# Patient Record
Sex: MALE | Race: WHITE | NOT HISPANIC OR LATINO | Employment: OTHER | URBAN - METROPOLITAN AREA
[De-identification: names, ages, dates, MRNs, and addresses within clinical notes are randomized per-mention and may not be internally consistent; named-entity substitution may affect disease eponyms.]

---

## 2017-02-06 DIAGNOSIS — I10 BENIGN ESSENTIAL HYPERTENSION: Primary | ICD-10-CM

## 2017-02-06 RX ORDER — LISINOPRIL 40 MG/1
40 TABLET ORAL DAILY
Qty: 90 TABLET | Refills: 0 | Status: SHIPPED | OUTPATIENT
Start: 2017-02-06 | End: 2017-05-10

## 2017-02-06 NOTE — TELEPHONE ENCOUNTER
Request for medication refill:    Date of last visit at clinic: 11/08/2016    Please complete refill if appropriate and CLOSE ENCOUNTER.    Closing the encounter signifies the refill is complete.    If refill has been denied, please complete the smart phrase .smirefuse and route it to the [facility] RN TRIAGE pool to inform the patient and the pharmacy.    Keyla Aguillon, CMA

## 2017-02-27 DIAGNOSIS — F33.1 MAJOR DEPRESSIVE DISORDER, RECURRENT EPISODE, MODERATE (H): ICD-10-CM

## 2017-02-27 RX ORDER — CITALOPRAM HYDROBROMIDE 40 MG/1
40 TABLET ORAL DAILY
Qty: 30 TABLET | Refills: 8 | Status: SHIPPED | OUTPATIENT
Start: 2017-02-27 | End: 2017-11-20

## 2017-02-27 NOTE — TELEPHONE ENCOUNTER
Request for medication refill:    Date of last visit at clinic: 11/8/16    Please complete refill if appropriate and CLOSE ENCOUNTER.    Closing the encounter signifies the refill is complete.    If refill has been denied, please complete the smart phrase .smirefuse and route it to the [facility] RN TRIAGE pool to inform the patient and the pharmacy.    Sandy Juares

## 2017-03-10 ENCOUNTER — OFFICE VISIT (OUTPATIENT)
Dept: FAMILY MEDICINE | Facility: CLINIC | Age: 57
End: 2017-03-10

## 2017-03-10 VITALS
OXYGEN SATURATION: 94 % | HEIGHT: 68 IN | WEIGHT: 310.7 LBS | DIASTOLIC BLOOD PRESSURE: 75 MMHG | SYSTOLIC BLOOD PRESSURE: 114 MMHG | BODY MASS INDEX: 47.09 KG/M2 | RESPIRATION RATE: 18 BRPM | TEMPERATURE: 97.9 F | HEART RATE: 81 BPM

## 2017-03-10 DIAGNOSIS — R20.0 NUMBNESS OF FEET: Primary | ICD-10-CM

## 2017-03-10 DIAGNOSIS — G56.21 LESION OF RIGHT ULNAR NERVE: ICD-10-CM

## 2017-03-10 LAB
FOLATE SERPL-MCNC: 59.5 NG/ML
HBA1C MFR BLD: 6.6 % (ref 4.1–5.7)
VIT B12 SERPL-MCNC: 618 PG/ML (ref 193–986)

## 2017-03-10 NOTE — PROGRESS NOTES
SUBJECTIVE:  The patient is here because of worsening problems with numbness and tingling.  First, I asked him how his neck was doing since the last time I talked with him that is what he was dealing with.  That got better with physical therapy so that has resolved.  He did fill out a PHQ-9 and had a score of 10 which all seemed related to his continued problems with sleep apnea.  He is just giving it a break right now since there have been a lot of efforts put into trying to resolve that problem.  However, this problem with the tingling has been a longstanding one.   Unfortunately, it looks like the background information was so long ago that it is not in the records on the computer.  The only mention I could find was something that says lesion of right ulnar nerve and nothing else to go with it.  Patient is not remembering a lot of the details except when it was looked into before, he had the impression that treatment was not all that good and recovery would be very long.  He decided since his symptoms are getting worse that he would look into it some more and see if there have been any advances made.  I told him I really did not know if advances have been made but since his symptoms are worse, it would probably be worth evaluating.  Now he has no symptoms in both arms, mostly in the hands both right and left.  He had had EMGs, nerve conduction tests previously and there had been found a problem in the right ulnar nerve previously.  Again, now it is more continuous and involving more of the hands bilaterally.  The other thing that is new and he says feels completely different is in his feet.  He gets a tingling sensation.  The hands are more of a numb feeling.  The feet, he says the more he sits, he will get it, in certain chairs it is more likely to happen, that comes and goes.  It is not painful at all.  It seems to be more towards the distal part of the feet.      OBJECTIVE:  On exam, the patient is in no acute  distress.  Vital signs are stable including a good blood pressure.  As mentioned his PHQ-9 score was 10, mostly for sleep-related issues.      IMPRESSION:  History of left ulnar nerve problem, symptoms getting worse in the hands bilaterally and now new symptoms of tingling in the feet.      PLAN:  We will go ahead with ordering an EMG for both upper extremities.  As far as the feet tingling, I ordered a hemoglobin A1c, B12 and folate.  I will get back with him through [employer] about those results.  After he has had his EMG, he will send me a MyChart note and we can make plans from there.      Visit length was 25 minutes, all spent counseling about symptoms and plan.

## 2017-03-10 NOTE — MR AVS SNAPSHOT
"              After Visit Summary   3/10/2017    Juan Francisco Booker    MRN: [id number]           Patient Information     Date Of Birth          1960        Visit Information        Provider Department      3/10/2017 1:40 PM Mar Lieberman MD [facility]        Today's Diagnoses     Numbness of feet    -  1       Follow-ups after your visit        Who to contact     Please call your clinic at [phone] to:    Ask questions about your health    Make or cancel appointments    Discuss your medicines    Learn about your test results    Speak to your doctor   If you have compliments or concerns about an experience at your clinic, or if you wish to file a complaint, please contact [facility] Patient Relations at [phone] or email us at [email or web address]         Additional Information About Your Visit        MyChart Information     frentst gives you secure access to your electronic health record. If you see a primary care provider, you can also send messages to your care team and make appointments. If you have questions, please call your primary care clinic.  If you do not have a primary care provider, please call [phone] and they will assist you.      [employer] is an electronic gateway that provides easy, online access to your medical records. With [employer], you can request a clinic appointment, read your test results, renew a prescription or communicate with your care team.     To access your existing account, please contact your [facility] or call [phone] for assistance.        Care EveryWhere ID     This is your Care EveryWhere ID. This could be used by other organizations to access your [facility] medical records  [id number]        Your Vitals Were     Pulse Temperature Respirations Height Pulse Oximetry BMI (Body Mass Index)    81 97.9  F (36.6  C) (Oral) 18 5' 8\" (172.7 cm) 94% 47.24 kg/m2       Blood " Pressure from Last 3 Encounters:   03/10/17 114/75   11/08/16 108/77   10/20/16 113/74    Weight from Last 3 Encounters:   03/10/17 (!) 310 lb 11.2 oz (140.9 kg)   11/08/16 (!) 315 lb (142.9 kg)   10/20/16 (!) 312 lb 6.3 oz (141.7 kg)              We Performed the Following     FOLATE     Hemoglobin A1c (LabDAQ)     Vitamin B12          Today's Medication Changes          These changes are accurate as of: 3/10/17  2:17 PM.  If you have any questions, ask your nurse or doctor.               These medicines have changed or have updated prescriptions.        Dose/Directions    hydrochlorothiazide 25 MG tablet   Commonly known as:  HYDRODIURIL   This may have changed:  additional instructions   Used for:  Benign essential hypertension        Dose:  25 mg   Take 1 tablet (25 mg) by mouth daily   Quantity:  90 tablet   Refills:  1                Primary Care Provider Office Phone # Fax #    Mar Lieberman [phone]       [facility] [address] [address]        Thank you!     Thank you for choosing [facility]  for your care. Our goal is always to provide you with excellent care. Hearing back from our patients is one way we can continue to improve our services. Please take a few minutes to complete the written survey that you may receive in the mail after your visit with us. Thank you!             Your Updated Medication List - Protect others around you: Learn how to safely use, store and throw away your medicines at www.disposemymeds.org.          This list is accurate as of: 3/10/17  2:17 PM.  Always use your most recent med list.                   Brand Name Dispense Instructions for use    aspirin 81 MG tablet      Take 1 tablet by mouth daily AM       atenolol 100 MG tablet    TENORMIN    90 tablet    Take 1 tablet (100 mg) by mouth daily AM       citalopram 40 MG tablet    celeXA    30 tablet    Take 1 tablet (40 mg) by mouth daily 1  tablet (40mg) daily.       DAILY MULTIVITAMIN PO      Take 1 tablet by mouth daily AM       ferrous sulfate 325 (65 FE) MG tablet    IRON    100 tablet    Take 1 tablet (325 mg) by mouth daily (with breakfast)       fluticasone 50 MCG/ACT spray    FLONASE    16 g    Spray 1-2 sprays into both nostrils daily       gabapentin 300 MG capsule    NEURONTIN    270 capsule    Take 3 capsules (900 mg) by mouth At Bedtime Take 900mg orally each night at 2 am       hydrochlorothiazide 25 MG tablet    HYDRODIURIL    90 tablet    Take 1 tablet (25 mg) by mouth daily       lisinopril 40 MG tablet    PRINIVIL/ZESTRIL    90 tablet    Take 1 tablet (40 mg) by mouth daily AM       omega-3 fatty acids 1200 MG capsule      Take 1 capsule by mouth daily AM       order for DME      Use your CPAP device as directed by your provider.       priLOSEC 20 MG CR capsule   Generic drug:  omeprazole      Take 40 mg by mouth At Bedtime HS       simvastatin 40 MG tablet    ZOCOR    30 tablet    Take 1 tablet (40 mg) by mouth At Bedtime       TYLENOL 500 MG tablet   Generic drug:  acetaminophen      Take 2 tablets by mouth every 6 hours as needed.

## 2017-03-12 DIAGNOSIS — E66.9 OBESITY, UNSPECIFIED OBESITY SEVERITY, UNSPECIFIED OBESITY TYPE: Primary | ICD-10-CM

## 2017-03-14 DIAGNOSIS — E66.9 OBESITY, UNSPECIFIED OBESITY SEVERITY, UNSPECIFIED OBESITY TYPE: ICD-10-CM

## 2017-03-14 LAB — GLUCOSE P FAST SERPL-MCNC: 120 MG/DL (ref 51–110)

## 2017-03-15 DIAGNOSIS — R73.03 PREDIABETES: Primary | ICD-10-CM

## 2017-03-16 ASSESSMENT — PATIENT HEALTH QUESTIONNAIRE - PHQ9: SUM OF ALL RESPONSES TO PHQ QUESTIONS 1-9: 10

## 2017-03-22 ENCOUNTER — TELEPHONE (OUTPATIENT)
Dept: FAMILY MEDICINE | Facility: CLINIC | Age: 57
End: 2017-03-22

## 2017-03-22 NOTE — TELEPHONE ENCOUNTER
[facility] phone call message- patient requesting a refill:    Full Medication Name: Atenalol and Hydrocholorothiazide    Dose: 25mg on hyrdro and 100mg atenalol both one time daily    Pharmacy confirmed as    [id number] [place] [facility] - [address] [address]  Phone: [phone] Fax: [phone]  : Yes    Additional Comments: Please refill the patient is out.     OK to leave a message on voice mail? Yes    Primary language: English      needed? No    Call taken on March 22, 2017 at 12:46 PM by Christal Lee

## 2017-03-24 DIAGNOSIS — I15.8 OTHER SECONDARY HYPERTENSION: ICD-10-CM

## 2017-03-24 DIAGNOSIS — I10 BENIGN ESSENTIAL HYPERTENSION: ICD-10-CM

## 2017-03-24 RX ORDER — HYDROCHLOROTHIAZIDE 25 MG/1
25 TABLET ORAL DAILY
Qty: 90 TABLET | Refills: 1 | Status: SHIPPED | OUTPATIENT
Start: 2017-03-24 | End: 2017-08-23

## 2017-03-24 RX ORDER — ATENOLOL 100 MG/1
100 TABLET ORAL DAILY
Qty: 90 TABLET | Refills: 1 | Status: SHIPPED | OUTPATIENT
Start: 2017-03-24 | End: 2017-08-23

## 2017-03-24 NOTE — TELEPHONE ENCOUNTER
Request for medication refill:    Date of last visit at clinic: 3/10/17    Please complete refill if appropriate and CLOSE ENCOUNTER.    Closing the encounter signifies the refill is complete.    If refill has been denied, please complete the smart phrase .smirefuse and route it to the [facility] RN TRIAGE pool to inform the patient and the pharmacy.    Sandy Juares

## 2017-04-10 ENCOUNTER — OFFICE VISIT (OUTPATIENT)
Dept: NEUROLOGY | Facility: CLINIC | Age: 57
End: 2017-04-10

## 2017-04-10 ENCOUNTER — MYC MEDICAL ADVICE (OUTPATIENT)
Dept: FAMILY MEDICINE | Facility: CLINIC | Age: 57
End: 2017-04-10

## 2017-04-10 DIAGNOSIS — G56.03 BILATERAL CARPAL TUNNEL SYNDROME: Primary | ICD-10-CM

## 2017-04-10 DIAGNOSIS — G56.00 CARPAL TUNNEL SYNDROME, UNSPECIFIED LATERALITY: Primary | ICD-10-CM

## 2017-04-10 NOTE — MR AVS SNAPSHOT
After Visit Summary   4/10/2017    Juan Francisco Booker    MRN: [id number]           Patient Information     Date Of Birth          1960        Visit Information        Provider Department      4/10/2017 2:30 PM David Fajardo MD [facility]        Today's Diagnoses     Bilateral carpal tunnel syndrome    -  1       Follow-ups after your visit        Follow-up notes from your care team     Return if symptoms worsen or fail to improve.      Your next 10 appointments already scheduled     Apr 21, 2017  2:30 PM CDT   (Arrive by 2:15 PM)   Office Visit with Micaela Rice RD   [facility] ([facility] [place])    [address]  [address] [facility] [address] [phone]           Bring a current list of meds and any records pertaining to this visit.  For Physicals, please bring immunization records and any forms needing to be filled out.  Please arrive 10 minutes early to complete paperwork.              Who to contact     Please call your clinic at [phone] to:    Ask questions about your health    Make or cancel appointments    Discuss your medicines    Learn about your test results    Speak to your doctor   If you have compliments or concerns about an experience at your clinic, or if you wish to file a complaint, please contact [facility] Patient Relations at [phone] or email us at [email or web address]         Additional Information About Your Visit        MyChart Information     [employer] gives you secure access to your electronic health record. If you see a primary care provider, you can also send messages to your care team and make appointments. If you have questions, please call your primary care clinic.  If you do not have a primary care provider, please call [phone] and they will assist you.      [employer] is an electronic gateway that provides easy, online  access to your medical records. With [email or web address], you can request a clinic appointment, read your test results, renew a prescription or communicate with your care team.     To access your existing account, please contact your [facility] or call [phone] for assistance.        Care EveryWhere ID     This is your Care EveryWhere ID. This could be used by other organizations to access your [facility] medical records  [id number]         Blood Pressure from Last 3 Encounters:   03/10/17 114/75   11/08/16 108/77   10/20/16 113/74    Weight from Last 3 Encounters:   03/10/17 (!) 310 lb 11.2 oz (140.9 kg)   11/08/16 (!) 315 lb (142.9 kg)   10/20/16 (!) 312 lb 6.3 oz (141.7 kg)              We Performed the Following     NCS Motor w or w/o F-Wave, 7 or 8 (54806)     Needle EMG - 1 Extremity  (5 or more muscles w/ 3or more nerves or 4 or more spinal levels) (44245)        Primary Care Provider Office Phone # Fax #    Mar Lieberman [phone]       [facility] [address] [address]        Thank you!     Thank you for choosing [facility]  for your care. Our goal is always to provide you with excellent care. Hearing back from our patients is one way we can continue to improve our services. Please take a few minutes to complete the written survey that you may receive in the mail after your visit with us. Thank you!             Your Updated Medication List - Protect others around you: Learn how to safely use, store and throw away your medicines at www.disposemymeds.org.          This list is accurate as of: 4/10/17 11:59 PM.  Always use your most recent med list.                   Brand Name Dispense Instructions for use    aspirin 81 MG tablet      Take 1 tablet by mouth daily AM       atenolol 100 MG tablet    TENORMIN    90 tablet    Take 1 tablet (100 mg) by mouth daily AM       citalopram  40 MG tablet    celeXA    30 tablet    Take 1 tablet (40 mg) by mouth daily 1 tablet (40mg) daily.       DAILY MULTIVITAMIN PO      Take 1 tablet by mouth daily AM       ferrous sulfate 325 (65 FE) MG tablet    IRON    100 tablet    Take 1 tablet (325 mg) by mouth daily (with breakfast)       fluticasone 50 MCG/ACT spray    FLONASE    16 g    Spray 1-2 sprays into both nostrils daily       gabapentin 300 MG capsule    NEURONTIN    270 capsule    Take 3 capsules (900 mg) by mouth At Bedtime Take 900mg orally each night at 2 am       hydrochlorothiazide 25 MG tablet    HYDRODIURIL    90 tablet    Take 1 tablet (25 mg) by mouth daily AM       lisinopril 40 MG tablet    PRINIVIL/ZESTRIL    90 tablet    Take 1 tablet (40 mg) by mouth daily AM       omega-3 fatty acids 1200 MG capsule      Take 1 capsule by mouth daily AM       order for DME      Use your CPAP device as directed by your provider.       priLOSEC 20 MG CR capsule   Generic drug:  omeprazole      Take 40 mg by mouth At Bedtime HS       simvastatin 40 MG tablet    ZOCOR    30 tablet    Take 1 tablet (40 mg) by mouth At Bedtime       TYLENOL 500 MG tablet   Generic drug:  acetaminophen      Take 2 tablets by mouth every 6 hours as needed.

## 2017-04-10 NOTE — LETTER
4/10/2017       RE: Farrukh Peacock  [address]  [place] [address]     Dear Colleague,    Thank you for referring your patient, Farrukh Peacock, to the [facility] at [facility]. Please see a copy of my visit note below.    RE: FARRUKH PEACOCK : 1960   MRN: [id number] REMINGTON: 04/10/2017     REFERRING:    Mar Lieberman MD, [facility] [place]   [address]     RIGHT AND LEFT UPPER EXTREMITY EMG EXAMINATION    RIGHT UPPER EXTREMITY      CONDUCTION   VELOCITIES STIM. LATENCY  MS AMPLITUDE   DISTANCE  CM   NCV NORMAL   Right Median Nerve Above Elbow 13.3  5 mV     Record  / APB       24 AE-W 49 M/sec >50    F-Wave :  Wrist 8.4  5 mV             Right Ulnar Nerve Above Elbow 8.5  4 mV     Record / ADM      10 AE-BE 54 M/sec >50    F-Wave: Below Elbow 6.7  4 mV          19 BE-W 54 M/sec >50    Wrist 3.2  5 mV                 Right Sensory Median   7.9  5 micro             Right Sensory Ulnar   2.8  20 micro             NEEDLE ELECTRODE EXAMINATION (EMG)   MUSCLE     POS. POT.     FIBS.     FASCIC. MOTOR UNIT ACTION POTENTIALS   Abductor Pollicis Brevis 0 0 0 Normal     1st Dorsal Interosseous   0   0   0   Normal     Pronator Teres   0   0   0   Normal     Extensor Digitorum Communis   0   0   0   Normal     Triceps   0   0   0   Normal          LEFT UPPER EXTREMITY      CONDUCTION   VELOCITIES STIM. LATENCY  MS AMPLITUDE   DISTANCE  CM   NCV NORMAL   Left Median Motor Nerve Above Elbow 11.3  3 mV     Record  / APB       26 AE-W 50 M/sec >50    F-Wave :  Wrist 6.1  3 mV             Left Ulnar Nerve Above Elbow 8.1  5 mV     Record / ADM      10 AE-BE 80 M/sec >50    F-Wave: Below Elbow 6.8  2 mV          18 BE-W 51 M/sec >50    Wrist 3.3  5 mV                 Left Sensory Median  5.7  7 micro               Left Sensory Ulnar  3.0  20 micro             NEEDLE ELECTRODE EXAMINATION (EMG)   MUSCLE     POS.  POT.     FIBS.     FASCIC. MOTOR UNIT ACTION POTENTIALS   Abductor Pollicis Brevis 0 0 0 Normal            This patient is seen for evaluation of bilateral hand and wrist pain and sensory disturbance.    FINDINGS:   Nerve conduction studies of the right arm show markedly prolonged peak latency of the median sensory response with low amplitude.  The median motor response shows a prolonged distal latency with normal amplitude.  The ulnar sensory and motor responses are normal.  Concentric needle EMG of the right arm and hand shows no abnormalities.     Nerve conduction studies of the left arm show prolonged peak latency with low amplitude.  The median motor response on the left shows prolonged distal latency with borderline low amplitude and normal conduction velocity.  The ulnar sensory and motor responses are normal.  EMG of left abductor pollicis brevis shows no abnormalities.       INTERPRETATION:  Abnormal study.  The findings show evidence of:  1.  Median neuropathies at the wrists, as seen in carpal tunnel syndrome.  There is a high degree of involvement with primarily demyelination bilaterally.  The right side is somewhat worse than the left.  2.  No evidence of ulnar neuropathy.   3.  No evidence of radiculopathy involving the right arm.       David Fajardo MD  Department of Neurology   [facility]    cc  Mar Lieberman MD  [place] [address] [address]   [address]      D: 04/10/2017 15:11   T: 04/10/2017 16:35   MT: CORNELIUS    Name:     FARRUKH PEACOCK   MRN:      [id number]        Account:      [id number]   :      1960           Service Date: 04/10/2017    Document: [id number]

## 2017-04-11 NOTE — PROGRESS NOTES
RE: FARRUKH PEACOCK : 1960   MRN: [id number] REMINGTON: 04/10/2017     REFERRING:    Mar Lieberman MD, [place]   [address]     RIGHT AND LEFT UPPER EXTREMITY EMG EXAMINATION    RIGHT UPPER EXTREMITY      CONDUCTION   VELOCITIES STIM. LATENCY  MS AMPLITUDE   DISTANCE  CM   NCV NORMAL   Right Median Nerve Above Elbow 13.3  5 mV     Record  / APB       24 AE-W 49 M/sec >50    F-Wave :  Wrist 8.4  5 mV             Right Ulnar Nerve Above Elbow 8.5  4 mV     Record / ADM      10 AE-BE 54 M/sec >50    F-Wave: Below Elbow 6.7  4 mV          19 BE-W 54 M/sec >50    Wrist 3.2  5 mV                 Right Sensory Median   7.9  5 micro             Right Sensory Ulnar   2.8  20 micro             NEEDLE ELECTRODE EXAMINATION (EMG)   MUSCLE     POS. POT.     FIBS.     FASCIC. MOTOR UNIT ACTION POTENTIALS   Abductor Pollicis Brevis 0 0 0 Normal     1st Dorsal Interosseous   0   0   0   Normal     Pronator Teres   0   0   0   Normal     Extensor Digitorum Communis   0   0   0   Normal     Triceps   0   0   0   Normal          LEFT UPPER EXTREMITY      CONDUCTION   VELOCITIES STIM. LATENCY  MS AMPLITUDE   DISTANCE  CM   NCV NORMAL   Left Median Motor Nerve Above Elbow 11.3  3 mV     Record  / APB       26 AE-W 50 M/sec >50    F-Wave :  Wrist 6.1  3 mV             Left Ulnar Nerve Above Elbow 8.1  5 mV     Record / ADM      10 AE-BE 80 M/sec >50    F-Wave: Below Elbow 6.8  2 mV          18 BE-W 51 M/sec >50    Wrist 3.3  5 mV                 Left Sensory Median  5.7  7 micro               Left Sensory Ulnar  3.0  20 micro             NEEDLE ELECTRODE EXAMINATION (EMG)   MUSCLE     POS. POT.     FIBS.     FASCIC. MOTOR UNIT ACTION POTENTIALS   Abductor Pollicis Brevis 0 0 0 Normal            This patient is seen for evaluation of bilateral hand and wrist pain and sensory disturbance.    FINDINGS:   Nerve conduction studies of the right arm show markedly prolonged peak latency of the median sensory response  with low amplitude.  The median motor response shows a prolonged distal latency with normal amplitude.  The ulnar sensory and motor responses are normal.  Concentric needle EMG of the right arm and hand shows no abnormalities.     Nerve conduction studies of the left arm show prolonged peak latency with low amplitude.  The median motor response on the left shows prolonged distal latency with borderline low amplitude and normal conduction velocity.  The ulnar sensory and motor responses are normal.  EMG of left abductor pollicis brevis shows no abnormalities.       INTERPRETATION:  Abnormal study.  The findings show evidence of:  1.  Median neuropathies at the wrists, as seen in carpal tunnel syndrome.  There is a high degree of involvement with primarily demyelination bilaterally.  The right side is somewhat worse than the left.  2.  No evidence of ulnar neuropathy.   3.  No evidence of radiculopathy involving the right arm.       David Fajardo MD  Department of Neurology   [facility]    cc  Mar Lieberman MD  [address]   [address]      D: 04/10/2017 15:11   T: 04/10/2017 16:35   MT: CORNELIUS    Name:     FARRUKH PEACOCK   MRN:      [id number]        Account:      [id number]   :      1960           Service Date: 04/10/2017    Document: [id number]

## 2017-04-21 ENCOUNTER — OFFICE VISIT (OUTPATIENT)
Dept: EDUCATION SERVICES | Facility: CLINIC | Age: 57
End: 2017-04-21

## 2017-04-21 VITALS — BODY MASS INDEX: 46.95 KG/M2 | HEIGHT: 68 IN | WEIGHT: 309.8 LBS

## 2017-04-21 DIAGNOSIS — R73.09 IMPAIRED GLUCOSE TOLERANCE TEST: Primary | ICD-10-CM

## 2017-04-21 NOTE — PROGRESS NOTES
"  Diabetes Self Management Training: Individual Review Visit    Juan Francisco Booker presents today for education related to Pre-diabetes.    He is accompanied by self    Patient Problem List and Family Medical History reviewed for relevant medical history, current medical status, and diabetes risk factors.    Current Diabetes Management per Patient:  Taking diabetes medications? no    Past Diabetes Education: No    Patient glucose self monitoring as follows: n/a.   FBS test: 120mg/dl     Vitals:  Ht 1.727 m (5' 8\")  Wt (!) 140.5 kg (309 lb 12.8 oz)  BMI 47.1 kg/m2  Estimated body mass index is 47.1 kg/(m^2) as calculated from the following:    Height as of this encounter: 1.727 m (5' 8\").    Weight as of this encounter: 140.5 kg (309 lb 12.8 oz).   Last 3 BP:   BP Readings from Last 3 Encounters:   03/10/17 114/75   11/08/16 108/77   10/20/16 113/74     History   Smoking Status     Never Smoker   Smokeless Tobacco     Never Used       Labs:  Lab Results   Component Value Date    A1C 6.6 03/10/2017     Lab Results   Component Value Date    .0 03/14/2017    .0 09/21/2016     Lab Results   Component Value Date    LDL 79 11/08/2016     HDL Cholesterol   Date Value Ref Range Status   11/08/2016 35 (L) >39 mg/dL Final   ]  GFR Estimate   Date Value Ref Range Status   09/21/2016 82.2 mL/min/1.7 m2 Final     GFR Estimate If Black   Date Value Ref Range Status   09/21/2016 99.4 mL/min/1.7 m2 Final     Lab Results   Component Value Date    CR 1.0 09/21/2016     No results found for: MICROALBUMIN    Nutrition Review:  Tomi is here today per Dr. Mathis for education/nutrition for prediabetes. [facility] is retired, [facility] [place] He tells me he has already made some diet and exercise changes in the past month and as of today he has lost 6#.     Diet Recall:   Breakfast:sleeps late  Lunch:Lean Cuisine or Smart ONes or Weight Watchers MW dinner, or 2-3 large bowls cheerios/skim milk, sometimes with " fruit  PM snack:Microwave meal, 8-9 Outshine low sugar frozen bars, apple or 2 clementines or carrots, or ritz carackers  Dinner:Microwave dinner, sometimes frozen squash, clementines, Outshine bars, water  Evening snack:2-3 large bowls cheerios/skim milk, sometimes clementines, Outshine bars    Eats out in restaurants: about 3-4 times per week: at cafe or restaurant at [employer],   Beverages:drinks sugar free tea, sometimes diet or regular soda  ETOH: none   Cooks meals at home:Javier does not cook for self  Grocery shops: 2per week at [employer]    Physical Activity:    Walks 4000 steps per day or uses treadmill for 15 minutes at 2.7 mph, plus push ups and sit ups and stretching    Gave Javier written and verbal information on general healthy eating, low saturated, low trans fat, low sodium diet. Discussed benefits of moderate weight loss of 5-10%, approximately 2-4# per month for diabetes prevention regarding blood glucose, blood pressure and lipids. Worked with Javier to set goals for improved diet and weight loss. Reviewed results of the Diabetes Prevention Program in detail and downloaded information for Javier about programs in his area if he is interested. Worked with Javier to develop simple meal plan/menus that are convenient for him to follow for a healthy diet and continued weight loss. Javier would like to lose weight to 280# for now. Reviewed exercise recommendations/goals of at least 30 minutes at least 5x/wk.   Education provided today on:  AADE Self-Care Behaviors:  Healthy Eating: weight reduction, heart healthy diet and portion control  Being Active: relationship to blood glucose and describe appropriate activity program    Pt verbalized understanding of concepts discussed and recommendations provided today.         ASSESSMENT: Javier is motivated to lose weight with diet and exercise and has already made lifestyle changes resulting in a 6# weight loss this past month. Verbalized recommendations/goals today. Will f/u in  one month.       PLAN:  See Patient Instructions for co-developed, patient-stated behavior change goals.  AVS printed and provided to patient today.    FOLLOW-UP:  Follow-up appointment scheduled one month.  Chart routed to referring provider.    Time Spent: 90 minutes  Encounter Type: Individual    Any diabetes medication dose changes were made via the CDE Protocol and Collaborative Practice Agreement with the patient's referring provider. A copy of this encounter was shared with the provider.

## 2017-04-21 NOTE — MR AVS SNAPSHOT
After Visit Summary   4/21/2017    Juan Francisco Booker    MRN: [id number]           Patient Information     Date Of Birth          1960        Visit Information        Provider Department      4/21/2017 2:30 PM Micaela Rice RD [facility] Diabetes        Care Instructions    1. You have lost 6 pounds on  Your own in the past month with diet and exercise changes!! That is excellent!  2. Today we discussed the Diabetes Prevention Program and results showing that a 7% weight loss, for you that would be approx a 22 pound weight loss. Your goal is 280#. A healthy rate of weight loss is 2-4 pounds per month.  3. You are doing a great job with your exercise. Gradually increase time to at least 30 minutes per session at least 5 days per week at minimum. Add 5 minutes per week to your current routine.  4. We developed some sample meal plans for you today. Keep track of what you are eating in a journal or spreadsheet.  5. Add Healthy Choice microwave meals to your current choices  6. Sample 2200 calorie menus and 100 calorie snack list provided  7. Try grocery shopping only once/week. Never go hungry. Go with a plan for what you want to buy.  8. Follow up one month, Wednesday May 24th at 12:30  Micaela Rice RD, LD, CDE  Diabetes Care  [address]  [address] [place] [address]  [address]  Phone: [phone]  Appointment line: [phone]  Email: [email or web address]            Follow-ups after your visit        Your next 10 appointments already scheduled     May 30, 2017  3:15 PM CDT   (Arrive by 3:00 PM)   NEW HAND with Aracelis Lowe MD   [facility] ([facility])    [address] [place] [address] [phone]              Who to contact     Please call your clinic at [phone] to:    Ask questions about your health    Make or cancel appointments    Discuss your medicines    Learn about  your test results    Speak to your doctor   If you have compliments or concerns about an experience at your clinic, or if you wish to file a complaint, please contact [facility] Patient Relations at [phone] or email us at [email or web address]         Additional Information About Your Visit        MyChart Information     [employer] gives you secure access to your electronic health record. If you see a primary care provider, you can also send messages to your care team and make appointments. If you have questions, please call your primary care clinic.  If you do not have a primary care provider, please call [phone] and they will assist you.      [employer] is an electronic gateway that provides easy, online access to your medical records. With [employer], you can request a clinic appointment, read your test results, renew a prescription or communicate with your care team.     To access your existing account, please contact your [facility] or call [phone] for assistance.        Care EveryWhere ID     This is your Care EveryWhere ID. This could be used by other organizations to access your [place] medical records  [id number]         Blood Pressure from Last 3 Encounters:   03/10/17 114/75   11/08/16 108/77   10/20/16 113/74    Weight from Last 3 Encounters:   03/10/17 (!) 140.9 kg (310 lb 11.2 oz)   11/08/16 (!) 142.9 kg (315 lb)   10/20/16 (!) 141.7 kg (312 lb 6.3 oz)              Today, you had the following     No orders found for display       Primary Care Provider Office Phone # Fax #    aMr Lieberman [phone]       [facility] [address] [address]        Thank you!     Thank you for choosing [facility]  for your care. Our goal is always to provide you with excellent care. Hearing back from our patients is one way we can continue to improve our services. Please take a few  minutes to complete the written survey that you may receive in the mail after your visit with us. Thank you!             Your Updated Medication List - Protect others around you: Learn how to safely use, store and throw away your medicines at www.disposemymeds.org.          This list is accurate as of: 4/21/17  3:58 PM.  Always use your most recent med list.                   Brand Name Dispense Instructions for use    aspirin 81 MG tablet      Take 1 tablet by mouth daily AM       atenolol 100 MG tablet    TENORMIN    90 tablet    Take 1 tablet (100 mg) by mouth daily AM       citalopram 40 MG tablet    celeXA    30 tablet    Take 1 tablet (40 mg) by mouth daily 1 tablet (40mg) daily.       DAILY MULTIVITAMIN PO      Take 1 tablet by mouth daily AM       ferrous sulfate 325 (65 FE) MG tablet    IRON    100 tablet    Take 1 tablet (325 mg) by mouth daily (with breakfast)       fluticasone 50 MCG/ACT spray    FLONASE    16 g    Spray 1-2 sprays into both nostrils daily       gabapentin 300 MG capsule    NEURONTIN    270 capsule    Take 3 capsules (900 mg) by mouth At Bedtime Take 900mg orally each night at 2 am       hydrochlorothiazide 25 MG tablet    HYDRODIURIL    90 tablet    Take 1 tablet (25 mg) by mouth daily AM       lisinopril 40 MG tablet    PRINIVIL/ZESTRIL    90 tablet    Take 1 tablet (40 mg) by mouth daily AM       omega-3 fatty acids 1200 MG capsule      Take 1 capsule by mouth daily AM       order for DME      Use your CPAP device as directed by your provider.       priLOSEC 20 MG CR capsule   Generic drug:  omeprazole      Take 40 mg by mouth At Bedtime HS       simvastatin 40 MG tablet    ZOCOR    30 tablet    Take 1 tablet (40 mg) by mouth At Bedtime       TYLENOL 500 MG tablet   Generic drug:  acetaminophen      Take 2 tablets by mouth every 6 hours as needed.

## 2017-04-21 NOTE — PATIENT INSTRUCTIONS
1. You have lost 6 pounds on  Your own in the past month with diet and exercise changes!! That is excellent!  2. Today we discussed the Diabetes Prevention Program and results showing that a 7% weight loss, for you that would be approx a 22 pound weight loss. Your goal is 280#. A healthy rate of weight loss is 2-4 pounds per month.  3. You are doing a great job with your exercise. Gradually increase time to at least 30 minutes per session at least 5 days per week at minimum. Add 5 minutes per week to your current routine.  4. We developed some sample meal plans for you today. Keep track of what you are eating in a journal or spreadsheet.  5. Add Healthy Choice microwave meals to your current choices  6. Sample 2200 calorie menus and 100 calorie snack list provided  7. Try grocery shopping only once/week. Never go hungry. Go with a plan for what you want to buy.  8. Follow up one month, Wednesday May 24th at 12:30  Micaela Rice RD, LD, CDE  Diabetes Care  [address]  [address]  [place]  [address]  Phone: [phone]  Appointment line: [phone]  Email: [email or web address]

## 2017-05-10 DIAGNOSIS — I10 BENIGN ESSENTIAL HYPERTENSION: ICD-10-CM

## 2017-05-10 NOTE — TELEPHONE ENCOUNTER
Request for medication refill:    Date of last visit at clinic: 03/10/2017    Please complete refill if appropriate and CLOSE ENCOUNTER.    Closing the encounter signifies the refill is complete.    If refill has been denied, please complete the smart phrase .smirefuse and route it to the [facility] RN TRIAGE pool to inform the patient and the pharmacy.    Keyla Aguillon, CMA

## 2017-05-12 RX ORDER — LISINOPRIL 40 MG/1
40 TABLET ORAL DAILY
Qty: 90 TABLET | Refills: 1 | Status: SHIPPED | OUTPATIENT
Start: 2017-05-12 | End: 2017-06-07

## 2017-05-16 ASSESSMENT — ENCOUNTER SYMPTOMS
JOINT SWELLING: 0
SINUS PAIN: 0
STIFFNESS: 0
MYALGIAS: 0
SINUS CONGESTION: 1
HEADACHES: 1
NUMBNESS: 0
DISTURBANCES IN COORDINATION: 0
SEIZURES: 0
MUSCLE CRAMPS: 0
LOSS OF CONSCIOUSNESS: 0
BACK PAIN: 1
SPEECH CHANGE: 0
PARALYSIS: 0
SMELL DISTURBANCE: 0
NECK MASS: 0
WEAKNESS: 0
TASTE DISTURBANCE: 0
HOARSE VOICE: 0
SORE THROAT: 1
TREMORS: 0
TINGLING: 1
NECK PAIN: 1
MEMORY LOSS: 0
TROUBLE SWALLOWING: 0
ARTHRALGIAS: 0
MUSCLE WEAKNESS: 0

## 2017-05-18 ENCOUNTER — PRE VISIT (OUTPATIENT)
Dept: ORTHOPEDICS | Facility: CLINIC | Age: 57
End: 2017-05-18

## 2017-05-18 NOTE — TELEPHONE ENCOUNTER
1.  Date/reason for appt: 5/30/17 - Carpal Tunnel Syndrome  2.  Referring provider: Dr. Lieberman  3.  Call to patient (Yes / No - short description): no, pt is referred  4.  Previous care at / records requested from:   - [facility] -- records in [facility] with Dr. Lieberman   - [facility] -- Records and EMG 4/10/17 is in [facility] with Dr. Fajardo

## 2017-05-24 ENCOUNTER — OFFICE VISIT (OUTPATIENT)
Dept: EDUCATION SERVICES | Facility: CLINIC | Age: 57
End: 2017-05-24

## 2017-05-24 VITALS — BODY MASS INDEX: 46.42 KG/M2 | WEIGHT: 306.3 LBS | HEIGHT: 68 IN

## 2017-05-24 DIAGNOSIS — R73.03 PREDIABETES: Primary | ICD-10-CM

## 2017-05-24 NOTE — PATIENT INSTRUCTIONS
1. You have lost another 3 pounds! Keep up the good work  2. Great job with exercise, gradually increase over time to 30 minutes at least 5 times per week  3. We devised a sample menu for you today with another small meal at night instead of your usual snacks  4. Continue to log your food daily  5. Follow up June 28th at 1:30 with Micaela Rice RD, HARJIT, CDE  Diabetes Care  [address]  [address]  [address]  Phone: [phone]  Appointment line: [phone]  Email: [email or web address]

## 2017-05-24 NOTE — PROGRESS NOTES
Medical Nutrition Therapy for Diabetes  Visit Type:Reassessment and intervention    Juan Francisco Booker presents today for MNT and education related to prediabetes.   He is accompanied by self.     ASSESSMENT:   Patient comments/concerns relating to nutrition: Juan Francisco brings in food and exercise logs today    NUTRITION HISTORY:    Breakfast: sleeping  Lunch: Microwave frozen dinners and veggies, 4 clementines or Outshine bars and 1/3 box wheat thins  Dinner: walleye/rice/corn or peter sandwich/clementines or salad/tuna crackers  Snacks: 8-9 Outshine bars, 2-3 bowls cereal with skim milk and blueberries, carrots, almonds  Beverages: water    Misses meals? breakfast  Eats out:  3-4 times/week        Food allergies/intolerances: none    Diet is high in: calories  Diet is low in: calcium, fiber and protein    EXERCISE: Tomi is walking 5 x/wk outside or on the treadmill, averaging 15-20 minutes. Goal is 30 minutes.    SOCIO/ECONOMIC:   Lives with: self    BLOOD GLUCOSE MONITORING:  Patient glucose self monitoring as follows: never     Patient's most recent   Lab Results   Component Value Date    A1C 6.6 03/10/2017    is meeting goal of <7.0    MEDICATIONS:  Current Outpatient Prescriptions   Medication     lisinopril (PRINIVIL/ZESTRIL) 40 MG tablet     atenolol (TENORMIN) 100 MG tablet     hydrochlorothiazide (HYDRODIURIL) 25 MG tablet     citalopram (CELEXA) 40 MG tablet     simvastatin (ZOCOR) 40 MG tablet     gabapentin (NEURONTIN) 300 MG capsule     fluticasone (FLONASE) 50 MCG/ACT nasal spray     ferrous sulfate (IRON) 325 (65 FE) MG tablet     ORDER FOR DME     aspirin 81 MG tablet     Omega-3 Fatty Acids (FISH OIL) 1200 MG capsule     Multiple Vitamin (DAILY MULTIVITAMIN PO)     omeprazole (PRILOSEC) 20 MG capsule     acetaminophen (TYLENOL) 500 MG tablet     No current facility-administered medications for this visit.        LABS:  Lab Results   Component Value Date    A1C 6.6 03/10/2017     Lab Results   Component  "Value Date    .0 03/14/2017    .0 09/21/2016     Lab Results   Component Value Date    LDL 79 11/08/2016     HDL Cholesterol   Date Value Ref Range Status   11/08/2016 35 (L) >39 mg/dL Final   ]  GFR Estimate   Date Value Ref Range Status   09/21/2016 82.2 mL/min/1.7 m2 Final     GFR Estimate If Black   Date Value Ref Range Status   09/21/2016 99.4 mL/min/1.7 m2 Final     Lab Results   Component Value Date    CR 1.0 09/21/2016     No results found for: MICROALBUMIN    ANTHROPOMETRICS:  Vitals: Ht 1.727 m (5' 8\")  Wt (!) 138.9 kg (306 lb 4.8 oz)  BMI 46.57 kg/m2  Body mass index is 46.57 kg/(m^2).      Wt Readings from Last 5 Encounters:   05/24/17 (!) 138.9 kg (306 lb 4.8 oz)   04/21/17 (!) 140.5 kg (309 lb 12.8 oz)   03/10/17 (!) 140.9 kg (310 lb 11.2 oz)   11/08/16 (!) 142.9 kg (315 lb)   10/20/16 (!) 141.7 kg (312 lb 6.3 oz)       Weight Change: decrease 3 pounds since initial visit, 10# since pre diabetes diagnosis    NUTRITION DIAGNOSIS: Excessive energy intake related to obesity as evidenced by prediabetes    NUTRITION INTERVENTION:  Gave Tomi lots of positive feedback for making above lifestyle changes resulting in a 3 pound weight loss and improved bg control. Devised menu with Tomi today to help him further improve diet quality and reduce calorie intake for continued weight loss and diabetes prevention.       PATIENT'S BEHAVIOR CHANGE GOALS:   See Patient Instructions for patient stated behavior change goals. AVS was printed and given to patient at today's appointment.    MONITOR / EVALUATE:  RD will monitor/evaluate:  Blood Glucose / A1c  Food and nutrition knowledge / skills  Food / Beverage / Nutrient intake   Pertinent Labs  Weight change    FOLLOW-UP:  Follow-up appointment scheduled on June 18, 2017.       Time spent in minutes: 60  Encounter: Individual  "

## 2017-05-24 NOTE — MR AVS SNAPSHOT
After Visit Summary   5/24/2017    Juan Francisco Booker    MRN: [id number]           Patient Information     Date Of Birth          1960        Visit Information        Provider Department      5/24/2017 12:30 PM Micaela Rice RD [facility] Diabetes        Care Instructions    1. You have lost another 3 pounds! Keep up the good work  2. Great job with exercise, gradually increase over time to 30 minutes at least 5 times per week  3. We devised a sample menu for you today with another small meal at night instead of your usual snacks  4. Continue to log your food daily  5. Follow up June 28th at 1:30 with Micaela Rice RD, LD, CDE  Diabetes Care  [address]  [address]  [place]  [address]  Phone: [phone]  Appointment line: [phone]  Email: [email or web address]                  Follow-ups after your visit        Your next 10 appointments already scheduled     May 30, 2017  3:15 PM CDT   (Arrive by 3:00 PM)   NEW HAND with Aracelis Lowe MD   [facility] ([facility])    [address]  4th Floor  [facility] [id number] [phone]            Jun 07, 2017  2:00 PM CDT   Return Visit with Mar Lieberman MD   [facility] ([facility])    [address],  [address]  [facility] [place] [address]   [phone]              Who to contact     Please call your clinic at [phone] to:    Ask questions about your health    Make or cancel appointments    Discuss your medicines    Learn about your test results    Speak to your doctor   If you have compliments or concerns about an experience at your clinic, or if you wish to file a complaint, please contact [facility] Patient Relations at [phone] or email us at [email or web address]         Additional Information About Your Visit        MyChart Information      [employer] gives you secure access to your electronic health record. If you see a primary care provider, you can also send messages to your care team and make appointments. If you have questions, please call your primary care clinic.  If you do not have a primary care provider, please call [phone] and they will assist you.      [employer] is an electronic gateway that provides easy, online access to your medical records. With [employer], you can request a clinic appointment, read your test results, renew a prescription or communicate with your care team.     To access your existing account, please contact your [facility] or call [phone] for assistance.        Care EveryWhere ID     This is your Care EveryWhere ID. This could be used by other organizations to access your [place] medical records  [id number]         Blood Pressure from Last 3 Encounters:   03/10/17 114/75   11/08/16 108/77   10/20/16 113/74    Weight from Last 3 Encounters:   04/21/17 (!) 140.5 kg (309 lb 12.8 oz)   03/10/17 (!) 140.9 kg (310 lb 11.2 oz)   11/08/16 (!) 142.9 kg (315 lb)              Today, you had the following     No orders found for display       Primary Care Provider Office Phone # Fax #    Mar Lieberman [phone]       [facility] [address] [address]        Thank you!     Thank you for choosing [facility]  for your care. Our goal is always to provide you with excellent care. Hearing back from our patients is one way we can continue to improve our services. Please take a few minutes to complete the written survey that you may receive in the mail after your visit with us. Thank you!             Your Updated Medication List - Protect others around you: Learn how to safely use, store and throw away your medicines at www.disposemymeds.org.          This list is accurate as of: 5/24/17  1:29 PM.  Always use your most recent med list.                    Brand Name Dispense Instructions for use    aspirin 81 MG tablet      Take 1 tablet by mouth daily AM       atenolol 100 MG tablet    TENORMIN    90 tablet    Take 1 tablet (100 mg) by mouth daily AM       citalopram 40 MG tablet    celeXA    30 tablet    Take 1 tablet (40 mg) by mouth daily 1 tablet (40mg) daily.       DAILY MULTIVITAMIN PO      Take 1 tablet by mouth daily AM       ferrous sulfate 325 (65 FE) MG tablet    IRON    100 tablet    Take 1 tablet (325 mg) by mouth daily (with breakfast)       fluticasone 50 MCG/ACT spray    FLONASE    16 g    Spray 1-2 sprays into both nostrils daily       gabapentin 300 MG capsule    NEURONTIN    270 capsule    Take 3 capsules (900 mg) by mouth At Bedtime Take 900mg orally each night at 2 am       hydrochlorothiazide 25 MG tablet    HYDRODIURIL    90 tablet    Take 1 tablet (25 mg) by mouth daily AM       lisinopril 40 MG tablet    PRINIVIL/ZESTRIL    90 tablet    Take 1 tablet (40 mg) by mouth daily AM       omega-3 fatty acids 1200 MG capsule      Take 1 capsule by mouth daily AM       order for DME      Use your CPAP device as directed by your provider.       priLOSEC 20 MG CR capsule   Generic drug:  omeprazole      Take 40 mg by mouth At Bedtime HS       simvastatin 40 MG tablet    ZOCOR    30 tablet    Take 1 tablet (40 mg) by mouth At Bedtime       TYLENOL 500 MG tablet   Generic drug:  acetaminophen      Take 2 tablets by mouth every 6 hours as needed.

## 2017-05-30 ENCOUNTER — OFFICE VISIT (OUTPATIENT)
Dept: ORTHOPEDICS | Facility: CLINIC | Age: 57
End: 2017-05-30

## 2017-05-30 VITALS — BODY MASS INDEX: 46.38 KG/M2 | WEIGHT: 306 LBS | HEIGHT: 68 IN

## 2017-05-30 DIAGNOSIS — G56.00 CARPAL TUNNEL SYNDROME, UNSPECIFIED LATERALITY: Primary | ICD-10-CM

## 2017-05-30 DIAGNOSIS — G56.03 BILATERAL CARPAL TUNNEL SYNDROME: Primary | ICD-10-CM

## 2017-05-30 NOTE — PROGRESS NOTES
[facility], Orthopaedic Hand Surgery    Juan Francisco Booker MRN# [id number]   Age: 57 year old YOB: 1960     Requesting physician: Antelmo Sharma   Primary care physician: Mar Lieberman             History of Present Illness:   Juan Francisco Booker is a 57 year old year old male who presents today for evaluation and management of bilateral carpal tunnel symptoms for > 8 years. Occasional complaints in ulnar distribution as well. Was seen for this many years ago.     Pain Assessment  Patient Currently in Pain: Yes  Pain Orientation: Right, Left  Pain Descriptors: Numbness  Alleviating Factors: Rest  Aggravating Factors:  (Driving)    Hand Dominance Evaluation  Hand Dominance: Right    The paresthesia localizes to the median nerve distribution R > L.    It has been present for approximately several years, recently worsening.    There was not inciting event or trauma.    The pain has been worsening over the more recent history.   Thus far, the patient has not tried inj or bracing.          Past Medical History:     Patient Active Problem List   Diagnosis     Low back pain     Obstructive sleep apnea     Insomnia     Excessive sleepiness     Chronic nasal congestion     Other and unspecified alcohol dependence, unspecified drinking behavior     Lesion of ulnar nerve     Hyperlipidemia     Essential hypertension     Obesity     Benign neoplasm of colon     Hyperlipidemia     Moderate major depression (H)     Gastric polyps     Cervical pain     Impaired glucose tolerance test     Prediabetes     Past Medical History:   Diagnosis Date     Anxiety      Benign neoplasm of colon 3/2/2015     Depression      Depressive disorder 1/15/2000     Difficult intubation      ETOH abuse 3/15/2009    in remission     Gastro-oesophageal reflux disease 1/15/2010     Hypoxemia      Morbid obesity (H)      TIMOTHY on CPAP 8/13/2012    Severe (uses 5-6 hours as able)     Other and unspecified hyperlipidemia 10/25/2012      "Personal history of colonic polyps 2/207/15    Multiple \"neg for FAP\"     Pre-diabetes      Prediabetes 2017     Skin tag      Unspecified essential hypertension 10/25/2012     Prior history of blood clot: none  Prior history of bleeding problems: none  Prior history of anesthetic complications: none  Currently on opioids:  none  History of Diabetes: prediabetic    Lab Results   Component Value Date    A1C 6.6 03/10/2017    A1C 6.3 2016    A1C 6.3 01/10/2014          Past Surgical History:     Past Surgical History:   Procedure Laterality Date     AS COLONOSCOPY THRU STOMA W BIOPSY/CAUTERY TUMOR/POLYP/LESION  2/27/15    colon polyps     COLONOSCOPY  2/27/2015    x 2     COLONOSCOPY WITH CO2 INSUFFLATION N/A 10/20/2016    Procedure: COLONOSCOPY WITH CO2 INSUFFLATION;  Surgeon: Juan Francisco Beltran MD;  Location:  OR     ENT SURGERY  2011    [facility]     ESOPHAGOSCOPY, GASTROSCOPY, DUODENOSCOPY (EGD), COMBINED N/A 10/20/2016    Procedure: COMBINED ESOPHAGOSCOPY, GASTROSCOPY, DUODENOSCOPY (EGD);  Surgeon: Juan Francisco Beltran MD;  Location:  OR            Social History:     Social History     Social History     Marital status: Single     Spouse name: N/A     Number of children: N/A     Years of education: N/A     Occupational History     Not on file.     Social History Main Topics     Smoking status: Never Smoker     Smokeless tobacco: Never Used     Alcohol use 0.0 oz/week     0 Standard drinks or equivalent per week      Comment: In remission since      Drug use: No     Sexual activity: No     Other Topics Concern     Not on file     Social History Narrative    Single.  Lives alone.  Retired.  Worked at [employer].    No children.    1 brother -  of liver cancer, etoh    Mother  of cancer ? Type    Father  - complications of MS     The patient lives in [place] with no others. Retired banker         Family History:       Family History   Problem Relation Age of Onset     Other " Cancer Mother      CANCER Mother      Other Cancer Brother      CANCER Brother      CEREBROVASCULAR DISEASE Maternal Grandfather      Alcohol/Drug Brother      CANCER Brother      pancreatic; liver     Asthma No family hx of      Anesthesia Reaction No family hx of      Colon Polyps No family hx of      Crohn Disease No family hx of      Ulcerative Colitis No family hx of      Colon Cancer No family hx of           Medications:     Current Outpatient Prescriptions   Medication Sig     lisinopril (PRINIVIL/ZESTRIL) 40 MG tablet Take 1 tablet (40 mg) by mouth daily AM     atenolol (TENORMIN) 100 MG tablet Take 1 tablet (100 mg) by mouth daily AM     hydrochlorothiazide (HYDRODIURIL) 25 MG tablet Take 1 tablet (25 mg) by mouth daily AM     citalopram (CELEXA) 40 MG tablet Take 1 tablet (40 mg) by mouth daily 1 tablet (40mg) daily.     simvastatin (ZOCOR) 40 MG tablet Take 1 tablet (40 mg) by mouth At Bedtime     gabapentin (NEURONTIN) 300 MG capsule Take 3 capsules (900 mg) by mouth At Bedtime Take 900mg orally each night at 2 am     fluticasone (FLONASE) 50 MCG/ACT nasal spray Spray 1-2 sprays into both nostrils daily     ferrous sulfate (IRON) 325 (65 FE) MG tablet Take 1 tablet (325 mg) by mouth daily (with breakfast) (Patient not taking: Reported on 3/10/2017)     ORDER FOR DME Use your CPAP device as directed by your provider.     aspirin 81 MG tablet Take 1 tablet by mouth daily AM     Omega-3 Fatty Acids (FISH OIL) 1200 MG capsule Take 1 capsule by mouth daily AM     Multiple Vitamin (DAILY MULTIVITAMIN PO) Take 1 tablet by mouth daily AM     omeprazole (PRILOSEC) 20 MG capsule Take 40 mg by mouth At Bedtime HS     acetaminophen (TYLENOL) 500 MG tablet Take 2 tablets by mouth every 6 hours as needed.     No current facility-administered medications for this visit.           Review of Systems:   A comprehensive 10 point review of systems (constitutional, ENT, cardiac, peripheral vascular, lymphatic, respiratory,  "GI, , Musculoskeletal, skin, Neurological) was performed and documented in the intake form and at the end of this note.  Answers for HPI/ROS submitted by the patient on 5/16/2017   General Symptoms: No  Skin Symptoms: No  HENT Symptoms: Yes  EYE SYMPTOMS: No  HEART SYMPTOMS: No  LUNG SYMPTOMS: No  INTESTINAL SYMPTOMS: No  URINARY SYMPTOMS: No  REPRODUCTIVE SYMPTOMS: No  SKELETAL SYMPTOMS: Yes  BLOOD SYMPTOMS: No  NERVOUS SYSTEM SYMPTOMS: Yes  MENTAL HEALTH SYMPTOMS: No  Ear pain: No  Ear discharge: No  Hearing loss: No  Tinnitus: No  Nosebleeds: No  Congestion: Yes  Sinus pain: No  Trouble swallowing: No   Voice hoarseness: No  Mouth sores: No  Sore throat: Yes  Tooth pain: No  Gum tenderness: No  Bleeding gums: No  Change in taste: No  Change in sense of smell: No  Dry mouth: Yes  Hearing aid used: No  Neck lump: No  Back pain: Yes  Muscle aches: No  Neck pain: Yes  Swollen joints: No  Joint pain: No  Bone pain: Yes  Muscle cramps: No  Muscle weakness: No  Joint stiffness: No  Bone fracture: No  Trouble with coordination: No  Fainting or black-out spells: No  Memory loss: No  Headache: Yes  Seizures: No  Speech problems: No  Tingling: Yes  Tremor: No  Weakness: No  Difficulty walking: No  Paralysis: No  Numbness: No         Physical Exam:     EXAMINATION pertinent findings:   VITAL SIGNS: Height 1.727 m (5' 8\"), weight (!) 138.8 kg (306 lb).  Body mass index is 46.53 kg/(m^2).  GEN: AOx3, appears stated age, cooperative, no distress  HEENT: normocephalic, atraumatic, multiple skin tags  RESP: non labored breathing   SKIN: No rashes or open lesions on hands  CV: Non-tachycardic   NEURO: CN2-12 grossly intact   VASCULAR: palpable radial pulse   MUSCULOSKELETAL:     BUE exam:   Tenderness to palpation: no  Warmth and erythema: no  Prior incision: no  Sensation: intact in Med/uln/rad distributions  2pt sense intact at 5mm in all digits    Motor: Fires EPL/FPL/AIN/IO  Deformity: no    Tinnel/star/phalen  R -/+/-  L " -/+/-         Data:   Imaging:     XR of bilateral wrist reviewed and the pertinent findings are as follows:     CLINICAL HISTORY: Carpal tunnel syndrome.    FINDINGS: AP, oblique, and lateral views of the bilateral wrists were  obtained. Bones are well aligned. The joint spaces are  well-maintained. No displaced fractures. Well-corticated bone fragment  along the dorsal aspect of the right wrist, possibly related to remote  trauma.    IMPRESSION: No acute bone abnormality in either wrist.      Measurements   force  R hand  level 1 force: 20.4 kg (45 lb)  R hand  level 3 force: 36.3 kg (80 lb)  R hand  level 5 force: 23.6 kg (52 lb)  L hand   level 1 force: 22.7 kg (50 lb)  L hand  level 3 force: 34 kg (75 lb)  L hand  level 5 force: 25.9 kg (57 lb)    Pinch force  R hand key force: 5.443 kg (12 lb)  L hand key force: 5.897 kg (13 lb)    QuickDASH Assessment  QuickDA Main 5/16/2017   1.Open a tight or new jar. Unable to answer   2. Do heavy household chores (e.g., wash walls, floors) No difficulty   3. Carry a shopping bag or briefcase. No difficulty   4. Wash your back. No difficulty   5. Use a knife to cut food. No difficulty   6. Recreational activities in which you take some force or impact through your arm, shoulder or hand (e.g., golf, hammering, tennis, etc.). No difficulty   7. During the past week, to what extent has your arm, shoulder or hand problem interfered with your normal social activities with family, friends, neighbours or groups? Moderately   8. During the past week, were you limited in your work or other regular daily activities as a result of your arm, shoulder or hand problem? Slightly limited   9. Arm, shoulder or hand pain. Mild   10.Tingling (pins and needles) in your arm,shoulder or hand. Severe   11. During the past week, how much difficulty have you had sleeping because of the pain in your arm, shoulder or hand? ([place] number) No difficulty   Quickdash  Ability Score 13.63               Assessment and Plan:   Assessment:  1. Bilateral clinical and EMG+ carpal tunnel syndrome     Plan:  1. Plan for CTR on side of pt preference followed by other side when recovered    I discussed non-operative and operative treatment options with the patient.  Specifically, I discussed open carpal tunnel release.  I have described the procedure, post-operative protocol, and expected outcomes.  I also discussed the risks of surgery which include, but are not limited to, bleeding, infection, nerve or vessel damage, wound healing problems, persistent pain, persistent numbness, and the possibility for further surgery.  Anesthetic risks are rare but include an adverse reaction to local anesthesia.  After a full discussion of risks, benefits, and alternatives to surgery, the patient has elected to proceed.  We will look for a mutually convenient date to schedule.    The patient plan of care was formulated along with Dr. Mynor Araujo MD  Orthopedic Resident  05/30/2017    I have independently seen and evaluated the patient and agree with the findings and plan of care as documented by the resident and edited by me.

## 2017-05-30 NOTE — MR AVS SNAPSHOT
After Visit Summary   5/30/2017    Juan Francisco Booker    MRN: [id number]           Patient Information     Date Of Birth          1960        Visit Information        Provider Department      5/30/2017 3:15 PM Aracelis Lowe MD [facility]        Today's Diagnoses     Bilateral carpal tunnel syndrome    -  1       Follow-ups after your visit        Your next 10 appointments already scheduled     Jun 27, 2017 11:00 AM CDT   (Arrive by 10:45 AM)   Post-Op with RYAN U ORTHO HAND POP   [facility] ([facility] [place])    [address] [place] [address]  [address]th [facility] [id number] [phone]            Jun 28, 2017  1:30 PM CDT   (Arrive by 1:15 PM)   Office Visit with Micaela Rice RD   [facility] ([facility])    [address] [place] [address]  3rd [facility] [id number] [phone]           Bring a current list of meds and any records pertaining to this visit.  For Physicals, please bring immunization records and any forms needing to be filled out.  Please arrive 10 minutes early to complete paperwork.              Who to contact     Please call your clinic at [phone] to:    Ask questions about your health    Make or cancel appointments    Discuss your medicines    Learn about your test results    Speak to your doctor   If you have compliments or concerns about an experience at your clinic, or if you wish to file a complaint, please contact [facility] Patient Relations at [phone] or email us at [email or web address]         Additional Information About Your Visit        MyChart Information     [employer] gives you secure access to your electronic health record. If you see a primary care provider, you can also send messages to your care team and make appointments. If you have questions, please call your primary care clinic.  If you do not have a primary care provider,  "please call [phone] and they will assist you.      Pharmaxis is an electronic gateway that provides easy, online access to your medical records. With Pharmaxis, you can request a clinic appointment, read your test results, renew a prescription or communicate with your care team.     To access your existing account, please contact your [facility] or call [phone] for assistance.        Care EveryWhere ID     This is your Care EveryWhere ID. This could be used by other organizations to access your [place] medical records  [id number]        Your Vitals Were     Height BMI (Body Mass Index)                1.727 m (5' 8\") 46.53 kg/m2           Blood Pressure from Last 3 Encounters:   06/14/17 132/76   06/07/17 101/63   03/10/17 114/75    Weight from Last 3 Encounters:   06/07/17 (!) 137.4 kg (303 lb)   05/30/17 (!) 138.8 kg (306 lb)   05/24/17 (!) 138.9 kg (306 lb 4.8 oz)              We Performed the Following     Huong-Operative Worksheet        Primary Care Provider Office Phone # Fax #    Mar Lieberman [phone]       [facility] [address] [id number]        Equal Access to Services     DAX BURGOS : Hadii aad ku hadasho Soomaali, waaxda luqadaha, qaybta kaalmada adeegyada, christiano amaral haychery waters . So [facility] [phone].    ATENCIÓN: Si habla español, tiene a beal disposición servicios gratuitos de asistencia lingüística. Margie al [phone].    We comply with applicable federal civil rights laws and [place] laws. We do not discriminate on the basis of race, color, national origin, age, disability sex, sexual orientation or gender identity.            Thank you!     Thank you for choosing [facility]  for your care. Our goal is always to provide you with excellent care. Hearing back from our patients is one way we can continue to improve our services. Please take a few minutes to " complete the written survey that you may receive in the mail after your visit with us. Thank you!             Your Updated Medication List - Protect others around you: Learn how to safely use, store and throw away your medicines at www.disposemymeds.org.          This list is accurate as of: 5/30/17 11:59 PM.  Always use your most recent med list.                   Brand Name Dispense Instructions for use Diagnosis    aspirin 81 MG tablet      Take 1 tablet by mouth daily AM        atenolol 100 MG tablet    TENORMIN    90 tablet    Take 1 tablet (100 mg) by mouth daily AM    Other secondary hypertension       citalopram 40 MG tablet    celeXA    30 tablet    Take 1 tablet (40 mg) by mouth daily 1 tablet (40mg) daily.    Major depressive disorder, recurrent episode, moderate (H)       DAILY MULTIVITAMIN PO      Take 1 tablet by mouth daily AM        fluticasone 50 MCG/ACT spray    FLONASE    16 g    Spray 1-2 sprays into both nostrils daily        gabapentin 300 MG capsule    NEURONTIN    270 capsule    Take 3 capsules (900 mg) by mouth At Bedtime Take 900mg orally each night at 2 am    Restless legs syndrome (RLS)       hydrochlorothiazide 25 MG tablet    HYDRODIURIL    90 tablet    Take 1 tablet (25 mg) by mouth daily AM    Benign essential hypertension       omega-3 fatty acids 1200 MG capsule      Take 1 capsule by mouth daily AM        order for DME      Use your CPAP device as directed by your provider.        priLOSEC 20 MG CR capsule   Generic drug:  omeprazole      Take 40 mg by mouth At Bedtime HS        simvastatin 40 MG tablet    ZOCOR    30 tablet    Take 1 tablet (40 mg) by mouth At Bedtime    Hyperlipidemia LDL goal <130       TYLENOL 500 MG tablet   Generic drug:  acetaminophen      Take 2 tablets by mouth every 6 hours as needed.

## 2017-05-30 NOTE — NURSING NOTE
Teaching Flowsheet   Relevant Diagnosis: Carpal tunnel syndrome  Teaching Topic: Pre-op for right carpal tunnel release - surgery coordinator will call to schedule     Person(s) involved in teaching:   Patient     Motivation Level:  Asks Questions: Yes  Eager to Learn: Yes  Cooperative: Yes  Receptive (willing/able to accept information): Yes  Any cultural factors/Worship beliefs that may influence understanding or compliance? No     Patient demonstrates understanding of the following:  Reason for the appointment, diagnosis and treatment plan: Yes  Knowledge of proper use of medications and conditions for which they are ordered (with special attention to potential side effects or drug interactions): Yes - local only  Which situations necessitate calling provider and whom to contact: Yes    H&P by Dr Lowe on day of surgery  Patient lives alone - will ask friend to assist with transportation  Given wrist brace     Proper use and care of post-op dressing (medical equip, care aids, etc.): Yes  Nutritional needs and diet plan: N/A  - no food or fluid restrictions  Pain management techniques: Yes  Wound Care: Yes  How and/when to access community resources: Yes     Instructional Materials Used/Given: Pre-op packet, surgical soap     Time spent with patient: 12.

## 2017-05-30 NOTE — NURSING NOTE
"Reason For Visit:   Chief Complaint   Patient presents with     Musculoskeletal Problem     Bilateral carpal tunnel (EMG 4/10/17), previously evaluation Dr Mar Lieberman (PCP)     Primary/Ref MD: Mar Lieberman    Age: 57 year old    ?  No      Ht 1.727 m (5' 8\")  Wt (!) 138.8 kg (306 lb)  BMI 46.53 kg/m2      Pain Assessment  Patient Currently in Pain: Yes  Pain Orientation: Right, Left  Pain Descriptors: Numbness  Alleviating Factors: Rest  Aggravating Factors:  (Driving)    Hand Dominance Evaluation  Hand Dominance: Right  Pinch force  R hand key force: 5.443 kg (12 lb)  L hand key force: 5.897 kg (13 lb)   force  R hand  level 1 force: 20.4 kg (45 lb)  R hand  level 3 force: 36.3 kg (80 lb)  R hand  level 5 force: 23.6 kg (52 lb)  L hand   level 1 force: 22.7 kg (50 lb)  L hand  level 3 force: 34 kg (75 lb)  L hand  level 5 force: 25.9 kg (57 lb)    QuickDASH Assessment  QuickDASH Main 5/16/2017   1.Open a tight or new jar. Unable to answer   2. Do heavy household chores (e.g., wash walls, floors) No difficulty   3. Carry a shopping bag or briefcase. No difficulty   4. Wash your back. No difficulty   5. Use a knife to cut food. No difficulty   6. Recreational activities in which you take some force or impact through your arm, shoulder or hand (e.g., golf, hammering, tennis, etc.). No difficulty   7. During the past week, to what extent has your arm, shoulder or hand problem interfered with your normal social activities with family, friends, neighbours or groups? Moderately   8. During the past week, were you limited in your work or other regular daily activities as a result of your arm, shoulder or hand problem? Slightly limited   9. Arm, shoulder or hand pain. Mild   10.Tingling (pins and needles) in your arm,shoulder or hand. Severe   11. During the past week, how much difficulty have you had sleeping because of the pain in your arm, shoulder or hand? ([place] " number) No difficulty   Quickdash Ability Score 13.63          Current Outpatient Prescriptions   Medication Sig Dispense Refill     lisinopril (PRINIVIL/ZESTRIL) 40 MG tablet Take 1 tablet (40 mg) by mouth daily AM 90 tablet 1     atenolol (TENORMIN) 100 MG tablet Take 1 tablet (100 mg) by mouth daily AM 90 tablet 1     hydrochlorothiazide (HYDRODIURIL) 25 MG tablet Take 1 tablet (25 mg) by mouth daily AM 90 tablet 1     citalopram (CELEXA) 40 MG tablet Take 1 tablet (40 mg) by mouth daily 1 tablet (40mg) daily. 30 tablet 8     simvastatin (ZOCOR) 40 MG tablet Take 1 tablet (40 mg) by mouth At Bedtime 30 tablet 11     gabapentin (NEURONTIN) 300 MG capsule Take 3 capsules (900 mg) by mouth At Bedtime Take 900mg orally each night at 2 am 270 capsule 3     fluticasone (FLONASE) 50 MCG/ACT nasal spray Spray 1-2 sprays into both nostrils daily 16 g 3     ferrous sulfate (IRON) 325 (65 FE) MG tablet Take 1 tablet (325 mg) by mouth daily (with breakfast) (Patient not taking: Reported on 3/10/2017) 100 tablet 2     ORDER FOR DME Use your CPAP device as directed by your provider.       aspirin 81 MG tablet Take 1 tablet by mouth daily AM       Omega-3 Fatty Acids (FISH OIL) 1200 MG capsule Take 1 capsule by mouth daily AM       Multiple Vitamin (DAILY MULTIVITAMIN PO) Take 1 tablet by mouth daily AM       omeprazole (PRILOSEC) 20 MG capsule Take 40 mg by mouth At Bedtime HS       acetaminophen (TYLENOL) 500 MG tablet Take 2 tablets by mouth every 6 hours as needed.         Allergies   Allergen Reactions     Grass

## 2017-06-07 ENCOUNTER — OFFICE VISIT (OUTPATIENT)
Dept: FAMILY MEDICINE | Facility: CLINIC | Age: 57
End: 2017-06-07

## 2017-06-07 VITALS
HEART RATE: 79 BPM | OXYGEN SATURATION: 96 % | WEIGHT: 303 LBS | RESPIRATION RATE: 20 BRPM | SYSTOLIC BLOOD PRESSURE: 101 MMHG | DIASTOLIC BLOOD PRESSURE: 63 MMHG | TEMPERATURE: 98.2 F | BODY MASS INDEX: 46.07 KG/M2

## 2017-06-07 DIAGNOSIS — I10 BENIGN ESSENTIAL HYPERTENSION: ICD-10-CM

## 2017-06-07 DIAGNOSIS — R10.13 DYSPEPSIA: ICD-10-CM

## 2017-06-07 DIAGNOSIS — G62.9 PERIPHERAL POLYNEUROPATHY: Primary | ICD-10-CM

## 2017-06-07 RX ORDER — LISINOPRIL 40 MG/1
40 TABLET ORAL DAILY
Qty: 90 TABLET | Refills: 1 | Status: SHIPPED | OUTPATIENT
Start: 2017-06-07 | End: 2018-05-17

## 2017-06-07 RX ORDER — GABAPENTIN 300 MG/1
CAPSULE ORAL
Qty: 150 CAPSULE | Refills: 3 | Status: SHIPPED | OUTPATIENT
Start: 2017-06-07 | End: 2017-12-18

## 2017-06-07 NOTE — PROGRESS NOTES
SUBJECTIVE:  The patient is here for followup.  This is to talk further about his abnormal sensations in his feet.  He ends up having carpal tunnel syndrome of his hands and is going to have surgery on his right hand in about a week.  They did diagnose him as having prediabetes and he has lost 12 pounds since November.  I congratulated him on that.  He did see the nutritionist to get some counseling about that.  He is getting regular exercise, walking about 30 minutes a day.  He does have a treadmill that he can use when the weather is bad.  Mainly we are talking about his feet.  We talked about this at his last visit that was approximately 3 months ago.  He does not describe it as a burning but a tingling.  It is on the soles primarily but sometimes it will go on the dorsum of the feet and then occasionally up to the ankles.  He does get this daily but is not constant.  It may be gone for a few hours at a time.  He notices it more with sitting and lying down.  He also mentioned that he has had some dyspepsia issues and omeprazole worked for a while and now it does not work so well.  I tried to give him a different PPI but his insurance would not give him anything else.  Now he has changed insurance so he wanted to see if we could give a different PPI this time.      OBJECTIVE:  On exam, the patient is in no acute distress.  Vital signs are stable.  His blood pressure is a little bit on the low side now that he has lost some weight.  We talked about watching that and he may be able to cut down on his blood pressure meds if this continues to be low.  His feet had a normal color and normal warmth and the appearance was normal.  I did monofilament testing of both feet and the sensation there was a normal.  He is currently on gabapentin 900 mg at 2:00 in the morning.  The patient has different hours.  [facility] by [facility] to try to help with restless leg syndrome.  He has not noticed that it has been  helpful for that.        IMPRESSION:  Tingling in the feet seems to be a peripheral neuropathy.  I forgot to mention that we had checked previously B12, folate and hemoglobin A1c which were normal except for his prediabetic level of hemoglobin A1c.  GI symptoms not responding very well to omeprazole.      PLAN:  We talked about the option of just doing a Neurology referral versus trying something for peripheral neuropathy and he was okay with just trying something for peripheral neuropathy.  So he is going to try a higher dose of gabapentin or more than just 1 dose a day.  We will start out at 300 mg in the morning, 300 at midday and then continue on 900 mg at 2:00 in the morning.  He will wait to start this until after his surgery is done for the carpal tunnel.  Certainly if we cannot get his symptoms any better we can refer to Neurology.  As far as the different PPI, we will try to Nexium and see if that is covered.  If not, hopefully we can find out what his new insurance does cover.      Visit length was 25 minutes, more than 50% spent in counseling about symptoms and plan.

## 2017-06-07 NOTE — MR AVS SNAPSHOT
After Visit Summary   6/7/2017    Juan Francisco Booker    MRN: [id number]           Patient Information     Date Of Birth          1960        Visit Information        Provider Department      6/7/2017 2:00 PM Mar Lieberman MD [facility]        Today's Diagnoses     Peripheral polyneuropathy (H)    -  1    Benign essential hypertension           Follow-ups after your visit        Your next 10 appointments already scheduled     Jun 14, 2017   Procedure with Aracelis Lowe MD   [facility] ([facility] [place])    [address] [place] [address]  5th [facility] [id number] [phone]           Located in the Clinics and Surgery Center at [address] [place] [address].   parking is very convenient and highly recommended.  is a $6 flat rate fee.  Both  and self parkers should enter the main arrival plaza from [facility]; parking attendants will direct you based on your parking preference.            Jun 27, 2017 11:00 AM CDT   (Arrive by 10:45 AM)   Post-Op with  U ORTHO HAND POP   [facility] ([facility] [place])    [address] [place] [address]  4th Floor  [facility] [id number] [phone]            Jun 28, 2017  1:30 PM CDT   (Arrive by 1:15 PM)   Office Visit with Micaela Rice RD   [facility] ([facility] [place])    [address] [place] [address]  3rd Floor  [facility] [id number] [phone]           Bring a current list of meds and any records pertaining to this visit.  For Physicals, please bring immunization records and any forms needing to be filled out.  Please arrive 10 minutes early to complete paperwork.              Who to contact     Please call your clinic at [phone] to:    Ask questions about your health    Make or cancel appointments    Discuss your medicines    Learn about your test results    Speak to your doctor   If  you have compliments or concerns about an experience at your clinic, or if you wish to file a complaint, please contact [facility] Patient Relations at [phone] or email us at [email or web address]         Additional Information About Your Visit        MyChart Information     [employer] gives you secure access to your electronic health record. If you see a primary care provider, you can also send messages to your care team and make appointments. If you have questions, please call your primary care clinic.  If you do not have a primary care provider, please call [phone] and they will assist you.      [employer] [employer] is an electronic gateway that provides easy, online access to your medical records. With [employer], you can request a clinic appointment, read your test results, renew a prescription or communicate with your care team.     To access your existing account, please contact your [facility] or call [phone] for assistance.        Care EveryWhere ID     This is your Care EveryWhere ID. This could be used by other organizations to access your [facility] medical records  [id number]        Your Vitals Were     Pulse Temperature Respirations Pulse Oximetry BMI (Body Mass Index)       79 98.2  F (36.8  C) (Oral) 20 96% 46.07 kg/m2        Blood Pressure from Last 3 Encounters:   06/07/17 101/63   03/10/17 114/75   11/08/16 108/77    Weight from Last 3 Encounters:   06/07/17 (!) 303 lb (137.4 kg)   05/30/17 (!) 306 lb (138.8 kg)   05/24/17 (!) 306 lb 4.8 oz (138.9 kg)              Today, you had the following     No orders found for display         Today's Medication Changes          These changes are accurate as of: 6/7/17  2:42 PM.  If you have any questions, ask your nurse or doctor.               These medicines have changed or have updated prescriptions.        Dose/Directions    * gabapentin 300 MG capsule   Commonly known as:  NEURONTIN   This  may have changed:  Another medication with the same name was added. Make sure you understand how and when to take each.   Used for:  Restless legs syndrome (RLS)   Changed by:  Wellington Varma MD        Dose:  900 mg   Take 3 capsules (900 mg) by mouth At Bedtime Take 900mg orally each night at 2 am   Quantity:  270 capsule   Refills:  3       * gabapentin 300 MG capsule   Commonly known as:  NEURONTIN   This may have changed:  You were already taking a medication with the same name, and this prescription was added. Make sure you understand how and when to take each.   Used for:  Peripheral polyneuropathy (H)   Changed by:  Mar Lieberman MD        1 in morning, 1 at midday, 3 at night   Quantity:  150 capsule   Refills:  3       * Notice:  This list has 2 medication(s) that are the same as other medications prescribed for you. Read the directions carefully, and ask your doctor or other care provider to review them with you.         Where to get your medicines      These medications were sent to [place] [address] [place] [address] [address]     Phone:  [phone]     gabapentin 300 MG capsule    lisinopril 40 MG tablet                Primary Care Provider Office Phone # Fax #    Mar Lieberman [phone]       [facility] [address] [address]        Thank you!     Thank you for choosing [facility]  for your care. Our goal is always to provide you with excellent care. Hearing back from our patients is one way we can continue to improve our services. Please take a few minutes to complete the written survey that you may receive in the mail after your visit with us. Thank you!             Your Updated Medication List - Protect others around you: Learn how to safely use, store and throw away your medicines at www.disposemymeds.org.          This list is accurate as of: 6/7/17  2:42 PM.   Always use your most recent med list.                   Brand Name Dispense Instructions for use    aspirin 81 MG tablet      Take 1 tablet by mouth daily AM       atenolol 100 MG tablet    TENORMIN    90 tablet    Take 1 tablet (100 mg) by mouth daily AM       citalopram 40 MG tablet    celeXA    30 tablet    Take 1 tablet (40 mg) by mouth daily 1 tablet (40mg) daily.       DAILY MULTIVITAMIN PO      Take 1 tablet by mouth daily AM       fluticasone 50 MCG/ACT spray    FLONASE    16 g    Spray 1-2 sprays into both nostrils daily       * gabapentin 300 MG capsule    NEURONTIN    270 capsule    Take 3 capsules (900 mg) by mouth At Bedtime Take 900mg orally each night at 2 am       * gabapentin 300 MG capsule    NEURONTIN    150 capsule    1 in morning, 1 at midday, 3 at night       hydrochlorothiazide 25 MG tablet    HYDRODIURIL    90 tablet    Take 1 tablet (25 mg) by mouth daily AM       lisinopril 40 MG tablet    PRINIVIL/ZESTRIL    90 tablet    Take 1 tablet (40 mg) by mouth daily AM       omega-3 fatty acids 1200 MG capsule      Take 1 capsule by mouth daily AM       order for DME      Use your CPAP device as directed by your provider.       priLOSEC 20 MG CR capsule   Generic drug:  omeprazole      Take 40 mg by mouth At Bedtime HS       simvastatin 40 MG tablet    ZOCOR    30 tablet    Take 1 tablet (40 mg) by mouth At Bedtime       TYLENOL 500 MG tablet   Generic drug:  acetaminophen      Take 2 tablets by mouth every 6 hours as needed.       * Notice:  This list has 2 medication(s) that are the same as other medications prescribed for you. Read the directions carefully, and ask your doctor or other care provider to review them with you.

## 2017-06-14 ENCOUNTER — HOSPITAL ENCOUNTER (OUTPATIENT)
Facility: AMBULATORY SURGERY CENTER | Age: 57
End: 2017-06-14
Attending: ORTHOPAEDIC SURGERY

## 2017-06-14 ENCOUNTER — SURGERY (OUTPATIENT)
Age: 57
End: 2017-06-14

## 2017-06-14 VITALS
SYSTOLIC BLOOD PRESSURE: 132 MMHG | DIASTOLIC BLOOD PRESSURE: 76 MMHG | TEMPERATURE: 97.1 F | OXYGEN SATURATION: 93 % | RESPIRATION RATE: 16 BRPM

## 2017-06-14 DIAGNOSIS — G56.01 CARPAL TUNNEL SYNDROME OF RIGHT WRIST: Primary | ICD-10-CM

## 2017-06-14 RX ORDER — HYDROCODONE BITARTRATE AND ACETAMINOPHEN 5; 325 MG/1; MG/1
1-2 TABLET ORAL EVERY 4 HOURS PRN
Qty: 15 TABLET | Refills: 0 | Status: SHIPPED | OUTPATIENT
Start: 2017-06-14 | End: 2017-06-27

## 2017-06-14 NOTE — BRIEF OP NOTE
Orthopedic Brief Operative Note    June 14, 2017    Pre-operative diagnosis: Right Carpal Tunnel Syndrome   Post-operative diagnosis: Same   Procedure: Right Open Carpal Tunnel Release   Surgeon: Aracelis Lowe   Assistant(s): Lenny Tellez MS4   Anesthesia: Local anesthesia   Estimated blood loss: Minimal   Total IV fluids: (See anesthesia record)  None   Drains: None   Specimens: None   Implants: None   Findings: See operative report   Complications: None   Condition: Stable   Weight bearing status: Weight bearing as tolerated   Activity: Activity as tolerated  Patient can move independently   Plan: Soft splint x 4 days, then removal brace until follow-up appt   Follow-up: 12 days in clinic    Aracelis Lowe MD  [phone]

## 2017-06-14 NOTE — DISCHARGE INSTRUCTIONS
[facility]  Home Care Following Your Procedure  Call a doctor if you have signs of infection (fever, growing tenderness at the surgery site, a large amount of drainage or bleeding, severe pain, foul-smelling drainage, redness, swelling).  Your doctor is:  Dr. Aracelis Lowe, Orthopaedics: [phone]                                    Or dial [phone] and ask for the resident on call for:  Orthopaedics  For emergency care, call the:  [facility]: [phone] (TTY for hearing impaired: [phone])

## 2017-06-14 NOTE — IP AVS SNAPSHOT
MRN:[id number]                      After Visit Summary   6/14/2017    Juan Francisco Booker    MRN: [id number]           Thank you!     Thank you for choosing [facility] for your care. Our goal is always to provide you with excellent care. Hearing back from our patients is one way we can continue to improve our services. Please take a few minutes to complete the written survey that you may receive in the mail after you visit with us. Thank you!        Patient Information     Date Of Birth          1960        About your hospital stay     You were [facility] on:  June 14, 2017 [facility]    You were discharged on:  June 14, 2017       Who to Call     For medical emergencies, please call 911.  For non-urgent questions about your medical care, please call your primary care provider or clinic, [phone]  For questions related to your surgery, please call your surgery clinic        Attending Provider     Provider Aracelis Barajas MD Orthopedics       Primary Care Provider Office Phone # Fax #    Mar Lieberman [phone]      After Care Instructions      Diet as Tolerated       Return to diet before surgery, unless instructed otherwise.            Discharge Instructions       Review outpatient procedure discharge instructions with patient as directed by Provider            Dressing Change       Keep dressing clean, dry, and intact for five days.  Then ok to remove dressing, get incision wet in shower, and transition to removable wrist brace.  No soaking incision until sutures removed.            Ice to affected area       Ice pack to surgical site every 15 minutes per hour for 24 hours            Notify Provider       For signs and symptoms of infection: Fever greater than 101, redness, swelling, heat at site, drainage, pus.            Return to clinic       Return to clinic in 2 weeks            Weight  bearing - Partial       No lifting more than a coffee cup.  Finger ROM encouraged beginning today.                  Your next 10 appointments already scheduled     Jun 27, 2017 11:00 AM CDT   (Arrive by 10:45 AM)   Post-Op with RYAN U ORTHO HAND POP   [facility] ([facility])    [address]  4th Floor  [facility] [address] [phone]            Jun 28, 2017  1:30 PM CDT   (Arrive by 1:15 PM)   Office Visit with Micaela Rice RD   [facility] ([facility])    [address]  3rd Floor  [facility] [address] [phone]           Bring a current list of meds and any records pertaining to this visit.  For Physicals, please bring immunization records and any forms needing to be filled out.  Please arrive 10 minutes early to complete paperwork.              Further instructions from your care team       [facility]  Home Care Following Your Procedure  Call a doctor if you have signs of infection (fever, growing tenderness at the surgery site, a large amount of drainage or bleeding, severe pain, foul-smelling drainage, redness, swelling).  Your doctor is:  Dr. Aracelis Lowe, Orthopaedics: [phone]                                    Or dial [phone] and ask for the resident on call for:  Orthopaedics  For emergency care, call the:  [facility]: [phone] (TTY for hearing impaired: [phone])                Pending Results     No orders found from 6/12/2017 to 6/15/2017.            Admission Information     Date & Time Provider Department Dept. Phone    6/14/2017 Aracelis Lowe MD [facility] [phone]      Your Vitals Were     Blood Pressure Temperature Respirations Pulse Oximetry          119/76 98.3  F (36.8  C) (Oral) 14 95%        MyChart Information     [employer] gives you secure access to your electronic health record. If you see a  primary care provider, you can also send messages to your care team and make appointments. If you have questions, please call your primary care clinic.  If you do not have a primary care provider, please call [phone] and they will assist you.      [employer] is an electronic gateway that provides easy, online access to your medical records. With [employer], you can request a clinic appointment, read your test results, renew a prescription or communicate with your care team.     To access your existing account, please contact your [facility] or call [phone] for assistance.        Care EveryWhere ID     This is your Care EveryWhere ID. This could be used by other organizations to access your [facility] medical records  [id number]           Review of your medicines      START taking        Dose / Directions    HYDROcodone-acetaminophen 5-325 MG per tablet   Commonly known as:  NORCO   Used for:  Carpal tunnel syndrome of right wrist        Dose:  1-2 tablet   Take 1-2 tablets by mouth every 4 hours as needed for other (Moderate to Severe Pain)   Quantity:  15 tablet   Refills:  0         CONTINUE these medicines which have NOT CHANGED        Dose / Directions    aspirin 81 MG tablet        Dose:  1 tablet   Take 1 tablet by mouth daily AM   Refills:  0       atenolol 100 MG tablet   Commonly known as:  TENORMIN   Used for:  Other secondary hypertension        Dose:  100 mg   Take 1 tablet (100 mg) by mouth daily AM   Quantity:  90 tablet   Refills:  1       citalopram 40 MG tablet   Commonly known as:  celeXA   Used for:  Major depressive disorder, recurrent episode, moderate (H)        Dose:  40 mg   Take 1 tablet (40 mg) by mouth daily 1 tablet (40mg) daily.   Quantity:  30 tablet   Refills:  8       DAILY MULTIVITAMIN PO        Dose:  1 tablet   Take 1 tablet by mouth daily AM   Refills:  0       esomeprazole 20 MG CR capsule   Commonly known as:  nexIUM   Used for:  Dyspepsia         Dose:  20 mg   Take 1 capsule (20 mg) by mouth every morning (before breakfast) Take 30-60 minutes before eating.   Quantity:  30 capsule   Refills:  1       fluticasone 50 MCG/ACT spray   Commonly known as:  FLONASE        Dose:  1-2 spray   Spray 1-2 sprays into both nostrils daily   Quantity:  16 g   Refills:  3       * gabapentin 300 MG capsule   Commonly known as:  NEURONTIN   Used for:  Restless legs syndrome (RLS)        Dose:  900 mg   Take 3 capsules (900 mg) by mouth At Bedtime Take 900mg orally each night at 2 am   Quantity:  270 capsule   Refills:  3       * gabapentin 300 MG capsule   Commonly known as:  NEURONTIN   Used for:  Peripheral polyneuropathy (H)        1 in morning, 1 at midday, 3 at night   Quantity:  150 capsule   Refills:  3       hydrochlorothiazide 25 MG tablet   Commonly known as:  HYDRODIURIL   Used for:  Benign essential hypertension        Dose:  25 mg   Take 1 tablet (25 mg) by mouth daily AM   Quantity:  90 tablet   Refills:  1       lisinopril 40 MG tablet   Commonly known as:  PRINIVIL/ZESTRIL   Used for:  Benign essential hypertension        Dose:  40 mg   Take 1 tablet (40 mg) by mouth daily AM   Quantity:  90 tablet   Refills:  1       omega-3 fatty acids 1200 MG capsule        Dose:  1 capsule   Take 1 capsule by mouth daily AM   Refills:  0       order for DME        Use your CPAP device as directed by your provider.   Refills:  0       priLOSEC 20 MG CR capsule   Generic drug:  omeprazole        Dose:  40 mg   Take 40 mg by mouth At Bedtime HS   Refills:  0       simvastatin 40 MG tablet   Commonly known as:  ZOCOR   Used for:  Hyperlipidemia LDL goal <130        Dose:  40 mg   Take 1 tablet (40 mg) by mouth At Bedtime   Quantity:  30 tablet   Refills:  11       TYLENOL 500 MG tablet   Generic drug:  acetaminophen        Dose:  2 tablet   Take 2 tablets by mouth every 6 hours as needed.   Refills:  0       * Notice:  This list has 2 medication(s) that are the same as  other medications prescribed for you. Read the directions carefully, and ask your doctor or other care provider to review them with you.         Where to get your medicines      Some of these will need a paper prescription and others can be bought over the counter. Ask your nurse if you have questions.     Bring a paper prescription for each of these medications     HYDROcodone-acetaminophen 5-325 MG per tablet                Protect others around you: Learn how to safely use, store and throw away your medicines at www.disposemymeds.org.             Medication List: This is a list of all your medications and when to take them. Check marks below indicate your daily home schedule. Keep this list as a reference.      Medications           Morning Afternoon Evening Bedtime As Needed    aspirin 81 MG tablet   Take 1 tablet by mouth daily AM                                atenolol 100 MG tablet   Commonly known as:  TENORMIN   Take 1 tablet (100 mg) by mouth daily AM                                citalopram 40 MG tablet   Commonly known as:  celeXA   Take 1 tablet (40 mg) by mouth daily 1 tablet (40mg) daily.                                DAILY MULTIVITAMIN PO   Take 1 tablet by mouth daily AM                                esomeprazole 20 MG CR capsule   Commonly known as:  nexIUM   Take 1 capsule (20 mg) by mouth every morning (before breakfast) Take 30-60 minutes before eating.                                fluticasone 50 MCG/ACT spray   Commonly known as:  FLONASE   Spray 1-2 sprays into both nostrils daily                                * gabapentin 300 MG capsule   Commonly known as:  NEURONTIN   Take 3 capsules (900 mg) by mouth At Bedtime Take 900mg orally each night at 2 am                                * gabapentin 300 MG capsule   Commonly known as:  NEURONTIN   1 in morning, 1 at midday, 3 at night                                hydrochlorothiazide 25 MG tablet   Commonly known as:  HYDRODIURIL   Take 1  tablet (25 mg) by mouth daily AM                                HYDROcodone-acetaminophen 5-325 MG per tablet   Commonly known as:  NORCO   Take 1-2 tablets by mouth every 4 hours as needed for other (Moderate to Severe Pain)                                lisinopril 40 MG tablet   Commonly known as:  PRINIVIL/ZESTRIL   Take 1 tablet (40 mg) by mouth daily AM                                omega-3 fatty acids 1200 MG capsule   Take 1 capsule by mouth daily AM                                order for DME   Use your CPAP device as directed by your provider.                                priLOSEC 20 MG CR capsule   Take 40 mg by mouth At Bedtime HS   Generic drug:  omeprazole                                simvastatin 40 MG tablet   Commonly known as:  ZOCOR   Take 1 tablet (40 mg) by mouth At Bedtime                                TYLENOL 500 MG tablet   Take 2 tablets by mouth every 6 hours as needed.   Generic drug:  acetaminophen                                * Notice:  This list has 2 medication(s) that are the same as other medications prescribed for you. Read the directions carefully, and ask your doctor or other care provider to review them with you.

## 2017-06-14 NOTE — IP AVS SNAPSHOT
[facility]    [address] [place] [address]    Phone:  [phone]    Fax:  [phone]                                       After Visit Summary   6/14/2017    Juan Francisco Booker    MRN: [id number]           After Visit Summary Signature Page     I have received my discharge instructions, and my questions have been answered. I have discussed any challenges I see with this plan with the nurse or doctor.    ..........................................................................................................................................  Patient/Patient Representative Signature      ..........................................................................................................................................  Patient Representative Print Name and Relationship to Patient    ..................................................               ................................................  Date                                            Time    ..........................................................................................................................................  Reviewed by Signature/Title    ...................................................              ..............................................  Date                                                            Time

## 2017-06-22 ASSESSMENT — ENCOUNTER SYMPTOMS
SORE THROAT: 0
NECK MASS: 0
SMELL DISTURBANCE: 0
TASTE DISTURBANCE: 0
SINUS CONGESTION: 1
HOARSE VOICE: 0
TROUBLE SWALLOWING: 0
SINUS PAIN: 0

## 2017-06-27 ENCOUNTER — OFFICE VISIT (OUTPATIENT)
Dept: ORTHOPEDICS | Facility: CLINIC | Age: 57
End: 2017-06-27

## 2017-06-27 DIAGNOSIS — G56.00 CARPAL TUNNEL SYNDROME, UNSPECIFIED LATERALITY: Primary | ICD-10-CM

## 2017-06-27 NOTE — MR AVS SNAPSHOT
After Visit Summary   6/27/2017    Juan Francisco Booker    MRN: [id number]           Patient Information     Date Of Birth          1960        Visit Information        Provider Department      6/27/2017 11:00 AM [facility]        Today's Diagnoses     Carpal tunnel syndrome, unspecified laterality    -  1       Follow-ups after your visit        Follow-up notes from your care team     Return if symptoms worsen or fail to improve.      Your next 10 appointments already scheduled     Jun 28, 2017  1:30 PM CDT   (Arrive by 1:15 PM)   Office Visit with Micaela Rice RD   [facility] ([facility])    [address]  [address] [facility] [address] [phone]           Bring a current list of meds and any records pertaining to this visit.  For Physicals, please bring immunization records and any forms needing to be filled out.  Please arrive 10 minutes early to complete paperwork.              Who to contact     Please call your clinic at [phone] to:    Ask questions about your health    Make or cancel appointments    Discuss your medicines    Learn about your test results    Speak to your doctor   If you have compliments or concerns about an experience at your clinic, or if you wish to file a complaint, please contact [facility] Patient Relations at [phone] or email us at [email or web address]         Additional Information About Your Visit        MyChart Information     [employer] gives you secure access to your electronic health record. If you see a primary care provider, you can also send messages to your care team and make appointments. If you have questions, please call your primary care clinic.  If you do not have a primary care provider, please call [phone] and they will assist you.      [employer] is an electronic gateway that provides easy, online access to your medical  records. With [employer], you can request a clinic appointment, read your test results, renew a prescription or communicate with your care team.     To access your existing account, please contact your [facility] or call [phone] for assistance.        Care EveryWhere ID     This is your Care EveryWhere ID. This could be used by other organizations to access your [place] medical records  [id number]         Blood Pressure from Last 3 Encounters:   06/14/17 132/76   06/07/17 101/63   03/10/17 114/75    Weight from Last 3 Encounters:   06/07/17 (!) 137.4 kg (303 lb)   05/30/17 (!) 138.8 kg (306 lb)   05/24/17 (!) 138.9 kg (306 lb 4.8 oz)              Today, you had the following     No orders found for display       Primary Care Provider Office Phone # Fax #    Mar Lieberman [phone]       [facility] [address] [address]        Equal Access to Services     [facility]: Hadii aad ku hadasho Soomaali, waaxda luqadaha, qaybta kaalmada adeegyada, christiano waters . So [facility] [phone].    ATENCIÓN: Si habla español, tiene a beal disposición servicios gratuitos de asistencia lingüística. Margie al [phone].    We comply with applicable federal civil rights laws and [place] laws. We do not discriminate on the basis of race, color, national origin, age, disability sex, sexual orientation or gender identity.            Thank you!     Thank you for choosing [facility]  for your care. Our goal is always to provide you with excellent care. Hearing back from our patients is one way we can continue to improve our services. Please take a few minutes to complete the written survey that you may receive in the mail after your visit with us. Thank you!             Your Updated Medication List - Protect others around you: Learn how to safely use, store and throw away your medicines at  www.disposemymeds.org.          This list is accurate as of: 6/27/17 11:24 AM.  Always use your most recent med list.                   Brand Name Dispense Instructions for use Diagnosis    aspirin 81 MG tablet      Take 1 tablet by mouth daily AM        atenolol 100 MG tablet    TENORMIN    90 tablet    Take 1 tablet (100 mg) by mouth daily AM    Other secondary hypertension       citalopram 40 MG tablet    celeXA    30 tablet    Take 1 tablet (40 mg) by mouth daily 1 tablet (40mg) daily.    Major depressive disorder, recurrent episode, moderate (H)       DAILY MULTIVITAMIN PO      Take 1 tablet by mouth daily AM        esomeprazole 20 MG CR capsule    nexIUM    30 capsule    Take 1 capsule (20 mg) by mouth every morning (before breakfast) Take 30-60 minutes before eating.    Dyspepsia       fluticasone 50 MCG/ACT spray    FLONASE    16 g    Spray 1-2 sprays into both nostrils daily        * gabapentin 300 MG capsule    NEURONTIN    270 capsule    Take 3 capsules (900 mg) by mouth At Bedtime Take 900mg orally each night at 2 am    Restless legs syndrome (RLS)       * gabapentin 300 MG capsule    NEURONTIN    150 capsule    1 in morning, 1 at midday, 3 at night    Peripheral polyneuropathy (H)       hydrochlorothiazide 25 MG tablet    HYDRODIURIL    90 tablet    Take 1 tablet (25 mg) by mouth daily AM    Benign essential hypertension       lisinopril 40 MG tablet    PRINIVIL/ZESTRIL    90 tablet    Take 1 tablet (40 mg) by mouth daily AM    Benign essential hypertension       omega-3 fatty acids 1200 MG capsule      Take 1 capsule by mouth daily AM        order for DME      Use your CPAP device as directed by your provider.        priLOSEC 20 MG CR capsule   Generic drug:  omeprazole      Take 40 mg by mouth At Bedtime HS        simvastatin 40 MG tablet    ZOCOR    30 tablet    Take 1 tablet (40 mg) by mouth At Bedtime    Hyperlipidemia LDL goal <130       TYLENOL 500 MG tablet   Generic drug:  acetaminophen       Take 2 tablets by mouth every 6 hours as needed.        * Notice:  This list has 2 medication(s) that are the same as other medications prescribed for you. Read the directions carefully, and ask your doctor or other care provider to review them with you.

## 2017-06-27 NOTE — PROGRESS NOTES
Mr Booker came into clinic for a two week post op check.   He is status post right open carpal tunnel release.      Assessment:  The incision is clean and intact with no erythema or drainage noted.  Sutures were removed without difficulty and steri-strips were applied to the site.  He reports numbness and tingling in the fingers has decreased.  He has good digital range of motion and states he has minimal pain.  He reports he has been wearing the wrist brace full-time.      Plan:  Mr Booker was told he can wean out of the brace.  He was encouraged to watch for signs of infection at the incisional site and call for any questions or concerns.  Otherwise he will follow-up on an as needed basis.

## 2017-06-28 ENCOUNTER — OFFICE VISIT (OUTPATIENT)
Dept: EDUCATION SERVICES | Facility: CLINIC | Age: 57
End: 2017-06-28

## 2017-06-28 VITALS — WEIGHT: 303.9 LBS | HEIGHT: 68 IN | BODY MASS INDEX: 46.06 KG/M2

## 2017-06-28 DIAGNOSIS — R73.03 PREDIABETES: Primary | ICD-10-CM

## 2017-06-28 DIAGNOSIS — R73.09 IMPAIRED GLUCOSE TOLERANCE TEST: ICD-10-CM

## 2017-06-28 NOTE — PROGRESS NOTES
Medical Nutrition Therapy  Visit Type:Reassessment and intervention    Juan Francisco Booker presents today for MNT and education related to prediabetes.   He is accompanied by self.     ASSESSMENT:   Patient comments/concerns relating to nutrition: Juan Francisco returns for f/u visit for prediabetes. He brings his daily logs for review. He tells me he is now up to 28 minutes when he walks and can also walk 4000 steps. He started at 2000 steps.     NUTRITION HISTORY:    Breakfast: sleeps  Lunch: Microwave Healthy Choice/Lean Cuisine/Weight Watchers, 2-4 clementines, sometimes frozen veg, water, 3 Outshine bars  Dinner: same as lunch, plus vegetables everyday  Snacks: 2 bowls cheerios/skim milk and turkey/cheese sandwich, 6-8 Outshine bars  Beverages: water    Misses meals? breakfast  Eats out:  2-3 times/wk when he goes to Jazz clubs to listen to music. Typically appetizers/bar food     Previous diet education:  Yes     Food allergies/intolerances: none    MEDICATIONS:  Current Outpatient Prescriptions   Medication     lisinopril (PRINIVIL/ZESTRIL) 40 MG tablet     gabapentin (NEURONTIN) 300 MG capsule     esomeprazole (NEXIUM) 20 MG CR capsule     atenolol (TENORMIN) 100 MG tablet     hydrochlorothiazide (HYDRODIURIL) 25 MG tablet     citalopram (CELEXA) 40 MG tablet     simvastatin (ZOCOR) 40 MG tablet     gabapentin (NEURONTIN) 300 MG capsule     fluticasone (FLONASE) 50 MCG/ACT nasal spray     ORDER FOR DME     aspirin 81 MG tablet     Omega-3 Fatty Acids (FISH OIL) 1200 MG capsule     Multiple Vitamin (DAILY MULTIVITAMIN PO)     omeprazole (PRILOSEC) 20 MG capsule     acetaminophen (TYLENOL) 500 MG tablet     No current facility-administered medications for this visit.        LABS:  Last Basic Metabolic Panel:  Lab Results   Component Value Date    .3 09/21/2016      Lab Results   Component Value Date    POTASSIUM 3.5 10/20/2016     Lab Results   Component Value Date    CHLORIDE 95.7 09/21/2016     Lab Results  "  Component Value Date    ZHEN 9.3 09/21/2016     Lab Results   Component Value Date    CO2 29.6 09/21/2016     Lab Results   Component Value Date    BUN 18.7 09/21/2016     Lab Results   Component Value Date    CR 1.0 09/21/2016     Lab Results   Component Value Date    .0 03/14/2017    .0 09/21/2016       ANTHROPOMETRICS:  Vitals: Ht 1.727 m (5' 8\")  Wt (!) 137.8 kg (303 lb 14.4 oz)  BMI 46.21 kg/m2  Body mass index is 46.21 kg/(m^2).      Wt Readings from Last 5 Encounters:   06/28/17 (!) 137.8 kg (303 lb 14.4 oz)   06/07/17 (!) 137.4 kg (303 lb)   05/30/17 (!) 138.8 kg (306 lb)   05/24/17 (!) 138.9 kg (306 lb 4.8 oz)   04/21/17 (!) 140.5 kg (309 lb 12.8 oz)       Weight Change: minus 3#, total weight loss since April 2017 of 12#    NUTRITION INTERVENTION:  Gave Juan Francisco lots of positive feedback for making above lifestyle changes resulting in a 3 pound weight loss and improved bg control since our last visit, with a total weight loss of 12# since 4/17. Reviewed importance of continued weight loss for diabetes prevention and worked with Juan Francisco to set continuing goals towards ongoing weight loss with diet and exercise. Encouraged Juan Francisco to make a follow up appointment with his PCP to monitor progress regarding his prediabetes.    PATIENT'S BEHAVIOR CHANGE GOALS:   See Patient Instructions for patient stated behavior change goals. AVS was printed and given to patient at today's appointment.    MONITOR / EVALUATE:  RD will monitor/evaluate:  Blood Glucose / A1c  Food and nutrition knowledge / skills  Food / Beverage / Nutrient intake   Pertinent Labs  Progress toward meeting stated nutrition-related goals  Weight change    FOLLOW-UP:  Follow-up appointment scheduled on August 7th at 2:30.       Time spent in minutes: 45  Encounter: Individual    "

## 2017-06-28 NOTE — PATIENT INSTRUCTIONS
1. You have done a great job again, you have lost another 3 pounds for a total weight loss since April of 12 pounds!  2. Your goals today:   Stop eating cereal in the evening, especially late at night. A turkey sandwich and veggies is good.   Limit Outshine Bars to 6 or 1/2 box   Increase walking to 35 minutes at least 5 times/week  3. Continue to log your food and weight and exercise  4. Follow up August 7th at 2:30  Micaela Rice RD, HARJIT, CDE  Diabetes Care  [address]  [address]  [place] [address]  Phone: [phone]  Appointment line: [phone]  Email: [email or web address]

## 2017-06-28 NOTE — MR AVS SNAPSHOT
After Visit Summary   6/28/2017    Juan Farncisco Booker    MRN: [id number]           Patient Information     Date Of Birth          1960        Visit Information        Provider Department      6/28/2017 1:30 PM Micaela Rice RD  Health Diabetes        Care Instructions    1. You have done a great job again, you have lost another 3 pounds for a total weight loss since April of 12 pounds!  2. Your goals today:   Stop eating cereal in the evening, especially late at night. A turkey sandwich and veggies is good.   Limit Outshine Bars to 6 or 1/2 box   Increase walking to 35 minutes at least 5 times/week  3. Continue to log your food and weight and exercise  4. Follow up August 7th at 2:30  Micaela Rice RD, LD, CDE  Diabetes Care  [address]  [address]  [place] [address]  Phone: [phone]  Appointment line: [phone]  Email: [email or web address]                Follow-ups after your visit        Who to contact     Please call your clinic at [phone] to:    Ask questions about your health    Make or cancel appointments    Discuss your medicines    Learn about your test results    Speak to your doctor   If you have compliments or concerns about an experience at your clinic, or if you wish to file a complaint, please contact [facility] Patient Relations at [phone] or email us at [email or web address]         Additional Information About Your Visit        MyChart Information     [employer] gives you secure access to your electronic health record. If you see a primary care provider, you can also send messages to your care team and make appointments. If you have questions, please call your primary care clinic.  If you do not have a primary care provider, please call [phone] and they will assist you.      [employer] is an electronic gateway that provides easy, online access to your medical records. With  Johnny, you can request a clinic appointment, read your test results, renew a prescription or communicate with your care team.     To access your existing account, please contact your [facility] or call [phone] for assistance.        Care EveryWhere ID     This is your Care EveryWhere ID. This could be used by other organizations to access your [place] medical records  [id number]         Blood Pressure from Last 3 Encounters:   06/14/17 132/76   06/07/17 101/63   03/10/17 114/75    Weight from Last 3 Encounters:   06/07/17 (!) 137.4 kg (303 lb)   05/30/17 (!) 138.8 kg (306 lb)   05/24/17 (!) 138.9 kg (306 lb 4.8 oz)              Today, you had the following     No orders found for display       Primary Care Provider Office Phone # Fax #    Mar Lieberman [phone]       [facility] [address] [address]        Equal Access to Services     [facility] : Hadii aad ku hadasho Soomaali, waaxda luqadaha, qaybta kaalmada adeegyada, christiano amaral haychery waters . So [facility] [phone].    ATENCIÓN: Si habla español, tiene a beal disposición servicios gratuitos de asistencia lingüística. Llame al [phone].    We comply with applicable federal civil rights laws and [place] laws. We do not discriminate on the basis of race, color, national origin, age, disability sex, sexual orientation or gender identity.            Thank you!     Thank you for choosing [facility]  for your care. Our goal is always to provide you with excellent care. Hearing back from our patients is one way we can continue to improve our services. Please take a few minutes to complete the written survey that you may receive in the mail after your visit with us. Thank you!             Your Updated Medication List - Protect others around you: Learn how to safely use, store and throw away your medicines at www.disposemymeds.org.           This list is accurate as of: 6/28/17  2:14 PM.  Always use your most recent med list.                   Brand Name Dispense Instructions for use Diagnosis    aspirin 81 MG tablet      Take 1 tablet by mouth daily AM        atenolol 100 MG tablet    TENORMIN    90 tablet    Take 1 tablet (100 mg) by mouth daily AM    Other secondary hypertension       citalopram 40 MG tablet    celeXA    30 tablet    Take 1 tablet (40 mg) by mouth daily 1 tablet (40mg) daily.    Major depressive disorder, recurrent episode, moderate (H)       DAILY MULTIVITAMIN PO      Take 1 tablet by mouth daily AM        esomeprazole 20 MG CR capsule    nexIUM    30 capsule    Take 1 capsule (20 mg) by mouth every morning (before breakfast) Take 30-60 minutes before eating.    Dyspepsia       fluticasone 50 MCG/ACT spray    FLONASE    16 g    Spray 1-2 sprays into both nostrils daily        * gabapentin 300 MG capsule    NEURONTIN    270 capsule    Take 3 capsules (900 mg) by mouth At Bedtime Take 900mg orally each night at 2 am    Restless legs syndrome (RLS)       * gabapentin 300 MG capsule    NEURONTIN    150 capsule    1 in morning, 1 at midday, 3 at night    Peripheral polyneuropathy (H)       hydrochlorothiazide 25 MG tablet    HYDRODIURIL    90 tablet    Take 1 tablet (25 mg) by mouth daily AM    Benign essential hypertension       lisinopril 40 MG tablet    PRINIVIL/ZESTRIL    90 tablet    Take 1 tablet (40 mg) by mouth daily AM    Benign essential hypertension       omega-3 fatty acids 1200 MG capsule      Take 1 capsule by mouth daily AM        order for DME      Use your CPAP device as directed by your provider.        priLOSEC 20 MG CR capsule   Generic drug:  omeprazole      Take 40 mg by mouth At Bedtime HS        simvastatin 40 MG tablet    ZOCOR    30 tablet    Take 1 tablet (40 mg) by mouth At Bedtime    Hyperlipidemia LDL goal <130       TYLENOL 500 MG tablet   Generic drug:  acetaminophen      Take 2 tablets by mouth every 6  hours as needed.        * Notice:  This list has 2 medication(s) that are the same as other medications prescribed for you. Read the directions carefully, and ask your doctor or other care provider to review them with you.

## 2017-07-14 NOTE — TELEPHONE ENCOUNTER
Pending Prescriptions:                       Disp   Refills    fluticasone (FLONASE) 50 MCG/ACT spray    16 g   3            Sig: Spray 1-2 sprays into both nostrils daily    Last Written Prescription Date:  7/11/2016  Last Fill Quantity: 16.0 g,   # refills: 3  Last Office Visit with [facility], [facility] or [facility] prescribing provider: 7/11/2016  Future Office visit:   No follow up scheduled at this time.      ALESSANDRA Brewer

## 2017-07-16 RX ORDER — FLUTICASONE PROPIONATE 50 MCG
1-2 SPRAY, SUSPENSION (ML) NASAL DAILY
Qty: 16 G | Refills: 3 | Status: SHIPPED | OUTPATIENT
Start: 2017-07-16 | End: 2017-10-26

## 2017-08-02 DIAGNOSIS — J31.0 CHRONIC RHINITIS, UNSPECIFIED TYPE: Primary | ICD-10-CM

## 2017-08-07 ENCOUNTER — TELEPHONE (OUTPATIENT)
Dept: SLEEP MEDICINE | Facility: CLINIC | Age: 57
End: 2017-08-07

## 2017-08-07 NOTE — TELEPHONE ENCOUNTER
Spoke with patient to schedule with Dr. Kristen Grimaldo ENT. Patient took the number once he can he will contact them for scheduling. Patient aware offices in [facility] on [address].

## 2017-08-22 ENCOUNTER — MYC MEDICAL ADVICE (OUTPATIENT)
Dept: ORTHOPEDICS | Facility: CLINIC | Age: 57
End: 2017-08-22

## 2017-08-23 DIAGNOSIS — I15.8 OTHER SECONDARY HYPERTENSION: ICD-10-CM

## 2017-08-23 DIAGNOSIS — I10 BENIGN ESSENTIAL HYPERTENSION: ICD-10-CM

## 2017-08-23 RX ORDER — ATENOLOL 100 MG/1
100 TABLET ORAL DAILY
Qty: 90 TABLET | Refills: 0 | Status: SHIPPED | OUTPATIENT
Start: 2017-08-23 | End: 2017-10-26

## 2017-08-23 RX ORDER — HYDROCHLOROTHIAZIDE 25 MG/1
25 TABLET ORAL DAILY
Qty: 90 TABLET | Refills: 0 | Status: SHIPPED | OUTPATIENT
Start: 2017-08-23 | End: 2017-11-20

## 2017-08-23 NOTE — TELEPHONE ENCOUNTER
Patient contacted concerning scheduling surgery.  He stated his right wrist is still sore.  Requests an appointment with Dr Lowe before scheduling the left carpal tunnel release.  Appointment scheduled 09/12/2017.

## 2017-08-24 ENCOUNTER — TELEPHONE (OUTPATIENT)
Dept: FAMILY MEDICINE | Facility: CLINIC | Age: 57
End: 2017-08-24

## 2017-08-24 NOTE — TELEPHONE ENCOUNTER
"[facility] phone call message- medication clarification/question:    Full Medication Name: atenolol (TENORMIN) 100 MG tablet   Dose: Take 1 tablet (100 mg) by mouth daily AM    Question: Per Daniel, Pharmacist they are \"out of atenolol and will not have it in stock until the end of September\". \"Can provider write Rx for alternative\"?     Pharmacy confirmed as   [facility] [id number] [facility] [facility] [address] [place] [address]  Phone: [phone] Fax: [phone]    [facility] - USE FOR MAILING ONLY - [address]  [place] [address]  Phone: [phone] Fax: [phone]    [facility] - [place], [place] - [address] [address]  Phone: [phone] Fax: [phone]  : Yes    OK to leave a message on voice mail? Yes    Call taken on August 24, 2017 at 10:04 AM by Anita Rodriguez    "

## 2017-08-30 DIAGNOSIS — I10 ESSENTIAL HYPERTENSION: Primary | ICD-10-CM

## 2017-08-30 RX ORDER — METOPROLOL SUCCINATE 100 MG/1
100 TABLET, EXTENDED RELEASE ORAL DAILY
Qty: 30 TABLET | Refills: 1 | Status: SHIPPED | OUTPATIENT
Start: 2017-08-30 | End: 2017-09-21

## 2017-08-30 NOTE — TELEPHONE ENCOUNTER
Message sent to PCP again to advise for alternative as pharmacy is out of atenolol    Gardenia Wallace RN

## 2017-08-30 NOTE — TELEPHONE ENCOUNTER
Pharmacy calling back to check on status of medication change.  Please advise if change will be made and send to pharmacy if appropriate.  This was first requested on 08/24/17

## 2017-09-07 ENCOUNTER — OFFICE VISIT (OUTPATIENT)
Dept: OTOLARYNGOLOGY | Facility: CLINIC | Age: 57
End: 2017-09-07

## 2017-09-07 VITALS — WEIGHT: 300 LBS | HEIGHT: 69 IN | BODY MASS INDEX: 44.43 KG/M2

## 2017-09-07 DIAGNOSIS — R09.81 NASAL CONGESTION: Primary | ICD-10-CM

## 2017-09-07 RX ORDER — SODIUM FLUORIDE 1.1 G/100G
CREAM ORAL
Refills: 2 | COMMUNITY
Start: 2016-11-21 | End: 2021-11-23

## 2017-09-07 ASSESSMENT — PAIN SCALES - GENERAL: PAINLEVEL: NO PAIN (0)

## 2017-09-07 NOTE — NURSING NOTE
Chief Complaint   Patient presents with     Consult     chronic rhinitis     Margaret Callejas Medical Assistant

## 2017-09-07 NOTE — MR AVS SNAPSHOT
After Visit Summary   9/7/2017    Juan Francisco Booker    MRN: [id number]           Patient Information     Date Of Birth          1960        Visit Information        Provider Department      9/7/2017 11:30 AM Kristen Grimaldo MD [facility] Ear Nose [facility] Th[facility]        Today's Diagnoses     Nasal congestion    -  1       Follow-ups after your visit        Your next 10 appointments already scheduled     Sep 14, 2017  1:00 PM CDT   LAB VISIT with SY [facility] LAB   [facility] ([facility])    [address] [place] [address],  [address]  [facility] [address]   [phone]           If you are coming in for fasting labs, you will need to fast for 10-12 hours prior to your appt. Fasting labs include lipids, cholesterol, glucose, complete metabolic panel, basic metabolic panel, and triglycerides. Do not drink coffee or any other fluids. Water with medications are okay. Do not chew gum as well. If you have any further questions, please contact your health care team.              Sep 21, 2017 11:00 AM CDT   Return Visit with Mar Lieberman MD   [facility] ([facility])    [address],  [address]  [facility] [address]   [phone]            Oct 26, 2017 11:30 AM CDT   Return Sleep Patient with Wellington Varma MD   [facility] [place] Sleep Study ([facility])    [address] [place] [id number] [phone]            Dec 29, 2017   Procedure with Aracelis Lowe MD   [facility] ([facility])    [address] [place] [address]  [address] [employer] [id number] [phone]           Located in the Clinics and Surgery Center at [address] [place] [address].   parking is very convenient and highly recommended.  is a $6 flat rate fee.  Both  and self parkers should enter the main arrival plaza from [facility]; parking  "attendants will direct you based on your parking preference.              Who to contact     Please call your clinic at [phone] to:    Ask questions about your health    Make or cancel appointments    Discuss your medicines    Learn about your test results    Speak to your doctor   If you have compliments or concerns about an experience at your clinic, or if you wish to file a complaint, please contact [facility] Patient Relations at [phone] or email us at [email or web address]         Additional Information About Your Visit        [employer] Information     [employer] gives you secure access to your electronic health record. If you see a primary care provider, you can also send messages to your care team and make appointments. If you have questions, please call your primary care clinic.  If you do not have a primary care provider, please call [phone] and they will assist you.      [employer] is an electronic gateway that provides easy, online access to your medical records. With [employer], you can request a clinic appointment, read your test results, renew a prescription or communicate with your care team.     To access your existing account, please contact your [facility] Clinic or call [phone] for assistance.        Care EveryWhere ID     This is your Care EveryWhere ID. This could be used by other organizations to access your [place] medical records  [id number]        Your Vitals Were     Height BMI (Body Mass Index)                1.753 m (5' 9\") 44.3 kg/m2           Blood Pressure from Last 3 Encounters:   06/14/17 132/76   06/07/17 101/63   03/10/17 114/75    Weight from Last 3 Encounters:   09/12/17 136.1 kg (300 lb)   09/07/17 136.1 kg (300 lb)   06/28/17 (!) 137.8 kg (303 lb 14.4 oz)              Today, you had the following     No orders found for display       Primary Care Provider Office Phone # Fax #    Mar Lieberman MD " [phone]       [address]  [facility] [address]        Equal Access to Services     DAX BURGOS : Hadii yumi vaila tammy Rodriguez, tetoda nikhil, lucia kakwame badillo, christiano Torres [facility] [phone].    ATENCIÓN: Si habla español, tiene a beal disposición servicios gratuitos de asistencia lingüística. Margie al [phone].    We comply with applicable federal civil rights laws and [place] laws. We do not discriminate on the basis of race, color, national origin, age, disability sex, sexual orientation or gender identity.            Thank you!     Thank you for choosing [facility] EAR NOSE AND THROAT  for your care. Our goal is always to provide you with excellent care. Hearing back from our patients is one way we can continue to improve our services. Please take a few minutes to complete the written survey that you may receive in the mail after your visit with us. Thank you!             Your Updated Medication List - Protect others around you: Learn how to safely use, store and throw away your medicines at www.disposemymeds.org.          This list is accurate as of: 9/7/17 11:59 PM.  Always use your most recent med list.                   Brand Name Dispense Instructions for use Diagnosis    aspirin 81 MG tablet      Take 1 tablet by mouth daily AM        atenolol 100 MG tablet    TENORMIN    90 tablet    Take 1 tablet (100 mg) by mouth daily AM    Other secondary hypertension       citalopram 40 MG tablet    celeXA    30 tablet    Take 1 tablet (40 mg) by mouth daily 1 tablet (40mg) daily.    Major depressive disorder, recurrent episode, moderate (H)       MULTIVITAMINS Chew      Take 1 tablet by mouth        DAILY MULTIVITAMIN PO      Take 1 tablet by mouth daily AM        DENTA 5000 PLUS 1.1 % Crea   Generic drug:  Sodium Fluoride      APPLY A PEA SIZED AMOUNT TO TOOTHBRUSH, BRUSH ONTO ROOT AREAS THOROUGHLY, THEN SPIT OUT AT BEDTIME.         esomeprazole 20 MG CR capsule    nexIUM    30 capsule    Take 1 capsule (20 mg) by mouth every morning (before breakfast) Take 30-60 minutes before eating.    Dyspepsia       fluticasone 50 MCG/ACT spray    FLONASE    16 g    Spray 1-2 sprays into both nostrils daily        * gabapentin 300 MG capsule    NEURONTIN    270 capsule    Take 3 capsules (900 mg) by mouth At Bedtime Take 900mg orally each night at 2 am    Restless legs syndrome (RLS)       * gabapentin 300 MG capsule    NEURONTIN    150 capsule    1 in morning, 1 at midday, 3 at night    Peripheral polyneuropathy (H)       hydrochlorothiazide 25 MG tablet    HYDRODIURIL    90 tablet    Take 1 tablet (25 mg) by mouth daily AM    Benign essential hypertension       lisinopril 40 MG tablet    PRINIVIL/ZESTRIL    90 tablet    Take 1 tablet (40 mg) by mouth daily AM    Benign essential hypertension       metoprolol 100 MG 24 hr tablet    TOPROL-XL    30 tablet    Take 1 tablet (100 mg) by mouth daily    Essential hypertension       omega-3 fatty acids 1200 MG capsule      Take 1 capsule by mouth daily AM        order for DME      Use your CPAP device as directed by your provider.        priLOSEC 20 MG CR capsule   Generic drug:  omeprazole      Take 40 mg by mouth At Bedtime HS        simvastatin 40 MG tablet    ZOCOR    30 tablet    Take 1 tablet (40 mg) by mouth At Bedtime    Hyperlipidemia LDL goal <130       TYLENOL 500 MG tablet   Generic drug:  acetaminophen      Take 2 tablets by mouth every 6 hours as needed.        * Notice:  This list has 2 medication(s) that are the same as other medications prescribed for you. Read the directions carefully, and ask your doctor or other care provider to review them with you.

## 2017-09-07 NOTE — PROGRESS NOTES
Otolaryngology Adult Consultation    Patient: Juan Francisco Booker  : 1960      HPI:  Juan Francisco Booker is a 57 year old male seen today in the Otolaryngology Clinic for nasal congestion and rhinitis.  The patient has been seen previously in our ENT Clinic by both Dr. Kecia Tee as well as myself in .  He was being evaluated for this same issue that he has today which is chronic nasal congestion and rhinitis.  He has a history of previous nasal polyp surgery with Dr. Lars Wallis in  or .  Congestion is present at night.  During the day, he has no significant issues with nasal breathing.  When he is on CPAP, he feels his nose gets more congested in the middle of the night.  In the morning and during the day, it gets better.  He currently is doing Neti pot rinses before he goes to bed as well as Flonase, initially once a day, but he has been on twice a day for the last month and half.  When he rinses his nose, he does get quite a bit of mucus out.  He has not felt much improvement in his nasal breathing by being on twice a day Flonase.         Medications:  Current Outpatient Rx   Medication Sig Dispense Refill     Multiple Vitamins-Minerals (MULTIVITAMINS) CHEW Take 1 tablet by mouth       DENTA 5000 PLUS 1.1 % CREA APPLY A PEA SIZED AMOUNT TO TOOTHBRUSH, BRUSH ONTO ROOT AREAS THOROUGHLY, THEN SPIT OUT AT BEDTIME.  2     metoprolol (TOPROL-XL) 100 MG 24 hr tablet Take 1 tablet (100 mg) by mouth daily 30 tablet 1     hydrochlorothiazide (HYDRODIURIL) 25 MG tablet Take 1 tablet (25 mg) by mouth daily AM 90 tablet 0     atenolol (TENORMIN) 100 MG tablet Take 1 tablet (100 mg) by mouth daily AM 90 tablet 0     fluticasone (FLONASE) 50 MCG/ACT spray Spray 1-2 sprays into both nostrils daily 16 g 3     lisinopril (PRINIVIL/ZESTRIL) 40 MG tablet Take 1 tablet (40 mg) by mouth daily AM 90 tablet 1     gabapentin (NEURONTIN) 300 MG capsule 1 in morning, 1 at midday, 3 at night 150 capsule 3     esomeprazole (NEXIUM)  "20 MG CR capsule Take 1 capsule (20 mg) by mouth every morning (before breakfast) Take 30-60 minutes before eating. 30 capsule 1     citalopram (CELEXA) 40 MG tablet Take 1 tablet (40 mg) by mouth daily 1 tablet (40mg) daily. 30 tablet 8     simvastatin (ZOCOR) 40 MG tablet Take 1 tablet (40 mg) by mouth At Bedtime 30 tablet 11     gabapentin (NEURONTIN) 300 MG capsule Take 3 capsules (900 mg) by mouth At Bedtime Take 900mg orally each night at 2 am 270 capsule 3     ORDER FOR DME Use your CPAP device as directed by your provider.       aspirin 81 MG tablet Take 1 tablet by mouth daily AM       Omega-3 Fatty Acids (FISH OIL) 1200 MG capsule Take 1 capsule by mouth daily AM       Multiple Vitamin (DAILY MULTIVITAMIN PO) Take 1 tablet by mouth daily AM       omeprazole (PRILOSEC) 20 MG capsule Take 40 mg by mouth At Bedtime HS       acetaminophen (TYLENOL) 500 MG tablet Take 2 tablets by mouth every 6 hours as needed.         Allergies: Grass     PMH:  Past Medical History:   Diagnosis Date     Anxiety      Benign neoplasm of colon 3/2/2015     Depression      Depressive disorder 1/15/2000     Difficult intubation      ETOH abuse 3/15/2009    in remission     Gastro-oesophageal reflux disease 1/15/2010     Hypoxemia      Morbid obesity (H)      Nasal polyps 1/1/2015     TIMOTHY on CPAP 8/13/2012    Severe (uses 5-6 hours as able)     Other and unspecified hyperlipidemia 10/25/2012     Personal history of colonic polyps 2/207/15    Multiple \"neg for FAP\"     Pre-diabetes      Prediabetes 5/24/2017     Skin tag      Unspecified essential hypertension 10/25/2012       PSH:  Past Surgical History:   Procedure Laterality Date     AS COLONOSCOPY THRU STOMA W BIOPSY/CAUTERY TUMOR/POLYP/LESION  2/27/15    colon polyps     COLONOSCOPY  2/27/2015    x 2     COLONOSCOPY WITH CO2 INSUFFLATION N/A 10/20/2016    Procedure: COLONOSCOPY WITH CO2 INSUFFLATION;  Surgeon: Juan Francisco Beltran MD;  Location: [facility] OR     ENT SURGERY  1/5/2011 "    [facility]     ESOPHAGOSCOPY, GASTROSCOPY, DUODENOSCOPY (EGD), COMBINED N/A 10/20/2016    Procedure: COMBINED ESOPHAGOSCOPY, GASTROSCOPY, DUODENOSCOPY (EGD);  Surgeon: Juan Francisco Beltran MD;  Location: [facility] OR     RELEASE CARPAL TUNNEL Right 6/14/2017    Procedure: RELEASE CARPAL TUNNEL;  Right Open Carpal Tunnel Release;  Surgeon: Aracelis Lowe MD;  Location: [facility] OR       FH:  Family History   Problem Relation Age of Onset     Other Cancer Mother      CANCER Mother      Other Cancer Brother      CANCER Brother      CEREBROVASCULAR DISEASE Maternal Grandfather      ,, ,, ,, ,, ,, ,, ,, ,     Alcohol/Drug Brother      CANCER Brother      pancreatic; liver     Asthma No family hx of      Anesthesia Reaction No family hx of      Colon Polyps No family hx of      Crohn Disease No family hx of      Ulcerative Colitis No family hx of      Colon Cancer No family hx of         SH:  Social History   Substance Use Topics     Smoking status: Never Smoker     Smokeless tobacco: Never Used     Alcohol use 0.0 oz/week     0 Standard drinks or equivalent per week      Comment: In remission since 2009       Review of Systems  [facility] ROS 8/29/2017   Constitutional Problems with sleep   Neurology Headache   Ears, Nose, Throat Nasal congestion or drainage, Sore throat, Trouble swallowing   Gastrointestinal/Genitourinary Heartburn/indigestion   Musculoskeletal Back pain, Neck pain       Physical Exam:    GEN:  The patient is alert, oriented and in no acute distress.  HEAD:  Head, face scalp is grossly normal.  EARS:  Ears demonstrate normal canals, auricles and tympanic membranes.  NOSE:  External nose is straight, skin is normal.                Intranasal exam (anterior rhinoscopy) reveals normal moist mucosa, turbinate                  tissue without edema, erythema or crusting.  Septum mainly in the midline.    Assessment/Plan:  The patient presents with situational nasal congestion and rhinitis.  From an anatomic  structural standpoint, he has a very open nose and therefore additional surgery I do not think will be helpful for him.  In terms of the rhinitis, he is already on appropriate medical therapy including sinus rinses and nasal steroid sprays.  At this time, there is not anything additional that we will be able to add.  Since he is not having significant improvement twice a day Flonase, my recommendation would be to decrease this to once a day.     I spent a total of 35 minutes face-to-face with Juan Francisco Booker during today's office visit.  Over 50% of this time was spent counseling the patient on and/or coordinating care as documented in my assessment and plan.

## 2017-09-07 NOTE — LETTER
2017       RE: Juan Francisco Booker  [address]  [place] [address]     Dear Colleague,    Thank you for referring your patient, Juan Francisco Booker, to the [facility] at [facility]. Please see a copy of my visit note below.    Otolaryngology Adult Consultation    Patient: Juan Francisco Booker  : 1960      HPI:  Juan Francisco Booker is a 57 year old male seen today in the Otolaryngology Clinic for nasal congestion and rhinitis.  The patient has been seen previously in our ENT Clinic by both Dr. Kecia Tee as well as myself in .  He was being evaluated for this same issue that he has today which is chronic nasal congestion and rhinitis.  He has a history of previous nasal polyp surgery with Dr. Lars Wallis in  or .  Congestion is present at night.  During the day, he has no significant issues with nasal breathing.  When he is on CPAP, he feels his nose gets more congested in the middle of the night.  In the morning and during the day, it gets better.  He currently is doing Neti pot rinses before he goes to bed as well as Flonase, initially once a day, but he has been on twice a day for the last month and half.  When he rinses his nose, he does get quite a bit of mucus out.  He has not felt much improvement in his nasal breathing by being on twice a day Flonase.         Medications:  Current Outpatient Rx   Medication Sig Dispense Refill     Multiple Vitamins-Minerals (MULTIVITAMINS) CHEW Take 1 tablet by mouth       DENTA 5000 PLUS 1.1 % CREA APPLY A PEA SIZED AMOUNT TO TOOTHBRUSH, BRUSH ONTO ROOT AREAS THOROUGHLY, THEN SPIT OUT AT BEDTIME.  2     metoprolol (TOPROL-XL) 100 MG 24 hr tablet Take 1 tablet (100 mg) by mouth daily 30 tablet 1     hydrochlorothiazide (HYDRODIURIL) 25 MG tablet Take 1 tablet (25 mg) by mouth daily AM 90 tablet 0     atenolol (TENORMIN) 100 MG tablet Take 1 tablet (100 mg) by mouth daily AM 90 tablet 0     fluticasone  "(FLONASE) 50 MCG/ACT spray Spray 1-2 sprays into both nostrils daily 16 g 3     lisinopril (PRINIVIL/ZESTRIL) 40 MG tablet Take 1 tablet (40 mg) by mouth daily AM 90 tablet 1     gabapentin (NEURONTIN) 300 MG capsule 1 in morning, 1 at midday, 3 at night 150 capsule 3     esomeprazole (NEXIUM) 20 MG CR capsule Take 1 capsule (20 mg) by mouth every morning (before breakfast) Take 30-60 minutes before eating. 30 capsule 1     citalopram (CELEXA) 40 MG tablet Take 1 tablet (40 mg) by mouth daily 1 tablet (40mg) daily. 30 tablet 8     simvastatin (ZOCOR) 40 MG tablet Take 1 tablet (40 mg) by mouth At Bedtime 30 tablet 11     gabapentin (NEURONTIN) 300 MG capsule Take 3 capsules (900 mg) by mouth At Bedtime Take 900mg orally each night at 2 am 270 capsule 3     ORDER FOR DME Use your CPAP device as directed by your provider.       aspirin 81 MG tablet Take 1 tablet by mouth daily AM       Omega-3 Fatty Acids (FISH OIL) 1200 MG capsule Take 1 capsule by mouth daily AM       Multiple Vitamin (DAILY MULTIVITAMIN PO) Take 1 tablet by mouth daily AM       omeprazole (PRILOSEC) 20 MG capsule Take 40 mg by mouth At Bedtime HS       acetaminophen (TYLENOL) 500 MG tablet Take 2 tablets by mouth every 6 hours as needed.         Allergies: Grass     PMH:  Past Medical History:   Diagnosis Date     Anxiety      Benign neoplasm of colon 3/2/2015     Depression      Depressive disorder 1/15/2000     Difficult intubation      ETOH abuse 3/15/2009    in remission     Gastro-oesophageal reflux disease 1/15/2010     Hypoxemia      Morbid obesity (H)      Nasal polyps 1/1/2015     TIMOTHY on CPAP 8/13/2012    Severe (uses 5-6 hours as able)     Other and unspecified hyperlipidemia 10/25/2012     Personal history of colonic polyps 2/207/15    Multiple \"neg for FAP\"     Pre-diabetes      Prediabetes 5/24/2017     Skin tag      Unspecified essential hypertension 10/25/2012       PSH:  Past Surgical History:   Procedure Laterality Date     AS " COLONOSCOPY THRU STOMA W BIOPSY/CAUTERY TUMOR/POLYP/LESION  2/27/15    colon polyps     COLONOSCOPY  2/27/2015    x 2     COLONOSCOPY WITH CO2 INSUFFLATION N/A 10/20/2016    Procedure: COLONOSCOPY WITH CO2 INSUFFLATION;  Surgeon: Juan Francisco Beltran MD;  Location: [facility] OR     ENT SURGERY  1/5/2011    [facility]     ESOPHAGOSCOPY, GASTROSCOPY, DUODENOSCOPY (EGD), COMBINED N/A 10/20/2016    Procedure: COMBINED ESOPHAGOSCOPY, GASTROSCOPY, DUODENOSCOPY (EGD);  Surgeon: Juan Francisco Beltran MD;  Location: [facility] OR     RELEASE CARPAL TUNNEL Right 6/14/2017    Procedure: RELEASE CARPAL TUNNEL;  Right Open Carpal Tunnel Release;  Surgeon: Aracelis Lowe MD;  [facility] OR       FH:  Family History   Problem Relation Age of Onset     Other Cancer Mother      CANCER Mother      Other Cancer Brother      CANCER Brother      CEREBROVASCULAR DISEASE Maternal Grandfather      ,, ,, ,, ,, ,, ,, ,, ,     Alcohol/Drug Brother      CANCER Brother      pancreatic; liver     Asthma No family hx of      Anesthesia Reaction No family hx of      Colon Polyps No family hx of      Crohn Disease No family hx of      Ulcerative Colitis No family hx of      Colon Cancer No family hx of         SH:  Social History   Substance Use Topics     Smoking status: Never Smoker     Smokeless tobacco: Never Used     Alcohol use 0.0 oz/week     0 Standard drinks or equivalent per week      Comment: In remission since 2009       Review of Systems  [facility] ROS 8/29/2017   Constitutional Problems with sleep   Neurology Headache   Ears, Nose, Throat Nasal congestion or drainage, Sore throat, Trouble swallowing   Gastrointestinal/Genitourinary Heartburn/indigestion   Musculoskeletal Back pain, Neck pain       Physical Exam:    GEN:  The patient is alert, oriented and in no acute distress.  HEAD:  Head, face scalp is grossly normal.  EARS:  Ears demonstrate normal canals, auricles and tympanic membranes.  NOSE:  External nose is straight, skin is  normal.                Intranasal exam (anterior rhinoscopy) reveals normal moist mucosa, turbinate                  tissue without edema, erythema or crusting.  Septum mainly in the midline.    Assessment/Plan:  The patient presents with situational nasal congestion and rhinitis.  From an anatomic structural standpoint, he has a very open nose and therefore additional surgery I do not think will be helpful for him.  In terms of the rhinitis, he is already on appropriate medical therapy including sinus rinses and nasal steroid sprays.  At this time, there is not anything additional that we will be able to add.  Since he is not having significant improvement twice a day Flonase, my recommendation would be to decrease this to once a day.     I spent a total of 35 minutes face-to-face with Juan Francisco Booker during today's office visit.  Over 50% of this time was spent counseling the patient on and/or coordinating care as documented in my assessment and plan.        Again, thank you for allowing me to participate in the care of your patient.      Sincerely,    Kristen Grimaldo MD

## 2017-09-08 ASSESSMENT — ENCOUNTER SYMPTOMS
TROUBLE SWALLOWING: 0
SORE THROAT: 1
DOUBLE VISION: 0
EYE PAIN: 0
SINUS CONGESTION: 1
HOARSE VOICE: 0
SINUS PAIN: 0
EYE WATERING: 1
TASTE DISTURBANCE: 0
NECK MASS: 0
EYE REDNESS: 0
SMELL DISTURBANCE: 0
EYE IRRITATION: 1

## 2017-09-11 DIAGNOSIS — R73.03 PREDIABETES: Primary | ICD-10-CM

## 2017-09-12 ENCOUNTER — OFFICE VISIT (OUTPATIENT)
Dept: ORTHOPEDICS | Facility: CLINIC | Age: 57
End: 2017-09-12

## 2017-09-12 VITALS — BODY MASS INDEX: 44.43 KG/M2 | HEIGHT: 69 IN | WEIGHT: 300 LBS

## 2017-09-12 DIAGNOSIS — G56.03 BILATERAL CARPAL TUNNEL SYNDROME: Primary | ICD-10-CM

## 2017-09-12 NOTE — PROGRESS NOTES
"Chief Complaint   Patient presents with     Surgical Followup     DOS 6/14/17 S/P Right Open Carpal Tunnel Release     RECHECK     States he is still having pain on his right side and wants to talk about surgery on his left       Summary of Clinic Encounter:     Mr. Booker 12 weeks is status post the above.  In the interim, is improving overall. He notes no pain or numbness in the fingers anymore. There is no nighttime pain. He does have intermittent tenderness and pain over the previous surgical incision site. He's having identical symptoms on the left side however less severe. Numbness and tingling in all fingers, but more so in the thumb and index. Worse with driving.  No nighttime pain  No fevers or chills.    Objective:  Ht 1.753 m (5' 9\")  Wt 136.1 kg (300 lb)  BMI 44.3 kg/m2   QUICKDASH: 18.18   strength level 3: 60lbs right and 65lbs left.  General - well developed and groomed. No acute distress  CV- regular rate  Pulm- No audible stridor or wheazing, nonlabored  Skin - intact, healed incisions.   MSK - examination of the right hand shows well-healed incision. His sensation is intact and radial, median, ulnar nerves. He is mildly tender over the incision consistent with pillar pain. He has mild hyperemia about his scar. Figures are warm and well perfused.  Left hand: He has no obvious thenar atrophy. 5 out of 5 APB strength. Positive carpal compression and positive Phalens. Negative Tinel's at the wrist. 2 point discrimination is 5mm in all digits.      EMG: Left median nerve sensory latency 5.7, nerve conduction velocity 50 m/s. No fasciculations or fibrillations.    Assessment:  57 year old, right-hand dominant, now 12 weeks s/p right carpal tunnel release with persistent pillar pain. 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 He wants to proceed with left carpal tunnel release.  I discussed non-operative and operative treatment options with " the patient.  Specifically, I discussed left open carpal tunnel release.  I have described the procedure, post-operative protocol, and expected outcomes.  I also discussed the risks of surgery which include, but are not limited to, bleeding, infection, nerve or vessel damage, wound healing problems, persistent pain, persistent numbness, and the possibility for further surgery.  Anesthetic risks are rare but include an adverse reaction to local anesthesia.  After a full discussion of risks, benefits, and alternatives to surgery, the patient has elected to proceed.  We will look for a mutually convenient date to schedule.      Plan:  -  Will schedule left carpal tunnel release December 2017 as he wants to give his right side more time to recover.  -  Continue scar massage    Seen and discussed with Dr. Lowe.     Guillermo Painting  9/12/2017  3:13 PM    I have independently seen and evaluated the patient and agree with the findings and plan of care as documented by the resident and edited by me.      Answers for HPI/ROS submitted by the patient on 9/8/2017   General Symptoms: No  Skin Symptoms: No  HENT Symptoms: Yes  EYE SYMPTOMS: Yes  HEART SYMPTOMS: No  LUNG SYMPTOMS: No  INTESTINAL SYMPTOMS: No  URINARY SYMPTOMS: No  REPRODUCTIVE SYMPTOMS: No  SKELETAL SYMPTOMS: No  BLOOD SYMPTOMS: No  NERVOUS SYSTEM SYMPTOMS: No  MENTAL HEALTH SYMPTOMS: No  Ear pain: No  Ear discharge: No  Hearing loss: No  Tinnitus: No  Nosebleeds: No  Congestion: Yes  Sinus pain: No  Trouble swallowing: No   Voice hoarseness: No  Mouth sores: No  Sore throat: Yes  Tooth pain: No  Gum tenderness: No  Bleeding gums: No  Change in taste: No  Change in sense of smell: No  Dry mouth: Yes  Hearing aid used: No  Neck lump: No  Eye pain: No  Vision loss: No  Dry eyes: No  Watery eyes: Yes  Eye bulging: No  Double vision: No  Flashing of lights: No  Spots: No  Floaters: No  Redness: No  Crossed eyes: No  Tunnel Vision: No  Yellowing of eyes:  No  Eye irritation: Yes

## 2017-09-12 NOTE — NURSING NOTE
Teaching Flowsheet   Relevant Diagnosis: Left carpal tunnel syndrome  Teaching Topic: Left open carpal tunnel syndrome     Person(s) involved in teaching:   Patient     Motivation Level:  Asks Questions: Yes  Eager to Learn: Yes  Cooperative: Yes  Receptive (willing/able to accept information): Yes  Any cultural factors/Sikhism beliefs that may influence understanding or compliance? No       Patient demonstrates understanding of the following:  Reason for the appointment, diagnosis and treatment plan: Yes  Knowledge of proper use of medications and conditions for which they are ordered (with special attention to potential side effects or drug interactions): Yes  Which situations necessitate calling provider and whom to contact: Yes       Teaching Concerns Addressed:   Patient has had the same procedure done on the right side. He is familiar with the per-operative instructions. He has a history of sleep apnea, HTN, and elevated cholesterol. The procedure will be done under a local only.   Recommended to hold the Aspirin and Omega-3 for 10-14 days prior to surgery.  Patient states he has most of the pain medication left over from the last procedure.      Proper use and care of : NA  Nutritional needs and diet plan: Yes  Pain management techniques: Yes  Wound Care: Yes  How and/when to access community resources: Yes     Instructional Materials Used/Given: pre-operative surgery packet and anti-bacterial soap.      Time spent with patient: 10 minutes.

## 2017-09-12 NOTE — NURSING NOTE
"Reason For Visit:   Chief Complaint   Patient presents with     Surgical Followup     DOS 6/14/17 S/P Right Open Carpal Tunnel Release     RECHECK     States he is still having pain on his right side and wants to talk about surgery on his left       Primary MD: Mar Lieberman  Ref. MD: Dr. Sharma    Age: 57 year old    ?  No      Ht 1.753 m (5' 9\")  Wt 136.1 kg (300 lb)  BMI 44.3 kg/m2      Pain Assessment  Patient Currently in Pain: Yes  0-10 Pain Scale: 1  Primary Pain Location: Wrist  Pain Orientation: Right  Pain Descriptors: Discomfort, Sore  Alleviating Factors: Rest  Aggravating Factors: Movement, Stretching    Hand Dominance Evaluation  Hand Dominance: Right          QuickDASH Assessment  QuickDASH Main 9/8/2017   1.Open a tight or new jar. No difficulty   2. Do heavy household chores (e.g., wash walls, floors) No difficulty   3. Carry a shopping bag or briefcase. No difficulty   4. Wash your back. No difficulty   5. Use a knife to cut food. No difficulty   6. Recreational activities in which you take some force or impact through your arm, shoulder or hand (e.g., golf, hammering, tennis, etc.). Moderate difficulty   7. During the past week, to what extent has your arm, shoulder or hand problem interfered with your normal social activities with family, friends, neighbours or groups? Slightly   8. During the past week, were you limited in your work or other regular daily activities as a result of your arm, shoulder or hand problem? Not limited at all   9. Arm, shoulder or hand pain. Moderate   10.Tingling (pins and needles) in your arm,shoulder or hand. Severe   11. During the past week, how much difficulty have you had sleeping because of the pain in your arm, shoulder or hand? ([place] number) No difficulty   Quickdash Ability Score 18.18          Current Outpatient Prescriptions   Medication Sig Dispense Refill     Multiple Vitamins-Minerals (MULTIVITAMINS) CHEW Take 1 tablet by mouth   "     DENTA 5000 PLUS 1.1 % CREA APPLY A PEA SIZED AMOUNT TO TOOTHBRUSH, BRUSH ONTO ROOT AREAS THOROUGHLY, THEN SPIT OUT AT BEDTIME.  2     metoprolol (TOPROL-XL) 100 MG 24 hr tablet Take 1 tablet (100 mg) by mouth daily 30 tablet 1     hydrochlorothiazide (HYDRODIURIL) 25 MG tablet Take 1 tablet (25 mg) by mouth daily AM 90 tablet 0     atenolol (TENORMIN) 100 MG tablet Take 1 tablet (100 mg) by mouth daily AM 90 tablet 0     fluticasone (FLONASE) 50 MCG/ACT spray Spray 1-2 sprays into both nostrils daily 16 g 3     lisinopril (PRINIVIL/ZESTRIL) 40 MG tablet Take 1 tablet (40 mg) by mouth daily AM 90 tablet 1     gabapentin (NEURONTIN) 300 MG capsule 1 in morning, 1 at midday, 3 at night 150 capsule 3     esomeprazole (NEXIUM) 20 MG CR capsule Take 1 capsule (20 mg) by mouth every morning (before breakfast) Take 30-60 minutes before eating. 30 capsule 1     citalopram (CELEXA) 40 MG tablet Take 1 tablet (40 mg) by mouth daily 1 tablet (40mg) daily. 30 tablet 8     simvastatin (ZOCOR) 40 MG tablet Take 1 tablet (40 mg) by mouth At Bedtime 30 tablet 11     gabapentin (NEURONTIN) 300 MG capsule Take 3 capsules (900 mg) by mouth At Bedtime Take 900mg orally each night at 2 am 270 capsule 3     ORDER FOR DME Use your CPAP device as directed by your provider.       aspirin 81 MG tablet Take 1 tablet by mouth daily AM       Omega-3 Fatty Acids (FISH OIL) 1200 MG capsule Take 1 capsule by mouth daily AM       Multiple Vitamin (DAILY MULTIVITAMIN PO) Take 1 tablet by mouth daily AM       omeprazole (PRILOSEC) 20 MG capsule Take 40 mg by mouth At Bedtime HS       acetaminophen (TYLENOL) 500 MG tablet Take 2 tablets by mouth every 6 hours as needed.         Allergies   Allergen Reactions     Grass        Whitney Tejada LPN

## 2017-09-12 NOTE — NURSING NOTE
"Reason For Visit:   Chief Complaint   Patient presents with     Surgical Followup     DOS 6/14/17 S/P Right Open Carpal Tunnel Release     RECHECK     States he is still having pain on his right side and wants to talk about surgery on his left       Primary MD: Mar Lieberman  Ref. MD: Dr. Sharma    Age: 57 year old    ?  No      Ht 1.753 m (5' 9\")  Wt 136.1 kg (300 lb)  BMI 44.3 kg/m2      Pain Assessment  Patient Currently in Pain: Yes  0-10 Pain Scale: 1  Primary Pain Location: Wrist  Pain Orientation: Right  Pain Descriptors: Discomfort, Sore  Alleviating Factors: Rest  Aggravating Factors: Movement, Stretching    Hand Dominance Evaluation  Hand Dominance: Right      force  R hand  level 3 force: 27.2 kg (60 lb)  L hand  level 3 force: 29.5 kg (65 lb)    QuickDASH Assessment  QuickDASH Main 9/8/2017   1.Open a tight or new jar. No difficulty   2. Do heavy household chores (e.g., wash walls, floors) No difficulty   3. Carry a shopping bag or briefcase. No difficulty   4. Wash your back. No difficulty   5. Use a knife to cut food. No difficulty   6. Recreational activities in which you take some force or impact through your arm, shoulder or hand (e.g., golf, hammering, tennis, etc.). Moderate difficulty   7. During the past week, to what extent has your arm, shoulder or hand problem interfered with your normal social activities with family, friends, neighbours or groups? Slightly   8. During the past week, were you limited in your work or other regular daily activities as a result of your arm, shoulder or hand problem? Not limited at all   9. Arm, shoulder or hand pain. Moderate   10.Tingling (pins and needles) in your arm,shoulder or hand. Severe   11. During the past week, how much difficulty have you had sleeping because of the pain in your arm, shoulder or hand? ([place] number) No difficulty   Quickdash Ability Score 18.18          Current Outpatient Prescriptions   Medication " Sig Dispense Refill     Multiple Vitamins-Minerals (MULTIVITAMINS) CHEW Take 1 tablet by mouth       DENTA 5000 PLUS 1.1 % CREA APPLY A PEA SIZED AMOUNT TO TOOTHBRUSH, BRUSH ONTO ROOT AREAS THOROUGHLY, THEN SPIT OUT AT BEDTIME.  2     metoprolol (TOPROL-XL) 100 MG 24 hr tablet Take 1 tablet (100 mg) by mouth daily 30 tablet 1     hydrochlorothiazide (HYDRODIURIL) 25 MG tablet Take 1 tablet (25 mg) by mouth daily AM 90 tablet 0     atenolol (TENORMIN) 100 MG tablet Take 1 tablet (100 mg) by mouth daily AM 90 tablet 0     fluticasone (FLONASE) 50 MCG/ACT spray Spray 1-2 sprays into both nostrils daily 16 g 3     lisinopril (PRINIVIL/ZESTRIL) 40 MG tablet Take 1 tablet (40 mg) by mouth daily AM 90 tablet 1     gabapentin (NEURONTIN) 300 MG capsule 1 in morning, 1 at midday, 3 at night 150 capsule 3     esomeprazole (NEXIUM) 20 MG CR capsule Take 1 capsule (20 mg) by mouth every morning (before breakfast) Take 30-60 minutes before eating. 30 capsule 1     citalopram (CELEXA) 40 MG tablet Take 1 tablet (40 mg) by mouth daily 1 tablet (40mg) daily. 30 tablet 8     simvastatin (ZOCOR) 40 MG tablet Take 1 tablet (40 mg) by mouth At Bedtime 30 tablet 11     gabapentin (NEURONTIN) 300 MG capsule Take 3 capsules (900 mg) by mouth At Bedtime Take 900mg orally each night at 2 am 270 capsule 3     ORDER FOR DME Use your CPAP device as directed by your provider.       aspirin 81 MG tablet Take 1 tablet by mouth daily AM       Omega-3 Fatty Acids (FISH OIL) 1200 MG capsule Take 1 capsule by mouth daily AM       Multiple Vitamin (DAILY MULTIVITAMIN PO) Take 1 tablet by mouth daily AM       omeprazole (PRILOSEC) 20 MG capsule Take 40 mg by mouth At Bedtime HS       acetaminophen (TYLENOL) 500 MG tablet Take 2 tablets by mouth every 6 hours as needed.         Allergies   Allergen Reactions     Grass        Whitney Tejada LPN

## 2017-09-12 NOTE — MR AVS SNAPSHOT
After Visit Summary   9/12/2017    Juan Francisco Booker    MRN: [id number]           Patient Information     Date Of Birth          1960        Visit Information        Provider Department      9/12/2017 2:30 PM Aracelis Lowe MD [facility]        Today's Diagnoses     Bilateral carpal tunnel syndrome    -  1       Follow-ups after your visit        Your next 10 appointments already scheduled     Oct 26, 2017 11:30 AM CDT   Return Sleep Patient with Wellington Varma MD   [facility] [place], Sleep Study ([facility])    [address] [place] [id number] [phone]            Dec 29, 2017   Procedure with Aracelis Lowe MD   [facility] ([facility])    [address] [place] [address]  [address] [facility] [address] [phone]           Located in the Clinics and Surgery Center at [address] [place] [address].   parking is very convenient and highly recommended.  is a $6 flat rate fee.  Both  and self parkers should enter the main arrival plaza from [facility]; parking attendants will direct you based on your parking preference.            Jan 09, 2018 10:30 AM CST   (Arrive by 10:15 AM)   Post-Op with  [facility]   [facility] ([facility])    [address] [place] [address]  [address] [facility] [address] [phone]              Who to contact     Please call your clinic at [phone] to:    Ask questions about your health    Make or cancel appointments    Discuss your medicines    Learn about your test results    Speak to your doctor   If you have compliments or concerns about an experience at your clinic, or if you wish to file a complaint, please contact [facility] Patient Relations at [phone] or email us at [email or web address]         Additional Information  "About Your Visit        [employer] Information     HOSTING gives you secure access to your electronic health record. If you see a primary care provider, you can also send messages to your care team and make appointments. If you have questions, please call your primary care clinic.  If you do not have a primary care provider, please call [phone] and they will assist you.      HOSTING is an electronic gateway that provides easy, online access to your medical records. With HOSTING, you can request a clinic appointment, read your test results, renew a prescription or communicate with your care team.     To access your existing account, please contact your [facility] or call [phone] for assistance.        Care EveryWhere ID     This is your Care EveryWhere ID. This could be used by other organizations to access your [place] medical records  [id number]        Your Vitals Were     Height BMI (Body Mass Index)                1.753 m (5' 9\") 44.3 kg/m2           Blood Pressure from Last 3 Encounters:   09/21/17 112/73   06/14/17 132/76   06/07/17 101/63    Weight from Last 3 Encounters:   09/21/17 135.5 kg (298 lb 12.8 oz)   09/12/17 136.1 kg (300 lb)   09/07/17 136.1 kg (300 lb)              We Performed the Following     Huong-Operative Worksheet (Hand)        Primary Care Provider Office Phone # Fax #    Mar Lieberman [phone]       [address] [address]        Equal Access to Services     DAX BURGOS : Hadii aad ku hadasho Soomaali, waaxda luqadaha, qaybta kaalmada adeegyada, christiano waters . So [facility] [phone].    ATENCIÓN: Si habla español, tiene a beal disposición servicios gratuitos de asistencia lingüística. Llame al [phone].    We comply with applicable federal civil rights laws and [place] laws. We do not discriminate on the basis of race, color, national origin, age, disability, sex, " sexual orientation, or gender identity.            Thank you!     Thank you for choosing [facility]  for your care. Our goal is always to provide you with excellent care. Hearing back from our patients is one way we can continue to improve our services. Please take a few minutes to complete the written survey that you may receive in the mail after your visit with us. Thank you!             Your Updated Medication List - Protect others around you: Learn how to safely use, store and throw away your medicines at www.disposemymeds.org.          This list is accurate as of: 9/12/17 11:59 PM.  Always use your most recent med list.                   Brand Name Dispense Instructions for use Diagnosis    aspirin 81 MG tablet      Take 1 tablet by mouth daily AM        atenolol 100 MG tablet    TENORMIN    90 tablet    Take 1 tablet (100 mg) by mouth daily AM    Other secondary hypertension       citalopram 40 MG tablet    celeXA    30 tablet    Take 1 tablet (40 mg) by mouth daily 1 tablet (40mg) daily.    Major depressive disorder, recurrent episode, moderate (H)       MULTIVITAMINS Chew      Take 1 tablet by mouth        DAILY MULTIVITAMIN PO      Take 1 tablet by mouth daily AM        DENTA 5000 PLUS 1.1 % Crea   Generic drug:  Sodium Fluoride      APPLY A PEA SIZED AMOUNT TO TOOTHBRUSH, BRUSH ONTO ROOT AREAS THOROUGHLY, THEN SPIT OUT AT BEDTIME.        esomeprazole 20 MG CR capsule    nexIUM    30 capsule    Take 1 capsule (20 mg) by mouth every morning (before breakfast) Take 30-60 minutes before eating.    Dyspepsia       fluticasone 50 MCG/ACT spray    FLONASE    16 g    Spray 1-2 sprays into both nostrils daily        * gabapentin 300 MG capsule    NEURONTIN    270 capsule    Take 3 capsules (900 mg) by mouth At Bedtime Take 900mg orally each night at 2 am    Restless legs syndrome (RLS)       * gabapentin 300 MG capsule    NEURONTIN    150 capsule    1 in morning, 1 at midday, 3 at night     Peripheral polyneuropathy       hydrochlorothiazide 25 MG tablet    HYDRODIURIL    90 tablet    Take 1 tablet (25 mg) by mouth daily AM    Benign essential hypertension       lisinopril 40 MG tablet    PRINIVIL/ZESTRIL    90 tablet    Take 1 tablet (40 mg) by mouth daily AM    Benign essential hypertension       metoprolol 100 MG 24 hr tablet    TOPROL-XL    30 tablet    Take 1 tablet (100 mg) by mouth daily    Essential hypertension       omega-3 fatty acids 1200 MG capsule      Take 1 capsule by mouth daily AM        order for DME      Use your CPAP device as directed by your provider.        priLOSEC 20 MG CR capsule   Generic drug:  omeprazole      Take 40 mg by mouth At Bedtime HS        simvastatin 40 MG tablet    ZOCOR    30 tablet    Take 1 tablet (40 mg) by mouth At Bedtime    Hyperlipidemia LDL goal <130       TYLENOL 500 MG tablet   Generic drug:  acetaminophen      Take 2 tablets by mouth every 6 hours as needed.        * Notice:  This list has 2 medication(s) that are the same as other medications prescribed for you. Read the directions carefully, and ask your doctor or other care provider to review them with you.

## 2017-09-14 DIAGNOSIS — R73.03 PREDIABETES: ICD-10-CM

## 2017-09-14 LAB
CHOLEST SERPL-MCNC: 163.7 MG/DL (ref 0–200)
CHOLEST/HDLC SERPL: 5.7 {RATIO} (ref 0–5)
GLUCOSE P FAST SERPL-MCNC: 125 MG/DL (ref 51–110)
HBA1C MFR BLD: 6 % (ref 4.1–5.7)
HDLC SERPL-MCNC: 28.8 MG/DL
LDLC SERPL CALC-MCNC: 75 MG/DL (ref 0–129)
TRIGL SERPL-MCNC: 301.1 MG/DL (ref 0–150)
VLDL CHOLESTEROL: 60.2 MG/DL (ref 7–32)

## 2017-09-21 ENCOUNTER — OFFICE VISIT (OUTPATIENT)
Dept: FAMILY MEDICINE | Facility: CLINIC | Age: 57
End: 2017-09-21

## 2017-09-21 VITALS
BODY MASS INDEX: 44.13 KG/M2 | HEART RATE: 76 BPM | OXYGEN SATURATION: 96 % | TEMPERATURE: 97.8 F | DIASTOLIC BLOOD PRESSURE: 73 MMHG | SYSTOLIC BLOOD PRESSURE: 112 MMHG | RESPIRATION RATE: 18 BRPM | WEIGHT: 298.8 LBS

## 2017-09-21 DIAGNOSIS — Z00.00 HEALTHCARE MAINTENANCE: Primary | ICD-10-CM

## 2017-09-21 ASSESSMENT — ENCOUNTER SYMPTOMS
TREMORS: 0
NAUSEA: 0
FLANK PAIN: 0
ADENOPATHY: 0
CHILLS: 0
VOMITING: 0
NECK PAIN: 1
COUGH: 0
NERVOUS/ANXIOUS: 1
LIGHT-HEADEDNESS: 0
EYE ITCHING: 0
NECK STIFFNESS: 0
BACK PAIN: 1
DIARRHEA: 0
ABDOMINAL PAIN: 0
DIZZINESS: 0
WEAKNESS: 0
DYSURIA: 0
SLEEP DISTURBANCE: 1
BRUISES/BLEEDS EASILY: 0
SINUS PAIN: 0
CHEST TIGHTNESS: 0
SHORTNESS OF BREATH: 0
FEVER: 1
SORE THROAT: 0

## 2017-09-21 ASSESSMENT — PATIENT HEALTH QUESTIONNAIRE - PHQ9: SUM OF ALL RESPONSES TO PHQ QUESTIONS 1-9: 8

## 2017-09-21 NOTE — PROGRESS NOTES
HPI:       Juan Francisco Booker is a 57 year old who c/o right heel and sometimes on L for past 2-3 months. R foot pain is on outside of heel and medial side of foot, 4-5/10, intermittent, better with rest & ice, worse with walking. Has not tried any medications for pain. It was so bad sometimes that he had to lower his activity level twice from the pain.     Gabapentin helps with the burning pain for past 6 months (f/u by Dr. Lieberman) but not for this new foot pain.     Also here to f/u on pre-diabetes labs. Has done a good job of cutting out desserts, eating healthier (more salads, vegetables).         Problem, Medication and Allergy Lists were reviewed and are current.  Patient is an established patient of this clinic.         Review of Systems:   Review of Systems   Constitutional: Positive for fever. Negative for chills.   HENT: Negative for dental problem, ear pain, hearing loss, sinus pain, sneezing and sore throat.    Eyes: Negative for itching and visual disturbance.   Respiratory: Negative for cough, chest tightness and shortness of breath.    Cardiovascular: Negative for chest pain and leg swelling.   Gastrointestinal: Negative for abdominal pain, diarrhea, nausea and vomiting.   Endocrine: Negative for cold intolerance, heat intolerance and polyuria.   Genitourinary: Negative for decreased urine volume, discharge, dysuria, flank pain and testicular pain.   Musculoskeletal: Positive for back pain and neck pain. Negative for gait problem and neck stiffness.   Skin: Negative for rash.   Neurological: Negative for dizziness, tremors, weakness and light-headedness.   Hematological: Negative for adenopathy. Does not bruise/bleed easily.   Psychiatric/Behavioral: Positive for sleep disturbance. The patient is nervous/anxious.              Physical Exam:   Patient Vitals for the past 24 hrs:   BP Temp Temp src Pulse Resp SpO2 Weight   09/21/17 1105 112/73 97.8  F (36.6  C) Oral 76 18 96 % 298 lb 12.8 oz (135.5  kg)     Body mass index is 44.13 kg/(m^2).  Vitals were reviewed and were normal     Physical Exam   Constitutional: He appears well-developed and well-nourished.   HENT:   Head: Normocephalic and atraumatic.   Cardiovascular: Normal rate and regular rhythm.    Pulmonary/Chest: Effort normal and breath sounds normal.   Abdominal: Soft. Bowel sounds are normal. He exhibits distension. He exhibits no mass. There is no tenderness. There is no rebound and no guarding.   Musculoskeletal: Normal range of motion.        Right ankle: Normal. No tenderness.        Feet:    Bilat pedal and posterior tibial pulses intact   Skin: Skin is warm and dry.         Assessment and Plan     1. Healthcare maintenance  - ADMIN VACCINE, INITIAL  - Flu vaccine, quad, preserve-free, 0.5 ml    2. Foot pain  The foot pain is more likely plantar fasciitis than another etiology, though the physical exam is atypical for it, so Juan Francisco was advised to try some exercises and stretches for plantar fasciitis. He was told to come back if there is no change or if it gets worse. He was also advised that he may have to lighten his walking regimen while the pain gets worked up. Juan Francisco agreed to this plan.  There are no discontinued medications.  Options for treatment and follow-up care were reviewed with the patient. Juan Francisco Booker  engaged in the decision making process and verbalized understanding of the options discussed and agreed with the final plan.    This note was scribed by Erin Lew, MS3, on behalf of Dr. Lieberman.     Erin Lew  3rd year Medical Student        Mar Lieberman MD

## 2017-09-21 NOTE — NURSING NOTE
Juan Francisco Booker      1.  Has the patient received the information for the influenza vaccine? YES    2.  Does the patient have any of the following contraindications?     Allergy to eggs? No     Allergic reaction to previous influenza vaccines? No     Any other problems to previous influenza vaccines? No     Paralyzed by Guillain-Endeavor syndrome? No     Currently pregnant? NO     Current moderate or severe illness? No    Vaccination given by Lauren Florence CMA  .  Recorded by Lauren Florence CMA

## 2017-09-23 NOTE — PROGRESS NOTES
The medical student acted as scribe and the encounter documented above was completely performed by myself and the documentation reflects the work I have performed today. Also reviewed labs drawn previsit - lipid profile, HbA1c, fasting glucose. Hba1c shows marked improvement. PHQ9 score 8, most related to sleeping issues (has sleep apnea not well controlled). Congratulated on progress with diet and activity. Visit length 25 min, >50% spent counseling re sxs and plan. Mar Lieberman MD

## 2017-10-26 ENCOUNTER — TELEPHONE (OUTPATIENT)
Dept: SLEEP MEDICINE | Facility: CLINIC | Age: 57
End: 2017-10-26

## 2017-10-26 ENCOUNTER — OFFICE VISIT (OUTPATIENT)
Dept: SLEEP MEDICINE | Facility: CLINIC | Age: 57
End: 2017-10-26
Attending: INTERNAL MEDICINE

## 2017-10-26 ENCOUNTER — DOCUMENTATION ONLY (OUTPATIENT)
Dept: SLEEP MEDICINE | Facility: CLINIC | Age: 57
End: 2017-10-26
Attending: INTERNAL MEDICINE

## 2017-10-26 VITALS
WEIGHT: 296 LBS | HEART RATE: 87 BPM | BODY MASS INDEX: 43.84 KG/M2 | HEIGHT: 69 IN | OXYGEN SATURATION: 94 % | DIASTOLIC BLOOD PRESSURE: 76 MMHG | SYSTOLIC BLOOD PRESSURE: 118 MMHG | RESPIRATION RATE: 18 BRPM

## 2017-10-26 DIAGNOSIS — G47.33 OBSTRUCTIVE SLEEP APNEA: Primary | ICD-10-CM

## 2017-10-26 PROCEDURE — 99211 OFF/OP EST MAY X REQ PHY/QHP: CPT | Mod: ZF

## 2017-10-26 RX ORDER — FLUTICASONE PROPIONATE 50 MCG
1-2 SPRAY, SUSPENSION (ML) NASAL DAILY
Qty: 16 G | Refills: 3 | Status: SHIPPED | OUTPATIENT
Start: 2017-10-26 | End: 2017-10-30

## 2017-10-26 RX ORDER — METOPROLOL SUCCINATE 100 MG/1
100 TABLET, EXTENDED RELEASE ORAL DAILY
Refills: 1 | COMMUNITY
Start: 2017-10-03 | End: 2017-12-26

## 2017-10-26 NOTE — PROGRESS NOTES
FARRUKH CAME TO SLEEP WANTING TO TRY NEW FULL FACE MASK /HEADGEAR. PT TRIED ON AIRFIT F20 LARGE AND SIMPLUS LARGE.  PT CHOSE THE SIMPLUS LARGE FULL FACE. FARRUKH ALSO RECEIVED A BLACK FILTER FOR SYSTEM ONE.  FARRUKH WAS GOING TO [facility] TO  A NEW WATER CHAMBER, TUBING, AND KNOB FOR SYSTEM ONE MACHINE, [place] WAS CURRENTLY OUT OF STOCK.

## 2017-10-26 NOTE — PATIENT INSTRUCTIONS

## 2017-10-26 NOTE — TELEPHONE ENCOUNTER
[facility] Pharmacist Aracelis calling for clarification for medication fluticasone (FLONASE) 50 MCG/ACT spray. Please have Dr. Varma or a nurse call to advise at [phone].    Central Scheduling  Rory NEVAREZ

## 2017-10-26 NOTE — MR AVS SNAPSHOT
After Visit Summary   10/26/2017    Juan Francisco Booker    MRN: [id number]           Patient Information     Date Of Birth          1960        Visit Information        Provider Department      10/26/2017 11:30 AM Wellington Varma MD [facility] [place], Sleep Study        Today's Diagnoses     Obstructive sleep apnea    -  1      Care Instructions      Your BMI is Body mass index is 43.71 kg/(m^2).  Weight management is a personal decision.  If you are interested in exploring weight loss strategies, the following discussion covers the approaches that may be successful. Body mass index (BMI) is one way to tell whether you are at a healthy weight, overweight, or obese. It measures your weight in relation to your height.  A BMI of 18.5 to 24.9 is in the healthy range. A person with a BMI of 25 to 29.9 is considered overweight, and someone with a BMI of 30 or greater is considered obese. More than two-thirds of American adults are considered overweight or obese.  Being overweight or obese increases the risk for further weight gain. Excess weight may lead to heart disease and diabetes.  Creating and following plans for healthy eating and physical activity may help you improve your health.  Weight control is part of healthy lifestyle and includes exercise, emotional health, and healthy eating habits. Careful eating habits lifelong are the mainstay of weight control. Though there are significant health benefits from weight loss, long-term weight loss with diet alone may be very difficult to achieve- studies show long-term success with dietary management in less than 10% of people. Attaining a healthy weight may be especially difficult to achieve in those with severe obesity. In some cases, medications, devices and surgical management might be considered.  What can you do?  If you are overweight or obese and are interested in methods for weight loss, you should discuss this with your provider.     Consider reducing  daily calorie intake by 500 calories.     Keep a food journal.     Avoiding skipping meals, consider cutting portions instead.    Diet combined with exercise helps maintain muscle while optimizing fat loss. Strength training is particularly important for building and maintaining muscle mass. Exercise helps reduce stress, increase energy, and improves fitness. Increasing exercise without diet control, however, may not burn enough calories to loose weight.       Start walking three days a week 10-20 minutes at a time    Work towards walking thirty minutes five days a week     Eventually, increase the speed of your walking for 1-2 minutes at time    In addition, we recommend that you review healthy lifestyles and methods for weight loss available through the National Institutes of Health patient information sites:  http://win.niddk.nih.gov/publications/index.htm    And look into health and wellness programs that may be available through your health insurance provider, employer, local community center, or bernie club.    Weight management plan: Patient was referred to their PCP to discuss a diet and exercise plan.              Follow-ups after your visit        Follow-up notes from your care team     Return in about 1 year (around 10/26/2018).      Your next 10 appointments already scheduled     Oct 26, 2017  2:30 PM CDT   PAP SETUP REPLACEMENT with DME SCHEDULE   [facility] [place], Sleep Study ([facility])    [address] [place] [id number] [phone]            Dec 29, 2017   Procedure with Aracelis Lowe MD   [facility] ([facility])    [address] [place] [address]  5th [facility] [id number] [phone]           Located in the Clinics and Surgery Center at [address] [place] [address].   parking is very convenient and highly recommended.  is a $6 flat rate fee.  Both  and  "self parkers should enter the main arrival plaza from [facility]; parking attendants will direct you based on your parking preference.            Jan 09, 2018 10:30 AM CST   (Arrive by 10:15 AM)   Post-Op with [facility] ([facility])    [address]  4th Floor  [facility] [id number] [phone]              Who to contact     If you have questions or need follow up information about today's clinic visit or your schedule please contact [facility] directly at [phone].  Normal or non-critical lab and imaging results will be communicated to you by [employer], letter or phone within 4 business days after the clinic has received the results. If you do not hear from us within 7 days, please contact the clinic through [employer] or phone. If you have a critical or abnormal lab result, we will notify you by phone as soon as possible.  Submit refill requests through [employer] or call your pharmacy and they will forward the refill request to us. Please allow 3 business days for your refill to be completed.          Additional Information About Your Visit        [facility] Information     [employer] gives you secure access to your electronic health record. If you see a primary care provider, you can also send messages to your care team and make appointments. If you have questions, please call your primary care clinic.  If you do not have a primary care provider, please call [phone] and they will assist you.        Care EveryWhere ID     This is your Care EveryWhere ID. This could be used by other organizations to access your [place] medical records  [id number]        Your Vitals Were     Pulse Respirations Height Pulse Oximetry BMI (Body Mass Index)       87 18 1.753 m (5' 9\") 94% 43.71 kg/m2        Blood Pressure from Last 3 Encounters:   10/26/17 118/76   09/21/17 112/73   06/14/17 132/76    Weight from Last 3 Encounters: "   10/26/17 134.3 kg (296 lb)   09/21/17 135.5 kg (298 lb 12.8 oz)   09/12/17 136.1 kg (300 lb)              We Performed the Following     Comprehensive DME          Today's Medication Changes          These changes are accurate as of: 10/26/17 12:23 PM.  If you have any questions, ask your nurse or doctor.               These medicines have changed or have updated prescriptions.        Dose/Directions    fluticasone 50 MCG/ACT spray   Commonly known as:  FLONASE   This may have changed:  additional instructions   Changed by:  Wellington Varma MD        Dose:  1-2 spray   Spray 1-2 sprays into both nostrils daily Spray 1-2 sprays twice daily   Quantity:  16 g   Refills:  3            Where to get your medicines      These medications were sent to [address] [address]     Phone:  [phone]     fluticasone 50 MCG/ACT spray                Primary Care Provider Office Phone # Fax #    Mar Ab Lieberman [phone]       [address] [address]        Equal Access to Services     [facility]: Hadii yumi avila hadfabrizioo Sorafi, waaxda luqadaha, qaybta kaalmada adeegyada, christiano waters . So [facility] [phone].    ATENCIÓN: Si habla español, tiene a beal disposición servicios gratuitos de asistencia lingüística. Llame al [phone].    We comply with applicable federal civil rights laws and [place] laws. We do not discriminate on the basis of race, color, national origin, age, disability, sex, sexual orientation, or gender identity.            Thank you!     Thank you for choosing [facility]EP [facility]  for your care. Our goal is always to provide you with excellent care. Hearing back from our patients is one way we can continue to improve our services. Please take a few minutes to complete the written survey that you may receive in the mail after your visit with us. Thank  you!             Your Updated Medication List - Protect others around you: Learn how to safely use, store and throw away your medicines at www.disposemymeds.org.          This list is accurate as of: 10/26/17 12:23 PM.  Always use your most recent med list.                   Brand Name Dispense Instructions for use Diagnosis    aspirin 81 MG tablet      Take 1 tablet by mouth daily AM        citalopram 40 MG tablet    celeXA    30 tablet    Take 1 tablet (40 mg) by mouth daily 1 tablet (40mg) daily.    Major depressive disorder, recurrent episode, moderate (H)       MULTIVITAMINS Chew      Take 1 tablet by mouth        DAILY MULTIVITAMIN PO      Take 1 tablet by mouth daily AM        DENTA 5000 PLUS 1.1 % Crea   Generic drug:  Sodium Fluoride      APPLY A PEA SIZED AMOUNT TO TOOTHBRUSH, BRUSH ONTO ROOT AREAS THOROUGHLY, THEN SPIT OUT AT BEDTIME.        esomeprazole 20 MG CR capsule    nexIUM    30 capsule    Take 1 capsule (20 mg) by mouth every morning (before breakfast) Take 30-60 minutes before eating.    Dyspepsia       fluticasone 50 MCG/ACT spray    FLONASE    16 g    Spray 1-2 sprays into both nostrils daily Spray 1-2 sprays twice daily        * gabapentin 300 MG capsule    NEURONTIN    270 capsule    Take 3 capsules (900 mg) by mouth At Bedtime Take 900mg orally each night at 2 am    Restless legs syndrome (RLS)       * gabapentin 300 MG capsule    NEURONTIN    150 capsule    1 in morning, 1 at midday, 3 at night    Peripheral polyneuropathy       hydrochlorothiazide 25 MG tablet    HYDRODIURIL    90 tablet    Take 1 tablet (25 mg) by mouth daily AM    Benign essential hypertension       lisinopril 40 MG tablet    PRINIVIL/ZESTRIL    90 tablet    Take 1 tablet (40 mg) by mouth daily AM    Benign essential hypertension       metoprolol 100 MG 24 hr tablet    TOPROL-XL     Take 100 mg by mouth daily        omega-3 fatty acids 1200 MG capsule      Take 1 capsule by mouth daily AM        order for DME      Use  your CPAP device as directed by your provider.        priLOSEC 20 MG CR capsule   Generic drug:  omeprazole      Take 40 mg by mouth At Bedtime HS        simvastatin 40 MG tablet    ZOCOR    30 tablet    Take 1 tablet (40 mg) by mouth At Bedtime    Hyperlipidemia LDL goal <130       TYLENOL 500 MG tablet   Generic drug:  acetaminophen      Take 2 tablets by mouth every 6 hours as needed.        * Notice:  This list has 2 medication(s) that are the same as other medications prescribed for you. Read the directions carefully, and ask your doctor or other care provider to review them with you.

## 2017-10-26 NOTE — PROGRESS NOTES
Farrukh Peacock is a 57-year-old gentleman with severe obstructive sleep apnea who has had long-term problems with effective therapy management due to intolerance to have an oral device in the past and difficulty using an interface, which has multiple underpinnings including chronic nasal congestion and a tendency for poor sleep hygiene with irregular sleep time and delayed sleep phase.  He denies additional problems such as pain at night or restless legs syndrome and does not have excessive rumination at night.  He acknowledges that he is frustrated with unsuccessful trial of CPAP.  He does feel that the nasal spray twice a day may have been better therapy for his chronic nasal congestion, so we have increased the dose and he indicates that at times he feels nasal symptoms are limiting his use of the device due to the need for oral breathing so we have offered him full facemask as an alternative.  We have also reinforced regular sleep time with bedtime of approximately 2:00 a.m. and awakening at 12:00 p.m. which meets his natural sleep cycle.  He will return here on a on-yearly basis.      Total time spent with the patient 25 minutes, greater than 50% counseling.         AURORA ORTIZ MD             D: 10/26/2017 12:11   T: 10/26/2017 12:40   MT: CHRISTINA      Name:     FARRUKH PEACOKC   MRN:      [id number]        Account:      [id number]   :      1960           Visit Date:   10/26/2017      Document: [id number]       cc: Mar Brock MD

## 2017-10-30 DIAGNOSIS — R09.81 NASAL CONGESTION: Primary | ICD-10-CM

## 2017-10-30 NOTE — TELEPHONE ENCOUNTER
Received fax from pharmacy requesting prior authorization to be completed for patient's Flonase.     Patient is using his RX with in 15 days, I spoke with Dr. Varma who advised going to once a day. He did not wish for me to complete authorization at this time.     I called the pharmacy back to discuss this, I spoke with Robbi who stated the way the RX is now patient would use in 15 days. 1-2 sprays each nostril twice daily would be 8 sprays a day and out of a 120 spray bottle after 15 days bottle would be empty.     Robbi did ask for a new RX to be sent clarifying once a day dosing.     RX sent to provider for approval.     ALESSANDRA Nick  Sleep Clinic-Specialist,    Registered Medical Assistant   [facility]- \[facility] [place] [facility]\""

## 2017-10-31 RX ORDER — FLUTICASONE PROPIONATE 50 MCG
1-2 SPRAY, SUSPENSION (ML) NASAL DAILY
Qty: 16 G | Refills: 3 | Status: SHIPPED | OUTPATIENT
Start: 2017-10-31 | End: 2018-04-19

## 2017-11-13 ENCOUNTER — DOCUMENTATION ONLY (OUTPATIENT)
Dept: SLEEP MEDICINE | Facility: CLINIC | Age: 57
End: 2017-11-13

## 2017-11-13 NOTE — PROGRESS NOTES
FARRUKH CAME AND WANTED TO TRY HIS SECOND CHOICE MASK AIRFIT F20 LARGE BEFORE MAKING CHOICE FOR MASK FITTING. HE STATES CURRENT MASK SIMPLUS LARGE HAS A LEAK IN EYES WHEN HE IS LAYING DOWN. WE WENT OVER HOW TO ADJUST MASK/HEADGEAR AND STATED HE MAY NEED TO ELEVATE HEAD WHLE LAYING DOWN.   HE IS GOING TO RETURN TO [facility] ON 11/17/17 AND RETURN DEMO AIRFIT F20 LARGE AND INFORM ME ON HOW HE WANTS TO PROCEED IN RE: TO MASK EXCHANGE.

## 2017-11-15 DIAGNOSIS — E78.5 HYPERLIPIDEMIA LDL GOAL <130: ICD-10-CM

## 2017-11-15 RX ORDER — SIMVASTATIN 40 MG
40 TABLET ORAL AT BEDTIME
Qty: 30 TABLET | Refills: 9 | Status: SHIPPED | OUTPATIENT
Start: 2017-11-15 | End: 2018-09-18

## 2017-11-15 NOTE — TELEPHONE ENCOUNTER
Request for medication refill:    Date of last visit at clinic: 09/21/17    Please complete refill if appropriate and CLOSE ENCOUNTER.    Closing the encounter signifies the refill is complete.    If refill has been denied, please complete the smart phrase .smirefuse and route it to the [facility] RN TRIAGE pool to inform the patient and the pharmacy.    Lala Anderson, CMA

## 2017-11-20 DIAGNOSIS — F33.1 MAJOR DEPRESSIVE DISORDER, RECURRENT EPISODE, MODERATE (H): ICD-10-CM

## 2017-11-20 DIAGNOSIS — I10 BENIGN ESSENTIAL HYPERTENSION: ICD-10-CM

## 2017-11-20 RX ORDER — HYDROCHLOROTHIAZIDE 25 MG/1
25 TABLET ORAL DAILY
Qty: 90 TABLET | Refills: 0 | Status: SHIPPED | OUTPATIENT
Start: 2017-11-20 | End: 2018-04-09

## 2017-11-20 RX ORDER — CITALOPRAM HYDROBROMIDE 40 MG/1
40 TABLET ORAL DAILY
Qty: 30 TABLET | Refills: 8 | Status: SHIPPED | OUTPATIENT
Start: 2017-11-20 | End: 2018-07-11

## 2017-11-20 NOTE — TELEPHONE ENCOUNTER
Date of last visit at clinic: 9/21/17    Please complete refill and CLOSE ENCOUNTER.  Closing the encounter signifies the refill is complete.

## 2017-11-20 NOTE — TELEPHONE ENCOUNTER
Request for medication refill:    Date of last visit at clinic: 09/21/2017    Please complete refill if appropriate and CLOSE ENCOUNTER.    Closing the encounter signifies the refill is complete.    If refill has been denied, please complete the smart phrase .smirefuse and route it to the [facility] RN TRIAGE pool to inform the patient and the pharmacy.    Flaquito Jacob, CMA

## 2017-12-18 DIAGNOSIS — G62.9 PERIPHERAL POLYNEUROPATHY: ICD-10-CM

## 2017-12-18 RX ORDER — GABAPENTIN 300 MG/1
CAPSULE ORAL
Qty: 150 CAPSULE | Refills: 3 | Status: SHIPPED | OUTPATIENT
Start: 2017-12-18 | End: 2018-09-24

## 2017-12-18 NOTE — TELEPHONE ENCOUNTER
Request for medication refill:    Date of last visit at clinic: 6/7/17    Please complete refill if appropriate and CLOSE ENCOUNTER.    Closing the encounter signifies the refill is complete.    If refill has been denied, please complete the smart phrase .smirefuse and route it to the [facility] RN TRIAGE pool to inform the patient and the pharmacy.    Sangeetha Fan, RN

## 2017-12-28 ENCOUNTER — OFFICE VISIT (OUTPATIENT)
Dept: FAMILY MEDICINE | Facility: CLINIC | Age: 57
End: 2017-12-28
Payer: COMMERCIAL

## 2017-12-28 VITALS
OXYGEN SATURATION: 93 % | SYSTOLIC BLOOD PRESSURE: 119 MMHG | HEART RATE: 72 BPM | RESPIRATION RATE: 18 BRPM | BODY MASS INDEX: 44.85 KG/M2 | TEMPERATURE: 97.8 F | DIASTOLIC BLOOD PRESSURE: 77 MMHG | WEIGHT: 295 LBS

## 2017-12-28 DIAGNOSIS — Z01.818 PREOP GENERAL PHYSICAL EXAM: Primary | ICD-10-CM

## 2017-12-28 LAB
BUN SERPL-MCNC: 15.6 MG/DL (ref 7–21)
CALCIUM SERPL-MCNC: 9.5 MG/DL (ref 8.5–10.1)
CHLORIDE SERPLBLD-SCNC: 99.1 MMOL/L (ref 98–110)
CO2 SERPL-SCNC: 30 MMOL/L (ref 20–32)
CREAT SERPL-MCNC: 0.9 MG/DL (ref 0.7–1.3)
GFR SERPL CREATININE-BSD FRML MDRD: >90 ML/MIN/1.7 M2
GLUCOSE SERPL-MCNC: 152.4 MG'DL (ref 70–99)
HBA1C MFR BLD: 5.9 % (ref 4.1–5.7)
POTASSIUM SERPL-SCNC: 4.1 MMOL/DL (ref 3.3–4.5)
SODIUM SERPL-SCNC: 136.3 MMOL/L (ref 132.6–141.4)

## 2017-12-28 NOTE — PROGRESS NOTES
[facility]  [address],  [address]  [facility] [address]  Phone: [phone]  Fax: [phone]    12/28/2017    Adult PRE-OP Evaluation:    Juan Francisco Booker, 1960 presents for pre-operative evaluation and assessment as requested by Dr. Lowe, prior to undergoing surgery/procedure for treatment of  Carpal Tunnel (Left wrist) .    Proposed procedure: Carpal Tunnel Surgery     Date of Surgery/ Procedure: December 29, 2017    Hospital/Surgical Facility:    [facility]      [address]      Fax: [phone]      Phone: [phone]       Primary Physician: Mar Lieberman  Type of Anesthesia Anticipated: Local  History of anesthesia complications: NONE  History of  abnormal bleeding: NONE   History of blood transfusions: NO  Patient has a Health Care Directive or Living Will:  NO    Preoperative Questions   1. NO - Do you have a history of heart attack, stroke, stent, bypass or surgery on an artery in the head, neck, heart or legs?  2. NO - Do you ever have any pain or discomfort in your chest?  3. NO - Have you ever had a severe pain across the front of your chest lasting for half an hour or more?  4. NO - Do you have a history of Congestive Heart Failure?  5. NO - Are you troubled by shortness of breath when: walking on the level/ up a slight hill/ at night?  6. NO - Does your chest ever sound wheezy or whistling?  7. NO - Do you currently have a cold, bronchitis or other respiratory infection?  8. NO - Have you had a cold, bronchitis or other respiratory infection within the last 2 weeks?  9. NO - Do you usually have a cough?  10. NO - Do you sometimes get pains in the calves of your legs when you walk?  11. NO - Do you or anyone in your family have previous history of blood clots?  12. NO - Do you or does anyone in your family have a serious bleeding problem such as prolonged bleeding following surgeries or cuts?  13. NO - Have you ever had problems with  anemia or been told to take iron pills?  14. NO - Have you had any abnormal blood loss such as black, tarry or bloody stools, or abnormal vaginal bleeding?  15. NO - Have you ever had a blood transfusion?  16. NO - Have you or any of your relatives ever had problems with anesthesia?  17. YES - Do you have sleep apnea, excessive snoring or daytime drowsiness.   18. NO - Do you have any prosthetic heart valves?  19. NO - Do you have prosthetic joints?  20. NO - Is there any chance that you may be pregnant?  Patient Active Problem List   Diagnosis     Low back pain     Obstructive sleep apnea     Insomnia     Excessive sleepiness     Chronic nasal congestion     Other and unspecified alcohol dependence, unspecified drinking behavior     Lesion of ulnar nerve     Hyperlipidemia     Essential hypertension     Obesity     Benign neoplasm of colon     Hyperlipidemia     Moderate major depression (H)     Gastric polyps     Cervical pain     Impaired glucose tolerance test     Prediabetes         Current Outpatient Prescriptions on File Prior to Visit:  ATENOLOL PO Take 100 mg by mouth daily   gabapentin (NEURONTIN) 300 MG capsule 1 in morning, 1 at midday, 3 at night   hydrochlorothiazide (HYDRODIURIL) 25 MG tablet Take 1 tablet (25 mg) by mouth daily AM   citalopram (CELEXA) 40 MG tablet Take 1 tablet (40 mg) by mouth daily 1 tablet (40mg) daily.   simvastatin (ZOCOR) 40 MG tablet Take 1 tablet (40 mg) by mouth At Bedtime   fluticasone (FLONASE) 50 MCG/ACT spray Spray 1-2 sprays into both nostrils daily   DENTA 5000 PLUS 1.1 % CREA APPLY A PEA SIZED AMOUNT TO TOOTHBRUSH, BRUSH ONTO ROOT AREAS THOROUGHLY, THEN SPIT OUT AT BEDTIME.   lisinopril (PRINIVIL/ZESTRIL) 40 MG tablet Take 1 tablet (40 mg) by mouth daily AM   esomeprazole (NEXIUM) 20 MG CR capsule Take 1 capsule (20 mg) by mouth every morning (before breakfast) Take 30-60 minutes before eating.   gabapentin (NEURONTIN) 300 MG capsule Take 3 capsules (900 mg) by mouth  At Bedtime Take 900mg orally each night at 2 am   aspirin 81 MG tablet Take 1 tablet by mouth daily AM   Omega-3 Fatty Acids (FISH OIL) 1200 MG capsule Take 1 capsule by mouth daily AM   Multiple Vitamin (DAILY MULTIVITAMIN PO) Take 1 tablet by mouth daily AM   omeprazole (PRILOSEC) 20 MG capsule Take 40 mg by mouth At Bedtime HS   acetaminophen (TYLENOL) 500 MG tablet Take 2 tablets by mouth every 6 hours as needed.   Multiple Vitamins-Minerals (MULTIVITAMINS) CHEW Take 1 tablet by mouth   ORDER FOR DME Use your CPAP device as directed by your provider.     No current facility-administered medications on file prior to visit.     OTC products: Aspirin and NSAIDS    Allergies   Allergen Reactions     Grass      Latex Allergy: NO    Social History     Social History     Marital status: Single     Spouse name: N/A     Number of children: N/A     Years of education: N/A     Social History Main Topics     Smoking status: Never Smoker     Smokeless tobacco: Never Used     Alcohol use 0.0 oz/week     0 Standard drinks or equivalent per week      Comment: In remission since      Drug use: No     Sexual activity: No     Other Topics Concern     None     Social History Narrative    Single.  Lives alone.  Retired.  Worked at [employer].    No children.    1 brother -  of liver cancer, etoh    Mother  of cancer ? Type    Father  - complications of MS       REVIEW OF SYSTEMS:   Constitutional, HEENT, cardiovascular, pulmonary, GI, , musculoskeletal, neuro, skin, endocrine and psych systems are negative, except as otherwise noted.      EXAM:   Patient Vitals for the past 24 hrs:   BP Temp Temp src Pulse Resp SpO2 Weight   17 1324 119/77 97.8  F (36.6  C) Oral 72 18 93 % 295 lb (133.8 kg)     Body mass index is 44.85 kg/(m^2).  GENERAL: healthy, alert and no distress  EYES: Eyes grossly normal to inspection, extraocular movements - intact, and PERRL  HENT: ear canals- normal; TMs- normal; Nose- normal; Mouth-  no ulcers, no lesions  NECK: no tenderness, no adenopathy, no asymmetry, no masses, no stiffness; thyroid- normal to palpation  RESP: lungs clear to auscultation - no rales, no rhonchi, no wheezes  CV: regular rates and rhythm, normal S1 S2, no S3 or S4 and no murmur, no click or rub -  ABDOMEN: soft, no tenderness, no  hepatosplenomegaly, no masses, normal bowel sounds  MS: extremities- no gross deformities noted, no edema  SKIN: Diffused facial skin tags.   NEURO: strength and tone- normal, sensory exam- grossly normal, mentation- intact, speech- normal, reflexes- symmetric  BACK: no CVA tenderness, no paralumbar tenderness  PSYCH: Alert and oriented times 3; speech- coherent , normal rate and volume; able to articulate logical thoughts  LYMPHATICS: ant. cervical- normal, post. cervical- normal    DIAGNOSTICS:      No labs or EKG required for low risk surgery (cataract, skin procedure, breast biopsy, etc)    RISK ASSESSMENT:     Cardiovascular Risk:  -Patient is able to participate in strenuous activities without chest pain.  -The patient does not have chest pain at rest.  -Patient does not have a history of congestive heart failure.    -The patient does not have a history of stroke and does not have a history of valvular disease.    Pulmonary Risk:  - Patient has sleep apnea     Perioperative Complications:  -The patient does not have a history of bleeding or clotting problems in the past.    -The patient has not had complications from surgeries.    -The patient does not have a family history of any anesthesia or surgical complications.      IMPRESSION:   Reason for surgery/procedure: Carpal Tunnel syndrome     The proposed surgical procedure is considered LOW risk.    For above listed surgery and anesthesia:   Patient is at low risk for surgery/procedure and perioperative/procedure complications.    RECOMMENDATIONS:     Labs: BMP and Hemoglobin A1c      Preop Plan:  --Approval given to proceed with proposed  procedure, without further diagnostic evaluation  --Patient is taking an  Ace inhibitor or Angiotensin Receptor Blocker (ARB) and will continue this medication due to the higher risk of uncontrolled perioperative hypertension (e.g. neurosurgical procedure)    Juan Francisco was seen today for pre-op exam.    Diagnoses and all orders for this visit:    Preop general physical exam:  Proceed with surgical procedure.     Selene Rodgers MD    Please contact our office if there are any further questions or information required about this patient.

## 2017-12-28 NOTE — MR AVS SNAPSHOT
After Visit Summary   12/28/2017    Juan Francisco Booker    MRN: [id number]           Patient Information     Date Of Birth          1960        Visit Information        Provider Department      12/28/2017 1:20 PM Selene Rodgers MD [facility]        Today's Diagnoses     Preop general physical exam    -  1      Care Instructions      Before Your Surgery      Call your surgeon if there is any change in your health. This includes signs of a cold or flu (such as a sore throat, runny nose, cough, rash or fever).    Do not smoke, drink alcohol or take over the counter medicine (unless your surgeon or primary care doctor tells you to) for the 24 hours before and after surgery.    If you take prescribed drugs: Follow your doctor s orders about which medicines to take and which to stop until after surgery.    Eating and drinking prior to surgery: follow the instructions from your surgeon    Take a shower or bath the night before surgery. Use the soap your surgeon gave you to gently clean your skin. If you do not have soap from your surgeon, use your regular soap. Do not shave or scrub the surgery site.  Wear clean pajamas and have clean sheets on your bed.   Presurgery Checklist  You are scheduled to have surgery. The healthcare staff will try to make your stay comfortable. Use the guidelines below to remind yourself what to do before surgery. Be sure to follow any specific pre-op instructions from your surgeon or nurse.   Preparing for Surgery  Ask your surgeon if you ll need a blood transfusion during surgery and if so, how to prepare for it. In some cases, you can donate blood before surgery. If needed, this blood can be given back (transfused) to you during or after surgery.  If you are having abdominal surgery, ask what you need to do to clear your bowel.  Tell your surgeon if you have allergies to any medications or foods.  Arrange for an adult family member or friend to drive  you home after surgery. If possible, have someone ready to help you at home as you recover.  Call the surgeon if you get a cold, fever, sore throat, diarrhea, or other health problem just before surgery. Your surgeon can decide whether or not to postpone the surgery.  Medications  Tell your surgeon about all medications you take, including prescription and over-the-counter products such as herbal remedies and vitamins. Ask if you should continue taking them.  If you take ibuprofen, naproxen, or  blood thinners  such as aspirin, clopidogrel (Plavix), or warfarin (Coumadin), ask your surgeon whether you should stop taking them and how long before surgery you should stop.  You may be told to take antibiotics just before surgery to prevent infection. If so, follow instructions carefully on how to take them.  If you are told to take medications called anticoagulants to prevent blood clots after surgery, be sure to follow the instructions on how to take them.  Stop Smoking  If you smoke, healing may take longer. So at least 2 week(s) before surgery, stop smoking.  Bathing or Showering Before Surgery  If instructed, wash with antibacterial soap. Afterward, do not use lotions or powders.  If you are having surgery on the head, you may be asked to shampoo with antibacterial soap. Follow instructions for doing so.  Do Not Remove Hair from the Surgery Site  Do not shave hair from the incision site, unless you are given specific instructions to do so. Usually, if hair needs to be removed, it will be done at the hospital right before surgery.  Don t Eat or Drink  Your doctor will tell you when to stop eating and drinking. If you do not follow your doctor's instructions, your procedure may be postponed or rescheduled for another day.  If your surgeon tells you to continue any medications, take them with small sips of water.  You can brush your teeth and rinse your mouth, but don t swallow any water.  Day of Surgery  Do not wear  makeup. Do not use perfume, deodorant, or hairspray. Remove nail polish and artificial nails.  Leave jewelry (including rings), watches, and other valuables at home.  Be sure to bring health insurance cards or forms and a photo ID.  Bring a list of your medications (include the name, dose, how often you take them, and the time last dose was taken).  Arrive on time at the hospital or surgery facility.          Follow-ups after your visit        Your next 10 appointments already scheduled     Dec 29, 2017   Procedure with Aracelis Lowe MD   [facility] ([facility])    [address] [place] [address]  5th [facility] [id number] [phone]           Located in the Clinics and Surgery Center at [address] [place] [address].   parking is very convenient and highly recommended.  is a $6 flat rate fee.  Both  and self parkers should enter the main arrival plaza from [facility]; parking attendants will direct you based on your parking preference.            Jan 11, 2018  2:00 PM CST   (Arrive by 1:45 PM)   Post-Op with  U ORTHO HAND POP   [facility] ([facility])    [address] [place] [address]  4th [facility] [id number] [phone]            Feb 06, 2018  3:00 PM CST   (Arrive by 2:45 PM)   RETURN HAND with Aracelis Lowe MD   [facility] ([facility])    [address] [place] [address]  4th [facility] [id number] [phone]              Who to contact     Please call your clinic at [phone] to:    Ask questions about your health    Make or cancel appointments    Discuss your medicines    Learn about your test results    Speak to your doctor   If you have compliments or concerns about an experience at your clinic, or if you wish to file a complaint, please contact [facility] Patient Relations at [phone] or  email us at [email or web address]         Additional Information About Your Visit        [employer] Information     [employer] gives you secure access to your electronic health record. If you see a primary care provider, you can also send messages to your care team and make appointments. If you have questions, please call your primary care clinic.  If you do not have a primary care provider, please call [phone] and they will assist you.      [employer] is an electronic gateway that provides easy, online access to your medical records. With [employer], you can request a clinic appointment, read your test results, renew a prescription or communicate with your care team.     To access your existing account, please contact your [facility] or call [phone] for assistance.        Care EveryWhere ID     This is your Care EveryWhere ID. This could be used by other organizations to access your [facility] medical records  [id number]        Your Vitals Were     Pulse Temperature Respirations Pulse Oximetry BMI (Body Mass Index)       72 97.8  F (36.6  C) (Oral) 18 93% 44.85 kg/m2        Blood Pressure from Last 3 Encounters:   12/28/17 119/77   10/26/17 118/76   09/21/17 112/73    Weight from Last 3 Encounters:   12/28/17 295 lb (133.8 kg)   10/26/17 296 lb (134.3 kg)   09/21/17 298 lb 12.8 oz (135.5 kg)              We Performed the Following     Basic Metabolic Panel ([facility])     Hemoglobin A1c ([facility])        Primary Care Provider Office Phone # Fax #    Marwilfredo Lieberman [phone]       [address] [address]        Equal Access to Services     [facility] : Hadii aad ku hadasho Soomaali, waaxda luqadaha, qaybta kaalmada adeegyada, christiano waters . So [facility] [phone].    ATENCIÓN: Si habla español, tiene a beal disposición servicios gratuitos de asistencia lingüística. Llame al [phone].    We  comply with applicable federal civil rights laws and [place] laws. We do not discriminate on the basis of race, color, national origin, age, disability, sex, sexual orientation, or gender identity.            Thank you!     Thank you for choosing [facility]  for your care. Our goal is always to provide you with excellent care. Hearing back from our patients is one way we can continue to improve our services. Please take a few minutes to complete the written survey that you may receive in the mail after your visit with us. Thank you!             Your Updated Medication List - Protect others around you: Learn how to safely use, store and throw away your medicines at www.disposemymeds.org.          This list is accurate as of: 12/28/17  2:23 PM.  Always use your most recent med list.                   Brand Name Dispense Instructions for use Diagnosis    aspirin 81 MG tablet      Take 1 tablet by mouth daily AM        ATENOLOL PO      Take 100 mg by mouth daily        citalopram 40 MG tablet    celeXA    30 tablet    Take 1 tablet (40 mg) by mouth daily 1 tablet (40mg) daily.    Major depressive disorder, recurrent episode, moderate (H)       MULTIVITAMINS Chew      Take 1 tablet by mouth        DAILY MULTIVITAMIN PO      Take 1 tablet by mouth daily AM        DENTA 5000 PLUS 1.1 % Crea   Generic drug:  Sodium Fluoride      APPLY A PEA SIZED AMOUNT TO TOOTHBRUSH, BRUSH ONTO ROOT AREAS THOROUGHLY, THEN SPIT OUT AT BEDTIME.        esomeprazole 20 MG CR capsule    nexIUM    30 capsule    Take 1 capsule (20 mg) by mouth every morning (before breakfast) Take 30-60 minutes before eating.    Dyspepsia       fluticasone 50 MCG/ACT spray    FLONASE    16 g    Spray 1-2 sprays into both nostrils daily    Nasal congestion       * gabapentin 300 MG capsule    NEURONTIN    270 capsule    Take 3 capsules (900 mg) by mouth At Bedtime Take 900mg orally each night at 2 am    Restless legs syndrome (RLS)       *  gabapentin 300 MG capsule    NEURONTIN    150 capsule    1 in morning, 1 at midday, 3 at night    Peripheral polyneuropathy       hydrochlorothiazide 25 MG tablet    HYDRODIURIL    90 tablet    Take 1 tablet (25 mg) by mouth daily AM    Benign essential hypertension       lisinopril 40 MG tablet    PRINIVIL/ZESTRIL    90 tablet    Take 1 tablet (40 mg) by mouth daily AM    Benign essential hypertension       omega-3 fatty acids 1200 MG capsule      Take 1 capsule by mouth daily AM        order for DME      Use your CPAP device as directed by your provider.        priLOSEC 20 MG CR capsule   Generic drug:  omeprazole      Take 40 mg by mouth At Bedtime HS        simvastatin 40 MG tablet    ZOCOR    30 tablet    Take 1 tablet (40 mg) by mouth At Bedtime    Hyperlipidemia LDL goal <130       TYLENOL 500 MG tablet   Generic drug:  acetaminophen      Take 2 tablets by mouth every 6 hours as needed.        * Notice:  This list has 2 medication(s) that are the same as other medications prescribed for you. Read the directions carefully, and ask your doctor or other care provider to review them with you.

## 2017-12-28 NOTE — PROGRESS NOTES
"  [facility]   [address],  [address]  [facility] [address]  [phone]  Dept: [phone]    PRE-OP EVALUATION:  Today's date: 2017    Juan Francisco Booker (: 1960) presents for pre-operative evaluation assessment as requested by Dr. Aracelis Lowe.  He requires evaluation and anesthesia risk assessment prior to undergoing surgery/procedure for treatment of *** .  Proposed procedure: carpel tunnel in the left hand    Date of Surgery/ Procedure: 2017  Time of Surgery/ Procedure: 8:55am  Hospital/Surgical Facility: [facility]  {SURGERY FAX NUMBER:[id number]::\"Fax number for surgical facility: ***\"}  Primary Physician: Mar Lieberman  Type of Anesthesia Anticipated: Local    Patient has a Health Care Directive or Living Will:  {YES/NO:107546::\"NO\"}    {PREOP QUESTIONNAIRE OPTIONS 2 (by MA):016443}    HPI:                                                      Brief HPI related to upcoming procedure: ***      {Ch. Problems:478279}    MEDICAL HISTORY:                                                      Patient Active Problem List    Diagnosis Date Noted     Prediabetes 2017     Priority: Medium     Impaired glucose tolerance test 2017     Priority: Medium     Cervical pain 2016     Priority: Medium     Gastric polyps 2016     Priority: Medium     Seen on endoscopy . One was bleeding.       Moderate major depression (H) 2016     Priority: Medium     Hyperlipidemia 2015     Priority: Medium     Benign neoplasm of colon 2015     Priority: Medium     Multiple seen on colonoscopy 2/27/15. Repeat colonoscopy 10/16 saw adenomatous polp. For repeat colonoscopy in 3 years.       Other and unspecified alcohol dependence, unspecified drinking behavior 10/25/2012     Priority: Medium     Lesion of ulnar nerve 10/25/2012     Priority: Medium     Hyperlipidemia 10/25/2012     Priority: Medium     Problem list name updated by automated process. " "Provider to review       Essential hypertension 10/25/2012     Priority: Medium     Problem list name updated by automated process. Provider to review       Obesity 10/25/2012     Priority: Medium     Problem list name updated by automated process. Provider to review       Obstructive sleep apnea 08/13/2012     Priority: Medium     Problem list name updated by automated process. Provider to review       Insomnia 08/13/2012     Priority: Medium     Problem list name updated by automated process. Provider to review       Excessive sleepiness 08/13/2012     Priority: Medium     Chronic nasal congestion 08/13/2012     Priority: Medium     Low back pain 01/06/2012     Priority: Medium      Past Medical History:   Diagnosis Date     Anxiety      Benign neoplasm of colon 3/2/2015     Depression      Depressive disorder 1/15/2000     Difficult intubation      ETOH abuse 3/15/2009    in remission     Gastro-oesophageal reflux disease 1/15/2010     Hypoxemia      Morbid obesity (H)      Nasal polyps 1/1/2015     TIMOTHY on CPAP 8/13/2012    Severe (uses 5-6 hours as able)     Other and unspecified hyperlipidemia 10/25/2012     Personal history of colonic polyps 2/207/15    Multiple \"neg for FAP\"     Pre-diabetes      Prediabetes 5/24/2017     Restless leg      Skin tag      Unspecified essential hypertension 10/25/2012     Past Surgical History:   Procedure Laterality Date     AS COLONOSCOPY THRU STOMA W BIOPSY/CAUTERY TUMOR/POLYP/LESION  2/27/15    colon polyps     COLONOSCOPY  2/27/2015    x 2     COLONOSCOPY WITH CO2 INSUFFLATION N/A 10/20/2016    Procedure: COLONOSCOPY WITH CO2 INSUFFLATION;  Surgeon: Juan Francisco Beltran MD;  Location:  OR     ENT SURGERY  1/5/2011    [facility]     ESOPHAGOSCOPY, GASTROSCOPY, DUODENOSCOPY (EGD), COMBINED N/A 10/20/2016    Procedure: COMBINED ESOPHAGOSCOPY, GASTROSCOPY, DUODENOSCOPY (EGD);  Surgeon: Juan Francisco Beltran MD;  Location:  OR     RELEASE CARPAL TUNNEL Right 6/14/2017    " Procedure: RELEASE CARPAL TUNNEL;  Right Open Carpal Tunnel Release;  Surgeon: Aracelis Lowe MD;  Location: [facility] OR     Current Outpatient Prescriptions   Medication Sig Dispense Refill     ATENOLOL PO Take 100 mg by mouth daily       gabapentin (NEURONTIN) 300 MG capsule 1 in morning, 1 at midday, 3 at night 150 capsule 3     hydrochlorothiazide (HYDRODIURIL) 25 MG tablet Take 1 tablet (25 mg) by mouth daily AM 90 tablet 0     citalopram (CELEXA) 40 MG tablet Take 1 tablet (40 mg) by mouth daily 1 tablet (40mg) daily. 30 tablet 8     simvastatin (ZOCOR) 40 MG tablet Take 1 tablet (40 mg) by mouth At Bedtime 30 tablet 9     fluticasone (FLONASE) 50 MCG/ACT spray Spray 1-2 sprays into both nostrils daily 16 g 3     DENTA 5000 PLUS 1.1 % CREA APPLY A PEA SIZED AMOUNT TO TOOTHBRUSH, BRUSH ONTO ROOT AREAS THOROUGHLY, THEN SPIT OUT AT BEDTIME.  2     lisinopril (PRINIVIL/ZESTRIL) 40 MG tablet Take 1 tablet (40 mg) by mouth daily AM 90 tablet 1     esomeprazole (NEXIUM) 20 MG CR capsule Take 1 capsule (20 mg) by mouth every morning (before breakfast) Take 30-60 minutes before eating. 30 capsule 1     gabapentin (NEURONTIN) 300 MG capsule Take 3 capsules (900 mg) by mouth At Bedtime Take 900mg orally each night at 2 am 270 capsule 3     aspirin 81 MG tablet Take 1 tablet by mouth daily AM       Omega-3 Fatty Acids (FISH OIL) 1200 MG capsule Take 1 capsule by mouth daily AM       Multiple Vitamin (DAILY MULTIVITAMIN PO) Take 1 tablet by mouth daily AM       omeprazole (PRILOSEC) 20 MG capsule Take 40 mg by mouth At Bedtime HS       acetaminophen (TYLENOL) 500 MG tablet Take 2 tablets by mouth every 6 hours as needed.       Multiple Vitamins-Minerals (MULTIVITAMINS) CHEW Take 1 tablet by mouth       ORDER FOR DME Use your CPAP device as directed by your provider.       OTC products: {OTC ANALGESICS:131282}    Allergies   Allergen Reactions     Grass       Latex Allergy: NO    Social History   Substance Use Topics      "Smoking status: Never Smoker     Smokeless tobacco: Never Used     Alcohol use 0.0 oz/week     0 Standard drinks or equivalent per week      Comment: In remission since 2009     History   Drug Use No       REVIEW OF SYSTEMS:                                                    {ROS Preop Choices:609782}    EXAM:                                                    /77  Pulse 72  Temp 97.8  F (36.6  C) (Oral)  Resp 18  Wt 295 lb (133.8 kg)  BMI 44.85 kg/m2  {EXAM Preop Choices:628203}    DIAGNOSTICS:                                                    {DIAGNOSTIC FOR PREOP:267945}    Recent Labs   Lab Test  09/14/17   1318  03/10/17   1427  11/08/16   1206  10/20/16   0630  09/21/16   1433   07/05/16   1438   HGB   --    --   13.4   --   12.8*   < >  10.7*   NA   --    --    --    --   135.3   --   132.4   POTASSIUM   --    --    --   3.5  3.8   --   4.0   CR   --    --    --    --   1.0   --   1.1   A1C  6.0*  6.6*   --    --    --    < >   --     < > = values in this interval not displayed.        IMPRESSION:                                                    {PREOP REASONS:926952::\"Reason for surgery/procedure: ***\",\"Diagnosis/reason for consult: ***\"}    The proposed surgical procedure is considered {HIGH=major cardiovascular or procedures requiring prolonged anesthesia >4 hours or large fluid shifts;    INTERMEDIATE=abdominal, most orthopedic and intrathoracic surgery; LOW= endoscopy, cataract and breast surgery:802670} risk.    REVISED CARDIAC RISK INDEX  The patient has the following serious cardiovascular risks for perioperative complications such as (MI, PE, VFib and 3  AV Block):  {PREOP REVISED CARDIAC INDEX (RCI):105683:p:\"No serious cardiac risks\"}  INTERPRETATION: {REVISED CARDIAC RISK INTERPRETATION:135791}    The patient has the following additional risks for perioperative complications:  {Additional perioperative risks:780362:p:\"No identified additional risks\"}    No diagnosis " "found.    RECOMMENDATIONS:                                                      {IMPORTANT - Conditions - complete carefully!!:459673}    {IMPORTANT - Medications:295729::\"--Patient is to take all scheduled medications on the day of surgery EXCEPT for modifications listed below.\"}    {IMPORTANT - Approval:989009:p:\"APPROVAL GIVEN to proceed with proposed procedure, without further diagnostic evaluation\"}       Signed Electronically by: Selene Rodgers MD    Copy of this evaluation report is provided to requesting physician.    [facility] Preop Guidelines  "

## 2017-12-28 NOTE — PATIENT INSTRUCTIONS
Before Your Surgery      Call your surgeon if there is any change in your health. This includes signs of a cold or flu (such as a sore throat, runny nose, cough, rash or fever).    Do not smoke, drink alcohol or take over the counter medicine (unless your surgeon or primary care doctor tells you to) for the 24 hours before and after surgery.    If you take prescribed drugs: Follow your doctor s orders about which medicines to take and which to stop until after surgery.    Eating and drinking prior to surgery: follow the instructions from your surgeon    Take a shower or bath the night before surgery. Use the soap your surgeon gave you to gently clean your skin. If you do not have soap from your surgeon, use your regular soap. Do not shave or scrub the surgery site.  Wear clean pajamas and have clean sheets on your bed.   Presurgery Checklist  You are scheduled to have surgery. The healthcare staff will try to make your stay comfortable. Use the guidelines below to remind yourself what to do before surgery. Be sure to follow any specific pre-op instructions from your surgeon or nurse.   Preparing for Surgery  Ask your surgeon if you ll need a blood transfusion during surgery and if so, how to prepare for it. In some cases, you can donate blood before surgery. If needed, this blood can be given back (transfused) to you during or after surgery.  If you are having abdominal surgery, ask what you need to do to clear your bowel.  Tell your surgeon if you have allergies to any medications or foods.  Arrange for an adult family member or friend to drive you home after surgery. If possible, have someone ready to help you at home as you recover.  Call the surgeon if you get a cold, fever, sore throat, diarrhea, or other health problem just before surgery. Your surgeon can decide whether or not to postpone the surgery.  Medications  Tell your surgeon about all medications you take, including prescription and over-the-counter  products such as herbal remedies and vitamins. Ask if you should continue taking them.  If you take ibuprofen, naproxen, or  blood thinners  such as aspirin, clopidogrel (Plavix), or warfarin (Coumadin), ask your surgeon whether you should stop taking them and how long before surgery you should stop.  You may be told to take antibiotics just before surgery to prevent infection. If so, follow instructions carefully on how to take them.  If you are told to take medications called anticoagulants to prevent blood clots after surgery, be sure to follow the instructions on how to take them.  Stop Smoking  If you smoke, healing may take longer. So at least 2 week(s) before surgery, stop smoking.  Bathing or Showering Before Surgery  If instructed, wash with antibacterial soap. Afterward, do not use lotions or powders.  If you are having surgery on the head, you may be asked to shampoo with antibacterial soap. Follow instructions for doing so.  Do Not Remove Hair from the Surgery Site  Do not shave hair from the incision site, unless you are given specific instructions to do so. Usually, if hair needs to be removed, it will be done at the hospital right before surgery.  Don t Eat or Drink  Your doctor will tell you when to stop eating and drinking. If you do not follow your doctor's instructions, your procedure may be postponed or rescheduled for another day.  If your surgeon tells you to continue any medications, take them with small sips of water.  You can brush your teeth and rinse your mouth, but don t swallow any water.  Day of Surgery  Do not wear makeup. Do not use perfume, deodorant, or hairspray. Remove nail polish and artificial nails.  Leave jewelry (including rings), watches, and other valuables at home.  Be sure to bring health insurance cards or forms and a photo ID.  Bring a list of your medications (include the name, dose, how often you take them, and the time last dose was taken).  Arrive on time at the  hospital or surgery facility.

## 2017-12-28 NOTE — PROGRESS NOTES
Preceptor Attestation:   Patient seen and discussed with the resident. Assessment and plan reviewed with resident and agreed upon.   Supervising Physician:  Milady Jones MD  [facility]

## 2017-12-29 ENCOUNTER — HOSPITAL ENCOUNTER (OUTPATIENT)
Facility: AMBULATORY SURGERY CENTER | Age: 57
End: 2017-12-29
Attending: ORTHOPAEDIC SURGERY
Payer: COMMERCIAL

## 2017-12-29 VITALS
TEMPERATURE: 97.7 F | RESPIRATION RATE: 12 BRPM | DIASTOLIC BLOOD PRESSURE: 62 MMHG | HEART RATE: 69 BPM | SYSTOLIC BLOOD PRESSURE: 110 MMHG | WEIGHT: 290 LBS | OXYGEN SATURATION: 94 % | BODY MASS INDEX: 43.95 KG/M2 | HEIGHT: 68 IN

## 2017-12-29 DIAGNOSIS — G56.02 CARPAL TUNNEL SYNDROME OF LEFT WRIST: Primary | ICD-10-CM

## 2017-12-29 RX ORDER — HYDROCODONE BITARTRATE AND ACETAMINOPHEN 5; 325 MG/1; MG/1
1-2 TABLET ORAL EVERY 4 HOURS PRN
Qty: 8 TABLET | Refills: 0 | Status: SHIPPED | OUTPATIENT
Start: 2017-12-29 | End: 2018-01-12

## 2017-12-29 NOTE — OP NOTE
PREOPERATIVE DIAGNOSIS:  Left carpal tunnel syndrome.      POSTOPERATIVE DIAGNOSIS:  Left carpal tunnel syndrome.      PROCEDURE:  Left open carpal tunnel release.      ATTENDING SURGEON:  Aracelis Lowe MD      ASSISTANT:  None.      ANESTHESIA:  Local anesthesia only consisting of 6 mL of 0.25% Marcaine plain and 1% lidocaine plain in a 1:1 ratio.      TOURNIQUET TIME:  14 minutes.      ESTIMATED BLOOD LOSS:  1 mL.      SPECIMENS:  None.      DRAINS:  None.      IMPLANTS:  None.      COMPLICATIONS:  None apparent.      BRIEF HISTORY:  Juan Francisco Booker is a 57-year-old right-hand dominant male who presented with a history of numbness and tingling in a median nerve distribution.  He has undergone a trial of splinting and anti-inflammatories, but has had persistence of symptoms.  He underwent previous RIGHT open carpal tunnel release on 6/14/17 with excellent results and desires surgery on the LEFT as well.  I had a discussion with the patient regarding open carpal tunnel release.  I described the procedure, post-operative protocol and expected outcomes.  We discussed the risks, benefits and alternatives to surgery.  Risks discussed include bleeding, infection, nerve or vessel damage, wound healing problems, persistent pain, persistent numbness, and the possibility for further surgery.  Anesthetic risks are rare but include an adverse systemic reaction to local anesthesia.  After a full discussion of risks, benefits, and alternatives to surgery, the patient elected to proceed and informed consent was obtained.    INTRAOPERATIVE FINDINGS: There was no hook of hamate fractures or retained lumbricals.  The median nerve was hyperemic.  The ligament was thickened most notably at the distal aspect.     DESCRIPTION OF PROCEDURE:  The patient was identified in the preoperative holding area.  The consent was reviewed and signed and the operative site was identified and marked.  The patient was brought to the operating room and  transferred to the operating table in a supine position.  A tourniquet was applied to the operative forearm and the arm was placed onto an arm table.  A timeout was performed, verifying the correct patient, procedure, site and side.  No preoperative prophylactic antibiotics were given.  The skin and subcutaneous tissue were injected with local anesthesia in line with the planned surgical incision.  The left arm was then prepped and draped in a standard sterile fashion.  A second timeout was performed, verifying the correct operative site.  An Esmarch was used to exsanguinate the limb and the tourniquet was inflated to 250 mmHg. A longitudinal incision was made in line with the radial aspect of the ring finger from the distal wrist flexion crease to Thao's line.  This was taken down sharply to the level of the palmar fascia.  The palmar fascia was incised in line with the skin incision.  Hemostasis was achieved.  The transverse fibers of the transverse carpal ligament were identified and released from their ulnar attachment in both a proximal and distal direction.  Distally, the release was completed until the palmar fat was present and there was greater than 1 cm diastasis of the flaps.  Proximally, the tenotomy scissors were used to bluntly dissect above and below the transverse carpal ligament and it was released under direct visualization into the distal forearm.  Palpation and visualization confirmed complete release of the ligament.  The carpal tunnel was then inspected with findings as listed above.  The tourniquet was deflated.  Hemostasis was achieved.  The wound was thoroughly irrigated with normal saline.  The skin was closed with interrupted 4-0 nylon.  A soft sterile dressing was applied followed by a well-padded volar resting splint.  The patient tolerated the procedure well and there were no immediate complications.  He was brought to the recovery room in stable condition.  All sponge and needle  counts were correct at the end of the case.      POSTOPERATIVE PLAN:  The patient will be discharged to home today with oral pain medications.  The patient will elevate and ice.  The splint should remain clean, dry, and intact for 5 days.  After that time, the splint may be removed and the incision may get wet in the shower. The removable wrist brace should be worn.  Finger range of motion is encouraged.  The patient will return in ~12 days for a wound check and suture removal.         TAVO KHAN MD

## 2017-12-29 NOTE — IP AVS SNAPSHOT
MRN:[id number]                      After Visit Summary   12/29/2017    Juan Francisco Booker    MRN: [id number]           Thank you!     Thank you for choosing [facility] for your care. Our goal is always to provide you with excellent care. Hearing back from our patients is one way we can continue to improve our services. Please take a few minutes to complete the written survey that you may receive in the mail after you visit with us. Thank you!        Patient Information     Date Of Birth          1960        About your hospital stay     You were admitted on:  December 29, 2017 [facility]    You were discharged on:  December 29, 2017       Who to Call     For medical emergencies, please call 911.  For non-urgent questions about your medical care, please call your primary care provider or clinic, [phone]  For questions related to your surgery, please call your surgery clinic        Attending Provider     Provider Aracelis Barajas MD Orthopedics       Primary Care Provider Office Phone # Fax #    Mar Lieberman [phone]      After Care Instructions      Diet as Tolerated       Return to diet before surgery, unless instructed otherwise.            Discharge Instructions       Review outpatient procedure discharge instructions with patient as directed by Provider            Dressing Change       Keep dressing clean, dry, and intact for five days.  Then ok to remove dressing, get incision wet in shower, and transition to removable wrist brace.  No soaking incision until sutures removed.            Ice to affected area       Ice pack to surgical site every 15 minutes per hour for 24 hours            Notify Provider       For signs and symptoms of infection: Fever greater than 101, redness, swelling, heat at site, drainage, pus.            Return to clinic       Return to clinic in 2 weeks             Weight bearing - Partial       No lifting more than a coffee cup.  Finger ROM encouraged beginning today.                  Your next 10 appointments already scheduled     Jan 11, 2018  2:00 PM CST   (Arrive by 1:45 PM)   Post-Op with RYAN U ORTHO HAND POP   [facility] ([facility])    [address] [place] [id number] [phone]            Feb 06, 2018  3:00 PM CST   (Arrive by 2:45 PM)   RETURN HAND with Aracelis Lowe MD   [facility] ([facility])    [address] [place] [id number] [phone]              Further instructions from your care team       [facility]  Home Care Following Your Procedure  Call a doctor if you have signs of infection (fever, growing tenderness at the surgery site, a large amount of drainage or bleeding, severe pain, foul-smelling drainage, redness, swelling).  Tylenol/Acetaminophen Consumption  To help encourage the safe use of acetaminophen, the makers of TYLENOL  have lowered the maximum daily dose for single-ingredient Extra Strength TYLENOL  (acetaminophen) products sold in the [place] from 8 pills per day (4,000 mg) to 6 pills per day (3,000 mg). The dosing interval has also changed from 2 pills every 4-6 hours to 2 pills every 6 hours.    If you feel your pain relief is insufficient, you may take Tylenol/Acetaminophen in addition to your narcotic pain medication.     Be careful not to exceed 3,000 mg of Tylenol/Acetaminophen in a 24 hour period from all sources.    If you are taking extra strength Tylenol/acetaminophen (500 mg), the maximum dose is 6 tablets in 24 hours.    If you are taking regular strength acetaminophen (325 mg), the maximum dose is 9 tablets in 24 hours.  Your doctor is:  Dr. Aracelis Lowe, Orthopaedics: [phone]                                    Or dial [phone] and ask for the  "resident on call for:  Orthopaedics  For emergency care, call the:  [facility]: [phone] (TTY for hearing impaired: [phone])                Pending Results     No orders found from 12/27/2017 to 12/30/2017.            Admission Information     Date & Time Provider Department Dept. Phone    12/29/2017 Aracelis Lowe MD [facility] [phone]      Your Vitals Were     Blood Pressure Pulse Temperature Respirations Height Weight    110/62 69 97.7  F (36.5  C) (Oral) 12 1.727 m (5' 8\") 131.5 kg (290 lb)    Pulse Oximetry BMI (Body Mass Index)                94% 44.09 kg/m2          [employer] Information     [employer] gives you secure access to your electronic health record. If you see a primary care provider, you can also send messages to your care team and make appointments. If you have questions, please call your primary care clinic.  If you do not have a primary care provider, please call [phone] and they will assist you.      [employer] is an electronic gateway that provides easy, online access to your medical records. With [employer], you can request a clinic appointment, read your test results, renew a prescription or communicate with your care team.     To access your existing account, please contact your [facility] or call [phone] for assistance.        Care EveryWhere ID     This is your Care EveryWhere ID. This could be used by other organizations to access your [facility] medical records  [id number]        Equal Access to Services     DAX BURGOS : Hadii yumi avila hadasho Sofrediali, waaxda luqadaha, qaybta kaalmada adeegyada, waxay idiin hayaan adeeg kharash la'aan . So [facility] [phone].    ATENCIÓN: Si habla español, tiene a beal disposición servicios gratuitos de asistencia lingüística. Llame al [phone].    We comply with applicable federal civil rights laws and [place] laws. We do not discriminate on the basis of race, color, " national origin, age, disability, sex, sexual orientation, or gender identity.               Review of your medicines      START taking        Dose / Directions    HYDROcodone-acetaminophen 5-325 MG per tablet   Commonly known as:  NORCO   Used for:  Carpal tunnel syndrome of left wrist        Dose:  1-2 tablet   Take 1-2 tablets by mouth every 4 hours as needed for other (Moderate to Severe Pain)   Quantity:  8 tablet   Refills:  0         CONTINUE these medicines which have NOT CHANGED        Dose / Directions    aspirin 81 MG tablet        Dose:  1 tablet   Take 1 tablet by mouth daily AM   Refills:  0       ATENOLOL PO        Dose:  100 mg   Take 100 mg by mouth daily   Refills:  0       citalopram 40 MG tablet   Commonly known as:  celeXA   Used for:  Major depressive disorder, recurrent episode, moderate (H)        Dose:  40 mg   Take 1 tablet (40 mg) by mouth daily 1 tablet (40mg) daily.   Quantity:  30 tablet   Refills:  8       MULTIVITAMINS Chew        Dose:  1 tablet   Take 1 tablet by mouth   Refills:  0       DAILY MULTIVITAMIN PO        Dose:  1 tablet   Take 1 tablet by mouth daily AM   Refills:  0       DENTA 5000 PLUS 1.1 % Crea   Generic drug:  Sodium Fluoride        APPLY A PEA SIZED AMOUNT TO TOOTHBRUSH, BRUSH ONTO ROOT AREAS THOROUGHLY, THEN SPIT OUT AT BEDTIME.   Refills:  2       esomeprazole 20 MG CR capsule   Commonly known as:  nexIUM   Used for:  Dyspepsia        Dose:  20 mg   Take 1 capsule (20 mg) by mouth every morning (before breakfast) Take 30-60 minutes before eating.   Quantity:  30 capsule   Refills:  1       fluticasone 50 MCG/ACT spray   Commonly known as:  FLONASE   Used for:  Nasal congestion        Dose:  1-2 spray   Spray 1-2 sprays into both nostrils daily   Quantity:  16 g   Refills:  3       * gabapentin 300 MG capsule   Commonly known as:  NEURONTIN   Used for:  Restless legs syndrome (RLS)        Dose:  900 mg   Take 3 capsules (900 mg) by mouth At Bedtime Take 900mg  orally each night at 2 am   Quantity:  270 capsule   Refills:  3       * gabapentin 300 MG capsule   Commonly known as:  NEURONTIN   Used for:  Peripheral polyneuropathy        1 in morning, 1 at midday, 3 at night   Quantity:  150 capsule   Refills:  3       hydrochlorothiazide 25 MG tablet   Commonly known as:  HYDRODIURIL   Used for:  Benign essential hypertension        Dose:  25 mg   Take 1 tablet (25 mg) by mouth daily AM   Quantity:  90 tablet   Refills:  0       lisinopril 40 MG tablet   Commonly known as:  PRINIVIL/ZESTRIL   Used for:  Benign essential hypertension        Dose:  40 mg   Take 1 tablet (40 mg) by mouth daily AM   Quantity:  90 tablet   Refills:  1       omega-3 fatty acids 1200 MG capsule        Dose:  1 capsule   Take 1 capsule by mouth daily AM   Refills:  0       order for DME        Use your CPAP device as directed by your provider.   Refills:  0       priLOSEC 20 MG CR capsule   Generic drug:  omeprazole        Dose:  40 mg   Take 40 mg by mouth At Bedtime HS   Refills:  0       simvastatin 40 MG tablet   Commonly known as:  ZOCOR   Used for:  Hyperlipidemia LDL goal <130        Dose:  40 mg   Take 1 tablet (40 mg) by mouth At Bedtime   Quantity:  30 tablet   Refills:  9       TYLENOL 500 MG tablet   Generic drug:  acetaminophen        Dose:  2 tablet   Take 2 tablets by mouth every 6 hours as needed.   Refills:  0       * Notice:  This list has 2 medication(s) that are the same as other medications prescribed for you. Read the directions carefully, and ask your doctor or other care provider to review them with you.         Where to get your medicines      Some of these will need a paper prescription and others can be bought over the counter. Ask your nurse if you have questions.     Bring a paper prescription for each of these medications     HYDROcodone-acetaminophen 5-325 MG per tablet                Protect others around you: Learn how to safely use, store and throw away your  medicines at www.disposemymeds.org.             Medication List: This is a list of all your medications and when to take them. Check marks below indicate your daily home schedule. Keep this list as a reference.      Medications           Morning Afternoon Evening Bedtime As Needed    aspirin 81 MG tablet   Take 1 tablet by mouth daily AM                                ATENOLOL PO   Take 100 mg by mouth daily                                citalopram 40 MG tablet   Commonly known as:  celeXA   Take 1 tablet (40 mg) by mouth daily 1 tablet (40mg) daily.                                MULTIVITAMINS Chew   Take 1 tablet by mouth                                DAILY MULTIVITAMIN PO   Take 1 tablet by mouth daily AM                                DENTA 5000 PLUS 1.1 % Crea   APPLY A PEA SIZED AMOUNT TO TOOTHBRUSH, BRUSH ONTO ROOT AREAS THOROUGHLY, THEN SPIT OUT AT BEDTIME.   Generic drug:  Sodium Fluoride                                esomeprazole 20 MG CR capsule   Commonly known as:  nexIUM   Take 1 capsule (20 mg) by mouth every morning (before breakfast) Take 30-60 minutes before eating.                                fluticasone 50 MCG/ACT spray   Commonly known as:  FLONASE   Spray 1-2 sprays into both nostrils daily                                * gabapentin 300 MG capsule   Commonly known as:  NEURONTIN   Take 3 capsules (900 mg) by mouth At Bedtime Take 900mg orally each night at 2 am                                * gabapentin 300 MG capsule   Commonly known as:  NEURONTIN   1 in morning, 1 at midday, 3 at night                                hydrochlorothiazide 25 MG tablet   Commonly known as:  HYDRODIURIL   Take 1 tablet (25 mg) by mouth daily AM                                HYDROcodone-acetaminophen 5-325 MG per tablet   Commonly known as:  NORCO   Take 1-2 tablets by mouth every 4 hours as needed for other (Moderate to Severe Pain)                                lisinopril 40 MG tablet   Commonly known  as:  PRINIVIL/ZESTRIL   Take 1 tablet (40 mg) by mouth daily AM                                omega-3 fatty acids 1200 MG capsule   Take 1 capsule by mouth daily AM                                order for DME   Use your CPAP device as directed by your provider.                                priLOSEC 20 MG CR capsule   Take 40 mg by mouth At Bedtime HS   Generic drug:  omeprazole                                simvastatin 40 MG tablet   Commonly known as:  ZOCOR   Take 1 tablet (40 mg) by mouth At Bedtime                                TYLENOL 500 MG tablet   Take 2 tablets by mouth every 6 hours as needed.   Generic drug:  acetaminophen                                * Notice:  This list has 2 medication(s) that are the same as other medications prescribed for you. Read the directions carefully, and ask your doctor or other care provider to review them with you.

## 2017-12-29 NOTE — IP AVS SNAPSHOT
[facility]    [address] [place] [address]    Phone:  [phone]    Fax:  [phone]                                       After Visit Summary   12/29/2017    Juan Francisco Bookre    MRN: [id number]           After Visit Summary Signature Page     I have received my discharge instructions, and my questions have been answered. I have discussed any challenges I see with this plan with the nurse or doctor.    ..........................................................................................................................................  Patient/Patient Representative Signature      ..........................................................................................................................................  Patient Representative Print Name and Relationship to Patient    ..................................................               ................................................  Date                                            Time    ..........................................................................................................................................  Reviewed by Signature/Title    ...................................................              ..............................................  Date                                                            Time

## 2017-12-29 NOTE — BRIEF OP NOTE
Orthopedic Brief Operative Note    December 29, 2017    Pre-operative diagnosis: Left Carpal Tunnel Syndrome   Post-operative diagnosis: Same   Procedure: Left open carpal tunnel release   Surgeon: Aracelis Lowe   Assistant(s): None   Anesthesia: Local anesthesia   Estimated blood loss: 1cc   Total IV fluids: None   Drains: None   Specimens: None   Implants: None   Findings: See operative note   Complications: None   Condition: Stable   Weight bearing status: Partial weight bearing (30 - 50%)   Activity: Activity as tolerated  Patient may move about with assist as indicated or with supervision   Plan: Elevate  Ice  Splint until Monday, then removable brace   Follow-up: 2 weeks.    Aracelis Lowe MD  [phone]

## 2017-12-29 NOTE — DISCHARGE INSTRUCTIONS
[facility]  Home Care Following Your Procedure  Call a doctor if you have signs of infection (fever, growing tenderness at the surgery site, a large amount of drainage or bleeding, severe pain, foul-smelling drainage, redness, swelling).  Tylenol/Acetaminophen Consumption  To help encourage the safe use of acetaminophen, the makers of TYLENOL  have lowered the maximum daily dose for single-ingredient Extra Strength TYLENOL  (acetaminophen) products sold in the [place] from 8 pills per day (4,000 mg) to 6 pills per day (3,000 mg). The dosing interval has also changed from 2 pills every 4-6 hours to 2 pills every 6 hours.    If you feel your pain relief is insufficient, you may take Tylenol/Acetaminophen in addition to your narcotic pain medication.     Be careful not to exceed 3,000 mg of Tylenol/Acetaminophen in a 24 hour period from all sources.    If you are taking extra strength Tylenol/acetaminophen (500 mg), the maximum dose is 6 tablets in 24 hours.    If you are taking regular strength acetaminophen (325 mg), the maximum dose is 9 tablets in 24 hours.  Your doctor is:  Dr. Aracelis Lowe, Orthopaedics: [phone]                                    Or dial [phone] and ask for the resident on call for:  Orthopaedics  For emergency care, call the:  [facility]: [phone] (TTY for hearing impaired: [phone])

## 2018-01-05 ASSESSMENT — ENCOUNTER SYMPTOMS
DIFFICULTY URINATING: 1
TINGLING: 0
NUMBNESS: 0
FLANK PAIN: 0
HEMATURIA: 0
DISTURBANCES IN COORDINATION: 0
SEIZURES: 0
HEADACHES: 1
SPEECH CHANGE: 0
DYSURIA: 0
LOSS OF CONSCIOUSNESS: 0
TREMORS: 0
PARALYSIS: 0
DIZZINESS: 0
WEAKNESS: 0
MEMORY LOSS: 0

## 2018-01-12 ENCOUNTER — OFFICE VISIT (OUTPATIENT)
Dept: ORTHOPEDICS | Facility: CLINIC | Age: 58
End: 2018-01-12
Payer: COMMERCIAL

## 2018-01-12 DIAGNOSIS — G56.03 BILATERAL CARPAL TUNNEL SYNDROME: Primary | ICD-10-CM

## 2018-01-12 NOTE — MR AVS SNAPSHOT
After Visit Summary   1/12/2018    Juan Francisco Booker    MRN: [id number]           Patient Information     Date Of Birth          1960        Visit Information        Provider Department      1/12/2018 2:00 PM [facility] ORTHO HAND [facility]        Today's Diagnoses     Bilateral carpal tunnel syndrome    -  1       Follow-ups after your visit        Follow-up notes from your care team     Return in about 5 weeks (around 2/13/2018).      Your next 10 appointments already scheduled     Feb 13, 2018  1:45 PM CST   (Arrive by 1:30 PM)   RETURN HAND with Aracelis Lowe MD   [facility] ([facility])    [address] [facility] [address] [phone]              Who to contact     Please call your clinic at [phone] to:    Ask questions about your health    Make or cancel appointments    Discuss your medicines    Learn about your test results    Speak to your doctor   If you have compliments or concerns about an experience at your clinic, or if you wish to file a complaint, please contact [facility] Patient Relations at [phone] or email us at [email or web address]         Additional Information About Your Visit        MyChart Information     [employer] gives you secure access to your electronic health record. If you see a primary care provider, you can also send messages to your care team and make appointments. If you have questions, please call your primary care clinic.  If you do not have a primary care provider, please call [phone] and they will assist you.      [employer] is an electronic gateway that provides easy, online access to your medical records. With [employer], you can request a clinic appointment, read your test results, renew a prescription or communicate with your care team.     To access your existing account, please contact your [facility] or call [phone] for assistance.        Care EveryWhere ID     This is your Care EveryWhere ID. This could be used by other organizations to access your [facility] medical records  [id number]         Blood Pressure from Last 3 Encounters:   12/29/17 110/62   12/28/17 119/77   10/26/17 118/76    Weight from Last 3 Encounters:   12/29/17 131.5 kg (290 lb)   12/28/17 133.8 kg (295 lb)   10/26/17 134.3 kg (296 lb)              Today, you had the following     No orders found for display       Primary Care Provider Office Phone # Fax #    Mar Lieberman [phone]       [address] [address]        Equal Access to Services     [facility] : Hadii yumi hannao Sorafi, waaxda luqadaha, qaybta kaalmada adeegyada, christiano waters . So [facility] [place] [phone].    ATENCIÓN: Si habla español, tiene a beal disposición servicios gratuitos de asistencia lingüística. Margie al [phone].    We comply with applicable federal civil rights laws and [place] laws. We do not discriminate on the basis of race, color, national origin, age, disability, sex, sexual orientation, or gender identity.            Thank you!     Thank you for choosing [facility]  for your care. Our goal is always to provide you with excellent care. Hearing back from our patients is one way we can continue to improve our services. Please take a few minutes to complete the written survey that you may receive in the mail after your visit with us. Thank you!             Your Updated Medication List - Protect others around you: Learn how to safely use, store and throw away your medicines at www.disposemymeds.org.          This list is accurate as of: 1/12/18  2:32 PM.  Always use your most recent med list.                   Brand Name Dispense Instructions for use Diagnosis    aspirin 81 MG tablet      Take 1 tablet by mouth daily AM        ATENOLOL PO       Take 100 mg by mouth daily        citalopram 40 MG tablet    celeXA    30 tablet    Take 1 tablet (40 mg) by mouth daily 1 tablet (40mg) daily.    Major depressive disorder, recurrent episode, moderate (H)       MULTIVITAMINS Chew      Take 1 tablet by mouth        DAILY MULTIVITAMIN PO      Take 1 tablet by mouth daily AM        DENTA 5000 PLUS 1.1 % Crea   Generic drug:  Sodium Fluoride      APPLY A PEA SIZED AMOUNT TO TOOTHBRUSH, BRUSH ONTO ROOT AREAS THOROUGHLY, THEN SPIT OUT AT BEDTIME.        esomeprazole 20 MG CR capsule    nexIUM    30 capsule    Take 1 capsule (20 mg) by mouth every morning (before breakfast) Take 30-60 minutes before eating.    Dyspepsia       fluticasone 50 MCG/ACT spray    FLONASE    16 g    Spray 1-2 sprays into both nostrils daily    Nasal congestion       * gabapentin 300 MG capsule    NEURONTIN    270 capsule    Take 3 capsules (900 mg) by mouth At Bedtime Take 900mg orally each night at 2 am    Restless legs syndrome (RLS)       * gabapentin 300 MG capsule    NEURONTIN    150 capsule    1 in morning, 1 at midday, 3 at night    Peripheral polyneuropathy       hydrochlorothiazide 25 MG tablet    HYDRODIURIL    90 tablet    Take 1 tablet (25 mg) by mouth daily AM    Benign essential hypertension       lisinopril 40 MG tablet    PRINIVIL/ZESTRIL    90 tablet    Take 1 tablet (40 mg) by mouth daily AM    Benign essential hypertension       omega-3 fatty acids 1200 MG capsule      Take 1 capsule by mouth daily AM        order for DME      Use your CPAP device as directed by your provider.        priLOSEC 20 MG CR capsule   Generic drug:  omeprazole      Take 40 mg by mouth At Bedtime HS        simvastatin 40 MG tablet    ZOCOR    30 tablet    Take 1 tablet (40 mg) by mouth At Bedtime    Hyperlipidemia LDL goal <130       TYLENOL 500 MG tablet   Generic drug:  acetaminophen      Take 2 tablets by mouth every 6 hours as needed.        * Notice:  This list has 2 medication(s) that are  the same as other medications prescribed for you. Read the directions carefully, and ask your doctor or other care provider to review them with you.

## 2018-01-12 NOTE — PROGRESS NOTES
Mr Booker came in today for a wound check.  He is status post left open carpal tunnel release 12/29/2017.    Assessment:  The incision is clean, dry, and intact with no signs of infection.  Sutures were removed without difficulty and steri-strips were applied to the site.  He denies pain.  He reports sensation is unchanged from pre-op.  He has good finger range of motion    On the right side he reports full sensation has returned    Plan:  Mr Booker states he has been wearing his wrist brace full-time.  He was told he can start to wean out of the brace now.  He will follow-up with Dr Lowe 02/13/18 and was encouraged to call for any concerns that arise before then.

## 2018-02-06 ASSESSMENT — ENCOUNTER SYMPTOMS
FLANK PAIN: 0
DYSURIA: 0
HEMATURIA: 0
DIFFICULTY URINATING: 1

## 2018-02-13 ENCOUNTER — OFFICE VISIT (OUTPATIENT)
Dept: ORTHOPEDICS | Facility: CLINIC | Age: 58
End: 2018-02-13
Payer: COMMERCIAL

## 2018-02-13 VITALS — WEIGHT: 290 LBS | BODY MASS INDEX: 43.95 KG/M2 | HEIGHT: 68 IN

## 2018-02-13 DIAGNOSIS — G56.02 CARPAL TUNNEL SYNDROME OF LEFT WRIST: Primary | ICD-10-CM

## 2018-02-13 NOTE — NURSING NOTE
"Reason For Visit:   Chief Complaint   Patient presents with     Surgical Followup     DOS 12/29/17 S/P Left carpal tunnel release.        Primary MD: Mar Lieberman  Ref. MD: Dr. Sharma    Age: 58 year old    ?  No      Ht 1.727 m (5' 8\")  Wt 131.5 kg (290 lb)  BMI 44.09 kg/m2      Pain Assessment  Patient Currently in Pain: Yes  0-10 Pain Scale: 1  Primary Pain Location: Wrist  Pain Orientation: Left  Pain Descriptors: Aching    Hand Dominance Evaluation  Hand Dominance: Right          QuickDASH Assessment  QuickDASH Main 2/6/2018   1.Open a tight or new jar. Unable to answer   2. Do heavy household chores (e.g., wash walls, floors) Mild difficulty   3. Carry a shopping bag or briefcase. No difficulty   4. Wash your back. No difficulty   5. Use a knife to cut food. Unable to answer   6. Recreational activities in which you take some force or impact through your arm, shoulder or hand (e.g., golf, hammering, tennis, etc.). Unable to answer   7. During the past week, to what extent has your arm, shoulder or hand problem interfered with your normal social activities with family, friends, neighbours or groups? Not at all   8. During the past week, were you limited in your work or other regular daily activities as a result of your arm, shoulder or hand problem? Not limited at all   9. Arm, shoulder or hand pain. Mild   10.Tingling (pins and needles) in your arm,shoulder or hand. None   11. During the past week, how much difficulty have you had sleeping because of the pain in your arm, shoulder or hand? ([place] number) No difficulty   Quickdash Ability Score -2.27          Current Outpatient Prescriptions   Medication Sig Dispense Refill     ATENOLOL PO Take 100 mg by mouth daily       gabapentin (NEURONTIN) 300 MG capsule 1 in morning, 1 at midday, 3 at night 150 capsule 3     hydrochlorothiazide (HYDRODIURIL) 25 MG tablet Take 1 tablet (25 mg) by mouth daily AM 90 tablet 0     citalopram (CELEXA) 40 " MG tablet Take 1 tablet (40 mg) by mouth daily 1 tablet (40mg) daily. 30 tablet 8     simvastatin (ZOCOR) 40 MG tablet Take 1 tablet (40 mg) by mouth At Bedtime 30 tablet 9     fluticasone (FLONASE) 50 MCG/ACT spray Spray 1-2 sprays into both nostrils daily 16 g 3     Multiple Vitamins-Minerals (MULTIVITAMINS) CHEW Take 1 tablet by mouth       DENTA 5000 PLUS 1.1 % CREA APPLY A PEA SIZED AMOUNT TO TOOTHBRUSH, BRUSH ONTO ROOT AREAS THOROUGHLY, THEN SPIT OUT AT BEDTIME.  2     lisinopril (PRINIVIL/ZESTRIL) 40 MG tablet Take 1 tablet (40 mg) by mouth daily AM 90 tablet 1     esomeprazole (NEXIUM) 20 MG CR capsule Take 1 capsule (20 mg) by mouth every morning (before breakfast) Take 30-60 minutes before eating. 30 capsule 1     gabapentin (NEURONTIN) 300 MG capsule Take 3 capsules (900 mg) by mouth At Bedtime Take 900mg orally each night at 2 am 270 capsule 3     ORDER FOR DME Use your CPAP device as directed by your provider.       aspirin 81 MG tablet Take 1 tablet by mouth daily AM       Omega-3 Fatty Acids (FISH OIL) 1200 MG capsule Take 1 capsule by mouth daily AM       Multiple Vitamin (DAILY MULTIVITAMIN PO) Take 1 tablet by mouth daily AM       omeprazole (PRILOSEC) 20 MG capsule Take 40 mg by mouth At Bedtime HS       acetaminophen (TYLENOL) 500 MG tablet Take 2 tablets by mouth every 6 hours as needed.         Allergies   Allergen Reactions     Grass        Whitney Tejada LPN

## 2018-02-13 NOTE — MR AVS SNAPSHOT
After Visit Summary   2/13/2018    Juan Francisco Booker    MRN: [id number]           Patient Information     Date Of Birth          1960        Visit Information        Provider Department      2/13/2018 1:45 PM Aracelis Lowe MD [facility]        Today's Diagnoses     Carpal tunnel syndrome of left wrist    -  1       Follow-ups after your visit        Follow-up notes from your care team     Return if symptoms worsen or fail to improve.      Your next 10 appointments already scheduled     Mar 29, 2018 11:00 AM CDT   Return Visit with Mar Lieberman MD   [facility] ([facility])    [address],  [address]  [place] [address]   [phone]              Who to contact     Please call your clinic at [phone] to:    Ask questions about your health    Make or cancel appointments    Discuss your medicines    Learn about your test results    Speak to your doctor            Additional Information About Your Visit        MyChart Information     [employer] gives you secure access to your electronic health record. If you see a primary care provider, you can also send messages to your care team and make appointments. If you have questions, please call your primary care clinic.  If you do not have a primary care provider, please call [phone] and they will assist you.      [employer] is an electronic gateway that provides easy, online access to your medical records. With [employer], you can request a clinic appointment, read your test results, renew a prescription or communicate with your care team.     To access your existing account, please contact your [facility] or call [phone] for assistance.        Care EveryWhere ID     This is your Care EveryWhere ID. This could be used by other organizations to access your [place] medical records  [id number]        Your Vitals Were     Height BMI (Body Mass Index)     "            1.727 m (5' 8\") 44.09 kg/m2           Blood Pressure from Last 3 Encounters:   12/29/17 110/62   12/28/17 119/77   10/26/17 118/76    Weight from Last 3 Encounters:   02/13/18 131.5 kg (290 lb)   12/29/17 131.5 kg (290 lb)   12/28/17 133.8 kg (295 lb)              Today, you had the following     No orders found for display       Primary Care Provider Office Phone # Fax #    Mar Ab Lieberman [phone]       [address] [address]        Equal Access to Services     UZAIR BURGOS : Garett Rodriguez, rdaha tejeda, lucia badillo, christiano waters . So [facility] [phone].    ATENCIÓN: Si habla español, tiene a beal disposición servicios gratuitos de asistencia lingüística. Llame al [phone].    We comply with applicable federal civil rights laws and [place] laws. We do not discriminate on the basis of race, color, national origin, age, disability, sex, sexual orientation, or gender identity.            Thank you!     Thank you for choosing [facility]  for your care. Our goal is always to provide you with excellent care. Hearing back from our patients is one way we can continue to improve our services. Please take a few minutes to complete the written survey that you may receive in the mail after your visit with us. Thank you!             Your Updated Medication List - Protect others around you: Learn how to safely use, store and throw away your medicines at www.disposemymeds.org.          This list is accurate as of 2/13/18 11:59 PM.  Always use your most recent med list.                   Brand Name Dispense Instructions for use Diagnosis    aspirin 81 MG tablet      Take 1 tablet by mouth daily AM        citalopram 40 MG tablet    celeXA    30 tablet    Take 1 tablet (40 mg) by mouth daily 1 tablet (40mg) daily.    Major depressive disorder, recurrent episode, moderate (H)       " MULTIVITAMINS Chew      Take 1 tablet by mouth        DAILY MULTIVITAMIN PO      Take 1 tablet by mouth daily AM        DENTA 5000 PLUS 1.1 % Crea   Generic drug:  Sodium Fluoride      APPLY A PEA SIZED AMOUNT TO TOOTHBRUSH, BRUSH ONTO ROOT AREAS THOROUGHLY, THEN SPIT OUT AT BEDTIME.        esomeprazole 20 MG CR capsule    nexIUM    30 capsule    Take 1 capsule (20 mg) by mouth every morning (before breakfast) Take 30-60 minutes before eating.    Dyspepsia       fluticasone 50 MCG/ACT spray    FLONASE    16 g    Spray 1-2 sprays into both nostrils daily    Nasal congestion       * gabapentin 300 MG capsule    NEURONTIN    270 capsule    Take 3 capsules (900 mg) by mouth At Bedtime Take 900mg orally each night at 2 am    Restless legs syndrome (RLS)       * gabapentin 300 MG capsule    NEURONTIN    150 capsule    1 in morning, 1 at midday, 3 at night    Peripheral polyneuropathy       hydrochlorothiazide 25 MG tablet    HYDRODIURIL    90 tablet    Take 1 tablet (25 mg) by mouth daily AM    Benign essential hypertension       lisinopril 40 MG tablet    PRINIVIL/ZESTRIL    90 tablet    Take 1 tablet (40 mg) by mouth daily AM    Benign essential hypertension       omega-3 fatty acids 1200 MG capsule      Take 1 capsule by mouth daily AM        order for DME      Use your CPAP device as directed by your provider.        priLOSEC 20 MG CR capsule   Generic drug:  omeprazole      Take 40 mg by mouth At Bedtime HS        simvastatin 40 MG tablet    ZOCOR    30 tablet    Take 1 tablet (40 mg) by mouth At Bedtime    Hyperlipidemia LDL goal <130       TYLENOL 500 MG tablet   Generic drug:  acetaminophen      Take 2 tablets by mouth every 6 hours as needed.        * Notice:  This list has 2 medication(s) that are the same as other medications prescribed for you. Read the directions carefully, and ask your doctor or other care provider to review them with you.

## 2018-02-22 DIAGNOSIS — I10 BENIGN ESSENTIAL HYPERTENSION: Primary | ICD-10-CM

## 2018-02-22 RX ORDER — ATENOLOL 25 MG/1
100 TABLET ORAL DAILY
Qty: 120 TABLET | Refills: 2 | Status: SHIPPED | OUTPATIENT
Start: 2018-02-22 | End: 2018-04-09

## 2018-02-22 NOTE — TELEPHONE ENCOUNTER
Request for medication refill:    Date of last visit at clinic: 12/28/2017    Please check dosage strength.    Please complete refill if appropriate and CLOSE ENCOUNTER.    Closing the encounter signifies the refill is complete.    If refill has been denied, please complete the smart phrase .smirefuse and route it to the [facility] RN TRIAGE pool to inform the patient and the pharmacy.    Flaquito Jacob, CMA

## 2018-03-29 ENCOUNTER — OFFICE VISIT (OUTPATIENT)
Dept: FAMILY MEDICINE | Facility: CLINIC | Age: 58
End: 2018-03-29
Payer: COMMERCIAL

## 2018-03-29 VITALS
WEIGHT: 290.2 LBS | TEMPERATURE: 97.9 F | HEART RATE: 83 BPM | BODY MASS INDEX: 44.12 KG/M2 | SYSTOLIC BLOOD PRESSURE: 130 MMHG | DIASTOLIC BLOOD PRESSURE: 80 MMHG | OXYGEN SATURATION: 94 % | RESPIRATION RATE: 18 BRPM

## 2018-03-29 DIAGNOSIS — R35.0 URINARY FREQUENCY: Primary | ICD-10-CM

## 2018-03-29 LAB
BILIRUBIN UR: NEGATIVE
BLOOD UR: NEGATIVE
GLUCOSE URINE: NEGATIVE
KETONES UR QL: NEGATIVE
LEUKOCYTE ESTERASE UR: NEGATIVE
NITRITE UR QL STRIP: NEGATIVE
PH UR STRIP: 5.5 [PH] (ref 5–7)
PROTEIN UR: NEGATIVE
SP GR UR STRIP: 1.02
UROBILINOGEN UR STRIP-ACNC: NORMAL

## 2018-03-29 RX ORDER — TAMSULOSIN HYDROCHLORIDE 0.4 MG/1
0.4 CAPSULE ORAL DAILY
Qty: 30 CAPSULE | Refills: 1 | Status: SHIPPED | OUTPATIENT
Start: 2018-03-29 | End: 2018-05-21

## 2018-03-29 NOTE — PROGRESS NOTES
HPI:       Juan Francisco Booker is a 58 year old who presents for the following  Patient presents with:  Urinary Problem: x's 2 months of feeling like he is not emptying his bladder. No pain. Normal frequency per pt    Genitourinary - Male  Onset: 1-2 months of urinary retention    Description:   Dysuria (painful urination): no  Hematuria (blood in urine):no  Frequency: Yes Details: at night and during the day.  How many times are you getting up to urinate at night? : 2-3  Hesitancy (delay in starting urine):Yes takes time to initiate  Retention (unable to empty): Yes has to go twice in 10-15 minutes to feel that he fully relieves himself.  Decrease in urinary flow: no  Incontinence: no    Progression of Symptoms:  same and constant    Accompanying Signs & Symptoms:  Fever: no  Back/Flank pain:no  Urethral discharge:no  Testicle lumps/masses/pain: no  Nausea and/or vomiting: no  Abdominal pain:no    History:   History of frequent UTI's: no  History of kidney stones: no  History of hernias: no  Personal or Family history of Prostate problems:no    What makes it worse?:   nothing    What makes it better?:  nothing    Therapies Tried and outcome:          Problem, Medication and Allergy Lists were reviewed and are current.  Patient is an established patient of this clinic.         Review of Systems:   Review of Systems   A 10 point ROS was performed as above and is negative unless otherwise listed.        Physical Exam:   Patient Vitals for the past 24 hrs:   BP Temp Temp src Pulse Resp SpO2 Weight   03/29/18 1106 130/80 97.9  F (36.6  C) Oral 83 18 94 % 290 lb 3.2 oz (131.6 kg)     Body mass index is 44.12 kg/(m^2).  Vitals were reviewed and were normal     Physical Exam   Constitutional: He is oriented to person, place, and time. He appears well-developed and well-nourished.   HENT:   Head: Normocephalic and atraumatic.   Eyes: EOM are normal. Pupils are equal, round, and reactive to light.   Cardiovascular: Normal  rate, regular rhythm and normal heart sounds.    Pulmonary/Chest: Effort normal and breath sounds normal. No respiratory distress.   Abdominal: He exhibits no distension.   Neurological: He is oriented to person, place, and time.   Skin:   Many wart like/polyp like growths across face and neck.    Psychiatric: He has a normal mood and affect. His behavior is normal.       Results:     Pending UA  Assessment and Plan     1. Urinary frequency  Likely BPH with chronicity.   - Urinalysis, Micro If (UA) ([facility])  - tamsulosin (FLOMAX) 0.4 MG capsule; Take 1 capsule (0.4 mg) by mouth daily 30 min after a meal  Dispense: 30 capsule; Refill: 1    Options for treatment and follow-up care were reviewed with the patient. Juan Francisco Booker  engaged in the decision making process and verbalized understanding of the options discussed and agreed with the final plan.    Mar Lieberman MD    Preceptor Attestation:  I was present with the medical student who participated in the service and in the documentation of this note. I have verified the history and personally performed the physical exam and medical decision making. I have verified the content of the note, which accurately reflects my assessment of the patient and the plan of care. Visit length 15 min, all spent counseling re sxs and plan.   Supervising Physician:  Mar Lieberman MD

## 2018-03-29 NOTE — MR AVS SNAPSHOT
After Visit Summary   3/29/2018    Juan Francisco Booker    MRN: [id number]           Patient Information     Date Of Birth          1960        Visit Information        Provider Department      3/29/2018 11:00 AM Mar Lieberman MD [facility]        Today's Diagnoses     Urinary frequency    -  1       Follow-ups after your visit        Who to contact     Please call your clinic at [phone] to:    Ask questions about your health    Make or cancel appointments    Discuss your medicines    Learn about your test results    Speak to your doctor            Additional Information About Your Visit        MyChart Information     [facility] gives you secure access to your electronic health record. If you see a primary care provider, you can also send messages to your care team and make appointments. If you have questions, please call your primary care clinic.  If you do not have a primary care provider, please call [phone] and they will assist you.      [facility] is an electronic gateway that provides easy, online access to your medical records. With [facility], you can request a clinic appointment, read your test results, renew a prescription or communicate with your care team.     To access your existing account, please contact your [facility] or call [phone] for assistance.        Care EveryWhere ID     This is your Care EveryWhere ID. This could be used by other organizations to access your [place] medical records  [id number]        Your Vitals Were     Pulse Temperature Respirations Pulse Oximetry BMI (Body Mass Index)       83 97.9  F (36.6  C) (Oral) 18 94% 44.12 kg/m2        Blood Pressure from Last 3 Encounters:   03/29/18 130/80   12/29/17 110/62   12/28/17 119/77    Weight from Last 3 Encounters:   03/29/18 290 lb 3.2 oz (131.6 kg)   02/13/18 290 lb (131.5 kg)   12/29/17 290 lb (131.5 kg)              We Performed the Following      Urinalysis, Micro If (UA) ([facility])          Today's Medication Changes          These changes are accurate as of 3/29/18 12:00 PM.  If you have any questions, ask your nurse or doctor.               Start taking these medicines.        Dose/Directions    tamsulosin 0.4 MG capsule   Commonly known as:  FLOMAX   Used for:  Urinary frequency   Started by:  Mar Lieberman MD        Dose:  0.4 mg   Take 1 capsule (0.4 mg) by mouth daily 30 min after a meal   Quantity:  30 capsule   Refills:  1            Where to get your medicines      These medications were sent to [place] [address] [place] [facility] [address]  [address] [facility] [address]     Phone:  [phone]     tamsulosin 0.4 MG capsule                Primary Care Provider Office Phone # Fax #    Mar Lieberman -[phone]       [address] [address]        Equal Access to Services     [facility]: Hadii aad ku hadasho Soomaali, waaxda luqadaha, qaybta kaalmada adeegyada, waxay idiin haychery waters . So [facility] [phone].    ATENCIÓN: Si habla español, tiene a beal disposición servicios gratuitos de asistencia lingüística. [facility] [phone].    We comply with applicable federal civil rights laws and [place] laws. We do not discriminate on the basis of race, color, national origin, age, disability, sex, sexual orientation, or gender identity.            Thank you!     Thank you for choosing \[facility]\"" [facility]  for your care. Our goal is always to provide you with excellent care. Hearing back from our patients is one way we can continue to improve our services. Please take a few minutes to complete the written survey that you may receive in the mail after your visit with us. Thank you!             Your Updated Medication List - Protect others around you: Learn how to safely use, store and throw away your medicines at www.disposemymeds.org.           This list is accurate as of 3/29/18 12:00 PM.  Always use your most recent med list.                   Brand Name Dispense Instructions for use Diagnosis    aspirin 81 MG tablet      Take 1 tablet by mouth daily AM        atenolol 25 MG tablet    TENORMIN    120 tablet    Take 4 tablets (100 mg) by mouth daily    Benign essential hypertension       citalopram 40 MG tablet    celeXA    30 tablet    Take 1 tablet (40 mg) by mouth daily 1 tablet (40mg) daily.    Major depressive disorder, recurrent episode, moderate (H)       MULTIVITAMINS Chew      Take 1 tablet by mouth        DAILY MULTIVITAMIN PO      Take 1 tablet by mouth daily AM        DENTA 5000 PLUS 1.1 % Crea   Generic drug:  Sodium Fluoride      APPLY A PEA SIZED AMOUNT TO TOOTHBRUSH, BRUSH ONTO ROOT AREAS THOROUGHLY, THEN SPIT OUT AT BEDTIME.        esomeprazole 20 MG CR capsule    nexIUM    30 capsule    Take 1 capsule (20 mg) by mouth every morning (before breakfast) Take 30-60 minutes before eating.    Dyspepsia       fluticasone 50 MCG/ACT spray    FLONASE    16 g    Spray 1-2 sprays into both nostrils daily    Nasal congestion       * gabapentin 300 MG capsule    NEURONTIN    270 capsule    Take 3 capsules (900 mg) by mouth At Bedtime Take 900mg orally each night at 2 am    Restless legs syndrome (RLS)       * gabapentin 300 MG capsule    NEURONTIN    150 capsule    1 in morning, 1 at midday, 3 at night    Peripheral polyneuropathy       hydrochlorothiazide 25 MG tablet    HYDRODIURIL    90 tablet    Take 1 tablet (25 mg) by mouth daily AM    Benign essential hypertension       lisinopril 40 MG tablet    PRINIVIL/ZESTRIL    90 tablet    Take 1 tablet (40 mg) by mouth daily AM    Benign essential hypertension       omega-3 fatty acids 1200 MG capsule      Take 1 capsule by mouth daily AM        order for DME      Use your CPAP device as directed by your provider.        priLOSEC 20 MG CR capsule   Generic drug:  omeprazole      Take 40 mg by mouth At  Bedtime HS        simvastatin 40 MG tablet    ZOCOR    30 tablet    Take 1 tablet (40 mg) by mouth At Bedtime    Hyperlipidemia LDL goal <130       tamsulosin 0.4 MG capsule    FLOMAX    30 capsule    Take 1 capsule (0.4 mg) by mouth daily 30 min after a meal    Urinary frequency       TYLENOL 500 MG tablet   Generic drug:  acetaminophen      Take 2 tablets by mouth every 6 hours as needed.        * Notice:  This list has 2 medication(s) that are the same as other medications prescribed for you. Read the directions carefully, and ask your doctor or other care provider to review them with you.

## 2018-04-09 DIAGNOSIS — I10 BENIGN ESSENTIAL HYPERTENSION: ICD-10-CM

## 2018-04-09 RX ORDER — HYDROCHLOROTHIAZIDE 25 MG/1
25 TABLET ORAL DAILY
Qty: 90 TABLET | Refills: 1 | Status: SHIPPED | OUTPATIENT
Start: 2018-04-09 | End: 2018-09-10

## 2018-04-09 RX ORDER — ATENOLOL 25 MG/1
100 TABLET ORAL DAILY
Qty: 120 TABLET | Refills: 5 | Status: SHIPPED | OUTPATIENT
Start: 2018-04-09 | End: 2018-11-09

## 2018-04-09 NOTE — TELEPHONE ENCOUNTER
Request for medication refill: Atenolol 100mg tab    Date of last visit at clinic: 03/26/2018    Please complete refill if appropriate and CLOSE ENCOUNTER.    Closing the encounter signifies the refill is complete.    If refill has been denied, please complete the smart phrase .smirefuse and route it to the [facility] RN TRIAGE pool to inform the patient and the pharmacy.    Elaina Lafleur, CMA

## 2018-04-09 NOTE — TELEPHONE ENCOUNTER
Request for medication refill: hydrochlorothiazide 25 mg tablet    Date of last visit at clinic: 03/29/2018    Please complete refill if appropriate and CLOSE ENCOUNTER.    Closing the encounter signifies the refill is complete.    If refill has been denied, please complete the smart phrase .smirefuse and route it to the [facility] RN TRIAGE pool to inform the patient and the pharmacy.    Elaina Lafleur, CMA

## 2018-04-18 ASSESSMENT — PATIENT HEALTH QUESTIONNAIRE - PHQ9: SUM OF ALL RESPONSES TO PHQ QUESTIONS 1-9: 8

## 2018-04-19 DIAGNOSIS — R09.81 NASAL CONGESTION: ICD-10-CM

## 2018-04-19 RX ORDER — FLUTICASONE PROPIONATE 50 MCG
1-2 SPRAY, SUSPENSION (ML) NASAL DAILY
Qty: 16 G | Refills: 3 | Status: SHIPPED | OUTPATIENT
Start: 2018-04-19 | End: 2018-08-20

## 2018-04-19 NOTE — TELEPHONE ENCOUNTER
Pending Prescriptions:                       Disp   Refills    fluticasone (FLONASE) 50 MCG/ACT spray    16 g   3            Sig: Spray 1-2 sprays into both nostrils daily    Last Written Prescription Date:  10/26/2017  Last Fill Quantity: 16g,   # refills: 3  Last Office Visit with [facility], [facility] or [facility] prescribing provider: 10/31/2017  Future Office visit:   No follow up scheduled at this time.    ALESSANDRA Brewer

## 2018-04-27 DIAGNOSIS — G25.81 RESTLESS LEGS SYNDROME (RLS): ICD-10-CM

## 2018-04-27 RX ORDER — GABAPENTIN 300 MG/1
900 CAPSULE ORAL AT BEDTIME
Qty: 270 CAPSULE | Refills: 3 | Status: SHIPPED | OUTPATIENT
Start: 2018-04-27 | End: 2018-09-24

## 2018-04-27 NOTE — TELEPHONE ENCOUNTER
Request for medication refill:    Date of last visit at clinic: 03/29/2018    Please complete refill if appropriate and CLOSE ENCOUNTER.    Closing the encounter signifies the refill is complete.    If refill has been denied, please complete the smart phrase .smirefuse and route it to the [facility] RN TRIAGE pool to inform the patient and the pharmacy.    Stacy Soriano MA

## 2018-05-17 DIAGNOSIS — I10 BENIGN ESSENTIAL HYPERTENSION: ICD-10-CM

## 2018-05-17 RX ORDER — LISINOPRIL 40 MG/1
40 TABLET ORAL DAILY
Qty: 90 TABLET | Refills: 0 | Status: SHIPPED | OUTPATIENT
Start: 2018-05-17 | End: 2018-06-18

## 2018-05-17 NOTE — TELEPHONE ENCOUNTER
Request for medication refill:    Date of last visit at clinic: 3/29/2018    Please complete refill if appropriate and CLOSE ENCOUNTER.    Closing the encounter signifies the refill is complete.    If refill has been denied, please complete the smart phrase .smirefuse and route it to the [facility] RN TRIAGE pool to inform the patient and the pharmacy.    Rivka Gutierrez, CMA

## 2018-05-18 ENCOUNTER — OFFICE VISIT (OUTPATIENT)
Dept: FAMILY MEDICINE | Facility: CLINIC | Age: 58
End: 2018-05-18
Payer: COMMERCIAL

## 2018-05-18 VITALS
BODY MASS INDEX: 42.85 KG/M2 | TEMPERATURE: 98 F | OXYGEN SATURATION: 94 % | WEIGHT: 281.8 LBS | SYSTOLIC BLOOD PRESSURE: 98 MMHG | HEART RATE: 77 BPM | DIASTOLIC BLOOD PRESSURE: 67 MMHG

## 2018-05-18 DIAGNOSIS — G89.29 CHRONIC BILATERAL LOW BACK PAIN WITHOUT SCIATICA: Primary | ICD-10-CM

## 2018-05-18 DIAGNOSIS — M79.672 HEEL PAIN, BILATERAL: ICD-10-CM

## 2018-05-18 DIAGNOSIS — M54.50 CHRONIC BILATERAL LOW BACK PAIN WITHOUT SCIATICA: Primary | ICD-10-CM

## 2018-05-18 DIAGNOSIS — M79.671 HEEL PAIN, BILATERAL: ICD-10-CM

## 2018-05-18 NOTE — MR AVS SNAPSHOT
"              After Visit Summary   5/18/2018    Juan Francisco Booker    MRN: [id number]           Patient Information     Date Of Birth          1960        Visit Information        Provider Department      5/18/2018 1:20 PM Mar Lieberman MD [facility]        Today's Diagnoses     Chronic bilateral low back pain without sciatica    -  1       Follow-ups after your visit        Additional Services     PHYSICAL THERAPY REFERRAL - [facility]  At this location [facility] in [facility]    For \"back school\", recurrent low back pain                  Who to contact     Please call your clinic at [phone] to:    Ask questions about your health    Make or cancel appointments    Discuss your medicines    Learn about your test results    Speak to your doctor            Additional Information About Your Visit        MyChart Information     [employer] gives you secure access to your electronic health record. If you see a primary care provider, you can also send messages to your care team and make appointments. If you have questions, please call your primary care clinic.  If you do not have a primary care provider, please call [phone] and they will assist you.      [employer] is an electronic gateway that provides easy, online access to your medical records. With [employer], you can request a clinic appointment, read your test results, renew a prescription or communicate with your care team.     To access your existing account, please contact your [facility] or call [phone] for assistance.        Care EveryWhere ID     This is your Care EveryWhere ID. This could be used by other organizations to access your [facility] medical records  [id number]        Your Vitals Were     Pulse Temperature Pulse Oximetry BMI (Body Mass Index)          77 98  F (36.7  C) (Oral) 94% 42.85 kg/m2         Blood Pressure from Last 3 Encounters: "   05/18/18 98/67   03/29/18 130/80   12/29/17 110/62    Weight from Last 3 Encounters:   05/18/18 281 lb 12.8 oz (127.8 kg)   03/29/18 290 lb 3.2 oz (131.6 kg)   02/13/18 290 lb (131.5 kg)              We Performed the Following     PHYSICAL THERAPY REFERRAL - EXTERNAL        Primary Care Provider Office Phone # Fax #    Mar Lieberman [phone]       [address] [address]        Equal Access to Services     [facility]: Hadii yumi avila hadasho Soomaali, waaxda luqadaha, qaybta kaalmada leticiayaisabel, christiano waters . So [facility] [phone].    ATENCIÓN: Si habla español, tiene a beal disposición servicios gratuitos de asistencia lingüística. [facility] [phone].    We comply with applicable federal civil rights laws and [place] laws. We do not discriminate on the basis of race, color, national origin, age, disability, sex, sexual orientation, or gender identity.            Thank you!     Thank you for choosing hospitals [facility]  for your care. Our goal is always to provide you with excellent care. Hearing back from our patients is one way we can continue to improve our services. Please take a few minutes to complete the written survey that you may receive in the mail after your visit with us. Thank you!             Your Updated Medication List - Protect others around you: Learn how to safely use, store and throw away your medicines at www.disposemymeds.org.          This list is accurate as of 5/18/18  1:41 PM.  Always use your most recent med list.                   Brand Name Dispense Instructions for use Diagnosis    aspirin 81 MG tablet      Take 1 tablet by mouth daily AM        atenolol 25 MG tablet    TENORMIN    120 tablet    Take 4 tablets (100 mg) by mouth daily    Benign essential hypertension       citalopram 40 MG tablet    celeXA    30 tablet    Take 1 tablet (40 mg) by mouth daily 1 tablet (40mg) daily.    Major  depressive disorder, recurrent episode, moderate (H)       MULTIVITAMINS Chew      Take 1 tablet by mouth        DAILY MULTIVITAMIN PO      Take 1 tablet by mouth daily AM        DENTA 5000 PLUS 1.1 % Crea   Generic drug:  Sodium Fluoride      APPLY A PEA SIZED AMOUNT TO TOOTHBRUSH, BRUSH ONTO ROOT AREAS THOROUGHLY, THEN SPIT OUT AT BEDTIME.        esomeprazole 20 MG CR capsule    nexIUM    30 capsule    Take 1 capsule (20 mg) by mouth every morning (before breakfast) Take 30-60 minutes before eating.    Dyspepsia       fluticasone 50 MCG/ACT spray    FLONASE    16 g    Spray 1-2 sprays into both nostrils daily    Nasal congestion       * gabapentin 300 MG capsule    NEURONTIN    150 capsule    1 in morning, 1 at midday, 3 at night    Peripheral polyneuropathy       * gabapentin 300 MG capsule    NEURONTIN    270 capsule    Take 3 capsules (900 mg) by mouth At Bedtime Take 900mg orally each night at 2 am    Restless legs syndrome (RLS)       hydrochlorothiazide 25 MG tablet    HYDRODIURIL    90 tablet    Take 1 tablet (25 mg) by mouth daily AM    Benign essential hypertension       lisinopril 40 MG tablet    PRINIVIL/ZESTRIL    90 tablet    Take 1 tablet (40 mg) by mouth daily AM    Benign essential hypertension       omega-3 fatty acids 1200 MG capsule      Take 1 capsule by mouth daily AM        order for DME      Use your CPAP device as directed by your provider.        priLOSEC 20 MG CR capsule   Generic drug:  omeprazole      Take 40 mg by mouth At Bedtime HS        simvastatin 40 MG tablet    ZOCOR    30 tablet    Take 1 tablet (40 mg) by mouth At Bedtime    Hyperlipidemia LDL goal <130       tamsulosin 0.4 MG capsule    FLOMAX    30 capsule    Take 1 capsule (0.4 mg) by mouth daily 30 min after a meal    Urinary frequency       TYLENOL 500 MG tablet   Generic drug:  acetaminophen      Take 2 tablets by mouth every 6 hours as needed.        * Notice:  This list has 2 medication(s) that are the same as other  medications prescribed for you. Read the directions carefully, and ask your doctor or other care provider to review them with you.

## 2018-05-18 NOTE — PROGRESS NOTES
SUBJECTIVE:  The patient is here with several issues.  He had recently been in [place] and had done a lot of walking to the point where he had lost 9 pounds, and I congratulated him on that.  However, he ended up having some problems with his back, he said, freezing up on him while he was walking and also having heel pain.  He has had both of these issues in the past, but was much worse during his trip when he was so physically active.  For the heel pain, I had given him some exercises for plantar fasciitis, which he said really did not make any difference.  He did buy some new shoes, which were the exact same design as his previous shoes, but it ended up his older shoes were more comfortable than the new ones.  With the back pain, he ended up not taking any medications for it.  He did get some massages, which were helpful.  There was no radiation of the pain.  He was walking up to 10 miles some days, which is much more than he usually walks.  He has had some intermittent back pain in the past.  He was interested in something to try to help his back become stronger so that he would not have this sort of problem with future trips.  He also mentioned that he is still having his urinary symptoms.  I had prescribed Flomax for him at the last visit; however, he is having trouble with the instructions that say 30 minutes after a meal, but he will forget to take it 30 minutes after the meal, and then he does not know whether it is too late and he should not take it, so he is only taking it about half the time.  He has not noticed any change with the Flomax, but is assuming it is because he is not taking it very often.  Then he was wondering about trying to get off his antidepressant.  He says the only positive symptoms on PHQ-9 at this point are just sleep related.  A couple years ago he had tried to get off the Celexa and had some difficulty with it, but would like to try it again.  He is currently on 40 mg of Celexa.       OBJECTIVE:  On exam, the patient is in no acute distress.  Blood pressure is definitely on the low side, which I think may be related to his 9 pound weight loss.  We talked about if that continues that we could probably start tapering him off some of his blood pressure medicines.  I did examine his feet.  His feet are actually in good shape as far as not any calluses or anything.  The heel pain location is not typical for plantar fasciitis.  It is bilaterally on sort of the medial aspect of the heels.  As far as his back pain, it is in the lower lumbar area bilaterally, not over the spinous processes.      IMPRESSION:  Several issues here.     1.  One is what sounds like musculoskeletal low back pain.  The plan for that is to go ahead with the Physical Therapy referral.  He has been to the [facility] on [facility], so I did a referral there.  I printed it out, and he will make the appointment, and the idea is for like a back school.     2.  Heel pain.  I think it is still most likely plantar fasciitis.  Since the exercises did not work, I thought probably it is best at this point to send him on to Podiatry, so I did a referral for that.   3.  BPH symptoms.  Not helped yet with Flomax, but only taking it half the amount of time.  The plan was to send a message to pharmacology and get their input on the importance of the strictness of the time after a meal; then I will get back to him about that.     4.  Interest in decreasing Celexa medication.  He will go ahead and cut his meds in half, so he would be on 20 mg daily.  He said he would do that for at least 6-8 weeks before thinking about cutting back any further.  He did fill out a PHQ-9 and had a score of 6, and all the points were sleep related.       Visit length was 25 minutes, more than 50% spent in counseling about symptoms and plan.

## 2018-05-19 ASSESSMENT — PATIENT HEALTH QUESTIONNAIRE - PHQ9: SUM OF ALL RESPONSES TO PHQ QUESTIONS 1-9: 6

## 2018-05-21 ENCOUNTER — THERAPY VISIT (OUTPATIENT)
Dept: PHYSICAL THERAPY | Facility: CLINIC | Age: 58
End: 2018-05-21
Payer: COMMERCIAL

## 2018-05-21 DIAGNOSIS — R35.0 URINARY FREQUENCY: ICD-10-CM

## 2018-05-21 DIAGNOSIS — M54.50 LUMBAGO: Primary | ICD-10-CM

## 2018-05-21 PROCEDURE — G8978 MOBILITY CURRENT STATUS: HCPCS | Mod: GP | Performed by: PHYSICAL THERAPIST

## 2018-05-21 PROCEDURE — G8979 MOBILITY GOAL STATUS: HCPCS | Mod: GP | Performed by: PHYSICAL THERAPIST

## 2018-05-21 PROCEDURE — 97161 PT EVAL LOW COMPLEX 20 MIN: CPT | Mod: GP | Performed by: PHYSICAL THERAPIST

## 2018-05-21 PROCEDURE — 97110 THERAPEUTIC EXERCISES: CPT | Mod: GP | Performed by: PHYSICAL THERAPIST

## 2018-05-21 RX ORDER — TAMSULOSIN HYDROCHLORIDE 0.4 MG/1
0.4 CAPSULE ORAL DAILY
Qty: 30 CAPSULE | Refills: 1 | Status: SHIPPED | OUTPATIENT
Start: 2018-05-21 | End: 2018-07-17

## 2018-05-21 NOTE — MR AVS SNAPSHOT
After Visit Summary   5/21/2018    Juan Francisco Booker    MRN: [id number]           Patient Information     Date Of Birth          1960        Visit Information        Provider Department      5/21/2018 12:40 PM Gio Rain PT [facility]        Today's Diagnoses     Lumbago    -  1       Follow-ups after your visit        Your next 10 appointments already scheduled     May 29, 2018 11:00 AM CDT   ZENOBIA Spine with Gio Rain PT   [facility] ([facility]  )    [address] [facility] [id number] [phone]              Who to contact     If you have questions or need follow up information about today's clinic visit or your schedule please contact [facility] directly at [phone].  Normal or non-critical lab and imaging results will be communicated to you by MyChart, letter or phone within 4 business days after the clinic has received the results. If you do not hear from us within 7 days, please contact the clinic through [employer] or phone. If you have a critical or abnormal lab result, we will notify you by phone as soon as possible.  Submit refill requests through [employer] or call your pharmacy and they will forward the refill request to us. Please allow 3 business days for your refill to be completed.          Additional Information About Your Visit        MyChart Information     [employer] gives you secure access to your electronic health record. If you see a primary care provider, you can also send messages to your care team and make appointments. If you have questions, please call your primary care clinic.  If you do not have a primary care provider, please call [phone] and they will assist you.        Care EveryWhere ID     This is your Care EveryWhere ID. This could be used by other organizations to access your [place]  medical records  [id number]         Blood Pressure from Last 3 Encounters:   05/18/18 98/67   03/29/18 130/80   12/29/17 110/62    Weight from Last 3 Encounters:   05/18/18 127.8 kg (281 lb 12.8 oz)   03/29/18 131.6 kg (290 lb 3.2 oz)   02/13/18 131.5 kg (290 lb)              We Performed the Following     HC PT EVAL, LOW COMPLEXITY     ZENOBIA INITIAL EVAL REPORT     THERAPEUTIC EXERCISES        Primary Care Provider Office Phone # Fax #    Mar Ab Lieberman [phone]       [address] [address]        Equal Access to Services     [facility] : Hadii yumi [place], walulu tejeda, qaybta kaalmada justino, christiano johnson. So [facility] [phone].    ATENCIÓN: Si habla español, tiene a beal disposición servicios gratuitos de asistencia lingüística. [facility] [phone].    We comply with applicable federal civil rights laws and [place] laws. We do not discriminate on the basis of race, color, national origin, age, disability, sex, sexual orientation, or gender identity.            Thank you!     Thank you for choosing [facility]  for your care. Our goal is always to provide you with excellent care. Hearing back from our patients is one way we can continue to improve our services. Please take a few minutes to complete the written survey that you may receive in the mail after your visit with us. Thank you!             Your Updated Medication List - Protect others around you: Learn how to safely use, store and throw away your medicines at www.disposemymeds.org.          This list is accurate as of 5/21/18  1:17 PM.  Always use your most recent med list.                   Brand Name Dispense Instructions for use Diagnosis    aspirin 81 MG tablet      Take 1 tablet by mouth daily AM        atenolol 25 MG tablet    TENORMIN    120 tablet    Take 4 tablets (100 mg) by mouth daily     Benign essential hypertension       citalopram 40 MG tablet    celeXA    30 tablet    Take 1 tablet (40 mg) by mouth daily 1 tablet (40mg) daily.    Major depressive disorder, recurrent episode, moderate (H)       MULTIVITAMINS Chew      Take 1 tablet by mouth        DAILY MULTIVITAMIN PO      Take 1 tablet by mouth daily AM        DENTA 5000 PLUS 1.1 % Crea   Generic drug:  Sodium Fluoride      APPLY A PEA SIZED AMOUNT TO TOOTHBRUSH, BRUSH ONTO ROOT AREAS THOROUGHLY, THEN SPIT OUT AT BEDTIME.        esomeprazole 20 MG CR capsule    nexIUM    30 capsule    Take 1 capsule (20 mg) by mouth every morning (before breakfast) Take 30-60 minutes before eating.    Dyspepsia       fluticasone 50 MCG/ACT spray    FLONASE    16 g    Spray 1-2 sprays into both nostrils daily    Nasal congestion       * gabapentin 300 MG capsule    NEURONTIN    150 capsule    1 in morning, 1 at midday, 3 at night    Peripheral polyneuropathy       * gabapentin 300 MG capsule    NEURONTIN    270 capsule    Take 3 capsules (900 mg) by mouth At Bedtime Take 900mg orally each night at 2 am    Restless legs syndrome (RLS)       hydrochlorothiazide 25 MG tablet    HYDRODIURIL    90 tablet    Take 1 tablet (25 mg) by mouth daily AM    Benign essential hypertension       lisinopril 40 MG tablet    PRINIVIL/ZESTRIL    90 tablet    Take 1 tablet (40 mg) by mouth daily AM    Benign essential hypertension       omega-3 fatty acids 1200 MG capsule      Take 1 capsule by mouth daily AM        order for DME      Use your CPAP device as directed by your provider.        priLOSEC 20 MG CR capsule   Generic drug:  omeprazole      Take 40 mg by mouth At Bedtime HS        simvastatin 40 MG tablet    ZOCOR    30 tablet    Take 1 tablet (40 mg) by mouth At Bedtime    Hyperlipidemia LDL goal <130       tamsulosin 0.4 MG capsule    FLOMAX    30 capsule    Take 1 capsule (0.4 mg) by mouth daily 30 min after a meal    Urinary frequency       TYLENOL 500 MG tablet    Generic drug:  acetaminophen      Take 2 tablets by mouth every 6 hours as needed.        * Notice:  This list has 2 medication(s) that are the same as other medications prescribed for you. Read the directions carefully, and ask your doctor or other care provider to review them with you.

## 2018-05-21 NOTE — PROGRESS NOTES
[facility] Initial Evaluation  Subjective:  Physical Therapy Initial Evaluation   5/21/18  Mar Lieberman MD Precautions/Restrictions/MD instructions: Recurrent LBP   Therapist Impression:   Pt is a 57 y/o male, with recent exacerbation of a chronic history of LBP. Pt presents with pain, reduced ROM, weakness, postural deficits, and reduced motor control consistent with mechanical LBP. These impairments limit their ability to ambulate, stand, perform ADL's, and perform household duties. Skilled PT services necessary in order to reduce impairments and improve independent function.    Subjective:   Chief Complaint: Pt reports bilateral LBP that increases with walking. Notes that it is chronic in nature and cannot recall any reason for the onset. Notes walking longer distances (>4,000 steps) increases his pain. Notes that after sitting for a long period of time, he will be very stiff.  DOI/onset: chronic DOS: n/a  Location: bilateral PSIS Quality: dull and stiff  Frequency: after walking Radiates: does note some tingling into both feet unrelated to back pain (notes that this has been going on for 6 months)  Pain scale: Rest 0/10 Activity 4/10   Time of day: activity related Sleeping: no issues d/t the back   Exacerbated by: longer distance walks Relieved by: rest Progression: worse since he got back from [place]  Previous Treatment: rest Effect of prior treatment: none   PMH and/or surgical history: overweight, HBP, changes in bowel/bladder function, migraines/headaches, sleep    Imaging: none    Occupation: retired Job duties: household duties   Current HEP/exercise regimen: walks daily Patient's goals: reduce pain/stiffness  Medications: sleep, antidepressants, HBP  General health as reported by patient: fair  Return to MD: PRN   HPI                Objective:  Lumbar Spine/SIJ Evaluation:    Gait: loss of lumbar lordosis, compensated trendelenburg    SL balance: B ipsilateral trunk  lean    Lumbar AROM:   Motion ROM Pain   Flexion To knees, does not reverse  LBP   Extension Mod loss L sided soreness   Side Bend R Min loss R sided   Side Bend L Min loss L sided   Side Gliding L Min loss L sided   Side Gliding R Min loss R sided     Repeated Motion: NT    Lumbar Myotomes/DTR/Dermatomes: WNL and symmetrical    Neural Tension/Mobility:  Neg    Hip PROM: Hypomobile all directions, comes out of saggital plane with flexion into obligatory ER    Muscle Activation    L R   TrA Poor poor   Glute Max NT NT       System    Physical Exam    General     ROS    Assessment/Plan:    Patient is a 58 year old male with lumbar complaints.    Patient has the following significant findings with corresponding treatment plan.                Diagnosis 1:  LBP  Pain -  hot/cold therapy, manual therapy, splint/taping/bracing/orthotics, education, directional preference exercise and home program  Decreased ROM/flexibility - manual therapy and therapeutic exercise  Decreased joint mobility - manual therapy and therapeutic exercise  Decreased strength - therapeutic exercise and therapeutic activities  Impaired balance - neuro re-education and therapeutic activities  Decreased proprioception - neuro re-education and therapeutic activities  Impaired gait - gait training  Impaired muscle performance - neuro re-education  Decreased function - therapeutic activities  Impaired posture - neuro re-education    Therapy Evaluation Codes:   1) History comprised of:   Personal factors that impact the plan of care:      Past/current experiences and Time since onset of symptoms.    Comorbidity factors that impact the plan of care are:      Bowel/bladder changes, High blood pressure, Migraines/headaches, Overweight and Sleep disorder/apnea.     Medications impacting care: Anti-depressant, High blood pressure and Sleep.  2) Examination of Body Systems comprised of:   Body structures and functions that impact the plan of care:      Hip and  Lumbar spine.   Activity limitations that impact the plan of care are:      Bathing, Bending, Cooking, Sitting, Walking and Sleeping.  3) Clinical presentation characteristics are:   Stable/Uncomplicated.  4) Decision-Making    Low complexity using standardized patient assessment instrument and/or measureable assessment of functional outcome.  Cumulative Therapy Evaluation is: Low complexity.    Previous and current functional limitations:  (See Goal Flow Sheet for this information)    Short term and Long term goals: (See Goal Flow Sheet for this information)     Communication ability:  Patient appears to be able to clearly communicate and understand verbal and written communication and follow directions correctly.  Treatment Explanation - The following has been discussed with the patient:   RX ordered/plan of care  Anticipated outcomes  Possible risks and side effects  This patient would benefit from PT intervention to resume normal activities.   Rehab potential is good.    Frequency:  1 X week, once daily  Duration:  for 3 weeks tapering to 2 X a month over 6 weeks  Discharge Plan:  Achieve all LTG.  Independent in home treatment program.  Reach maximal therapeutic benefit.    Please refer to the daily flowsheet for treatment today, total treatment time and time spent performing 1:1 timed codes.

## 2018-05-21 NOTE — TELEPHONE ENCOUNTER
Request for medication refill:    Date of last visit at clinic: 5/18/18    Please complete refill if appropriate and CLOSE ENCOUNTER.    Closing the encounter signifies the refill is complete.    If refill has been denied, please complete the smart phrase .smirefuse and route it to the [facility] RN TRIAGE pool to inform the patient and the pharmacy.    ALESSANDRA Clarke 1:40 PM May 21, 2018

## 2018-05-27 ENCOUNTER — MYC MEDICAL ADVICE (OUTPATIENT)
Dept: FAMILY MEDICINE | Facility: CLINIC | Age: 58
End: 2018-05-27

## 2018-05-29 ENCOUNTER — THERAPY VISIT (OUTPATIENT)
Dept: PHYSICAL THERAPY | Facility: CLINIC | Age: 58
End: 2018-05-29
Payer: COMMERCIAL

## 2018-05-29 DIAGNOSIS — M54.50 LUMBAGO: ICD-10-CM

## 2018-05-29 PROCEDURE — 97110 THERAPEUTIC EXERCISES: CPT | Mod: GP | Performed by: PHYSICAL THERAPIST

## 2018-06-08 ENCOUNTER — THERAPY VISIT (OUTPATIENT)
Dept: PHYSICAL THERAPY | Facility: CLINIC | Age: 58
End: 2018-06-08
Payer: COMMERCIAL

## 2018-06-08 DIAGNOSIS — M54.50 LUMBAGO: ICD-10-CM

## 2018-06-08 PROCEDURE — 97112 NEUROMUSCULAR REEDUCATION: CPT | Mod: GP | Performed by: PHYSICAL THERAPIST

## 2018-06-08 PROCEDURE — 97110 THERAPEUTIC EXERCISES: CPT | Mod: GP | Performed by: PHYSICAL THERAPIST

## 2018-06-18 DIAGNOSIS — I10 BENIGN ESSENTIAL HYPERTENSION: ICD-10-CM

## 2018-06-18 RX ORDER — LISINOPRIL 40 MG/1
40 TABLET ORAL DAILY
Qty: 90 TABLET | Refills: 1 | Status: SHIPPED | OUTPATIENT
Start: 2018-06-18 | End: 2018-08-17

## 2018-06-18 NOTE — TELEPHONE ENCOUNTER

## 2018-06-19 ENCOUNTER — THERAPY VISIT (OUTPATIENT)
Dept: PHYSICAL THERAPY | Facility: CLINIC | Age: 58
End: 2018-06-19
Payer: COMMERCIAL

## 2018-06-19 DIAGNOSIS — M54.50 LUMBAGO: ICD-10-CM

## 2018-06-19 PROCEDURE — 97112 NEUROMUSCULAR REEDUCATION: CPT | Mod: GP | Performed by: PHYSICAL THERAPIST

## 2018-06-19 PROCEDURE — 97110 THERAPEUTIC EXERCISES: CPT | Mod: GP | Performed by: PHYSICAL THERAPIST

## 2018-06-26 ASSESSMENT — ENCOUNTER SYMPTOMS
TASTE DISTURBANCE: 0
SINUS PAIN: 0
BACK PAIN: 1
MUSCLE CRAMPS: 0
DIFFICULTY URINATING: 1
NECK PAIN: 0
MEMORY LOSS: 0
FLANK PAIN: 0
TINGLING: 0
WEAKNESS: 0
ARTHRALGIAS: 0
SORE THROAT: 1
SEIZURES: 0
MUSCLE WEAKNESS: 0
MYALGIAS: 0
HOARSE VOICE: 0
SINUS CONGESTION: 1
PARALYSIS: 0
SPEECH CHANGE: 0
HEMATURIA: 0
HEADACHES: 1
TROUBLE SWALLOWING: 0
SMELL DISTURBANCE: 0
NECK MASS: 0
LOSS OF CONSCIOUSNESS: 0
DYSURIA: 0
DISTURBANCES IN COORDINATION: 0
NUMBNESS: 0
JOINT SWELLING: 0
TREMORS: 0
DIZZINESS: 0
STIFFNESS: 0

## 2018-06-28 NOTE — TELEPHONE ENCOUNTER
FUTURE VISIT INFORMATION      FUTURE VISIT INFORMATION:    Date: 7/10/18    Time: 1420    Location: [facility]  REFERRAL INFORMATION:    Referring provider:  PCP    Referring providers clinic:  [facility]    Reason for visit/diagnosis  HEEL PAIN      RECORDS STATUS      RECORDS IN CHART FROM [facility]

## 2018-07-10 ENCOUNTER — RADIANT APPOINTMENT (OUTPATIENT)
Dept: GENERAL RADIOLOGY | Facility: CLINIC | Age: 58
End: 2018-07-10
Attending: PODIATRIST
Payer: COMMERCIAL

## 2018-07-10 ENCOUNTER — OFFICE VISIT (OUTPATIENT)
Dept: ORTHOPEDICS | Facility: CLINIC | Age: 58
End: 2018-07-10
Payer: COMMERCIAL

## 2018-07-10 ENCOUNTER — PRE VISIT (OUTPATIENT)
Dept: ORTHOPEDICS | Facility: CLINIC | Age: 58
End: 2018-07-10

## 2018-07-10 DIAGNOSIS — T14.8XXA CONTUSION OF BONE: ICD-10-CM

## 2018-07-10 DIAGNOSIS — M79.671 PAIN IN BOTH FEET: ICD-10-CM

## 2018-07-10 DIAGNOSIS — M79.672 PAIN IN BOTH FEET: ICD-10-CM

## 2018-07-10 DIAGNOSIS — M79.672 PAIN IN BOTH FEET: Primary | ICD-10-CM

## 2018-07-10 DIAGNOSIS — M79.671 PAIN IN BOTH FEET: Primary | ICD-10-CM

## 2018-07-10 RX ORDER — NAPROXEN 500 MG/1
500 TABLET ORAL 2 TIMES DAILY WITH MEALS
Qty: 28 TABLET | Refills: 0 | Status: SHIPPED | OUTPATIENT
Start: 2018-07-10 | End: 2018-09-18

## 2018-07-10 NOTE — MR AVS SNAPSHOT
After Visit Summary   7/10/2018    Juan Francisco Booker    MRN: [id number]           Patient Information     Date Of Birth          1960        Visit Information        Provider Department      7/10/2018 2:20 PM Juan José Manzo, [facility]        Today's Diagnoses     Pain in both feet    -  1    Contusion of bone           Follow-ups after your visit        Your next 10 appointments already scheduled     Jul 11, 2018  3:20 PM CDT   [facility] with Gio Rain PT   [facility] ([facility]  )    [address] [facility] [id number] [phone]              Who to contact     Please call your clinic at [phone] to:    Ask questions about your health    Make or cancel appointments    Discuss your medicines    Learn about your test results    Speak to your doctor            Additional Information About Your Visit        MyChart Information     [employer] gives you secure access to your electronic health record. If you see a primary care provider, you can also send messages to your care team and make appointments. If you have questions, please call your primary care clinic.  If you do not have a primary care provider, please call [phone] and they will assist you.      [employer] is an electronic gateway that provides easy, online access to your medical records. With [employer], you can request a clinic appointment, read your test results, renew a prescription or communicate with your care team.     To access your existing account, please contact your [facility] or call [phone] for assistance.        Care EveryWhere ID     This is your Care EveryWhere ID. This could be used by other organizations to access your [facility] medical records  [id number]         Blood Pressure from Last 3 Encounters:   05/18/18 98/67   03/29/18 130/80   12/29/17 110/62    Weight from Last 3 Encounters:    05/18/18 127.8 kg (281 lb 12.8 oz)   03/29/18 131.6 kg (290 lb 3.2 oz)   02/13/18 131.5 kg (290 lb)                 Today's Medication Changes          These changes are accurate as of 7/10/18  3:16 PM.  If you have any questions, ask your nurse or doctor.               Start taking these medicines.        Dose/Directions    naproxen 500 MG tablet   Commonly known as:  NAPROSYN   Used for:  Pain in both feet   Started by:  Juan José Manzo DPM        Dose:  500 mg   Take 1 tablet (500 mg) by mouth 2 times daily (with meals)   Quantity:  28 tablet   Refills:  0            Where to get your medicines      These medications were sent to [address] [address]     Phone:  [phone]     naproxen 500 MG tablet                Primary Care Provider Office Phone # Fax #    Mar Lieberman [phone]       [address] [address]        Equal Access to Services     [facility]: Hadii aad ku hadasho Soomaali, waaxda luqadaha, qaybta kaalmada adebonnie, christiano waters . So [facility] [phone].    ATENCIÓN: Si habla español, tiene a beal disposición servicios gratuitos de asistencia lingüística. [facility] [phone].    We comply with applicable federal civil rights laws and [place] laws. We do not discriminate on the basis of race, color, national origin, age, disability, sex, sexual orientation, or gender identity.            Thank you!     Thank you for choosing [facility]  for your care. Our goal is always to provide you with excellent care. Hearing back from our patients is one way we can continue to improve our services. Please take a few minutes to complete the written survey that you may receive in the mail after your visit with us. Thank you!             Your Updated Medication List - Protect others around you: Learn how to safely use, store and  throw away your medicines at www.disposemymeds.org.          This list is accurate as of 7/10/18  3:16 PM.  Always use your most recent med list.                   Brand Name Dispense Instructions for use Diagnosis    aspirin 81 MG tablet      Take 1 tablet by mouth daily AM        atenolol 25 MG tablet    TENORMIN    120 tablet    Take 4 tablets (100 mg) by mouth daily    Benign essential hypertension       citalopram 40 MG tablet    celeXA    30 tablet    Take 1 tablet (40 mg) by mouth daily 1 tablet (40mg) daily.    Major depressive disorder, recurrent episode, moderate (H)       MULTIVITAMINS Chew      Take 1 tablet by mouth        DAILY MULTIVITAMIN PO      Take 1 tablet by mouth daily AM        DENTA 5000 PLUS 1.1 % Crea   Generic drug:  Sodium Fluoride      APPLY A PEA SIZED AMOUNT TO TOOTHBRUSH, BRUSH ONTO ROOT AREAS THOROUGHLY, THEN SPIT OUT AT BEDTIME.        fluticasone 50 MCG/ACT spray    FLONASE    16 g    Spray 1-2 sprays into both nostrils daily    Nasal congestion       * gabapentin 300 MG capsule    NEURONTIN    150 capsule    1 in morning, 1 at midday, 3 at night    Peripheral polyneuropathy       * gabapentin 300 MG capsule    NEURONTIN    270 capsule    Take 3 capsules (900 mg) by mouth At Bedtime Take 900mg orally each night at 2 am    Restless legs syndrome (RLS)       hydrochlorothiazide 25 MG tablet    HYDRODIURIL    90 tablet    Take 1 tablet (25 mg) by mouth daily AM    Benign essential hypertension       lisinopril 40 MG tablet    PRINIVIL/ZESTRIL    90 tablet    Take 1 tablet (40 mg) by mouth daily AM    Benign essential hypertension       naproxen 500 MG tablet    NAPROSYN    28 tablet    Take 1 tablet (500 mg) by mouth 2 times daily (with meals)    Pain in both feet       omega-3 fatty acids 1200 MG capsule      Take 1 capsule by mouth daily AM        order for DME      Use your CPAP device as directed by your provider.        priLOSEC 20 MG CR capsule   Generic drug:  omeprazole       Take 40 mg by mouth At Bedtime HS        simvastatin 40 MG tablet    ZOCOR    30 tablet    Take 1 tablet (40 mg) by mouth At Bedtime    Hyperlipidemia LDL goal <130       tamsulosin 0.4 MG capsule    FLOMAX    30 capsule    Take 1 capsule (0.4 mg) by mouth daily 30 min after a meal    Urinary frequency       TYLENOL 500 MG tablet   Generic drug:  acetaminophen      Take 2 tablets by mouth every 6 hours as needed.        * Notice:  This list has 2 medication(s) that are the same as other medications prescribed for you. Read the directions carefully, and ask your doctor or other care provider to review them with you.

## 2018-07-10 NOTE — LETTER
"7/10/2018       RE: Juan Francisco Booker  [address]  [place] [address]     Dear Colleague,    Thank you for referring your patient, Juan Francisco Booker, to the [facility]. Please see a copy of my visit note below.    Date of Service: 7/10/2018    Chief Complaint:   Chief Complaint   Patient presents with     Consult     Soreness, bilateral heels. Pt stated that his left heel is getting better.         HPI: Juan Francisco is a 58 year old male who presents today for further evaluation of BL heel pain.  Nature: dull and aching    Location: both heels however left is mostly resolved. Right still painful    Duration: 2 months.     Onset: happened after walking a lot in [place].     Course: getting somewhat better. Left has almost fully resolved.     Aggravating/alleviating factors: No post-static dyskinesia. Long walks aggravate. Rest alleviates.     Previous Treatments: Stretching, which did help the left.       PMH:   Past Medical History:   Diagnosis Date     Anxiety      Benign neoplasm of colon 3/2/2015     Depression      Depressive disorder 1/15/2000     Difficult intubation      ETOH abuse 3/15/2009    in remission     Gastro-oesophageal reflux disease 1/15/2010     Hyperlipidemia 1/1/2000     Hypoxemia      Morbid obesity (H)      Nasal polyps 1/1/2015     TIMOTHY on CPAP 8/13/2012    Severe (uses 5-6 hours as able)     Other and unspecified hyperlipidemia 10/25/2012     Personal history of colonic polyps 2/207/15    Multiple \"neg for FAP\"     Pre-diabetes      Prediabetes 5/24/2017     Restless leg      Skin tag      Surgical complication 1/6/2015    difficulty inserting a tube down my throat     Unspecified essential hypertension 10/25/2012       PSxH:   Past Surgical History:   Procedure Laterality Date     AS COLONOSCOPY THRU STOMA W BIOPSY/CAUTERY TUMOR/POLYP/LESION  2/27/15    colon polyps     COLONOSCOPY  2/27/2015    x 2     COLONOSCOPY WITH CO2 " INSUFFLATION N/A 10/20/2016    Procedure: COLONOSCOPY WITH CO2 INSUFFLATION;  Surgeon: Juan Francisco Beltran MD;  [facility]     ENT SURGERY  2011    [facility]     ESOPHAGOSCOPY, GASTROSCOPY, DUODENOSCOPY (EGD), COMBINED N/A 10/20/2016    Procedure: COMBINED ESOPHAGOSCOPY, GASTROSCOPY, DUODENOSCOPY (EGD);  Surgeon: Juan Francisco Beltran MD;  Location: [facility] OR     RELEASE CARPAL TUNNEL Right 2017    Procedure: RELEASE CARPAL TUNNEL;  Right Open Carpal Tunnel Release;  Surgeon: Aracelis Lowe MD;  [facility]     RELEASE CARPAL TUNNEL Left 2017    Procedure: RELEASE CARPAL TUNNEL;  Left Open Carpal Tunnel Release;  Surgeon: Aracelis Lowe MD;  [facility]       Allergies: Grass    SH:   Social History     Social History     Marital status: Single     Spouse name: N/A     Number of children: N/A     Years of education: N/A     Occupational History     Not on file.     Social History Main Topics     Smoking status: Never Smoker     Smokeless tobacco: Never Used     Alcohol use 0.0 oz/week     0 Standard drinks or equivalent per week      Comment: In remission since      Drug use: No     Sexual activity: No     Other Topics Concern     Not on file     Social History Narrative    Single.  Lives alone.  Retired.  Worked at [employer].    No children.    1 brother -  of liver cancer, etoh    Mother  of cancer ? Type    Father  - complications of MS       FH:   Family History   Problem Relation Age of Onset     Other Cancer Mother      Cancer Mother      Other Cancer Brother      Cancer Brother      Cerebrovascular Disease Maternal Grandfather      ,, ,, ,, ,, ,, ,, ,, ,     Alcohol/Drug Brother      Cancer Brother      pancreatic; liver     Asthma No family hx of      Anesthesia Reaction No family hx of      Colon Polyps No family hx of      Crohn Disease No family hx of      Ulcerative Colitis No family hx of      Colon Cancer No family hx of        Objective:      PT  and DP pulses are 2/4 bilaterally. CRT is 3 seconds. POsitive pedal hair.   Gross sensation is intact bilaterally.   Equinus is mild bilaterally. No pain with active or passive ROM of the ankle, MTJ, 1st ray, or halluces bilaterally. Pain is noted with directed palpation of the medial wall of BL calcanei and and left PT tendon around the medial malleolus. No edema or ecchymosis.   Nails normal bilaterally. No open lesions are noted.     bilateral ankle xrays indicated in 6 weightbearing views.    No fractures. Normal alignment. no soft tissue abnormality. Retrocalc spurring BL.       Assessment: Calcaneal contusion from increased walking. Has not had any rest days.     Plan:  - Pt seen and evaluated.  - Xrays taken and discussed with him.  - Recommend 7 days of Naprosyn BID. RICE protocol tomorrow. Can do 1/4 walking distance starting on Thursday.  - If no resolution, reappoint. Otherwise, see PRN.              Again, thank you for allowing me to participate in the care of your patient.      Sincerely,    Juan José Manzo DPM

## 2018-07-10 NOTE — NURSING NOTE
Reason For Visit:   Chief Complaint   Patient presents with     Consult     Soreness, bilateral heels. Pt stated that his left heel is getting better.      Referring:   SLIKC WELLS      Pain Assessment  Patient Currently in Pain: Yes  0-10 Pain Scale: 1  Primary Pain Location:  (heel)  Pain Orientation: Right           Current Outpatient Prescriptions   Medication Sig Dispense Refill     acetaminophen (TYLENOL) 500 MG tablet Take 2 tablets by mouth every 6 hours as needed.       aspirin 81 MG tablet Take 1 tablet by mouth daily AM       atenolol (TENORMIN) 25 MG tablet Take 4 tablets (100 mg) by mouth daily 120 tablet 5     citalopram (CELEXA) 40 MG tablet Take 1 tablet (40 mg) by mouth daily 1 tablet (40mg) daily. 30 tablet 8     DENTA 5000 PLUS 1.1 % CREA APPLY A PEA SIZED AMOUNT TO TOOTHBRUSH, BRUSH ONTO ROOT AREAS THOROUGHLY, THEN SPIT OUT AT BEDTIME.  2     fluticasone (FLONASE) 50 MCG/ACT spray Spray 1-2 sprays into both nostrils daily 16 g 3     gabapentin (NEURONTIN) 300 MG capsule Take 3 capsules (900 mg) by mouth At Bedtime Take 900mg orally each night at 2 am 270 capsule 3     gabapentin (NEURONTIN) 300 MG capsule 1 in morning, 1 at midday, 3 at night 150 capsule 3     hydrochlorothiazide (HYDRODIURIL) 25 MG tablet Take 1 tablet (25 mg) by mouth daily AM 90 tablet 1     lisinopril (PRINIVIL/ZESTRIL) 40 MG tablet Take 1 tablet (40 mg) by mouth daily AM 90 tablet 1     Multiple Vitamin (DAILY MULTIVITAMIN PO) Take 1 tablet by mouth daily AM       Multiple Vitamins-Minerals (MULTIVITAMINS) CHEW Take 1 tablet by mouth       Omega-3 Fatty Acids (FISH OIL) 1200 MG capsule Take 1 capsule by mouth daily AM       omeprazole (PRILOSEC) 20 MG capsule Take 40 mg by mouth At Bedtime HS       ORDER FOR DME Use your CPAP device as directed by your provider.       simvastatin (ZOCOR) 40 MG tablet Take 1 tablet (40 mg) by mouth At Bedtime 30 tablet 9     tamsulosin (FLOMAX) 0.4 MG capsule Take 1 capsule (0.4  mg) by mouth daily 30 min after a meal 30 capsule 1          Allergies   Allergen Reactions     Grass

## 2018-07-10 NOTE — PROGRESS NOTES
Date of Service: 7/10/2018    Chief Complaint:   Chief Complaint   Patient presents with     Consult     Soreness, bilateral heels. Pt stated that his left heel is getting better.         HPI: Juan Francisco is a 58 year old male who presents today for further evaluation of BL heel pain.  Nature: dull and aching    Location: both heels however left is mostly resolved. Right still painful    Duration: 2 months.     Onset: happened after walking a lot in [place].     Course: getting somewhat better. Left has almost fully resolved.     Aggravating/alleviating factors: No post-static dyskinesia. Long walks aggravate. Rest alleviates.     Previous Treatments: Stretching, which did help the left.         Review of Systems: Answers for HPI/ROS submitted by the patient on 6/26/2018   General Symptoms: No  Skin Symptoms: No  HENT Symptoms: Yes  EYE SYMPTOMS: No  HEART SYMPTOMS: No  LUNG SYMPTOMS: No  INTESTINAL SYMPTOMS: No  URINARY SYMPTOMS: Yes  REPRODUCTIVE SYMPTOMS: No  SKELETAL SYMPTOMS: Yes  BLOOD SYMPTOMS: No  NERVOUS SYSTEM SYMPTOMS: Yes  MENTAL HEALTH SYMPTOMS: No  Ear pain: No  Ear discharge: No  Hearing loss: No  Tinnitus: No  Nosebleeds: No  Congestion: Yes  Sinus pain: No  Trouble swallowing: No   Voice hoarseness: No  Mouth sores: No  Sore throat: Yes  Tooth pain: Yes  Gum tenderness: No  Bleeding gums: No  Change in taste: No  Change in sense of smell: No  Dry mouth: Yes  Hearing aid used: No  Neck lump: No  Trouble holding urine or incontinence: No  Pain or burning: No  Trouble starting or stopping: No  Increased frequency of urination: No  Blood in urine: No  Decreased frequency of urination: No  Frequent nighttime urination: No  Flank pain: No  Difficulty emptying bladder: Yes  Back pain: Yes  Muscle aches: No  Neck pain: No  Swollen joints: No  Joint pain: No  Bone pain: No  Muscle cramps: No  Muscle weakness: No  Joint stiffness: No  Bone fracture: No  Trouble with coordination: No  Dizziness or trouble with  "balance: No  Fainting or black-out spells: No  Memory loss: No  Headache: Yes  Seizures: No  Speech problems: No  Tingling: No  Tremor: No  Weakness: No  Difficulty walking: No  Paralysis: No  Numbness: No      PMH:   Past Medical History:   Diagnosis Date     Anxiety      Benign neoplasm of colon 3/2/2015     Depression      Depressive disorder 1/15/2000     Difficult intubation      ETOH abuse 3/15/2009    in remission     Gastro-oesophageal reflux disease 1/15/2010     Hyperlipidemia 1/1/2000     Hypoxemia      Morbid obesity (H)      Nasal polyps 1/1/2015     TIMOTHY on CPAP 8/13/2012    Severe (uses 5-6 hours as able)     Other and unspecified hyperlipidemia 10/25/2012     Personal history of colonic polyps 2/207/15    Multiple \"neg for FAP\"     Pre-diabetes      Prediabetes 5/24/2017     Restless leg      Skin tag      Surgical complication 1/6/2015    difficulty inserting a tube down my throat     Unspecified essential hypertension 10/25/2012       PSxH:   Past Surgical History:   Procedure Laterality Date     AS COLONOSCOPY THRU STOMA W BIOPSY/CAUTERY TUMOR/POLYP/LESION  2/27/15    colon polyps     COLONOSCOPY  2/27/2015    x 2     COLONOSCOPY WITH CO2 INSUFFLATION N/A 10/20/2016    Procedure: COLONOSCOPY WITH CO2 INSUFFLATION;  Surgeon: Juan Francisco Beltran MD;  [facility]     ENT SURGERY  1/5/2011    [facility]     ESOPHAGOSCOPY, GASTROSCOPY, DUODENOSCOPY (EGD), COMBINED N/A 10/20/2016    Procedure: COMBINED ESOPHAGOSCOPY, GASTROSCOPY, DUODENOSCOPY (EGD);  Surgeon: Juan Francisco Beltran MD;  Location: [facility] OR     RELEASE CARPAL TUNNEL Right 6/14/2017    Procedure: RELEASE CARPAL TUNNEL;  Right Open Carpal Tunnel Release;  Surgeon: Aracelis Lowe MD;  [facility]     RELEASE CARPAL TUNNEL Left 12/29/2017    Procedure: RELEASE CARPAL TUNNEL;  Left Open Carpal Tunnel Release;  Surgeon: Aracelis Lowe MD;  [facility]       Allergies: Grass    SH:   Social History     Social History     " Marital status: Single     Spouse name: N/A     Number of children: N/A     Years of education: N/A     Occupational History     Not on file.     Social History Main Topics     Smoking status: Never Smoker     Smokeless tobacco: Never Used     Alcohol use 0.0 oz/week     0 Standard drinks or equivalent per week      Comment: In remission since      Drug use: No     Sexual activity: No     Other Topics Concern     Not on file     Social History Narrative    Single.  Lives alone.  Retired.  Worked at [employer].    No children.    1 brother -  of liver cancer, etoh    Mother  of cancer ? Type    Father  - complications of MS       FH:   Family History   Problem Relation Age of Onset     Other Cancer Mother      Cancer Mother      Other Cancer Brother      Cancer Brother      Cerebrovascular Disease Maternal Grandfather      ,, ,, ,, ,, ,, ,, ,, ,     Alcohol/Drug Brother      Cancer Brother      pancreatic; liver     Asthma No family hx of      Anesthesia Reaction No family hx of      Colon Polyps No family hx of      Crohn Disease No family hx of      Ulcerative Colitis No family hx of      Colon Cancer No family hx of        Objective:      PT and DP pulses are 2/4 bilaterally. CRT is 3 seconds. POsitive pedal hair.   Gross sensation is intact bilaterally.   Equinus is mild bilaterally. No pain with active or passive ROM of the ankle, MTJ, 1st ray, or halluces bilaterally. Pain is noted with directed palpation of the medial wall of BL calcanei and and left PT tendon around the medial malleolus. No edema or ecchymosis.   Nails normal bilaterally. No open lesions are noted.     bilateral ankle xrays indicated in 6 weightbearing views.    No fractures. Normal alignment. no soft tissue abnormality. Retrocalc spurring BL.       Assessment: Calcaneal contusion from increased walking. Has not had any rest days.     Plan:  - Pt seen and evaluated.  - Xrays taken and discussed with him.  - Recommend 7 days of  Sukhi CARMONA. RICE protocol tomorrow. Can do 1/4 walking distance starting on Thursday.  - If no resolution, reappoint. Otherwise, see PRN.

## 2018-07-11 ENCOUNTER — THERAPY VISIT (OUTPATIENT)
Dept: PHYSICAL THERAPY | Facility: CLINIC | Age: 58
End: 2018-07-11
Payer: COMMERCIAL

## 2018-07-11 DIAGNOSIS — M54.50 LUMBAGO: ICD-10-CM

## 2018-07-11 DIAGNOSIS — F32.1 MODERATE MAJOR DEPRESSION (H): Primary | ICD-10-CM

## 2018-07-11 PROCEDURE — 97110 THERAPEUTIC EXERCISES: CPT | Mod: GP | Performed by: PHYSICAL THERAPIST

## 2018-07-11 PROCEDURE — 97112 NEUROMUSCULAR REEDUCATION: CPT | Mod: GP | Performed by: PHYSICAL THERAPIST

## 2018-07-11 RX ORDER — CITALOPRAM HYDROBROMIDE 10 MG/1
10 TABLET ORAL DAILY
Qty: 30 TABLET | Refills: 3 | Status: SHIPPED | OUTPATIENT
Start: 2018-07-11 | End: 2018-09-18

## 2018-07-11 NOTE — MR AVS SNAPSHOT
After Visit Summary   7/11/2018    Juan Francisco Booker    MRN: [id number]           Patient Information     Date Of Birth          1960        Visit Information        Provider Department      7/11/2018 3:20 PM Gio Rain PT [facility] Physical [facility]        Today's Diagnoses     Lumbago           Follow-ups after your visit        Your next 10 appointments already scheduled     Jul 25, 2018  2:00 PM CDT   ZENOBIA Spine with Sravanthi Mcdonough ATC   [facility] Physical Therapy ([facility]  )    [address] [facility] [address]   [phone]            Aug 08, 2018  2:00 PM CDT   ZENOBIA Spine with Sravanthi Mcdonough ATC   [facility] Physical Therapy ([facility]  )    [facility] [address]   [phone]              Who to contact     If you have questions or need follow up information about today's clinic visit or your schedule please contact [facility] directly at [phone].  Normal or non-critical lab and imaging results will be communicated to you by Glacier Bayhart, letter or phone within 4 business days after the clinic has received the results. If you do not hear from us within 7 days, please contact the clinic through E[place]ont or phone. If you have a critical or abnormal lab result, we will notify you by phone as soon as possible.  Submit refill requests through Sidestage or call your pharmacy and they will forward the refill request to us. Please allow 3 business days for your refill to be completed.          Additional Information About Your Visit        Glacier Bayhart Information     Sidestage gives you secure access to your electronic health record. If you see a primary care provider, you can also send messages to your care team and make appointments. If you have questions, please call your primary care clinic.  If you  do not have a primary care provider, please call [phone] and they will assist you.        Care EveryWhere ID     This is your Care EveryWhere ID. This could be used by other organizations to access your [place] medical records  [id number]         Blood Pressure from Last 3 Encounters:   05/18/18 98/67   03/29/18 130/80   12/29/17 110/62    Weight from Last 3 Encounters:   05/18/18 127.8 kg (281 lb 12.8 oz)   03/29/18 131.6 kg (290 lb 3.2 oz)   02/13/18 131.5 kg (290 lb)              We Performed the Following     ZENOBIA PROGRESS NOTES REPORT     NEUROMUSCULAR RE-EDUCATION     THERAPEUTIC EXERCISES          Today's Medication Changes          These changes are accurate as of 7/11/18  3:52 PM.  If you have any questions, ask your nurse or doctor.               These medicines have changed or have updated prescriptions.        Dose/Directions    citalopram 10 MG tablet   Commonly known as:  celeXA   This may have changed:    - medication strength  - how much to take  - additional instructions   Used for:  Moderate major depression (H)   Changed by:  Mar Lieberman MD        Dose:  10 mg   Take 1 tablet (10 mg) by mouth daily   Quantity:  30 tablet   Refills:  3            Where to get your medicines      These medications were sent to [place] [address] [place] [address] [address]     Phone:  [phone]     citalopram 10 MG tablet                Primary Care Provider Office Phone # Fax #    Mar Lieberman [phone]       [address] [address]        Equal Access to Services     [facility]: Hadii yumi munoz Sorafi, waaxda luqadaha, qaybta kaalchristiano nuñez. So [facility] [phone].    ATENCIÓN: Si habla español, tiene a beal disposición servicios gratuitos de asistencia lingüística. Llame al [phone].    We comply with applicable federal civil rights  laws and [place] laws. We do not discriminate on the basis of race, color, national origin, age, disability, sex, sexual orientation, or gender identity.            Thank you!     Thank you for choosing [place] [facility] PHYSICAL THERAPY  for your care. Our goal is always to provide you with excellent care. Hearing back from our patients is one way we can continue to improve our services. Please take a few minutes to complete the written survey that you may receive in the mail after your visit with us. Thank you!             Your Updated Medication List - Protect others around you: Learn how to safely use, store and throw away your medicines at www.disposemymeds.org.          This list is accurate as of 7/11/18  3:52 PM.  Always use your most recent med list.                   Brand Name Dispense Instructions for use Diagnosis    aspirin 81 MG tablet      Take 1 tablet by mouth daily AM        atenolol 25 MG tablet    TENORMIN    120 tablet    Take 4 tablets (100 mg) by mouth daily    Benign essential hypertension       citalopram 10 MG tablet    celeXA    30 tablet    Take 1 tablet (10 mg) by mouth daily    Moderate major depression (H)       MULTIVITAMINS Chew      Take 1 tablet by mouth        DAILY MULTIVITAMIN PO      Take 1 tablet by mouth daily AM        DENTA 5000 PLUS 1.1 % Crea   Generic drug:  Sodium Fluoride      APPLY A PEA SIZED AMOUNT TO TOOTHBRUSH, BRUSH ONTO ROOT AREAS THOROUGHLY, THEN SPIT OUT AT BEDTIME.        fluticasone 50 MCG/ACT spray    FLONASE    16 g    Spray 1-2 sprays into both nostrils daily    Nasal congestion       * gabapentin 300 MG capsule    NEURONTIN    150 capsule    1 in morning, 1 at midday, 3 at night    Peripheral polyneuropathy       * gabapentin 300 MG capsule    NEURONTIN    270 capsule    Take 3 capsules (900 mg) by mouth At Bedtime Take 900mg orally each night at 2 am    Restless legs syndrome (RLS)       hydrochlorothiazide 25 MG tablet     HYDRODIURIL    90 tablet    Take 1 tablet (25 mg) by mouth daily AM    Benign essential hypertension       lisinopril 40 MG tablet    PRINIVIL/ZESTRIL    90 tablet    Take 1 tablet (40 mg) by mouth daily AM    Benign essential hypertension       naproxen 500 MG tablet    NAPROSYN    28 tablet    Take 1 tablet (500 mg) by mouth 2 times daily (with meals)    Pain in both feet       omega-3 fatty acids 1200 MG capsule      Take 1 capsule by mouth daily AM        order for DME      Use your CPAP device as directed by your provider.        priLOSEC 20 MG CR capsule   Generic drug:  omeprazole      Take 40 mg by mouth At Bedtime HS        simvastatin 40 MG tablet    ZOCOR    30 tablet    Take 1 tablet (40 mg) by mouth At Bedtime    Hyperlipidemia LDL goal <130       tamsulosin 0.4 MG capsule    FLOMAX    30 capsule    Take 1 capsule (0.4 mg) by mouth daily 30 min after a meal    Urinary frequency       TYLENOL 500 MG tablet   Generic drug:  acetaminophen      Take 2 tablets by mouth every 6 hours as needed.        * Notice:  This list has 2 medication(s) that are the same as other medications prescribed for you. Read the directions carefully, and ask your doctor or other care provider to review them with you.

## 2018-07-11 NOTE — PROGRESS NOTES
Subjective:  HPI                    Objective:  System    Physical Exam    General     ROS    Assessment/Plan:    PROGRESS  REPORT    Progress reporting period is from 5/21/18 to 7/11/18.       SUBJECTIVE  Subjective changes noted by patient:   Subjective: Pt reports that he continues to have some discomfort with bending forward w/o an abdominal brace is somewhat sore. Feels like he is walking >6,000 steps per day without much of an issue but has not tried longer at this time.     Current Pain level: 0/10.     Initial Pain level: 2/10.   Changes in function:  Yes (See Goal flowsheet attached for changes in current functional level)  Adverse reaction to treatment or activity: None    OBJECTIVE  Changes noted in objective findings:  Yes,  Objective: Lumbar AROM: flex to distal tibia (HS stretch), ext min loss, R SB min loss with some R sided pain, L SB WNL. Continues to require cuing for avoiding pelvic rotation with stability exercises.      ASSESSMENT/PLAN  Updated problem list and treatment plan: Diagnosis 1:  LBP  Pain -  hot/cold therapy, manual therapy, education, directional preference exercise and home program  Decreased ROM/flexibility - manual therapy and therapeutic exercise  Decreased joint mobility - manual therapy and therapeutic exercise  Decreased strength - therapeutic exercise and therapeutic activities  Decreased proprioception - neuro re-education and therapeutic activities  Impaired muscle performance - neuro re-education  Decreased function - therapeutic activities  STG/LTGs have been met or progress has been made towards goals:  Yes (See Goal flow sheet completed today.)  Assessment of Progress: The patient's condition is improving.  The patient's condition has potential to improve.  Self Management Plans:  Patient has been instructed in a home treatment program.  I have re-evaluated this patient and find that the nature, scope, duration and intensity of the therapy is appropriate for the  medical condition of the patient.  Juan Francisco continues to require the following intervention to meet STG and LTG's:  PT    Recommendations:  This patient would benefit from continued therapy.     Frequency:  2 X a month, once daily  Duration:  for 2 visits        Please refer to the daily flowsheet for treatment today, total treatment time and time spent performing 1:1 timed codes.

## 2018-07-17 DIAGNOSIS — R35.0 URINARY FREQUENCY: ICD-10-CM

## 2018-07-17 RX ORDER — TAMSULOSIN HYDROCHLORIDE 0.4 MG/1
0.4 CAPSULE ORAL DAILY
Qty: 30 CAPSULE | Refills: 5 | Status: SHIPPED | OUTPATIENT
Start: 2018-07-17 | End: 2018-08-29

## 2018-07-17 NOTE — TELEPHONE ENCOUNTER

## 2018-07-18 NOTE — NURSING NOTE
"Chief Complaint   Patient presents with     CPAP Follow Up     Discuss not being able to use cpap due to pluggedn nose       Initial /76  Pulse 87  Resp 18  Ht 1.753 m (5' 9\")  Wt 134.3 kg (296 lb)  SpO2 94%  BMI 43.71 kg/m2 Estimated body mass index is 43.71 kg/(m^2) as calculated from the following:    Height as of this encounter: 1.753 m (5' 9\").    Weight as of this encounter: 134.3 kg (296 lb).  Medication Reconciliation: complete     ALESSANDRA Brewer        "
[facility]  
I, Jaspal Betancourt, performed the initial face to face bedside interview with this patient regarding history of present illness, review of symptoms and relevant past medical, social and family history.  I completed an independent physical examination.  I was the initial provider who evaluated this patient. I have signed out the follow up of any pending tests (i.e. labs, radiological studies) to the ACP.  I have communicated the patient’s plan of care and disposition with the ACP.  The history, relevant review of systems, past medical and surgical history, medical decision making, and physical examination was documented by the scribe in my presence and I attest to the accuracy of the documentation.

## 2018-07-25 ENCOUNTER — THERAPY VISIT (OUTPATIENT)
Dept: PHYSICAL THERAPY | Facility: CLINIC | Age: 58
End: 2018-07-25
Payer: COMMERCIAL

## 2018-07-25 DIAGNOSIS — M54.50 LUMBAGO: ICD-10-CM

## 2018-07-25 PROCEDURE — 97110 THERAPEUTIC EXERCISES: CPT | Mod: GP

## 2018-07-25 PROCEDURE — 97112 NEUROMUSCULAR REEDUCATION: CPT | Mod: GP

## 2018-08-08 ENCOUNTER — THERAPY VISIT (OUTPATIENT)
Dept: PHYSICAL THERAPY | Facility: CLINIC | Age: 58
End: 2018-08-08
Payer: COMMERCIAL

## 2018-08-08 DIAGNOSIS — M54.50 LUMBAGO: ICD-10-CM

## 2018-08-08 PROCEDURE — 97112 NEUROMUSCULAR REEDUCATION: CPT | Mod: GP

## 2018-08-08 PROCEDURE — 97110 THERAPEUTIC EXERCISES: CPT | Mod: GP

## 2018-08-08 NOTE — MR AVS SNAPSHOT
After Visit Summary   8/8/2018    Juan Francisco Booker    MRN: [id number]           Patient Information     Date Of Birth          1960        Visit Information        Provider Department      8/8/2018 2:00 PM Sravanthi Mcdonough ATC [facility]        Today's Diagnoses     Lumbago           Follow-ups after your visit        Your next 10 appointments already scheduled     Aug 29, 2018  2:00 PM CDT   ZENOBIA Spine with Sravanthi Mcdonough ATC   [facility] Physical Therapy ([facility]  )    [address] [facility] [address] [phone]              Who to contact     If you have questions or need follow up information about today's clinic visit or your schedule please contact [facility] A[facility] PHYSICAL [facility] directly at [phone].  Normal or non-critical lab and imaging results will be communicated to you by Exoprisehart, letter or phone within 4 business days after the clinic has received the results. If you do not hear from us within 7 days, please contact the clinic through Exoprisehart or phone. If you have a critical or abnormal lab result, we will notify you by phone as soon as possible.  Submit refill requests through Tantaline or call your pharmacy and they will forward the refill request to us. Please allow 3 business days for your refill to be completed.          Additional Information About Your Visit        MyChart Information     Tantaline gives you secure access to your electronic health record. If you see a primary care provider, you can also send messages to your care team and make appointments. If you have questions, please call your primary care clinic.  If you do not have a primary care provider, please call [phone] and they will assist you.        Care EveryWhere ID     This is your Care EveryWhere ID. This could be used by other organizations to access your [place]  medical records  [id number]         Blood Pressure from Last 3 Encounters:   05/18/18 98/67   03/29/18 130/80   12/29/17 110/62    Weight from Last 3 Encounters:   05/18/18 127.8 kg (281 lb 12.8 oz)   03/29/18 131.6 kg (290 lb 3.2 oz)   02/13/18 131.5 kg (290 lb)              We Performed the Following     Neuromuscular Re-Education     Therapeutic Exercises        Primary Care Provider Office Phone # Fax #    Mar Lieberman [phone]       [address] [address]        Equal Access to Services     UZAIR BURGOS : Hadii yumi Rodriguez, walulu tejeda, qayari morillomada justino, christiano waters . So [facility] [phone].    ATENCIÓN: Si habla español, tiene a beal disposición servicios gratuitos de asistencia lingüística. Llame al [phone].    We comply with applicable federal civil rights laws and [place] laws. We do not discriminate on the basis of race, color, national origin, age, disability, sex, sexual orientation, or gender identity.            Thank you!     [facility] you for choosing [facility] PHYSICAL THERAPY  for your care. Our goal is always to provide you with excellent care. Hearing back from our patients is one way we can continue to improve our services. Please take a few minutes to complete the written survey that you may receive in the mail after your visit with us. Thank you!             Your Updated Medication List - Protect others around you: Learn how to safely use, store and throw away your medicines at www.disposemymeds.org.          This list is accurate as of 8/8/18  3:04 PM.  Always use your most recent med list.                   Brand Name Dispense Instructions for use Diagnosis    aspirin 81 MG tablet      Take 1 tablet by mouth daily AM        atenolol 25 MG tablet    TENORMIN    120 tablet    Take 4 tablets (100 mg) by mouth daily    Benign essential hypertension        citalopram 10 MG tablet    celeXA    30 tablet    Take 1 tablet (10 mg) by mouth daily    Moderate major depression (H)       MULTIVITAMINS Chew      Take 1 tablet by mouth        DAILY MULTIVITAMIN PO      Take 1 tablet by mouth daily AM        DENTA 5000 PLUS 1.1 % Crea   Generic drug:  Sodium Fluoride      APPLY A PEA SIZED AMOUNT TO TOOTHBRUSH, BRUSH ONTO ROOT AREAS THOROUGHLY, THEN SPIT OUT AT BEDTIME.        fluticasone 50 MCG/ACT spray    FLONASE    16 g    Spray 1-2 sprays into both nostrils daily    Nasal congestion       * gabapentin 300 MG capsule    NEURONTIN    150 capsule    1 in morning, 1 at midday, 3 at night    Peripheral polyneuropathy       * gabapentin 300 MG capsule    NEURONTIN    270 capsule    Take 3 capsules (900 mg) by mouth At Bedtime Take 900mg orally each night at 2 am    Restless legs syndrome (RLS)       hydrochlorothiazide 25 MG tablet    HYDRODIURIL    90 tablet    Take 1 tablet (25 mg) by mouth daily AM    Benign essential hypertension       lisinopril 40 MG tablet    PRINIVIL/ZESTRIL    90 tablet    Take 1 tablet (40 mg) by mouth daily AM    Benign essential hypertension       naproxen 500 MG tablet    NAPROSYN    28 tablet    Take 1 tablet (500 mg) by mouth 2 times daily (with meals)    Pain in both feet       omega-3 fatty acids 1200 MG capsule      Take 1 capsule by mouth daily AM        order for DME      Use your CPAP device as directed by your provider.        priLOSEC 20 MG CR capsule   Generic drug:  omeprazole      Take 40 mg by mouth At Bedtime HS        simvastatin 40 MG tablet    ZOCOR    30 tablet    Take 1 tablet (40 mg) by mouth At Bedtime    Hyperlipidemia LDL goal <130       tamsulosin 0.4 MG capsule    FLOMAX    30 capsule    Take 1 capsule (0.4 mg) by mouth daily 30 min after a meal    Urinary frequency       TYLENOL 500 MG tablet   Generic drug:  acetaminophen      Take 2 tablets by mouth every 6 hours as needed.        * Notice:  This list has 2  medication(s) that are the same as other medications prescribed for you. Read the directions carefully, and ask your doctor or other care provider to review them with you.

## 2018-08-08 NOTE — PROGRESS NOTES
Subjective:  HPI                    Objective:  System    Physical Exam    General     ROS    Assessment/Plan:    PROGRESS  REPORT    Progress reporting period is to 8/8/2018. Patient has completed 7 visits and would like to extend for 2 additional visits.    SUBJECTIVE  Subjective: Patient has been working to increase his walking and having less pain overall with this activity. Patient would like to reach about 8, 000 steps on a walk.  Patient does continue to have min-moderate pain with reacing into washer (top load)- bending forwards activity.   Current Pain level: 0/10   Initial Pain level: 2/10   Changes in function: Yes, see goal flow sheet for change in function   Adverse reactions: None;   ,     The subjective and objective information are from the last SOAP note on this patient.    OBJECTIVE  Objective: better stability with exercise, improved core engagement.  improved independance with hep.  Working on Lower extremity strength for improved ADL's and goal. Poor posture with bending at this time.      ASSESSMENT/PLAN  Updated problem list and treatment plan: Diagnosis 1:  Low BAck Pain  Pain -  self management, education and home program  Decreased ROM/flexibility - therapeutic exercise, therapeutic activity and home program  Decreased strength - therapeutic exercise, therapeutic activities and home program  STG/LTGs have been met or progress has been made towards goals:  Yes (See Goal flow sheet completed today.)  Assessment of Progress: The patient's condition is improving.  Progressing towards goals- walking 8,000 steps per outing and decreasing pain with daily activity as in unloading washer and doing dishes.  Self Management Plans:  Patient has been instructed in a home treatment program.  Patient is becoming more independent in a home treatment program.  I have re-evaluated this patient and find that the nature, scope, duration and intensity of the therapy is appropriate for the medical condition of  the patientTaisha Chicas continues to require the following intervention to meet STG and LTG's: PT  Would like to continue with Strength progression and ergonomic staging for improved ALD's and less pain on a daily basis.     Recommendations:  This patient would benefit from continued therapy.     Frequency:  1 X week, once daily  Duration:  for 2 visits, each visit about 2-3 weeks apart.     Additional authorization will be needed for Patient to continue with therapy.       Please refer to the daily flowsheet for treatment today, total treatment time and time spent performing 1:1 timed codes.

## 2018-08-17 DIAGNOSIS — I10 BENIGN ESSENTIAL HYPERTENSION: ICD-10-CM

## 2018-08-17 NOTE — TELEPHONE ENCOUNTER

## 2018-08-20 DIAGNOSIS — R09.81 NASAL CONGESTION: ICD-10-CM

## 2018-08-20 RX ORDER — LISINOPRIL 40 MG/1
40 TABLET ORAL DAILY
Qty: 90 TABLET | Refills: 1 | Status: SHIPPED | OUTPATIENT
Start: 2018-08-20 | End: 2019-07-08

## 2018-08-20 RX ORDER — FLUTICASONE PROPIONATE 50 MCG
1-2 SPRAY, SUSPENSION (ML) NASAL DAILY
Qty: 16 G | Refills: 3 | Status: SHIPPED | OUTPATIENT
Start: 2018-08-20 | End: 2019-01-29

## 2018-08-20 NOTE — TELEPHONE ENCOUNTER
Chief Complaint   Patient presents with     Refill Request     Flonase      Refill request received via fax by pharmacy        [facility] [address] [place] [facility] [place] [place] - [address] [place] [address]   tel:[phone]    Juan Francisco requests prescriptions be mailed via certified mail to    [facility] [address] [place] [facility] [place] [place] - [address]  [facility] - USE FOR MAILING ONLY - [facility] [address] [place] [facility] [place] [place] - [address] [place] [address] [facility] - [place], [place] - [address] [facility] [place] [place]      Pending Prescriptions:                       Disp   Refills    fluticasone (FLONASE) 50 MCG/ACT spray    16 g   3            Sig: Spray 1-2 sprays into both nostrils daily         Last Written Prescription Date:  04/19/18  Last Fill Quantity: 16.0 GM,   # refills: 3  Last Office Visit with prescribing provider: 10/26/17  Future Office visit: None at this time.      Routing refill request to provider for review/approval because:  Drug not on the FMG, P or  Health refill protocol or controlled substance      Olivia Joseph [facility]

## 2018-08-21 ENCOUNTER — OFFICE VISIT (OUTPATIENT)
Dept: ORTHOPEDICS | Facility: CLINIC | Age: 58
End: 2018-08-21
Payer: COMMERCIAL

## 2018-08-21 DIAGNOSIS — M76.71 PERONEAL TENDONITIS, RIGHT: ICD-10-CM

## 2018-08-21 DIAGNOSIS — M76.829 PTTD (POSTERIOR TIBIAL TENDON DYSFUNCTION): Primary | ICD-10-CM

## 2018-08-21 NOTE — MR AVS SNAPSHOT
After Visit Summary   8/21/2018    Juan Francisco Booker    MRN: [id number]           Patient Information     Date Of Birth          1960        Visit Information        Provider Department      8/21/2018 5:20 PM Juan José Manzo DPM [facility]        Today's Diagnoses     PTTD (posterior tibial tendon dysfunction)    -  1    Peroneal tendonitis, right           Follow-ups after your visit        Additional Services     PHYSICAL THERAPY REFERRAL (Internal)       Physical Therapy Referral                  Your next 10 appointments already scheduled     Aug 29, 2018 11:00 AM CDT   Return Visit with Mar Lieberman MD   [facility] ([facility])    [address] [place] [address],  [address]  [facility] [address]   [phone]            Aug 29, 2018  2:00 PM CDT   ZENOBIA Spine with Simba Lieberman, PT   [facility] ([facility]  )    [address] [place] [address]   [phone]            Aug 30, 2018  2:00 PM CDT   (Arrive by 1:45 PM)   ZENOBIA Extremity with Jo Menjivar, PT   [facility] ([facility]  )    [address] [place] [address]   [phone]            Sep 07, 2018  4:00 PM CDT   ZENOBIA Extremity with Jo Menjivar PT   [facility] ([facility]  )    [address] [place] [address]   [phone]            Sep 18, 2018  5:20 PM CDT   (Arrive by 5:05 PM)   RETURN FOOT/ANKLE with Juan José Manzo DPM   [facility] ([facility]    [address] [facility]  4th Floor  [facility] [address] [phone]              Who to contact     Please call your clinic at [phone] to:    Ask questions about your health    Make or cancel appointments    Discuss your medicines    Learn about your test  results    Speak to your doctor            Additional Information About Your Visit        ProteoSensehart Information     [employer] gives you secure access to your electronic health record. If you see a primary care provider, you can also send messages to your care team and make appointments. If you have questions, please call your primary care clinic.  If you do not have a primary care provider, please call [phone] and they will assist you.      [employer] is an electronic gateway that provides easy, online access to your medical records. With [employer], you can request a clinic appointment, read your test results, renew a prescription or communicate with your care team.     To access your existing account, please contact your [facility] or call [phone] for assistance.        Care EveryWhere ID     This is your Care EveryWhere ID. This could be used by other organizations to access your [facility] medical records  [id number]         Blood Pressure from Last 3 Encounters:   05/18/18 98/67   03/29/18 130/80   12/29/17 110/62    Weight from Last 3 Encounters:   05/18/18 127.8 kg (281 lb 12.8 oz)   03/29/18 131.6 kg (290 lb 3.2 oz)   02/13/18 131.5 kg (290 lb)              We Performed the Following     PHYSICAL THERAPY REFERRAL (Internal)        Primary Care Provider Office Phone # Fax #    Mar Lieberman [phone]       [address] [address]        Equal Access to Services     [facility] : Hadii yumi hannao Sorafi, waaxda luqadaha, qaybta kaalmada justino, christiano waters . So [facility] [phone].    ATENCIÓN: Si habla español, tiene a beal disposición servicios gratuitos de asistencia lingüística. Llame al [phone].    We comply with applicable federal civil rights laws and [place] laws. We do not discriminate on the basis of race, color, national origin, age, disability, sex, sexual orientation, or  gender identity.            Thank you!     Thank you for choosing [facility]  for your care. Our goal is always to provide you with excellent care. Hearing back from our patients is one way we can continue to improve our services. Please take a few minutes to complete the written survey that you may receive in the mail after your visit with us. Thank you!             Your Updated Medication List - Protect others around you: Learn how to safely use, store and throw away your medicines at www.disposemymeds.org.          This list is accurate as of 8/21/18 11:59 PM.  Always use your most recent med list.                   Brand Name Dispense Instructions for use Diagnosis    aspirin 81 MG tablet      Take 1 tablet by mouth daily AM        atenolol 25 MG tablet    TENORMIN    120 tablet    Take 4 tablets (100 mg) by mouth daily    Benign essential hypertension       citalopram 10 MG tablet    celeXA    30 tablet    Take 1 tablet (10 mg) by mouth daily    Moderate major depression (H)       MULTIVITAMINS Chew      Take 1 tablet by mouth        DAILY MULTIVITAMIN PO      Take 1 tablet by mouth daily AM        DENTA 5000 PLUS 1.1 % Crea   Generic drug:  Sodium Fluoride      APPLY A PEA SIZED AMOUNT TO TOOTHBRUSH, BRUSH ONTO ROOT AREAS THOROUGHLY, THEN SPIT OUT AT BEDTIME.        fluticasone 50 MCG/ACT spray    FLONASE    16 g    Spray 1-2 sprays into both nostrils daily        * gabapentin 300 MG capsule    NEURONTIN    150 capsule    1 in morning, 1 at midday, 3 at night    Peripheral polyneuropathy       * gabapentin 300 MG capsule    NEURONTIN    270 capsule    Take 3 capsules (900 mg) by mouth At Bedtime Take 900mg orally each night at 2 am    Restless legs syndrome (RLS)       hydrochlorothiazide 25 MG tablet    HYDRODIURIL    90 tablet    Take 1 tablet (25 mg) by mouth daily AM    Benign essential hypertension       lisinopril 40 MG tablet    PRINIVIL/ZESTRIL    90 tablet    Take 1 tablet (40 mg) by  mouth daily AM    Benign essential hypertension       naproxen 500 MG tablet    NAPROSYN    28 tablet    Take 1 tablet (500 mg) by mouth 2 times daily (with meals)    Pain in both feet       omega-3 fatty acids 1200 MG capsule      Take 1 capsule by mouth daily AM        order for DME      Use your CPAP device as directed by your provider.        priLOSEC 20 MG CR capsule   Generic drug:  omeprazole      Take 40 mg by mouth At Bedtime HS        simvastatin 40 MG tablet    ZOCOR    30 tablet    Take 1 tablet (40 mg) by mouth At Bedtime    Hyperlipidemia LDL goal <130       tamsulosin 0.4 MG capsule    FLOMAX    30 capsule    Take 1 capsule (0.4 mg) by mouth daily 30 min after a meal    Urinary frequency       TYLENOL 500 MG tablet   Generic drug:  acetaminophen      Take 2 tablets by mouth every 6 hours as needed.        * Notice:  This list has 2 medication(s) that are the same as other medications prescribed for you. Read the directions carefully, and ask your doctor or other care provider to review them with you.

## 2018-08-21 NOTE — NURSING NOTE
Reason For Visit:   Chief Complaint   Patient presents with     RECHECK     Follow up. Pain, right heel. Pt stated that his pain is the same as last visit.        Pain Assessment  Patient Currently in Pain: Denies  Primary Pain Location:  (heel)  Pain Orientation: Right  Aggravating Factors: Walking, Standing       Current Outpatient Prescriptions   Medication Sig Dispense Refill     acetaminophen (TYLENOL) 500 MG tablet Take 2 tablets by mouth every 6 hours as needed.       aspirin 81 MG tablet Take 1 tablet by mouth daily AM       atenolol (TENORMIN) 25 MG tablet Take 4 tablets (100 mg) by mouth daily 120 tablet 5     citalopram (CELEXA) 10 MG tablet Take 1 tablet (10 mg) by mouth daily 30 tablet 3     DENTA 5000 PLUS 1.1 % CREA APPLY A PEA SIZED AMOUNT TO TOOTHBRUSH, BRUSH ONTO ROOT AREAS THOROUGHLY, THEN SPIT OUT AT BEDTIME.  2     fluticasone (FLONASE) 50 MCG/ACT spray Spray 1-2 sprays into both nostrils daily 16 g 3     gabapentin (NEURONTIN) 300 MG capsule Take 3 capsules (900 mg) by mouth At Bedtime Take 900mg orally each night at 2 am 270 capsule 3     gabapentin (NEURONTIN) 300 MG capsule 1 in morning, 1 at midday, 3 at night 150 capsule 3     hydrochlorothiazide (HYDRODIURIL) 25 MG tablet Take 1 tablet (25 mg) by mouth daily AM 90 tablet 1     lisinopril (PRINIVIL/ZESTRIL) 40 MG tablet Take 1 tablet (40 mg) by mouth daily AM 90 tablet 1     Multiple Vitamin (DAILY MULTIVITAMIN PO) Take 1 tablet by mouth daily AM       Multiple Vitamins-Minerals (MULTIVITAMINS) CHEW Take 1 tablet by mouth       naproxen (NAPROSYN) 500 MG tablet Take 1 tablet (500 mg) by mouth 2 times daily (with meals) 28 tablet 0     Omega-3 Fatty Acids (FISH OIL) 1200 MG capsule Take 1 capsule by mouth daily AM       omeprazole (PRILOSEC) 20 MG capsule Take 40 mg by mouth At Bedtime HS       ORDER FOR DME Use your CPAP device as directed by your provider.       simvastatin (ZOCOR) 40 MG tablet Take 1 tablet (40 mg) by mouth At Bedtime 30  tablet 9     tamsulosin (FLOMAX) 0.4 MG capsule Take 1 capsule (0.4 mg) by mouth daily 30 min after a meal 30 capsule 5          Allergies   Allergen Reactions     Grass

## 2018-08-21 NOTE — LETTER
8/21/2018       RE: Juan Francisco Booker  [address]  [place] [address]     Dear Colleague,    Thank you for referring your patient, Juan Francisco Booker, [facility] Please see a copy of my visit note below.    Chief Complaint:   Chief Complaint   Patient presents with     RECHECK     Follow up. Pain, right heel. Pt stated that his pain is the same as last visit.           Allergies   Allergen Reactions     Grass          Subjective: Juan Francisco is a 58 year old male who presents to the clinic today for a follow up of right ankle pain. He relates that he is having pain on both sides of the ankle. It does not hurt with the first step in the morning, but as he walks around during the day.     Objective  Data Unavailable Data Unavailable Data Unavailable Data Unavailable Data Unavailable 0 lbs 0 oz  Pain noted along the courses of the PT and peroneal tendons as they course around their respective malleoli. No edema. He is able to heel raise without pain. No tendon voids noted. No pain in the Achilles tendons.     Assessment: PT and peroneal tendonitis on the right ankle.     Plan:   - Pt seen and evaluated  - Recommendations: Start PT for the tendonitis. Start use of a Tri-lock ankle brace. He agrees to these and the brace was dispensed.  - Pt to return to clinic in 1 month.       Again, thank you for allowing me to participate in the care of your patient.      Sincerely,    Juan José Manzo DPM

## 2018-08-23 NOTE — PROGRESS NOTES
Chief Complaint:   Chief Complaint   Patient presents with     RECHECK     Follow up. Pain, right heel. Pt stated that his pain is the same as last visit.           Allergies   Allergen Reactions     Grass          Subjective: Juan Francisco is a 58 year old male who presents to the clinic today for a follow up of right ankle pain. He relates that he is having pain on both sides of the ankle. It does not hurt with the first step in the morning, but as he walks around during the day.     Objective  Data Unavailable Data Unavailable Data Unavailable Data Unavailable Data Unavailable 0 lbs 0 oz  Pain noted along the courses of the PT and peroneal tendons as they course around their respective malleoli. No edema. He is able to heel raise without pain. No tendon voids noted. No pain in the Achilles tendons.     Assessment: PT and peroneal tendonitis on the right ankle.     Plan:   - Pt seen and evaluated  - Recommendations: Start PT for the tendonitis. Start use of a Tri-lock ankle brace. He agrees to these and the brace was dispensed.  - Pt to return to clinic in 1 month.

## 2018-08-29 ENCOUNTER — OFFICE VISIT (OUTPATIENT)
Dept: FAMILY MEDICINE | Facility: CLINIC | Age: 58
End: 2018-08-29
Payer: COMMERCIAL

## 2018-08-29 VITALS
SYSTOLIC BLOOD PRESSURE: 119 MMHG | DIASTOLIC BLOOD PRESSURE: 76 MMHG | HEART RATE: 67 BPM | OXYGEN SATURATION: 96 % | BODY MASS INDEX: 43.97 KG/M2 | WEIGHT: 289.2 LBS | TEMPERATURE: 98.6 F

## 2018-08-29 DIAGNOSIS — R35.0 URINARY FREQUENCY: ICD-10-CM

## 2018-08-29 RX ORDER — TAMSULOSIN HYDROCHLORIDE 0.4 MG/1
0.8 CAPSULE ORAL DAILY
Qty: 60 CAPSULE | Refills: 0 | Status: SHIPPED | OUTPATIENT
Start: 2018-08-29 | End: 2018-11-01

## 2018-08-29 NOTE — PROGRESS NOTES
SUBJECTIVE:  The patient is here for followup.  He has been on 0.4 mg of Flomax for more than 4 weeks and feels like it is not helping at all with his BPH type symptoms.  Recently, he was not taking it very regularly because originally the instructions were 30 minutes after a meal.  Then we clarified with pharmacy that it basically needs to be on an empty stomach, so he says he has been able to take it more regularly and again has not noticed any difference.  We also discussed some of his other issues.  His blood pressure is doing well.  He has been dealing with foot pain with Podiatry and they have given him a brace, which does not seem too helpful at the moment.  He also went to Physical Therapy for back issues, and he is finding that very helpful.      OBJECTIVE:  On exam, the patient is in no acute distress.  Vital signs are stable.  No further exam done.      IMPRESSION:  BPH symptoms, not improved with 0.4 mg of Flomax.      PLAN:  We will go ahead and increase to 0.8 mg daily.  If that does not help symptoms after 4 weeks, he can go ahead and give me a message in [employer] and we will go ahead with a Urology referral.  We also touched base of his citalopram taper.  He is on 10 mg now and things are going well.      Visit length 15 minutes, all spent counseling about symptoms and plan.      Mar Brock MD

## 2018-08-29 NOTE — MR AVS SNAPSHOT
After Visit Summary   8/29/2018    Juan Francisco Booker    MRN: [id number]           Patient Information     Date Of Birth          1960        Visit Information        Provider Department      8/29/2018 11:00 AM Mar Lieberman MD [facility]        Today's Diagnoses     Urinary frequency           Follow-ups after your visit        Your next 10 appointments already scheduled     Aug 30, 2018  2:00 PM CDT   (Arrive by 1:45 PM)   ZENOBIA Extremity with Jo Menjivar PT   [facility] ([facility]  )    [address] [place] [address]   [phone]            Sep 07, 2018  4:00 PM CDT   ZENOBIA Extremity with Jo Menjivar PT   [facility] ([facility]  )    [address] [place] [address]   [phone]            Sep 18, 2018  5:20 PM CDT   (Arrive by 5:05 PM)   [facility]    [address] [place] [address] [phone]              Who to contact     Please call your clinic at [phone] to:    Ask questions about your health    Make or cancel appointments    Discuss your medicines    Learn about your test results    Speak to your doctor            Additional Information About Your Visit        MyChart Information     [employer] gives you secure access to your electronic health record. If you see a primary care provider, you can also send messages to your care team and make appointments. If you have questions, please call your primary care clinic.  If you do not have a primary care provider, please call [phone] and they will assist you.      [employer] is an electronic gateway that provides easy, online access to your medical records. With [employer], you can request a clinic appointment, read your test results,  renew a prescription or communicate with your care team.     To access your existing account, please contact your [facility] or call [phone] for assistance.        Care EveryWhere ID     This is your Care EveryWhere ID. This could be used by other organizations to access your [facility] medical records  [id number]        Your Vitals Were     Pulse Temperature Pulse Oximetry BMI (Body Mass Index)          67 98.6  F (37  C) (Oral) 96% 43.97 kg/m2         Blood Pressure from Last 3 Encounters:   08/29/18 119/76   05/18/18 98/67   03/29/18 130/80    Weight from Last 3 Encounters:   08/29/18 289 lb 3.2 oz (131.2 kg)   05/18/18 281 lb 12.8 oz (127.8 kg)   03/29/18 290 lb 3.2 oz (131.6 kg)              Today, you had the following     No orders found for display         Today's Medication Changes          These changes are accurate as of 8/29/18 11:28 AM.  If you have any questions, ask your nurse or doctor.               These medicines have changed or have updated prescriptions.        Dose/Directions    tamsulosin 0.4 MG capsule   Commonly known as:  FLOMAX   This may have changed:  how much to take   Used for:  Urinary frequency   Changed by:  Mar Lieberman MD        Dose:  0.8 mg   Take 2 capsules (0.8 mg) by mouth daily 30 min after a meal   Quantity:  60 capsule   Refills:  0            Where to get your medicines      These medications were sent to [address] [address]     Phone:  [phone]     tamsulosin 0.4 MG capsule                Primary Care Provider Office Phone # Fax #    Mar Lieberman [phone]       [address] [address]        Equal Access to Services     DAX BURGOS : Garett Rodriguez, waaxda luqadaha, qaybta kaalmada justino, christiano johnson. So [facility] [phone].    ATENCIÓN: Si  apt alex, tiene a beal disposición servicios gratuitos de asistencia lingüística. Margie lara [phone].    We comply with applicable federal civil rights laws and [place] laws. We do not discriminate on the basis of race, color, national origin, age, disability, sex, sexual orientation, or gender identity.            Thank you!     Thank you for choosing [facility]  for your care. Our goal is always to provide you with excellent care. Hearing back from our patients is one way we can continue to improve our services. Please take a few minutes to complete the written survey that you may receive in the mail after your visit with us. Thank you!             Your Updated Medication List - Protect others around you: Learn how to safely use, store and throw away your medicines at www.disposemymeds.org.          This list is accurate as of 8/29/18 11:28 AM.  Always use your most recent med list.                   Brand Name Dispense Instructions for use Diagnosis    aspirin 81 MG tablet      Take 1 tablet by mouth daily AM        atenolol 25 MG tablet    TENORMIN    120 tablet    Take 4 tablets (100 mg) by mouth daily    Benign essential hypertension       citalopram 10 MG tablet    celeXA    30 tablet    Take 1 tablet (10 mg) by mouth daily    Moderate major depression (H)       MULTIVITAMINS Chew      Take 1 tablet by mouth        DAILY MULTIVITAMIN PO      Take 1 tablet by mouth daily AM        DENTA 5000 PLUS 1.1 % Crea   Generic drug:  Sodium Fluoride      APPLY A PEA SIZED AMOUNT TO TOOTHBRUSH, BRUSH ONTO ROOT AREAS THOROUGHLY, THEN SPIT OUT AT BEDTIME.        fluticasone 50 MCG/ACT spray    FLONASE    16 g    Spray 1-2 sprays into both nostrils daily        * gabapentin 300 MG capsule    NEURONTIN    150 capsule    1 in morning, 1 at midday, 3 at night    Peripheral polyneuropathy       * gabapentin 300 MG capsule    NEURONTIN    270 capsule    Take 3 capsules (900 mg) by mouth At Bedtime  Take 900mg orally each night at 2 am    Restless legs syndrome (RLS)       hydrochlorothiazide 25 MG tablet    HYDRODIURIL    90 tablet    Take 1 tablet (25 mg) by mouth daily AM    Benign essential hypertension       lisinopril 40 MG tablet    PRINIVIL/ZESTRIL    90 tablet    Take 1 tablet (40 mg) by mouth daily AM    Benign essential hypertension       naproxen 500 MG tablet    NAPROSYN    28 tablet    Take 1 tablet (500 mg) by mouth 2 times daily (with meals)    Pain in both feet       omega-3 fatty acids 1200 MG capsule      Take 1 capsule by mouth daily AM        order for DME      Use your CPAP device as directed by your provider.        priLOSEC 20 MG CR capsule   Generic drug:  omeprazole      Take 40 mg by mouth At Bedtime HS        simvastatin 40 MG tablet    ZOCOR    30 tablet    Take 1 tablet (40 mg) by mouth At Bedtime    Hyperlipidemia LDL goal <130       tamsulosin 0.4 MG capsule    FLOMAX    60 capsule    Take 2 capsules (0.8 mg) by mouth daily 30 min after a meal    Urinary frequency       TYLENOL 500 MG tablet   Generic drug:  acetaminophen      Take 2 tablets by mouth every 6 hours as needed.        * Notice:  This list has 2 medication(s) that are the same as other medications prescribed for you. Read the directions carefully, and ask your doctor or other care provider to review them with you.

## 2018-08-30 ENCOUNTER — THERAPY VISIT (OUTPATIENT)
Dept: PHYSICAL THERAPY | Facility: CLINIC | Age: 58
End: 2018-08-30
Payer: COMMERCIAL

## 2018-08-30 DIAGNOSIS — M76.821 POSTERIOR TIBIAL TENDONITIS, RIGHT: ICD-10-CM

## 2018-08-30 DIAGNOSIS — M76.71 PERONEAL TENDONITIS, RIGHT: Primary | ICD-10-CM

## 2018-08-30 DIAGNOSIS — M25.571 PAIN IN JOINT INVOLVING ANKLE AND FOOT, RIGHT: ICD-10-CM

## 2018-08-30 PROCEDURE — 97530 THERAPEUTIC ACTIVITIES: CPT | Mod: GP | Performed by: PHYSICAL THERAPIST

## 2018-08-30 PROCEDURE — G8978 MOBILITY CURRENT STATUS: HCPCS | Mod: GP | Performed by: PHYSICAL THERAPIST

## 2018-08-30 PROCEDURE — 97161 PT EVAL LOW COMPLEX 20 MIN: CPT | Mod: GP | Performed by: PHYSICAL THERAPIST

## 2018-08-30 PROCEDURE — G8979 MOBILITY GOAL STATUS: HCPCS | Mod: GP | Performed by: PHYSICAL THERAPIST

## 2018-08-30 PROCEDURE — 97110 THERAPEUTIC EXERCISES: CPT | Mod: GP | Performed by: PHYSICAL THERAPIST

## 2018-08-30 NOTE — PROGRESS NOTES
[facility] Initial Evaluation  Subjective:  [facility]                  Physical Therapy Initial Evaluation  August 30, 2018     Precautions/Restrictions/MD instructions: PT eval and treat. Posterior tibial and peroneal tendonitis, ROM, PROM, AAROM, AROM and strengthening    Subjective:   Date of Onset:  May, 2018. MD order on 8/21/18  C/C:lateral heel pain inferior to lateral malleolus, occasionally medial pain posterior to medial malleolus  Quality of pain is aching. Pains are described as intermittent in nature. Pain is worse: after a walk. Pain is rated 2-4/10.   History of symptoms: Pains began suddenly as the result of increased walking when in [place]. Since onset, symptoms are improving and same. Previous episodes: none  Worsened by: walking, going down stairs walking 1 foot to a step    Alleviated by: Rest.    General health as reported by patient: fair  Pertinent medical/surgical history: carpal tunnel release in B UE. chemical dependency, depression, migraines/headaches, overweight, sleep apnea. He denies night pain, unexplained weight loss, significant current illness or recent hospital admission. He denies any regional implanted devices. He denies history of surgery in the area.  Imaging: x-ray. Current occupational status: none. Patient's goals are: decrease pain, improve tolerance to walking, improve tolerance to stairs with reciprocal pattern. Return to MD:  1 month    Objective:  ANKLE:   ROM L ROM R MMT L MMT R   Dorsiflexion 0 5 from neutral 5/5 5/5   Active 2 0     Plantarflexion 50 53 4/5 4/5   Inversion 30 30 5/5 5/5   Eversion 15 15 5/5 5/5   G Toe Ext 45 30     G Toe Flex 45 30     Diagonals        Strength L Strength R Pain  Tenderness   Dorsal Medial 5/5 5/5     Dorsal Lateral 5/5 4+/5 R    Caudal Medial 5/5 5/5     Caudal Lateral 5/5 5/5       Joint Mobility:   Left Right   Talocrural hypo Hypo**   Subtalar WNL hypo   Calcaneonavicular hypo hypo   Calcaneocuboid WNL hypo    Cuneonavicular WNL hypo   Ray mobility WNL hypo     Edema:  No apparent edema present  Palpation: TTP at posterior tibial muscle belly and fibularis longus tendon.  Gait: reduced step length on L LE with hard landing, early heel off on R with reduced pronation.  Single leg stance: unable to maintain B without min UE support    Assessment/Plan:    The patient is a 58 year old male with chief complaint of left sided heel pain.    The patient has the following significant findings with corresponding treatment plan.  Diagnosis 1:  Posterior tibial and peroneal tendonitis    Pain -  hot/cold therapy, manual therapy, splint/taping/bracing/orthotics, self management, education, directional preference exercise and home program  Decreased ROM/flexibility - manual therapy and therapeutic exercise  Decreased joint mobility - manual therapy and therapeutic exercise  Decreased strength - therapeutic exercise and therapeutic activities  Impaired balance - neuro re-education and therapeutic activities  Decreased proprioception - neuro re-education and therapeutic activities  Impaired gait - gait training  Impaired muscle performance - neuro re-education  Decreased function - therapeutic activities    Therapy Evaluation Codes:   1) History comprised of:   Personal factors that impact the plan of care:      Age and Time since onset of symptoms.    Comorbidity factors that impact the plan of care are:      Chemical Dependency, Depression, High blood pressure, Migraines/headaches, Overweight and Sleep disorder/apnea.     Medications impacting care: Anti-depressant, High blood pressure, Pain and Sleep.  2) Examination of Body Systems comprised of:   Body structures and functions that impact the plan of care:      Ankle and Lumbar spine.   Activity limitations that impact the plan of care are:      Driving, Jumping, Sitting, Squatting/kneeling, Stairs, Standing and Walking.   Clinical presentation characteristics  are:    Stable/Uncomplicated.  3) Presentation comprised of:   Presentation scored as Low complexity with uncomplicated characteristics..  4) Decision-Making    Low complexity using standardized patient assessment instrument and/or measureable assessment of functional outcome.  Cumulative Therapy Evaluation is: Low complexity.    Previous and current functional limitations:  (See Goal Flow Sheet for this information)    Short term and Long term goals: (See Goal Flow Sheet for this information)     Communication ability:  Patient appears to be able to clearly communicate and understand verbal and written communication and follow directions correctly.  Treatment Explanation - The following has been discussed with the patient: RX ordered/plan of care, anticipated outcomes, and possible risks and side effects.  This patient would benefit from PT intervention to resume normal activities.   Rehab potential is good.    Frequency:  1 X week, once daily  Duration:  for 8 weeks  Discharge Plan: Achieve all LTGs, be independent in home treatment program, and reach maximal therapeutic benefit.    Please refer to the daily flowsheet for treatment today, total treatment time and time spent performing 1:1 timed codes.       Objective:  System    Physical Exam    General     ROS

## 2018-08-30 NOTE — MR AVS SNAPSHOT
After Visit Summary   8/30/2018    Juan Francisco Booker    MRN: [id number]           Patient Information     Date Of Birth          1960        Visit Information        Provider Department      8/30/2018 2:00 PM Jo Menjivar PT [facility] Physical Therapy        Today's Diagnoses     Peroneal tendonitis, right    -  1    Posterior tibial tendonitis, right        Pain in joint involving ankle and foot, right           Follow-ups after your visit        Your next 10 appointments already scheduled     Sep 07, 2018  4:00 PM CDT   ZENOBIA Extremity with Jo Menjivar PT   [facility] ([facility] [place]  )    [address] [place] [id number]   [phone]            Sep 13, 2018  1:50 PM CDT   ZENOBIA Extremity with Sravanthi Mcdonough ATC   [facility] ([facility]  )    [address] [place] [id number] [phone]            Sep 18, 2018  5:20 PM CDT   (Arrive by 5:05 PM)   RETURN FOOT/ANKLE with Juan José Manzo DPM   [facility] ([facility])    [address] [place] [address] [phone]            Sep 19, 2018  2:00 PM CDT   ZENOBIA Extremity with Sravanthi Mcdonough ATC   [facility] ([facility]  )    [address] [place] [id number] [phone]            Sep 26, 2018  2:00 PM CDT   ZENOBIA Extremity with Paulo Gabriel PT   [facility] ([facility]  )    [address] [place] [address]   [phone]              Who to contact     If you have questions or need follow up information about today's clinic visit or your schedule please contact [facility] PHYSICAL THERAP[facility] directly at [phone].  Normal or  non-critical lab and imaging results will be communicated to you by MyChart, letter or phone within 4 business days after the clinic has received the results. If you do not hear from us within 7 days, please contact the clinic through Umthunzit or phone. If you have a critical or abnormal lab result, we will notify you by phone as soon as possible.  Submit refill requests through Cloud[employer] or call your pharmacy and they will forward the refill request to us. Please allow 3 business days for your refill to be completed.          Additional Information About Your Visit        CloudX Information     CloudX gives you secure access to your electronic health record. If you see a primary care provider, you can also send messages to your care team and make appointments. If you have questions, please call your primary care clinic.  If you do not have a primary care provider, please call [phone] and they will assist you.        Care EveryWhere ID     This is your Care EveryWhere ID. This could be used by other organizations to access your [facility] medical records  [id number]         Blood Pressure from Last 3 Encounters:   08/29/18 119/76   05/18/18 98/67   03/29/18 130/80    Weight from Last 3 Encounters:   08/29/18 131.2 kg (289 lb 3.2 oz)   05/18/18 127.8 kg (281 lb 12.8 oz)   03/29/18 131.6 kg (290 lb 3.2 oz)              We Performed the Following     HC PT EVAL, LOW COMPLEXITY     ZENOBIA INITIAL EVAL REPORT     THERAPEUTIC ACTIVITIES     THERAPEUTIC EXERCISES        Primary Care Provider Office Phone # Fax #    Mar Ab Lieberman [phone]       [address] [address]        Equal Access to Services     [facility] : Hadii yumi Rodriguez, radha tejeda, christiano mahoney. So [facility] [phone].    ATENCIÓN: Si habla español, tiene a beal disposición servicios gratuitos de asistencia lingüística. Llame al  [phone].    We comply with applicable federal civil rights laws and [place] laws. We do not discriminate on the basis of race, color, national origin, age, disability, sex, sexual orientation, or gender identity.            Thank you!     Thank you for choosing [facility] PHYSICAL THERAP[facility]  for your care. Our goal is always to provide you with excellent care. Hearing back from our patients is one way we can continue to improve our services. Please take a few minutes to complete the written survey that you may receive in the mail after your visit with us. Thank you!             Your Updated Medication List - Protect others around you: Learn how to safely use, store and throw away your medicines at www.disposemymeds.org.          This list is accurate as of 8/30/18 10:59 PM.  Always use your most recent med list.                   Brand Name Dispense Instructions for use Diagnosis    aspirin 81 MG tablet      Take 1 tablet by mouth daily AM        atenolol 25 MG tablet    TENORMIN    120 tablet    Take 4 tablets (100 mg) by mouth daily    Benign essential hypertension       citalopram 10 MG tablet    celeXA    30 tablet    Take 1 tablet (10 mg) by mouth daily    Moderate major depression (H)       MULTIVITAMINS Chew      Take 1 tablet by mouth        DAILY MULTIVITAMIN PO      Take 1 tablet by mouth daily AM        DENTA 5000 PLUS 1.1 % Crea   Generic drug:  Sodium Fluoride      APPLY A PEA SIZED AMOUNT TO TOOTHBRUSH, BRUSH ONTO ROOT AREAS THOROUGHLY, THEN SPIT OUT AT BEDTIME.        fluticasone 50 MCG/ACT spray    FLONASE    16 g    Spray 1-2 sprays into both nostrils daily        * gabapentin 300 MG capsule    NEURONTIN    150 capsule    1 in morning, 1 at midday, 3 at night    Peripheral polyneuropathy       * gabapentin 300 MG capsule    NEURONTIN    270 capsule    Take 3 capsules (900 mg) by mouth At Bedtime Take 900mg orally each night at 2 am    Restless legs syndrome  (RLS)       hydrochlorothiazide 25 MG tablet    HYDRODIURIL    90 tablet    Take 1 tablet (25 mg) by mouth daily AM    Benign essential hypertension       lisinopril 40 MG tablet    PRINIVIL/ZESTRIL    90 tablet    Take 1 tablet (40 mg) by mouth daily AM    Benign essential hypertension       naproxen 500 MG tablet    NAPROSYN    28 tablet    Take 1 tablet (500 mg) by mouth 2 times daily (with meals)    Pain in both feet       omega-3 fatty acids 1200 MG capsule      Take 1 capsule by mouth daily AM        order for DME      Use your CPAP device as directed by your provider.        priLOSEC 20 MG CR capsule   Generic drug:  omeprazole      Take 40 mg by mouth At Bedtime HS        simvastatin 40 MG tablet    ZOCOR    30 tablet    Take 1 tablet (40 mg) by mouth At Bedtime    Hyperlipidemia LDL goal <130       tamsulosin 0.4 MG capsule    FLOMAX    60 capsule    Take 2 capsules (0.8 mg) by mouth daily 30 min after a meal    Urinary frequency       TYLENOL 500 MG tablet   Generic drug:  acetaminophen      Take 2 tablets by mouth every 6 hours as needed.        * Notice:  This list has 2 medication(s) that are the same as other medications prescribed for you. Read the directions carefully, and ask your doctor or other care provider to review them with you.

## 2018-08-30 NOTE — LETTER
[facility] PHYSICAL THERAPY  [address] [address]  [phone]    2018    Re: Juan Francisco Booker   :   1960  MRN:  [id number]   REFERRING PHYSICIAN:   Juan José Manzo    [facility] PHYSICAL [facility]  Date of Initial Evaluation:  2018  Visits:  Rxs Used: 1  Reason for Referral:     Peroneal tendonitis, right  Posterior tibial tendonitis, right  Pain in joint involving ankle and foot, right    EVALUATION SUMMARY  Physical Therapy Initial Evaluation  2018  Precautions/Restrictions/MD instructions: PT eval and treat. Posterior tibial and peroneal tendonitis, ROM, PROM, AAROM, AROM and strengthening    Subjective:   Date of Onset:  May, 2018. MD order on 18  C/C:lateral heel pain inferior to lateral malleolus, occasionally medial pain posterior to medial malleolus  Quality of pain is aching. Pains are described as intermittent in nature. Pain is worse: after a walk. Pain is rated 2-4/10.   History of symptoms: Pains began suddenly as the result of increased walking when in [place]. Since onset, symptoms are improving and same. Previous episodes: none  Worsened by: walking, going down stairs walking 1 foot to a step    Alleviated by: Rest.    General health as reported by patient: fair  Pertinent medical/surgical history: carpal tunnel release in B UE. chemical dependency, depression, migraines/headaches, overweight, sleep apnea. He denies night pain, unexplained weight loss, significant current illness or recent hospital admission. He denies any regional implanted devices. He denies history of surgery in the area.  Imaging: x-ray. Current occupational status: none. Patient's goals are: decrease pain, improve tolerance to walking, improve tolerance to stairs with reciprocal pattern. Return to MD:  1 month            Re: Juan Francisco Booker   :   1960    Objective:  ANKLE:   ROM L ROM R MMT L  MMT R   Dorsiflexion 0 5 from neutral 5/5 5/5   Active 2 0     Plantarflexion 50 53 4/5 4/5   Inversion 30 30 5/5 5/5   Eversion 15 15 5/5 5/5   G Toe Ext 45 30     G Toe Flex 45 30     Diagonals        Strength L Strength R Pain  Tenderness   Dorsal Medial 5/5 5/5     Dorsal Lateral 5/5 4+/5 R    Caudal Medial 5/5 5/5     Caudal Lateral 5/5 5/5       Joint Mobility:   Left Right   Talocrural hypo Hypo**   Subtalar WNL hypo   Calcaneonavicular hypo hypo   Calcaneocuboid WNL hypo   Cuneonavicular WNL hypo   Ray mobility WNL hypo     Edema:  No apparent edema present  Palpation: TTP at posterior tibial muscle belly and fibularis longus tendon.  Gait: reduced step length on L LE with hard landing, early heel off on R with reduced pronation.  Single leg stance: unable to maintain B without min UE support    Assessment/Plan:    The patient is a 58 year old male with chief complaint of left sided heel pain.    The patient has the following significant findings with corresponding treatment plan.  Diagnosis 1:  Posterior tibial and peroneal tendonitis    Pain -  hot/cold therapy, manual therapy, splint/taping/bracing/orthotics, self management, education, directional preference exercise and home program  Decreased ROM/flexibility - manual therapy and therapeutic exercise  Decreased joint mobility - manual therapy and therapeutic exercise  Decreased strength - therapeutic exercise and therapeutic activities  Impaired balance - neuro re-education and therapeutic activities  Decreased proprioception - neuro re-education and therapeutic activities  Impaired gait - gait training  Impaired muscle performance - neuro re-education  Decreased function - therapeutic activities    Therapy Evaluation Codes:   1) History comprised of:   Personal factors that impact the plan of care:      Age and Time since onset of symptoms.    Comorbidity factors that impact the plan of care are:      Chemical Dependency, Depression, High blood  pressure, Migraines/headaches, Overweight and Sleep disorder/apnea.     Medications impacting care: Anti-depressant, High blood pressure, Pain and Sleep.  Re: Juan Francisco Booker   :   1960    2) Examination of Body Systems comprised of:   Body structures and functions that impact the plan of care:      Ankle and Lumbar spine.   Activity limitations that impact the plan of care are:      Driving, Jumping, Sitting, Squatting/kneeling, Stairs, Standing and Walking.   Clinical presentation characteristics are:    Stable/Uncomplicated.  3) Presentation comprised of:   Presentation scored as Low complexity with uncomplicated characteristics..  4) Decision-Making    Low complexity using standardized patient assessment instrument and/or measureable assessment of functional outcome.  Cumulative Therapy Evaluation is: Low complexity.    Previous and current functional limitations:  (See Goal Flow Sheet for this information)    Short term and Long term goals: (See Goal Flow Sheet for this information)     Communication ability:  Patient appears to be able to clearly communicate and understand verbal and written communication and follow directions correctly.  Treatment Explanation - The following has been discussed with the patient: RX ordered/plan of care, anticipated outcomes, and possible risks and side effects.  This patient would benefit from PT intervention to resume normal activities.   Rehab potential is good.    Frequency:  1 X week, once daily  Duration:  for 8 weeks  Discharge Plan: Achieve all LTGs, be independent in home treatment program, and reach maximal therapeutic benefit.    Please refer to the daily flowsheet for treatment today, total treatment time and time spent performing 1:1 timed codes.       Thank you for your referral.    INQUIRIES  Therapist: Jo Menjivar, PT, DPT   [facility] PHYSICAL THERAPY  [address] [place] [phone]  Phone:  [phone]  Fax: [phone]

## 2018-09-07 ENCOUNTER — THERAPY VISIT (OUTPATIENT)
Dept: PHYSICAL THERAPY | Facility: CLINIC | Age: 58
End: 2018-09-07
Payer: COMMERCIAL

## 2018-09-07 DIAGNOSIS — M25.571 PAIN IN JOINT INVOLVING ANKLE AND FOOT, RIGHT: ICD-10-CM

## 2018-09-07 DIAGNOSIS — M76.71 PERONEAL TENDONITIS, RIGHT: ICD-10-CM

## 2018-09-07 DIAGNOSIS — M76.821 POSTERIOR TIBIAL TENDONITIS, RIGHT: ICD-10-CM

## 2018-09-07 PROCEDURE — 97140 MANUAL THERAPY 1/> REGIONS: CPT | Mod: GP | Performed by: PHYSICAL THERAPIST

## 2018-09-07 PROCEDURE — 97110 THERAPEUTIC EXERCISES: CPT | Mod: GP | Performed by: PHYSICAL THERAPIST

## 2018-09-09 NOTE — OP NOTE
PREOPERATIVE DIAGNOSIS:  Right carpal tunnel syndrome.      POSTOPERATIVE DIAGNOSIS:  Right carpal tunnel syndrome.      PROCEDURE:  Right open carpal tunnel release.      ATTENDING SURGEON:  Aracelis Lowe MD      ASSISTANT:  None.      ANESTHESIA:  Local anesthesia only consisting of 7 mL of 0.25% Marcaine plain and 1% lidocaine plain in a 1:1 ratio.      TOURNIQUET TIME:  17 minutes.      ESTIMATED BLOOD LOSS:  1 mL.      SPECIMENS:  None.      DRAINS:  None.      IMPLANTS:  None.      COMPLICATIONS:  None apparent.      BRIEF HISTORY:  Juan Francisco Booker is a 57-year-old right-hand dominant male who presented with a history of numbness and tingling in a median nerve distribution.  He has undergone a trial of splinting and anti-inflammatories, but has had persistence of symptoms.  I had a discussion with the patient regarding open carpal tunnel release.  I described the procedure, post-operative protocol and expected outcomes.  We discussed the risks, benefits and alternatives to surgery.  Risks discussed include bleeding, infection, nerve or vessel damage, wound healing problems, persistent pain, persistent numbness, and the possibility for further surgery.  Anesthetic risks are rare but include an adverse systemic reaction to local anesthesia.  After a full discussion of risks, benefits, and alternatives to surgery, the patient elected to proceed and informed consent was obtained.    INTRAOPERATIVE FINDINGS: There was no hook of hamate fractures or retained lumbricals.  The median nerve was hyperemic.  The ligament was thickened.  There was a superficial crossing nerve branch, superficial to the palmar fascia running from proximal radial to distal ulnar and this was protected throughout the case.     DESCRIPTION OF PROCEDURE:  The patient was identified in the preoperative holding area.  The consent was reviewed and signed and the operative site was identified and marked.  The patient was brought to the operating room  and transferred to the operating table in a supine position.  A tourniquet was applied to the operative forearm and the arm was placed on to an arm table.  A timeout was performed, verifying the correct patient, procedure, site and side.  No preoperative prophylactic antibiotics were given.  The skin and subcutaneous tissue were injected with local anesthesia in line with the planned surgical incision.  The right arm was then prepped and draped in a standard sterile fashion.  A second timeout was performed, verifying the correct operative site.  An Esmarch was used to exsanguinate the limb and the tourniquet was inflated to 250 mmHg. A longitudinal incision was made in line with the radial aspect of the ring finger from the distal wrist flexion crease to Thao's line.  This was taken down sharply to the level of the palmar fascia.  The palmar fascia was incised in line with the skin incision.  Hemostasis was achieved.  The transverse fibers of the transverse carpal ligament were identified and released from their ulnar attachment in both a proximal and distal direction.  Distally, the release was completed until the palmar fat was present and there was greater than 1 cm diastasis of the flaps.  Proximally, the tenotomy scissors were used to bluntly dissect above and below the transverse carpal ligament and it was released under direct visualization into the distal forearm.  Palpation and visualization confirmed complete release of the ligament.  The carpal tunnel was then inspected with findings as listed above.  The tourniquet was deflated.  Hemostasis was achieved.  The wound was thoroughly irrigated with normal saline.  The skin was closed with interrupted 4-0 nylon.  A soft sterile dressing was applied followed by a well-padded volar resting splint.  The patient tolerated the procedure well and there were no immediate complications.  He was brought to the recovery room in stable condition.  All sponge and  needle counts were correct at the end of the case.      POSTOPERATIVE PLAN:  The patient will be discharged to home today with oral pain medications.  The patient will elevate and ice.  The splint should remain clean, dry, and intact for 5 days.  After that time, the splint may be removed and the incision may get wet in the shower. The removable wrist brace should be worn.  Finger range of motion is encouraged.  I will plan to see the patient on 6/27/17 for a wound check and suture removal.         TAVO KHAN MD   No

## 2018-09-10 DIAGNOSIS — I10 BENIGN ESSENTIAL HYPERTENSION: ICD-10-CM

## 2018-09-10 RX ORDER — HYDROCHLOROTHIAZIDE 25 MG/1
25 TABLET ORAL DAILY
Qty: 90 TABLET | Refills: 1 | Status: SHIPPED | OUTPATIENT
Start: 2018-09-10 | End: 2019-03-26

## 2018-09-10 NOTE — TELEPHONE ENCOUNTER

## 2018-09-12 ENCOUNTER — THERAPY VISIT (OUTPATIENT)
Dept: PHYSICAL THERAPY | Facility: CLINIC | Age: 58
End: 2018-09-12
Payer: COMMERCIAL

## 2018-09-12 DIAGNOSIS — M76.71 PERONEAL TENDONITIS, RIGHT: ICD-10-CM

## 2018-09-12 DIAGNOSIS — M76.821 POSTERIOR TIBIAL TENDONITIS, RIGHT: ICD-10-CM

## 2018-09-12 DIAGNOSIS — M25.571 PAIN IN JOINT INVOLVING ANKLE AND FOOT, RIGHT: ICD-10-CM

## 2018-09-12 PROCEDURE — 97140 MANUAL THERAPY 1/> REGIONS: CPT | Mod: GP

## 2018-09-12 PROCEDURE — 97110 THERAPEUTIC EXERCISES: CPT | Mod: GP

## 2018-09-12 PROCEDURE — 97112 NEUROMUSCULAR REEDUCATION: CPT | Mod: GP

## 2018-09-18 ENCOUNTER — OFFICE VISIT (OUTPATIENT)
Dept: ORTHOPEDICS | Facility: CLINIC | Age: 58
End: 2018-09-18
Payer: COMMERCIAL

## 2018-09-18 DIAGNOSIS — E78.5 HYPERLIPIDEMIA LDL GOAL <130: ICD-10-CM

## 2018-09-18 DIAGNOSIS — M76.821 POSTERIOR TIBIAL TENDONITIS, RIGHT: Primary | ICD-10-CM

## 2018-09-18 RX ORDER — SIMVASTATIN 40 MG
40 TABLET ORAL AT BEDTIME
Qty: 30 TABLET | Refills: 0 | Status: SHIPPED | OUTPATIENT
Start: 2018-09-18 | End: 2018-10-17

## 2018-09-18 NOTE — LETTER
9/18/2018       RE: Juan Francisco Booker  [address]  [place] [address]     Dear Colleague,    Thank you for referring your patient, Juan Francisco Booker, [facility] the [facility]. Please see a copy of my visit note below.    Chief Complaint:   Chief Complaint   Patient presents with     RECHECK     Pain, right heel. Pt has been going to physical therapy and says that his pain have improved some,          Allergies   Allergen Reactions     Grass          Subjective: Juan Francisco is a 58 year old male who presents to the clinic today for a follow up of right PTTD and peroneal tendonitis. Relates that PT is helping. Decreased pain when he walks. Uses the brace on longer walks.     Objective  Data Unavailable Data Unavailable Data Unavailable Data Unavailable Data Unavailable 0 lbs 0 oz  Some pain noted along the course of the left peroneal tendon as it courses around the lateral malleolus. No pain along the PT tendon or [place] don the left.     Assessment: Resolving left ankle tendonitis.   Plan:   - Pt seen and evaluated  - Cont PT until discharged. Can cont to wear ankle brace until not needed.   - Pt to return to clinic PRN  - Rx for Voltaren gel for breakthrough pain.      Again, thank you for allowing me to participate in the care of your patient.      Sincerely,    Juan José Manzo DPM

## 2018-09-18 NOTE — NURSING NOTE
Reason For Visit:   Chief Complaint   Patient presents with     RECHECK     Pain, right heel. Pt has been going to physical therapy and says that his pain have improved some,       Pain Assessment  Patient Currently in Pain: Denies             Current Outpatient Prescriptions   Medication Sig Dispense Refill     acetaminophen (TYLENOL) 500 MG tablet Take 2 tablets by mouth every 6 hours as needed.       aspirin 81 MG tablet Take 1 tablet by mouth daily AM       atenolol (TENORMIN) 25 MG tablet Take 4 tablets (100 mg) by mouth daily 120 tablet 5     DENTA 5000 PLUS 1.1 % CREA APPLY A PEA SIZED AMOUNT TO TOOTHBRUSH, BRUSH ONTO ROOT AREAS THOROUGHLY, THEN SPIT OUT AT BEDTIME.  2     fluticasone (FLONASE) 50 MCG/ACT spray Spray 1-2 sprays into both nostrils daily 16 g 3     gabapentin (NEURONTIN) 300 MG capsule Take 3 capsules (900 mg) by mouth At Bedtime Take 900mg orally each night at 2 am 270 capsule 3     gabapentin (NEURONTIN) 300 MG capsule 1 in morning, 1 at midday, 3 at night 150 capsule 3     hydrochlorothiazide (HYDRODIURIL) 25 MG tablet Take 1 tablet (25 mg) by mouth daily AM 90 tablet 1     lisinopril (PRINIVIL/ZESTRIL) 40 MG tablet Take 1 tablet (40 mg) by mouth daily AM 90 tablet 1     Multiple Vitamin (DAILY MULTIVITAMIN PO) Take 1 tablet by mouth daily AM       Multiple Vitamins-Minerals (MULTIVITAMINS) CHEW Take 1 tablet by mouth       Omega-3 Fatty Acids (FISH OIL) 1200 MG capsule Take 1 capsule by mouth daily AM       omeprazole (PRILOSEC) 20 MG capsule Take 40 mg by mouth At Bedtime HS       ORDER FOR DME Use your CPAP device as directed by your provider.       simvastatin (ZOCOR) 40 MG tablet Take 1 tablet (40 mg) by mouth At Bedtime No further refills until has lipid blood test. 30 tablet 0     tamsulosin (FLOMAX) 0.4 MG capsule Take 2 capsules (0.8 mg) by mouth daily 30 min after a meal 60 capsule 0     [DISCONTINUED] simvastatin (ZOCOR) 40 MG tablet Take 1 tablet (40 mg) by mouth At Bedtime 30  tablet 9          Allergies   Allergen Reactions     Grass

## 2018-09-18 NOTE — MR AVS SNAPSHOT
After Visit Summary   9/18/2018    Juan Francisco Booker    MRN: [id number]           Patient Information     Date Of Birth          1960        Visit Information        Provider Department      9/18/2018 5:20 PM Juan José Manzo, [facility]        Today's Diagnoses     Posterior tibial tendonitis, right    -  1       Follow-ups after your visit        Your next 10 appointments already scheduled     Sep 20, 2018  1:50 PM CDT   ZENOBIA Extremity with Sravanthi Mcdonough ATC   [facility] Physical Therapy ([facility]  )    [address] [place] [address]   [phone]            Sep 26, 2018  2:00 PM CDT   ZENOBIA Extremity with Paulo Gabriel PT   [facility] Physical Therapy ([facility]  )    [address] [place] [id number] [phone]              Who to contact     Please call your clinic at [phone] to:    Ask questions about your health    Make or cancel appointments    Discuss your medicines    Learn about your test results    Speak to your doctor            Additional Information About Your Visit        Vanderdroidhart Information     [employer] gives you secure access to your electronic health record. If you see a primary care provider, you can also send messages to your care team and make appointments. If you have questions, please call your primary care clinic.  If you do not have a primary care provider, please call [phone] and they will assist you.      [employer] is an electronic gateway that provides easy, online access to your medical records. With [employer], you can request a clinic appointment, read your test results, renew a prescription or communicate with your care team.     To access your existing account, please contact your [facility] or call [phone] for assistance.        Care EveryWhere ID     This is your Care EveryWhere ID. This  could be used by other organizations to access your [place] medical records  [id number]         Blood Pressure from Last 3 Encounters:   08/29/18 119/76   05/18/18 98/67   03/29/18 130/80    Weight from Last 3 Encounters:   08/29/18 131.2 kg (289 lb 3.2 oz)   05/18/18 127.8 kg (281 lb 12.8 oz)   03/29/18 131.6 kg (290 lb 3.2 oz)              Today, you had the following     No orders found for display         Today's Medication Changes          These changes are accurate as of 9/18/18 11:59 PM.  If you have any questions, ask your nurse or doctor.               Start taking these medicines.        Dose/Directions    diclofenac 1 % Gel topical gel   Commonly known as:  VOLTAREN   Used for:  Posterior tibial tendonitis, right   Started by:  Juan José Manzo DPM        Apply 2 grams to ankle four times daily using enclosed dosing card.   Quantity:  100 g   Refills:  3         These medicines have changed or have updated prescriptions.        Dose/Directions    simvastatin 40 MG tablet   Commonly known as:  ZOCOR   This may have changed:  additional instructions   Used for:  Hyperlipidemia LDL goal <130   Changed by:  Mar Lieberman MD        Dose:  40 mg   Take 1 tablet (40 mg) by mouth At Bedtime No further refills until has lipid blood test.   Quantity:  30 tablet   Refills:  0            Where to get your medicines      These medications were sent to [place] [address] [place] [address]  [address] [facility] [address]     Phone:  [phone]     diclofenac 1 % Gel topical gel    simvastatin 40 MG tablet                Primary Care Provider Office Phone # Fax #    Mar Lieberman [phone]       [address] [address]        Equal Access to Services     DAX BURGOS : Garett Rodriguez, radha tejeda, christiano mahoney. So [facility] [phone].    ATENCIÓN:  Si habla isai, tiene a beal disposición servicios gratuitos de asistencia lingüística. Margie lara [phone].    We comply with applicable federal civil rights laws and [place] laws. We do not discriminate on the basis of race, color, national origin, age, disability, sex, sexual orientation, or gender identity.            Thank you!     Thank you for choosing [facility]  for your care. Our goal is always to provide you with excellent care. Hearing back from our patients is one way we can continue to improve our services. Please take a few minutes to complete the written survey that you may receive in the mail after your visit with us. Thank you!             Your Updated Medication List - Protect others around you: Learn how to safely use, store and throw away your medicines at www.disposemymeds.org.          This list is accurate as of 9/18/18 11:59 PM.  Always use your most recent med list.                   Brand Name Dispense Instructions for use Diagnosis    aspirin 81 MG tablet      Take 1 tablet by mouth daily AM        atenolol 25 MG tablet    TENORMIN    120 tablet    Take 4 tablets (100 mg) by mouth daily    Benign essential hypertension       MULTIVITAMINS Chew      Take 1 tablet by mouth        DAILY MULTIVITAMIN PO      Take 1 tablet by mouth daily AM        DENTA 5000 PLUS 1.1 % Crea   Generic drug:  Sodium Fluoride      APPLY A PEA SIZED AMOUNT TO TOOTHBRUSH, BRUSH ONTO ROOT AREAS THOROUGHLY, THEN SPIT OUT AT BEDTIME.        diclofenac 1 % Gel topical gel    VOLTAREN    100 g    Apply 2 grams to ankle four times daily using enclosed dosing card.    Posterior tibial tendonitis, right       fluticasone 50 MCG/ACT spray    FLONASE    16 g    Spray 1-2 sprays into both nostrils daily        * gabapentin 300 MG capsule    NEURONTIN    150 capsule    1 in morning, 1 at midday, 3 at night    Peripheral polyneuropathy       * gabapentin 300 MG capsule    NEURONTIN    270 capsule    Take 3  capsules (900 mg) by mouth At Bedtime Take 900mg orally each night at 2 am    Restless legs syndrome (RLS)       hydrochlorothiazide 25 MG tablet    HYDRODIURIL    90 tablet    Take 1 tablet (25 mg) by mouth daily AM    Benign essential hypertension       lisinopril 40 MG tablet    PRINIVIL/ZESTRIL    90 tablet    Take 1 tablet (40 mg) by mouth daily AM    Benign essential hypertension       omega-3 fatty acids 1200 MG capsule      Take 1 capsule by mouth daily AM        order for DME      Use your CPAP device as directed by your provider.        priLOSEC 20 MG CR capsule   Generic drug:  omeprazole      Take 40 mg by mouth At Bedtime HS        simvastatin 40 MG tablet    ZOCOR    30 tablet    Take 1 tablet (40 mg) by mouth At Bedtime No further refills until has lipid blood test.    Hyperlipidemia LDL goal <130       tamsulosin 0.4 MG capsule    FLOMAX    60 capsule    Take 2 capsules (0.8 mg) by mouth daily 30 min after a meal    Urinary frequency       TYLENOL 500 MG tablet   Generic drug:  acetaminophen      Take 2 tablets by mouth every 6 hours as needed.        * Notice:  This list has 2 medication(s) that are the same as other medications prescribed for you. Read the directions carefully, and ask your doctor or other care provider to review them with you.

## 2018-09-19 NOTE — PROGRESS NOTES
Chief Complaint:   Chief Complaint   Patient presents with     RECHECK     Pain, right heel. Pt has been going to physical therapy and says that his pain have improved some,          Allergies   Allergen Reactions     Grass          Subjective: Juan Francisco is a 58 year old male who presents to the clinic today for a follow up of right PTTD and peroneal tendonitis. Relates that PT is helping. Decreased pain when he walks. Uses the brace on longer walks.     Objective  Data Unavailable Data Unavailable Data Unavailable Data Unavailable Data Unavailable 0 lbs 0 oz  Some pain noted along the course of the left peroneal tendon as it courses around the lateral malleolus. No pain along the PT tendon or [place] don the left.     Assessment: Resolving left ankle tendonitis.     Plan:   - Pt seen and evaluated  - Cont PT until discharged. Can cont to wear ankle brace until not needed.   - Pt to return to clinic PRN  - Rx for Voltaren gel for breakthrough pain.

## 2018-09-20 ENCOUNTER — THERAPY VISIT (OUTPATIENT)
Dept: PHYSICAL THERAPY | Facility: CLINIC | Age: 58
End: 2018-09-20
Payer: COMMERCIAL

## 2018-09-20 DIAGNOSIS — M76.821 POSTERIOR TIBIAL TENDONITIS, RIGHT: ICD-10-CM

## 2018-09-20 DIAGNOSIS — M25.571 PAIN IN JOINT INVOLVING ANKLE AND FOOT, RIGHT: ICD-10-CM

## 2018-09-20 DIAGNOSIS — M76.71 PERONEAL TENDONITIS, RIGHT: ICD-10-CM

## 2018-09-20 PROCEDURE — 97140 MANUAL THERAPY 1/> REGIONS: CPT | Mod: GP

## 2018-09-20 PROCEDURE — 97530 THERAPEUTIC ACTIVITIES: CPT | Mod: GP

## 2018-09-20 NOTE — MR AVS SNAPSHOT
After Visit Summary   9/20/2018    Juan Francisco Booker    MRN: [id number]           Patient Information     Date Of Birth          1960        Visit Information        Provider Department      9/20/2018 1:50 PM Sravanthi Mcdonough ATC [facility]        Today's Diagnoses     Peroneal tendonitis, right        Posterior tibial tendonitis, right        Pain in joint involving ankle and foot, right           Follow-ups after your visit        Your next 10 appointments already scheduled     Sep 26, 2018  2:00 PM CDT   ZENOBIA Extremity with Paulo Gabriel, PT   [facility] ([facility]  )    [address] [facility] [address] [phone]              Who to contact     If you have questions or need follow up information about today's clinic visit or your schedule please contact [facility] PHYSICAL [facility] directly at [phone].  Normal or non-critical lab and imaging results will be communicated to you by nGamehart, letter or phone within 4 business days after the clinic has received the results. If you do not hear from us within 7 days, please contact the clinic through [employer] or phone. If you have a critical or abnormal lab result, we will notify you by phone as soon as possible.  Submit refill requests through [employer] or call your pharmacy and they will forward the refill request to us. Please allow 3 business days for your refill to be completed.          Additional Information About Your Visit        MyChart Information     [employer] gives you secure access to your electronic health record. If you see a primary care provider, you can also send messages to your care team and make appointments. If you have questions, please call your primary care clinic.  If you do not have a primary care provider, please call [phone] and they will assist you.         Care EveryWhere ID     This is your Care EveryWhere ID. This could be used by other organizations to access your [place] medical records  [id number]         Blood Pressure from Last 3 Encounters:   08/29/18 119/76   05/18/18 98/67   03/29/18 130/80    Weight from Last 3 Encounters:   08/29/18 131.2 kg (289 lb 3.2 oz)   05/18/18 127.8 kg (281 lb 12.8 oz)   03/29/18 131.6 kg (290 lb 3.2 oz)              We Performed the Following     Manual Ther Tech, 1+Regions, EA 15 min     Therapeutic Activities        Primary Care Provider Office Phone # Fax #    Mar Ab Lieberman [phone]       [address] [address]        Equal Access to Services     [facility] : Hadii aad ku hadasho Soomaali, waaxda luqadaha, qaybta kaalmada adejanethyaisabel, christiano waters . So [facility] [phone].    ATENCIÓN: Si habla español, tiene a beal disposición servicios gratuitos de asistencia lingüística. Margie al [phone].    We comply with applicable federal civil rights laws and [place] laws. We do not discriminate on the basis of race, color, national origin, age, disability, sex, sexual orientation, or gender identity.            Thank you!     Thank you for choosing [facility]ER[facility]  for your care. Our goal is always to provide you with excellent care. Hearing back from our patients is one way we can continue to improve our services. Please take a few minutes to complete the written survey that you may receive in the mail after your visit with us. Thank you!             Your Updated Medication List - Protect others around you: Learn how to safely use, store and throw away your medicines at www.disposemymeds.org.          This list is accurate as of 9/20/18  3:50 PM.  Always use your most recent med list.                   Brand Name Dispense Instructions for use Diagnosis    aspirin 81 MG tablet      Take 1 tablet by  mouth daily AM        atenolol 25 MG tablet    TENORMIN    120 tablet    Take 4 tablets (100 mg) by mouth daily    Benign essential hypertension       MULTIVITAMINS Chew      Take 1 tablet by mouth        DAILY MULTIVITAMIN PO      Take 1 tablet by mouth daily AM        DENTA 5000 PLUS 1.1 % Crea   Generic drug:  Sodium Fluoride      APPLY A PEA SIZED AMOUNT TO TOOTHBRUSH, BRUSH ONTO ROOT AREAS THOROUGHLY, THEN SPIT OUT AT BEDTIME.        diclofenac 1 % Gel topical gel    VOLTAREN    100 g    Apply 2 grams to ankle four times daily using enclosed dosing card.    Posterior tibial tendonitis, right       fluticasone 50 MCG/ACT spray    FLONASE    16 g    Spray 1-2 sprays into both nostrils daily        * gabapentin 300 MG capsule    NEURONTIN    150 capsule    1 in morning, 1 at midday, 3 at night    Peripheral polyneuropathy       * gabapentin 300 MG capsule    NEURONTIN    270 capsule    Take 3 capsules (900 mg) by mouth At Bedtime Take 900mg orally each night at 2 am    Restless legs syndrome (RLS)       hydrochlorothiazide 25 MG tablet    HYDRODIURIL    90 tablet    Take 1 tablet (25 mg) by mouth daily AM    Benign essential hypertension       lisinopril 40 MG tablet    PRINIVIL/ZESTRIL    90 tablet    Take 1 tablet (40 mg) by mouth daily AM    Benign essential hypertension       omega-3 fatty acids 1200 MG capsule      Take 1 capsule by mouth daily AM        order for DME      Use your CPAP device as directed by your provider.        priLOSEC 20 MG CR capsule   Generic drug:  omeprazole      Take 40 mg by mouth At Bedtime HS        simvastatin 40 MG tablet    ZOCOR    30 tablet    Take 1 tablet (40 mg) by mouth At Bedtime No further refills until has lipid blood test.    Hyperlipidemia LDL goal <130       tamsulosin 0.4 MG capsule    FLOMAX    60 capsule    Take 2 capsules (0.8 mg) by mouth daily 30 min after a meal    Urinary frequency       TYLENOL 500 MG tablet   Generic drug:  acetaminophen      Take 2  tablets by mouth every 6 hours as needed.        * Notice:  This list has 2 medication(s) that are the same as other medications prescribed for you. Read the directions carefully, and ask your doctor or other care provider to review them with you.

## 2018-09-24 DIAGNOSIS — G62.9 PERIPHERAL POLYNEUROPATHY: ICD-10-CM

## 2018-09-24 RX ORDER — GABAPENTIN 300 MG/1
CAPSULE ORAL
Qty: 450 CAPSULE | Refills: 3 | Status: SHIPPED | OUTPATIENT
Start: 2018-09-24 | End: 2019-11-29

## 2018-09-24 RX ORDER — GABAPENTIN 300 MG/1
CAPSULE ORAL
Qty: 150 CAPSULE | Refills: 11 | Status: SHIPPED | OUTPATIENT
Start: 2018-09-24 | End: 2018-09-24

## 2018-09-26 ENCOUNTER — THERAPY VISIT (OUTPATIENT)
Dept: PHYSICAL THERAPY | Facility: CLINIC | Age: 58
End: 2018-09-26
Payer: COMMERCIAL

## 2018-09-26 DIAGNOSIS — M76.821 POSTERIOR TIBIAL TENDONITIS, RIGHT: ICD-10-CM

## 2018-09-26 DIAGNOSIS — M76.71 PERONEAL TENDONITIS, RIGHT: ICD-10-CM

## 2018-09-26 DIAGNOSIS — M25.571 PAIN IN JOINT INVOLVING ANKLE AND FOOT, RIGHT: ICD-10-CM

## 2018-09-26 PROCEDURE — 97530 THERAPEUTIC ACTIVITIES: CPT | Mod: GP | Performed by: PHYSICAL THERAPIST

## 2018-09-26 PROCEDURE — 97110 THERAPEUTIC EXERCISES: CPT | Mod: GP | Performed by: PHYSICAL THERAPIST

## 2018-09-26 NOTE — MR AVS SNAPSHOT
After Visit Summary   9/26/2018    Juan Francisco Booker    MRN: [id number]           Patient Information     Date Of Birth          1960        Visit Information        Provider Department      9/26/2018 2:00 PM Paulo Gabriel, PT [facility]        Today's Diagnoses     Peroneal tendonitis, right        Posterior tibial tendonitis, right        Pain in joint involving ankle and foot, right           Follow-ups after your visit        Who to contact     If you have questions or need follow up information about today's clinic visit or your schedule please contact [facility] ATHLET[facility] PHYSICAL [facility] directly at [phone].  Normal or non-critical lab and imaging results will be communicated to you by [employer] Hi-Tech[employer], letter or phone within 4 business days after the clinic has received the results. If you do not hear from us within 7 days, please contact the clinic through [employer] or phone. If you have a critical or abnormal lab result, we will notify you by phone as soon as possible.  Submit refill requests through [employer] or call your pharmacy and they will forward the refill request to us. Please allow 3 business days for your refill to be completed.          Additional Information About Your Visit        MyChart Information     [employer] gives you secure access to your electronic health record. If you see a primary care provider, you can also send messages to your care team and make appointments. If you have questions, please call your primary care clinic.  If you do not have a primary care provider, please call [phone] and they will assist you.        Care EveryWhere ID     This is your Care EveryWhere ID. This could be used by other organizations to access your [place] medical records  [id number]         Blood Pressure from Last 3 Encounters:   08/29/18 119/76   05/18/18 98/67   03/29/18 130/80    Weight from Last 3  Encounters:   08/29/18 131.2 kg (289 lb 3.2 oz)   05/18/18 127.8 kg (281 lb 12.8 oz)   03/29/18 131.6 kg (290 lb 3.2 oz)              We Performed the Following     Therapeutic Activities     Therapeutic Exercises        Primary Care Provider Office Phone # Fax #    Mar Ab Lieberman [phone]       [address] [address]        Equal Access to Services     DAX BURGOS : Hadii aad ku hadasho Soomaali, waaxda luqadaha, qaybta kaalmada adeegyada, waxay idiin hayaan adeeg romina waters . So [facility] [phone].    ATENCIÓN: Si habla español, tiene a beal disposición servicios gratuitos de asistencia lingüística. [facility] [phone].    We comply with applicable federal civil rights laws and [place] laws. We do not discriminate on the basis of race, color, national origin, age, disability, sex, sexual orientation, or gender identity.            Thank you!     Thank you for choosing [place] [facility] PHYSICAL THERAPY  for your care. Our goal is always to provide you with excellent care. Hearing back from our patients is one way we can continue to improve our services. Please take a few minutes to complete the written survey that you may receive in the mail after your visit with us. Thank you!             Your Updated Medication List - Protect others around you: Learn how to safely use, store and throw away your medicines at www.disposemymeds.org.          This list is accurate as of 9/26/18  2:33 PM.  Always use your most recent med list.                   Brand Name Dispense Instructions for use Diagnosis    aspirin 81 MG tablet      Take 1 tablet by mouth daily AM        atenolol 25 MG tablet    TENORMIN    120 tablet    Take 4 tablets (100 mg) by mouth daily    Benign essential hypertension       MULTIVITAMINS Chew      Take 1 tablet by mouth        DAILY MULTIVITAMIN PO      Take 1 tablet by mouth daily AM        DENTA 5000 PLUS 1.1 %  Crea   Generic drug:  Sodium Fluoride      APPLY A PEA SIZED AMOUNT TO TOOTHBRUSH, BRUSH ONTO ROOT AREAS THOROUGHLY, THEN SPIT OUT AT BEDTIME.        diclofenac 1 % Gel topical gel    VOLTAREN    100 g    Apply 2 grams to ankle four times daily using enclosed dosing card.    Posterior tibial tendonitis, right       fluticasone 50 MCG/ACT spray    FLONASE    16 g    Spray 1-2 sprays into both nostrils daily        gabapentin 300 MG capsule    NEURONTIN    450 capsule    1 in morning, 1 at midday, 3 at night    Peripheral polyneuropathy       hydrochlorothiazide 25 MG tablet    HYDRODIURIL    90 tablet    Take 1 tablet (25 mg) by mouth daily AM    Benign essential hypertension       lisinopril 40 MG tablet    PRINIVIL/ZESTRIL    90 tablet    Take 1 tablet (40 mg) by mouth daily AM    Benign essential hypertension       omega-3 fatty acids 1200 MG capsule      Take 1 capsule by mouth daily AM        order for DME      Use your CPAP device as directed by your provider.        priLOSEC 20 MG CR capsule   Generic drug:  omeprazole      Take 40 mg by mouth At Bedtime HS        simvastatin 40 MG tablet    ZOCOR    30 tablet    Take 1 tablet (40 mg) by mouth At Bedtime No further refills until has lipid blood test.    Hyperlipidemia LDL goal <130       tamsulosin 0.4 MG capsule    FLOMAX    60 capsule    Take 2 capsules (0.8 mg) by mouth daily 30 min after a meal    Urinary frequency       TYLENOL 500 MG tablet   Generic drug:  acetaminophen      Take 2 tablets by mouth every 6 hours as needed.

## 2018-09-27 DIAGNOSIS — N40.1 BENIGN PROSTATIC HYPERPLASIA WITH URINARY FREQUENCY: Primary | ICD-10-CM

## 2018-09-27 DIAGNOSIS — R35.0 BENIGN PROSTATIC HYPERPLASIA WITH URINARY FREQUENCY: Primary | ICD-10-CM

## 2018-10-01 ENCOUNTER — PRE VISIT (OUTPATIENT)
Dept: UROLOGY | Facility: CLINIC | Age: 58
End: 2018-10-01

## 2018-10-01 NOTE — TELEPHONE ENCOUNTER
MEDICAL RECORDS REQUEST   [facility]  Urology Clinic  [address] [place] [address]  PHONE: [phone]  Fax: [phone]        FUTURE VISIT INFORMATION                                                   Juan Francisco Booker : 1960 scheduled for future visit at [facility]    APPOINTMENT INFORMATION:    Date: 10/30/2018    Provider:  Jackie Matta    Reason for Visit/Diagnosis: BPH    REFERRAL INFORMATION:    Referring provider:  Mar Lieberman    Specialty: MD    Referring providers clinic:  [facility]    Clinic contact number:[phone];      RECORDS REQUESTED FOR VISIT                                                     NOTES  STATUS/DETAILS   OFFICE NOTE from referring provider  yes   OFFICE NOTE from other specialist  no   DISCHARGE SUMMARY from hospital  no   DISCHARGE REPORT from the ER  no   OPERATIVE REPORT  no   MEDICATION LIST  yes   LABS     URINALYSIS (UA)  yes       PRE-VISIT CHECKLIST      Record collection complete Yes   Appointment appropriately scheduled           (right time/right provider) Yes   MyChart activation Yes   Questionnaire complete If no, please explain in process     Completed by: Teresa Chacon

## 2018-10-17 DIAGNOSIS — E78.5 HYPERLIPIDEMIA LDL GOAL <130: ICD-10-CM

## 2018-10-17 RX ORDER — SIMVASTATIN 40 MG
40 TABLET ORAL AT BEDTIME
Qty: 30 TABLET | Refills: 0 | Status: SHIPPED | OUTPATIENT
Start: 2018-10-17 | End: 2018-11-13

## 2018-10-17 NOTE — TELEPHONE ENCOUNTER

## 2018-10-23 ASSESSMENT — ENCOUNTER SYMPTOMS
HEMATURIA: 0
ALTERED TEMPERATURE REGULATION: 1
POLYPHAGIA: 0
WEIGHT GAIN: 1
FEVER: 0
NIGHT SWEATS: 0
DECREASED APPETITE: 0
POLYDIPSIA: 0
HALLUCINATIONS: 0
DIFFICULTY URINATING: 1
CHILLS: 1
DYSURIA: 0
INCREASED ENERGY: 1
WEIGHT LOSS: 0
FATIGUE: 1
FLANK PAIN: 0

## 2018-10-24 ENCOUNTER — OFFICE VISIT (OUTPATIENT)
Dept: FAMILY MEDICINE | Facility: CLINIC | Age: 58
End: 2018-10-24
Payer: COMMERCIAL

## 2018-10-24 VITALS
DIASTOLIC BLOOD PRESSURE: 71 MMHG | SYSTOLIC BLOOD PRESSURE: 110 MMHG | TEMPERATURE: 98.9 F | RESPIRATION RATE: 16 BRPM | BODY MASS INDEX: 44.52 KG/M2 | OXYGEN SATURATION: 93 % | HEART RATE: 75 BPM | WEIGHT: 292.8 LBS

## 2018-10-24 DIAGNOSIS — R53.83 FATIGUE, UNSPECIFIED TYPE: Primary | ICD-10-CM

## 2018-10-24 DIAGNOSIS — F32.A DEPRESSION, UNSPECIFIED DEPRESSION TYPE: ICD-10-CM

## 2018-10-24 DIAGNOSIS — R73.03 PREDIABETES: ICD-10-CM

## 2018-10-24 DIAGNOSIS — Z13.9 SCREENING FOR CONDITION: ICD-10-CM

## 2018-10-24 DIAGNOSIS — Z13.220 SCREENING FOR HYPERLIPIDEMIA: ICD-10-CM

## 2018-10-24 LAB
% GRANULOCYTES: 71 %G (ref 40–75)
BUN SERPL-MCNC: 18.2 MG/DL (ref 7–21)
CALCIUM SERPL-MCNC: 9.3 MG/DL (ref 8.5–10.1)
CHLORIDE SERPLBLD-SCNC: 100.9 MMOL/L (ref 98–110)
CHOLEST SERPL-MCNC: 190.4 MG/DL (ref 0–200)
CHOLEST/HDLC SERPL: 5.1 {RATIO} (ref 0–5)
CK SERPL-CCNC: 52 U/L (ref 30–300)
CO2 SERPL-SCNC: 27.7 MMOL/L (ref 20–32)
CREAT SERPL-MCNC: 0.9 MG/DL (ref 0.7–1.3)
GFR SERPL CREATININE-BSD FRML MDRD: >90 ML/MIN/1.7 M2
GLUCOSE P FAST SERPL-MCNC: 85 MG/DL (ref 51–110)
GLUCOSE SERPL-MCNC: 106.8 MG'DL (ref 70–99)
GRANULOCYTES #: 5.3 K/UL (ref 1.6–8.3)
HBA1C MFR BLD: 6.1 % (ref 4.1–5.7)
HCT VFR BLD AUTO: 46.3 % (ref 40–53)
HDLC SERPL-MCNC: 37.6 MG/DL
HEMOGLOBIN: 14.1 G/DL (ref 13.3–17.7)
LDLC SERPL CALC-MCNC: 90 MG/DL (ref 0–129)
LYMPHOCYTES # BLD AUTO: 1.8 K/UL (ref 0.8–5.3)
LYMPHOCYTES NFR BLD AUTO: 23.7 %L (ref 20–48)
MCH RBC QN AUTO: 27.9 PG (ref 26.5–35)
MCHC RBC AUTO-ENTMCNC: 30.5 G/DL (ref 32–36)
MCV RBC AUTO: 91.7 FL (ref 78–100)
MID #: 0.4 K/UL (ref 0–2.2)
MID %: 5.3 %M (ref 0–20)
PLATELET # BLD AUTO: 324 K/UL (ref 150–450)
POTASSIUM SERPL-SCNC: 4.1 MMOL/DL (ref 3.3–4.5)
RBC # BLD AUTO: 5.05 M/UL (ref 4.4–5.9)
SODIUM SERPL-SCNC: 137.4 MMOL/L (ref 132.6–141.4)
T4 FREE SERPL-MCNC: 1.01 NG/DL (ref 0.76–1.46)
TRIGL SERPL-MCNC: 315.6 MG/DL (ref 0–150)
TSH SERPL DL<=0.005 MIU/L-ACNC: <0.01 MU/L (ref 0.4–4)
VLDL CHOLESTEROL: 63.1 MG/DL (ref 7–32)
WBC # BLD AUTO: 7.4 K/UL (ref 4–11)

## 2018-10-24 RX ORDER — INFLUENZA A VIRUS A/VICTORIA/2454/2019 IVR-207 (H1N1) ANTIGEN (PROPIOLACTONE INACTIVATED), INFLUENZA A VIRUS A/HONG KONG/2671/2019 IVR-208 (H3N2) ANTIGEN (PROPIOLACTONE INACTIVATED), INFLUENZA B VIRUS B/VICTORIA/705/2018 BVR-11 ANTIGEN (PROPIOLACTONE INACTIVATED), INFLUENZA B VIRUS B/PHUKET/3073/2013 BVR-1B ANTIGEN (PROPIOLACTONE INACTIVATED) 15; 15; 15; 15 UG/.5ML; UG/.5ML; UG/.5ML; UG/.5ML
INJECTION, SUSPENSION INTRAMUSCULAR
Refills: 0 | COMMUNITY
Start: 2018-08-21 | End: 2018-12-19

## 2018-10-24 RX ORDER — CITALOPRAM HYDROBROMIDE 10 MG/1
10 TABLET ORAL DAILY
Qty: 60 TABLET | Refills: 3 | Status: SHIPPED | OUTPATIENT
Start: 2018-10-24 | End: 2019-06-04

## 2018-10-24 RX ORDER — CITALOPRAM HYDROBROMIDE 10 MG/1
10 TABLET ORAL DAILY
Refills: 3 | COMMUNITY
Start: 2018-08-09 | End: 2018-10-24

## 2018-10-24 ASSESSMENT — ENCOUNTER SYMPTOMS
CONSTIPATION: 0
ABDOMINAL PAIN: 0
FREQUENCY: 0
SORE THROAT: 0
ACTIVITY CHANGE: 1
DECREASED CONCENTRATION: 1
COLOR CHANGE: 0
LIGHT-HEADEDNESS: 0
MYALGIAS: 1
ARTHRALGIAS: 0
DIZZINESS: 0
DYSURIA: 0
HEADACHES: 1
CHILLS: 1
UNEXPECTED WEIGHT CHANGE: 0
FATIGUE: 1
DYSPHORIC MOOD: 1
DIARRHEA: 0
APPETITE CHANGE: 1
VOMITING: 0
SHORTNESS OF BREATH: 0
COUGH: 0
PALPITATIONS: 0
DIAPHORESIS: 0
NAUSEA: 0
FEVER: 0

## 2018-10-24 ASSESSMENT — PATIENT HEALTH QUESTIONNAIRE - PHQ9: SUM OF ALL RESPONSES TO PHQ QUESTIONS 1-9: 12

## 2018-10-24 NOTE — PROGRESS NOTES
Preceptor Attestation:   Patient seen, evaluated and discussed with the resident. I have verified the content of the note, which accurately reflects my assessment of the patient and the plan of care.   Supervising Physician:  Alex Tapia MD

## 2018-10-24 NOTE — MR AVS SNAPSHOT
After Visit Summary   10/24/2018    Juan Francisco Booker    MRN: [id number]           Patient Information     Date Of Birth          1960        Visit Information        Provider Department      10/24/2018 1:00 PM Peggy Pop MD [facility]        Today's Diagnoses     Screening for hyperlipidemia    -  1    Prediabetes        Screening for condition        Fatigue, unspecified type        Depression, unspecified depression type          Care Instructions    Here is the plan from today's visit    Please follow up in 2 weeks for results and re-check.    1. Screening for hyperlipidemia  - Lipid Cascade ([facility])    2. Prediabetes  - Glucose Fasting ([facility])  - Hemoglobin A1c ([facility])    3. Screening for condition  - CBC with Diff Plt ([facility])  - TSH with free T4 reflex  - CK total  - Basic Metabolic Panel ([facility])    4. Fatigue, unspecified type  - CBC with Diff Plt ([facility])  - TSH with free T4 reflex    5. Depression, unspecified depression type  - citalopram (CELEXA) 10 MG tablet; Take 1 tablet (10 mg) by mouth daily  Dispense: 60 tablet; Refill: 3    Please call or return to clinic if your symptoms don't go away.    Follow up plan  Please make a clinic appointment for follow up with your primary physician Mar Lieberman MD in 2 weeks.    Thank you for coming to [facility] today.  Lab Testing:  **If you had lab testing today and your results are reassuring or normal they will be mailed to you or sent through [employer] within 7 days.   **If the lab tests need quick action we will call you with the results.  The phone number we will call with results is # [phone] (home) . If this is not the best number please call our clinic and change the number.  Medication Refills:  If you need any refills please call your pharmacy and they will contact us.   If you need to  your refill at a new pharmacy, please contact the new pharmacy directly. The new pharmacy  will help you get your medications transferred faster.   Scheduling:  If you have any concerns about today's visit or wish to schedule another appointment please call our office during normal business hours [phone] (8-5:00 M-F)  If a referral was made to a [facility] and you don't get a call from central scheduling please call [phone].  If a Mammogram was ordered for you at [facility] call [phone] to schedule or change your appointment.  If you had an XRay/CT/Ultrasound/MRI ordered the number is [phone] to schedule or change your radiology appointment.   Medical Concerns:  If you have urgent medical concerns please call [phone] at any time of the day.    Peggy Pop MD            Follow-ups after your visit        Your next 10 appointments already scheduled     Oct 30, 2018  4:30 PM CDT   (Arrive by 4:15 PM)   NEW BENIGN PROSTATIC HYPERTROPHY with LUZ MARIA Padgett   [facility] ([facility])    [address] [place] [id number] [phone]              Who to contact     Please call your clinic at [phone] to:    Ask questions about your health    Make or cancel appointments    Discuss your medicines    Learn about your test results    Speak to your doctor            Additional Information About Your Visit        Capt[employer]art Information     B[employer]o gives you secure access to your electronic health record. If you see a primary care provider, you can also send messages to your care team and make appointments. If you have questions, please call your primary care clinic.  If you do not have a primary care provider, please call [phone] and they will assist you.      Bebo is an electronic gateway that provides easy, online access to your medical records. With [employer], you can request a clinic appointment, read your test results, renew a prescription  or communicate with your care team.     To access your existing account, please contact your [facility] or call [phone] for assistance.        Care EveryWhere ID     This is your Care EveryWhere ID. This could be used by other organizations to access your [facility] medical records  [id number]        Your Vitals Were     Pulse Temperature Respirations Pulse Oximetry BMI (Body Mass Index)       75 98.9  F (37.2  C) (Oral) 16 93% 44.52 kg/m2        Blood Pressure from Last 3 Encounters:   10/24/18 110/71   08/29/18 119/76   05/18/18 98/67    Weight from Last 3 Encounters:   10/24/18 292 lb 12.8 oz (132.8 kg)   08/29/18 289 lb 3.2 oz (131.2 kg)   05/18/18 281 lb 12.8 oz (127.8 kg)              We Performed the Following     Basic Metabolic Panel ([facility])     CBC with Diff Plt ([facility])     CK total     Glucose Fasting ([facility])     Hemoglobin A1c ([facility])     Lipid [place] ([facility])     TSH with free T4 reflex          Where to get your medicines      These medications were sent to [place] [address] [place] [facility] [address] [facility]  [address] [facility] [address]     Phone:  [phone]     citalopram 10 MG tablet          Primary Care Provider Office Phone # Fax #    Mar Ab Lieberman [phone]       [address] [address]        Equal Access to Services     DAX BURGOS : Hadii yumi ku hadasho Soomaali, waaxda luqadaha, qaybta kaalmada justino, wax cristin waters . So [facility] [phone].    ATENCIÓN: Si habla español, tiene a beal disposición servicios gratuitos de asistencia lingüística. Llame al [phone].    We comply with applicable federal civil rights laws and [place] laws. We do not discriminate on the basis of race, color, national origin, age, disability, sex, sexual orientation, or gender identity.            Thank you!     Thank you for choosing [facility]   for your care. Our goal is always to provide you with excellent care. Hearing back from our patients is one way we can continue to improve our services. Please take a few minutes to complete the written survey that you may receive in the mail after your visit with us. Thank you!             Your Updated Medication List - Protect others around you: Learn how to safely use, store and throw away your medicines at www.disposemymeds.org.          This list is accurate as of 10/24/18  1:41 PM.  Always use your most recent med list.                   Brand Name Dispense Instructions for use Diagnosis    AFLURIA QUADRIVALENT 0.5 ML injection   Generic drug:  influenza quadrivalent (PF) vacc      TO BE ADMINISTERED BY PHARMACIST FOR IMMUNIZATION        aspirin 81 MG tablet      Take 1 tablet by mouth daily AM        atenolol 25 MG tablet    TENORMIN    120 tablet    Take 4 tablets (100 mg) by mouth daily    Benign essential hypertension       citalopram 10 MG tablet    celeXA    60 tablet    Take 1 tablet (10 mg) by mouth daily    Depression, unspecified depression type       MULTIVITAMINS Chew      Take 1 tablet by mouth        DAILY MULTIVITAMIN PO      Take 1 tablet by mouth daily AM        DENTA 5000 PLUS 1.1 % Crea   Generic drug:  Sodium Fluoride      APPLY A PEA SIZED AMOUNT TO TOOTHBRUSH, BRUSH ONTO ROOT AREAS THOROUGHLY, THEN SPIT OUT AT BEDTIME.        diclofenac 1 % Gel topical gel    VOLTAREN    100 g    Apply 2 grams to ankle four times daily using enclosed dosing card.    Posterior tibial tendonitis, right       fluticasone 50 MCG/ACT spray    FLONASE    16 g    Spray 1-2 sprays into both nostrils daily        gabapentin 300 MG capsule    NEURONTIN    450 capsule    1 in morning, 1 at midday, 3 at night    Peripheral polyneuropathy       hydrochlorothiazide 25 MG tablet    HYDRODIURIL    90 tablet    Take 1 tablet (25 mg) by mouth daily AM    Benign essential hypertension       lisinopril 40 MG tablet     PRINIVIL/ZESTRIL    90 tablet    Take 1 tablet (40 mg) by mouth daily AM    Benign essential hypertension       omega-3 fatty acids 1200 MG capsule      Take 1 capsule by mouth daily AM        order for DME      Use your CPAP device as directed by your provider.        priLOSEC 20 MG CR capsule   Generic drug:  omeprazole      Take 40 mg by mouth At Bedtime HS        simvastatin 40 MG tablet    ZOCOR    30 tablet    Take 1 tablet (40 mg) by mouth At Bedtime No further refills until has lipid blood test.    Hyperlipidemia LDL goal <130       tamsulosin 0.4 MG capsule    FLOMAX    60 capsule    Take 2 capsules (0.8 mg) by mouth daily 30 min after a meal    Urinary frequency       TYLENOL 500 MG tablet   Generic drug:  acetaminophen      Take 2 tablets by mouth every 6 hours as needed.

## 2018-10-24 NOTE — PROGRESS NOTES
"       HPI       Juan Francisco Booker is a 58 year old  who presents for   Chief Complaint   Patient presents with     Fatigue     tired, chills, bodyaches, x 3 weeks; no vomiting, no diarrhea     Labs Only     lipids     Fatigue    Started 5 weeks ago - reported feeling \"sick for about 1 week\" then getting better, then recent 3 weeks of ongoing fatigue, chills, diffuse myalgias.    Describes 1 week period of \"feeling sick\" as fatigue, intermittent chills, with NO cough, sore throat, congestion, rhinorrhea, fever, n/v/d.     Denies fever, diaphoresis. Chills on and off - afternoon and evenings. No night sweats. No weight loss.    Reports similar feeling few years ago when he was iron deficient 2/2 GI bleeding.    No recent blood in stools/dark stools or blood per rectum noted. Denies hematuria.    Currently denies light-headedness, though some chronic tension headaches with no recent changes in pattern.    Sleep pattern: no changes. Sleeps about 6 hours per night. Uses a CPAP - attempts nightly, but some nights falls off or wakes up without it (may have forgotten). When CPAP does stay on throughout the night, he does wake up feeling refreshed. Notes no changes in this pattern of recent and does not think fatigue is entirely due to CPAP issues.    Mood described as worse than usual with occasional tearfulness. (see PHQ-9 from today). Has noted to be worse since stopping his citalopram in August after running out. Stopped citalopram due to side effects - felt mostly on 40 mg dose, and not noticed on 10 mg while still having good symptom control.    Finds it hard to get the energy to complete daily tasks.    Multiple naps throughout the day.    Multiple skin tags over face, neck, upper chest - no recent changes. Previously biopsied and benign.    Hx multiple colonic tubular adenomas. Colonoscopy q3y.    PHQ-9 score:    PHQ-9 SCORE 10/24/2018   Total Score -   Total Score 12     Problem, Medication and Allergy Lists were " reviewed and updated if needed..    Patient is an established patient of this clinic..         Review of Systems:   Review of Systems   Constitutional: Positive for activity change, appetite change, chills and fatigue. Negative for diaphoresis, fever and unexpected weight change.   HENT: Negative for sore throat.    Eyes: Negative for visual disturbance.   Respiratory: Negative for cough and shortness of breath.    Cardiovascular: Negative for chest pain, palpitations and leg swelling.   Gastrointestinal: Negative for abdominal pain, constipation, diarrhea, nausea and vomiting.   Genitourinary: Negative for dysuria and frequency.   Musculoskeletal: Positive for myalgias. Negative for arthralgias.   Skin: Negative for color change and rash.   Neurological: Positive for headaches (chronic). Negative for dizziness and light-headedness.   Psychiatric/Behavioral: Positive for decreased concentration and dysphoric mood. Negative for self-injury.            Physical Exam:     Vitals:    10/24/18 1306   BP: 110/71   Pulse: 75   Resp: 16   Temp: 98.9  F (37.2  C)   TempSrc: Oral   SpO2: 93%   Weight: 292 lb 12.8 oz (132.8 kg)     Body mass index is 44.52 kg/(m^2).  Vitals were reviewed and were normal     Physical Exam   Constitutional: He is oriented to person, place, and time. He appears well-developed and well-nourished. No distress.   HENT:   Head: Normocephalic and atraumatic.   Mouth/Throat: No oropharyngeal exudate.   Eyes: Conjunctivae and EOM are normal.   Neck: Normal range of motion.   Cardiovascular: Normal rate, regular rhythm and normal heart sounds.    Pulmonary/Chest: Effort normal and breath sounds normal. No respiratory distress. He has no wheezes. He has no rales. He exhibits no tenderness.   Abdominal: Soft. Bowel sounds are normal. There is no tenderness.   Musculoskeletal: Normal range of motion.   Neurological: He is alert and oriented to person, place, and time.   Skin: Skin is warm. He is not  diaphoretic.   Multiple diffuse facial, neck, and upper chest skin tags. Colours vary from pale to dark. (Stable according to patient)   Psychiatric: He has a normal mood and affect.   Vitals reviewed.      Results:   Results are ordered and pending    Assessment and Plan      Juan Francisco was seen today for fatigue and HM labs.    Diagnoses and all orders for this visit:    Fatigue with myalgias  Differentials include anemia 2/2 GI bleed/malignancy; statin-induced myopathy; hypothyroidism; overt diabetes from pre-diabetes; depression; CPAP mask fitting issues.  -     CBC with Diff Plt ([facility])  -     TSH with free T4 reflex  -     Glucose Fasting ([facility])  -     Hemoglobin A1c ([facility])  -     TSH with free T4 reflex  -     CK total  -     Basic Metabolic Panel ([facility])    History of multiple adenomatous colonic polyps - receives colonoscopy q3y, due on 10/20/2019. Hx of LGI bleed but has not noticed any bleeds of recent. No light-headedness. Re-screen Hb.    Depression - stopped citalopram in August after running out.  Has noticed worsening mood, concentration, appetite, increasing tearfulness. Previously had issues with side effects at 40 mg, found 10 mg tolerable and effective for symptoms. Interested in re-starting citalopram today. Not interested in therapy for managing depression.    CPAP adjustment - says he has previously gotten in contact with his CPAP provider and technician and they appeared to be uninterested in seeing him again for re-fitting or adjustments.    Numerous skin tags - biopsied prior and benign - no new changes in appearance. Less likely source of malignancy if present.    Depression, unspecified depression type  -     citalopram (CELEXA) 10 MG tablet; Take 1 tablet (10 mg) by mouth daily    Screening for hyperlipidemia  -     Lipid Cascade ([facility])    RTC in 2 weeks to discuss testing results as well as check-in on depression/improvements after re-starting citalopram. Will also try to  find out CPAP fitting information in the meantime, but encouraged Juan Francisco to check in with his CPAP provider again and request adjustment/re-fitting.    Options for treatment and follow-up care were reviewed with the patient. Juan Francisco Booker  engaged in the decision making process and verbalized understanding of the options discussed and agreed with the final plan.    Peggy Pop MD  [facility] Medicine PGY-1  Pager [phone]

## 2018-10-24 NOTE — PATIENT INSTRUCTIONS
Here is the plan from today's visit    Please follow up in 2 weeks for results and re-check.    1. Screening for hyperlipidemia  - Lipid Cascade ([facility])    2. Prediabetes  - Glucose Fasting ([facility])  - Hemoglobin A1c ([facility])    3. Screening for condition  - CBC with Diff Plt ([facility])  - TSH with free T4 reflex  - CK total  - Basic Metabolic Panel ([facility])    4. Fatigue, unspecified type  - CBC with Diff Plt ([facility])  - TSH with free T4 reflex    5. Depression, unspecified depression type  - citalopram (CELEXA) 10 MG tablet; Take 1 tablet (10 mg) by mouth daily  Dispense: 60 tablet; Refill: 3    Please call or return to clinic if your symptoms don't go away.    Follow up plan  Please make a clinic appointment for follow up with your primary physician Mar Lieberman MD in 2 weeks.    Thank you for coming to [facility] today.  Lab Testing:  **If you had lab testing today and your results are reassuring or normal they will be mailed to you or sent through [employer] within 7 days.   **If the lab tests need quick action we will call you with the results.  The phone number we will call with results is # [phone] (home) . If this is not the best number please call our clinic and change the number.  Medication Refills:  If you need any refills please call your pharmacy and they will contact us.   If you need to  your refill at a new pharmacy, please contact the new pharmacy directly. The new pharmacy will help you get your medications transferred faster.   Scheduling:  If you have any concerns about today's visit or wish to schedule another appointment please call our office during normal business hours [phone] (8-5:00 M-F)  If a referral was made to a [facility] and you don't get a call from central scheduling please call [phone].  If a Mammogram was ordered for you at [facility] call [phone] to schedule or change your appointment.  If you had  an XRay/CT/Ultrasound/MRI ordered the number is [phone] to schedule or change your radiology appointment.   Medical Concerns:  If you have urgent medical concerns please call [phone] at any time of the day.    Peggy Pop MD

## 2018-10-25 NOTE — PROGRESS NOTES
Dear Juan Francisco  Here are your labs as you can see that on testing of your thyroid, TSH is low - however, the free T4 is normal. I have ordered add-on studies of thyroid levels for the lab to run. My suggested plan is to see what these results say, and repeat your TSH in 1 month to see if this is a truly decreased value or a transient drop or laboratory error.    Please message me if you have any concerns  Peggy Pop MD

## 2018-10-26 LAB — T3FREE SERPL-MCNC: 3.6 PG/ML (ref 2.3–4.2)

## 2018-10-30 ENCOUNTER — OFFICE VISIT (OUTPATIENT)
Dept: UROLOGY | Facility: CLINIC | Age: 58
End: 2018-10-30
Payer: COMMERCIAL

## 2018-10-30 VITALS
HEIGHT: 68 IN | SYSTOLIC BLOOD PRESSURE: 126 MMHG | WEIGHT: 290.3 LBS | DIASTOLIC BLOOD PRESSURE: 80 MMHG | BODY MASS INDEX: 44 KG/M2 | HEART RATE: 62 BPM

## 2018-10-30 DIAGNOSIS — N40.1 BENIGN PROSTATIC HYPERPLASIA WITH LOWER URINARY TRACT SYMPTOMS, SYMPTOM DETAILS UNSPECIFIED: Primary | ICD-10-CM

## 2018-10-30 LAB
ALBUMIN UR-MCNC: NEGATIVE MG/DL
APPEARANCE UR: CLEAR
BILIRUB UR QL STRIP: NEGATIVE
COLOR UR AUTO: YELLOW
GLUCOSE UR STRIP-MCNC: NEGATIVE MG/DL
HGB UR QL STRIP: NEGATIVE
KETONES UR STRIP-MCNC: NEGATIVE MG/DL
LEUKOCYTE ESTERASE UR QL STRIP: NEGATIVE
MUCOUS THREADS #/AREA URNS LPF: PRESENT /LPF
NITRATE UR QL: NEGATIVE
PH UR STRIP: 7 PH (ref 5–7)
RBC #/AREA URNS AUTO: 1 /HPF (ref 0–2)
SOURCE: ABNORMAL
SP GR UR STRIP: 1.01 (ref 1–1.03)
SQUAMOUS #/AREA URNS AUTO: <1 /HPF (ref 0–1)
UROBILINOGEN UR STRIP-MCNC: 0 MG/DL (ref 0–2)
WBC #/AREA URNS AUTO: 1 /HPF (ref 0–5)

## 2018-10-30 ASSESSMENT — PAIN SCALES - GENERAL: PAINLEVEL: NO PAIN (0)

## 2018-10-30 NOTE — PROGRESS NOTES
"It was my pleasure to meet Mr. Juan Francisco Booker, a 58 year old year old male seen in consultation today at the request of Anibal Pop MD for chief complaint: RECHECK (BPH)  - Having trouble emptying the bladder    HPI: Mr. Juan Francisco Booker has PMH significant for HTN, HLD, gerd, obesity, prediabetes, depression, TIMOTHY, BPH (on flomax - started 8/29/18).   Has a 3 month history of voiding until he feels his bladder is empty.  But he then leaves the toilet then needs to go right back to void again.   Sometimes he admits he is inpatient and leaves the toilet too soon, but he has been trying to take his time to empty fully.   Daytime frequency - 10-12 times per day  Nocturia 2 times  Sometimes has urgency.  No incontinence.  Wears no protection  Must sit to void.  Has dribbling stream.   No hesitancy.  Does have intermittency.    Fluid intake: A little water with pills.  Milk with cereal.  Out[employer] Frozen fruit bars (2 boxes per day).    Takes caffeine pills - has sleep apnea and these help him stay awake - has severe sleep apnea. Started pills to replace soda (which upset his stomach)   HGB A1C 6.1%.   No dysuria, hematuria, UTIs, stones.     Bowels: Sometimes has hesitancy.  Several times per day - soft.     REVIEW OF DIAGNOSTICS:  10/24/18 - Creatinine 0.9mg/dL  3/29/18 - UA negative    No PSA or renal imaging.     Past Medical History:   Diagnosis Date     Anxiety      Benign neoplasm of colon 3/2/2015     Depression      Depressive disorder 1/15/2000     Difficult intubation      ETOH abuse 3/15/2009    in remission     Gastro-oesophageal reflux disease 1/15/2010     Hyperlipidemia 1/1/2000     Hypoxemia      Morbid obesity (H)      Nasal polyps 1/1/2015     TIMOTHY on CPAP 8/13/2012    Severe (uses 5-6 hours as able)     Other and unspecified hyperlipidemia 10/25/2012     Personal history of colonic polyps 2/207/15    Multiple \"neg for FAP\"     Pre-diabetes      Prediabetes 5/24/2017     Restless leg      Skin tag      " Surgical complication 2015    difficulty inserting a tube down my throat     Unspecified essential hypertension 10/25/2012       Past Surgical History:   Procedure Laterality Date     AS COLONOSCOPY THRU STOMA W BIOPSY/CAUTERY TUMOR/POLYP/LESION  2/27/15    colon polyps     COLONOSCOPY  2/27/2015    x 2     COLONOSCOPY WITH CO2 INSUFFLATION N/A 10/20/2016    Procedure: COLONOSCOPY WITH CO2 INSUFFLATION;  Surgeon: Juan Francisco Beltran MD;  Location: [facility] OR     ENT SURGERY  2011    [facility]     ESOPHAGOSCOPY, GASTROSCOPY, DUODENOSCOPY (EGD), COMBINED N/A 10/20/2016    Procedure: COMBINED ESOPHAGOSCOPY, GASTROSCOPY, DUODENOSCOPY (EGD);  Surgeon: Juan Francisco Beltran MD;  Location: [facility] OR     RELEASE CARPAL TUNNEL Right 2017    Procedure: RELEASE CARPAL TUNNEL;  Right Open Carpal Tunnel Release;  Surgeon: Aracelis Lowe MD;  Location: [facility]     RELEASE CARPAL TUNNEL Left 2017    Procedure: RELEASE CARPAL TUNNEL;  Left Open Carpal Tunnel Release;  Surgeon: Aracelis Lowe MD;  Location: [facility] OR       FAMILY HISTORY: Denies family history of urologic cancer.   Brother - liver ca  Mother -  female malignancy    SOCIAL HISTORY: Lives alone.  Retired -  for a [employer]. No children.   No occupational exposures.    reports that he has never smoked. He has never used smokeless tobacco.    Current Outpatient Prescriptions   Medication Sig Dispense Refill     acetaminophen (TYLENOL) 500 MG tablet Take 2 tablets by mouth every 6 hours as needed.       AFLURIA QUADRIVALENT 0.5 ML injection TO BE ADMINISTERED BY PHARMACIST FOR IMMUNIZATION  0     aspirin 81 MG tablet Take 1 tablet by mouth daily AM       atenolol (TENORMIN) 25 MG tablet Take 4 tablets (100 mg) by mouth daily 120 tablet 5     citalopram (CELEXA) 10 MG tablet Take 1 tablet (10 mg) by mouth daily 60 tablet 3     DENTA 5000 PLUS 1.1 % CREA APPLY A PEA SIZED AMOUNT TO TOOTHBRUSH, BRUSH ONTO ROOT AREAS THOROUGHLY, THEN  SPIT OUT AT BEDTIME.  2     diclofenac (VOLTAREN) 1 % GEL topical gel Apply 2 grams to ankle four times daily using enclosed dosing card. 100 g 3     fluticasone (FLONASE) 50 MCG/ACT spray Spray 1-2 sprays into both nostrils daily 16 g 3     gabapentin (NEURONTIN) 300 MG capsule 1 in morning, 1 at midday, 3 at night 450 capsule 3     hydrochlorothiazide (HYDRODIURIL) 25 MG tablet Take 1 tablet (25 mg) by mouth daily AM 90 tablet 1     lisinopril (PRINIVIL/ZESTRIL) 40 MG tablet Take 1 tablet (40 mg) by mouth daily AM 90 tablet 1     Multiple Vitamin (DAILY MULTIVITAMIN PO) Take 1 tablet by mouth daily AM       Multiple Vitamins-Minerals (MULTIVITAMINS) CHEW Take 1 tablet by mouth       Omega-3 Fatty Acids (FISH OIL) 1200 MG capsule Take 1 capsule by mouth daily AM       omeprazole (PRILOSEC) 20 MG capsule Take 40 mg by mouth At Bedtime HS       ORDER FOR DME Use your CPAP device as directed by your provider.       simvastatin (ZOCOR) 40 MG tablet Take 1 tablet (40 mg) by mouth At Bedtime No further refills until has lipid blood test. 30 tablet 0     tamsulosin (FLOMAX) 0.4 MG capsule Take 2 capsules (0.8 mg) by mouth daily 30 min after a meal 60 capsule 0       ALLERGIES: Grass      REVIEW OF SYSTEMS:  Answers for HPI/ROS submitted by the patient on 10/23/2018   General Symptoms: Yes  Skin Symptoms: No  HENT Symptoms: No  EYE SYMPTOMS: No  HEART SYMPTOMS: No  LUNG SYMPTOMS: No  INTESTINAL SYMPTOMS: No  URINARY SYMPTOMS: Yes  REPRODUCTIVE SYMPTOMS: No  SKELETAL SYMPTOMS: No  BLOOD SYMPTOMS: No  NERVOUS SYSTEM SYMPTOMS: No  MENTAL HEALTH SYMPTOMS: No  Fever: No  Loss of appetite: No  Weight loss: No  Weight gain: Yes  Fatigue: Yes  Night sweats: No  Chills: Yes  Increased stress: No  Excessive hunger: No  Excessive thirst: No  Feeling hot or cold when others believe the temperature is normal: Yes  Loss of height: No  Post-operative complications: No  Surgical site pain: No  Hallucinations: No  Change in or Loss of  "Energy: Yes  Hyperactivity: No  Confusion: No  Trouble holding urine or incontinence: No  Pain or burning: No  Trouble starting or stopping: Yes  Increased frequency of urination: Yes  Blood in urine: No  Decreased frequency of urination: No  Frequent nighttime urination: No  Flank pain: No  Difficulty emptying bladder: Yes      GENERAL PHYSICAL EXAM:   Vitals: /80  Pulse 62  Ht 1.727 m (5' 8\")  Wt 131.7 kg (290 lb 4.8 oz)  BMI 44.14 kg/m2  Body mass index is 44.14 kg/(m^2).    GENERAL: Well groomed, well developed, well nourished male in NAD.  NECK: Neck supple. No adenopathy.   GI: Soft, NT, ND, no palpable masses.    No bony spinal tenderness or CVAT bilaterally.  MS: Full ROM in extremities, gait normal, normal muscle tone  SKIN: Warm to touch, dry.  Numerous skin tags on skin  HEMATOLOGIC/LYMPHATIC/IMMUNOLOGIC: normal ant/post cervical, supraclavicular and inguinal nodes. No LE edema.  NEURO: Alert and oriented x 3.  PSYCH: Normal mood and affect, pleasant and agreeable during interview and exam.     :      Inguinal: no hernias or  palpable lymph nodes      Circumcised penis, no penile plaques or lesions. Orthotopic location of the urethral meatus.       Scrotum normal.      Testicles of normal firmness and consistency, no masses.      Epididymes bilaterally without masses or tenderness.       CM:      normal rectal tone, small soft amount of stool in the rectal vault.      Unclear - sized prostate, without tenderness, nodules or asymmetry (although only the apex was palpable).    PVR: Residual urine by ultrasound was 0ml.     LABS: The last test results for Ms. Juan Francisco Booker were reviewed.   PSA - No results found for: PSA  BMP -   Recent Labs   Lab Test  10/24/18   1354  12/28/17   1400   10/20/16   0630  09/21/16   1433   NA  137.4  136.3   --    --   135.3   POTASSIUM  4.1  4.1   --   3.5  3.8   CHLORIDE  100.9  99.1   --    --   95.7*   CO2  27.7  30.0   --    --   29.6   BUN  18.2  15.6   --  "   --   18.7   CR  0.9  0.9   --    --   1.0   GLC  85.0  106.8*  152.4*   < >   --   157.0*   ZHEN  9.3  9.5   --    --   9.3    < > = values in this interval not displayed.       CBC -   Recent Labs   Lab Test  10/24/18   1354  11/08/16   1206  09/21/16   1433   HGB  14.1  13.4  12.8*       ASSESSMENT:   1) BPH with luts - Frequency/ urgency / hesitancy  2) Prostate cancer screening    PLAN:   UA micro reflex  Get a PSA lab text next time you get blood draws (can do today if you wish)  Keep a 24 hour bladder diary (include fruit bars on your diary as well as other sources of liquid)  Take your time at the toilet, double void, lean forward and press on your bladder, stand up and sit back down. Your goal is to empty your bladder completely. PVR was zero today  Continue Flomax (tamsulosin) for now  Return in 4-6 weeks with your bladder diary.  Will at that time decide about anticholinergics vs UDS. Could also obtain a uroflow to help diagnose.     Deena Matta PA-C  Department of Urologic Surgery

## 2018-10-30 NOTE — NURSING NOTE
"Chief Complaint   Patient presents with     RECHECK     BPH       Blood pressure 126/80, pulse 62, height 1.727 m (5' 8\"), weight 131.7 kg (290 lb 4.8 oz). Body mass index is 44.14 kg/(m^2).    Patient Active Problem List   Diagnosis     Obstructive sleep apnea     Insomnia     Excessive sleepiness     Chronic nasal congestion     Other and unspecified alcohol dependence, unspecified drinking behavior     Lesion of ulnar nerve     Hyperlipidemia     Essential hypertension     Obesity     Benign neoplasm of colon     Hyperlipidemia     Moderate major depression (H)     Gastric polyps     Impaired glucose tolerance test     Prediabetes     Lumbago     Peroneal tendonitis, right     Posterior tibial tendonitis, right     Pain in joint involving ankle and foot, right       Allergies   Allergen Reactions     Grass        Current Outpatient Prescriptions   Medication Sig Dispense Refill     acetaminophen (TYLENOL) 500 MG tablet Take 2 tablets by mouth every 6 hours as needed.       AFLURIA QUADRIVALENT 0.5 ML injection TO BE ADMINISTERED BY PHARMACIST FOR IMMUNIZATION  0     aspirin 81 MG tablet Take 1 tablet by mouth daily AM       atenolol (TENORMIN) 25 MG tablet Take 4 tablets (100 mg) by mouth daily 120 tablet 5     citalopram (CELEXA) 10 MG tablet Take 1 tablet (10 mg) by mouth daily 60 tablet 3     DENTA 5000 PLUS 1.1 % CREA APPLY A PEA SIZED AMOUNT TO TOOTHBRUSH, BRUSH ONTO ROOT AREAS THOROUGHLY, THEN SPIT OUT AT BEDTIME.  2     diclofenac (VOLTAREN) 1 % GEL topical gel Apply 2 grams to ankle four times daily using enclosed dosing card. 100 g 3     fluticasone (FLONASE) 50 MCG/ACT spray Spray 1-2 sprays into both nostrils daily 16 g 3     gabapentin (NEURONTIN) 300 MG capsule 1 in morning, 1 at midday, 3 at night 450 capsule 3     hydrochlorothiazide (HYDRODIURIL) 25 MG tablet Take 1 tablet (25 mg) by mouth daily AM 90 tablet 1     lisinopril (PRINIVIL/ZESTRIL) 40 MG tablet Take 1 tablet (40 mg) by mouth daily AM " 90 tablet 1     Multiple Vitamin (DAILY MULTIVITAMIN PO) Take 1 tablet by mouth daily AM       Multiple Vitamins-Minerals (MULTIVITAMINS) CHEW Take 1 tablet by mouth       Omega-3 Fatty Acids (FISH OIL) 1200 MG capsule Take 1 capsule by mouth daily AM       omeprazole (PRILOSEC) 20 MG capsule Take 40 mg by mouth At Bedtime HS       ORDER FOR DME Use your CPAP device as directed by your provider.       simvastatin (ZOCOR) 40 MG tablet Take 1 tablet (40 mg) by mouth At Bedtime No further refills until has lipid blood test. 30 tablet 0     tamsulosin (FLOMAX) 0.4 MG capsule Take 2 capsules (0.8 mg) by mouth daily 30 min after a meal 60 capsule 0       Social History   Substance Use Topics     Smoking status: Never Smoker     Smokeless tobacco: Never Used     Alcohol use 0.0 oz/week     0 Standard drinks or equivalent per week      Comment: In remission since 2009       ALESSANDRA Carrasco  10/30/2018  4:27 PM

## 2018-10-30 NOTE — MR AVS SNAPSHOT
"              After Visit Summary   10/30/2018    Juan Francisco Booker    MRN: [id number]           Patient Information     Date Of Birth          1960        Visit Information        Provider Department      10/30/2018 4:30 PM Jackie Matta PA [facility]        Today's Diagnoses     Benign prostatic hyperplasia with lower urinary tract symptoms, symptom details unspecified    -  1      Care Instructions    Get a PSA lab text next time you get blood draws (can do today if you wish)  Keep a 24 hour bladder diary (include fruit bars on your diary as well as other sources of liquid)  Take your time at the toilet, double void, lean forward and press on your bladder, stand up and sit back down. Your goal is to empty your bladder completely.   Continue Flomax (tamsulosin) for now  Return in 4-6 weeks with your bladder diary    [facility]    BLADDER DIARY    INSTRUCTIONS:    1.  Fluid Intake    Record the amounts of all fluids you drink.    Record the types of fluids you drink (such as coffee, pop, and water). Try to change the way you would \"normally\" drink.   3. Accidents/Leaking    Record when an accident occurs.    Did you feel a strong sense of urgency with the accident?    Record what you were doing when the accident occurred.   2. Urination    Record every time you urinate.    Measure / record the amount.   Maintain this diary for two full days  and bring it with you to your next appointment in Urology    Thank you! ~ Deena Matta               Time of day? Drinks    What   How  Kind?   Much? Urine    How much? Leaks  ([place] one)    Sm. Med.  Lg. Did you feel a strong urge?  ([place] one) What were you doing at the time?   Day one:    ? ? ?  Yes  No        ? ? ?  Yes  No        ? ? ?  Yes  No        ? ? ?  Yes  No        ? ? ?  Yes  No        ? ? ?  Yes  No        ? ? ?  Yes  No        ? ? ?  Yes  No        ? ? ?  Yes  No        ? ? ?  Yes  No       "  ? ? ?  Yes  No        ? ? ?  Yes  No        ? ? ?  Yes  No        ? ? ? Yes No        ? ? ? Yes No        ? ? ? Yes No        ? ? ?  Yes  No        ? ? ?  Yes  No        ? ? ?  Yes  No        ? ? ?  Yes  No            Time of day? Drinks    What   How  Kind?   Much Urine    How much Leaks  ([place] one)    Sm. Med.  Lg. Did you feel a strong urge?  ([place] one) What were you doing at the time?       ? ? ?  Yes  No        ? ? ?  Yes  No        ? ? ?  Yes  No    Day two:    ? ? ?  Yes  No        ? ? ?  Yes  No        ? ? ?  Yes  No        ? ? ?  Yes  No        ? ? ?  Yes  No        ? ? ?  Yes  No        ? ? ?  Yes  No        ? ? ?  Yes  No        ? ? ?  Yes  No        ? ? ?  Yes  No        ? ? ?  Yes  No        ? ? ?  Yes  No        ? ? ?  Yes  No        ? ? ?  Yes  No        ? ? ?  Yes  No        ? ? ?  Yes  No        ? ? ? Yes No        ? ? ? Yes No        ? ? ? Yes No        ? ? ?  Yes  No        ? ? ?  Yes  No                  Follow-ups after your visit        Your next 10 appointments already scheduled     Nov 13, 2018  1:20 PM CST   Return Visit with Mar Lieberman MD   [facility] ([facility])    [address],  [address]  [place] [address]   [phone]              Future tests that were ordered for you today     Open Future Orders        Priority Expected Expires Ordered    PSA tumor marker Routine  10/30/2019 10/30/2018            Who to contact     Please call your clinic at [phone] to:    Ask questions about your health    Make or cancel appointments    Discuss your medicines    Learn about your test results    Speak to your doctor            Additional Information About Your Visit        [employer] Information     [email or web address] gives you secure access to your electronic health record. If you see a primary care provider, you can also send messages to your care team and make appointments. If you have questions, please call your primary care clinic.  If you do  "not have a primary care provider, please call [phone] and they will assist you.      [employer] is an electronic gateway that provides easy, online access to your medical records. With [employer], you can request a clinic appointment, read your test results, renew a prescription or communicate with your care team.     To access your existing account, please contact your [facility] or call [phone] for assistance.        Care EveryWhere ID     This is your Care EveryWhere ID. This could be used by other organizations to access your [place] medical records  [id number]        Your Vitals Were     Pulse Height BMI (Body Mass Index)             62 1.727 m (5' 8\") 44.14 kg/m2          Blood Pressure from Last 3 Encounters:   10/30/18 126/80   10/24/18 110/71   08/29/18 119/76    Weight from Last 3 Encounters:   10/30/18 131.7 kg (290 lb 4.8 oz)   10/24/18 132.8 kg (292 lb 12.8 oz)   08/29/18 131.2 kg (289 lb 3.2 oz)              We Performed the Following     POST-VOID RESIDUAL BLADDER SCAN        Primary Care Provider Office Phone # Fax #    Mar Lieberman [phone]       [address] [address]        Equal Access to Services     [facility]: Hadii aad ku hadasho Soomaali, waaxda luqadaha, qaybta kaalmada adeegyada, christiano waters . So [facility] [phone].    ATENCIÓN: Si habla español, tiene a beal disposición servicios gratuitos de asistencia lingüística. Llame al [phone].    We comply with applicable federal civil rights laws and [place] laws. We do not discriminate on the basis of race, color, national origin, age, disability, sex, sexual orientation, or gender identity.            Thank you!     Thank you for choosing [facility]OLOG[facility] AND [facility]  for your care. Our goal is always to provide you with excellent care. Hearing back from our patients is one way we can " continue to improve our services. Please take a few minutes to complete the written survey that you may receive in the mail after your visit with us. Thank you!             Your Updated Medication List - Protect others around you: Learn how to safely use, store and throw away your medicines at www.disposemymeds.org.          This list is accurate as of 10/30/18  5:15 PM.  Always use your most recent med list.                   Brand Name Dispense Instructions for use Diagnosis    AFLURIA QUADRIVALENT 0.5 ML injection   Generic drug:  influenza quadrivalent (PF) vacc      TO BE ADMINISTERED BY PHARMACIST FOR IMMUNIZATION        aspirin 81 MG tablet      Take 1 tablet by mouth daily AM        atenolol 25 MG tablet    TENORMIN    120 tablet    Take 4 tablets (100 mg) by mouth daily    Benign essential hypertension       citalopram 10 MG tablet    celeXA    60 tablet    Take 1 tablet (10 mg) by mouth daily    Depression, unspecified depression type       MULTIVITAMINS Chew      Take 1 tablet by mouth        DAILY MULTIVITAMIN PO      Take 1 tablet by mouth daily AM        DENTA 5000 PLUS 1.1 % Crea   Generic drug:  Sodium Fluoride      APPLY A PEA SIZED AMOUNT TO TOOTHBRUSH, BRUSH ONTO ROOT AREAS THOROUGHLY, THEN SPIT OUT AT BEDTIME.        diclofenac 1 % Gel topical gel    VOLTAREN    100 g    Apply 2 grams to ankle four times daily using enclosed dosing card.    Posterior tibial tendonitis, right       fluticasone 50 MCG/ACT spray    FLONASE    16 g    Spray 1-2 sprays into both nostrils daily        gabapentin 300 MG capsule    NEURONTIN    450 capsule    1 in morning, 1 at midday, 3 at night    Peripheral polyneuropathy       hydrochlorothiazide 25 MG tablet    HYDRODIURIL    90 tablet    Take 1 tablet (25 mg) by mouth daily AM    Benign essential hypertension       lisinopril 40 MG tablet    PRINIVIL/ZESTRIL    90 tablet    Take 1 tablet (40 mg) by mouth daily AM    Benign essential hypertension       omega-3 fatty  acids 1200 MG capsule      Take 1 capsule by mouth daily AM        order for DME      Use your CPAP device as directed by your provider.        priLOSEC 20 MG CR capsule   Generic drug:  omeprazole      Take 40 mg by mouth At Bedtime HS        simvastatin 40 MG tablet    ZOCOR    30 tablet    Take 1 tablet (40 mg) by mouth At Bedtime No further refills until has lipid blood test.    Hyperlipidemia LDL goal <130       tamsulosin 0.4 MG capsule    FLOMAX    60 capsule    Take 2 capsules (0.8 mg) by mouth daily 30 min after a meal    Urinary frequency       TYLENOL 500 MG tablet   Generic drug:  acetaminophen      Take 2 tablets by mouth every 6 hours as needed.

## 2018-10-30 NOTE — LETTER
10/30/2018       RE: Juan Francisco Booker  [address]  [place] [address]     Dear Colleague,    Thank you for referring your patient, Juan Francisco Booker, to the [facility] UROLOGY AND INST FOR PROSTATE AND UROLOGIC CANCERS at [facility]. Please see a copy of my visit note below.    It was my pleasure to meet Mr. Juan Francisco Booker, a 58 year old year old male seen in consultation today at the request of Anibal Pop MD for chief complaint: RECHECK (BPH)  - Having trouble emptying the bladder    HPI: Mr. Juan Francisco Booker has PMH significant for HTN, HLD, gerd, obesity, prediabetes, depression, TIMOTHY, BPH (on flomax - started 8/29/18).   Has a 3 month history of voiding until he feels his bladder is empty.  But he then leaves the toilet then needs to go right back to void again.   Sometimes he admits he is inpatient and leaves the toilet too soon, but he has been trying to take his time to empty fully.   Daytime frequency - 10-12 times per day  Nocturia 2 times  Sometimes has urgency.  No incontinence.  Wears no protection  Must sit to void.  Has dribbling stream.   No hesitancy.  Does have intermittency.    Fluid intake: A little water with pills.  Milk with cereal.  [employer]zen fruit bars (2 boxes per day).    Takes caffeine pills - has sleep apnea and these help him stay awake - has severe sleep apnea. Started pills to replace soda (which upset his stomach)   HGB A1C 6.1%.   No dysuria, hematuria, UTIs, stones.     Bowels: Sometimes has hesitancy.  Several times per day - soft.     REVIEW OF DIAGNOSTICS:  10/24/18 - Creatinine 0.9mg/dL  3/29/18 - UA negative    No PSA or renal imaging.     Past Medical History:   Diagnosis Date     Anxiety      Benign neoplasm of colon 3/2/2015     Depression      Depressive disorder 1/15/2000     Difficult intubation      ETOH abuse 3/15/2009    in remission     Gastro-oesophageal reflux disease 1/15/2010     Hyperlipidemia 1/1/2000     Hypoxemia   "    Morbid obesity (H)      Nasal polyps 2015     TIMOTHY on CPAP 2012    Severe (uses 5-6 hours as able)     Other and unspecified hyperlipidemia 10/25/2012     Personal history of colonic polyps 2/207/15    Multiple \"neg for FAP\"     Pre-diabetes      Prediabetes 2017     Restless leg      Skin tag      Surgical complication 2015    difficulty inserting a tube down my throat     Unspecified essential hypertension 10/25/2012       Past Surgical History:   Procedure Laterality Date     AS COLONOSCOPY THRU STOMA W BIOPSY/CAUTERY TUMOR/POLYP/LESION  2/27/15    colon polyps     COLONOSCOPY  2/27/2015    x 2     COLONOSCOPY WITH CO2 INSUFFLATION N/A 10/20/2016    Procedure: COLONOSCOPY WITH CO2 INSUFFLATION;  Surgeon: Juan Francisco Beltran MD;  Location: [facility] OR     ENT SURGERY  2011    [facility]     ESOPHAGOSCOPY, GASTROSCOPY, DUODENOSCOPY (EGD), COMBINED N/A 10/20/2016    Procedure: COMBINED ESOPHAGOSCOPY, GASTROSCOPY, DUODENOSCOPY (EGD);  Surgeon: Juan Francisco Beltran MD;  Location: [facility] OR     RELEASE CARPAL TUNNEL Right 2017    Procedure: RELEASE CARPAL TUNNEL;  Right Open Carpal Tunnel Release;  Surgeon: Aracelis Lowe MD;  Location: [facility] OR     RELEASE CARPAL TUNNEL Left 2017    Procedure: RELEASE CARPAL TUNNEL;  Left Open Carpal Tunnel Release;  Surgeon: Aracelis Lowe MD;  Location: [facility] OR       FAMILY HISTORY: Denies family history of urologic cancer.   Brother - liver ca  Mother -  female malignancy    SOCIAL HISTORY: Lives alone.  Retired -  for a [employer]. No children.   No occupational exposures.    reports that he has never smoked. He has never used smokeless tobacco.    Current Outpatient Prescriptions   Medication Sig Dispense Refill     acetaminophen (TYLENOL) 500 MG tablet Take 2 tablets by mouth every 6 hours as needed.       AFLURIA QUADRIVALENT 0.5 ML injection TO BE ADMINISTERED BY PHARMACIST FOR IMMUNIZATION  0     aspirin 81 MG tablet " "Take 1 tablet by mouth daily AM       atenolol (TENORMIN) 25 MG tablet Take 4 tablets (100 mg) by mouth daily 120 tablet 5     citalopram (CELEXA) 10 MG tablet Take 1 tablet (10 mg) by mouth daily 60 tablet 3     DENTA 5000 PLUS 1.1 % CREA APPLY A PEA SIZED AMOUNT TO TOOTHBRUSH, BRUSH ONTO ROOT AREAS THOROUGHLY, THEN SPIT OUT AT BEDTIME.  2     diclofenac (VOLTAREN) 1 % GEL topical gel Apply 2 grams to ankle four times daily using enclosed dosing card. 100 g 3     fluticasone (FLONASE) 50 MCG/ACT spray Spray 1-2 sprays into both nostrils daily 16 g 3     gabapentin (NEURONTIN) 300 MG capsule 1 in morning, 1 at midday, 3 at night 450 capsule 3     hydrochlorothiazide (HYDRODIURIL) 25 MG tablet Take 1 tablet (25 mg) by mouth daily AM 90 tablet 1     lisinopril (PRINIVIL/ZESTRIL) 40 MG tablet Take 1 tablet (40 mg) by mouth daily AM 90 tablet 1     Multiple Vitamin (DAILY MULTIVITAMIN PO) Take 1 tablet by mouth daily AM       Multiple Vitamins-Minerals (MULTIVITAMINS) CHEW Take 1 tablet by mouth       Omega-3 Fatty Acids (FISH OIL) 1200 MG capsule Take 1 capsule by mouth daily AM       omeprazole (PRILOSEC) 20 MG capsule Take 40 mg by mouth At Bedtime HS       ORDER FOR DME Use your CPAP device as directed by your provider.       simvastatin (ZOCOR) 40 MG tablet Take 1 tablet (40 mg) by mouth At Bedtime No further refills until has lipid blood test. 30 tablet 0     tamsulosin (FLOMAX) 0.4 MG capsule Take 2 capsules (0.8 mg) by mouth daily 30 min after a meal 60 capsule 0       ALLERGIES: Grass      GENERAL PHYSICAL EXAM:   Vitals: /80  Pulse 62  Ht 1.727 m (5' 8\")  Wt 131.7 kg (290 lb 4.8 oz)  BMI 44.14 kg/m2  Body mass index is 44.14 kg/(m^2).    GENERAL: Well groomed, well developed, well nourished male in NAD.  NECK: Neck supple. No adenopathy.   GI: Soft, NT, ND, no palpable masses.    No bony spinal tenderness or CVAT bilaterally.  MS: Full ROM in extremities, gait normal, normal muscle tone  SKIN: Warm " to touch, dry.  Numerous skin tags on skin  HEMATOLOGIC/LYMPHATIC/IMMUNOLOGIC: normal ant/post cervical, supraclavicular and inguinal nodes. No LE edema.  NEURO: Alert and oriented x 3.  PSYCH: Normal mood and affect, pleasant and agreeable during interview and exam.     :      Inguinal: no hernias or  palpable lymph nodes      Circumcised penis, no penile plaques or lesions. Orthotopic location of the urethral meatus.       Scrotum normal.      Testicles of normal firmness and consistency, no masses.      Epididymes bilaterally without masses or tenderness.       CM:      normal rectal tone, small soft amount of stool in the rectal vault.      Unclear - sized prostate, without tenderness, nodules or asymmetry (although only the apex was palpable).    PVR: Residual urine by ultrasound was 0ml.     LABS: The last test results for Ms. Juan Francisco Booker were reviewed.   PSA - No results found for: PSA  BMP -   Recent Labs   Lab Test  10/24/18   1354  12/28/17   1400   10/20/16   0630  09/21/16   1433   NA  137.4  136.3   --    --   135.3   POTASSIUM  4.1  4.1   --   3.5  3.8   CHLORIDE  100.9  99.1   --    --   95.7*   CO2  27.7  30.0   --    --   29.6   BUN  18.2  15.6   --    --   18.7   CR  0.9  0.9   --    --   1.0   GLC  85.0  106.8*  152.4*   < >   --   157.0*   ZHEN  9.3  9.5   --    --   9.3    < > = values in this interval not displayed.       CBC -   Recent Labs   Lab Test  10/24/18   1354  11/08/16   1206  09/21/16   1433   HGB  14.1  13.4  12.8*       ASSESSMENT:   1) BPH with luts - Frequency/ urgency / hesitancy  2) Prostate cancer screening    PLAN:   UA micro reflex  Get a PSA lab text next time you get blood draws (can do today if you wish)  Keep a 24 hour bladder diary (include fruit bars on your diary as well as other sources of liquid)  Take your time at the toilet, double void, lean forward and press on your bladder, stand up and sit back down. Your goal is to empty your bladder completely. PVR was  zero today  Continue Flomax (tamsulosin) for now  Return in 4-6 weeks with your bladder diary.  Will at that time decide about anticholinergics vs UDS. Could also obtain a uroflow to help diagnose.     Deena Matta PA-C  Department of Urologic Surgery

## 2018-10-30 NOTE — PATIENT INSTRUCTIONS
"Get a PSA lab text next time you get blood draws (can do today if you wish)  Keep a 24 hour bladder diary (include fruit bars on your diary as well as other sources of liquid)  Take your time at the toilet, double void, lean forward and press on your bladder, stand up and sit back down. Your goal is to empty your bladder completely.   Continue Flomax (tamsulosin) for now  Return in 4-6 weeks with your bladder diary    [facility]    BLADDER DIARY    INSTRUCTIONS:    1.  Fluid Intake    Record the amounts of all fluids you drink.    Record the types of fluids you drink (such as coffee, pop, and water). Try to change the way you would \"normally\" drink.   3. Accidents/Leaking    Record when an accident occurs.    Did you feel a strong sense of urgency with the accident?    Record what you were doing when the accident occurred.   2. Urination    Record every time you urinate.    Measure / record the amount.   Maintain this diary for two full days  and bring it with you to your next appointment in Urology    Thank you! ~ Deena Matta               Time of day? Drinks    What   How  Kind?   Much? Urine    How much? Leaks  ([place] one)    Sm. Med.  Lg. Did you feel a strong urge?  ([place] one) What were you doing at the time?   Day one:    ? ? ?  Yes  No        ? ? ?  Yes  No        ? ? ?  Yes  No        ? ? ?  Yes  No        ? ? ?  Yes  No        ? ? ?  Yes  No        ? ? ?  Yes  No        ? ? ?  Yes  No        ? ? ?  Yes  No        ? ? ?  Yes  No        ? ? ?  Yes  No        ? ? ?  Yes  No        ? ? ?  Yes  No        ? ? ? Yes No        ? ? ? Yes No        ? ? ? Yes No        ? ? ?  Yes  No        ? ? ?  Yes  No        ? ? ?  Yes  No        ? ? ?  Yes  No            Time of day? Drinks    What   How  Kind?   Much Urine    How much Leaks  ([place] one)    Sm. Med.  Lg. Did you feel a strong urge?  ([place] one) What were you doing at the time?       ? ? ?  Yes  No        ? ? ?  Yes  No        ? ? ?  Yes  No  "   Day two:    ? ? ?  Yes  No        ? ? ?  Yes  No        ? ? ?  Yes  No        ? ? ?  Yes  No        ? ? ?  Yes  No        ? ? ?  Yes  No        ? ? ?  Yes  No        ? ? ?  Yes  No        ? ? ?  Yes  No        ? ? ?  Yes  No        ? ? ?  Yes  No        ? ? ?  Yes  No        ? ? ?  Yes  No        ? ? ?  Yes  No        ? ? ?  Yes  No        ? ? ?  Yes  No        ? ? ? Yes No        ? ? ? Yes No        ? ? ? Yes No        ? ? ?  Yes  No        ? ? ?  Yes  No

## 2018-11-01 DIAGNOSIS — R35.0 URINARY FREQUENCY: ICD-10-CM

## 2018-11-01 RX ORDER — TAMSULOSIN HYDROCHLORIDE 0.4 MG/1
0.8 CAPSULE ORAL DAILY
Qty: 60 CAPSULE | Refills: 0 | Status: SHIPPED | OUTPATIENT
Start: 2018-11-01 | End: 2018-11-06

## 2018-11-01 NOTE — TELEPHONE ENCOUNTER

## 2018-11-05 ENCOUNTER — TELEPHONE (OUTPATIENT)
Dept: UROLOGY | Facility: CLINIC | Age: 58
End: 2018-11-05

## 2018-11-05 NOTE — TELEPHONE ENCOUNTER
[facility] Call Center    Phone Message    May a detailed message be left on voicemail: yes    Reason for Call: Other: Per call from PT is to FU with Dr Matta at the end of November for BPH but Dr Matta doesn't have anything until January.  PT is requesting for sooner APPT, please assist the PT.     Action Taken: Message routed to:  [facility] ([facility] Urology

## 2018-11-06 DIAGNOSIS — R35.0 URINARY FREQUENCY: ICD-10-CM

## 2018-11-06 RX ORDER — TAMSULOSIN HYDROCHLORIDE 0.4 MG/1
0.8 CAPSULE ORAL DAILY
Qty: 60 CAPSULE | Refills: 5 | Status: SHIPPED | OUTPATIENT
Start: 2018-11-06 | End: 2019-06-04

## 2018-11-07 NOTE — TELEPHONE ENCOUNTER
Made appointment and left a detailed VM with the date and time. Asked for a call back to confirm.

## 2018-11-09 DIAGNOSIS — I10 BENIGN ESSENTIAL HYPERTENSION: ICD-10-CM

## 2018-11-09 RX ORDER — ATENOLOL 25 MG/1
100 TABLET ORAL DAILY
Qty: 120 TABLET | Refills: 5 | Status: SHIPPED | OUTPATIENT
Start: 2018-11-09 | End: 2019-05-14

## 2018-11-09 NOTE — TELEPHONE ENCOUNTER

## 2018-11-13 ENCOUNTER — OFFICE VISIT (OUTPATIENT)
Dept: FAMILY MEDICINE | Facility: CLINIC | Age: 58
End: 2018-11-13
Payer: COMMERCIAL

## 2018-11-13 VITALS
DIASTOLIC BLOOD PRESSURE: 83 MMHG | SYSTOLIC BLOOD PRESSURE: 125 MMHG | HEART RATE: 86 BPM | TEMPERATURE: 97.9 F | BODY MASS INDEX: 44.4 KG/M2 | OXYGEN SATURATION: 94 % | WEIGHT: 292 LBS

## 2018-11-13 DIAGNOSIS — E05.90 SUBCLINICAL HYPERTHYROIDISM: Primary | ICD-10-CM

## 2018-11-13 DIAGNOSIS — E78.5 HYPERLIPIDEMIA LDL GOAL <130: ICD-10-CM

## 2018-11-13 DIAGNOSIS — N40.1 BENIGN PROSTATIC HYPERPLASIA WITH LOWER URINARY TRACT SYMPTOMS, SYMPTOM DETAILS UNSPECIFIED: ICD-10-CM

## 2018-11-13 DIAGNOSIS — Z13.9 SCREENING FOR CONDITION: ICD-10-CM

## 2018-11-13 RX ORDER — SIMVASTATIN 40 MG
40 TABLET ORAL AT BEDTIME
Qty: 90 TABLET | Refills: 3 | Status: SHIPPED | OUTPATIENT
Start: 2018-11-13 | End: 2019-10-29

## 2018-11-13 NOTE — PROGRESS NOTES
SUBJECTIVE:  The patient is here for followup of fatigue.  He says this has been going on for 3 months, altogether.  Feels like it is slightly improving.  He did see a colleague recently who did a thyroid test which showed subclinical hyperthyroid.  She ended up ordering a T3 also, which was normal.  Recommended rechecking that in the month.  It has not been quite a month yet.  He did also see urologists very recently and they kept him on same medication, but he had a bunch of suggestions for him and they are going to see him back in followup.  I think his postvoid residual was 0, so I think they are not quite sure of the diagnosis.  They did want to have a PSA drawn next time he has lab work.  I mentioned to him about doing a 1 time HIV test and he was fine with that.      OBJECTIVE:  On exam, the patient is in no acute distress.  Vital signs are stable, including his blood pressure.  I did review other labs that have been done which were all fine including a normal hemoglobin.  He said it felt like maybe he felt when he was anemic before his colonoscopy.      ASSESSMENT:  Fatigue, uncertain etiology but realizes that his sleep apnea might be part of the cause.        PLAN:  He will come back for a blood drawing a lab visit only sometime after 11/24.  Will get the HIV, PSA, TSH and T4 at that time.      Visit length was 25 minutes, all spent counseling about symptoms and plan.

## 2018-11-13 NOTE — MR AVS SNAPSHOT
After Visit Summary   11/13/2018    Juan Francisco Booker    MRN: [id number]           Patient Information     Date Of Birth          1960        Visit Information        Provider Department      11/13/2018 1:20 PM Mar Lieberman MD [facility]        Today's Diagnoses     Subclinical hyperthyroidism    -  1    Benign prostatic hyperplasia with lower urinary tract symptoms, symptom details unspecified        Screening for condition           Follow-ups after your visit        Your next 10 appointments already scheduled     Dec 05, 2018 12:30 PM CST   (Arrive by 12:15 PM)   Return Visit with LUZ MARIA Padgett   [facility] and [facility] for Prostate and Urologic Cancers ([facility])    [address]  [address] [facility] [address] [phone]              Who to contact     Please call your clinic at [phone] to:    Ask questions about your health    Make or cancel appointments    Discuss your medicines    Learn about your test results    Speak to your doctor            Additional Information About Your Visit        [employer] Information     [employer]ary gives you secure access to your electronic health record. If you see a primary care provider, you can also send messages to your care team and make appointments. If you have questions, please call your primary care clinic.  If you do not have a primary care provider, please call [phone] and they will assist you.      Qnary is an electronic gateway that provides easy, online access to your medical records. With [employer]ary, you can request a clinic appointment, read your test results, renew a prescription or communicate with your care team.     To access your existing account, please contact your [facility] or call [phone] for assistance.        Care EveryWhere ID     This is your Care EveryWhere ID. This could be used by other organizations to  access your [facility] medical records  [id number]        Your Vitals Were     Pulse Temperature Pulse Oximetry BMI (Body Mass Index)          86 97.9  F (36.6  C) (Oral) 94% 44.4 kg/m2         Blood Pressure from Last 3 Encounters:   11/13/18 125/83   10/30/18 126/80   10/24/18 110/71    Weight from Last 3 Encounters:   11/13/18 292 lb (132.5 kg)   10/30/18 290 lb 4.8 oz (131.7 kg)   10/24/18 292 lb 12.8 oz (132.8 kg)              We Performed the Following     HIV Antigen Antibody Combo     Prostate spec antigen screen     T4 free     TSH          Today's Medication Changes          These changes are accurate as of 11/13/18  1:41 PM.  If you have any questions, ask your nurse or doctor.               These medicines have changed or have updated prescriptions.        Dose/Directions    simvastatin 40 MG tablet   Commonly known as:  ZOCOR   This may have changed:  additional instructions   Used for:  Hyperlipidemia LDL goal <130   Changed by:  Mar Lieberman MD        Dose:  40 mg   Take 1 tablet (40 mg) by mouth At Bedtime   Quantity:  90 tablet   Refills:  3            Where to get your medicines      These medications were sent to [address]  [address] [facility] [address]     Phone:  [phone]     simvastatin 40 MG tablet                Primary Care Provider Office Phone # Fax #    Mar Lieberman [phone]       [address] [address]        Equal Access to Services     UZAIR BURGOS : Hadii yumi ku hadasho Soomaali, waaxda luqadaha, qaybta kaalmada adeegyada, waxay cristin waters . So [facility] [phone].    ATENCIÓN: Si habla español, tiene a beal disposición servicios gratuitos de asistencia lingüística. Llame al [phone].    We comply with applicable federal civil rights laws and [place] laws. We do not discriminate on the basis of race, color, national origin, age,  disability, sex, sexual orientation, or gender identity.            Thank you!     Thank you for choosing [facility]  for your care. Our goal is always to provide you with excellent care. Hearing back from our patients is one way we can continue to improve our services. Please take a few minutes to complete the written survey that you may receive in the mail after your visit with us. Thank you!             Your Updated Medication List - Protect others around you: Learn how to safely use, store and throw away your medicines at www.disposemymeds.org.          This list is accurate as of 11/13/18  1:41 PM.  Always use your most recent med list.                   Brand Name Dispense Instructions for use Diagnosis    AFLURIA QUADRIVALENT 0.5 ML injection   Generic drug:  influenza quadrivalent (PF) vacc      TO BE ADMINISTERED BY PHARMACIST FOR IMMUNIZATION        aspirin 81 MG tablet      Take 1 tablet by mouth daily AM        atenolol 25 MG tablet    TENORMIN    120 tablet    Take 4 tablets (100 mg) by mouth daily    Benign essential hypertension       citalopram 10 MG tablet    celeXA    60 tablet    Take 1 tablet (10 mg) by mouth daily    Depression, unspecified depression type       MULTIVITAMINS Chew      Take 1 tablet by mouth        DAILY MULTIVITAMIN PO      Take 1 tablet by mouth daily AM        DENTA 5000 PLUS 1.1 % Crea   Generic drug:  Sodium Fluoride      APPLY A PEA SIZED AMOUNT TO TOOTHBRUSH, BRUSH ONTO ROOT AREAS THOROUGHLY, THEN SPIT OUT AT BEDTIME.        diclofenac 1 % Gel topical gel    VOLTAREN    100 g    Apply 2 grams to ankle four times daily using enclosed dosing card.    Posterior tibial tendonitis, right       fluticasone 50 MCG/ACT spray    FLONASE    16 g    Spray 1-2 sprays into both nostrils daily        gabapentin 300 MG capsule    NEURONTIN    450 capsule    1 in morning, 1 at midday, 3 at night    Peripheral polyneuropathy       hydrochlorothiazide 25 MG tablet     HYDRODIURIL    90 tablet    Take 1 tablet (25 mg) by mouth daily AM    Benign essential hypertension       lisinopril 40 MG tablet    PRINIVIL/ZESTRIL    90 tablet    Take 1 tablet (40 mg) by mouth daily AM    Benign essential hypertension       omega-3 fatty acids 1200 MG capsule      Take 1 capsule by mouth daily AM        order for DME      Use your CPAP device as directed by your provider.        priLOSEC 20 MG CR capsule   Generic drug:  omeprazole      Take 40 mg by mouth At Bedtime HS        simvastatin 40 MG tablet    ZOCOR    90 tablet    Take 1 tablet (40 mg) by mouth At Bedtime    Hyperlipidemia LDL goal <130       tamsulosin 0.4 MG capsule    FLOMAX    60 capsule    Take 2 capsules (0.8 mg) by mouth daily 30 min after a meal    Urinary frequency       TYLENOL 500 MG tablet   Generic drug:  acetaminophen      Take 2 tablets by mouth every 6 hours as needed.

## 2018-11-27 DIAGNOSIS — N40.1 BENIGN PROSTATIC HYPERPLASIA WITH LOWER URINARY TRACT SYMPTOMS, SYMPTOM DETAILS UNSPECIFIED: ICD-10-CM

## 2018-11-27 DIAGNOSIS — E05.90 SUBCLINICAL HYPERTHYROIDISM: ICD-10-CM

## 2018-11-27 DIAGNOSIS — Z13.9 SCREENING FOR CONDITION: ICD-10-CM

## 2018-11-27 LAB
PSA SERPL-ACNC: 1.26 UG/L (ref 0–4)
T4 FREE SERPL-MCNC: 1.06 NG/DL (ref 0.76–1.46)
TSH SERPL DL<=0.005 MIU/L-ACNC: <0.01 MU/L (ref 0.4–4)

## 2018-11-28 DIAGNOSIS — E05.90 SUBCLINICAL HYPERTHYROIDISM: Primary | ICD-10-CM

## 2018-11-28 LAB — HIV 1+2 AB+HIV1 P24 AG SERPL QL IA: NONREACTIVE

## 2018-12-03 ENCOUNTER — HOSPITAL ENCOUNTER (OUTPATIENT)
Dept: NUCLEAR MEDICINE | Facility: CLINIC | Age: 58
Setting detail: NUCLEAR MEDICINE
Discharge: HOME | End: 2018-12-04
Attending: FAMILY MEDICINE | Admitting: FAMILY MEDICINE
Payer: COMMERCIAL

## 2018-12-03 DIAGNOSIS — E05.90 SUBCLINICAL HYPERTHYROIDISM: ICD-10-CM

## 2018-12-03 PROCEDURE — 34300033 ZZH RX 343: Performed by: FAMILY MEDICINE

## 2018-12-03 PROCEDURE — A9516 IODINE I-123 SOD IODIDE MIC: HCPCS | Performed by: FAMILY MEDICINE

## 2018-12-03 RX ADMIN — Medication 214 UCI.: at 13:54

## 2018-12-04 ENCOUNTER — HOSPITAL ENCOUNTER (OUTPATIENT)
Dept: NUCLEAR MEDICINE | Facility: CLINIC | Age: 58
Setting detail: NUCLEAR MEDICINE
End: 2018-12-04
Attending: FAMILY MEDICINE
Payer: COMMERCIAL

## 2018-12-04 DIAGNOSIS — E03.8 SUBCLINICAL HYPOTHYROIDISM: Primary | ICD-10-CM

## 2018-12-04 PROCEDURE — 78014 THYROID IMAGING W/BLOOD FLOW: CPT

## 2018-12-05 ENCOUNTER — OFFICE VISIT (OUTPATIENT)
Dept: UROLOGY | Facility: CLINIC | Age: 58
End: 2018-12-05
Payer: COMMERCIAL

## 2018-12-05 VITALS
HEART RATE: 73 BPM | BODY MASS INDEX: 43.92 KG/M2 | DIASTOLIC BLOOD PRESSURE: 71 MMHG | HEIGHT: 68 IN | SYSTOLIC BLOOD PRESSURE: 109 MMHG | WEIGHT: 289.8 LBS

## 2018-12-05 DIAGNOSIS — R35.0 URINARY FREQUENCY: Primary | ICD-10-CM

## 2018-12-05 ASSESSMENT — PAIN SCALES - GENERAL: PAINLEVEL: NO PAIN (0)

## 2018-12-05 NOTE — MR AVS SNAPSHOT
"              After Visit Summary   12/5/2018    Juan Francisco Booker    MRN: [id number]           Patient Information     Date Of Birth          1960        Visit Information        Provider Department      12/5/2018 12:30 PM Jackie Matta PA [facility] Urology and [facility]        Care Instructions    - Increase water  - try 50-60 ounces per day.  This is a very normal amount of water for people to drink.    - See if this makes a difference.  If you are better, then return in 1 year.  If you are not better, then come back right away (or you can call in for further advice).  Honestly, the next step would be to try an overactive bladder medication.    - While you are doing this \"water\" experiment, continue the FLomax (tamsulosin).  But if your symptoms get better with water, then you can try stopping the Flomax too.   - Call or return with questions.     Good luck and happy holidays!    Deena matta PA-C            Follow-ups after your visit        Future tests that were ordered for you today     Open Future Orders        Priority Expected Expires Ordered    US Thyroid Routine  12/4/2019 12/4/2018            Who to contact     Please call your clinic at [phone] to:    Ask questions about your health    Make or cancel appointments    Discuss your medicines    Learn about your test results    Speak to your doctor            Additional Information About Your Visit        [employer] Information     [employer] gives you secure access to your electronic health record. If you see a primary care provider, you can also send messages to your care team and make appointments. If you have questions, please call your primary care clinic.  If you do not have a primary care provider, please call [phone] and they will assist you.      [employer] is an electronic gateway that provides easy, online access to your medical records. With [employer], you can request a clinic appointment, read your test " "results, renew a prescription or communicate with your care team.     To access your existing account, please contact your [facility] or call [phone] for assistance.        Care EveryWhere ID     This is your Care EveryWhere ID. This could be used by other organizations to access your [facility] medical records  [id number]        Your Vitals Were     Pulse Height BMI (Body Mass Index)             73 1.727 m (5' 8\") 44.06 kg/m2          Blood Pressure from Last 3 Encounters:   12/05/18 109/71   11/13/18 125/83   10/30/18 126/80    Weight from Last 3 Encounters:   12/05/18 131.5 kg (289 lb 12.8 oz)   11/13/18 132.5 kg (292 lb)   10/30/18 131.7 kg (290 lb 4.8 oz)              Today, you had the following     No orders found for display       Primary Care Provider Office Phone # Fax #    Mar Lieberman [phone]       [address] [address]        Equal Access to Services     [facility]: Hadii aad ku hadasho Sorafi, waaxda luqadaha, qaybta kaalmada adebonnie, christiano waters . So [facility] [phone].    ATENCIÓN: Si habla español, tiene a beal disposición servicios gratuitos de asistencia lingüística. [facility] [phone].    We comply with applicable federal civil rights laws and [place] laws. We do not discriminate on the basis of race, color, national origin, age, disability, sex, sexual orientation, or gender identity.            Thank you!     Thank you for choosing [facility] UROLOGY AND [facility] FOR PROSTATE AND UROLOGIC CANCERS  for your care. Our goal is always to provide you with excellent care. Hearing back from our patients is one way we can continue to improve our services. Please take a few minutes to complete the written survey that you may receive in the mail after your visit with us. Thank you!             Your Updated Medication List - Protect others around you: Learn how to safely use, " store and throw away your medicines at www.disposemymeds.org.          This list is accurate as of 12/5/18  1:32 PM.  Always use your most recent med list.                   Brand Name Dispense Instructions for use Diagnosis    AFLURIA QUADRIVALENT 0.5 ML injection   Generic drug:  influenza quadrivalent (PF) vacc      TO BE ADMINISTERED BY PHARMACIST FOR IMMUNIZATION        aspirin 81 MG tablet    ASA     Take 1 tablet by mouth daily AM        atenolol 25 MG tablet    TENORMIN    120 tablet    Take 4 tablets (100 mg) by mouth daily    Benign essential hypertension       citalopram 10 MG tablet    celeXA    60 tablet    Take 1 tablet (10 mg) by mouth daily    Depression, unspecified depression type       MULTIVITAMINS Chew      Take 1 tablet by mouth        DAILY MULTIVITAMIN PO      Take 1 tablet by mouth daily AM        DENTA 5000 PLUS 1.1 % Crea   Generic drug:  Sodium Fluoride      APPLY A PEA SIZED AMOUNT TO TOOTHBRUSH, BRUSH ONTO ROOT AREAS THOROUGHLY, THEN SPIT OUT AT BEDTIME.        diclofenac 1 % topical gel    VOLTAREN    100 g    Apply 2 grams to ankle four times daily using enclosed dosing card.    Posterior tibial tendonitis, right       fluticasone 50 MCG/ACT nasal spray    FLONASE    16 g    Spray 1-2 sprays into both nostrils daily        gabapentin 300 MG capsule    NEURONTIN    450 capsule    1 in morning, 1 at midday, 3 at night    Peripheral polyneuropathy       hydrochlorothiazide 25 MG tablet    HYDRODIURIL    90 tablet    Take 1 tablet (25 mg) by mouth daily AM    Benign essential hypertension       lisinopril 40 MG tablet    PRINIVIL/ZESTRIL    90 tablet    Take 1 tablet (40 mg) by mouth daily AM    Benign essential hypertension       omega-3 fatty acids 1200 MG capsule      Take 1 capsule by mouth daily AM        order for DME      Use your CPAP device as directed by your provider.        priLOSEC 20 MG DR capsule   Generic drug:  omeprazole      Take 40 mg by mouth At Bedtime HS         simvastatin 40 MG tablet    ZOCOR    90 tablet    Take 1 tablet (40 mg) by mouth At Bedtime    Hyperlipidemia LDL goal <130       tamsulosin 0.4 MG capsule    FLOMAX    60 capsule    Take 2 capsules (0.8 mg) by mouth daily 30 min after a meal    Urinary frequency       TYLENOL 500 MG tablet   Generic drug:  acetaminophen      Take 2 tablets by mouth every 6 hours as needed.

## 2018-12-05 NOTE — PATIENT INSTRUCTIONS
"- Increase water  - try 50-60 ounces per day.  This is a very normal amount of water for people to drink.    - See if this makes a difference.  If you are better, then return in 1 year.  If you are not better, then come back right away (or you can call in for further advice).  Honestly, the next step would be to try an overactive bladder medication.    - While you are doing this \"water\" experiment, continue the FLomax (tamsulosin).  But if your symptoms get better with water, then you can try stopping the Flomax too.   - Call or return with questions.     Good luck and happy holidays!    Deena lamb PA-C    "

## 2018-12-05 NOTE — PROGRESS NOTES
HPI: Mr. Juan Francisco Booker is a 58 year old year old male presenting today for evaluation of chief complaint(s): Follow Up For (BPH)    HPI: Mr. Juan Francisco Booker has PMH significant for HTN, HLD, gerd, obesity, prediabetes (HGB A1C 6.1%), depression, TIMOTHY, BPH (on flomax - started 8/29/18). He was seen on 10/30/18 in Urology clinic with a 3 month history of voiding until he feels his bladder is empty, but then when he leaves the toilet he'll need to go right back to void again.  He feels persistent sensation to void.  He feels he isn't emptying. No dysuria, hematuria, UTIs, stones. At that time his PVR was zero. His UA was normal. His CM was normal. He was eating 2 boxes of Outshine frozen fruit bars per day and taking caffeine pills to help him stay awake.  He was advised to keep a voiding diary, continue flomax.      Today he presents in routine followup with unchanged symptoms. His PSA is 1.26ng/dL.   He brought his bladder diary which shows:  - INTAKE: 8 ounces of water per day with 19 Outshine fruit bars  - OUTPUT: He voided 7 times in 24 hours.  Voided volumes were 100cc (10am), 100cc (noon), 100cc (4pm), 100cc (8pm), 50cc (midnight), 50cc (2am), 25cc (4am).      REVIEW OF DIAGNOSTICS:  12/5/18 - PSA 1.26ng/dL.   10/30/18 - UA negative  10/24/18 - Creatinine 0.9mg/dL  3/29/18 - UA negative    Current Outpatient Prescriptions   Medication Sig Dispense Refill     acetaminophen (TYLENOL) 500 MG tablet Take 2 tablets by mouth every 6 hours as needed.       aspirin 81 MG tablet Take 1 tablet by mouth daily AM       atenolol (TENORMIN) 25 MG tablet Take 4 tablets (100 mg) by mouth daily 120 tablet 5     citalopram (CELEXA) 10 MG tablet Take 1 tablet (10 mg) by mouth daily 60 tablet 3     DENTA 5000 PLUS 1.1 % CREA APPLY A PEA SIZED AMOUNT TO TOOTHBRUSH, BRUSH ONTO ROOT AREAS THOROUGHLY, THEN SPIT OUT AT BEDTIME.  2     fluticasone (FLONASE) 50 MCG/ACT spray Spray 1-2 sprays into both nostrils daily 16 g 3     gabapentin  "(NEURONTIN) 300 MG capsule 1 in morning, 1 at midday, 3 at night 450 capsule 3     hydrochlorothiazide (HYDRODIURIL) 25 MG tablet Take 1 tablet (25 mg) by mouth daily AM 90 tablet 1     lisinopril (PRINIVIL/ZESTRIL) 40 MG tablet Take 1 tablet (40 mg) by mouth daily AM 90 tablet 1     Multiple Vitamin (DAILY MULTIVITAMIN PO) Take 1 tablet by mouth daily AM       Omega-3 Fatty Acids (FISH OIL) 1200 MG capsule Take 1 capsule by mouth daily AM       omeprazole (PRILOSEC) 20 MG capsule Take 40 mg by mouth At Bedtime HS       ORDER FOR DME Use your CPAP device as directed by your provider.       simvastatin (ZOCOR) 40 MG tablet Take 1 tablet (40 mg) by mouth At Bedtime 90 tablet 3     tamsulosin (FLOMAX) 0.4 MG capsule Take 2 capsules (0.8 mg) by mouth daily 30 min after a meal 60 capsule 5     AFLURIA QUADRIVALENT 0.5 ML injection TO BE ADMINISTERED BY PHARMACIST FOR IMMUNIZATION  0     diclofenac (VOLTAREN) 1 % GEL topical gel Apply 2 grams to ankle four times daily using enclosed dosing card. (Patient not taking: Reported on 12/5/2018) 100 g 3     Multiple Vitamins-Minerals (MULTIVITAMINS) CHEW Take 1 tablet by mouth         ALLERGIES: Grass     GENERAL PHYSICAL EXAM:   Vitals: /71  Pulse 73  Ht 1.727 m (5' 8\")  Wt 131.5 kg (289 lb 12.8 oz)  BMI 44.06 kg/m2  Body mass index is 44.06 kg/(m^2).    GENERAL: Well groomed, well developed, well nourished male in NAD.  NEURO: Alert and oriented x 3.  PSYCH: Normal mood and affect, pleasant and agreeable during interview and exam.    LABS: The last test results for Mr. Juan Francisco Booker were reviewed:  PSA -   Lab Results   Component Value Date    PSA 1.26 11/27/2018     BMP -   Recent Labs   Lab Test  10/24/18   1354  12/28/17   1400   10/20/16   0630  09/21/16   1433   NA  137.4  136.3   --    --   135.3   POTASSIUM  4.1  4.1   --   3.5  3.8   CHLORIDE  100.9  99.1   --    --   95.7*   CO2  27.7  30.0   --    --   29.6   BUN  18.2  15.6   --    --   18.7   CR  0.9  0.9 " "  --    --   1.0   GLC  85.0  106.8*  152.4*   < >   --   157.0*   ZHEN  9.3  9.5   --    --   9.3    < > = values in this interval not displayed.       CBC -   Recent Labs   Lab Test  10/24/18   1354  11/08/16   1206  09/21/16   1433   HGB  14.1  13.4  12.8*       1) Urinary frequency - likely sensory due to poor PO water intake leading to hyperconcentrated urine in the setting of fruit bar and caffeine intake.     PLAN:   - We previously discussed reducing caffeine, but Mr. Booker feels this is necessary to promote wakefulness.  We also previously discussed reducing fruit bars.    - Increase water  - try 50-60 ounces per day.  This is a very normal amount of water for people to drink.    - See if this makes a difference.  If you are better, then return in 1 year.  If you are not better, then come back right away (or you can call in for further advice).  Honestly, the next step would be to try an overactive bladder medication like Sanctura 20mg bid (which helps with motor and sensory urgency).  Could also consider UDS.   - While you are doing this \"water\" experiment, continue the FLomax (tamsulosin).  But if your symptoms get better with increasing water, then you can try stopping the Flomax too.   - Call or return with questions.     Deena Matta PA-C  Department of Urologic Surgery    "

## 2018-12-07 ENCOUNTER — RADIANT APPOINTMENT (OUTPATIENT)
Dept: ULTRASOUND IMAGING | Facility: CLINIC | Age: 58
End: 2018-12-07
Attending: FAMILY MEDICINE
Payer: COMMERCIAL

## 2018-12-07 DIAGNOSIS — E03.8 SUBCLINICAL HYPOTHYROIDISM: ICD-10-CM

## 2018-12-10 DIAGNOSIS — E05.90 SUBCLINICAL HYPERTHYROIDISM: Primary | ICD-10-CM

## 2018-12-11 ENCOUNTER — TELEPHONE (OUTPATIENT)
Dept: ENDOCRINOLOGY | Facility: CLINIC | Age: 58
End: 2018-12-11

## 2018-12-11 NOTE — TELEPHONE ENCOUNTER
To schedulers : please schedule with open TUSHAR within the next 1-3 weeks.    Anabella Clemens MD  Endocrine triage     Health Call Center    Phone Message    May a detailed message be left on voicemail: Unknown     Reason for Call: Other: Internal referral has been placed by Mar Lieberman MD for  subclinical hyperthyroidism.      Action Taken: Message routed to:  [facility] Endo

## 2018-12-18 ASSESSMENT — ENCOUNTER SYMPTOMS
DIFFICULTY URINATING: 1
HOARSE VOICE: 0
SINUS CONGESTION: 1
DYSURIA: 0
TASTE DISTURBANCE: 0
SORE THROAT: 1
HEMATURIA: 0
TROUBLE SWALLOWING: 1
SINUS PAIN: 1
FLANK PAIN: 0
SMELL DISTURBANCE: 0
NECK MASS: 0

## 2018-12-19 ENCOUNTER — OFFICE VISIT (OUTPATIENT)
Dept: ENDOCRINOLOGY | Facility: CLINIC | Age: 58
End: 2018-12-19
Attending: FAMILY MEDICINE
Payer: COMMERCIAL

## 2018-12-19 VITALS
HEART RATE: 70 BPM | DIASTOLIC BLOOD PRESSURE: 82 MMHG | HEIGHT: 68 IN | BODY MASS INDEX: 45.07 KG/M2 | SYSTOLIC BLOOD PRESSURE: 125 MMHG | WEIGHT: 297.4 LBS

## 2018-12-19 DIAGNOSIS — E04.1 THYROID NODULE: ICD-10-CM

## 2018-12-19 DIAGNOSIS — E04.1 THYROID NODULE: Primary | ICD-10-CM

## 2018-12-19 DIAGNOSIS — E05.90 SUBCLINICAL HYPERTHYROIDISM: ICD-10-CM

## 2018-12-19 DIAGNOSIS — N40.1 BENIGN PROSTATIC HYPERPLASIA WITH LOWER URINARY TRACT SYMPTOMS, SYMPTOM DETAILS UNSPECIFIED: ICD-10-CM

## 2018-12-19 DIAGNOSIS — E66.812 CLASS 2 OBESITY WITHOUT SERIOUS COMORBIDITY IN ADULT, UNSPECIFIED BMI, UNSPECIFIED OBESITY TYPE: ICD-10-CM

## 2018-12-19 LAB
PSA SERPL-MCNC: 1.58 UG/L (ref 0–4)
T3 SERPL-MCNC: 126 NG/DL (ref 60–181)
T4 FREE SERPL-MCNC: 0.96 NG/DL (ref 0.76–1.46)
TSH SERPL DL<=0.005 MIU/L-ACNC: 0.01 MU/L (ref 0.4–4)

## 2018-12-19 ASSESSMENT — MIFFLIN-ST. JEOR: SCORE: 2143.37

## 2018-12-19 ASSESSMENT — PAIN SCALES - GENERAL: PAINLEVEL: NO PAIN (0)

## 2018-12-19 NOTE — PROGRESS NOTES
Endocrine Clinic Consult:     Reason for consult: Hyperthyroidism  Date: 12/19/2018  Referring Physician: Mar Lieberman    HPI:   Juan Francisco Booker is a 58 year old male who is seen for initial consultation.    Fatigue: Presented in October with following history:   Felt sick with chills / ? sore throat in September   This was followed by fatigue angely Exercise intolerance. Could not do treadmills.   Sleep pattern: no changes.     He saw PCP a month after symptom onset. TSH was suppressed.   Further labs done in 4 weeks -- persistent subclinical hyperthyroidism     PETERSON uptake:         Uptake on right: 2.4%, Uptake on Left: 8.0%.  Overall thyroid uptake is within normal limits at 10.4%.  Thyroid US recommended.     Thyroid ultrasound 12/7/18            Left Lobe:   Nodule 1: Ill-defined hypoechoic abnormality in the superior aspect of left thyroid lobe.   Nodule measurement: 1.4 x 1.1 x 1.1 cm   Echogenicity: Hypoechoic   Consistency: solid   Calcifications: None   Hypervascular: yes   Interval growth (>20%): N/A    He presents today for follow-up and treatment    Symptoms Details   Temp intolerance no   Palpitations no   SOB no   Appetite  increased   Weight changes  increased   Change in BM  no changes   Diet  increased carbs in diet due to holidays   Exercise  30-minute aerobic exercise.  Baseline 45 minutes.  exercise tolerance -- slowly improved   Energy Levels  good   Sleep  no new changes, uses CPAP   Mental status change  no changes   Skin or hair changes  none, numerous skin tags   Extremity  trace edema   Hypogonadism  does not have kids, never tested       Compressive symptoms   occasional difficulty swallowing pills.  No voice change, neck pain   FH  no family history of thyroid disease  No fh history of autoimmune disease  Obesity runs in the family      Radiation  no radiation exposure       Other ROS:   Mild URI symptoms, occ tinnitus.   No eye symptoms  No headache, change in vision, loss of  "peripheral vision  Complete ROS that were obtained were negative.     Past Medical History:   Diagnosis Date     Anxiety      Benign neoplasm of colon 3/2/2015     Depression      Depressive disorder 1/15/2000     Difficult intubation      ETOH abuse 3/15/2009    in remission     Gastro-oesophageal reflux disease 1/15/2010     Hyperlipidemia 1/1/2000     Hypoxemia      Morbid obesity (H)      Nasal polyps 1/1/2015     TIMOTHY on CPAP 8/13/2012    Severe (uses 5-6 hours as able)     Other and unspecified hyperlipidemia 10/25/2012     Personal history of colonic polyps 2/207/15    Multiple \"neg for FAP\"     Pre-diabetes      Prediabetes 5/24/2017     Restless leg      Skin tag      Surgical complication 1/6/2015    difficulty inserting a tube down my throat     Unspecified essential hypertension 10/25/2012     Past Surgical History:   Procedure Laterality Date     AS COLONOSCOPY THRU STOMA W BIOPSY/CAUTERY TUMOR/POLYP/LESION  2/27/15    colon polyps     COLONOSCOPY  2/27/2015    x 2     COLONOSCOPY WITH CO2 INSUFFLATION N/A 10/20/2016    Procedure: COLONOSCOPY WITH CO2 INSUFFLATION;  Surgeon: Juan Francisco Beltran MD;  Location: [facility] OR     ENT SURGERY  1/5/2011    [facility]     ESOPHAGOSCOPY, GASTROSCOPY, DUODENOSCOPY (EGD), COMBINED N/A 10/20/2016    Procedure: COMBINED ESOPHAGOSCOPY, GASTROSCOPY, DUODENOSCOPY (EGD);  Surgeon: Juan Francisco Beltran MD;  Location: [facility] OR     RELEASE CARPAL TUNNEL Right 6/14/2017    Procedure: RELEASE CARPAL TUNNEL;  Right Open Carpal Tunnel Release;  Surgeon: Aracelis Lowe MD;  Location: [facility] OR     RELEASE CARPAL TUNNEL Left 12/29/2017    Procedure: RELEASE CARPAL TUNNEL;  Left Open Carpal Tunnel Release;  Surgeon: Aracelis Lowe MD;  Location: [facility] OR     Family History   Problem Relation Age of Onset     Other Cancer Mother      Cancer Mother      Other Cancer Brother      Cancer Brother      Cerebrovascular Disease Maternal Grandfather         ,, ,, ,, ,, ,, ,, ,, ,     " Alcohol/Drug Brother      Cancer Brother         pancreatic; liver     Asthma No family hx of      Anesthesia Reaction No family hx of      Colon Polyps No family hx of      Crohn's Disease No family hx of      Ulcerative Colitis No family hx of      Colon Cancer No family hx of      Social History     Socioeconomic History     Marital status: Single     Spouse name: Not on file     Number of children: Not on file     Years of education: Not on file     Highest education level: Not on file   Social Needs     Financial resource strain: Not on file     Food insecurity - worry: Not on file     Food insecurity - inability: Not on file     Transportation needs - medical: Not on file     Transportation needs - non-medical: Not on file   Occupational History     Not on file   Tobacco Use     Smoking status: Never Smoker     Smokeless tobacco: Never Used   Substance and Sexual Activity     Alcohol use: Yes     Alcohol/week: 0.0 oz     Comment: In remission since      Drug use: No     Sexual activity: No   Other Topics Concern     Not on file   Social History Narrative    Single.  Lives alone.  Retired.  Worked at [employer].    No children.    1 brother -  of liver cancer, etoh    Mother  of cancer ? Type    Father  - complications of MS        Allergies   Allergen Reactions     Grass      Current Outpatient Medications   Medication     acetaminophen (TYLENOL) 500 MG tablet     AFLURIA QUADRIVALENT 0.5 ML injection     aspirin 81 MG tablet     atenolol (TENORMIN) 25 MG tablet     citalopram (CELEXA) 10 MG tablet     DENTA 5000 PLUS 1.1 % CREA     diclofenac (VOLTAREN) 1 % GEL topical gel     fluticasone (FLONASE) 50 MCG/ACT spray     gabapentin (NEURONTIN) 300 MG capsule     hydrochlorothiazide (HYDRODIURIL) 25 MG tablet     lisinopril (PRINIVIL/ZESTRIL) 40 MG tablet     Multiple Vitamin (DAILY MULTIVITAMIN PO)     Multiple Vitamins-Minerals (MULTIVITAMINS) CHEW     Omega-3 Fatty Acids (FISH OIL) 1200 MG  "capsule     omeprazole (PRILOSEC) 20 MG capsule     ORDER FOR DME     simvastatin (ZOCOR) 40 MG tablet     tamsulosin (FLOMAX) 0.4 MG capsule     No current facility-administered medications for this visit.            Exam:  /82   Pulse 70   Ht 1.727 m (5' 7.99\")   Wt 134.9 kg (297 lb 6.4 oz)   BMI 45.23 kg/m     Constitutional: Obese male  Head: Normocephalic. No masses, lesions, tenderness or abnormalities  Neck: Neck supple. No adenopathy. Thyroid no goiter   ENT: No throat congestion, no neck nodes or sinus tenderness  Cardiovascular: regular rhythm, no tachycardia  Respiratory: Lungs clear  Gastrointestinal: Abdomen soft, non-tender. BS normal. No striae  Musculoskeletal: gait normal and normal muscle tone  Neurologic: Normal speech, reflexes normal.   Psychiatric: mentation appears normal   Skin multiple skin tags  Trace pedal edema    TSH   Date Value Ref Range Status   11/27/2018 <0.01 (L) 0.40 - 4.00 mU/L Final   10/24/2018 <0.01 (L) 0.40 - 4.00 mU/L Final   07/05/2016 0.97 0.40 - 4.00 mU/L Final   10/08/2010 0.92 0.4 - 5.0 mU/L Final       T4 Free   Date Value Ref Range Status   11/27/2018 1.06 0.76 - 1.46 ng/dL Final   10/24/2018 1.01 0.76 - 1.46 ng/dL Final   ]    CBC RESULTS:   Recent Labs   Lab Test 10/24/18  1354   HGB 14.1   HCT 46.3   MCV 91.7   MCH 27.9   MCHC 30.5*     Recent Labs   Lab Test 10/24/18  1354 12/28/17  1400  03/27/15  1346 01/10/14  0921   .4 136.3   < > 135 138   POTASSIUM 4.1 4.1   < > 4.6 4.5   CHLORIDE 100.9 99.1   < > 102 101   CO2 27.7 30.0   < > 27 30   ANIONGAP  --   --   --  7 7   GLC 85.0  106.8* 152.4*   < > 90 109*   BUN 18.2 15.6   < > 21 15   CR 0.9 0.9   < > 0.97 0.97   ZHEN 9.3 9.5   < > 9.5 9.3    < > = values in this interval not displayed.     ENDO THYROID LABS-UMP Latest Ref Rng & Units 11/27/2018 10/24/2018   TSH 0.40 - 4.00 mU/L <0.01 (L) <0.01 (L)   T4 FREE 0.76 - 1.46 ng/dL 1.06 1.01   FREE T3 2.3 - 4.2 pg/mL  3.6     ENDO THYROID LABS-UMP " Latest Ref Rng & Units 7/5/2016   TSH 0.40 - 4.00 mU/L 0.97   T4 FREE 0.76 - 1.46 ng/dL    FREE T3 2.3 - 4.2 pg/mL      I reviewed imaging results and results as interpreted above.   I reviewed labs with patient  Discussed about plan of care extensively.     Assessment :     58 year old male with complex medical issues:     Excessive fatigue    Subclinical hyperthyroidism diagnosed in October 2018.  Presented with worsening fatigue without other symptoms    Ill-defined 1.4 cm left-sided thyroid nodule with possible central calcification that requires a biopsy once thyroid function test is normal, suspicious.     TIMOTHY and Obesity with BMI more than 40 leading to severe health consequences    Prediabetes      Subclinical hyperthyroidism:  Obtain labs today including TSI and antibodies.  If the labs are improving, likely mechanism is subacute thyroiditis.  Unlikely to be toxic adenoma.     Thyroid Nodule  We discussed about thyroid nodules and management in great detail. Majority of thyroid nodules are benign. Majority of thyroid cancers are slow growing and have good prognosis but a small number tend to be aggressive.   Thyroid FNA for further evaluation of larger nodules is next best step.   Outcomes at FNA including nondiagnostic, benign, malignant and indeterminate diagnosis were discussed. The nondiagnostic and indeterminate diagnosis may necessitate repeat FNA   When TSH normalizes, plan to FNA left sided nodule.     Obesity with severe health consequences  Diet and exercise discussed.   Wt management clinic referral made    Prediabetes:   Diabetes prevention with diet exercise, metformin discussed.   He will try wt management.     Yazmin Apodaca MD  17[address]  Endocrinology Service    >50 min spent today.

## 2018-12-19 NOTE — PATIENT INSTRUCTIONS
Labs today.   Follow up in 3 months with thyroid labs.   Wt management referral made today.   Reduce bread pizza pasta rice potatoes indiet, supplement with lean proteins and veggies.   Exercise, regular aerobic exercise as tolerated.

## 2018-12-19 NOTE — LETTER
12/19/2018     RE: Juan Francisco Booker  [address]  [place] [address]     Dear Colleague,    Thank you for referring your patient, Juan Francisco Booker, to the [facility] ENDOCRINOLOGY at [facility]. Please see a copy of my visit note below.    Endocrine Clinic Consult:     Reason for consult: Hyperthyroidism  Date: 12/19/2018  Referring Physician: Mar Lieberman    HPI:   Juan Francisco Booker is a 58 year old male who is seen for initial consultation.    Fatigue: Presented in October with following history:   Felt sick with chills / ? sore throat in September   This was followed by fatigue anegly Exercise intolerance. Could not do treadmills.   Sleep pattern: no changes.     He saw PCP a month after symptom onset. TSH was suppressed.   Further labs done in 4 weeks -- persistent subclinical hyperthyroidism     PETERSON uptake:         Uptake on right: 2.4%, Uptake on Left: 8.0%.  Overall thyroid uptake is within normal limits at 10.4%.  Thyroid US recommended.     Thyroid ultrasound 12/7/18            Left Lobe:   Nodule 1: Ill-defined hypoechoic abnormality in the superior aspect of left thyroid lobe.   Nodule measurement: 1.4 x 1.1 x 1.1 cm   Echogenicity: Hypoechoic   Consistency: solid   Calcifications: None   Hypervascular: yes   Interval growth (>20%): N/A    He presents today for follow-up and treatment    Symptoms Details   Temp intolerance no   Palpitations no   SOB no   Appetite  increased   Weight changes  increased   Change in BM  no changes   Diet  increased carbs in diet due to holidays   Exercise  30-minute aerobic exercise.  Baseline 45 minutes.  exercise tolerance -- slowly improved   Energy Levels  good   Sleep  no new changes, uses CPAP   Mental status change  no changes   Skin or hair changes  none, numerous skin tags   Extremity  trace edema   Hypogonadism  does not have kids, never tested       Compressive symptoms   occasional difficulty swallowing pills.  No  "voice change, neck pain   FH  no family history of thyroid disease  No fh history of autoimmune disease  Obesity runs in the family      Radiation  no radiation exposure       Other ROS:   Mild URI symptoms, occ tinnitus.   No eye symptoms  No headache, change in vision, loss of peripheral vision  Complete ROS that were obtained were negative.     Past Medical History:   Diagnosis Date     Anxiety      Benign neoplasm of colon 3/2/2015     Depression      Depressive disorder 1/15/2000     Difficult intubation      ETOH abuse 3/15/2009    in remission     Gastro-oesophageal reflux disease 1/15/2010     Hyperlipidemia 1/1/2000     Hypoxemia      Morbid obesity (H)      Nasal polyps 1/1/2015     TIMOTHY on CPAP 8/13/2012    Severe (uses 5-6 hours as able)     Other and unspecified hyperlipidemia 10/25/2012     Personal history of colonic polyps 2/207/15    Multiple \"neg for FAP\"     Pre-diabetes      Prediabetes 5/24/2017     Restless leg      Skin tag      Surgical complication 1/6/2015    difficulty inserting a tube down my throat     Unspecified essential hypertension 10/25/2012     Past Surgical History:   Procedure Laterality Date     AS COLONOSCOPY THRU STOMA W BIOPSY/CAUTERY TUMOR/POLYP/LESION  2/27/15    colon polyps     COLONOSCOPY  2/27/2015    x 2     COLONOSCOPY WITH CO2 INSUFFLATION N/A 10/20/2016    Procedure: COLONOSCOPY WITH CO2 INSUFFLATION;  Surgeon: Juan Francisco Beltran MD;  Location:  OR     ENT SURGERY  1/5/2011    [facility]     ESOPHAGOSCOPY, GASTROSCOPY, DUODENOSCOPY (EGD), COMBINED N/A 10/20/2016    Procedure: COMBINED ESOPHAGOSCOPY, GASTROSCOPY, DUODENOSCOPY (EGD);  Surgeon: Juan Francisco Beltran MD;  Location:  OR     RELEASE CARPAL TUNNEL Right 6/14/2017    Procedure: RELEASE CARPAL TUNNEL;  Right Open Carpal Tunnel Release;  Surgeon: Aracelis Lowe MD;  Location:  OR     RELEASE CARPAL TUNNEL Left 12/29/2017    Procedure: RELEASE CARPAL TUNNEL;  Left Open Carpal Tunnel Release;  " Surgeon: Aracelis Lowe MD;  Location: [facility] OR     Family History   Problem Relation Age of Onset     Other Cancer Mother      Cancer Mother      Other Cancer Brother      Cancer Brother      Cerebrovascular Disease Maternal Grandfather         ,, ,, ,, ,, ,, ,, ,, ,     Alcohol/Drug Brother      Cancer Brother         pancreatic; liver     Asthma No family hx of      Anesthesia Reaction No family hx of      Colon Polyps No family hx of      Crohn's Disease No family hx of      Ulcerative Colitis No family hx of      Colon Cancer No family hx of      Social History     Socioeconomic History     Marital status: Single     Spouse name: Not on file     Number of children: Not on file     Years of education: Not on file     Highest education level: Not on file   Social Needs     Financial resource strain: Not on file     Food insecurity - worry: Not on file     Food insecurity - inability: Not on file     Transportation needs - medical: Not on file     Transportation needs - non-medical: Not on file   Occupational History     Not on file   Tobacco Use     Smoking status: Never Smoker     Smokeless tobacco: Never Used   Substance and Sexual Activity     Alcohol use: Yes     Alcohol/week: 0.0 oz     Comment: In remission since      Drug use: No     Sexual activity: No   Other Topics Concern     Not on file   Social History Narrative    Single.  Lives alone.  Retired.  Worked at [employer].    No children.    1 brother -  of liver cancer, etoh    Mother  of cancer ? Type    Father  - complications of MS        Allergies   Allergen Reactions     Grass      Current Outpatient Medications   Medication     acetaminophen (TYLENOL) 500 MG tablet     AFLURIA QUADRIVALENT 0.5 ML injection     aspirin 81 MG tablet     atenolol (TENORMIN) 25 MG tablet     citalopram (CELEXA) 10 MG tablet     DENTA 5000 PLUS 1.1 % CREA     diclofenac (VOLTAREN) 1 % GEL topical gel     fluticasone (FLONASE) 50 MCG/ACT spray      "gabapentin (NEURONTIN) 300 MG capsule     hydrochlorothiazide (HYDRODIURIL) 25 MG tablet     lisinopril (PRINIVIL/ZESTRIL) 40 MG tablet     Multiple Vitamin (DAILY MULTIVITAMIN PO)     Multiple Vitamins-Minerals (MULTIVITAMINS) CHEW     Omega-3 Fatty Acids (FISH OIL) 1200 MG capsule     omeprazole (PRILOSEC) 20 MG capsule     ORDER FOR DME     simvastatin (ZOCOR) 40 MG tablet     tamsulosin (FLOMAX) 0.4 MG capsule     No current facility-administered medications for this visit.            Exam:  /82   Pulse 70   Ht 1.727 m (5' 7.99\")   Wt 134.9 kg (297 lb 6.4 oz)   BMI 45.23 kg/m      Constitutional: Obese male  Head: Normocephalic. No masses, lesions, tenderness or abnormalities  Neck: Neck supple. No adenopathy. Thyroid no goiter   ENT: No throat congestion, no neck nodes or sinus tenderness  Cardiovascular: regular rhythm, no tachycardia  Respiratory: Lungs clear  Gastrointestinal: Abdomen soft, non-tender. BS normal. No striae  Musculoskeletal: gait normal and normal muscle tone  Neurologic: Normal speech, reflexes normal.   Psychiatric: mentation appears normal   Skin multiple skin tags  Trace pedal edema    TSH   Date Value Ref Range Status   11/27/2018 <0.01 (L) 0.40 - 4.00 mU/L Final   10/24/2018 <0.01 (L) 0.40 - 4.00 mU/L Final   07/05/2016 0.97 0.40 - 4.00 mU/L Final   10/08/2010 0.92 0.4 - 5.0 mU/L Final       T4 Free   Date Value Ref Range Status   11/27/2018 1.06 0.76 - 1.46 ng/dL Final   10/24/2018 1.01 0.76 - 1.46 ng/dL Final   ]    CBC RESULTS:   Recent Labs   Lab Test 10/24/18  1354   HGB 14.1   HCT 46.3   MCV 91.7   MCH 27.9   MCHC 30.5*     Recent Labs   Lab Test 10/24/18  1354 12/28/17  1400  03/27/15  1346 01/10/14  0921   .4 136.3   < > 135 138   POTASSIUM 4.1 4.1   < > 4.6 4.5   CHLORIDE 100.9 99.1   < > 102 101   CO2 27.7 30.0   < > 27 30   ANIONGAP  --   --   --  7 7   GLC 85.0  106.8* 152.4*   < > 90 109*   BUN 18.2 15.6   < > 21 15   CR 0.9 0.9   < > 0.97 0.97   ZHEN 9.3 " 9.5   < > 9.5 9.3    < > = values in this interval not displayed.     ENDO THYROID LABS-UMP Latest Ref Rng & Units 11/27/2018 10/24/2018   TSH 0.40 - 4.00 mU/L <0.01 (L) <0.01 (L)   T4 FREE 0.76 - 1.46 ng/dL 1.06 1.01   FREE T3 2.3 - 4.2 pg/mL  3.6     ENDO THYROID LABS-UMP Latest Ref Rng & Units 7/5/2016   TSH 0.40 - 4.00 mU/L 0.97   T4 FREE 0.76 - 1.46 ng/dL    FREE T3 2.3 - 4.2 pg/mL      I reviewed imaging results and results as interpreted above.   I reviewed labs with patient  Discussed about plan of care extensively.     Assessment :     58 year old male with complex medical issues:     Excessive fatigue    Subclinical hyperthyroidism diagnosed in October 2018.  Presented with worsening fatigue without other symptoms    Ill-defined 1.4 cm left-sided thyroid nodule with possible central calcification that requires a biopsy once thyroid function test is normal, suspicious.     TIMOTHY and Obesity with BMI more than 40 leading to severe health consequences    Prediabetes      Subclinical hyperthyroidism:  Obtain labs today including TSI and antibodies.  If the labs are improving, likely mechanical subacute thyroiditis.  Unlikely to be thyroid nodule    Thyroid Nodule  We discussed about thyroid nodules and management in great detail. Majority of thyroid nodules are benign. Majority of thyroid cancers are slow growing and have good prognosis but a small number tend to be aggressive.   Thyroid FNA for further evaluation of larger nodules is next best step.   Outcomes at FNA including nondiagnostic, benign, malignant and indeterminate diagnosis were discussed. The nondiagnostic and indeterminate diagnosis may necessitate repeat FNA   When TSH normalizes, plan to FNA left sided nodule.     Obesity with severe health consequences  Diet and exercise discussed.   Wt management clinic referral made    Prediabetes:   Diabetes prevention with diet exercise, metformin discussed.   He will try wt management.     Yazmin  MD Paulie  [id number]  Endocrinology Service    >50 min spent today. Again, thank you for allowing me to participate in the care of your patient.

## 2018-12-20 LAB
THYROGLOB AB SERPL IA-ACNC: <20 IU/ML (ref 0–40)
THYROPEROXIDASE AB SERPL-ACNC: 13 IU/ML

## 2018-12-21 ENCOUNTER — MYC MEDICAL ADVICE (OUTPATIENT)
Dept: ENDOCRINOLOGY | Facility: CLINIC | Age: 58
End: 2018-12-21

## 2018-12-24 LAB — TSI SER-ACNC: 1.1 TSI INDEX

## 2018-12-28 ENCOUNTER — OFFICE VISIT (OUTPATIENT)
Dept: FAMILY MEDICINE | Facility: CLINIC | Age: 58
End: 2018-12-28
Payer: COMMERCIAL

## 2018-12-28 VITALS
TEMPERATURE: 97.7 F | HEART RATE: 76 BPM | SYSTOLIC BLOOD PRESSURE: 114 MMHG | WEIGHT: 295.8 LBS | OXYGEN SATURATION: 98 % | RESPIRATION RATE: 16 BRPM | BODY MASS INDEX: 44.99 KG/M2 | DIASTOLIC BLOOD PRESSURE: 81 MMHG

## 2018-12-28 DIAGNOSIS — H92.01 OTALGIA, RIGHT: Primary | ICD-10-CM

## 2018-12-28 NOTE — PROGRESS NOTES
SUBJECTIVE:  The patient is here for some right ear discomfort.  It has been going on, actually, for a few months.  It mainly bothers him when he uses a CPAP machine.  He feels like there is pressure in the right ear, which is enough that it causes trouble sleeping.  There has not been any change in his CPAP machine, and it attaches at his mouth and nose, not to the ear, so it is a little bit puzzling.  There is really minimal discomfort with the left ear.  It has been enough of a problem that he has actually stopped using the CPAP machine some nights.  He did get seen at [facility] about this.  They thought they saw some abnormality, maybe some fluid.  They recommended a nasal rinse.  Instead, it has gotten slightly better.  He does use Flonase regularly and nasal rinse.  The other thing which is unrelated we ended up talking about was his visit with Endocrine.  The plan is that he will have some followup lab work in about 3 months and potentially have a biopsy of one of the thyroid nodules when his TSH normalizes.  I had some questions about the visit, which the patient also had questions about, so he suggested and I agreed that I will send a message to Endocrine about these questions and get back to the patient with them.        OBJECTIVE:  On exam, the patient is in no acute distress.  Vital signs are stable, including a good blood pressure.  Both TMs look normal to me.  He has no pain with pulling on the pinnae.  No TMJ discomfort.  His nasal mucosa is quite boggy, right more than the left.      IMPRESSION:  Right ear discomfort worsened with CPAP, uncertain etiology.  Subclinical hyperthyroidism.      PLAN:  Since I do not see the reason for his problem, but it is to the extent that it is interfering with his CPAP, I suggested he give the [facility] a call about this and see what suggestions they have.  Again, I will send a message to Endocrine about our questions about his subclinical hyperthyroidism.       Visit length was 25 minutes, more than 50% spent in counseling about symptoms and plan.

## 2018-12-31 ENCOUNTER — MYC MEDICAL ADVICE (OUTPATIENT)
Dept: FAMILY MEDICINE | Facility: CLINIC | Age: 58
End: 2018-12-31

## 2019-01-23 DIAGNOSIS — G47.33 OBSTRUCTIVE SLEEP APNEA (ADULT) (PEDIATRIC): Primary | ICD-10-CM

## 2019-01-29 ENCOUNTER — MYC REFILL (OUTPATIENT)
Dept: SLEEP MEDICINE | Facility: CLINIC | Age: 59
End: 2019-01-29

## 2019-01-29 ENCOUNTER — OFFICE VISIT (OUTPATIENT)
Dept: ENDOCRINOLOGY | Facility: CLINIC | Age: 59
End: 2019-01-29
Payer: COMMERCIAL

## 2019-01-29 ENCOUNTER — ALLIED HEALTH/NURSE VISIT (OUTPATIENT)
Dept: SURGERY | Facility: CLINIC | Age: 59
End: 2019-01-29
Payer: COMMERCIAL

## 2019-01-29 VITALS
HEART RATE: 76 BPM | BODY MASS INDEX: 43.98 KG/M2 | HEIGHT: 68 IN | DIASTOLIC BLOOD PRESSURE: 75 MMHG | SYSTOLIC BLOOD PRESSURE: 128 MMHG | WEIGHT: 290.2 LBS | OXYGEN SATURATION: 93 %

## 2019-01-29 DIAGNOSIS — E66.813 CLASS 3 SEVERE OBESITY DUE TO EXCESS CALORIES WITH SERIOUS COMORBIDITY AND BODY MASS INDEX (BMI) OF 40.0 TO 44.9 IN ADULT (H): Primary | ICD-10-CM

## 2019-01-29 DIAGNOSIS — E66.01 CLASS 3 SEVERE OBESITY DUE TO EXCESS CALORIES WITH SERIOUS COMORBIDITY AND BODY MASS INDEX (BMI) OF 40.0 TO 44.9 IN ADULT (H): Primary | ICD-10-CM

## 2019-01-29 RX ORDER — TOPIRAMATE 25 MG/1
TABLET, FILM COATED ORAL
Qty: 270 TABLET | Refills: 2 | Status: SHIPPED | OUTPATIENT
Start: 2019-01-29 | End: 2019-09-19

## 2019-01-29 ASSESSMENT — PATIENT HEALTH QUESTIONNAIRE - PHQ9
SUM OF ALL RESPONSES TO PHQ QUESTIONS 1-9: 10
10. IF YOU CHECKED OFF ANY PROBLEMS, HOW DIFFICULT HAVE THESE PROBLEMS MADE IT FOR YOU TO DO YOUR WORK, TAKE CARE OF THINGS AT HOME, OR GET ALONG WITH OTHER PEOPLE: VERY DIFFICULT
SUM OF ALL RESPONSES TO PHQ QUESTIONS 1-9: 10

## 2019-01-29 ASSESSMENT — MIFFLIN-ST. JEOR: SCORE: 2110.68

## 2019-01-29 NOTE — LETTER
"2019       RE: Juan Francisco Booker  [address]  [place] [address]     Dear Colleague,    Thank you for referring your patient, Juan Francisco Booker, to the [facility] MEDICAL [facility]IGHT MANAGEMENT at [facility]. Please see a copy of my visit note below.          New Medical Weight Management Consult    PATIENT:  Juan Francisco Booker  MRN:         [id number]  :         1960  [place]:         2019    Dear Luisana, Mar Berger,    I had the pleasure of seeing your patient, Juan Francisco Booker.  Full intake/assessment done to determine barriers to weight loss success and develop a treatment plan.  Juan Francisco Booker is a 58 year old male interested in treatment of medical problems associated with weight.  His weight today is 290 lbs 3.2 oz, Body mass index is 44.14 kg/m ., and he has the following co-morbidities: prediabetes, hypertension, proteinuria, hyperlipidemia, TIMOTHY, GERD    Hx of thyroid nodule and subclinical hyperthyroidism likely thyroiditis. Follow up with .       1/15/2019   I have the following co-morbidities associated with obesity: Pre-Diabetes, High Blood Pressure, High Cholesterol, Sleep Apnea, GERD (Reflux)       Patient Goals Reviewed With Patient 1/15/2019   I am interested in attaining a healthier weight to diminish current health problems related to co-morbid conditions: Yes   I am interested in attaining a healthier weight in order to prevent future health problems: Yes       Referring Provider 1/15/2019   Please name the provider who referred you to Medical Weight Management.  If you do not know, please answer: \"I Don't Know\". Dr. Apodaca, but talk to Dr. Lieberman       Wt Readings from Last 4 Encounters:   19 131.6 kg (290 lb 3.2 oz)   18 134.2 kg (295 lb 12.8 oz)   18 134.9 kg (297 lb 6.4 oz)   18 131.5 kg (289 lb 12.8 oz)     Pt states that he gained weight since college years. He said that he probably gained weight from " eating wrong food and large portion. He is current retired. He snacks between meal and bedtime. He likes sweet and carbohydrate snack. He does not sleep well and always has something before bedtime.    Diet recalled:  BF: Frozen dinner, orange, fruit bar x2  Lunch: Frozen dinner, orange, fruit bar x2  Dinner: Frozen dinner, orange, fruit bar x2  Snack: bowl of cereal, cookies, cracker    Exercise: walking on treadmill about 30-40 minutes x 5 times per week    Weight History Reviewed With Patient 1/15/2019   How concerned are you about your weight? Very Concerned   Would you describe your weight gain as gradual? Yes   I became overweight: As a Teenager   The following factors have contributed to my weight gain:  After Stopping an Addictive Drug or Alcohol, Eating Wrong Types of Food, Eating Too Much   I have tried the following methods to lose weight: Watching Portions or Calories, Exercise   I have the following family history of obesity/being overweight:  My mother is overwieght   Has anyone in your family had weight loss surgery? No       Diet Recall Reviewed With Patient 1/15/2019   How many glasses of juice do you drink in a typical day? 0   How many of glasses of milk do you drink in a typical day? 1   How many 8oz glasses of sugar containing drinks such as Chago-Aid/sweet tea do you drink in a day? 0   How many cans/bottles of sugar pop/soda/tea/sports drinks do you drink in a day? 0   How many cans/bottles of diet pop/soda/tea or sports drink do you drink in a day? 0   How often do you have a drink of alcohol? Never       Eating Habits Reviewed With Patient 1/15/2019   Generally, my meals include foods like these: bread, pasta, rice, potatoes, corn, crackers, sweet dessert, pop, or juice. Almost Everyday   Generally, my meals include foods like these: fried meats, brats, burgers, french fries, pizza, cheese, chips, or ice cream. A Few Times a Week   Eat fast food (like McDonalds, Burger Oswaldo, Taco Bell). A Few  Times a Week   Eat at a buffet or sit-down restaurant. A Few Times a Week   Eat most of my meals in front of the TV or computer. Everyday   Often skip meals, eat at random times, have no regular eating times. Never   Rarely sit down for a meal but snack or graze throughout.  Almost Everyday   Eat extra snacks between meals. Everyday   Eat most of my food at the end of the day. Half of the Week   Eat in the middle of the night or wake up at night to eat. Everyday   Eat extra snacks to prevent or correct low blood sugar. Half of the Week   Eat to prevent acid reflux or stomach pain. Never   Worry about not having enough food to eat. Never   Have you been to the food shelf at least a few times this year? No   I eat when I am depressed, stressed, anxious, or bored. Almost Everyday   I eat when I am happy or as a reward. Half of the Week   I feel hungry all the time even if I just have eaten. A Few Times a Week   Feeling full is important to me. Almost Everyday   Once I start eating, it is hard to stop. Everyday   I finish all the food on my plate even if I am already full. Almost Everyday   I can't resist eating delicious food or walk past the good food/smell. Half of the Week   I eat/snack without noticing that I am eating. Never   I eat when I am preparing the meal. Never   I eat more than usual when I see others eating. Never   I have trouble not eating sweets, ice cream, cookies, or chips if they are around the house. Almost Everyday   I think about food all day. Everyday   What foods, if any, do you crave? Chips/Crackers   Please list any other foods you crave? Outshine bars!!!!!   I feel out of control when eating. Everyday   I eat a large amount of food, like a loaf of bread, a box of cookies, a pint/quart of ice cream, all at once. Weekly   I eat a large amount of food even when I am not hungry. Almost Everyday   I eat rapidly. Almost Everyday   I eat alone because I feel embarrassed and do not want others to  "see how much I have eaten. Never   I eat until I am uncomfortably full. Weekly   I feel bad, disgusted, or guilty after I overeat. Never   I make myself vomit what I have eaten or use laxatives to get rid of food. Never       Activity/Exercise History Reviewed With Patient 1/15/2019   How much of a typical 12 hour day do you spend sitting? Most of the Day   How much of a typical 12 hour day do you spend lying down? Less Than Half the Day   How much of a typical day do you spend walking/standing? Less Than Half the Day   How many hours (not including work) do you spend on the TV/Video Games/Computer/Tablet/Phone? 6 Hours or More   How many times a week are you active for the purpose of exercise? 2-3 Times a Week   How many total minutes do you spend doing some activity for the purpose of exercising when you exercise? More Than 30 Minutes   What keeps you from being more active? Lack of Time, Too tired       PAST MEDICAL HISTORY:  Past Medical History:   Diagnosis Date     Anxiety      Benign neoplasm of colon 3/2/2015     Depression      Depressive disorder 1/15/2000     Difficult intubation      ETOH abuse 3/15/2009    in remission     Gastro-oesophageal reflux disease 1/15/2010     Hyperlipidemia 1/1/2000     Hypoxemia      Morbid obesity (H)      Nasal polyps 1/1/2015     TIMOTHY on CPAP 8/13/2012    Severe (uses 5-6 hours as able)     Other and unspecified hyperlipidemia 10/25/2012     Personal history of colonic polyps 2/207/15    Multiple \"neg for FAP\"     Pre-diabetes      Prediabetes 5/24/2017     Restless leg      Skin tag      Surgical complication 1/6/2015    difficulty inserting a tube down my throat     Unspecified essential hypertension 10/25/2012       Work/Social History Reviewed With Patient 1/15/2019   My employment status is: Retired   My job is: retired   How much of your job is spent on the computer or phone? 50%   What is your marital status? Single   If in a relationship, is your significant other " overweight? N/A   Do you have children? No   If you have children, are they overweight? N/A       Mental Health History Reviewed With Patient 1/15/2019   Have you ever been physically or sexually abused? No   How often in the past 2 weeks have you felt little interest or pleasure in doing things? Not at all   Over the past 2 weeks how often have you felt down, depressed, or hopeless? Not at all       Sleep History Reviewed With Patient 1/15/2019   How many hours do you sleep at night? 5   Do you think that you snore loudly or has anybody ever heard you snore loudly (louder than talking or so loud it can be heard behind a shut door)? Yes   Has anyone seen or heard you stop breathing during your sleep? Yes   Do you often feel tired, fatigued, or sleepy during the day? Yes       MEDICATIONS:   Current Outpatient Medications   Medication Sig Dispense Refill     acetaminophen (TYLENOL) 500 MG tablet Take 2 tablets by mouth every 6 hours as needed.       aspirin 81 MG tablet Take 1 tablet by mouth daily AM       atenolol (TENORMIN) 25 MG tablet Take 4 tablets (100 mg) by mouth daily 120 tablet 5     citalopram (CELEXA) 10 MG tablet Take 1 tablet (10 mg) by mouth daily 60 tablet 3     DENTA 5000 PLUS 1.1 % CREA APPLY A PEA SIZED AMOUNT TO TOOTHBRUSH, BRUSH ONTO ROOT AREAS THOROUGHLY, THEN SPIT OUT AT BEDTIME.  2     diclofenac (VOLTAREN) 1 % GEL topical gel Apply 2 grams to ankle four times daily using enclosed dosing card. 100 g 3     fluticasone (FLONASE) 50 MCG/ACT spray Spray 1-2 sprays into both nostrils daily 16 g 3     gabapentin (NEURONTIN) 300 MG capsule 1 in morning, 1 at midday, 3 at night 450 capsule 3     hydrochlorothiazide (HYDRODIURIL) 25 MG tablet Take 1 tablet (25 mg) by mouth daily AM 90 tablet 1     lisinopril (PRINIVIL/ZESTRIL) 40 MG tablet Take 1 tablet (40 mg) by mouth daily AM 90 tablet 1     Multiple Vitamin (DAILY MULTIVITAMIN PO) Take 1 tablet by mouth daily AM       Omega-3 Fatty Acids (FISH  "OIL) 1200 MG capsule Take 1 capsule by mouth daily AM       omeprazole (PRILOSEC) 20 MG capsule Take 40 mg by mouth At Bedtime HS       ORDER FOR DME Use your CPAP device as directed by your provider.       simvastatin (ZOCOR) 40 MG tablet Take 1 tablet (40 mg) by mouth At Bedtime 90 tablet 3     tamsulosin (FLOMAX) 0.4 MG capsule Take 2 capsules (0.8 mg) by mouth daily 30 min after a meal 60 capsule 5       ALLERGIES:   Allergies   Allergen Reactions     Grass        PHYSICAL EXAM:  /75   Pulse 76   Ht 1.727 m (5' 7.99\")   Wt 131.6 kg (290 lb 3.2 oz)   SpO2 93%   BMI 44.14 kg/m      A & O x 3  HEENT: NCAT, mucous membranes moist  Respirations unlabored  Location of obesity: Mixed Obesity    I have reviewed lab    Lab Results   Component Value Date    A1C 6.1 10/24/2018    A1C 5.9 12/28/2017    A1C 6.0 09/14/2017    A1C 6.6 03/10/2017    A1C 6.3 07/06/2016     ENDO DIABETES Latest Ref Rng & Units 10/24/2018   GLUCOSE 70.0 - 99.0 mg'dL 106.8 (H)   CHOLESTEROL 0.0 - 200.0 mg/dL 190.4   LDL CHOLESTEROL, CALCULATED 0 - 129 mg/dL 90   LDL CHOLESTEROL DIRECT 0.0 - 129.0 mg/dL    HDL CHOLESTEROL >40.0 mg/dL 37.6 (L)   VLDL-CHOLESTEROL 0 - 30 mg/dL    NON HDL CHOLESTEROL <130 mg/dL    TRIGLYCERIDES 0.0 - 150.0 mg/dL 315.6 (H)   CREATININE 0.7 - 1.3 mg/dL 0.9   POTASSIUM 3.3 - 4.5 mmol/dL 4.1   CK TOTAL 30 - 300 U/L 52     ASSESSMENT:  Juan Francisco is a patient with mature onset obesity with significant element of familial/genetic influence and with current health consequences. He does not need aggressive weight loss plan.  Juan Francisco Booker eats a high carb diet, eats a high fat diet, has perception of intense hunger, eats to obtain specific degree of fullness, mostly eats during the evening and wakes at night to eat.    His problem is complicated by strong craving/reward pathways and poor lifestyle choices    His ability to lose weight is impacted by lack of confidence, lack of motivation and lack of education on nutrition " and dietary needs.    PLAN:    Decrease portion sizes  Purge house of food triggers  Dietician visit of education  Calorie/low fat diet  Meal planning  Continue walking on treadmill    Craving/Reward   Ancillary testing:  N/A.  Food Plan:  Volumetrics and High protein/low carbohydrate.   Activity Plan:  Exercise after meals.  Supplementary:  N/A.   Medication:  The patient will begin medication in pursuit of improved medical status as influenced by body weight. He will start topiramate titration from 25 mg daily up to 75 mg daily.  There is a mutual understanding of the goals and risks of this therapy. The patient is in agreement. He is educated on dosage regimen and possible side effects.    He is not interested in injectable medication at this time.    RTC:    Refer dietitian  Follow up with Stephanie StewartD in 4-6 weeks  Follow up with me in 3 months    TIME: 30 min spent on evaluation, management, counseling, education, & motivational interviewing with greater than 50 % of the total time was spent on counseling and coordinating care    Sincerely,    Augusto Arshad MD

## 2019-01-29 NOTE — PROGRESS NOTES
"Juan Francisco Bookre is a 58 year old male presents today for new weight management nutrition consultation.  Patient was referred by Dr. Augusto Arshad.    Starting Topiramate with MD    Estimated body mass index is 44.14 kg/m  as calculated from the following:    Height as of an earlier encounter on 1/29/19: 1.727 m (5' 7.99\").    Weight as of an earlier encounter on 1/29/19: 131.6 kg (290 lb 3.2 oz).     Nutrition history  See MD note for details.  Breakfast: frozen dinner two oranges and fruit bar(outshine)  Snacks: cookies(4-5), crackers, oranges(two clementines), outshine bars  Lunch: frozen dinner two oranges and fruit bar(outshine)  Snacks: cookies, crackers, oranges, outshine bars  Dinner: frozen dinner two oranges and fruit bar(outshine) or chicken thigh with mushroom sauce with tomato and onion, or fruit bars  Snack:  cookies, crackers, oranges, outshine bars - late night -> bowl of cereal or frozen dinner  Beverages: water, milk in cereal  Patient reported that he will eat 20-30 outshine fruit bars per day(800-1200 calories per day)    Nutrition Prescription  Decrease snacking (per MD)    Nutrition Diagnosis  Obesity r/t long history of self-monitoring deficit and excessive energy intake aeb BMI >30.    Nutrition Intervention  Materials/education provided on portion control and healthy food choices (The Plate Method), meal and snack planning and websites, sample meal plans.  Patient lives alone and reported that he goes grocery shopping a couple times per week and will typically purchase a snack food.  Encouraged not purchasing snacks as often, trying to only get snacks every other trip to the grocery store.  He will eat 20-30 outshine fruit bars per day, discussed reducing fruit bars by half for 1-2 lbs weight loss per week.    Patient Understanding: good  Expected Compliance: fair/good     Nutrition Goals  1) Reduce Outshine bars by 50%(10-15 per day)  2) Decrease snacking - limit how often you buy " them, limit snack purchases to every other time you grocery shop    Try using more non-starchy vegetable as snack    Follow-Up:  PRN    Time spent with patient: 30 minutes.  Shari Berry RD, LD

## 2019-01-29 NOTE — PATIENT INSTRUCTIONS
Nutrition Goals  1) Reduce Outshine bars by 50%(10-15 per day)  2) Decrease snacking - limit how often you buy them, limit snack purchases to every other time you grocery shop    Try using more non-starchy vegetable as snack    Follow up with RD in one-two months    Shari Berry RD, LD  If you need to schedule or reschedule with a dietitian please call [phone].

## 2019-01-29 NOTE — PATIENT INSTRUCTIONS
Start topiramate: Take 25 mg (one tab) at bedtime for one week, then increase to 50 mg (two tabs) at bedtime, then increase to 75 mg (3 tabs) thereafter.      Cut down cookies, cracker, fruit bar  Eat more fruit -- apple or berry  Drink more water  Sleep early  Continue with treadmill exercise    Follow up with Flori Goldman PharmD in 4-6 weeks  Follow up with Dr.Tasm TRISTAN in 3 months    If you have any questions, please do not hesitate to call Weight management clinic at [phone] or [phone].    Sincerely,    Augusto Arshad MD  Endocrinology

## 2019-01-29 NOTE — PROGRESS NOTES
"      New Medical Weight Management Consult    PATIENT:  Juan Francisco Booker  MRN:         [id number]  :         1960  [place]:         2019    Dear Luisana, Mar Berger,    I had the pleasure of seeing your patient, Juan Francisco Booker.  Full intake/assessment done to determine barriers to weight loss success and develop a treatment plan.  Juan Francisco Booker is a 58 year old male interested in treatment of medical problems associated with weight.  His weight today is 290 lbs 3.2 oz, Body mass index is 44.14 kg/m ., and he has the following co-morbidities: prediabetes, hypertension, proteinuria, hyperlipidemia, TIMOTHY, GERD    Hx of thyroid nodule and subclinical hyperthyroidism likely thyroiditis. Follow up with .       1/15/2019   I have the following co-morbidities associated with obesity: Pre-Diabetes, High Blood Pressure, High Cholesterol, Sleep Apnea, GERD (Reflux)       Patient Goals Reviewed With Patient 1/15/2019   I am interested in attaining a healthier weight to diminish current health problems related to co-morbid conditions: Yes   I am interested in attaining a healthier weight in order to prevent future health problems: Yes       Referring Provider 1/15/2019   Please name the provider who referred you to Medical Weight Management.  If you do not know, please answer: \"I Don't Know\". Dr. Apodaca, but talk to Dr. Lieberman       Wt Readings from Last 4 Encounters:   19 131.6 kg (290 lb 3.2 oz)   18 134.2 kg (295 lb 12.8 oz)   18 134.9 kg (297 lb 6.4 oz)   18 131.5 kg (289 lb 12.8 oz)     Pt states that he gained weight since college years. He said that he probably gained weight from eating wrong food and large portion. He is current retired. He snacks between meal and bedtime. He likes sweet and carbohydrate snack. He does not sleep well and always has something before bedtime.    Diet recalled:  BF: Frozen dinner, orange, fruit bar x2  Lunch: Frozen dinner, orange, fruit bar " x2  Dinner: Frozen dinner, orange, fruit bar x2  Snack: bowl of cereal, cookies, cracker    Exercise: walking on treadmill about 30-40 minutes x 5 times per week    Weight History Reviewed With Patient 1/15/2019   How concerned are you about your weight? Very Concerned   Would you describe your weight gain as gradual? Yes   I became overweight: As a Teenager   The following factors have contributed to my weight gain:  After Stopping an Addictive Drug or Alcohol, Eating Wrong Types of Food, Eating Too Much   I have tried the following methods to lose weight: Watching Portions or Calories, Exercise   I have the following family history of obesity/being overweight:  My mother is overwieght   Has anyone in your family had weight loss surgery? No       Diet Recall Reviewed With Patient 1/15/2019   How many glasses of juice do you drink in a typical day? 0   How many of glasses of milk do you drink in a typical day? 1   How many 8oz glasses of sugar containing drinks such as Chago-Aid/sweet tea do you drink in a day? 0   How many cans/bottles of sugar pop/soda/tea/sports drinks do you drink in a day? 0   How many cans/bottles of diet pop/soda/tea or sports drink do you drink in a day? 0   How often do you have a drink of alcohol? Never       Eating Habits Reviewed With Patient 1/15/2019   Generally, my meals include foods like these: bread, pasta, rice, potatoes, corn, crackers, sweet dessert, pop, or juice. Almost Everyday   Generally, my meals include foods like these: fried meats, brats, burgers, french fries, pizza, cheese, chips, or ice cream. A Few Times a Week   Eat fast food (like McDonalds, Burger Oswaldo, Taco Bell). A Few Times a Week   Eat at a buffet or sit-down restaurant. A Few Times a Week   Eat most of my meals in front of the TV or computer. Everyday   Often skip meals, eat at random times, have no regular eating times. Never   Rarely sit down for a meal but snack or graze throughout.  Almost Everyday   Eat  extra snacks between meals. Everyday   Eat most of my food at the end of the day. Half of the Week   Eat in the middle of the night or wake up at night to eat. Everyday   Eat extra snacks to prevent or correct low blood sugar. Half of the Week   Eat to prevent acid reflux or stomach pain. Never   Worry about not having enough food to eat. Never   Have you been to the food shelf at least a few times this year? No   I eat when I am depressed, stressed, anxious, or bored. Almost Everyday   I eat when I am happy or as a reward. Half of the Week   I feel hungry all the time even if I just have eaten. A Few Times a Week   Feeling full is important to me. Almost Everyday   Once I start eating, it is hard to stop. Everyday   I finish all the food on my plate even if I am already full. Almost Everyday   I can't resist eating delicious food or walk past the good food/smell. Half of the Week   I eat/snack without noticing that I am eating. Never   I eat when I am preparing the meal. Never   I eat more than usual when I see others eating. Never   I have trouble not eating sweets, ice cream, cookies, or chips if they are around the house. Almost Everyday   I think about food all day. Everyday   What foods, if any, do you crave? Chips/Crackers   Please list any other foods you crave? Outshine bars!!!!!   I feel out of control when eating. Everyday   I eat a large amount of food, like a loaf of bread, a box of cookies, a pint/quart of ice cream, all at once. Weekly   I eat a large amount of food even when I am not hungry. Almost Everyday   I eat rapidly. Almost Everyday   I eat alone because I feel embarrassed and do not want others to see how much I have eaten. Never   I eat until I am uncomfortably full. Weekly   I feel bad, disgusted, or guilty after I overeat. Never   I make myself vomit what I have eaten or use laxatives to get rid of food. Never       Activity/Exercise History Reviewed With Patient 1/15/2019   How much of a  "typical 12 hour day do you spend sitting? Most of the Day   How much of a typical 12 hour day do you spend lying down? Less Than Half the Day   How much of a typical day do you spend walking/standing? Less Than Half the Day   How many hours (not including work) do you spend on the TV/Video Games/Computer/Tablet/Phone? 6 Hours or More   How many times a week are you active for the purpose of exercise? 2-3 Times a Week   How many total minutes do you spend doing some activity for the purpose of exercising when you exercise? More Than 30 Minutes   What keeps you from being more active? Lack of Time, Too tired       PAST MEDICAL HISTORY:  Past Medical History:   Diagnosis Date     Anxiety      Benign neoplasm of colon 3/2/2015     Depression      Depressive disorder 1/15/2000     Difficult intubation      ETOH abuse 3/15/2009    in remission     Gastro-oesophageal reflux disease 1/15/2010     Hyperlipidemia 1/1/2000     Hypoxemia      Morbid obesity (H)      Nasal polyps 1/1/2015     TIMOTHY on CPAP 8/13/2012    Severe (uses 5-6 hours as able)     Other and unspecified hyperlipidemia 10/25/2012     Personal history of colonic polyps 2/207/15    Multiple \"neg for FAP\"     Pre-diabetes      Prediabetes 5/24/2017     Restless leg      Skin tag      Surgical complication 1/6/2015    difficulty inserting a tube down my throat     Unspecified essential hypertension 10/25/2012       Work/Social History Reviewed With Patient 1/15/2019   My employment status is: Retired   My job is: retired   How much of your job is spent on the computer or phone? 50%   What is your marital status? Single   If in a relationship, is your significant other overweight? N/A   Do you have children? No   If you have children, are they overweight? N/A       Mental Health History Reviewed With Patient 1/15/2019   Have you ever been physically or sexually abused? No   How often in the past 2 weeks have you felt little interest or pleasure in doing things? " Not at all   Over the past 2 weeks how often have you felt down, depressed, or hopeless? Not at all       Sleep History Reviewed With Patient 1/15/2019   How many hours do you sleep at night? 5   Do you think that you snore loudly or has anybody ever heard you snore loudly (louder than talking or so loud it can be heard behind a shut door)? Yes   Has anyone seen or heard you stop breathing during your sleep? Yes   Do you often feel tired, fatigued, or sleepy during the day? Yes       MEDICATIONS:   Current Outpatient Medications   Medication Sig Dispense Refill     acetaminophen (TYLENOL) 500 MG tablet Take 2 tablets by mouth every 6 hours as needed.       aspirin 81 MG tablet Take 1 tablet by mouth daily AM       atenolol (TENORMIN) 25 MG tablet Take 4 tablets (100 mg) by mouth daily 120 tablet 5     citalopram (CELEXA) 10 MG tablet Take 1 tablet (10 mg) by mouth daily 60 tablet 3     DENTA 5000 PLUS 1.1 % CREA APPLY A PEA SIZED AMOUNT TO TOOTHBRUSH, BRUSH ONTO ROOT AREAS THOROUGHLY, THEN SPIT OUT AT BEDTIME.  2     diclofenac (VOLTAREN) 1 % GEL topical gel Apply 2 grams to ankle four times daily using enclosed dosing card. 100 g 3     fluticasone (FLONASE) 50 MCG/ACT spray Spray 1-2 sprays into both nostrils daily 16 g 3     gabapentin (NEURONTIN) 300 MG capsule 1 in morning, 1 at midday, 3 at night 450 capsule 3     hydrochlorothiazide (HYDRODIURIL) 25 MG tablet Take 1 tablet (25 mg) by mouth daily AM 90 tablet 1     lisinopril (PRINIVIL/ZESTRIL) 40 MG tablet Take 1 tablet (40 mg) by mouth daily AM 90 tablet 1     Multiple Vitamin (DAILY MULTIVITAMIN PO) Take 1 tablet by mouth daily AM       Omega-3 Fatty Acids (FISH OIL) 1200 MG capsule Take 1 capsule by mouth daily AM       omeprazole (PRILOSEC) 20 MG capsule Take 40 mg by mouth At Bedtime HS       ORDER FOR DME Use your CPAP device as directed by your provider.       simvastatin (ZOCOR) 40 MG tablet Take 1 tablet (40 mg) by mouth At Bedtime 90 tablet 3      "tamsulosin (FLOMAX) 0.4 MG capsule Take 2 capsules (0.8 mg) by mouth daily 30 min after a meal 60 capsule 5       ALLERGIES:   Allergies   Allergen Reactions     Grass        PHYSICAL EXAM:  /75   Pulse 76   Ht 1.727 m (5' 7.99\")   Wt 131.6 kg (290 lb 3.2 oz)   SpO2 93%   BMI 44.14 kg/m     A & O x 3  HEENT: NCAT, mucous membranes moist  Respirations unlabored  Location of obesity: Mixed Obesity    I have reviewed lab    Lab Results   Component Value Date    A1C 6.1 10/24/2018    A1C 5.9 12/28/2017    A1C 6.0 09/14/2017    A1C 6.6 03/10/2017    A1C 6.3 07/06/2016     ENDO DIABETES Latest Ref Rng & Units 10/24/2018   GLUCOSE 70.0 - 99.0 mg'dL 106.8 (H)   CHOLESTEROL 0.0 - 200.0 mg/dL 190.4   LDL CHOLESTEROL, CALCULATED 0 - 129 mg/dL 90   LDL CHOLESTEROL DIRECT 0.0 - 129.0 mg/dL    HDL CHOLESTEROL >40.0 mg/dL 37.6 (L)   VLDL-CHOLESTEROL 0 - 30 mg/dL    NON HDL CHOLESTEROL <130 mg/dL    TRIGLYCERIDES 0.0 - 150.0 mg/dL 315.6 (H)   CREATININE 0.7 - 1.3 mg/dL 0.9   POTASSIUM 3.3 - 4.5 mmol/dL 4.1   CK TOTAL 30 - 300 U/L 52     ASSESSMENT:  Juan Francisco is a patient with mature onset obesity with significant element of familial/genetic influence and with current health consequences. He does not need aggressive weight loss plan.  Juan Francisco Booker eats a high carb diet, eats a high fat diet, has perception of intense hunger, eats to obtain specific degree of fullness, mostly eats during the evening and wakes at night to eat.    His problem is complicated by strong craving/reward pathways and poor lifestyle choices    His ability to lose weight is impacted by lack of confidence, lack of motivation and lack of education on nutrition and dietary needs.    PLAN:    Decrease portion sizes  Purge house of food triggers  Dietician visit of education  Calorie/low fat diet  Meal planning  Continue walking on treadmill    Craving/Reward   Ancillary testing:  N/A.  Food Plan:  Volumetrics and High protein/low carbohydrate.   Activity " Plan:  Exercise after meals.  Supplementary:  N/A.   Medication:  The patient will begin medication in pursuit of improved medical status as influenced by body weight. He will start topiramate titration from 25 mg daily up to 75 mg daily.  There is a mutual understanding of the goals and risks of this therapy. The patient is in agreement. He is educated on dosage regimen and possible side effects.    He is not interested in injectable medication at this time.    RTC:    Refer dietitian  Follow up with Flori Goldman PharmD in 4-6 weeks  Follow up with me in 3 months    TIME: 30 min spent on evaluation, management, counseling, education, & motivational interviewing with greater than 50 % of the total time was spent on counseling and coordinating care    Sincerely,    Augusto Arshad MD

## 2019-01-29 NOTE — NURSING NOTE
"  Chief Complaint   Patient presents with     Weight Problem     NMWM     Vitals:    01/29/19 1405   BP: 128/75   Pulse: 76   SpO2: 93%   Weight: 131.6 kg (290 lb 3.2 oz)   Height: 1.727 m (5' 7.99\")     Body mass index is 44.14 kg/m .  Tesfaye Tavares CMA    "

## 2019-01-31 NOTE — TELEPHONE ENCOUNTER
Pending Prescriptions:                       Disp   Refills    fluticasone (FLONASE) 50 MCG/ACT nasal sp*16 g   3            Sig: Spray 1-2 sprays into both nostrils daily    Last Written Prescription Date:  8/20/2018  Last Fill Quantity: dispense 16 g ,   # refills: 3  Last Office Visit with [facility], [facility] or [facility] prescribing provider: 10/26/2017  Future Office visit: 3/4/2019    Trevor Cid  Sleep Clinic Specialist - [place]

## 2019-02-01 RX ORDER — FLUTICASONE PROPIONATE 50 MCG
1-2 SPRAY, SUSPENSION (ML) NASAL DAILY
Qty: 16 G | Refills: 3 | Status: SHIPPED | OUTPATIENT
Start: 2019-02-01 | End: 2019-06-03

## 2019-03-04 ENCOUNTER — OFFICE VISIT (OUTPATIENT)
Dept: SLEEP MEDICINE | Facility: CLINIC | Age: 59
End: 2019-03-04
Payer: COMMERCIAL

## 2019-03-04 VITALS
HEART RATE: 69 BPM | BODY MASS INDEX: 41.37 KG/M2 | OXYGEN SATURATION: 96 % | SYSTOLIC BLOOD PRESSURE: 88 MMHG | RESPIRATION RATE: 16 BRPM | HEIGHT: 68 IN | DIASTOLIC BLOOD PRESSURE: 56 MMHG | WEIGHT: 273 LBS

## 2019-03-04 DIAGNOSIS — G47.33 OSA (OBSTRUCTIVE SLEEP APNEA): Primary | ICD-10-CM

## 2019-03-04 PROCEDURE — 99214 OFFICE O/P EST MOD 30 MIN: CPT | Performed by: INTERNAL MEDICINE

## 2019-03-04 ASSESSMENT — MIFFLIN-ST. JEOR: SCORE: 2027.69

## 2019-03-04 NOTE — PROGRESS NOTES
[facility]  Outpatient Sleep Medicine Consultation  March 4, 2019      Name: Juan Francisco Booker MRN# [id number]   Age: 59 year old YOB: 1960     Date of Consultation: March 4, 2019  Consultation is requested by: No referring provider defined for this encounter.  Primary care provider: Mar Lieberman           Assessment and Plan:       Severe obstructive sleep apnea with difficulty with CPAP compliance associated with recent ear pain without structural abnormalities on exam and excessive mask leak- bothlikely due to high pressures    Hypertension    Hyperlipidemia    Summary Recommendations:      Reduce pressure to aCPAP 8-16    Supply orders placed    CPAP pillow    RTC 1 yr             History of Present Illness:     Juan Francisco Booker is a 59 year old male who has been having left ear pain with CPAP at night without hearing loss, has large mask leak and loss of humidifier water in am.     Previous ENT evaluation in September 2017 demonstrated allergic rhinitis and macroglossia without obvious nasal.    PREVIOUS SLEEP STUDIES:  Date: October 17, 2014      CPAP Compliance:  Dates:         77 % >4hour use       Average Use 6 hours   Leak 4.5 hours  Residual AHI 4.8             Medications:     Current Outpatient Medications   Medication Sig     acetaminophen (TYLENOL) 500 MG tablet Take 2 tablets by mouth every 6 hours as needed.     aspirin 81 MG tablet Take 1 tablet by mouth daily AM     atenolol (TENORMIN) 25 MG tablet Take 4 tablets (100 mg) by mouth daily     citalopram (CELEXA) 10 MG tablet Take 1 tablet (10 mg) by mouth daily     DENTA 5000 PLUS 1.1 % CREA APPLY A PEA SIZED AMOUNT TO TOOTHBRUSH, BRUSH ONTO ROOT AREAS THOROUGHLY, THEN SPIT OUT AT BEDTIME.     diclofenac (VOLTAREN) 1 % GEL topical gel Apply 2 grams to ankle four times daily using enclosed dosing card.     fluticasone (FLONASE) 50 MCG/ACT nasal spray Spray 1-2 sprays into both nostrils daily     gabapentin  "(NEURONTIN) 300 MG capsule 1 in morning, 1 at midday, 3 at night     hydrochlorothiazide (HYDRODIURIL) 25 MG tablet Take 1 tablet (25 mg) by mouth daily AM     lisinopril (PRINIVIL/ZESTRIL) 40 MG tablet Take 1 tablet (40 mg) by mouth daily AM     Multiple Vitamin (DAILY MULTIVITAMIN PO) Take 1 tablet by mouth daily AM     Omega-3 Fatty Acids (FISH OIL) 1200 MG capsule Take 1 capsule by mouth daily AM     omeprazole (PRILOSEC) 20 MG capsule Take 40 mg by mouth At Bedtime HS     ORDER FOR DME Use your CPAP device as directed by your provider.     simvastatin (ZOCOR) 40 MG tablet Take 1 tablet (40 mg) by mouth At Bedtime     tamsulosin (FLOMAX) 0.4 MG capsule Take 2 capsules (0.8 mg) by mouth daily 30 min after a meal     topiramate (TOPAMAX) 25 MG tablet 25 mg at bedtime for 1 week, 50 mg at bedtime for 1 week and 75 mg daily at bedtime thereafter     No current facility-administered medications for this visit.         Allergies   Allergen Reactions     Grass             Past Medical History:     Does not need 02 supplement at night   Past Medical History:   Diagnosis Date     Anxiety      Benign neoplasm of colon 3/2/2015     Depression      Depressive disorder 1/15/2000     Difficult intubation      ETOH abuse 3/15/2009    in remission     Gastro-oesophageal reflux disease 1/15/2010     Hyperlipidemia 1/1/2000     Hypoxemia      Morbid obesity (H)      Nasal polyps 1/1/2015     TIMOTHY on CPAP 8/13/2012    Severe (uses 5-6 hours as able)     Other and unspecified hyperlipidemia 10/25/2012     Personal history of colonic polyps 2/207/15    Multiple \"neg for FAP\"     Pre-diabetes      Prediabetes 5/24/2017     Restless leg      Skin tag      Surgical complication 1/6/2015    difficulty inserting a tube down my throat     Unspecified essential hypertension 10/25/2012             Past Surgical History:      Past Surgical History:   Procedure Laterality Date     AS COLONOSCOPY THRU STOMA W BIOPSY/CAUTERY TUMOR/POLYP/LESION  " 2/27/15    colon polyps     COLONOSCOPY  2/27/2015    x 2     COLONOSCOPY WITH CO2 INSUFFLATION N/A 10/20/2016    Procedure: COLONOSCOPY WITH CO2 INSUFFLATION;  Surgeon: Juan Francisco Bletran MD;  Location: [facility] OR     ENT SURGERY  1/5/2011    [facility]     ESOPHAGOSCOPY, GASTROSCOPY, DUODENOSCOPY (EGD), COMBINED N/A 10/20/2016    Procedure: COMBINED ESOPHAGOSCOPY, GASTROSCOPY, DUODENOSCOPY (EGD);  Surgeon: Juan Francisco Beltran MD;  Location: [facility] OR     RELEASE CARPAL TUNNEL Right 6/14/2017    Procedure: RELEASE CARPAL TUNNEL;  Right Open Carpal Tunnel Release;  Surgeon: Aracelis Lowe MD;  Location: [facility] OR     RELEASE CARPAL TUNNEL Left 12/29/2017    Procedure: RELEASE CARPAL TUNNEL;  Left Open Carpal Tunnel Release;  Surgeon: Aracelis Lowe MD;  Location: [facility] OR                      Physical Examination:   There were no vitals taken for this visit.     Normal gross hearing acuity equal bilaterally  Normal left drum and light reflex            Copy to: Mar Lieberman MD 3/4/2019     [facility]   Floor 1, [address]   ?[address] [place] [address]   Appointments: [phone]    [facility]  3rd Floor  [address] [place] [address]     Total time spent with patient: 25 min >50% counseling

## 2019-03-04 NOTE — PATIENT INSTRUCTIONS
"MY TREATMENT INFORMATION FOR SLEEP APNEA-  Juan Francisco Booker    DOCTOR : [facility]  [facility] :      MY CONTACT NUMBER:       We would like to share some general information about sleep to help us work together to get better sleep for you.     Why do we sleep?   -clear toxins from the brain   -remove unnecessary neural connections that accumulate during the day   -enhance memory and learning   Without adequate sleep, our brain may become impaired in a manner that is similar to being intoxicated.       How much sleep do we need?   -The average adult needs 7-8 hours of sleep while young adolescents need up to 9 hours in order to function at their best.   -Between 12 and 20 years of age our sleep is often delayed up to an hour compared to older or younger people.   Aim for 7-8 sleep and a regular schedule; it may take a week or more to eliminate the effects of chronic insufficient sleep.       When should I sleep?   The timing of sleep is determined genetically for each of us.  Some of us are \"night owls\" and others are \"larks\".   The timing of sleep and the amount of sleep we need changes with our age.   Try to schedule your sleep time to fit your natural sleep schedule when you are not busy with school, work, or travel.       What if my sleep schedule doesn't fit my work schedule?   -If you have no trouble falling asleep or getting up during the work week, your work schedule is probably well-matched to your natural sleep schedule.   -You may benefit from a sleep  who can help support you to get to the best schedule.       What are some good habits to start with?   -Your bedroom is for sleeping and should be quiet, comfortably cool and dark before you lie down.   -You should not have electronic devices such as phones or computers in your bedroom.   -Your bedtime and awakening time should fit your natural tendency to fall asleep and wake up and should not vary much between weekdays and weekends.   -No caffeine " "within 6 hours or alcohol within two hours of bedtime  Be careful and regular with your sleep-you will feel better and think better.    Am I having a sleep study at a sleep center?  Make sure you have an appointment for the study before you leave!    Am I having a home sleep study?  Watch this video:  https://www.Playthe.net.com/watch?v=CteI_GhyP9g&list=[email or web address]  Please verify your insurance coverage with your insurance carrier    Frequently asked questions:  1. What is Obstructive Sleep Apnea (TIMOTHY)? TIMOTHY is the most common type of sleep apnea. Apnea means, \"without breath.\"  Apnea is most often caused by narrowing or collapse of the upper airway as muscles relax during sleep.   Almost everyone has occasional apneas. Most people with sleep apnea have had brief interruptions at night frequently for many years.  The severity of sleep apnea is related to how frequent and severe the events are.   2. What are the consequences of TIMOTHY? Symptoms include: feeling sleepy during the day, snoring loudly, gasping or stopping of breathing, trouble sleeping, and occasionally morning headaches or heartburn at night.  Sleepiness can be serious and even increase the risk of falling asleep while driving. Other health consequences may include development of high blood pressure and other cardiovascular disease in persons who are susceptible. Untreated TIMOTHY  can contribute to heart disease, stroke and diabetes.   3. What are the treatment options? In most situations, sleep apnea is a lifelong disease that must be managed with daily therapy. Medications are not effective for sleep apnea and surgery is generally not considered until other therapies have been tried. Your treatment is your choice . Continuous Positive Airway (CPAP) works right away and is the therapy that is effective in nearly everyone. An oral device to hold your jaw forward is usually the next most reliable option. Other options include postioning " devices (to keep you off your back), weight loss, and surgery including a tongue pacing device. There is more detail about some of these options below.    Important tips for using CPAP and similar devices   Know your equipment:  CPAP is continuous positive airway pressure that prevents obstructive sleep apnea by keeping the throat from collapsing while you are sleeping. In most cases, the device is  smart  and can slowly self-adjusts if your throat collapses and keeps a record every day of how well you are treated-this information is available to you and your care team.  BPAP is bilevel positive airway pressure that keeps your throat open and also assists each breath with a pressure boost to maintain adequate breathing.  Special kinds of BPAP are used in patients who have inadequate breathing from lung or heart disease. In most cases, the device is  smart  and can slowly self-adjusts to assist breathing. Like CPAP, the device keeps a record of how well you are treated.  Your mask is your connection to the device. You get to choose what feels most comfortable and the staff will help to make sure if fits. Here: are some examples of the different masks that are available:       Key points to remember on your journey with sleep apnea:  1. Sleep study.  PAP devices often need to be adjusted during a sleep study to show that they are effective and adjusted right.  2. Good tips to remember: Try wearing just the mask during a quiet time during the day so your body adapts to wearing it. A humidifier is recommended for comfort in most cases to prevent drying of your nose and throat. Allergy medication from your provider may help you if you are having nasal congestion.  3. Getting settled-in. It takes more than one night for most of us to get used to wearing a mask. Try wearing just the mask during a quiet time during the day so your body adapts to wearing it. A humidifier is recommended for comfort in most cases. Our team  will work with you carefully on the first day and will be in contact within 4 days and again at 2 and 4 weeks for advice and remote device adjustments. Your therapy is evaluated by the device each day.   4. Use it every night. The more you are able to sleep naturally for 7-8 hours, the more likely you will have good sleep and to prevent health risks or symptoms from sleep apnea. Even if you use it 4 hours it helps. Occasionally all of us are unable to use a medical therapy, in sleep apnea, it is not dangerous to miss one night.   5. Communicate. Call our skilled team on the number provided on the first day if your visit for problems that make it difficult to wear the device. Over 2 out of 3 patients can learn to wear the device long-term with help from our team. Remember to call our team or your sleep providers if you are unable to wear the device as we may have other solutions for those who cannot adapt to mask CPAP therapy. It is recommended that you sleep your sleep provider within the first 3 months and yearly after that if you are not having problems.   Take care of your equipment. Make sure you clean your mask and tubing using directions every day and that your filter and mask are replaced as recommended or if they are not working.     BESIDES CPAP, WHAT OTHER THERAPIES ARE THERE?    Positioning Device  Positioning devices are generally used when sleep apnea is mild and only occurs on your back.This example shows a pillow that straps around the waist. It may be appropriate for those whose sleep study shows milder sleep apnea that occurs primarily when lying flat on one's back. Preliminary studies have shown benefit but effectiveness at home may need to be verified by a home sleep test. These devices are generally not covered by medical insurance.  Examples of devices that maintain sleeping on the back to prevent snoring and mild sleep apnea.    Belt type body positioner  Http://[employer]/    Electronic  reminder  Http://nightshifttherapy.com/  Http://www.[employer].com.au/    Oral Appliance  What is oral appliance therapy?  An oral appliance device fits on your teeth at night like a retainer used after having braces. The device is made by a specialized dentist and requires several visits over 1-2 months before a manufactured device is made to fit your teeth and is adjusted to prevent your sleep apnea. Once an oral device is working properly, snoring should be improved. A home sleep test may be recommended at that time if to determine whether the sleep apnea is adequately treated.       Some things to remember:  -Oral devices are often, but not always, covered by your medical insurance. Be sure to check with your insurance provider.   -If you are referred for oral therapy, you will be given a list of specialized dentists to consider or you may choose to visit the Web site of the American Academy of Dental Sleep Medicine  -Oral devices are less likely to work if you have severe sleep apnea or are extremely overweight.     More detailed information  An oral appliance is a small acrylic device that fits over the upper and lower teeth  (similar to a retainer or a mouth guard). This device slightly moves jaw forward, which moves the base of the tongue forward, opens the airway, improves breathing for effective treat snoring and obstructive sleep apnea in perhaps 7 out of 10 people .  The best working devices are custom-made by a dental device  after a mold is made of the teeth 1, 2, 3.  When is an oral appliance indicated?  Oral appliance therapy is recommended as a first-line treatment for patients with primary snoring, mild sleep apnea, and for patients with moderate sleep apnea who prefer appliance therapy to use of CPAP4, 5. Severity of sleep apnea is determined by sleep testing and is based on the number of respiratory events per hour of sleep.   How successful is oral appliance therapy?  The success rate  of oral appliance therapy in patients with mild sleep apnea is 75-80% while in patients with moderate sleep apnea it is 50-70%. The chance of success in patients with severe sleep apnea is 40-50%. The research also shows that oral appliances have a beneficial effect on the cardiovascular health of TIMOTHY patients at the same magnitude as CPAP therapy7.  Oral appliances should be a second-line treatment in cases of severe sleep apnea, but if not completely successful then a combination therapy utilizing CPAP plus oral appliance therapy may be effective. Oral appliances tend to be effective in a broad range of patients although studies show that the patients who have the highest success are females, younger patients, those with milder disease, and less severe obesity. 3, 6.   Finding a dentist that practices dental sleep medicine  Specific training is available through the American Academy of Dental Sleep Medicine for dentists interested in working in the field of sleep. To find a dentist who is educated in the field of sleep and the use of oral appliances, near you, visit the Web site of the American Academy of Dental Sleep Medicine.    References  1. Jackie et al. Objectively measured vs self-reported compliance during oral appliance therapy for sleep-disordered breathing. Chest 2013; 144(5): 6841-9043.  2. Kami et al. Objective measurement of compliance during oral appliance therapy for sleep-disordered breathing. Thorax 2013; 68(1): 91-96.  3. Kirk et al. Mandibular advancement devices in 620 men and women with TIMOTHY and snoring: tolerability and predictors of treatment success. Chest 2004; 125: 7382-8259.  4. Tom, et al. Oral appliances for snoring and TIMOTHY: a review. Sleep 2006; 29: 244-262.  5. Missy et al. Oral appliance treatment for TIMOTHY: an update. J Clin Sleep Med 2014; 10(2): 215-227.  6. Natalie et al. Predictors of OSAH treatment outcome. J Dent Res 2007; 86:  2704-0126.      Weight Loss:    Weight loss is a long-term strategy that may improve sleep apnea in some patients.    Weight management is a personal decision and the decision should be based on your interest and the potential benefits.  If you are interested in exploring weight loss strategies, the following discussion covers the impact on weight loss on sleep apnea and the approaches that may be successful.    Being overweight does not necessarily mean you will have health consequences.  Those who have BMI over 35 or over 27 with existing medical conditions carries greater risk.   Weight loss decreases severity of sleep apnea in most people with obesity. For those with mild obesity who have developed snoring with weight gain, even 15-30 pound weight loss can improve and occasionally eliminate sleep apnea.  Structured and life-long dietary and health habits are necessary to lose weight and keep healthier weight levels.     Though there may be significant health benefits from weight loss, long-term weight loss is very difficult to achieve- studies show success with dietary management in less than 10% of people. In addition, substantial weight loss may require years of dietary control and may be difficult if patients have severe obesity. In these cases, surgical management may be considered.  Finally, older individuals who have tolerated obesity without health complications may be less likely to benefit from weight loss strategies.        Your BMI is Body mass index is 41.52 kg/m .  Weight management is a personal decision.  If you are interested in exploring weight loss strategies, the following discussion covers the approaches that may be successful. Body mass index (BMI) is one way to tell whether you are at a healthy weight, overweight, or obese. It measures your weight in relation to your height.  A BMI of 18.5 to 24.9 is in the healthy range. A person with a BMI of 25 to 29.9 is considered overweight, and  someone with a BMI of 30 or greater is considered obese. More than two-thirds of American adults are considered overweight or obese.  Being overweight or obese increases the risk for further weight gain. Excess weight may lead to heart disease and diabetes.  Creating and following plans for healthy eating and physical activity may help you improve your health.  Weight control is part of healthy lifestyle and includes exercise, emotional health, and healthy eating habits. Careful eating habits lifelong are the mainstay of weight control. Though there are significant health benefits from weight loss, long-term weight loss with diet alone may be very difficult to achieve- studies show long-term success with dietary management in less than 10% of people. Attaining a healthy weight may be especially difficult to achieve in those with severe obesity. In some cases, medications, devices and surgical management might be considered.  What can you do?  If you are overweight or obese and are interested in methods for weight loss, you should discuss this with your provider.     Consider reducing daily calorie intake by 500 calories.     Keep a food journal.     Avoiding skipping meals, consider cutting portions instead.    Diet combined with exercise helps maintain muscle while optimizing fat loss. Strength training is particularly important for building and maintaining muscle mass. Exercise helps reduce stress, increase energy, and improves fitness. Increasing exercise without diet control, however, may not burn enough calories to loose weight.       Start walking three days a week 10-20 minutes at a time    Work towards walking thirty minutes five days a week     Eventually, increase the speed of your walking for 1-2 minutes at time    In addition, we recommend that you review healthy lifestyles and methods for weight loss available through the National Institutes of Health patient information  sites:  http://win.niddk.nih.gov/publications/index.htm    And look into health and wellness programs that may be available through your health insurance provider, employer, local community center, or bernie club.    Weight management plan: Patient was referred to their PCP to discuss a diet and exercise plan.      Surgery:    Surgery for obstructive sleep apnea is considered generally only when other therapies fail to work. Surgery may be discussed with you if you are having a difficult time tolerating CPAP and or when there is an abnormal structure that requires surgical correction.  Nose and throat surgeries often enlarge the airway to prevent collapse.  Most of these surgeries create pain for 1-2 weeks and up to half of the most common surgeries are not effective throughout life.  You should carefully discuss the benefits and drawbacks to surgery with your sleep provider and surgeon to determine if it is the best solution for you.   More information  Surgery for TIMOTHY is directed at areas that are responsible for narrowing or complete obstruction of the airway during sleep.  There are a wide range of procedures available to enlarge and/or stabilize the airway to prevent blockage of breathing in the three major areas where it can occur: the palate, tongue, and nasal regions.  Successful surgical treatment depends on the accurate identification of the factors responsible for obstructive sleep apnea in each person.  A personalized approach is required because there is no single treatment that works well for everyone.  Because of anatomic variation, consultation with an examination by a sleep surgeon is a critical first step in determining what surgical options are best for each patient.  In some cases, examination during sedation may be recommended in order to guide the selection of procedures.  Patients will be counseled about risks and benefits as well as the typical recovery course after surgery. Surgery is  typically not a cure for a person s TIMOTHY.  However, surgery will often significantly improve one s TIMOTHY severity (termed  success rate ).  Even in the absence of a cure, surgery will decrease the cardiovascular risk associated with OSA7; improve overall quality of life8 (sleepiness, functionality, sleep quality, etc).      Palate Procedures:  Patients with TIMOTHY often have narrowing of their airway in the region of their tonsils and uvula.  The goals of palate procedures are to widen the airway in this region as well as to help the tissues resist collapse.  Modern palate procedure techniques focus on tissue conservation and soft tissue rearrangement, rather than tissue removal.  Often the uvula is preserved in this procedure. Residual sleep apnea is common in patient after pharyngoplasty with an average reduction in sleep apnea events of 33%2.      Tongue Procedures:  ExamWhile patients are awake, the muscles that surround the throat are active and keep this region open for breathing. These muscles relax during sleep, allowing the tongue and other structures to collapse and block breathing.  There are several different tongue procedures available.  Selection of a tongue base procedure depends on characteristics seen on physical exam.  Generally, procedures are aimed at removing bulky tissues in this area or preventing the back of the tongue from falling back during sleep.  Success rates for tongue surgery range from 50-62%3.    Hypoglossal Nerve Stimulation:  Hypoglossal nerve stimulation has recently received approval from the [place] Food and Drug Administration for the treatment of obstructive sleep apnea.  This is based on research showing that the system was safe and effective in treating sleep apnea6.  Results showed that the median AHI score decreased 68%, from 29.3 to 9.0. This therapy uses an implant system that senses breathing patterns and delivers mild stimulation to airway muscles, which keeps the  airway open during sleep.  The system consists of three fully implanted components: a small generator (similar in size to a pacemaker), a breathing sensor, and a stimulation lead.  Using a small handheld remote, a patient turns the therapy on before bed and off upon awakening.    Candidates for this device must be greater than 22 years of age, have moderate to severe TIMOTHY (AHI between 20-65), BMI less than 32, have tried CPAP/oral appliance without success, and have appropriate upper airway anatomy (determined by a sleep endoscopy performed by Dr. Grimaldo).    Hypoglossal Nerve Stimulation Pathway:    The sleep surgeon s office will work with the patient through the insurance prior-authorization process (including communications and appeals).    Nasal Procedures:  Nasal obstruction can interfere with nasal breathing during the day and night.  Studies have shown that relief of nasal obstruction can improve the ability of some patients to tolerate positive airway pressure therapy for obstructive sleep apnea1.  Treatment options include medications such as nasal saline, topical corticosteroid and antihistamine sprays, and oral medications such as antihistamines or decongestants. Non-surgical treatments can include external nasal dilators for selected patients. If these are not successful by themselves, surgery can improve the nasal airway either alone or in combination with these other options.      Combination Procedures:  Combination of surgical procedures and other treatments may be recommended, particularly if patients have more than one area of narrowing or persistent positional disease.  The success rate of combination surgery ranges from 66-80%2,3.    References  1. Pramod CALLOWAY. The Role of the Nose in Snoring and Obstructive Sleep Apnoea: An Update.  Eur Arch Otorhinolaryngol. 2011; 268: 1365-73.  2.  Sreekanth SM; Mayte YUEN; Shea ESQUIVEL; Pallanch JF; Sunni MAHONEY; Humberto ORDONEZ; Daniel BECKER. Surgical modifications of the upper  airway for obstructive sleep apnea in adults: a systematic review and meta-analysis. SLEEP 2010;33(10):5441-6471. Love SOLIS. Hypopharyngeal surgery in obstructive sleep apnea: an evidence-based medicine review.  Arch Otolaryngol Head Neck Surg. 2006 Feb;132(2):206-13.  3. Guanako ELAINEH1, Checo Y, Alexandre ARMINDA. The efficacy of anatomically based multilevel surgery for obstructive sleep apnea. Otolaryngol Head Neck Surg. 2003 Oct;129(4):327-35.  4. Kezirian E, Goldberg A. Hypopharyngeal Surgery in Obstructive Sleep Apnea: An Evidence-Based Medicine Review. Arch Otolaryngol Head Neck Surg. 2006 Feb;132(2):206-13.  5. Solis BELLAMY et al. Upper-Airway Stimulation for Obstructive Sleep Apnea.  N Engl J Med. 2014 Jan 9;370(2):139-49.  6. Abdirizak Y et al. Increased Incidence of Cardiovascular Disease in Middle-aged Men with Obstructive Sleep Apnea. Am J Respir Crit Care Med; 2002 166: 159-165  7. Haider EM et al. Studying Life Effects and Effectiveness of Palatopharyngoplasty (SLEEP) study: Subjective Outcomes of Isolated Uvulopalatopharyngoplasty. Otolaryngol Head Neck Surg. 2011; 144: 623-631.            Your BMI is Body mass index is 41.52 kg/m .  Weight management is a personal decision.  If you are interested in exploring weight loss strategies, the following discussion covers the approaches that may be successful. Body mass index (BMI) is one way to tell whether you are at a healthy weight, overweight, or obese. It measures your weight in relation to your height.  A BMI of 18.5 to 24.9 is in the healthy range. A person with a BMI of 25 to 29.9 is considered overweight, and someone with a BMI of 30 or greater is considered obese. More than two-thirds of American adults are considered overweight or obese.  Being overweight or obese increases the risk for further weight gain. Excess weight may lead to heart disease and diabetes.  Creating and following plans for healthy eating and physical activity may help you improve your  health.  Weight control is part of healthy lifestyle and includes exercise, emotional health, and healthy eating habits. Careful eating habits lifelong are the mainstay of weight control. Though there are significant health benefits from weight loss, long-term weight loss with diet alone may be very difficult to achieve- studies show long-term success with dietary management in less than 10% of people. Attaining a healthy weight may be especially difficult to achieve in those with severe obesity. In some cases, medications, devices and surgical management might be considered.  What can you do?  If you are overweight or obese and are interested in methods for weight loss, you should discuss this with your provider.     Consider reducing daily calorie intake by 500 calories.     Keep a food journal.     Avoiding skipping meals, consider cutting portions instead.    Diet combined with exercise helps maintain muscle while optimizing fat loss. Strength training is particularly important for building and maintaining muscle mass. Exercise helps reduce stress, increase energy, and improves fitness. Increasing exercise without diet control, however, may not burn enough calories to loose weight.       Start walking three days a week 10-20 minutes at a time    Work towards walking thirty minutes five days a week     Eventually, increase the speed of your walking for 1-2 minutes at time    In addition, we recommend that you review healthy lifestyles and methods for weight loss available through the National Institutes of Health patient information sites:  http://win.niddk.nih.gov/publications/index.htm    And look into health and wellness programs that may be available through your health insurance provider, employer, local community center, or bernie club.    Weight management plan: Patient was referred to their PCP to discuss a diet and exercise plan.

## 2019-03-04 NOTE — NURSING NOTE
Pressure change done. Min 8 Max 16  Orders sent to [facility].    [facility] ~[place]

## 2019-03-05 ENCOUNTER — OFFICE VISIT (OUTPATIENT)
Dept: PHARMACY | Facility: CLINIC | Age: 59
End: 2019-03-05
Payer: COMMERCIAL

## 2019-03-05 VITALS
BODY MASS INDEX: 41.53 KG/M2 | DIASTOLIC BLOOD PRESSURE: 69 MMHG | WEIGHT: 273.1 LBS | HEART RATE: 65 BPM | SYSTOLIC BLOOD PRESSURE: 110 MMHG

## 2019-03-05 DIAGNOSIS — E78.00 PURE HYPERCHOLESTEROLEMIA: ICD-10-CM

## 2019-03-05 DIAGNOSIS — F32.1 MODERATE MAJOR DEPRESSION (H): ICD-10-CM

## 2019-03-05 DIAGNOSIS — Z79.82 ASPIRIN LONG-TERM USE: ICD-10-CM

## 2019-03-05 DIAGNOSIS — I10 ESSENTIAL HYPERTENSION: ICD-10-CM

## 2019-03-05 DIAGNOSIS — K21.9 GASTROESOPHAGEAL REFLUX DISEASE, ESOPHAGITIS PRESENCE NOT SPECIFIED: ICD-10-CM

## 2019-03-05 DIAGNOSIS — M76.821 POSTERIOR TIBIAL TENDONITIS, RIGHT: ICD-10-CM

## 2019-03-05 DIAGNOSIS — N40.0 BENIGN PROSTATIC HYPERPLASIA, UNSPECIFIED WHETHER LOWER URINARY TRACT SYMPTOMS PRESENT: ICD-10-CM

## 2019-03-05 DIAGNOSIS — E66.813 CLASS 3 SEVERE OBESITY DUE TO EXCESS CALORIES WITH SERIOUS COMORBIDITY AND BODY MASS INDEX (BMI) OF 40.0 TO 44.9 IN ADULT (H): Primary | ICD-10-CM

## 2019-03-05 DIAGNOSIS — G44.219 EPISODIC TENSION-TYPE HEADACHE, NOT INTRACTABLE: ICD-10-CM

## 2019-03-05 DIAGNOSIS — R09.81 CHRONIC NASAL CONGESTION: ICD-10-CM

## 2019-03-05 DIAGNOSIS — K03.89: ICD-10-CM

## 2019-03-05 DIAGNOSIS — G62.9 NEUROPATHY: ICD-10-CM

## 2019-03-05 DIAGNOSIS — E66.01 CLASS 3 SEVERE OBESITY DUE TO EXCESS CALORIES WITH SERIOUS COMORBIDITY AND BODY MASS INDEX (BMI) OF 40.0 TO 44.9 IN ADULT (H): Primary | ICD-10-CM

## 2019-03-05 PROCEDURE — 99605 MTMS BY PHARM NP 15 MIN: CPT | Mod: U4 | Performed by: PHARMACIST

## 2019-03-05 PROCEDURE — 99607 MTMS BY PHARM ADDL 15 MIN: CPT | Mod: U4 | Performed by: PHARMACIST

## 2019-03-05 NOTE — Clinical Note
DEYVI, told him to schedule f/u with you. He is at ~6% weight loss in 1 month. Good results from dietary mod and top. Flori BARTON

## 2019-03-05 NOTE — PROGRESS NOTES
SUBJECTIVE/OBJECTIVE:                           Juan Francisco Booker is a 59 year old male coming in for an initial visit for Medication Therapy Management.  He was referred to me from Dr Casas.     Chief Complaint: Topiramate follow up.     Allergies/ADRs: Reviewed in Epic  Tobacco: No tobacco use  Alcohol: not currently using  Caffeine: 2- 200 mg caffeine pills in AM, and sometimes will take 200 mg in PM but not often or will drink a can of diet pop in evening.   PMH: Reviewed in Epic    Medication Adherence/Access:  Patient takes medications directly from bottles.  Patient takes medications 2 time(s) per day.   Per patient, misses medication 0 times per week.     Obesity: Current medication(s) include: Topiramate 75 mg once daily before bedtime. Medication was started on 1/29/19 by Dr. Augusto Arshad. Patient is experiencing the follow side effects: None.   Diet/Eating Habits: Patient reports eating less. Eating 3 meals per day. Likes to snack: fruit popsicle bars. Was eating 10-12 boxes per week. Decreased to 3 boxes per week. He does not know if he is ready to decrease to 2 boxes/week.   Exercise: Patient reports exercising 5 times per week, walking 38 minutes (2.7 mph) each time, will to stretches and push ups before or after every other day.    Failed medications include: None.     Current weight today: 273 lbs 1.6 oz  Weight when weight loss medication was started: 290 lb 3.2 oz BMI 44.14 kg/m^2  Cumulative Weight Loss: -17.1 lb     Hypertension: Current medications include atenolol 100 mg daily, hydrochlorothiazide 25 mg daily, lisinopril 40 mg daily.  Patient does not self-monitor BP.  Patient reports no current medication side effects. Patient states when he went to doctor the other day his blood pressure was low. No dizziness. No lightheadedness.   Potassium   Date Value Ref Range Status   10/24/2018 4.1 3.3 - 4.5 mmol/dL Final     Hyperlipidemia: Current therapy includes simvastatin 40mg once daily in  AM, fish oil 1000 mg daily. Pt reports myalgias since on medication.  LDL Cholesterol Calculated   Date Value Ref Range Status   10/24/2018 90 0 - 129 mg/dL Final     Triglycerides   Date Value Ref Range Status   10/24/2018 315.6 (H) 0.0 - 150.0 mg/dL Final     Depression:  Current medications include: Citalopram 10mg once daily. Pt reports that depression symptoms are stable. Has been on medication for 18 years. Patient states that he has tried taking off previously because he didn't think he needed, but then became weepy so went back to taking, but decreased dose than previous.   PHQ-9 SCORE 5/18/2018 10/24/2018 1/29/2019   PHQ-9 Total Score - - -   PHQ-9 Total Score MyChart - - 10 (Moderate depression)   PHQ-9 Total Score 6 12 10     BPH: Takes tamsulosin 0.4 mg capsule: 2 capsules daily. He misses sometimes because he takes 30 minutes after meals. Patient feels that since starting the medication that things have improved.     Ankle pain: Using diclofenac 1% gel as needed for pain, APAP 500-1000 mg once daily or twice daily. Completed physical therapy. Then Saturday was walking down stairs, then started hurting again, a sharp pain in his ankle. The pain did not radiate. He finds that this pain has resolved. Followed up with Ortho regarding his ankle pain, found to have tendonitis, finds medications somewhat help.    Headaches: Typically was getting 1-2 headaches per day. He was taking the maximum amount of APAP 3000 mg per day, but has since then decreased the amount of APAP taking since having less headaches since starting topiramate. No medication side effects. He has had these headaches for years, unsure what they are from. Finds APAP and sleep make headaches better.      Heart protection: Taking aspirin 81 mg once daily. No issues of bleeding or bruising. No hx MI or stroke. Patient thinks he was told to start per a doctor, but unsure of which one.     Nasal Congestion: Current medications include fluticasone  nasal spray 2 spray(s) once daily. Primary symptoms are nasal congestion. Pt feels that current therapy is not effective. He is unsure what has made the congestion better or worse. Unsure if related to pollen, dust or other irritants.     GERD: Current medications include: Prilosec (omeprazole) 40 once daily at bedtime. Pt c/o no current symptoms.  Patient feels that current regimen is effective.    Neuropathy: Takes gabapentin 300 mg capsule: 2 capsules in breakfast and 3 capsules nightly. Finds that the medication is effective.   Creatinine   Date Value Ref Range Status   10/24/2018 0.9 0.7 - 1.3 mg/dL Final     GFR Estimate   Date Value Ref Range Status   10/24/2018 >90 >60.0 mL/min/1.7 m2 Final     GFR Estimate If Black   Date Value Ref Range Status   10/24/2018 >90 >60.0 mL/min/1.7 m2 Final     Teeth: Using denta toothpaste for fluoride content. Brushes teeth daily. No concerns.     Today's Vitals: /69   Pulse 65   Wt 273 lb 1.6 oz (123.9 kg)   BMI 41.53 kg/m      ASSESSMENT:                             Current medications were reviewed today.     Medication Adherence: good, no issues identified    Obesity: Stable. Patient is showing 5.9% weight loss in first month of topiramate, would be appropriate to continue at this time.     Hypertension: Stable. BP <140/90 mmHg.     Hyperlipidemia: Needs improvement. LDL <130. Could consider switching to taking simvastatin nightly. Appropriate to continue fish oil due to current Trig level as fish oil may be assisting in lowering of trigs.     Depression: Stable.     BPH: Stable. Discussed with patient efficacy of tamsulosin taking with meal or 30 minutes after meal, and that if trying to take after meal is having him forget ~50% of doses, then could consider taking with meal if this will improve adherence.     Ankle pain: Stable for now, discussed that if pain returns and does not resolve in similar parameters to before, then could consider re-evaluating and  following up with Ortho.     Headaches: Stable. Could be getting improvement in headache frequency due to starting topiramate       Heart protection: Stable. According to aspirin guide, it appears appropriate patient to be continuing aspirin at this time.   Guidance: Consider low-dose aspirin (75-81 mg/d)  ASCVD Risk Score: 8.33% over 10 years without aspirin use.  Bleeding Risk Score: 1.20% over 10 years without aspirin use. 1 risk factor for increased risk of bleeding  Number Needed to Treat (NNT) with aspirin over 10 years to prevent one ASCVD event: 80  Number Needed to Harm (NNH): number needed to treat with aspirin over 10 years to result in one aspirin-related GI bleeding event: 144    Nasal Congestion: needs improvement. Discussed appropriate administration of current nasal steroid, otherwise could consider trying alternative nasal steroid as would be the most effective for current symptoms.     GERD: Stable.     Neuropathy: Stable.     Teeth: Stable.     PLAN:                            1. Could try an alternative steroid nasal spray like nasocort over the counter if flonase isn't working.     2. Can move your simvastatin to taking at bedtime.     3. Patient to schedule follow up appointment with Dr. Casas.     I spent 45 minutes with this patient today. A copy of the visit note was provided to the patient's referring provider.    Will follow up in 2-3 months.    The patient was given a summary of these recommendations as an after visit summary.     Lauren Turner Bloch, PharmD  Medication Therapy Management Pharmacist   [facility]   Phone: [phone]

## 2019-03-05 NOTE — PATIENT INSTRUCTIONS
Recommendations from today's MTM visit:                                                    MTM (medication therapy management) is a service provided by a clinical pharmacist designed to help you get the most of out of your medicines.   Today we reviewed what your medicines are for, how to know if they are working, that your medicines are safe and how to make your medicine regimen as easy as possible.     1. Could try an alternative steroid nasal spray like nasocort over the counter if flonase isn't working.     2. Can move your simvastatin to taking at bedtime.     3. Dietician you saw last time was Shari Berry.     Next MTM visit: 2-3 months     To schedule another MTM appointment, please call the clinic directly or you may call the MTM scheduling line at [phone] or toll-free at [phone].     My Clinical Pharmacist's contact information:                                                      It was a pleasure talking with you today!  Please feel free to contact me with any questions or concerns you have.      Lauren Turner Bloch, PharmD  Medication Therapy Management Pharmacist   [facility]   Phone: [phone]        You may receive a survey about the MTM services you received by email and/or US Mail.  I would appreciate your feedback to help me serve you better in the future. Your comments will be anonymous.

## 2019-03-12 ENCOUNTER — ALLIED HEALTH/NURSE VISIT (OUTPATIENT)
Dept: SURGERY | Facility: CLINIC | Age: 59
End: 2019-03-12
Payer: COMMERCIAL

## 2019-03-12 VITALS — BODY MASS INDEX: 41.34 KG/M2 | WEIGHT: 271.8 LBS

## 2019-03-12 NOTE — PROGRESS NOTES
"Juan Francisco Booker is a 59 year old male presents today for weight management nutrition consultation.  Patient was referred by Dr. Augusto Arshad.    Starting Topiramate with MD    Estimated body mass index is 44.14 kg/m  as calculated from the following:    Height as of an earlier encounter on 1/29/19: 1.727 m (5' 7.99\").    Weight as of an earlier encounter on 1/29/19: 131.6 kg (290 lb 3.2 oz).   Current weight: 271.8 lbs (-18.4 lbs in the past 6 weeks)      Nutrition history  Recent food recall:  Breakfast: frozen dinner two oranges  Lunch: frozen dinner two oranges  Dinner: frozen dinner two oranges  Snacks: not snacking as often, carrots, crackers, occasionally chips  Beverages: water, occ diet pop at restaurants, tea     Progress with previous goals:  1) Reduce Outshine bars by 50%(10-15 per day) 18 per week (3 boxes per week)   2) Decrease snacking - limit how often you buy them, limit snack purchases to every other time you grocery shop not snacking as often    Try using more non-starchy vegetable as snack one box of crackers per week, tries to take carrots to volunteer will snack there on chips or cracker     Treadmill every other day(~3 times per week)    Nutrition Diagnosis  Obesity r/t long history of self-monitoring deficit and excessive energy intake aeb BMI >30. - improving, continues    Nutrition Intervention  Materials/education provided, reviewed previous goals.  Patient reported that he has decreased the number of outshine bars that he is eating and snacking less often.  He continues to consume the same portions for meals.  He had questions regarding eating out and snack options discussed both with patient, provided 100 calories snack list.    Patient Understanding: good  Expected Compliance: fair/good     Nutrition Goals  1) Reduce Outshine bars by 50%(less than 3 boxes per week)  2) Decrease snacking - or have 100 calorie planned snacks if hungry   Try using more non-starchy vegetable as " snack  3) Increase walking, aim for 5 days per week on the treadmill     Follow-Up:  PRN    Time spent with patient: 30 minutes.  Shari Berry RD, LD

## 2019-03-26 DIAGNOSIS — I10 BENIGN ESSENTIAL HYPERTENSION: ICD-10-CM

## 2019-03-26 RX ORDER — HYDROCHLOROTHIAZIDE 25 MG/1
25 TABLET ORAL DAILY
Qty: 90 TABLET | Refills: 1 | Status: SHIPPED | OUTPATIENT
Start: 2019-03-26 | End: 2019-08-29

## 2019-03-26 NOTE — TELEPHONE ENCOUNTER

## 2019-04-16 PROBLEM — M76.821 POSTERIOR TIBIAL TENDONITIS, RIGHT: Status: RESOLVED | Noted: 2018-08-30 | Resolved: 2019-04-16

## 2019-04-16 PROBLEM — M76.71 PERONEAL TENDONITIS, RIGHT: Status: RESOLVED | Noted: 2018-08-30 | Resolved: 2019-04-16

## 2019-04-16 PROBLEM — M25.571 PAIN IN JOINT INVOLVING ANKLE AND FOOT, RIGHT: Status: RESOLVED | Noted: 2018-08-30 | Resolved: 2019-04-16

## 2019-04-16 NOTE — PROGRESS NOTES
Juan Francisco Sorensone was seen 5 times for evaluation and treatment.  Patient did not return for further treatment and current status is unknown.  Due to short treatment duration, no objective or functional changes were made.  Please see goal flow sheet from episode noted date below and initial evaluation for further information.  Linked Episodes   Type: Episode: Status: Noted: Resolved: Last update: Updated by:   PHYSICAL THERAPY heel pain 8/30/18 Active 8/30/2018 9/26/2018  2:01 PM Jo Menjivar, PT      Comments:

## 2019-05-14 DIAGNOSIS — I10 BENIGN ESSENTIAL HYPERTENSION: ICD-10-CM

## 2019-05-14 RX ORDER — ATENOLOL 25 MG/1
100 TABLET ORAL DAILY
Qty: 120 TABLET | Refills: 2 | Status: SHIPPED | OUTPATIENT
Start: 2019-05-14 | End: 2019-07-22

## 2019-05-21 NOTE — PATIENT INSTRUCTIONS
Nutrition Goals  1) Reduce Outshine bars by 50%(less than 3 boxes per week)  2) Decrease snacking - or have 100 calorie planned snacks if hungry   Try using more non-starchy vegetable as snack  3) Increase walking, aim for 5 days per week on the treadmill     Shari Berry, MICHAEL, LD  If you need to schedule or reschedule with a dietitian please call [phone].   
DC instructions/copies of all results

## 2019-06-03 RX ORDER — FLUTICASONE PROPIONATE 50 MCG
1-2 SPRAY, SUSPENSION (ML) NASAL DAILY
Qty: 16 G | Refills: 3 | Status: SHIPPED | OUTPATIENT
Start: 2019-06-03 | End: 2019-10-01

## 2019-06-03 NOTE — TELEPHONE ENCOUNTER
Pending Prescriptions:                       Disp   Refills    fluticasone (FLONASE) 50 MCG/ACT nasal sp*16 g   3            Sig: Spray 1-2 sprays into both nostrils daily    Last Written Prescription Date:  2/1/2019  Last Fill Quantity: 16g ,   # refills: 3  Last Office Visit with [facility], [facility] or [facility] prescribing provider: 3/4/2019  Future Office visit: Patient has no future appointment at this time.    Trevor Cid  [facility] Clinic Specialist - [place]

## 2019-06-04 DIAGNOSIS — R35.0 URINARY FREQUENCY: ICD-10-CM

## 2019-06-04 DIAGNOSIS — F32.A DEPRESSION, UNSPECIFIED DEPRESSION TYPE: ICD-10-CM

## 2019-06-04 RX ORDER — CITALOPRAM HYDROBROMIDE 10 MG/1
10 TABLET ORAL DAILY
Qty: 90 TABLET | Refills: 1 | Status: SHIPPED | OUTPATIENT
Start: 2019-06-04 | End: 2019-11-29

## 2019-06-04 RX ORDER — TAMSULOSIN HYDROCHLORIDE 0.4 MG/1
0.8 CAPSULE ORAL DAILY
Qty: 180 CAPSULE | Refills: 1 | Status: SHIPPED | OUTPATIENT
Start: 2019-06-04 | End: 2019-11-26

## 2019-06-04 NOTE — TELEPHONE ENCOUNTER
Last Clinic Visit: 12/5/2018  [facility]

## 2019-06-04 NOTE — TELEPHONE ENCOUNTER

## 2019-07-08 DIAGNOSIS — I10 BENIGN ESSENTIAL HYPERTENSION: ICD-10-CM

## 2019-07-08 RX ORDER — LISINOPRIL 40 MG/1
40 TABLET ORAL DAILY
Qty: 30 TABLET | Refills: 0 | Status: SHIPPED | OUTPATIENT
Start: 2019-07-08 | End: 2019-08-05

## 2019-07-08 NOTE — TELEPHONE ENCOUNTER

## 2019-07-22 DIAGNOSIS — I10 BENIGN ESSENTIAL HYPERTENSION: ICD-10-CM

## 2019-07-22 RX ORDER — ATENOLOL 25 MG/1
100 TABLET ORAL DAILY
Qty: 120 TABLET | Refills: 0 | Status: SHIPPED | OUTPATIENT
Start: 2019-07-22 | End: 2019-08-16

## 2019-07-22 NOTE — TELEPHONE ENCOUNTER
"Request for medication refill: Atenolol 100mg    Providers if patient needs an appointment and you are willing to give a one month supply please refill for one month and  send a letter/MyChart using \".SMILLIMITEDREFILL\" .smillimited and route chart to \"P SMI \" (Giving one month refill in non controlled medications is strongly recommended before denial)    If refill has been denied, meaning absolutely no refills without visit, please complete the smart phrase \".smirxrefuse\" and route it to the \"P [facility] MED REFILLS\"  pool to inform the patient and the pharmacy.    Luiza Putnam, [facility]        "

## 2019-07-24 NOTE — TELEPHONE ENCOUNTER
"Request for medication refill: Atenolol    Providers if patient needs an appointment and you are willing to give a one month supply please refill for one month and  send a letter/MyChart using \".SMILLIMITEDREFILL\" .smillimited and route chart to \"P [facility] \" (Giving one month refill in non controlled medications is strongly recommended before denial)    If refill has been denied, meaning absolutely no refills without visit, please complete the smart phrase \".smirxrefuse\" and route it to the \"P SMI MED REFILLS\"  pool to inform the patient and the pharmacy.    Anca Cullen [facility]            " [Patient] : patient

## 2019-08-05 DIAGNOSIS — I10 BENIGN ESSENTIAL HYPERTENSION: ICD-10-CM

## 2019-08-05 RX ORDER — LISINOPRIL 40 MG/1
40 TABLET ORAL DAILY
Qty: 30 TABLET | Refills: 0 | Status: SHIPPED | OUTPATIENT
Start: 2019-08-05 | End: 2019-09-05

## 2019-08-05 NOTE — TELEPHONE ENCOUNTER
"Request for medication refill:lisinopril (PRINIVIL/ZESTRIL) 40 MG tablet    Providers if patient needs an appointment and you are willing to give a one month supply please refill for one month and  send a letter/MyChart using \".SMILLIMITEDREFILL\" .smillimited and route chart to \"P [facility] \" (Giving one month refill in non controlled medications is strongly recommended before denial)    If refill has been denied, meaning absolutely no refills without visit, please complete the smart phrase \".smirxrefuse\" and route it to the \"P [facility] MED REFILLS\"  pool to inform the patient and the pharmacy.    Law Na, MA        "

## 2019-08-16 DIAGNOSIS — I10 BENIGN ESSENTIAL HYPERTENSION: ICD-10-CM

## 2019-08-16 RX ORDER — ATENOLOL 25 MG/1
100 TABLET ORAL DAILY
Qty: 60 TABLET | Refills: 0 | Status: SHIPPED | OUTPATIENT
Start: 2019-08-16 | End: 2019-09-18

## 2019-08-16 NOTE — TELEPHONE ENCOUNTER
Refill atenolol x 2 weeks only.  Was previously refilled x 1 month with instructions for follow up for BP check.  Patient has not yet followed up.

## 2019-08-29 DIAGNOSIS — I10 BENIGN ESSENTIAL HYPERTENSION: ICD-10-CM

## 2019-08-29 RX ORDER — HYDROCHLOROTHIAZIDE 25 MG/1
25 TABLET ORAL DAILY
Qty: 30 TABLET | Refills: 0 | Status: SHIPPED | OUTPATIENT
Start: 2019-08-29 | End: 2019-10-01

## 2019-08-29 NOTE — TELEPHONE ENCOUNTER

## 2019-09-05 DIAGNOSIS — I10 BENIGN ESSENTIAL HYPERTENSION: ICD-10-CM

## 2019-09-05 RX ORDER — LISINOPRIL 40 MG/1
40 TABLET ORAL DAILY
Qty: 30 TABLET | Refills: 0 | Status: SHIPPED | OUTPATIENT
Start: 2019-09-05 | End: 2019-10-01

## 2019-09-18 DIAGNOSIS — I10 BENIGN ESSENTIAL HYPERTENSION: ICD-10-CM

## 2019-09-18 RX ORDER — ATENOLOL 25 MG/1
100 TABLET ORAL DAILY
Qty: 60 TABLET | Refills: 0 | Status: SHIPPED | OUTPATIENT
Start: 2019-09-18 | End: 2019-10-01

## 2019-09-18 NOTE — TELEPHONE ENCOUNTER

## 2019-09-19 ENCOUNTER — OFFICE VISIT (OUTPATIENT)
Dept: FAMILY MEDICINE | Facility: CLINIC | Age: 59
End: 2019-09-19
Payer: COMMERCIAL

## 2019-09-19 VITALS
SYSTOLIC BLOOD PRESSURE: 125 MMHG | BODY MASS INDEX: 35.84 KG/M2 | WEIGHT: 242 LBS | HEIGHT: 69 IN | DIASTOLIC BLOOD PRESSURE: 84 MMHG | TEMPERATURE: 97.4 F | OXYGEN SATURATION: 96 % | HEART RATE: 62 BPM | RESPIRATION RATE: 16 BRPM

## 2019-09-19 DIAGNOSIS — I10 BENIGN ESSENTIAL HYPERTENSION: Primary | ICD-10-CM

## 2019-09-19 ASSESSMENT — MIFFLIN-ST. JEOR: SCORE: 1903.08

## 2019-09-19 ASSESSMENT — PATIENT HEALTH QUESTIONNAIRE - PHQ9: SUM OF ALL RESPONSES TO PHQ QUESTIONS 1-9: 9

## 2019-09-25 PROBLEM — M54.50 LUMBAGO: Status: RESOLVED | Noted: 2018-05-21 | Resolved: 2019-09-25

## 2019-09-30 ENCOUNTER — HEALTH MAINTENANCE LETTER (OUTPATIENT)
Age: 59
End: 2019-09-30

## 2019-09-30 NOTE — TELEPHONE ENCOUNTER
Chief Complaint   Patient presents with     Refill Request     Flonase      Refill request received via fax by pharmacy     Pending Prescriptions:                       Disp   Refills    fluticasone (FLONASE) 50 MCG/ACT nasal sp*16 g   3            Sig: Spray 1-2 sprays into both nostrils daily         Last Written Prescription Date:  06/03/19  Last Fill Quantity: 16g,   # refills: 3  Last Office Visit with prescribing provider: 03/04/19  Future Office visit: None at this time      Routing refill request to provider for review/approval because:  Drug not on the G, P or  Health refill protocol or controlled substance    [facility]ia [facility] ~[place]

## 2019-10-01 ENCOUNTER — OFFICE VISIT (OUTPATIENT)
Dept: FAMILY MEDICINE | Facility: CLINIC | Age: 59
End: 2019-10-01
Payer: COMMERCIAL

## 2019-10-01 VITALS
WEIGHT: 239.6 LBS | HEIGHT: 69 IN | DIASTOLIC BLOOD PRESSURE: 75 MMHG | SYSTOLIC BLOOD PRESSURE: 127 MMHG | HEART RATE: 57 BPM | OXYGEN SATURATION: 94 % | BODY MASS INDEX: 35.49 KG/M2 | RESPIRATION RATE: 16 BRPM | TEMPERATURE: 98.8 F

## 2019-10-01 DIAGNOSIS — I10 BENIGN ESSENTIAL HYPERTENSION: ICD-10-CM

## 2019-10-01 RX ORDER — ATENOLOL 25 MG/1
50 TABLET ORAL DAILY
Qty: 60 TABLET | Refills: 3 | Status: SHIPPED | OUTPATIENT
Start: 2019-10-01 | End: 2020-01-11

## 2019-10-01 RX ORDER — FLUTICASONE PROPIONATE 50 MCG
1-2 SPRAY, SUSPENSION (ML) NASAL DAILY
Qty: 16 G | Refills: 3 | Status: SHIPPED | OUTPATIENT
Start: 2019-10-01 | End: 2020-01-30

## 2019-10-01 RX ORDER — HYDROCHLOROTHIAZIDE 25 MG/1
25 TABLET ORAL DAILY
Qty: 90 TABLET | Refills: 1 | Status: SHIPPED | OUTPATIENT
Start: 2019-10-01 | End: 2020-04-03

## 2019-10-01 RX ORDER — LISINOPRIL 40 MG/1
40 TABLET ORAL DAILY
Qty: 90 TABLET | Refills: 1 | Status: SHIPPED | OUTPATIENT
Start: 2019-10-01 | End: 2020-04-03

## 2019-10-01 ASSESSMENT — ANXIETY QUESTIONNAIRES
5. BEING SO RESTLESS THAT IT IS HARD TO SIT STILL: SEVERAL DAYS
6. BECOMING EASILY ANNOYED OR IRRITABLE: SEVERAL DAYS
GAD7 TOTAL SCORE: 5
2. NOT BEING ABLE TO STOP OR CONTROL WORRYING: NOT AT ALL
1. FEELING NERVOUS, ANXIOUS, OR ON EDGE: NOT AT ALL
7. FEELING AFRAID AS IF SOMETHING AWFUL MIGHT HAPPEN: NEARLY EVERY DAY
IF YOU CHECKED OFF ANY PROBLEMS ON THIS QUESTIONNAIRE, HOW DIFFICULT HAVE THESE PROBLEMS MADE IT FOR YOU TO DO YOUR WORK, TAKE CARE OF THINGS AT HOME, OR GET ALONG WITH OTHER PEOPLE: NOT DIFFICULT AT ALL
3. WORRYING TOO MUCH ABOUT DIFFERENT THINGS: NOT AT ALL

## 2019-10-01 ASSESSMENT — PATIENT HEALTH QUESTIONNAIRE - PHQ9
SUM OF ALL RESPONSES TO PHQ QUESTIONS 1-9: 8
5. POOR APPETITE OR OVEREATING: NOT AT ALL

## 2019-10-01 ASSESSMENT — PAIN SCALES - GENERAL: PAINLEVEL: NO PAIN (0)

## 2019-10-01 ASSESSMENT — MIFFLIN-ST. JEOR: SCORE: 1890.58

## 2019-10-01 NOTE — PROGRESS NOTES
"       HPI       Juan Francisco Booker is a 59 year old  who presents for   Chief Complaint   Patient presents with     Follow Up     patient is here follow up for hypetension       Hypertension Follow-up  <140/90    Outpatient blood pressures are being checked at home.  Results are 109-126 SBP, 71-87 DBP.    Chest Pain? :No     Low Salt Diet: not monitoring salt - is aware of high level of Na    Daily NSAID Use?No     Did patient take their HTN pills today/last night as usual?  Yes    Last Basic Metabolic Panel:  Lab Results   Component Value Date    .4 10/24/2018      Lab Results   Component Value Date    POTASSIUM 4.1 10/24/2018     Lab Results   Component Value Date    CHLORIDE 100.9 10/24/2018     Lab Results   Component Value Date    ZHEN 9.3 10/24/2018     Lab Results   Component Value Date    CO2 27.7 10/24/2018     Lab Results   Component Value Date    BUN 18.2 10/24/2018     Lab Results   Component Value Date    CR 0.9 10/24/2018     Lab Results   Component Value Date    GLC 85.0 10/24/2018    .8 10/24/2018       +++++++  Problem, Medication and Allergy Lists were reviewed and updated if needed..    Patient is an established patient of this clinic..         Review of Systems:   Review of Systems         Physical Exam:     Vitals:    10/01/19 1349   BP: 127/75   Pulse: 57   Resp: 16   Temp: 98.8  F (37.1  C)   TempSrc: Oral   SpO2: 94%   Weight: 108.7 kg (239 lb 9.6 oz)   Height: 1.75 m (5' 8.9\")     Body mass index is 35.49 kg/m .  Vitals were reviewed and were normal     Physical Exam  Vitals signs reviewed.   Constitutional:       General: He is not in acute distress.     Appearance: He is obese. He is not ill-appearing, toxic-appearing or diaphoretic.   Cardiovascular:      Rate and Rhythm: Normal rate and regular rhythm.   Pulmonary:      Effort: Pulmonary effort is normal.      Breath sounds: Normal breath sounds.   Neurological:      Mental Status: He is alert.   Psychiatric:         Mood and " Affect: Mood normal.         Behavior: Behavior normal.         Thought Content: Thought content normal.         Judgement: Judgment normal.         Results:   No testing ordered today    Assessment and Plan        1. Benign essential hypertension  Patient doing well with current medication regimen of atenolol 50mg daily, hydrochlorothiazide 25mg daily, and lisinopril 40mg daily. Plan to continue current regimen without changes. We discussed that patient's PCP of many years is leaving, and he currently pays out of pocket to come here because we are no longer on his insurance. Patient will think about either changing insurance to have us covered, or likely will change to a different clinic.   - atenolol (TENORMIN) 25 MG tablet; Take 2 tablets (50 mg) by mouth daily  Dispense: 60 tablet; Refill: 3  - hydrochlorothiazide (HYDRODIURIL) 25 MG tablet; Take 1 tablet (25 mg) by mouth daily  Dispense: 90 tablet; Refill: 1  - lisinopril (PRINIVIL/ZESTRIL) 40 MG tablet; Take 1 tablet (40 mg) by mouth daily  Dispense: 90 tablet; Refill: 1       Medications Discontinued During This Encounter   Medication Reason     atenolol (TENORMIN) 25 MG tablet Reorder     hydrochlorothiazide (HYDRODIURIL) 25 MG tablet Reorder     lisinopril (PRINIVIL/ZESTRIL) 40 MG tablet Reorder       Options for treatment and follow-up care were reviewed with the patient. Juan Francisco Booker  engaged in the decision making process and verbalized understanding of the options discussed and agreed with the final plan.    Marcia Mayo, DO

## 2019-10-01 NOTE — PROGRESS NOTES
Preceptor Attestation:   Patient seen, evaluated and discussed with the resident. I have verified the content of the note, which accurately reflects my assessment of the patient and the plan of care.   Supervising Physician:  Maggy Guzman MD

## 2019-10-01 NOTE — PATIENT INSTRUCTIONS
1. Reduce atenolol to 2 tablets (50mg) daily  2. Check your blood pressure a couple of times a week, send me these results via Skimble in the next 2-4 weeks  3. If your blood pressure is >140/90 send me a Skimble message right away   4. Check with insurance if they will accept a referral from me (Dr. Mayo, out of network) to a gastroenterologist within network, if so - what gastroenterologist and Dr. Mayo will send in referral - send me this info via Skimble  5. Ask pharmacy for flu shot, pneumonia, and shingles vaccine    Make an appointment to follow up with me in January 2020 (Jan 6th is first available) - at that appointment we will do blood work and check in on your colonoscopy.

## 2019-10-02 ENCOUNTER — MYC MEDICAL ADVICE (OUTPATIENT)
Dept: PHARMACY | Facility: CLINIC | Age: 59
End: 2019-10-02

## 2019-10-02 ASSESSMENT — ANXIETY QUESTIONNAIRES: GAD7 TOTAL SCORE: 5

## 2019-10-02 NOTE — TELEPHONE ENCOUNTER
Attempt #1: Sent [email or web address] message about scheduling follow up appointment with MTM pharmacist at [facility].     Stephanie StewartD  Medication Therapy Management Pharmacist   [facility]   Phone: [phone]

## 2019-10-29 DIAGNOSIS — E78.5 HYPERLIPIDEMIA LDL GOAL <130: ICD-10-CM

## 2019-10-29 RX ORDER — SIMVASTATIN 40 MG
40 TABLET ORAL AT BEDTIME
Qty: 90 TABLET | Refills: 3 | Status: SHIPPED | OUTPATIENT
Start: 2019-10-29 | End: 2020-10-19

## 2019-10-29 NOTE — TELEPHONE ENCOUNTER
"Request for medication refill: Simvastatin 40mg tabs    Providers if patient needs an appointment and you are willing to give a one month supply please refill for one month and  send a letter/MyChart using \".SMILLIMITEDREFILL\" .smillimited and route chart to \"P [facility] \" (Giving one month refill in non controlled medications is strongly recommended before denial)    If refill has been denied, meaning absolutely no refills without visit, please complete the smart phrase \".smirxrefuse\" and route it to the \"P SMI MED REFILLS\"  pool to inform the patient and the pharmacy.    Sravanthi Jordan CMA        "

## 2019-11-18 ENCOUNTER — MYC MEDICAL ADVICE (OUTPATIENT)
Dept: FAMILY MEDICINE | Facility: CLINIC | Age: 59
End: 2019-11-18

## 2019-11-18 DIAGNOSIS — D12.6 ADENOMA OF COLON: Primary | ICD-10-CM

## 2019-11-18 NOTE — TELEPHONE ENCOUNTER
"Order placed for colonoscopy referral.    Patient due for another colonoscopy (recommended 3 year follow-up in 2016 given \"recent advanced adenoma at colonoscopy 1 year ago\". Last Colonoscopy was 10/20/2016 and was done in OR given concerns for cardiopulmonary risk not manageable at endoscopy suite. Patient is not sure if he will still need to have this done in OR, as he has reduced his risk by losing weight (313 lbs, last visit 10/1/2019 was 239lbs).    Order placed for colonoscopy referral.    Marcia Mayo DO  PGY3 Family Medicine Resident  Pager: [phone]    "

## 2019-11-19 ENCOUNTER — TELEPHONE (OUTPATIENT)
Dept: GASTROENTEROLOGY | Facility: CLINIC | Age: 59
End: 2019-11-19

## 2019-11-20 ENCOUNTER — TELEPHONE (OUTPATIENT)
Dept: GASTROENTEROLOGY | Facility: CLINIC | Age: 59
End: 2019-11-20

## 2019-11-26 DIAGNOSIS — G62.9 PERIPHERAL POLYNEUROPATHY: ICD-10-CM

## 2019-11-26 DIAGNOSIS — F32.A DEPRESSION, UNSPECIFIED DEPRESSION TYPE: ICD-10-CM

## 2019-11-26 DIAGNOSIS — R35.0 URINARY FREQUENCY: ICD-10-CM

## 2019-11-26 RX ORDER — TAMSULOSIN HYDROCHLORIDE 0.4 MG/1
0.8 CAPSULE ORAL DAILY
Qty: 180 CAPSULE | Refills: 0 | Status: SHIPPED | OUTPATIENT
Start: 2019-11-26 | End: 2020-01-21

## 2019-11-26 NOTE — TELEPHONE ENCOUNTER
"tamsulosin (FLOMAX) 0.4 MG capsule      Last Written Prescription Date:  6/4/19  Last Fill Quantity: 180,   # refills: 1  Last Office Visit : 12/5/18  Future Office visit:  none    Routing refill request to provider for review/approval because:  Refill needs review.  *last office visit plan  12/5/18-- if better after following \"water\" experiment, then try stopping tamsulosin.    "

## 2019-11-26 NOTE — TELEPHONE ENCOUNTER
"Request for medication refill: Citalopram HBR 10mg tabs    Providers if patient needs an appointment and you are willing to give a one month supply please refill for one month and  send a letter/MyChart using \".SMILLIMITEDREFILL\" .smillimited and route chart to \"P SMI \" (Giving one month refill in non controlled medications is strongly recommended before denial)    If refill has been denied, meaning absolutely no refills without visit, please complete the smart phrase \".smirxrefuse\" and route it to the \"P SMI MED REFILLS\"  pool to inform the patient and the pharmacy.    Sravanthi Jordan CMA        "

## 2019-11-29 RX ORDER — CITALOPRAM HYDROBROMIDE 10 MG/1
10 TABLET ORAL DAILY
Qty: 90 TABLET | Refills: 1 | Status: SHIPPED | OUTPATIENT
Start: 2019-11-29 | End: 2020-06-02

## 2019-11-29 RX ORDER — GABAPENTIN 300 MG/1
CAPSULE ORAL
Qty: 450 CAPSULE | Refills: 3 | Status: SHIPPED | OUTPATIENT
Start: 2019-11-29 | End: 2020-12-02

## 2019-12-29 ENCOUNTER — DOCUMENTATION ONLY (OUTPATIENT)
Dept: CARE COORDINATION | Facility: CLINIC | Age: 59
End: 2019-12-29

## 2019-12-30 ENCOUNTER — TELEPHONE (OUTPATIENT)
Dept: GASTROENTEROLOGY | Facility: CLINIC | Age: 59
End: 2019-12-30

## 2019-12-30 DIAGNOSIS — Z12.11 SPECIAL SCREENING FOR MALIGNANT NEOPLASMS, COLON: Primary | ICD-10-CM

## 2019-12-30 RX ORDER — BISACODYL 5 MG/1
10 TABLET, DELAYED RELEASE ORAL DAILY
Qty: 4 TABLET | Refills: 0 | Status: SHIPPED | OUTPATIENT
Start: 2019-12-30 | End: 2020-02-24

## 2019-12-30 NOTE — TELEPHONE ENCOUNTER
Patient Name: Juan Francisco Booker   : 1960  MRN: [id number]       : [x] N/A       [x] Johnny Active  _____________________________________________________      Patient scheduled for:    [x] Colonoscopy      Indication for procedure. [x] Adenoma of colon    Sedation Type: [x] Conscious Sedation       Procedure Provider:  Josue      Referring Provider. Anisa Ley Kelsey (PCP)    Arrival time verified: .[phone]    Facility location verified:   [facility] [place] [address]  1st Floor, Rm 1[address]      Additional Information: BMI 35.49 10/1/19 (44.23 )    Unable to have at ASC owing Hx: Difficult intubation  __________________________________________________      Patient taking any blood thinners ? Yes    Anticoagulants or blood thinners:           [x] ASA 81mg  - may continue   ................................................            Heart disease ? Yes: HTN      Lung disease ? No        Sleep apnea ? Yes: CPAP      Diabetic ? No      Kidney disease ? No  Hemodialysis: No      Electronic implanted medical devices ? No      History & Physical: [x] N/A      Prep Type:     [x]Golytely    Pharmacy : [id number] - [place], [place] - [address]       Instructions given: [x] Rec'd & Read   [x] Reviewed         Pre procedure teaching completed: [x] Yes - Reviewed      IV Sedation: [x] No questions regarding Sedation as ordered       :   o Transportation from procedure & responsible adult to be with patient following procedure for a minimum of 6 hrs (Conscious Sedation): [x] Ayala - confirmed will have post-procedure companionship as required    _______________________________________________      Pauly Morillo RN  [facility]

## 2020-01-06 ENCOUNTER — HOSPITAL ENCOUNTER (OUTPATIENT)
Facility: CLINIC | Age: 60
Discharge: HOME | End: 2020-01-06
Attending: INTERNAL MEDICINE | Admitting: INTERNAL MEDICINE
Payer: COMMERCIAL

## 2020-01-06 VITALS
RESPIRATION RATE: 13 BRPM | HEART RATE: 51 BPM | OXYGEN SATURATION: 97 % | DIASTOLIC BLOOD PRESSURE: 98 MMHG | SYSTOLIC BLOOD PRESSURE: 114 MMHG

## 2020-01-06 LAB — COLONOSCOPY: NORMAL

## 2020-01-06 PROCEDURE — 99153 MOD SED SAME PHYS/QHP EA: CPT

## 2020-01-06 PROCEDURE — 25000128 H RX IP 250 OP 636: Performed by: INTERNAL MEDICINE

## 2020-01-06 PROCEDURE — G0500 MOD SEDAT ENDO SERVICE >5YRS: HCPCS | Performed by: INTERNAL MEDICINE

## 2020-01-06 PROCEDURE — 99153 MOD SED SAME PHYS/QHP EA: CPT | Performed by: INTERNAL MEDICINE

## 2020-01-06 PROCEDURE — 88305 TISSUE EXAM BY PATHOLOGIST: CPT | Performed by: INTERNAL MEDICINE

## 2020-01-06 PROCEDURE — 45385 COLONOSCOPY W/LESION REMOVAL: CPT | Mod: PT | Performed by: INTERNAL MEDICINE

## 2020-01-06 PROCEDURE — 45380 COLONOSCOPY AND BIOPSY: CPT | Performed by: INTERNAL MEDICINE

## 2020-01-06 RX ORDER — INFLUENZA A VIRUS A/GUANGDONG-MAONAN/SWL1536/2019 CNIC-1909 (H1N1) ANTIGEN (FORMALDEHYDE INACTIVATED), INFLUENZA A VIRUS A/HONG KONG/2671/2019 (H3N2) ANTIGEN (FORMALDEHYDE INACTIVATED), INFLUENZA B VIRUS B/PHUKET/3073/2013 ANTIGEN (FORMALDEHYDE INACTIVATED), AND INFLUENZA B VIRUS B/WASHINGTON/02/2019 ANTIGEN (FORMALDEHYDE INACTIVATED) 15; 15; 15; 15 UG/.5ML; UG/.5ML; UG/.5ML; UG/.5ML
INJECTION, SUSPENSION INTRAMUSCULAR
Refills: 0 | COMMUNITY
Start: 2019-10-03 | End: 2021-07-14

## 2020-01-06 RX ORDER — NALOXONE HYDROCHLORIDE 0.4 MG/ML
.1-.4 INJECTION, SOLUTION INTRAMUSCULAR; INTRAVENOUS; SUBCUTANEOUS
Status: DISCONTINUED | OUTPATIENT
Start: 2020-01-06 | End: 2020-01-06 | Stop reason: HOSPADM

## 2020-01-06 RX ORDER — ONDANSETRON 4 MG/1
4 TABLET, ORALLY DISINTEGRATING ORAL EVERY 6 HOURS PRN
Status: DISCONTINUED | OUTPATIENT
Start: 2020-01-06 | End: 2020-01-06 | Stop reason: HOSPADM

## 2020-01-06 RX ORDER — ONDANSETRON 2 MG/ML
4 INJECTION INTRAMUSCULAR; INTRAVENOUS EVERY 6 HOURS PRN
Status: DISCONTINUED | OUTPATIENT
Start: 2020-01-06 | End: 2020-01-06 | Stop reason: HOSPADM

## 2020-01-06 RX ORDER — ONDANSETRON 2 MG/ML
4 INJECTION INTRAMUSCULAR; INTRAVENOUS EVERY 6 HOURS PRN
Status: CANCELLED | OUTPATIENT
Start: 2020-01-06

## 2020-01-06 RX ORDER — SODIUM FLUORIDE 6 MG/ML
PASTE, DENTIFRICE DENTAL
Refills: 0 | COMMUNITY
Start: 2019-01-09 | End: 2020-02-24

## 2020-01-06 RX ORDER — ONDANSETRON 4 MG/1
4 TABLET, ORALLY DISINTEGRATING ORAL EVERY 6 HOURS PRN
Status: CANCELLED | OUTPATIENT
Start: 2020-01-06

## 2020-01-06 RX ORDER — FLUMAZENIL 0.1 MG/ML
0.2 INJECTION, SOLUTION INTRAVENOUS
Status: DISCONTINUED | OUTPATIENT
Start: 2020-01-06 | End: 2020-01-06 | Stop reason: HOSPADM

## 2020-01-06 RX ORDER — ONDANSETRON 2 MG/ML
4 INJECTION INTRAMUSCULAR; INTRAVENOUS
Status: DISCONTINUED | OUTPATIENT
Start: 2020-01-06 | End: 2020-01-06 | Stop reason: HOSPADM

## 2020-01-06 RX ORDER — FENTANYL CITRATE 50 UG/ML
INJECTION, SOLUTION INTRAMUSCULAR; INTRAVENOUS PRN
Status: DISCONTINUED | OUTPATIENT
Start: 2020-01-06 | End: 2020-01-06 | Stop reason: HOSPADM

## 2020-01-06 RX ORDER — NALOXONE HYDROCHLORIDE 0.4 MG/ML
.1-.4 INJECTION, SOLUTION INTRAMUSCULAR; INTRAVENOUS; SUBCUTANEOUS
Status: CANCELLED | OUTPATIENT
Start: 2020-01-06 | End: 2020-01-07

## 2020-01-06 RX ORDER — TOPIRAMATE 25 MG/1
TABLET, FILM COATED ORAL
Refills: 2 | COMMUNITY
Start: 2019-01-30 | End: 2020-02-24

## 2020-01-06 RX ORDER — LIDOCAINE 40 MG/G
CREAM TOPICAL
Status: DISCONTINUED | OUTPATIENT
Start: 2020-01-06 | End: 2020-01-06 | Stop reason: HOSPADM

## 2020-01-06 RX ORDER — FLUMAZENIL 0.1 MG/ML
0.2 INJECTION, SOLUTION INTRAVENOUS
Status: CANCELLED | OUTPATIENT
Start: 2020-01-06 | End: 2020-01-06

## 2020-01-06 NOTE — OR NURSING
Colonoscopy tolerated very well with sedation given. 2 small polyps removed from the transverse colon with cold snare method.  Retrieved and sent to pathology.

## 2020-01-07 ENCOUNTER — MYC MEDICAL ADVICE (OUTPATIENT)
Dept: FAMILY MEDICINE | Facility: CLINIC | Age: 60
End: 2020-01-07

## 2020-01-07 DIAGNOSIS — E78.5 HYPERLIPIDEMIA LDL GOAL <130: Primary | ICD-10-CM

## 2020-01-07 DIAGNOSIS — I10 BENIGN ESSENTIAL HYPERTENSION: ICD-10-CM

## 2020-01-07 DIAGNOSIS — E05.90 SUBCLINICAL HYPERTHYROIDISM: ICD-10-CM

## 2020-01-07 LAB — COPATH REPORT: NORMAL

## 2020-01-10 DIAGNOSIS — I10 BENIGN ESSENTIAL HYPERTENSION: ICD-10-CM

## 2020-01-11 RX ORDER — ATENOLOL 25 MG/1
50 TABLET ORAL DAILY
Qty: 60 TABLET | Refills: 3 | Status: SHIPPED | OUTPATIENT
Start: 2020-01-11 | End: 2020-02-27

## 2020-01-13 ENCOUNTER — PRE VISIT (OUTPATIENT)
Dept: UROLOGY | Facility: CLINIC | Age: 60
End: 2020-01-13

## 2020-01-13 DIAGNOSIS — E78.5 HYPERLIPIDEMIA LDL GOAL <130: ICD-10-CM

## 2020-01-13 DIAGNOSIS — I10 BENIGN ESSENTIAL HYPERTENSION: ICD-10-CM

## 2020-01-13 DIAGNOSIS — E05.90 SUBCLINICAL HYPERTHYROIDISM: ICD-10-CM

## 2020-01-13 LAB
BUN SERPL-MCNC: 14.2 MG/DL (ref 7–21)
CALCIUM SERPL-MCNC: 9.7 MG/DL (ref 8.5–10.1)
CHLORIDE SERPLBLD-SCNC: 102 MMOL/L (ref 98–110)
CHOLEST SERPL-MCNC: 167.8 MG/DL (ref 0–200)
CHOLEST/HDLC SERPL: 5.3 {RATIO} (ref 0–5)
CO2 SERPL-SCNC: 31 MMOL/L (ref 20–32)
CREAT SERPL-MCNC: 0.9 MG/DL (ref 0.7–1.3)
GFR SERPL CREATININE-BSD FRML MDRD: >90 ML/MIN/1.7 M2
GLUCOSE SERPL-MCNC: 78.8 MG'DL (ref 70–99)
HDLC SERPL-MCNC: 31.7 MG/DL
LDLC SERPL CALC-MCNC: 100 MG/DL (ref 0–129)
POTASSIUM SERPL-SCNC: 4 MMOL/DL (ref 3.3–4.5)
SODIUM SERPL-SCNC: 140 MMOL/L (ref 132.6–141.4)
TRIGL SERPL-MCNC: 179 MG/DL (ref 0–150)
TSH SERPL DL<=0.005 MIU/L-ACNC: 0.6 MU/L (ref 0.4–4)
VLDL CHOLESTEROL: 35.8 MG/DL (ref 7–32)

## 2020-01-13 NOTE — TELEPHONE ENCOUNTER
Chief Complaint : Return-1 yr    Hx/Sx: BPH    Records/Orders: Available     Pt Contacted: n/a    At Rooming: Normal

## 2020-01-21 ENCOUNTER — OFFICE VISIT (OUTPATIENT)
Dept: UROLOGY | Facility: CLINIC | Age: 60
End: 2020-01-21
Payer: COMMERCIAL

## 2020-01-21 VITALS
DIASTOLIC BLOOD PRESSURE: 61 MMHG | BODY MASS INDEX: 33.44 KG/M2 | HEIGHT: 69 IN | HEART RATE: 62 BPM | SYSTOLIC BLOOD PRESSURE: 100 MMHG | WEIGHT: 225.8 LBS

## 2020-01-21 DIAGNOSIS — R35.0 URINARY FREQUENCY: ICD-10-CM

## 2020-01-21 LAB — PSA SERPL-MCNC: 1.25 UG/L (ref 0–4)

## 2020-01-21 RX ORDER — TAMSULOSIN HYDROCHLORIDE 0.4 MG/1
0.8 CAPSULE ORAL DAILY
Qty: 180 CAPSULE | Refills: 4 | Status: SHIPPED | OUTPATIENT
Start: 2020-01-21 | End: 2021-04-05

## 2020-01-21 ASSESSMENT — PATIENT HEALTH QUESTIONNAIRE - PHQ9
10. IF YOU CHECKED OFF ANY PROBLEMS, HOW DIFFICULT HAVE THESE PROBLEMS MADE IT FOR YOU TO DO YOUR WORK, TAKE CARE OF THINGS AT HOME, OR GET ALONG WITH OTHER PEOPLE: SOMEWHAT DIFFICULT
SUM OF ALL RESPONSES TO PHQ QUESTIONS 1-9: 6
SUM OF ALL RESPONSES TO PHQ QUESTIONS 1-9: 6

## 2020-01-21 ASSESSMENT — PAIN SCALES - GENERAL: PAINLEVEL: NO PAIN (0)

## 2020-01-21 ASSESSMENT — MIFFLIN-ST. JEOR: SCORE: 1828.01

## 2020-01-21 NOTE — NURSING NOTE
"Chief Complaint   Patient presents with     RECHECK     BPH follow up       Blood pressure 100/61, pulse 62, height 1.75 m (5' 8.9\"), weight 102.4 kg (225 lb 12.8 oz). Body mass index is 33.44 kg/m .    Patient Active Problem List   Diagnosis     Obstructive sleep apnea     Insomnia     Excessive sleepiness     Chronic nasal congestion     Other and unspecified alcohol dependence, unspecified drinking behavior     Lesion of ulnar nerve     Hyperlipidemia     Essential hypertension     Obesity     Benign neoplasm of colon     Hyperlipidemia     Moderate major depression (H)     Gastric polyps     Impaired glucose tolerance test     Prediabetes       Allergies   Allergen Reactions     Grass        Current Outpatient Medications   Medication Sig Dispense Refill     acetaminophen (TYLENOL) 500 MG tablet Take 2 tablets by mouth every 6 hours as needed.       aspirin 81 MG tablet Take 1 tablet by mouth daily AM       atenolol (TENORMIN) 25 MG tablet Take 2 tablets (50 mg) by mouth daily 60 tablet 3     Caffeine 100 MG TABS Take 2 tablets by mouth daily as needed       citalopram (CELEXA) 10 MG tablet Take 1 tablet (10 mg) by mouth daily 90 tablet 1     DENTA 5000 PLUS 1.1 % CREA APPLY A PEA SIZED AMOUNT TO TOOTHBRUSH, BRUSH ONTO ROOT AREAS THOROUGHLY, THEN SPIT OUT AT BEDTIME.  2     fluticasone (FLONASE) 50 MCG/ACT nasal spray Spray 1-2 sprays into both nostrils daily 16 g 3     FLUZONE QUADRIVALENT 0.5 ML injection TO BE ADMINISTERED BY PHARMACIST FOR IMMUNIZATION  0     gabapentin (NEURONTIN) 300 MG capsule 1 in morning, 1 at midday, 3 at night 450 capsule 3     hydrochlorothiazide (HYDRODIURIL) 25 MG tablet Take 1 tablet (25 mg) by mouth daily 90 tablet 1     lisinopril (PRINIVIL/ZESTRIL) 40 MG tablet Take 1 tablet (40 mg) by mouth daily 90 tablet 1     Multiple Vitamin (DAILY MULTIVITAMIN PO) Take 1 tablet by mouth daily AM       Omega-3 Fatty Acids (FISH OIL) 1200 MG capsule Take 1 capsule by mouth daily AM       " omeprazole (PRILOSEC) 20 MG capsule Take 40 mg by mouth At Bedtime HS       ORDER FOR DME Use your CPAP device as directed by your provider.       PREVIDENT 5000 BOOSTER PLUS 1.1 % PSTE USE WHILE BRUSHING TEETH ONCE NIGHTLY AT BEDTIME. DO NOT RINSE AFTERWARDS.  0     simvastatin (ZOCOR) 40 MG tablet Take 1 tablet (40 mg) by mouth At Bedtime 90 tablet 3     tamsulosin (FLOMAX) 0.4 MG capsule Take 2 capsules (0.8 mg) by mouth daily 30 min after a meal 180 capsule 0     topiramate (TOPAMAX) 25 MG tablet PLEASE SEE ATTACHED FOR DETAILED DIRECTIONS  2       Social History     Tobacco Use     Smoking status: Never Smoker     Smokeless tobacco: Never Used   Substance Use Topics     Alcohol use: Yes     Alcohol/week: 0.0 standard drinks     Comment: In remission since 2009     Drug use: No       Moraima Otero CMA, RMA  1/21/2020  1:11 PM

## 2020-01-21 NOTE — PATIENT INSTRUCTIONS
- PSA today at the lab  - COntinue FLomax  - COntinue weight loss  - Schedule urodynamics and a follow up appointment with me.     Deena Matta

## 2020-01-21 NOTE — LETTER
"1/21/2020       RE: Juan Francisco Booker  [address]  [place] [address]     Dear Colleague,    Thank you for referring your patient, Juan Francisco Booker, to the [facility] UROLOGY AND INST FOR PROSTATE AND UROLOGIC CANCERS at [facility]. Please see a copy of my visit note below.    HPI: Mr. Juan Francisco Booker is a 59 year old year old male presenting today for evaluation of chief complaint(s): RECHECK (BPH follow up)    HPI: . Juan Francisco Booker has PMH significant for HTN, HLD, gerd, obesity, prediabetes (HGB A1C 6.1%), depression, TIMOTHY, BPH (on flomax 0.8mg daily - started 8/29/18). He was seen on 12/5/18 in Urology clinic with urinary frequency.  At that time his UA was normal, PVR was zero, CM was normal.  He was eating 2 boxes of Outside frozen fruit bars per day and taking caffeine pills to help him stay awake.  A bladder diary revealed 8 ounces of water and 19 outside bars per day.  He voided 7 times in 24 hours.  Void volumes ranged between 25-100cc.     Today he returns in routine followup.  Sts symptoms are stable, not terrible.  Chief complaint - He still feels he has difficulty finishing his urine stream.  Oftentimes will feel he is finished but will shift position or stand up and get another urge to void - will pass another few drops of urine but sometimes quite a bit more.  No significant daytime frequency or urgency.  Nocturia x1-2.  Still takes flomax - unsure whether or not it helps.  No dysuria or hematuria. AUA SS: 21 (4,4,1,5,2,2,3) \"mixed\".     - Has cut down on the fruit popsicles.  Is trying to lose weight.  Has lost 70lbs over the past year.  Drinks some water while working out.  Drinks a little water while watching TV or reading. Typically - 32 ounces of water per day.  Sometimes tea if he goes out to eat.   - Walks in the summer but walks on the treadmill in the winter.      Bowels:  \"Like the bladder\" - sometimes he has urge but cannot defecate fully.  If he " waits long enough, he will eventually pass stool.  Texture is soft. Recent colonoscopy shows precancerous lesions - will need more frequent scoping as a result.   Neuro: Does have low back pain but no disc issues - pain worse with staying still, better with movement. Has tried PT with some small amounts of benefit. No LE numbness, tingling, loss of coordination. No blurry vision, didplopia.  Manually dexterity unchanged.       REVIEW OF DIAGNOSTICS:  01/13/20 - Creatinine 0.9mg/dL  12/19/18 - PSA 1.58ng/dL.   10/30/18 - UA negative  10/24/18 - Creatinine 0.9mg/dL  3/29/18 - UA negative    Current Outpatient Medications   Medication Sig Dispense Refill     acetaminophen (TYLENOL) 500 MG tablet Take 2 tablets by mouth every 6 hours as needed.       aspirin 81 MG tablet Take 1 tablet by mouth daily AM       atenolol (TENORMIN) 25 MG tablet Take 2 tablets (50 mg) by mouth daily 60 tablet 3     Caffeine 100 MG TABS Take 2 tablets by mouth daily as needed       citalopram (CELEXA) 10 MG tablet Take 1 tablet (10 mg) by mouth daily 90 tablet 1     DENTA 5000 PLUS 1.1 % CREA APPLY A PEA SIZED AMOUNT TO TOOTHBRUSH, BRUSH ONTO ROOT AREAS THOROUGHLY, THEN SPIT OUT AT BEDTIME.  2     fluticasone (FLONASE) 50 MCG/ACT nasal spray Spray 1-2 sprays into both nostrils daily 16 g 3     FLUZONE QUADRIVALENT 0.5 ML injection TO BE ADMINISTERED BY PHARMACIST FOR IMMUNIZATION  0     gabapentin (NEURONTIN) 300 MG capsule 1 in morning, 1 at midday, 3 at night 450 capsule 3     hydrochlorothiazide (HYDRODIURIL) 25 MG tablet Take 1 tablet (25 mg) by mouth daily 90 tablet 1     lisinopril (PRINIVIL/ZESTRIL) 40 MG tablet Take 1 tablet (40 mg) by mouth daily 90 tablet 1     Multiple Vitamin (DAILY MULTIVITAMIN PO) Take 1 tablet by mouth daily AM       Omega-3 Fatty Acids (FISH OIL) 1200 MG capsule Take 1 capsule by mouth daily AM       omeprazole (PRILOSEC) 20 MG capsule Take 40 mg by mouth At Bedtime HS       ORDER FOR DME Use your CPAP device  "as directed by your provider.       PREVIDENT 5000 BOOSTER PLUS 1.1 % PSTE USE WHILE BRUSHING TEETH ONCE NIGHTLY AT BEDTIME. DO NOT RINSE AFTERWARDS.  0     simvastatin (ZOCOR) 40 MG tablet Take 1 tablet (40 mg) by mouth At Bedtime 90 tablet 3     tamsulosin (FLOMAX) 0.4 MG capsule Take 2 capsules (0.8 mg) by mouth daily 30 min after a meal 180 capsule 0     topiramate (TOPAMAX) 25 MG tablet PLEASE SEE ATTACHED FOR DETAILED DIRECTIONS  2       ALLERGIES: Grass      REVIEW OF SYSTEMS:  As above in HPI  GENERAL PHYSICAL EXAM:   Vitals: /61   Pulse 62   Ht 1.75 m (5' 8.9\")   Wt 102.4 kg (225 lb 12.8 oz)   BMI 33.44 kg/m     Body mass index is 33.44 kg/m .    GENERAL: Well groomed, well developed, well nourished male in NAD.  HEAD: Normocephalic. No masses, lesions, tenderness or abnormalities  NECK: Neck supple. No adenopathy. Thyroid enlarged, firm, no nodules (12/2018 thyroid US shows heterogeneity but no nodules)    GI: Soft, NT, ND, no palpable masses.    No CVAT bilaterally.    MS: Full ROM in extremities, gait normal, normal muscle tone  SKIN: Warm to touch, dry.  + multiple skin tags  HEMATOLOGIC/LYMPHATIC/IMMUNOLOGIC: normal ant/post cervical, supraclavicular nodes. No LE edema.  NEURO: Alert and oriented x 3.  PSYCH: Normal mood and affect, pleasant and agreeable during interview and exam.    CM:      Normal rectal tone, small soft amount of stool in the rectal vault.      Medium sized prostate, without tenderness, nodules or asymmetry.    RADIOLOGY: The following tests were reviewed:   EXAMINATION: US THYROID, 12/7/2018 1:20 PM      COMPARISON: Correlation with thyroid scan 12/4/2018     HISTORY: Subclinical hypothyroidism     Technique: Grayscale and color ultrasound imaging of the thyroid was  performed.     Findings:    Thyroid parenchyma: Markedly heterogeneous.     The right lobe of the thyroid measures: 2.6 x 1.7 x 4.6 cm      The thyroid isthmus measures: 0.34 cm      The left lobe of the " thyroid measures: 2.4 x 2 x 4.7 cm      Right lobe:  Nodule 1:  Nodule measurement: 0.7 x 0.5 x 0.7 cm  Echogenicity: Hypoechoic  Consistency: Mixed cystic solid  Calcifications: None  Hypervascular: no  Interval growth (>20%): N/A     Isthmus:    N/A     Left Lobe:   Nodule 1: Ill-defined hypoechoic abnormality in the superior aspect of  left thyroid lobe.  Nodule measurement: 1.4 x 1.1 x 1.1 cm   Echogenicity: Hypoechoic  Consistency: solid  Calcifications: None  Hypervascular: yes  Interval growth (>20%): N/A     Nodule 2:  Nodule measurement: 0.8 x 0.4 x 0.7  Echogenicity: Isoechoic  Consistency: Mixed cystic solid  Calcifications: no  Hypervascular: no  Interval growth (>20%): N/A                                                                 Impression:  Marked heterogeneity of the thyroid gland, in keeping with sequela of  thyroiditis. This would explain asymmetric heterogeneous uptake on  recent thyroid scintigraphy.    LABS: The last test results for Mr. Juan Francisco Booker were reviewed:  PSA -   Lab Results   Component Value Date    PSA 1.58 12/19/2018    PSA 1.26 11/27/2018     BMP -   Recent Labs   Lab Test 01/13/20  1321 10/24/18  1354 12/28/17  1400   .0 137.4 136.3   POTASSIUM 4.0 4.1 4.1   CHLORIDE 102.0 100.9 99.1   CO2 31.0 27.7 30.0   BUN 14.2 18.2 15.6   CR 0.9 0.9 0.9   GLC 78.8 85.0  106.8* 152.4*   ZHEN 9.7 9.3 9.5       CBC -   Recent Labs   Lab Test 10/24/18  1354 11/08/16  1206 09/21/16  1433   HGB 14.1 13.4 12.8*       ASSESSMENT:   1) Urgency/ postvoid dribble.   2) BPH   3) prostate cancer screening    PLAN:   - PSA today at the lab  - Continue FLomax - refilled today  - Continue weight loss  - Schedule urodynamics and a follow up appointment with me.  Patient would be interested in additional medications or even procedures/ surgeries to optimize his voiding symptoms    Deena Matta PA-C  Department of Urologic Surgery

## 2020-01-21 NOTE — PROGRESS NOTES
"HPI: Mr. Juan Francisco Booker is a 59 year old year old male presenting today for evaluation of chief complaint(s): RECHECK (BPH follow up)    HPI: Mr. Juan Francisco Booker has PMH significant for HTN, HLD, gerd, obesity, prediabetes (HGB A1C 6.1%), depression, TIMOTHY, BPH (on flomax 0.8mg daily - started 8/29/18). He was seen on 12/5/18 in Urology clinic with urinary frequency.  At that time his UA was normal, PVR was zero, CM was normal.  He was eating 2 boxes of Outside frozen fruit bars per day and taking caffeine pills to help him stay awake.  A bladder diary revealed 8 ounces of water and 19 outside bars per day.  He voided 7 times in 24 hours.  Void volumes ranged between 25-100cc.     Today he returns in routine followup.  Sts symptoms are stable, not terrible.  Chief complaint - He still feels he has difficulty finishing his urine stream.  Oftentimes will feel he is finished but will shift position or stand up and get another urge to void - will pass another few drops of urine but sometimes quite a bit more.  No significant daytime frequency or urgency.  Nocturia x1-2.  Still takes flomax - unsure whether or not it helps.  No dysuria or hematuria. AUA SS: 21 (4,4,1,5,2,2,3) \"mixed\".     - Has cut down on the fruit popsicles.  Is trying to lose weight.  Has lost 70lbs over the past year.  Drinks some water while working out.  Drinks a little water while watching TV or reading. Typically - 32 ounces of water per day.  Sometimes tea if he goes out to eat.   - Walks in the summer but walks on the treadmill in the winter.      Bowels:  \"Like the bladder\" - sometimes he has urge but cannot defecate fully.  If he waits long enough, he will eventually pass stool.  Texture is soft. Recent colonoscopy shows precancerous lesions - will need more frequent scoping as a result.   Neuro: Does have low back pain but no disc issues - pain worse with staying still, better with movement. Has tried PT with some small amounts of benefit. No LE " numbness, tingling, loss of coordination. No blurry vision, didplopia.  Manually dexterity unchanged.       REVIEW OF DIAGNOSTICS:  01/13/20 - Creatinine 0.9mg/dL  12/19/18 - PSA 1.58ng/dL.   10/30/18 - UA negative  10/24/18 - Creatinine 0.9mg/dL  3/29/18 - UA negative    Current Outpatient Medications   Medication Sig Dispense Refill     acetaminophen (TYLENOL) 500 MG tablet Take 2 tablets by mouth every 6 hours as needed.       aspirin 81 MG tablet Take 1 tablet by mouth daily AM       atenolol (TENORMIN) 25 MG tablet Take 2 tablets (50 mg) by mouth daily 60 tablet 3     Caffeine 100 MG TABS Take 2 tablets by mouth daily as needed       citalopram (CELEXA) 10 MG tablet Take 1 tablet (10 mg) by mouth daily 90 tablet 1     DENTA 5000 PLUS 1.1 % CREA APPLY A PEA SIZED AMOUNT TO TOOTHBRUSH, BRUSH ONTO ROOT AREAS THOROUGHLY, THEN SPIT OUT AT BEDTIME.  2     fluticasone (FLONASE) 50 MCG/ACT nasal spray Spray 1-2 sprays into both nostrils daily 16 g 3     FLUZONE QUADRIVALENT 0.5 ML injection TO BE ADMINISTERED BY PHARMACIST FOR IMMUNIZATION  0     gabapentin (NEURONTIN) 300 MG capsule 1 in morning, 1 at midday, 3 at night 450 capsule 3     hydrochlorothiazide (HYDRODIURIL) 25 MG tablet Take 1 tablet (25 mg) by mouth daily 90 tablet 1     lisinopril (PRINIVIL/ZESTRIL) 40 MG tablet Take 1 tablet (40 mg) by mouth daily 90 tablet 1     Multiple Vitamin (DAILY MULTIVITAMIN PO) Take 1 tablet by mouth daily AM       Omega-3 Fatty Acids (FISH OIL) 1200 MG capsule Take 1 capsule by mouth daily AM       omeprazole (PRILOSEC) 20 MG capsule Take 40 mg by mouth At Bedtime HS       ORDER FOR DME Use your CPAP device as directed by your provider.       PREVIDENT 5000 BOOSTER PLUS 1.1 % PSTE USE WHILE BRUSHING TEETH ONCE NIGHTLY AT BEDTIME. DO NOT RINSE AFTERWARDS.  0     simvastatin (ZOCOR) 40 MG tablet Take 1 tablet (40 mg) by mouth At Bedtime 90 tablet 3     tamsulosin (FLOMAX) 0.4 MG capsule Take 2 capsules (0.8 mg) by mouth daily  "30 min after a meal 180 capsule 0     topiramate (TOPAMAX) 25 MG tablet PLEASE SEE ATTACHED FOR DETAILED DIRECTIONS  2       ALLERGIES: Grass      REVIEW OF SYSTEMS:  As above in HPI  GENERAL PHYSICAL EXAM:   Vitals: /61   Pulse 62   Ht 1.75 m (5' 8.9\")   Wt 102.4 kg (225 lb 12.8 oz)   BMI 33.44 kg/m    Body mass index is 33.44 kg/m .    GENERAL: Well groomed, well developed, well nourished male in NAD.  HEAD: Normocephalic. No masses, lesions, tenderness or abnormalities  NECK: Neck supple. No adenopathy. Thyroid enlarged, firm, no nodules (12/2018 thyroid US shows heterogeneity but no nodules)    GI: Soft, NT, ND, no palpable masses.    No CVAT bilaterally.    MS: Full ROM in extremities, gait normal, normal muscle tone  SKIN: Warm to touch, dry.  + multiple skin tags  HEMATOLOGIC/LYMPHATIC/IMMUNOLOGIC: normal ant/post cervical, supraclavicular nodes. No LE edema.  NEURO: Alert and oriented x 3.  PSYCH: Normal mood and affect, pleasant and agreeable during interview and exam.    CM:      Normal rectal tone, small soft amount of stool in the rectal vault.      Medium sized prostate, without tenderness, nodules or asymmetry.    RADIOLOGY: The following tests were reviewed:   EXAMINATION: US THYROID, 12/7/2018 1:20 PM      COMPARISON: Correlation with thyroid scan 12/4/2018     HISTORY: Subclinical hypothyroidism     Technique: Grayscale and color ultrasound imaging of the thyroid was  performed.     Findings:    Thyroid parenchyma: Markedly heterogeneous.     The right lobe of the thyroid measures: 2.6 x 1.7 x 4.6 cm      The thyroid isthmus measures: 0.34 cm      The left lobe of the thyroid measures: 2.4 x 2 x 4.7 cm      Right lobe:  Nodule 1:  Nodule measurement: 0.7 x 0.5 x 0.7 cm  Echogenicity: Hypoechoic  Consistency: Mixed cystic solid  Calcifications: None  Hypervascular: no  Interval growth (>20%): N/A     Isthmus:    N/A     Left Lobe:   Nodule 1: Ill-defined hypoechoic abnormality in the " superior aspect of  left thyroid lobe.  Nodule measurement: 1.4 x 1.1 x 1.1 cm   Echogenicity: Hypoechoic  Consistency: solid  Calcifications: None  Hypervascular: yes  Interval growth (>20%): N/A     Nodule 2:  Nodule measurement: 0.8 x 0.4 x 0.7  Echogenicity: Isoechoic  Consistency: Mixed cystic solid  Calcifications: no  Hypervascular: no  Interval growth (>20%): N/A                                                                 Impression:  Marked heterogeneity of the thyroid gland, in keeping with sequela of  thyroiditis. This would explain asymmetric heterogeneous uptake on  recent thyroid scintigraphy.    LABS: The last test results for Mr. Juan Francisco Booker were reviewed:  PSA -   Lab Results   Component Value Date    PSA 1.58 12/19/2018    PSA 1.26 11/27/2018     BMP -   Recent Labs   Lab Test 01/13/20  1321 10/24/18  1354 12/28/17  1400   .0 137.4 136.3   POTASSIUM 4.0 4.1 4.1   CHLORIDE 102.0 100.9 99.1   CO2 31.0 27.7 30.0   BUN 14.2 18.2 15.6   CR 0.9 0.9 0.9   GLC 78.8 85.0  106.8* 152.4*   ZHEN 9.7 9.3 9.5       CBC -   Recent Labs   Lab Test 10/24/18  1354 11/08/16  1206 09/21/16  1433   HGB 14.1 13.4 12.8*       ASSESSMENT:   1) Urgency/ postvoid dribble.   2) BPH   3) prostate cancer screening    PLAN:   - PSA today at the lab  - Continue FLomax - refilled today  - Continue weight loss  - Schedule urodynamics and a follow up appointment with me.  Patient would be interested in additional medications or even procedures/ surgeries to optimize his voiding symptoms    Deena Matta PA-C  Department of Urologic Surgery

## 2020-01-30 RX ORDER — FLUTICASONE PROPIONATE 50 MCG
1-2 SPRAY, SUSPENSION (ML) NASAL DAILY
Qty: 16 G | Refills: 3 | Status: SHIPPED | OUTPATIENT
Start: 2020-01-30 | End: 2020-07-08

## 2020-01-30 NOTE — TELEPHONE ENCOUNTER
Chief Complaint   Patient presents with     Refill Request     Fluticasone 50 mcg      Refill request received via fax by pharmacy   [id number] - [place], [place] - [address] tel:[phone]        Pending Prescriptions:                       Disp   Refills    fluticasone (FLONASE) 50 MCG/ACT nasal sp*16 g   3            Sig: Spray 1-2 sprays into both nostrils daily         Last Written Prescription Date:  10/01/19  Last Fill Quantity: 16 g,   # refills: 3  Last Office Visit with prescribing provider: 03/04/2019  Future Office visit: No appointment made. Per clinic note patient to RTC 1yr. Called made to patient to call and schedule yearly follow up.      Routing refill request to provider for review/approval because:  Drug not on the [facility], P or [facility] refill protocol or controlled substance  Olivia Joseph [facility] ~[place]

## 2020-02-04 ENCOUNTER — PRE VISIT (OUTPATIENT)
Dept: UROLOGY | Facility: CLINIC | Age: 60
End: 2020-02-04

## 2020-02-04 NOTE — TELEPHONE ENCOUNTER
Chief Complaint : PSA check, UDS follow up    Hx/Sx: BPH, urgency/postvoid dribble    Records/Orders: Available    Pt Contacted: n/a    At Rooming: normal

## 2020-02-06 ENCOUNTER — PRE VISIT (OUTPATIENT)
Dept: UROLOGY | Facility: CLINIC | Age: 60
End: 2020-02-06

## 2020-02-11 ENCOUNTER — OFFICE VISIT (OUTPATIENT)
Dept: UROLOGY | Facility: CLINIC | Age: 60
End: 2020-02-11
Payer: COMMERCIAL

## 2020-02-11 ENCOUNTER — ANCILLARY PROCEDURE (OUTPATIENT)
Dept: RADIOLOGY | Facility: AMBULATORY SURGERY CENTER | Age: 60
End: 2020-02-11
Attending: NURSE PRACTITIONER
Payer: COMMERCIAL

## 2020-02-11 VITALS
DIASTOLIC BLOOD PRESSURE: 66 MMHG | BODY MASS INDEX: 32.58 KG/M2 | WEIGHT: 220 LBS | HEART RATE: 64 BPM | SYSTOLIC BLOOD PRESSURE: 97 MMHG | HEIGHT: 69 IN

## 2020-02-11 DIAGNOSIS — R35.0 URINARY FREQUENCY: Primary | ICD-10-CM

## 2020-02-11 DIAGNOSIS — R39.12 BENIGN PROSTATIC HYPERPLASIA WITH WEAK URINARY STREAM: ICD-10-CM

## 2020-02-11 DIAGNOSIS — R32 INCONTINENCE: ICD-10-CM

## 2020-02-11 DIAGNOSIS — N40.1 BENIGN PROSTATIC HYPERPLASIA WITH WEAK URINARY STREAM: ICD-10-CM

## 2020-02-11 LAB
ALBUMIN UR-MCNC: NEGATIVE MG/DL
APPEARANCE UR: CLEAR
BILIRUB UR QL STRIP: NEGATIVE
COLOR UR AUTO: YELLOW
GLUCOSE UR STRIP-MCNC: NEGATIVE MG/DL
HGB UR QL STRIP: ABNORMAL
KETONES UR STRIP-MCNC: NEGATIVE MG/DL
LEUKOCYTE ESTERASE UR QL STRIP: NEGATIVE
NITRATE UR QL: NEGATIVE
PH UR STRIP: 7.5 PH (ref 5–7)
SP GR UR STRIP: 1.02 (ref 1–1.03)
UROBILINOGEN UR STRIP-ACNC: 1 EU/DL (ref 0.2–1)

## 2020-02-11 RX ORDER — SULFAMETHOXAZOLE/TRIMETHOPRIM 800-160 MG
1 TABLET ORAL ONCE
Status: COMPLETED | OUTPATIENT
Start: 2020-02-11 | End: 2020-02-11

## 2020-02-11 RX ADMIN — SULFAMETHOXAZOLE AND TRIMETHOPRIM 1 TABLET: 800; 160 TABLET ORAL at 14:55

## 2020-02-11 ASSESSMENT — PAIN SCALES - GENERAL: PAINLEVEL: NO PAIN (0)

## 2020-02-11 ASSESSMENT — MIFFLIN-ST. JEOR: SCORE: 1798.29

## 2020-02-11 NOTE — LETTER
2/11/2020     RE: Juan Francisco Booker  [address]  [place] [address]     Dear Colleague,    Thank you for referring your patient, Juan Francisco Booker, to the [facility] AND [facility] FOR PROSTATE AND UROLOGIC CANCERS at [facility]. Please see a copy of my visit note below.    PREPROCEDURE DIAGNOSES:    1. BPH with urinary urgency and post-void dribbling    POSTPROCEDURE DIAGNOSES:  -Large bladder capacity (859 mL) with heightened early sensations.  -Good bladder compliance without DO/DOI.  -No JOSEPH.  -Moderate detrusor contraction during voiding to max Pdet ~35 cm H2O, which he supplements with Valsalva effort. He first attempts to void from a seated position on the [employer] chair, but is then assisted to standing for a second attempt.  -Borderline slow flow rate (Qmax 12 mL/s) with initial voiding in the UDS suite, with an intermittent, fluctuating flow curve and incomplete bladder emptying ( mL). He then voids to completion into a private uroflow, with a final PVR of 0 mL.   -Increased EMG activity during voiding, which possibly correlates with Valsalva effort/movement, although could also suggest pelvic floor involvement.  -BOOI does not suggest bladder outlet obstruction.  -Fluoroscopy reveals a smooth bladder wall without diverticulae or VUR. The bladder neck was closed during filling and open during voiding.    PROCEDURE:    1. Sterile urethral catheterization for measurement of postvoid residual urine volume.  2. Complex filling cystometrogram with measurement of bladder and rectal pressures.  3. Complex voiding cystometrogram with measurement of bladder and rectal pressures.  4. Electromyography of the pelvic floor during urodynamics.  5. Fluoroscopic imaging of the bladder during urodynamics, at least 3 views.    6. Interpretation of urodynamics and flouroscopic imaging.      INDICATIONS FOR PROCEDURE:  Mr. Juan Francisco Booker is a pleasant 60 year old male with BPH  with urinary urgency and post-void dribbling. Baseline video urodynamic assessment is requested today by Deena Matta PA-C to better characterize Mr. Juan Francisco Booker's voiding dysfunction.      VOIDING DIARY:  Not completed.    DESCRIPTION OF PROCEDURE:  Risks, benefits, and alternatives to urodynamics were discussed with the patient and he wished to proceed.  Urodynamics are planned to better assess the primary etiology for Mr. Booker's urologic dysfunction.  The patient does not currently take anticholinergic medication.  After informed consent was obtained, the patient was taken to the procedure room where the study was initiated. Findings below.     PRE-STUDY UROFLOWMETRY:  Postvoid residual by catheter: 60 mL.  Pretest urine dipstick was negative for leukocytes and nitrites.    Next a 7F double-lumen urodynamics catheter was inserted into the bladder under sterile technique via the urethra.  A 7F abdominal manometry catheter was placed in the rectum.  EMG pads were placed on both sides of the anal verge.  The bladder was filled with 200 mL of Omnipaque at 30 mL/minute and serial pressures were recorded.  With coughing there was an appropriate rise in vesical and abdominal pressures with no change in detrusor pressure, confirming good study catheter placement.    DURING THE FILLING PHASE:  First sensation: 136 mL.  First Desire: 156 mL.  Strong Desire: 276 mL.  Maximum Capacity: 859 mL.    Uninhibited detrusor contractions: None.  Compliance: Good. PDet=9.7 cmH20 at capacity. Compliance ratio of 88.  Continence: No DOI or JOSEPH.  EMG: Mostly concordant during filling.    DURING THE VOIDING PHASE:  Maximum detrusor contraction with void: ~35 cm of H2O pressure, which he supplements with Valsalva effort. He first attempts to void from a seated position on the [email or web address] chair, but is then assisted to standing for a second attempt.  Voided volume: 630 mL.  Maximum flow rate: 12 mL/sec.  Average flow rate: 2.9  mL/sec.  Postvoid Residual: 229 mL.  EMG activity: Increased, which possibly correlates with Valsalva effort/movement, although could suggest pelvic floor involvement.  Character of voiding curve: Intermittent, fluctuating.  BOOI: 2.5 (suggesting no obstruction - see key below)  [obstructed (PATTERSON index [BOOI] ? 40); equivocal (no definite   obstruction; BOOI 20-40); and no obstruction (BOOI ? 20)]    As the patient was unable to void to completion in the UDS suite, all catheters were removed, and he was allowed to finish voiding into a private uroflow. This yielded the following data:     Voided volume: 229 mL.  Maximum flow rate: 9.1 mL/sec.  Average flow rate: 4.0 mL/sec.  Postvoid Residual: 0 mL.  Character of voiding curve: Intermittent.    FLUOROSCOPIC IMAGING OF THE BLADDER DURING URODYNAMICS:  Please note, image numbers on UDS tracings correlate with iSite series numbers on PACS images. Fluoroscopy during today's procedure demonstrated a smooth bladder wall without diverticulae or cellules.  No vesicoureteral reflux was observed.  The bladder neck was closed during filling and open during voiding.  After voiding to completion, all catheters were removed and the patient was brought back into the consultation room to further discuss today's study results.      ASSESSMENT/PLAN:  Mr. Juan Francisco Booker is a pleasant 60 year old male with BPH with urinary urgency and post-void dribbling who demonstrated the following findings today on urodynamic evaluation:    -Large bladder capacity (859 mL) with heightened early sensations.  -Good bladder compliance without DO/DOI.  -No JOSEPH.  -Moderate detrusor contraction during voiding to max Pdet ~35 cm H2O, which he supplements with Valsalva effort. He first attempts to void from a seated position on the [email or web address] chair, but is then assisted to standing for a second attempt.  -Borderline slow flow rate (Qmax 12 mL/s) with initial voiding in the UDS suite, with an intermittent,  fluctuating flow curve and incomplete bladder emptying ( mL). He then voids to completion into a private uroflow, with a final PVR of 0 mL.   -Increased EMG activity during voiding, which possibly correlates with Valsalva effort/movement, although could also suggest pelvic floor involvement.  -BOOI does not suggest bladder outlet obstruction.  -Fluoroscopy reveals a smooth bladder wall without diverticulae or VUR. The bladder neck was closed during filling and open during voiding.    The patient will follow up as scheduled with Deena to further discuss today's study results and make plans for how best to proceed.      - A single Bactrim DS was provided for UTI prophylaxis following completion of today's study per department protocol.  The risk of UTI with VUDS is low at ~2.5-3%.      Thank you for allowing me to participate in the care of Mr. Juan Francisco Booker and please don't hesitate to contact me with any questions or concerns.      This procedure was performed under a collaborative agreement with Dr. Shiva Kent, Professor and  of Urology, [facility].    SUZIE Manrique, CNP  Department of Urology

## 2020-02-11 NOTE — PROGRESS NOTES
PREPROCEDURE DIAGNOSES:    1. BPH with urinary urgency and post-void dribbling    POSTPROCEDURE DIAGNOSES:  -Large bladder capacity (859 mL) with heightened early sensations.  -Good bladder compliance without DO/DOI.  -No JOSEPH.  -Moderate detrusor contraction during voiding to max Pdet ~35 cm H2O, which he supplements with Valsalva effort. He first attempts to void from a seated position on the [employer] chair, but is then assisted to standing for a second attempt.  -Borderline slow flow rate (Qmax 12 mL/s) with initial voiding in the UDS suite, with an intermittent, fluctuating flow curve and incomplete bladder emptying ( mL). He then voids to completion into a private uroflow, with a final PVR of 0 mL.   -Increased EMG activity during voiding, which possibly correlates with Valsalva effort/movement, although could also suggest pelvic floor involvement.  -BOOI does not suggest bladder outlet obstruction.  -Fluoroscopy reveals a smooth bladder wall without diverticulae or VUR. The bladder neck was closed during filling and open during voiding.    PROCEDURE:    1. Sterile urethral catheterization for measurement of postvoid residual urine volume.  2. Complex filling cystometrogram with measurement of bladder and rectal pressures.  3. Complex voiding cystometrogram with measurement of bladder and rectal pressures.  4. Electromyography of the pelvic floor during urodynamics.  5. Fluoroscopic imaging of the bladder during urodynamics, at least 3 views.    6. Interpretation of urodynamics and flouroscopic imaging.      INDICATIONS FOR PROCEDURE:  Mr. Juan Francisco Booker is a pleasant 60 year old male with BPH with urinary urgency and post-void dribbling. Baseline video urodynamic assessment is requested today by Deena Matta PA-C to better characterize Mr. Juan Francisco Booker's voiding dysfunction.      VOIDING DIARY:  Not completed.    DESCRIPTION OF PROCEDURE:  Risks, benefits, and alternatives to urodynamics were discussed  with the patient and he wished to proceed.  Urodynamics are planned to better assess the primary etiology for Mr. Booker's urologic dysfunction.  The patient does not currently take anticholinergic medication.  After informed consent was obtained, the patient was taken to the procedure room where the study was initiated. Findings below.     PRE-STUDY UROFLOWMETRY:  Postvoid residual by catheter: 60 mL.  Pretest urine dipstick was negative for leukocytes and nitrites.    Next a 7F double-lumen urodynamics catheter was inserted into the bladder under sterile technique via the urethra.  A 7F abdominal manometry catheter was placed in the rectum.  EMG pads were placed on both sides of the anal verge.  The bladder was filled with 200 mL of Omnipaque at 30 mL/minute and serial pressures were recorded.  With coughing there was an appropriate rise in vesical and abdominal pressures with no change in detrusor pressure, confirming good study catheter placement.    DURING THE FILLING PHASE:  First sensation: 136 mL.  First Desire: 156 mL.  Strong Desire: 276 mL.  Maximum Capacity: 859 mL.    Uninhibited detrusor contractions: None.  Compliance: Good. PDet=9.7 cmH20 at capacity. Compliance ratio of 88.  Continence: No DOI or JOSEPH.  EMG: Mostly concordant during filling.    DURING THE VOIDING PHASE:  Maximum detrusor contraction with void: ~35 cm of H2O pressure, which he supplements with Valsalva effort. He first attempts to void from a seated position on the [employer] chair, but is then assisted to standing for a second attempt.  Voided volume: 630 mL.  Maximum flow rate: 12 mL/sec.  Average flow rate: 2.9 mL/sec.  Postvoid Residual: 229 mL.  EMG activity: Increased, which possibly correlates with Valsalva effort/movement, although could suggest pelvic floor involvement.  Character of voiding curve: Intermittent, fluctuating.  BOOI: 2.5 (suggesting no obstruction - see key below)  [obstructed (PATTERSON index [BOOI] ? 40); equivocal (no  definite   obstruction; BOOI 20-40); and no obstruction (BOOI ? 20)]    As the patient was unable to void to completion in the UDS suite, all catheters were removed, and he was allowed to finish voiding into a private uroflow. This yielded the following data:     Voided volume: 229 mL.  Maximum flow rate: 9.1 mL/sec.  Average flow rate: 4.0 mL/sec.  Postvoid Residual: 0 mL.  Character of voiding curve: Intermittent.    FLUOROSCOPIC IMAGING OF THE BLADDER DURING URODYNAMICS:  Please note, image numbers on UDS tracings correlate with iSite series numbers on PACS images. Fluoroscopy during today's procedure demonstrated a smooth bladder wall without diverticulae or cellules.  No vesicoureteral reflux was observed.  The bladder neck was closed during filling and open during voiding.  After voiding to completion, all catheters were removed and the patient was brought back into the consultation room to further discuss today's study results.      ASSESSMENT/PLAN:  Mr. Juan Francisco Booker is a pleasant 60 year old male with BPH with urinary urgency and post-void dribbling who demonstrated the following findings today on urodynamic evaluation:    -Large bladder capacity (859 mL) with heightened early sensations.  -Good bladder compliance without DO/DOI.  -No JOSEPH.  -Moderate detrusor contraction during voiding to max Pdet ~35 cm H2O, which he supplements with Valsalva effort. He first attempts to void from a seated position on the [employer] chair, but is then assisted to standing for a second attempt.  -Borderline slow flow rate (Qmax 12 mL/s) with initial voiding in the UDS suite, with an intermittent, fluctuating flow curve and incomplete bladder emptying ( mL). He then voids to completion into a private uroflow, with a final PVR of 0 mL.   -Increased EMG activity during voiding, which possibly correlates with Valsalva effort/movement, although could also suggest pelvic floor involvement.  -BOOI does not suggest bladder  outlet obstruction.  -Fluoroscopy reveals a smooth bladder wall without diverticulae or VUR. The bladder neck was closed during filling and open during voiding.    The patient will follow up as scheduled with Deena to further discuss today's study results and make plans for how best to proceed.      - A single Bactrim DS was provided for UTI prophylaxis following completion of today's study per department protocol.  The risk of UTI with VUDS is low at ~2.5-3%.      Thank you for allowing me to participate in the care of Mr. Juan Francisco Booker and please don't hesitate to contact me with any questions or concerns.      This procedure was performed under a collaborative agreement with Dr. Shiva Kent, Professor and  of Urology, [facility].    SUZIE Manrique, CNP  Department of Urology

## 2020-02-11 NOTE — NURSING NOTE
UDS-Deena     Hx of urinary urgency     He reports the sensation of incomplete emptying and nocturia x2 associated with a weaker stream.  He has been on flomax for about 18 months      Chief Complaint   Patient presents with     Urodynamics Study     Urinary urgency        There were no vitals taken for this visit. There is no height or weight on file to calculate BMI.    Patient Active Problem List   Diagnosis     Obstructive sleep apnea     Insomnia     Excessive sleepiness     Chronic nasal congestion     Other and unspecified alcohol dependence, unspecified drinking behavior     Lesion of ulnar nerve     Hyperlipidemia     Essential hypertension     Obesity     Benign neoplasm of colon     Hyperlipidemia     Moderate major depression (H)     Gastric polyps     Impaired glucose tolerance test     Prediabetes       Allergies   Allergen Reactions     Grass        Current Outpatient Medications   Medication Sig Dispense Refill     acetaminophen (TYLENOL) 500 MG tablet Take 2 tablets by mouth every 6 hours as needed.       aspirin 81 MG tablet Take 1 tablet by mouth daily AM       atenolol (TENORMIN) 25 MG tablet Take 2 tablets (50 mg) by mouth daily 60 tablet 3     Caffeine 100 MG TABS Take 2 tablets by mouth daily as needed       citalopram (CELEXA) 10 MG tablet Take 1 tablet (10 mg) by mouth daily 90 tablet 1     DENTA 5000 PLUS 1.1 % CREA APPLY A PEA SIZED AMOUNT TO TOOTHBRUSH, BRUSH ONTO ROOT AREAS THOROUGHLY, THEN SPIT OUT AT BEDTIME.  2     fluticasone (FLONASE) 50 MCG/ACT nasal spray Spray 1-2 sprays into both nostrils daily 16 g 3     FLUZONE QUADRIVALENT 0.5 ML injection TO BE ADMINISTERED BY PHARMACIST FOR IMMUNIZATION  0     gabapentin (NEURONTIN) 300 MG capsule 1 in morning, 1 at midday, 3 at night 450 capsule 3     hydrochlorothiazide (HYDRODIURIL) 25 MG tablet Take 1 tablet (25 mg) by mouth daily 90 tablet 1     lisinopril (PRINIVIL/ZESTRIL) 40 MG tablet Take 1 tablet (40 mg) by mouth daily 90 tablet 1      Multiple Vitamin (DAILY MULTIVITAMIN PO) Take 1 tablet by mouth daily AM       Omega-3 Fatty Acids (FISH OIL) 1200 MG capsule Take 1 capsule by mouth daily AM       omeprazole (PRILOSEC) 20 MG capsule Take 40 mg by mouth At Bedtime HS       ORDER FOR DME Use your CPAP device as directed by your provider.       PREVIDENT 5000 BOOSTER PLUS 1.1 % PSTE USE WHILE BRUSHING TEETH ONCE NIGHTLY AT BEDTIME. DO NOT RINSE AFTERWARDS.  0     simvastatin (ZOCOR) 40 MG tablet Take 1 tablet (40 mg) by mouth At Bedtime 90 tablet 3     tamsulosin (FLOMAX) 0.4 MG capsule Take 2 capsules (0.8 mg) by mouth daily 30 min after a meal 180 capsule 4     topiramate (TOPAMAX) 25 MG tablet PLEASE SEE ATTACHED FOR DETAILED DIRECTIONS  2       Social History     Tobacco Use     Smoking status: Never Smoker     Smokeless tobacco: Never Used   Substance Use Topics     Alcohol use: Yes     Alcohol/week: 0.0 standard drinks     Comment: In remission since      Drug use: No       Invasive Procedure Safety Checklist:    Procedure: Urodynamics    Action: Complete sections and checkboxes as appropriate.  Pre-procedure:  1. Patient ID Verified with 2 identifiers (Johana and  or MRN) : YES    2. Procedure and site verified with patient/designee (when able) : YES    3. Accurate consent documentation in medical record : YES    4. H&P (or appropriate assessment) documented in medical record : N/A  H&P must be up to 30 days prior to procedure an updated within 24 hours of Procedure as applicable.     5. Relevant diagnostic and radiology test results appropriately labeled and displayed as applicable : YES    6. Blood products, implants, devices, and/or special equipment available for the procedure as applicable : YES    7. Procedure site(s) marked with provider initials [Exclusions: none] : NO    8. Marking not required. Reason : Yes  Procedure does not require site marking    Time Out:     Time-Out performed immediately prior to starting procedure,  including verbal and active participation of all team members addressing: YES    1. Correct patient identity.  2. Confirmed that the correct side and site are marked.  3. An accurate procedure to be done.  4. Agreement on the procedure to be done.  5. Correct patient position.  6. Relevant images and results are properly labeled and appropriately displayed.  7. The need to administer antibiotics or fluids for irrigation purposes during the procedure as applicable.  8. Safety precautions based on patient history or medication use.    During Procedure: Verification of correct person, site, and procedure occurs any time the responsibility for care of the patient is transferred to another member of the care team.    The following medication was given: Sulfamethoxazole / Trimethoprim    MEDICATION:  Bactrim  ROUTE: PO  SITE: Orally  DOSE: 800/160mg  LOT #: [id number]  : [employer]  EXPIRATION DATE: 11/2020  NDC#: [address]-07   Was there drug waste? No    Prior to administration, verified patient identity using patient's name and date of birth.  Due to administration, patient instructed to remain in clinic for 15 minutes  afterwards, and to report any adverse reaction to me immediately.    Drug Amount Wasted:  None.  Vial/Syringe: Single dose vial      The following medication was given:     MEDICATION:  Omnipaque (Iohexol Injection)  ROUTE: Provider Administered  SITE: Provider Administered via catheter  DOSE: 240mL  LOT #: [id number]  : [place]erian  EXPIRATION DATE: 29/03/2021  NDC#: [address]-90   Was there drug waste? No    Prior to injection, verified patient identity using patient's name and date of birth.  Due to injection administration, patient instructed to remain in clinic for 15 minutes  afterwards, and to report any adverse reaction to me immediately.    Drug Amount Wasted:  None.  Vial/Syringe: Single dose vial    Juan Francisco Vargasgue comes into clinic today at the request of Deena MILLARD  for Urodynamics.    Order has been verified: Yes.      The following medication was given: See Above    Insertion:  16 Fr Coude tipped vinyl straight catheter inserted into urethral meatus in the usual sterile fashion without difficulty.  Received 60 ml yellow urine output.     Patient did tolerate procedure well.    Patient instructed as to where to call or go for pain, fever, leakage, or decreased urine flow.     Patient instructed as to where to call or go for pain, fever, leakage, or decreased urine flow.     This service provided today was under the direct supervision of Peri Santoro, who was available if needed.    Nikko Jay, EMT  2/11/2020  2:51 PM

## 2020-02-11 NOTE — PATIENT INSTRUCTIONS
Follow up as planned with Deena to review your UDS on 2/18/20.    It was a pleasure meeting with you today.  Thank you for allowing me and my team the privilege of caring for you today.  YOU are the reason we are here, and I truly hope we provided you with the excellent service you deserve.  Please let us know if there is anything else we can do for you so that we can be sure you are leaving completely satisfied with your care experience.        Nikko Jay, EMT

## 2020-02-18 ENCOUNTER — OFFICE VISIT (OUTPATIENT)
Dept: UROLOGY | Facility: CLINIC | Age: 60
End: 2020-02-18
Payer: COMMERCIAL

## 2020-02-18 VITALS — HEART RATE: 66 BPM | SYSTOLIC BLOOD PRESSURE: 88 MMHG | DIASTOLIC BLOOD PRESSURE: 59 MMHG

## 2020-02-18 DIAGNOSIS — R35.0 URINARY FREQUENCY: Primary | ICD-10-CM

## 2020-02-18 ASSESSMENT — PAIN SCALES - GENERAL: PAINLEVEL: NO PAIN (0)

## 2020-02-18 NOTE — Clinical Note
2/18/2020       RE: Juan Francisco Booker  [address]  [place] [address]     Dear Colleague,    Thank you for referring your patient, Juan Francisco Booker, to the [facility] UROLOGY AND INST FOR PROSTATE AND UROLOGIC CANCERS at [facility]. Please see a copy of my visit note below.    HPI: Mr. Juan Francisco Booker is a 60 year old year old male presenting today for evaluation of chief complaint(s): Follow Up (UDS results, PSA check)    Mr. Juan Francisco Booker has PMH significant for HTN, HLD, gerd, obesity, prediabetes (HGB A1C 6.1%), depression, TIMOTHY, BPH (on flomax 0.8mg daily - started 8/29/18).   - He was seen on 12/5/18 in Urology clinic with urinary frequency.  At that time his UA was normal, PVR was zero, CM was normal.  He was eating 2 boxes of Outshine frozen fruit bars per day and taking caffeine pills to help him stay awake.  A bladder diary revealed 8 ounces of water and 19 outhine bars per day.  He voided 7 times in 24 hours.  Void volumes ranged between 25-100cc. He was asked to cut down on popsicles, drink more water, start flomax.   - At his last visit on 1/21/20 his symptoms were stable, not terrible, and AUA SS was 21 (4,4,1,5,2,2,3).  He had lost 70lbs and had cut down on fruit popsicles, replacing this with water.  Chief complaint was difficulty finishing urine stream. He was sent for urodynamics (2/11/20) which showed:  POSTPROCEDURE DIAGNOSES:  -Large bladder capacity (859 mL) with heightened early sensations.  -Good bladder compliance without DO/DOI.  -No JOSEPH.  -Moderate detrusor contraction during voiding to max Pdet ~35 cm H2O, which he supplements with Valsalva effort. He first attempts to void from a seated position on the [employer] chair, but is then assisted to standing for a second attempt.  -Borderline slow flow rate (Qmax 12 mL/s) with initial voiding in the UDS suite, with an intermittent, fluctuating flow curve and incomplete bladder emptying ( mL). He then  "voids to completion into a private uroflow, with a final PVR of 0 mL.   -Increased EMG activity during voiding, which possibly correlates with Valsalva effort/movement, although could also suggest pelvic floor involvement.  -BOOI does not suggest bladder outlet obstruction.  -Fluoroscopy reveals a smooth bladder wall without diverticulae or VUR. The bladder neck was closed during filling and open during voiding.    Today he returns to review UDS.  Continues on Flomax 0.8mg daily. Has now lost a total of 100lbs.  Symptoms feel worse today than usual.  AUA SS today is 17 (4,3,0,4,2,2,2) \"mostly disappointed\". Even harder to finish emptying.  Urinary frequency 6-7 times per day.  At night x1. He tells me his most significant problems is when he has to void in the middle of the night, he doesn't always get out of bed immediately.  He tries to fall back to sleep but often fails, thus he loses out on sleep.  He admits he should just get up right away to use the toilet.   FLuids:  Currently drinking 3 x12 ounce glasses of water per day. Occasional hot chocolate. May have tea with going to a restaurant.  Eating 18 outshine bars per week, which is a significant improvement        Current Outpatient Medications   Medication Sig Dispense Refill     acetaminophen (TYLENOL) 500 MG tablet Take 2 tablets by mouth every 6 hours as needed.       aspirin 81 MG tablet Take 1 tablet by mouth daily AM       atenolol (TENORMIN) 25 MG tablet Take 2 tablets (50 mg) by mouth daily 60 tablet 3     Caffeine 100 MG TABS Take 2 tablets by mouth daily as needed       citalopram (CELEXA) 10 MG tablet Take 1 tablet (10 mg) by mouth daily 90 tablet 1     DENTA 5000 PLUS 1.1 % CREA APPLY A PEA SIZED AMOUNT TO TOOTHBRUSH, BRUSH ONTO ROOT AREAS THOROUGHLY, THEN SPIT OUT AT BEDTIME.  2     fluticasone (FLONASE) 50 MCG/ACT nasal spray Spray 1-2 sprays into both nostrils daily 16 g 3     FLUZONE QUADRIVALENT 0.5 ML injection TO BE ADMINISTERED BY " PHARMACIST FOR IMMUNIZATION  0     gabapentin (NEURONTIN) 300 MG capsule 1 in morning, 1 at midday, 3 at night 450 capsule 3     hydrochlorothiazide (HYDRODIURIL) 25 MG tablet Take 1 tablet (25 mg) by mouth daily 90 tablet 1     lisinopril (PRINIVIL/ZESTRIL) 40 MG tablet Take 1 tablet (40 mg) by mouth daily 90 tablet 1     Multiple Vitamin (DAILY MULTIVITAMIN PO) Take 1 tablet by mouth daily AM       Omega-3 Fatty Acids (FISH OIL) 1200 MG capsule Take 1 capsule by mouth daily AM       omeprazole (PRILOSEC) 20 MG capsule Take 40 mg by mouth At Bedtime HS       ORDER FOR DME Use your CPAP device as directed by your provider.       simvastatin (ZOCOR) 40 MG tablet Take 1 tablet (40 mg) by mouth At Bedtime 90 tablet 3     tamsulosin (FLOMAX) 0.4 MG capsule Take 2 capsules (0.8 mg) by mouth daily 30 min after a meal 180 capsule 4     PREVIDENT 5000 BOOSTER PLUS 1.1 % PSTE USE WHILE BRUSHING TEETH ONCE NIGHTLY AT BEDTIME. DO NOT RINSE AFTERWARDS.  0     topiramate (TOPAMAX) 25 MG tablet PLEASE SEE ATTACHED FOR DETAILED DIRECTIONS  2       ALLERGIES: Grass      REVIEW OF SYSTEMS:  As above in HPI    GENERAL PHYSICAL EXAM:   Vitals: BP (!) 88/59 (BP Location: Right arm, Patient Position: Sitting)   Pulse 66   There is no height or weight on file to calculate BMI.    GENERAL: Well groomed, well developed, well nourished male in NAD.  NEURO: Alert and oriented x 3.  PSYCH: Normal mood and affect, pleasant and agreeable during interview and exam.    LABS: The last test results for Mr. Juan Francisco Booker were reviewed:  PSA -   Lab Results   Component Value Date    PSA 1.25 01/21/2020    PSA 1.58 12/19/2018    PSA 1.26 11/27/2018     BMP -   Recent Labs   Lab Test 01/13/20  1321 10/24/18  1354 12/28/17  1400   .0 137.4 136.3   POTASSIUM 4.0 4.1 4.1   CHLORIDE 102.0 100.9 99.1   CO2 31.0 27.7 30.0   BUN 14.2 18.2 15.6   CR 0.9 0.9 0.9   GLC 78.8 85.0  106.8* 152.4*   ZHEN 9.7 9.3 9.5       CBC -   Recent Labs   Lab Test  10/24/18  1354 11/08/16  1206 09/21/16  1433   HGB 14.1 13.4 12.8*       ASSESSMENT:   1) urinary frequency    PLAN:   - Discussed UDS.  He has earlier strong desire to void, but if we EXCEED that bladder capacity, he goes on to comfortably hold way too much fluid (859cc).  He clearly has some impaired sensation.    - Suspect concentrated urine is causing sensory urgency which leads to low volume voids and sensation of incomplete emptying.  Increasing water should help with this  - Continue FLomax 0.8mg daily   - Asked patient to drink 64 ounces of water per day to dilute his urine.   - Lets see if things improve.  If they don't, schedule an appointment with Dr. Cade or Dr. Mariano for cystoscopy (to decide whether a prostate procedure might help you urinate better.  UDS don't show obstruction but his pDetmax is low, so reducing outlet resistance may help him eliminate more efficiently.     - If you are doing better, then followup with me in 6 months    LUZ MARIA Arriola-BRODIE  Department of Urologic Surgery    Again, thank you for allowing me to participate in the care of your patient.      Sincerely,    LUZ MARIA Paniagua

## 2020-02-18 NOTE — NURSING NOTE
Chief Complaint   Patient presents with     Follow Up     UDS results, PSA check       Blood pressure (!) 88/59, pulse 66. There is no height or weight on file to calculate BMI.    Patient Active Problem List   Diagnosis     Obstructive sleep apnea     Insomnia     Excessive sleepiness     Chronic nasal congestion     Other and unspecified alcohol dependence, unspecified drinking behavior     Lesion of ulnar nerve     Hyperlipidemia     Essential hypertension     Obesity     Benign neoplasm of colon     Hyperlipidemia     Moderate major depression (H)     Gastric polyps     Impaired glucose tolerance test     Prediabetes       Allergies   Allergen Reactions     Grass        Current Outpatient Medications   Medication Sig Dispense Refill     acetaminophen (TYLENOL) 500 MG tablet Take 2 tablets by mouth every 6 hours as needed.       aspirin 81 MG tablet Take 1 tablet by mouth daily AM       atenolol (TENORMIN) 25 MG tablet Take 2 tablets (50 mg) by mouth daily 60 tablet 3     Caffeine 100 MG TABS Take 2 tablets by mouth daily as needed       citalopram (CELEXA) 10 MG tablet Take 1 tablet (10 mg) by mouth daily 90 tablet 1     DENTA 5000 PLUS 1.1 % CREA APPLY A PEA SIZED AMOUNT TO TOOTHBRUSH, BRUSH ONTO ROOT AREAS THOROUGHLY, THEN SPIT OUT AT BEDTIME.  2     fluticasone (FLONASE) 50 MCG/ACT nasal spray Spray 1-2 sprays into both nostrils daily 16 g 3     FLUZONE QUADRIVALENT 0.5 ML injection TO BE ADMINISTERED BY PHARMACIST FOR IMMUNIZATION  0     gabapentin (NEURONTIN) 300 MG capsule 1 in morning, 1 at midday, 3 at night 450 capsule 3     hydrochlorothiazide (HYDRODIURIL) 25 MG tablet Take 1 tablet (25 mg) by mouth daily 90 tablet 1     lisinopril (PRINIVIL/ZESTRIL) 40 MG tablet Take 1 tablet (40 mg) by mouth daily 90 tablet 1     Multiple Vitamin (DAILY MULTIVITAMIN PO) Take 1 tablet by mouth daily AM       Omega-3 Fatty Acids (FISH OIL) 1200 MG capsule Take 1 capsule by mouth daily AM       omeprazole (PRILOSEC) 20  MG capsule Take 40 mg by mouth At Bedtime HS       ORDER FOR DME Use your CPAP device as directed by your provider.       simvastatin (ZOCOR) 40 MG tablet Take 1 tablet (40 mg) by mouth At Bedtime 90 tablet 3     tamsulosin (FLOMAX) 0.4 MG capsule Take 2 capsules (0.8 mg) by mouth daily 30 min after a meal 180 capsule 4     PREVIDENT 5000 BOOSTER PLUS 1.1 % PSTE USE WHILE BRUSHING TEETH ONCE NIGHTLY AT BEDTIME. DO NOT RINSE AFTERWARDS.  0     topiramate (TOPAMAX) 25 MG tablet PLEASE SEE ATTACHED FOR DETAILED DIRECTIONS  2       Social History     Tobacco Use     Smoking status: Never Smoker     Smokeless tobacco: Never Used   Substance Use Topics     Alcohol use: Yes     Alcohol/week: 0.0 standard drinks     Comment: In remission since 2009     Drug use: No       Raúl Fonseca, EMT  2/18/2020  4:18 PM

## 2020-02-18 NOTE — PROGRESS NOTES
HPI: Mr. Juan Francisco Booker is a 60 year old year old male presenting today for evaluation of chief complaint(s): Follow Up (UDS results, PSA check)    Mr. Juan Francisco Booker has PMH significant for HTN, HLD, gerd, obesity, prediabetes (HGB A1C 6.1%), depression, TIMOTHY, BPH (on flomax 0.8mg daily - started 8/29/18).   - He was seen on 12/5/18 in Urology clinic with urinary frequency.  At that time his UA was normal, PVR was zero, CM was normal.  He was eating 2 boxes of Outshine frozen fruit bars per day and taking caffeine pills to help him stay awake.  A bladder diary revealed 8 ounces of water and 19 outhine bars per day.  He voided 7 times in 24 hours.  Void volumes ranged between 25-100cc. He was asked to cut down on popsicles, drink more water, start flomax.   - At his last visit on 1/21/20 his symptoms were stable, not terrible, and AUA SS was 21 (4,4,1,5,2,2,3).  He had lost 70lbs and had cut down on fruit popsicles, replacing this with water.  Chief complaint was difficulty finishing urine stream. He was sent for urodynamics (2/11/20) which showed:  POSTPROCEDURE DIAGNOSES:  -Large bladder capacity (859 mL) with heightened early sensations.  -Good bladder compliance without DO/DOI.  -No JOSEPH.  -Moderate detrusor contraction during voiding to max Pdet ~35 cm H2O, which he supplements with Valsalva effort. He first attempts to void from a seated position on the [employer] chair, but is then assisted to standing for a second attempt.  -Borderline slow flow rate (Qmax 12 mL/s) with initial voiding in the UDS suite, with an intermittent, fluctuating flow curve and incomplete bladder emptying ( mL). He then voids to completion into a private uroflow, with a final PVR of 0 mL.   -Increased EMG activity during voiding, which possibly correlates with Valsalva effort/movement, although could also suggest pelvic floor involvement.  -BOOI does not suggest bladder outlet obstruction.  -Fluoroscopy reveals a smooth bladder wall  "without diverticulae or VUR. The bladder neck was closed during filling and open during voiding.    Today he returns to review UDS.  Continues on Flomax 0.8mg daily. Has now lost a total of 100lbs.  Symptoms feel worse today than usual.  AUA SS today is 17 (4,3,0,4,2,2,2) \"mostly disappointed\". Even harder to finish emptying.  Urinary frequency 6-7 times per day.  At night x1. He tells me his most significant problems is when he has to void in the middle of the night, he doesn't always get out of bed immediately.  He tries to fall back to sleep but often fails, thus he loses out on sleep.  He admits he should just get up right away to use the toilet.   FLuids:  Currently drinking 3 x12 ounce glasses of water per day. Occasional hot chocolate. May have tea with going to a restaurant.  Eating 18 outshine bars per week, which is a significant improvement        Current Outpatient Medications   Medication Sig Dispense Refill     acetaminophen (TYLENOL) 500 MG tablet Take 2 tablets by mouth every 6 hours as needed.       aspirin 81 MG tablet Take 1 tablet by mouth daily AM       atenolol (TENORMIN) 25 MG tablet Take 2 tablets (50 mg) by mouth daily 60 tablet 3     Caffeine 100 MG TABS Take 2 tablets by mouth daily as needed       citalopram (CELEXA) 10 MG tablet Take 1 tablet (10 mg) by mouth daily 90 tablet 1     DENTA 5000 PLUS 1.1 % CREA APPLY A PEA SIZED AMOUNT TO TOOTHBRUSH, BRUSH ONTO ROOT AREAS THOROUGHLY, THEN SPIT OUT AT BEDTIME.  2     fluticasone (FLONASE) 50 MCG/ACT nasal spray Spray 1-2 sprays into both nostrils daily 16 g 3     FLUZONE QUADRIVALENT 0.5 ML injection TO BE ADMINISTERED BY PHARMACIST FOR IMMUNIZATION  0     gabapentin (NEURONTIN) 300 MG capsule 1 in morning, 1 at midday, 3 at night 450 capsule 3     hydrochlorothiazide (HYDRODIURIL) 25 MG tablet Take 1 tablet (25 mg) by mouth daily 90 tablet 1     lisinopril (PRINIVIL/ZESTRIL) 40 MG tablet Take 1 tablet (40 mg) by mouth daily 90 tablet 1     " Multiple Vitamin (DAILY MULTIVITAMIN PO) Take 1 tablet by mouth daily AM       Omega-3 Fatty Acids (FISH OIL) 1200 MG capsule Take 1 capsule by mouth daily AM       omeprazole (PRILOSEC) 20 MG capsule Take 40 mg by mouth At Bedtime HS       ORDER FOR DME Use your CPAP device as directed by your provider.       simvastatin (ZOCOR) 40 MG tablet Take 1 tablet (40 mg) by mouth At Bedtime 90 tablet 3     tamsulosin (FLOMAX) 0.4 MG capsule Take 2 capsules (0.8 mg) by mouth daily 30 min after a meal 180 capsule 4     PREVIDENT 5000 BOOSTER PLUS 1.1 % PSTE USE WHILE BRUSHING TEETH ONCE NIGHTLY AT BEDTIME. DO NOT RINSE AFTERWARDS.  0     topiramate (TOPAMAX) 25 MG tablet PLEASE SEE ATTACHED FOR DETAILED DIRECTIONS  2       ALLERGIES: Grass      REVIEW OF SYSTEMS:  As above in HPI    GENERAL PHYSICAL EXAM:   Vitals: BP (!) 88/59 (BP Location: Right arm, Patient Position: Sitting)   Pulse 66   There is no height or weight on file to calculate BMI.    GENERAL: Well groomed, well developed, well nourished male in NAD.  NEURO: Alert and oriented x 3.  PSYCH: Normal mood and affect, pleasant and agreeable during interview and exam.    LABS: The last test results for Mr. Juan Francisco Booker were reviewed:  PSA -   Lab Results   Component Value Date    PSA 1.25 01/21/2020    PSA 1.58 12/19/2018    PSA 1.26 11/27/2018     BMP -   Recent Labs   Lab Test 01/13/20  1321 10/24/18  1354 12/28/17  1400   .0 137.4 136.3   POTASSIUM 4.0 4.1 4.1   CHLORIDE 102.0 100.9 99.1   CO2 31.0 27.7 30.0   BUN 14.2 18.2 15.6   CR 0.9 0.9 0.9   GLC 78.8 85.0  106.8* 152.4*   ZHEN 9.7 9.3 9.5       CBC -   Recent Labs   Lab Test 10/24/18  1354 11/08/16  1206 09/21/16  1433   HGB 14.1 13.4 12.8*       ASSESSMENT:   1) urinary frequency    PLAN:   - Discussed UDS.  He has earlier strong desire to void, but if we EXCEED that bladder capacity, he goes on to comfortably hold way too much fluid (859cc).  He clearly has some impaired sensation.    - Suspect  concentrated urine is causing sensory urgency which leads to low volume voids and sensation of incomplete emptying.  Increasing water should help with this  - Continue FLomax 0.8mg daily   - Asked patient to drink 64 ounces of water per day to dilute his urine.   - Lets see if things improve.  If they don't, schedule an appointment with Dr. Cade or Dr. Mariano for cystoscopy (to decide whether a prostate procedure might help you urinate better.  UDS don't show obstruction but his pDetmax is low, so reducing outlet resistance may help him eliminate more efficiently.     - If you are doing better, then followup with me in 6 months    Deena Matta PA-C  Department of Urologic Surgery

## 2020-02-18 NOTE — PATIENT INSTRUCTIONS
- Continue FLomax  - Drink 64 ounces of water per day.   - Lets see if things improve.  If they don't, schedule an appointment with Dr. Cade or Dr. Mariano for cystoscopy (to decide whether a prostate procedure might help you urinate better.      - If you are doing better, then followup with me in 6 months    LUZ MARIA [facility]

## 2020-02-24 ENCOUNTER — OFFICE VISIT (OUTPATIENT)
Dept: FAMILY MEDICINE | Facility: CLINIC | Age: 60
End: 2020-02-24
Payer: COMMERCIAL

## 2020-02-24 VITALS
WEIGHT: 224.6 LBS | HEIGHT: 69 IN | OXYGEN SATURATION: 98 % | SYSTOLIC BLOOD PRESSURE: 135 MMHG | HEART RATE: 55 BPM | DIASTOLIC BLOOD PRESSURE: 79 MMHG | BODY MASS INDEX: 33.27 KG/M2 | RESPIRATION RATE: 18 BRPM | TEMPERATURE: 97.7 F

## 2020-02-24 DIAGNOSIS — D13.91 FAP (FAMILIAL ADENOMATOUS POLYPOSIS): ICD-10-CM

## 2020-02-24 DIAGNOSIS — Z00.00 ROUTINE GENERAL MEDICAL EXAMINATION AT A HEALTH CARE FACILITY: Primary | ICD-10-CM

## 2020-02-24 DIAGNOSIS — E66.811 CLASS 1 OBESITY DUE TO EXCESS CALORIES WITH SERIOUS COMORBIDITY AND BODY MASS INDEX (BMI) OF 33.0 TO 33.9 IN ADULT: ICD-10-CM

## 2020-02-24 DIAGNOSIS — R42 DIZZINESS: ICD-10-CM

## 2020-02-24 DIAGNOSIS — G56.21 LESION OF RIGHT ULNAR NERVE: ICD-10-CM

## 2020-02-24 DIAGNOSIS — E66.09 CLASS 1 OBESITY DUE TO EXCESS CALORIES WITH SERIOUS COMORBIDITY AND BODY MASS INDEX (BMI) OF 33.0 TO 33.9 IN ADULT: ICD-10-CM

## 2020-02-24 LAB — HEMOGLOBIN: 13.7 G/DL (ref 13.3–17.7)

## 2020-02-24 ASSESSMENT — MIFFLIN-ST. JEOR: SCORE: 1819.16

## 2020-02-24 ASSESSMENT — PATIENT HEALTH QUESTIONNAIRE - PHQ9: SUM OF ALL RESPONSES TO PHQ QUESTIONS 1-9: 9

## 2020-02-24 NOTE — PROGRESS NOTES
Preceptor Attestation:   Patient seen and discussed with the resident. Assessment and plan reviewed with resident and agreed upon.   Supervising Physician:  DO [facility]

## 2020-02-24 NOTE — PROGRESS NOTES
Male Physical Note          HPI       Concerns today:   1. Back pain  - Plan to discuss at next visit    2. Tingling and pain in right hand  - Patient thinks it is from his tread mill. He works out at home on his treadmill at least 5 days a week. He does not feel coordinated, so he often  the treadmill tightly and for long periods of time - he notices the tingling then. He has had improvement of symptoms since decreasing time and severity of gripping, and now has added bubble wrap around  to help create softness.  - Denies any weakness, no changes in muscle mass size  - Denies injury  - Had some pain in left hand, but resolved after interventions above    3. When he gets up feels dizzy often - stumbled and fell against window but unsure if actual loss of consciousness  - Has been told to increase water intake (per chart review by Urology based on concentrated UA)  - Has not had issues before  - Has been decreasing BP meds as patient losing weight. Last change was atenolol from 100mg to 50mg back in October 2019  - Does not see cardiology  - No palpitations, no chest pain    Weight loss: exercising fairly often (5 days a week 45 minutes each time, on treadmill)    Patient Active Problem List   Diagnosis     Obstructive sleep apnea     Insomnia     Excessive sleepiness     Chronic nasal congestion     Other and unspecified alcohol dependence, unspecified drinking behavior     Lesion of ulnar nerve     Hyperlipidemia     Essential hypertension     Obesity     Benign neoplasm of colon     Hyperlipidemia     Moderate major depression (H)     Gastric polyps     Impaired glucose tolerance test     Prediabetes     FAP (familial adenomatous polyposis)     Past Medical History:   Diagnosis Date     Anxiety      Benign neoplasm of colon 3/2/2015     Depression      Depressive disorder 1/15/2000     Difficult intubation      ETOH abuse 3/15/2009    in remission     Gastro-oesophageal reflux disease 1/15/2010      "Hyperlipidemia 1/1/2000     Hypoxemia      Morbid obesity (H)      Nasal polyps 1/1/2015     TIMOTHY on CPAP 8/13/2012    Severe (uses 5-6 hours as able)     Other and unspecified hyperlipidemia 10/25/2012     Personal history of colonic polyps 2/207/15    Multiple \"neg for FAP\"     Pre-diabetes      Prediabetes 5/24/2017     Restless leg      Skin tag      Surgical complication 1/6/2015    difficulty inserting a tube down my throat     Unspecified essential hypertension 10/25/2012      Family History   Problem Relation Age of Onset     Other Cancer Mother      Cancer Mother      Other Cancer Brother      Cancer Brother      Cerebrovascular Disease Maternal Grandfather         ,, ,, ,, ,, ,, ,, ,, ,     Alcohol/Drug Brother      Cancer Brother         pancreatic; liver     Other Cancer Brother      Asthma No family hx of      Anesthesia Reaction No family hx of      Colon Polyps No family hx of      Crohn's Disease No family hx of      Ulcerative Colitis No family hx of      Colon Cancer No family hx of             Review of Systems:     Review of Systems:  CONSTITUTIONAL: NEGATIVE for fever, chills, change in weight  INTEGUMENTARY/SKIN: NEGATIVE for worrisome rashes, moles or lesions  EYES: NEGATIVE for vision changes or irritation  ENT/MOUTH: NEGATIVE for ear, mouth and throat problems  RESP: NEGATIVE for significant cough or SOB  BREAST: NEGATIVE for masses, tenderness or discharge  CV: NEGATIVE for chest pain, palpitations or peripheral edema  GI: NEGATIVE for nausea, abdominal pain, heartburn, or change in bowel habits   male :positive for and decreased urinary stream  MUSCULOSKELETAL: NEGATIVE for significant arthralgias or myalgia, positive for right finger tingling  NEURO: NEGATIVE for weakness, dizziness or paresthesias  ENDOCRINE: NEGATIVE for temperature intolerance, skin/hair changes  HEME/ALLERGY: NEGATIVE for bleeding problems  PSYCHIATRIC: NEGATIVE for changes in mood or affect  Sleep:   Do you snore " most or the night (as reported by a family member)? Yes  Do you feel sleepy or extremely tired during most of the day? Yes  - Has CPAP for TIMOTHY         Social History     Social History     Socioeconomic History     Marital status: Single     Spouse name: Not on file     Number of children: Not on file     Years of education: Not on file     Highest education level: Not on file   Occupational History     Not on file   Social Needs     Financial resource strain: Not on file     Food insecurity:     Worry: Not on file     Inability: Not on file     Transportation needs:     Medical: Not on file     Non-medical: Not on file   Tobacco Use     Smoking status: Never Smoker     Smokeless tobacco: Never Used   Substance and Sexual Activity     Alcohol use: Yes     Alcohol/week: 0.0 standard drinks     Comment: In remission since      Drug use: No     Sexual activity: Never   Lifestyle     Physical activity:     Days per week: Not on file     Minutes per session: Not on file     Stress: Not on file   Relationships     Social connections:     Talks on phone: Not on file     Gets together: Not on file     Attends Synagogue service: Not on file     Active member of club or organization: Not on file     Attends meetings of clubs or organizations: Not on file     Relationship status: Not on file     Intimate partner violence:     Fear of current or ex partner: Not on file     Emotionally abused: Not on file     Physically abused: Not on file     Forced sexual activity: Not on file   Other Topics Concern     Parent/sibling w/ CABG, MI or angioplasty before 65F 55M? Not Asked   Social History Narrative    Single.  Lives alone.  Retired.  Worked at [employer].    No children.    1 brother -  of liver cancer, etoh    Mother  of cancer ? Type    Father  - complications of MS     Marital Status:Single  Who lives in your household? Self    Has anyone hurt you physically, for example by pushing, hitting, slapping or kicking  "you or forcing you to have sex? Denies  Do you feel threatened or controlled by a partner, ex-partner or anyone in your life? Denies    Sexual Health     Sexual concerns: No   STI History: Neg    Recommended Screening     Cholesterol Level (>44 yo or at risk):  Date done January 13 2020  Result(s) total cholesterol WNL and ASA use (>3% risk in 5 y):  Patient already on ASA  Colon CA Screening (>50-75 ):  Date done 1/6/20  Result(s) benign polyps follow-up in five years         Physical Exam:     Vitals: /79   Pulse 55   Temp 97.7  F (36.5  C) (Oral)   Resp 18   Ht 1.753 m (5' 9\")   Wt 101.9 kg (224 lb 9.6 oz)   SpO2 98%   BMI 33.17 kg/m    BMI= Body mass index is 33.17 kg/m .  GENERAL: healthy, alert and no distress  EYES: Eyes grossly normal to inspection, extraocular movements - intact, and PERRL  HENT: ear canals- normal; TMs- normal; Nose- normal; Mouth- no ulcers, no lesions; narrow airway  NECK: no tenderness, no adenopathy, no asymmetry, no masses, no stiffness; thyroid- normal to palpation  RESP: lungs clear to auscultation - no rales, no rhonchi, no wheezes  CV: regular rates and rhythm, normal S1 S2, no S3 or S4 and no murmur, no click or rub -  ABDOMEN: soft, no tenderness, no  hepatosplenomegaly, no masses, normal bowel sounds  MS: extremities- no gross deformities noted, no edema  SKIN: no suspicious lesions, no rashes, does have numerous polyps related to FAP   NEURO: strength and tone- normal, sensory exam- grossly normal, mentation- intact, speech- normal, reflexes- symmetric  BACK: no CVA tenderness, no paralumbar tenderness  PSYCH: Alert and oriented times 3; speech- coherent , normal rate and volume; able to articulate logical thoughts, able to abstract reason, no tangential thoughts, no hallucinations or delusions, affect- normal  LYMPHATICS: ant. cervical- normal, post. cervical- normal    Assessment and Plan      Juan Francisco was seen today for physical.    Diagnoses and all orders for " this visit:    Routine general medical examination at a health care facility    FAP (familial adenomatous polyposis)    Lesion of right ulnar nerve    Class 1 obesity due to excess calories with serious comorbidity and body mass index (BMI) of 33.0 to 33.9 in adult    Dizziness  -     Hemoglobin (HGB) (Hannah's)      PLAN:  1.Hgb to check for anemia as potential cause of dizziness  2. Counseled on ways to reduce numbness/tingling from likely right ulnar nerve (bubble wrapped treadmill already, showed cock-up wrist splints to wear with treadmill activity, walking slower on treadmill so he does not have to  bar entire walk, etc)  3. Continue to work on weight loss with diet and exercise changes  4. Plan to continue to decrease antihypertensives as patient continues to lose weight and increase physical activity  - stop atenolol entirely for concerns of causing dizziness when standing (no significant changes in BP when measuring orthostatic vitals, but does not have any change in HR with position to compensate if patient is experiencing orthostatic hypotension at home)    Options for treatment and follow-up care were reviewed with the patient. Juan Francisco Booker and/or guardian engaged in the decision making process and verbalized understanding of the options discussed and agreed with the final plan.    Marcia Mayo, DO

## 2020-03-18 ENCOUNTER — VIRTUAL VISIT (OUTPATIENT)
Dept: SLEEP MEDICINE | Facility: CLINIC | Age: 60
End: 2020-03-18
Payer: COMMERCIAL

## 2020-03-18 DIAGNOSIS — G47.33 OSA (OBSTRUCTIVE SLEEP APNEA): Primary | ICD-10-CM

## 2020-03-18 PROCEDURE — 99213 OFFICE O/P EST LOW 20 MIN: CPT | Mod: 95 | Performed by: INTERNAL MEDICINE

## 2020-03-18 NOTE — PROGRESS NOTES
"Juan Francisco Booker is a 60 year old male who is being evaluated via a billable telephone visit.      The patient has been notified of following:     \"This telephone visit will be conducted via a call between you and your physician/provider. We have found that certain health care needs can be provided without the need for a physical exam.  This service lets us provide the care you need with a short phone conversation.  If a prescription is necessary we can send it directly to your pharmacy.  If lab work is needed we can place an order for that and you can then stop by our lab to have the test done at a later time.    If during the course of the call the physician/provider feels a telephone visit is not appropriate, you will not be charged for this service.\"       Juan Francisco Booker complains of    Chief Complaint   Patient presents with     CPAP Follow Up       I have reviewed and updated the patient's Past Medical History, Social History, Family History and Medication List.    ALLERGIES  [facility] ~[facility]   Outpatient Sleep Medicine Consultation  March 18, 2020    Name: Juan Francisco Booker MRN# [id number]   Age: 60 year old YOB: 1960     Date of Consultation: March 18, 2020  Consultation is requested by: No referring provider defined for this encounter.  Primary care provider: Marcia Mayo  Juan Francisco Booker is a 60 year old male who is being evaluated via a billable telephone visit.      The patient has been notified of following:     \"This telephone visit will be conducted via a call between you and your physician/provider. We have found that certain health care needs can be provided without the need for a physical exam.  This service lets us provide the care you need with a short phone conversation.  If a prescription is necessary we can send it directly to your pharmacy.  If lab work is needed we can place an order for that and you can " "then stop by our lab to have the test done at a later time.    If during the course of the call the physician/provider feels a telephone visit is not appropriate, you will not be charged for this service.\"       I have reviewed and updated the patient's Past Medical History, Social History, Family History and Medication List.    ALLERGIES  Grass               Assessment and Plan:       Severe obstructive sleep apnea with improved sleep quality, daytime sleepiness     Depression    Summary Recommendations:    Counseled in adherence, patient to call before next visit if symptoms recur         History of Present Illness:     Juan Francisco Booker is a 60 year  gentleman with severe obstructive sleep apnea who has had long-term problems with effective therapy management due to intolerance to have an oral device in the past and difficulty using an interface and insomnia with current improvement in sleep continuity as well adherence. We did discuss      PREVIOUS SLEEP STUDIES:  Date: 9/29/2015  AHI: 66.5      CPAP Compliance:  Dates: 2/17 - 3/17/2020       83 % >4hour use       Average Use 6.7 hours  Residual AHI 9.2                Medications:     Current Outpatient Medications   Medication Sig     acetaminophen (TYLENOL) 500 MG tablet Take 2 tablets by mouth every 6 hours as needed.     aspirin 81 MG tablet Take 1 tablet by mouth daily AM     Caffeine 100 MG TABS Take 2 tablets by mouth daily as needed     citalopram (CELEXA) 10 MG tablet Take 1 tablet (10 mg) by mouth daily     DENTA 5000 PLUS 1.1 % CREA APPLY A PEA SIZED AMOUNT TO TOOTHBRUSH, BRUSH ONTO ROOT AREAS THOROUGHLY, THEN SPIT OUT AT BEDTIME.     fluticasone (FLONASE) 50 MCG/ACT nasal spray Spray 1-2 sprays into both nostrils daily     FLUZONE QUADRIVALENT 0.5 ML injection TO BE ADMINISTERED BY PHARMACIST FOR IMMUNIZATION     gabapentin (NEURONTIN) 300 MG capsule 1 in morning, 1 at midday, 3 at night     hydrochlorothiazide (HYDRODIURIL) 25 MG tablet Take 1 tablet " "(25 mg) by mouth daily     lisinopril (PRINIVIL/ZESTRIL) 40 MG tablet Take 1 tablet (40 mg) by mouth daily     Multiple Vitamin (DAILY MULTIVITAMIN PO) Take 1 tablet by mouth daily AM     Omega-3 Fatty Acids (FISH OIL) 1200 MG capsule Take 1 capsule by mouth daily AM     omeprazole (PRILOSEC) 20 MG capsule Take 40 mg by mouth At Bedtime HS     ORDER FOR DME Use your CPAP device as directed by your provider.     simvastatin (ZOCOR) 40 MG tablet Take 1 tablet (40 mg) by mouth At Bedtime     tamsulosin (FLOMAX) 0.4 MG capsule Take 2 capsules (0.8 mg) by mouth daily 30 min after a meal     No current facility-administered medications for this visit.         Allergies   Allergen Reactions     Grass             Past Medical History:     Does not need 02 supplement at night   Past Medical History:   Diagnosis Date     Anxiety      Benign neoplasm of colon 3/2/2015     Depression      Depressive disorder 1/15/2000     Difficult intubation      ETOH abuse 3/15/2009    in remission     Gastro-oesophageal reflux disease 1/15/2010     Hyperlipidemia 1/1/2000     Hypoxemia      Morbid obesity (H)      Nasal polyps 1/1/2015     TIMOTHY on CPAP 8/13/2012    Severe (uses 5-6 hours as able)     Other and unspecified hyperlipidemia 10/25/2012     Personal history of colonic polyps 2/207/15    Multiple \"neg for FAP\"     Pre-diabetes      Prediabetes 5/24/2017     Restless leg      Skin tag      Surgical complication 1/6/2015    difficulty inserting a tube down my throat     Unspecified essential hypertension 10/25/2012             Past Surgical History:    No h/o  upper airway surgery  Past Surgical History:   Procedure Laterality Date     AS COLONOSCOPY THRU STOMA W BIOPSY/CAUTERY TUMOR/POLYP/LESION  2/27/15    colon polyps     COLONOSCOPY  2/27/2015    x 2     COLONOSCOPY N/A 1/6/2020    Procedure: COLONOSCOPY, WITH POLYPECTOMY AND BIOPSY;  Surgeon: Omer Salas MD;  Location: [facility]     COLONOSCOPY WITH CO2 INSUFFLATION N/A " 10/20/2016    Procedure: COLONOSCOPY WITH CO2 INSUFFLATION;  Surgeon: Juan Francisco Beltran MD;  Location: [facility] OR     ENT SURGERY  1/5/2011    [facility]     ESOPHAGOSCOPY, GASTROSCOPY, DUODENOSCOPY (EGD), COMBINED N/A 10/20/2016    Procedure: COMBINED ESOPHAGOSCOPY, GASTROSCOPY, DUODENOSCOPY (EGD);  Surgeon: Juan Francisco Beltran MD;  Location: [facility] OR     RELEASE CARPAL TUNNEL Right 6/14/2017    Procedure: RELEASE CARPAL TUNNEL;  Right Open Carpal Tunnel Release;  Surgeon: Aracelis Lowe MD;  Location: [facility]     RELEASE CARPAL TUNNEL Left 12/29/2017    Procedure: RELEASE CARPAL TUNNEL;  Left Open Carpal Tunnel Release;  Surgeon: Aracelis Lowe MD;  Location: [facility] OR                      Physical Examination:   There were no vitals taken for this visit.             Copy to: Marcia Mayo MD 3/18/2020     [facility]   Floor 1, [address]   [address] [place] [address]   Appointments: [phone]    [facility]  3rd Floor  [address], [place] [address]     Total time spent with patient:  25 min

## 2020-04-03 ENCOUNTER — MYC MEDICAL ADVICE (OUTPATIENT)
Dept: FAMILY MEDICINE | Facility: CLINIC | Age: 60
End: 2020-04-03

## 2020-04-03 DIAGNOSIS — I10 BENIGN ESSENTIAL HYPERTENSION: ICD-10-CM

## 2020-04-03 RX ORDER — HYDROCHLOROTHIAZIDE 25 MG/1
25 TABLET ORAL DAILY
Qty: 90 TABLET | Refills: 1 | Status: SHIPPED | OUTPATIENT
Start: 2020-04-03 | End: 2020-05-28

## 2020-04-03 RX ORDER — LISINOPRIL 40 MG/1
40 TABLET ORAL DAILY
Qty: 90 TABLET | Refills: 1 | Status: SHIPPED | OUTPATIENT
Start: 2020-04-03 | End: 2020-10-01

## 2020-04-03 NOTE — TELEPHONE ENCOUNTER

## 2020-06-09 ENCOUNTER — VIRTUAL VISIT (OUTPATIENT)
Dept: FAMILY MEDICINE | Facility: CLINIC | Age: 60
End: 2020-06-09
Payer: COMMERCIAL

## 2020-06-09 DIAGNOSIS — R20.0 LOWER EXTREMITY NUMBNESS: ICD-10-CM

## 2020-06-09 DIAGNOSIS — R42 DIZZINESS: ICD-10-CM

## 2020-06-09 DIAGNOSIS — I10 BENIGN ESSENTIAL HYPERTENSION: Primary | ICD-10-CM

## 2020-06-09 NOTE — PROGRESS NOTES
"Family Medicine Telephone Visit Note         Telephone Visit Consent   Patient was verbally read the following and verbal consent was obtained.    \"Telephone visits are billed at different rates depending on your insurance coverage. During this emergency period, for some insurers they may be billed the same as an in-person visit.  Please reach out to your insurance provider with any questions.  If during the course of the call the physician/provider feels a telephone visit is not appropriate, you will not be charged for this service.\"    Name person giving consent:  Patient   Date verbal consent given:  6/9/2020  Time verbal consent given:  1:06 PM    Chief Complaint   Patient presents with     Dizziness     X 6 months and pt complaining of losing circulation in feet x 2 weeks           HPI   Patients name: Tomi  Appointment start time:  1:12 PM    Patient reports still feeling dizzy   - he walked up steps, felt a little dizzy at the top of the stairs  - sometimes when standing up fast he feels dizzy  - improves after rest  - no chest pain   - Hgb back in Feb 2020 was 13/7    Sitting, and sometimes laying  - feels he is \"losing circulation\" in lower legs and feet  - \"tingly?\"  - uncomfortable   - could not sit for 4 hours straight  - started a couple weeks ago  - happening every day  - happens on both sides  - he has always had tingling on bottom of feet   - recliner is a little easier  - only happens when sitting  - ok when walking  - ok when waking up   - back is always sore, goes to PT and does home exercises  - no imaging of back ever  - hurts in lower back    BP results  5/28 101/71  5/29 108/78  Decreased hydrochlorothiazide from 25 to 12.5mg  5/30 114/73  6/1 102/68  6/2 108/73  6/3 97/69  also was dizzy at time a bit after walk  6/5 142/88, 110/72, 107/71  retook bp after high reading  6/6 112/75  6/7 115/76  6/8 114/76    Has been on flomax for about 1.5 years. He has trouble finishing urination, and "   Drinking 8 glasses a day at least  - Helped for about 3 weeks   - Now symptoms are back  - per urology note 2/18/20,. Recommended increased oral intake, if no improvement in symptoms schedule cystoscopy    Current Outpatient Medications   Medication Sig Dispense Refill     acetaminophen (TYLENOL) 500 MG tablet Take 350 tablets by mouth every 6 hours as needed        aspirin 81 MG tablet Take 1 tablet by mouth daily AM       Caffeine 100 MG TABS Take 2 tablets by mouth daily as needed       citalopram (CELEXA) 10 MG tablet Take 1 tablet (10 mg) by mouth daily 90 tablet 0     DENTA 5000 PLUS 1.1 % CREA APPLY A PEA SIZED AMOUNT TO TOOTHBRUSH, BRUSH ONTO ROOT AREAS THOROUGHLY, THEN SPIT OUT AT BEDTIME.  2     fluticasone (FLONASE) 50 MCG/ACT nasal spray Spray 1-2 sprays into both nostrils daily 16 g 3     gabapentin (NEURONTIN) 300 MG capsule 1 in morning, 1 at midday, 3 at night 450 capsule 3     lisinopril (ZESTRIL) 40 MG tablet Take 1 tablet (40 mg) by mouth daily 90 tablet 1     Multiple Vitamin (DAILY MULTIVITAMIN PO) Take 1 tablet by mouth daily AM       Omega-3 Fatty Acids (FISH OIL) 1200 MG capsule Take 1 capsule by mouth daily AM       omeprazole (PRILOSEC) 20 MG capsule Take 20 mg by mouth At Bedtime HS       simvastatin (ZOCOR) 40 MG tablet Take 1 tablet (40 mg) by mouth At Bedtime 90 tablet 3     tamsulosin (FLOMAX) 0.4 MG capsule Take 2 capsules (0.8 mg) by mouth daily 30 min after a meal 180 capsule 4     FLUZONE QUADRIVALENT 0.5 ML injection TO BE ADMINISTERED BY PHARMACIST FOR IMMUNIZATION  0     ORDER FOR DME Use your CPAP device as directed by your provider.       Allergies   Allergen Reactions     Grass           Review of Systems:     Constitutional, HEENT, cardiovascular, pulmonary, gi and gu systems are negative, except as otherwise noted.         Physical Exam:     There were no vitals taken for this visit.  Estimated body mass index is 33.17 kg/m  as calculated from the following:    Height as  "of 2/24/20: 1.753 m (5' 9\").    Weight as of 2/24/20: 101.9 kg (224 lb 9.6 oz).    Unable to perform due to virtual visit over the telephone        Assessment and Plan   1. Benign essential hypertension  2. Dizziness  Patient with ongoing dizziness, no changes with decreased antihypertensive medications and improvement in BP - since decreasing hydrochlorothiazide. Will discontinue hydrochlorothiazide today, and proceed only on lisinopril 40mg. If BP continues to be well controlled given patient's weight loss, may consider continuing to decrease antihypertensive regimen. Patient's dizziness could be related to flomax - and given it is having little improvement of symptoms, recommended patient reach out to Urologist, to see if he can stop flomax and continue with previous recommended work up (if no improvement, they were considering cystoscopy and possible prostate procedure).     3. Lower extremity numbness  Unclear etiology at this time. Given improvement in numbness with standing and activity, less likely to be claudication/vascular issue. Given bilateral and worse with sitting, suspicious for etiology in spine - possible disc herniation (patient with reported chronic back pain managed with PT exercises), possible spinal stenosis. Patient to follow-up in person for physical exam - if any concerns for muscle weakness, abnormal sensation, or abnormal reflexes recommend imaging of spine.     After Visit Information:  Patient chose to view AVS via [employer]    Return in about 3 days (around 6/12/2020) for Physical Exam, With me, Dr. Mayo, in person clinic visit.    Appointment end time: 1:28 PM  This is a telephone visit that took 16 minutes.    Clinician location:  Jennifer Mayo, DO  I precepted today with Dr. Altamirano who spoke to patient over the phone.    "

## 2020-06-09 NOTE — PROGRESS NOTES
Preceptor Attestation:   I talked to the patient on the phone. I discussed the patient with the resident. I have verified the content of the note, which accurately reflects my assessment of the patient and the plan of care.   Supervising Physician:  Travon Altamirano MD.

## 2020-06-11 ENCOUNTER — OFFICE VISIT (OUTPATIENT)
Dept: FAMILY MEDICINE | Facility: CLINIC | Age: 60
End: 2020-06-11
Payer: COMMERCIAL

## 2020-06-11 DIAGNOSIS — L91.8 SKIN TAG: ICD-10-CM

## 2020-06-11 DIAGNOSIS — M54.50 CHRONIC BILATERAL LOW BACK PAIN, UNSPECIFIED WHETHER SCIATICA PRESENT: ICD-10-CM

## 2020-06-11 DIAGNOSIS — G89.29 CHRONIC BILATERAL LOW BACK PAIN, UNSPECIFIED WHETHER SCIATICA PRESENT: ICD-10-CM

## 2020-06-11 DIAGNOSIS — R20.0 LOWER EXTREMITY NUMBNESS: Primary | ICD-10-CM

## 2020-06-11 NOTE — PROGRESS NOTES
Preceptor Attestation:    Patient seen and evaluated in person. I discussed the patient with the resident. I have verified the content of the note, which accurately reflects my assessment of the patient and the plan of care.   Supervising Physician:  Travon Altamirano MD.

## 2020-06-11 NOTE — PATIENT INSTRUCTIONS
Here is the plan from today's visit    1. Lower extremity numbness  - MR Lumbosacral Plexus w/o Contrast; Future    2. Chronic bilateral low back pain, unspecified whether sciatica present  - MR Lumbosacral Plexus w/o Contrast; Future    Please call or return to clinic if your symptoms don't go away.    Follow up plan  Please make a clinic appointment for follow up with me (MEENAKSHI RAMÍREZ) 2 days after MRI.    Thank you for coming to [facility] today.  Lab Testing:  **If you had lab testing today and your results are reassuring or normal they will be mailed to you or sent through [employer] within 7 days.   **If the lab tests need quick action we will call you with the results.  The phone number we will call with results is # [phone] (home) . If this is not the best number please call our clinic and change the number.  Medication Refills:  If you need any refills please call your pharmacy and they will contact us.   If you need to  your refill at a new pharmacy, please contact the new pharmacy directly. The new pharmacy will help you get your medications transferred faster.   Scheduling:  If you have any concerns about today's visit or wish to schedule another appointment please call our office during normal business hours [phone] (8-5:00 M-F)  If a referral was made to a [facility] and you don't get a call from central scheduling please call [phone].  If a Mammogram was ordered for you at [facility] call [phone] to schedule or change your appointment.  If you had an XRay/CT/Ultrasound/MRI ordered the number is [phone] to schedule or change your radiology appointment.   Medical Concerns:  If you have urgent medical concerns please call [phone] at any time of the day.    Meenakshi Ramírez, DO

## 2020-06-11 NOTE — PROGRESS NOTES
Tomi is a 60 year old  who presents for   Patient presents with:  Numbness: in feet and legs x 2 montrhs    Assessment and Plan      Juan Francisco was seen today for numbness.    Diagnoses and all orders for this visit:    Lower extremity numbness  -     MR Lumbosacral Plexus w/o Contrast; Future    Chronic bilateral low back pain, unspecified whether sciatica present  -     MR Lumbosacral Plexus w/o Contrast; Future      Patient presenting with lower extremity numbness, in the setting of chronic back pain.  Patient has previously diagnosed neuropathy in bilateral lower extremities, exclusively to plantar surface of feet, no formal work-up done for this, presumed polyneuropathy.  However, patient states numbness previously defined as neuropathy in his plantar surfaces, now feels different, possibly worse, as well as tingling and numbness going up to knees bilaterally, mostly on anterior surface, sometimes extending to posterior surface.  Physical exam concerning for pain in the lumbar region with extension, the setting of chronic back pain no formal work-up done, concern for spinal stenosis.  Possible nerve impingement resulting in sensation changes.  Negative slump test, unlikely to be herniated disc.  Given the bilateral distribution, unlikely to be etiology in lower extremities.    Recommend lumbosacral MRI, will follow-up 2 days after MRI completed to review results.     Return for 2 days after MRI with telephone or video visit with me to review MRI results.    There are no discontinued medications.      DO DEVON Zarate       Tomi is a 60 year old  who presents for   Patient presents with:  Numbness: in feet and legs x 2 montrhs    Visit [place]  1.  Bilateral lower extremity numbness and tingling    Back Pain      Duration: Chronic back pain for many years.  Now with numbness in bilateral lower extremities for 2 weeks        Specific cause: No formal work-up done    Description:   Location of pain: low back  bilateral and midline  Character of pain: sharp  Pain radiation:none  New numbness or weakness in legs, not attributed to pain:   YES -no weakness  Any new bowel or bladder incontinence?No   History of back problems: Previously has gone to physical therapy, intermittent pain  Any previous MRI or X-rays: None  Sees a specialist for back pain:  No    Problem, Medication and Allergy Lists were reviewed and updated if needed..  Patient is an established patient of this clinic.  Reviewed and updated as needed this visit by clinical staff  Tobacco  Allergies  Meds  Problems  Med Hx  Surg Hx  Fam Hx  Soc Hx        Reviewed and updated as needed this visit by Provider  Tobacco  Allergies  Meds  Problems  Med Hx  Surg Hx  Fam Hx            Review of Systems:   Review of Systems  7 point review of symptoms was otherwise negative except as noted in HPI         Physical Exam:   There were no vitals filed for this visit.  There is no height or weight on file to calculate BMI.  Vitals were reviewed and were normal     Physical Exam  Vitals signs reviewed.   Constitutional:       General: He is not in acute distress.     Appearance: He is not ill-appearing, toxic-appearing or diaphoretic.   HENT:      Mouth/Throat:      Mouth: Mucous membranes are moist.   Eyes:      Conjunctiva/sclera: Conjunctivae normal.   Cardiovascular:      Rate and Rhythm: Normal rate.   Pulmonary:      Effort: Pulmonary effort is normal. No respiratory distress.   Abdominal:      General: There is no distension.      Tenderness: There is no abdominal tenderness.   Musculoskeletal:      Comments: Increase in back pain and bilateral LE numbness with lumbar extension.   No pain with RENETTA, no tenderness to palpation of bilateral piriformis.   Neurological:      Mental Status: He is alert.      Motor: Motor function is intact. No weakness or atrophy.      Gait: Gait is intact.      Deep Tendon Reflexes:      Reflex Scores:       Patellar reflexes  are 2+ on the right side and 2+ on the left side.       Achilles reflexes are 2+ on the right side and 2+ on the left side.     Comments: No weakness in bilateral LE, slight decrease in sharp sensation in S1 distribution bilaterally.            Results:   Results are ordered and pending    Options for treatment and follow-up care were reviewed with the patient. Juan Francisco Booker  engaged in the decision making process and verbalized understanding of the options discussed and agreed with the final plan.  [facility]

## 2020-06-22 ENCOUNTER — ANCILLARY PROCEDURE (OUTPATIENT)
Dept: MRI IMAGING | Facility: CLINIC | Age: 60
End: 2020-06-22
Attending: FAMILY MEDICINE
Payer: COMMERCIAL

## 2020-06-22 DIAGNOSIS — R20.0 LOWER EXTREMITY NUMBNESS: ICD-10-CM

## 2020-06-22 DIAGNOSIS — G89.29 CHRONIC BILATERAL LOW BACK PAIN, UNSPECIFIED WHETHER SCIATICA PRESENT: ICD-10-CM

## 2020-06-22 DIAGNOSIS — M54.50 CHRONIC BILATERAL LOW BACK PAIN, UNSPECIFIED WHETHER SCIATICA PRESENT: ICD-10-CM

## 2020-06-25 ENCOUNTER — TELEPHONE (OUTPATIENT)
Dept: FAMILY MEDICINE | Facility: CLINIC | Age: 60
End: 2020-06-25

## 2020-06-25 ENCOUNTER — VIRTUAL VISIT (OUTPATIENT)
Dept: FAMILY MEDICINE | Facility: CLINIC | Age: 60
End: 2020-06-25
Payer: COMMERCIAL

## 2020-06-25 DIAGNOSIS — R20.0 LOWER EXTREMITY NUMBNESS: Primary | ICD-10-CM

## 2020-06-25 NOTE — TELEPHONE ENCOUNTER
"Request for medication refill: Pt requesting Hydrochlorothiazide 12.5mg. This medication is not on their list. Please advise. Thanks.     Providers if patient needs an appointment and you are willing to give a one month supply please refill for one month and  send a letter/MyChart using \".SMILLIMITEDREFILL\" .smillimited and route chart to \"P SMI \" (Giving one month refill in non controlled medications is strongly recommended before denial)    If refill has been denied, meaning absolutely no refills without visit, please complete the smart phrase \".smirxrefuse\" and route it to the \"P SMI MED REFILLS\"  pool to inform the patient and the pharmacy.    Lauren Florence CMA        "

## 2020-06-25 NOTE — PROGRESS NOTES
"Family Medicine Telephone Visit Note         Telephone Visit Consent   Patient was verbally read the following and verbal consent was obtained.    \"Telephone visits are billed at different rates depending on your insurance coverage. During this emergency period, for some insurers they may be billed the same as an in-person visit.  Please reach out to your insurance provider with any questions.  If during the course of the call the physician/provider feels a telephone visit is not appropriate, you will not be charged for this service.\"    Name person giving consent:  Patient   Date verbal consent given:  6/25/2020  Time verbal consent given:  1:38 PM       Chief Complaint   Patient presents with     Follow Up     MRI on back results          HPI   Patients name: Tomi  Appointment start time:  2:10 PM    No changes in symptoms at this time, as previously documented in other visits.    He declines every having a history of HIV, syphilis, HCV, no liver abnormalities in labs before.    Reviewed MRI results with patient  MR LUMBOSACRAL PLEXUS W/O CONTRAST 6/22/2020 4:29 PM     History: Patient with chronic back pain never imaged, now with new  onset bilateral lower extremity numbness; Lower extremity numbness;  Chronic bilateral low back pain, unspecified whether sciatica present;  Chronic bilateral low back pain, unspecified whether sciatica present  ICD-10: Lower extremity numbness; Chronic bilateral low back pain,  unspecified whether sciatica present; Chronic bilateral low back pain,  unspecified whether sciatica present     Comparison: None available.     Technique: Sagittal T2-weighted, axial T1-weighted, T2-weighted, and  paracoronal inversion recovery images obtained through the lumbosacral  plexus region without intravenous contrast.     Contrast: None.     Findings:   Dehydration of the lower lumbar intervertebral discs. Degenerative  changes including disc bulges and facet hypertrophy contribute to mild  to " moderate spinal canal narrowing at L3-4, mild at L4-5. Also mild to  moderate bilateral neural foraminal stenosis.     No mass, adenopathy, or abnormal signal along the course of the  lumbosacral plexus on either side.     Mild ascites in the pelvis. Enlarged central prostate gland.     Heterogeneous marrow in the posterior left ilium, likely a hemangioma.                                                                      Impression:   1. No focal abnormality of the lumbosacral plexus on either side.  2. Lumbar spondylosis. This could be further evaluated with a  dedicated MRI of the lumbar spine.  3. Mild indeterminate ascites in the pelvis. Consider liver function  testing.     CECY PETER MD    Current Outpatient Medications   Medication Sig Dispense Refill     acetaminophen (TYLENOL) 500 MG tablet Take 350 tablets by mouth every 6 hours as needed        aspirin 81 MG tablet Take 1 tablet by mouth daily AM       Caffeine 100 MG TABS Take 2 tablets by mouth daily as needed       citalopram (CELEXA) 10 MG tablet Take 1 tablet (10 mg) by mouth daily 90 tablet 0     DENTA 5000 PLUS 1.1 % CREA APPLY A PEA SIZED AMOUNT TO TOOTHBRUSH, BRUSH ONTO ROOT AREAS THOROUGHLY, THEN SPIT OUT AT BEDTIME.  2     fluticasone (FLONASE) 50 MCG/ACT nasal spray Spray 1-2 sprays into both nostrils daily 16 g 3     FLUZONE QUADRIVALENT 0.5 ML injection TO BE ADMINISTERED BY PHARMACIST FOR IMMUNIZATION  0     gabapentin (NEURONTIN) 300 MG capsule 1 in morning, 1 at midday, 3 at night 450 capsule 3     lisinopril (ZESTRIL) 40 MG tablet Take 1 tablet (40 mg) by mouth daily 90 tablet 1     Multiple Vitamin (DAILY MULTIVITAMIN PO) Take 1 tablet by mouth daily AM       Omega-3 Fatty Acids (FISH OIL) 1200 MG capsule Take 1 capsule by mouth daily AM       omeprazole (PRILOSEC) 20 MG capsule Take 20 mg by mouth At Bedtime HS       ORDER FOR DME Use your CPAP device as directed by your provider.       simvastatin (ZOCOR) 40 MG tablet Take 1  "tablet (40 mg) by mouth At Bedtime 90 tablet 3     tamsulosin (FLOMAX) 0.4 MG capsule Take 2 capsules (0.8 mg) by mouth daily 30 min after a meal 180 capsule 4     Allergies   Allergen Reactions     Grass               Review of Systems:     Constitutional, HEENT, cardiovascular, pulmonary, gi and gu systems are negative, except as otherwise noted.         Physical Exam:     There were no vitals taken for this visit.  Estimated body mass index is 33.17 kg/m  as calculated from the following:    Height as of 2/24/20: 1.753 m (5' 9\").    Weight as of 2/24/20: 101.9 kg (224 lb 9.6 oz).    Unable to perform physical exam due to virtual visit over the telephone.          Assessment and Plan       ICD-10-CM    1. Lower extremity numbness  R20.0 Hepatic Panel (Jennifer)     Hepatitis C antibody     Treponema Abs w Reflex to RPR and Titer     CANCELED: EMG ([facility])     CANCELED: Hepatitis C antibody     CANCELED: Hepatic Panel (Jennifer)     CANCELED: Treponema Abs w Reflex to RPR and Titer     Patient with worsening numbness in bilateral lower extremities, reviewed MRI results with patient, given his history of back pain and worsening of numbness, did an MRI of the lumbar spine, no significant etiology to explain symptoms.  Will complete laboratory work-up required, including hepatitis C and syphilis.  Will also check hepatic panel, given minimal ascites seen on MRI.  Patient will follow-up by [facility] on results, if the lab results are normal, plan to proceed with an EMG, to complete peripheral neuropathy work-up.    After Visit Information:  Patient chose to view AVS via [employer]    No follow-ups on file.    Appointment end time: 2:21 PM  This is a telephone visit that took 11 minutes.      Clinician location:  Jennifer Mayo, DO  I precepted today with Dr. Guzman who spoke to patient over the phone    "

## 2020-06-25 NOTE — PROGRESS NOTES
Preceptor Attestation:   I talked to the patient on the phone. I discussed the patient with the resident. I have verified the content of the note, which accurately reflects my assessment of the patient and the plan of care.   Supervising Physician:  Maggy Guzman MD.

## 2020-06-25 NOTE — TELEPHONE ENCOUNTER
Received refill request for hydrochlorothiazide, this was discontinued 6/9/20 - will send [facility] message to patient.    Marcia Mayo DO  PGY3 Family Medicine Resident  Pager: [phone]

## 2020-06-26 DIAGNOSIS — G62.9 NEUROPATHY: Primary | ICD-10-CM

## 2020-06-26 DIAGNOSIS — R20.0 LOWER EXTREMITY NUMBNESS: ICD-10-CM

## 2020-06-26 LAB
ALBUMIN SERPL-MCNC: 4.6 MG/DL (ref 3.5–4.7)
ALP SERPL-CCNC: 55 U/L (ref 31.7–110.7)
ALT SERPL-CCNC: 16.3 U/L (ref 0–45)
AST SERPL-CCNC: 15.7 U/L (ref 0–55)
BILIRUB SERPL-MCNC: 0.7 MG/DL (ref 0.2–1.3)
BILIRUBIN DIRECT: 0.2 MG/DL (ref 0.1–0.3)
PROT SERPL-MCNC: 6.3 G/DL (ref 6.8–8.8)

## 2020-06-26 RX ORDER — HYDROCHLOROTHIAZIDE 12.5 MG/1
12.5 TABLET ORAL DAILY
OUTPATIENT
Start: 2020-06-26

## 2020-06-26 RX ORDER — HYDROCHLOROTHIAZIDE 12.5 MG/1
12.5 TABLET ORAL DAILY
COMMUNITY
Start: 2020-05-28 | End: 2021-03-08

## 2020-06-26 NOTE — PROGRESS NOTES
Lab testing hepatic normal, awaiting Hep C and Treponema.    Ordered EMG. Plan to schedule if Hep C and Treponema normal.    Marcia Mayo, DO  PGY3 Family Medicine Resident  Pager: [phone]

## 2020-06-26 NOTE — TELEPHONE ENCOUNTER
"Request for medication refill: hydrochlorothiazide  12.5MG    Providers if patient needs an appointment and you are willing to give a one month supply please refill for one month and  send a letter/MyChart using \".SMILLIMITEDREFILL\" .smillimited and route chart to \"P SMI \" (Giving one month refill in non controlled medications is strongly recommended before denial)    If refill has been denied, meaning absolutely no refills without visit, please complete the smart phrase \".smirxrefuse\" and route it to the \"P SMI MED REFILLS\"  pool to inform the patient and the pharmacy.    Luiza Putnam, [facility]        "

## 2020-06-26 NOTE — TELEPHONE ENCOUNTER
Second refill request for hydrochlorothiazide. As previously documented this medicine was STOPPED at last in person clinic visit.    Marcia Mayo DO  PGY3 Family Medicine Resident  Pager: [phone]

## 2020-06-27 LAB — T PALLIDUM AB SER QL: NONREACTIVE

## 2020-06-29 LAB — HCV AB SERPL QL IA: NONREACTIVE

## 2020-07-08 ENCOUNTER — MYC REFILL (OUTPATIENT)
Dept: SLEEP MEDICINE | Facility: CLINIC | Age: 60
End: 2020-07-08

## 2020-07-08 NOTE — TELEPHONE ENCOUNTER
fluticasone (FLONASE) 50 MCG/ACT nasal spray   Last Written Prescription Date:  1/30/2020  Last Fill Quantity: 1,   # refills: 3  Last Office Visit: 3/18/2020  Future Office visit:       Routing refill request to provider for review/approval because:  Drug not on the [facility], [facility] or [facility] refill protocol or controlled substance

## 2020-07-09 RX ORDER — FLUTICASONE PROPIONATE 50 MCG
1-2 SPRAY, SUSPENSION (ML) NASAL DAILY
Qty: 16 G | Refills: 3 | Status: SHIPPED | OUTPATIENT
Start: 2020-07-09 | End: 2020-09-29

## 2020-07-29 ENCOUNTER — OFFICE VISIT (OUTPATIENT)
Dept: NEUROLOGY | Facility: CLINIC | Age: 60
End: 2020-07-29
Payer: COMMERCIAL

## 2020-07-29 DIAGNOSIS — R20.0 LOWER EXTREMITY NUMBNESS: ICD-10-CM

## 2020-07-29 DIAGNOSIS — G62.9 NEUROPATHY: ICD-10-CM

## 2020-07-29 NOTE — LETTER
7/29/2020       RE: Juan Francisco Booker  [address]  [place] [address]     Dear Colleague,    Thank you for referring your patient, Juan Francisco Booker, to the [facility] EMG at [facility]. Please see a copy of my visit note below.        [facility]  Electrodiagnostic Laboratory    Nerve Conduction & EMG Report          Patient:       Juan Francisco Booker  Patient ID:    [id number]  Gender:        Male  YOB: 1960  Age:           60 Years 5 Months        History & Examination:  60 year old man with numbness in both feet, right > left. He also reports low back pain. Evaluate for polyneuropathy vs radiculopathy.     Techniques: Motor and sensory conduction studies were done with surface recording electrodes. EMG was done with a concentric needle electrode.      Results:  Nerve conduction studies:   1. Bilateral sural sensory responses are normal.   2. Bilateral peroneal-EDB and right tibial-AH motor responses are normal.     Needle EMG of selected proximal and distal right and left lower limb muscles was performed as tabulated below. No abnormal spontaneous activity was observed in the sampled muscles. Motor unit potential morphology and recruitment patterns were normal.     Interpretation:  This is a normal study. There is no electrophysiologic evidence of a large-fiber polyneuropathy or lumbosacral radiculopathy affecting the lower limbs on the basis of this study.       Ja Hines MD  Department of Neurology         Sensory NCS      Nerve / Sites Rec. Site Onset Peak NP Amp Ref. PP Amp Dist Merlin Ref. Temp     ms ms  V  V  V cm m/s m/s  C   R SURAL - Lat Mall 60      Calf Ankle 2.60 3.49 8.9 5.0 12.0 14 53.8 38.0 31.1   L SURAL - Lat Mall 60      Calf Ankle 2.40 3.28 9.4 5.0 12.6 14 58.4 38.0 30.8       Motor NCS      Nerve / Sites Rec. Site Lat Ref. Amp Ref. Rel Amp Dist Merlin Ref. Dur. Area Temp.     ms ms mV mV % cm m/s m/s ms %  C   R DEEP PERONEAL - EDB 60       Ankle EDB 4.38 6.00 3.0 2.0 100 8   5.00 100       FibHead EDB 11.67  2.4  80.1 30 41.1 38.0 5.78 98.1       Pop Fos EDB 12.97  2.3  74.4 6 46.1 38.0 5.99 95.4    L DEEP PERONEAL - EDB 60      Ankle EDB 5.00 6.00 3.1 2.0 100 8   6.61 100    R TIBIAL - AH      Ankle AH 3.91 6.00 8.6 4.0 100 8   6.30 100 30.6      Pop Fos AH 12.34  4.1  47.4 36 42.7 38.0 7.50 64.1 30.7       F  Wave      Nerve Min F Lat Max F Lat Mean FLat Temp.    ms ms ms  C   R TIBIAL 53.44 54.37 53.91 31       EMG Summary Table     Spontaneous MUAP Recruitment    IA Fib PSW Fasc H.F. Amp Dur. PPP Pattern   R. VAST LATERALIS N None None None None N N N N   R. TIB ANTERIOR N None None None None N N N N   R. PERON LONGUS N None None None None N N N N   R. GASTROCN (MED) N None None None None N N N N   R. TIB POSTERIOR N None None None None N N N N   L. TIB ANT N None None None None N N N N   L. GASTROCN (MED) N None None None None N N N N                        Again, thank you for allowing me to participate in the care of your patient.      Sincerely,    Ja Hines MD

## 2020-07-29 NOTE — PROGRESS NOTES
[facility]  Electrodiagnostic Laboratory    Nerve Conduction & EMG Report          Patient:       Juan Francisco Booker  Patient ID:    [id number]  Gender:        Male  YOB: 1960  Age:           60 Years 5 Months        History & Examination:  60 year old man with numbness in both feet, right > left. He also reports low back pain. Evaluate for polyneuropathy vs radiculopathy.     Techniques: Motor and sensory conduction studies were done with surface recording electrodes. EMG was done with a concentric needle electrode.      Results:  Nerve conduction studies:   1. Bilateral sural sensory responses are normal.   2. Bilateral peroneal-EDB and right tibial-AH motor responses are normal.     Needle EMG of selected proximal and distal right and left lower limb muscles was performed as tabulated below. No abnormal spontaneous activity was observed in the sampled muscles. Motor unit potential morphology and recruitment patterns were normal.     Interpretation:  This is a normal study. There is no electrophysiologic evidence of a large-fiber polyneuropathy or lumbosacral radiculopathy affecting the lower limbs on the basis of this study.       Ja Hines MD  Department of Neurology         Sensory NCS      Nerve / Sites Rec. Site Onset Peak NP Amp Ref. PP Amp Dist Merlin Ref. Temp     ms ms  V  V  V cm m/s m/s  C   R SURAL - Lat Mall 60      Calf Ankle 2.60 3.49 8.9 5.0 12.0 14 53.8 38.0 31.1   L SURAL - Lat Mall 60      Calf Ankle 2.40 3.28 9.4 5.0 12.6 14 58.4 38.0 30.8       Motor NCS      Nerve / Sites Rec. Site Lat Ref. Amp Ref. Rel Amp Dist Merlin Ref. Dur. Area Temp.     ms ms mV mV % cm m/s m/s ms %  C   R DEEP PERONEAL - EDB 60      Ankle EDB 4.38 6.00 3.0 2.0 100 8   5.00 100       FibHead EDB 11.67  2.4  80.1 30 41.1 38.0 5.78 98.1       Pop Fos EDB 12.97  2.3  74.4 6 46.1 38.0 5.99 95.4    L DEEP PERONEAL - EDB 60      Ankle EDB 5.00 6.00 3.1 2.0 100 8   6.61 100    R TIBIAL - AH      Ankle AH  3.91 6.00 8.6 4.0 100 8   6.30 100 30.6      Pop Fos AH 12.34  4.1  47.4 36 42.7 38.0 7.50 64.1 30.7       F  Wave      Nerve Min F Lat Max F Lat Mean FLat Temp.    ms ms ms  C   R TIBIAL 53.44 54.37 53.91 31       EMG Summary Table     Spontaneous MUAP Recruitment    IA Fib PSW Fasc H.F. Amp Dur. PPP Pattern   R. VAST LATERALIS N None None None None N N N N   R. TIB ANTERIOR N None None None None N N N N   R. PERON LONGUS N None None None None N N N N   R. GASTROCN (MED) N None None None None N N N N   R. TIB POSTERIOR N None None None None N N N N   L. TIB ANT N None None None None N N N N   L. GASTROCN (MED) N None None None None N N N N

## 2020-08-17 DIAGNOSIS — F32.A DEPRESSION, UNSPECIFIED DEPRESSION TYPE: ICD-10-CM

## 2020-08-17 NOTE — TELEPHONE ENCOUNTER

## 2020-08-18 RX ORDER — CITALOPRAM HYDROBROMIDE 10 MG/1
10 TABLET ORAL DAILY
Qty: 90 TABLET | Refills: 0 | Status: SHIPPED | OUTPATIENT
Start: 2020-08-18 | End: 2020-11-10

## 2020-08-25 ENCOUNTER — OFFICE VISIT (OUTPATIENT)
Dept: FAMILY MEDICINE | Facility: CLINIC | Age: 60
End: 2020-08-25
Payer: COMMERCIAL

## 2020-08-25 VITALS
RESPIRATION RATE: 18 BRPM | SYSTOLIC BLOOD PRESSURE: 110 MMHG | OXYGEN SATURATION: 96 % | HEART RATE: 72 BPM | HEIGHT: 69 IN | DIASTOLIC BLOOD PRESSURE: 76 MMHG | TEMPERATURE: 98.3 F | BODY MASS INDEX: 28.94 KG/M2 | WEIGHT: 195.4 LBS

## 2020-08-25 DIAGNOSIS — R20.0 LOWER EXTREMITY NUMBNESS: Primary | ICD-10-CM

## 2020-08-25 LAB
HBA1C MFR BLD: 5 % (ref 4.1–5.7)
VIT B12 SERPL-MCNC: 331 PG/ML (ref 193–986)

## 2020-08-25 ASSESSMENT — MIFFLIN-ST. JEOR: SCORE: 1686.71

## 2020-08-25 ASSESSMENT — PATIENT HEALTH QUESTIONNAIRE - PHQ9: SUM OF ALL RESPONSES TO PHQ QUESTIONS 1-9: 6

## 2020-08-25 NOTE — PATIENT INSTRUCTIONS
Here is the plan from today's visit    1. Lower extremity numbness  [facility] next steps would be neurology  - Hemoglobin A1c ([facility])  - Vitamin B12      Please call or return to clinic if your symptoms don't go away.    Follow up plan  Please make a clinic appointment for follow up with me (IVON RED) as needed.    Thank you for coming to [facility] today.  Lab Testing:  **If you had lab testing today and your results are reassuring or normal they will be mailed to you or sent through [employer] within 7 days.   **If the lab tests need quick action we will call you with the results.  The phone number we will call with results is # [phone] (home) . If this is not the best number please call our clinic and change the number.  Medication Refills:  If you need any refills please call your pharmacy and they will contact us.   If you need to  your refill at a new pharmacy, please contact the new pharmacy directly. The new pharmacy will help you get your medications transferred faster.   Scheduling:  If you have any concerns about today's visit or wish to schedule another appointment please call our office during normal business hours [phone] (8-5:00 M-F)  If a referral was made to a [facility] and you don't get a call from central scheduling please call [phone].  If a Mammogram was ordered for you at [facility] call [phone] to schedule or change your appointment.  If you had an XRay/CT/Ultrasound/MRI ordered the number is [phone] to schedule or change your radiology appointment.   Medical Concerns:  If you have urgent medical concerns please call [phone] at any time of the day.    Ivon Red MD

## 2020-08-25 NOTE — PROGRESS NOTES
Tomi is a 60 year old  who presents for   Patient presents with:  RECHECK: on the tingling in his legs and has questions regarding his exercise program      Assessment and Plan      1. Lower extremity numbness  Pt has had extensive work up for lower extremity numbness. Reviewed MRI, given his history of back pain and worsening of numbness, did an MRI of the lumbar spine, no significant etiology to explain symptoms.  Previous labs hepatic panel, hepatitis C and syphilis were negative.  Recent EMG done showing normal conductivity. Vit B12 or diabetes are a possibility though has good nutrition and recent sugar not elevated, however should be ruled out. If this all is normal will send a referral to Neurology. Possibility that this is psychogenic if no cause can be found.   - Hemoglobin A1c (Carey's)  - Vitamin B12     Return if symptoms worsen or fail to improve.    There are no discontinued medications.      Ivon Red MD         HPI       Tomi is a 60 year old  who presents for   Patient presents with:  RECHECK: on the tingling in his legs and has questions regarding his exercise program      Visit [place]  1. Numbness  Pt has been seen by Dr. Mayo for this for the last 6-8 months. Seems to come on when sitting in chair after 15 min and gets worse the longer he sits. But goes away when in recliner with feet, standing and walking fine. No changes at that time. Losing weight. Numbness starts longterm done calf and then goes down. Worse on right than left. Elevation seems to help. No leg swelling. Diet: eats veggies, but not necessarily leafy one. Feels like drinks a lot of water and pees, but not abnormal. No fatigue, no sob, no soler, no orthopnea.  No cigarettes and no alcohol.        Problem, Medication and Allergy Lists were reviewed and updated if needed..  Patient is an established patient of this clinic.  Reviewed and updated as needed this visit by clinical staff  Tobacco  Allergies  Meds       Reviewed  "and updated as needed this visit by Provider       Health Maintenance Due   Topic Date Due     DEPRESSION ACTION PLAN  1960     PHQ-9  09/25/2020          Review of Systems:   Review of Systems  10 point review of symptoms was otherwise negative except as noted in HPI         Physical Exam:     Vitals:    08/25/20 1345   BP: 110/76   BP Location: Left arm   Patient Position: Sitting   Cuff Size: Adult Regular   Pulse: 72   Resp: 18   Temp: 98.3  F (36.8  C)   TempSrc: Oral   SpO2: 96%   Weight: 88.6 kg (195 lb 6.4 oz)   Height: 1.753 m (5' 9\")     Body mass index is 28.86 kg/m .  Vitals were reviewed and were normal     Physical Exam  Vitals signs reviewed.   Constitutional:       General: He is not in acute distress.     Appearance: Normal appearance. He is normal weight.   Neck:      Musculoskeletal: Normal range of motion and neck supple. No neck rigidity or muscular tenderness.   Cardiovascular:      Pulses:           Dorsalis pedis pulses are 1+ on the right side and 1+ on the left side.        Posterior tibial pulses are 1+ on the right side and 1+ on the left side.   Musculoskeletal: Normal range of motion.         General: No swelling, tenderness, deformity or signs of injury.      Lumbar back: He exhibits normal range of motion, no tenderness and no spasm.      Right lower leg: No edema.      Left lower leg: No edema.      Right foot: Normal range of motion.      Left foot: Normal range of motion.   Feet:      Right foot:      Protective Sensation: 8 sites tested. 7 sites sensed.      Left foot:      Protective Sensation: 8 sites tested. 8 sites sensed.   Skin:     General: Skin is warm and dry.      Capillary Refill: Capillary refill takes less than 2 seconds.   Neurological:      Mental Status: He is alert.      Sensory: Sensation is intact. No sensory deficit.      Motor: No weakness.      Coordination: Coordination is intact. Romberg sign negative. Coordination normal. Heel to Shin Test normal.    "   Gait: Gait normal.      Deep Tendon Reflexes: Reflexes normal. Babinski sign absent on the right side. Babinski sign absent on the left side.      Reflex Scores:       Patellar reflexes are 2+ on the right side and 2+ on the left side.  Psychiatric:         Mood and Affect: Mood normal.         Behavior: Behavior normal.         Thought Content: Thought content normal.         Judgment: Judgment normal.           Results:   Results are ordered and pending    Options for treatment and follow-up care were reviewed with the patient. Juan Francisco Booker  engaged in the decision making process and verbalized understanding of the options discussed and agreed with the final plan.  Ivon Red MD  [facility]

## 2020-08-26 DIAGNOSIS — R20.0 LOWER EXTREMITY NUMBNESS: Primary | ICD-10-CM

## 2020-09-02 ENCOUNTER — MYC MEDICAL ADVICE (OUTPATIENT)
Dept: FAMILY MEDICINE | Facility: CLINIC | Age: 60
End: 2020-09-02

## 2020-09-23 ENCOUNTER — PRE VISIT (OUTPATIENT)
Dept: NEUROLOGY | Facility: CLINIC | Age: 60
End: 2020-09-23

## 2020-09-23 NOTE — TELEPHONE ENCOUNTER
FUTURE VISIT INFORMATION      FUTURE VISIT INFORMATION:    Date: 10/5/2020    Time: 1pm    Location: [facility]  REFERRAL INFORMATION:    Referring provider:  Dr. Red     Referring providers clinic:  [facility]     Reason for visit/diagnosis  Leg Weakness     RECORDS REQUESTED FROM:       Clinic name Comments Records Status Imaging Status   Internal Dr. Red-8/25/2020    Dr. Hines 7/29/2020    Marcia Mayo-6/25/2020, 6/11/2020, 6/9/2020 EPIC N/A

## 2020-09-29 RX ORDER — FLUTICASONE PROPIONATE 50 MCG
1-2 SPRAY, SUSPENSION (ML) NASAL DAILY
Qty: 16 G | Refills: 3 | Status: SHIPPED | OUTPATIENT
Start: 2020-09-29 | End: 2021-01-05

## 2020-09-29 NOTE — TELEPHONE ENCOUNTER
fluticasone (FLONASE) 50 MCG/ACT nasal spray       Last Written Prescription Date:  7/9/2020  Last Fill Quantity: 16g,   # refills: 3  Last Office Visit: 3/18/2020  Future Office visit:  1 year     Routing refill request to provider for review/approval because:  Drug not on the [facility], P or [facility] refill protocol or controlled substance

## 2020-10-01 DIAGNOSIS — I10 BENIGN ESSENTIAL HYPERTENSION: ICD-10-CM

## 2020-10-01 RX ORDER — LISINOPRIL 40 MG/1
40 TABLET ORAL DAILY
Qty: 90 TABLET | Refills: 1 | Status: SHIPPED | OUTPATIENT
Start: 2020-10-01 | End: 2020-11-09

## 2020-10-01 NOTE — TELEPHONE ENCOUNTER
"Request for medication refill:    Providers if patient needs an appointment and you are willing to give a one month supply please refill for one month and  send a letter/MyChart using \".SMILLIMITEDREFILL\" .smillimited and route chart to \"P SMI \" (Giving one month refill in non controlled medications is strongly recommended before denial)    If refill has been denied, meaning absolutely no refills without visit, please complete the smart phrase \".smirxrefuse\" and route it to the \"P SMI MED REFILLS\"  pool to inform the patient and the pharmacy.    Michelle Palm MA        " Mom calls and states that Autumn has had a headache for the past two days. Sometimes the pain will resolve for an hour and then return. Returned call and mom denies head injury. Mom has been giving ibuprofen. She has been using ice packs. Jacey has been drinking lots of fluids. Mom denies any cold symptoms. She is sensitive to light and sound. She vomited a few times yesterday but has not today. Mom denies diarrhea. She does not have a fever. Appt scheduled.

## 2020-10-05 ENCOUNTER — OFFICE VISIT (OUTPATIENT)
Dept: NEUROLOGY | Facility: CLINIC | Age: 60
End: 2020-10-05
Attending: FAMILY MEDICINE
Payer: COMMERCIAL

## 2020-10-05 VITALS
RESPIRATION RATE: 15 BRPM | DIASTOLIC BLOOD PRESSURE: 74 MMHG | HEIGHT: 69 IN | OXYGEN SATURATION: 99 % | SYSTOLIC BLOOD PRESSURE: 116 MMHG | WEIGHT: 190 LBS | BODY MASS INDEX: 28.14 KG/M2 | HEART RATE: 57 BPM

## 2020-10-05 DIAGNOSIS — R20.0 BILATERAL LEG NUMBNESS: Primary | ICD-10-CM

## 2020-10-05 DIAGNOSIS — Z82.0 FAMILY HISTORY OF MS (MULTIPLE SCLEROSIS): ICD-10-CM

## 2020-10-05 PROCEDURE — 99203 OFFICE O/P NEW LOW 30 MIN: CPT | Performed by: PSYCHIATRY & NEUROLOGY

## 2020-10-05 ASSESSMENT — PAIN SCALES - GENERAL: PAINLEVEL: MILD PAIN (3)

## 2020-10-05 ASSESSMENT — MIFFLIN-ST. JEOR: SCORE: 1662.21

## 2020-10-05 NOTE — NURSING NOTE
Chief Complaint   Patient presents with     Numbness     Tingling     UMP NEW BILATERAL PARESTHESIAS- FEET/LOWER LEGS       Lenny Mcdonough, EMT

## 2020-10-05 NOTE — PROGRESS NOTES
Service Date: 10/05/2020      Ivon Red MD   [facility]   [address] [place]  [address]      RE: Juan Francisco Booker   MRN: [id number]   : 1960      Dear Dr. Red:      I saw your patient, Juan Francisco Booker.  He is a 60-year-old male here for evaluation of intermittent tingling and numbness involving his lower extremities.      He has had tingling on the bottom of his feet for several years.  He also has restless legs and takes gabapentin for this.      Over the past 6 months, his symptoms have changed.  He notes when sitting he will get a tingling that starts on the top of his feet and then goes to the soles and then up to the legs, mainly posterior to about the knee level.  It to some extent depends upon what kind of chair he is sitting in.  For example, if he is in a recliner or if he has his feet elevated, it does not happen.  If he gets up and moves around, the symptoms resolve, but if he gets up and stand still, the symptoms will persist.      He has had no weakness.  He has no upper extremity symptoms.  He has no cranial nerve symptoms.  Curiously, he reports a vague sensation in his forehead that he describes as uncomfortable when he is sitting but not when he is lying down or standing.      He has had no trouble with bowel or bladder control aside from some urinary frequency and rarely incontinence.  He denies a Lhermitte phenomenon.      He did have an MRI scan done of the lumbar plexus on 2020 that was unremarkable, but it only went up to the L3 level.      He had a lower extremity EMG and nerve conduction studies done by Dr. Hines on 2020 and this was a normal study.      Normal or negative laboratory work included a B12, hemoglobin A1c, hepatitis C, Treponema pallidum and liver functions.  He had a normal basic metabolic panel in January.      He did have a low TSH in 2018 suggesting subclinical hyperthyroidism.  He did see Endocrinology about this.  He had a thyroid  nodule.  No intervention was done at that time, and by 01/2020, his TSH was normal.      PAST MEDICAL HISTORY:  Notable for hypertension, hyperlipidemia, depression and restless leg syndrome.      CURRENT MEDICATIONS:   1.  Aspirin.   2.  Caffeine   3.  Citalopram.   4.  Flonase.   5.  Gabapentin 300 mg in morning, 300 mg at midday and 900 mg at night.   6.  Lisinopril.   7.  Multivitamin.   8.  Fish oil.   9.  Zocor.   10.  Flomax.      ALLERGIES:  He is not allergic to any medications.      FAMILY HISTORY:  Notable for his father having multiple sclerosis.      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  He does not smoke or use alcohol.      PHYSICAL EXAMINATION:  Examination reveals the patient's heart rate is 57.  Blood pressure 116/74.  Pupils are equal, round and react well to light.  He has full extraocular motility.  Facial strength and sensation are normal, and otherwise, cranial nerves II-XII are intact.  Motor examination reveals normal strength.  Sensory examination is notable for diminished vibratory sense in the toes on the right only.  He also reports a relative decrease in cold appreciation involving the distal aspects of both feet.  Pinprick and position sense are intact.  Finger-to-nose and heel-to-shin are done well.  His gait is normal.  He is able to tandem walk, although this is mildly difficult for him.  Romberg is negative.  Reflexes are 2 to 3+ in the upper extremities with somewhat increased finger flexor reflexes, particularly on the right.  Lower extremity reflexes are 3+.  The plantar responses are a bit difficult to  but possibly extensor bilaterally.      IMPRESSION:  Intermittent lower extremity paresthesias.      This does not sound like a peripheral neuropathic process given the variability related to different positions.  It is interesting he does have a positive family history of multiple sclerosis and his reflexes are somewhat brisk with equivocal plantar  responses.      PLAN:  For further evaluation, he is going to have cervical and thoracic MRI scans to look for any myelopathic process.  I am also going to do a formal lumbar MRI scan because the study done of the plexus did not go above L3.      I will communicate these results to him.       ADDENDUM 10/23/20: Reviewed MRIs and discussed with patient. Spinal cord normal. Moderate spinal stenosis at L3-4 with moderate foraminal narrowing L3-4 and L4-5. Suspect his dynamic sensory symptoms related to lumbar spine but in view of negative EMG would not pursue further unless symptoms worsen or he develops pain or weakness. 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 Reviewed with Radiology. Metastatic disease possible but much less likely. Discussed with patient reimaging in 3-6 months to assure stability.      Sincerely,         ELI WAHL MD             D: 10/05/2020   T: 10/05/2020   MT: chauncey      Name:     FARRUKH PEACOCK   MRN:      [id number]        Account:      [id number]   :      1960           Service Date: 10/05/2020      Document: [id number]

## 2020-10-05 NOTE — LETTER
10/5/2020       RE: Juan Francisco Booker  [address]  [place] [address]     Dear Colleague,    Thank you for referring your patient, Juan Francisco Booker, to the [facility] [place] at [facility]. Please see a copy of my visit note below.    Service Date: 10/05/2020      Ivon Red MD   [facility]   [address] [place]  [address]      RE: Juan Francisco Booker   MRN: [id number]   : 1960      Dear Dr. Red:      I saw your patient, Juan Francisco Booker.  He is a 60-year-old male here for evaluation of intermittent tingling and numbness involving his lower extremities.      He has had tingling on the bottom of his feet for several years.  He also has restless legs and takes gabapentin for this.      Over the past 6 months, his symptoms have changed.  He notes when sitting he will get a tingling that starts on the top of his feet and then goes to the soles and then up to the legs, mainly posterior to about the knee level.  It to some extent depends upon what kind of chair he is sitting in.  For example, if he is in a recliner or if he has his feet elevated, it does not happen.  If he gets up and moves around, the symptoms resolve, but if he gets up and stand still, the symptoms will persist.      He has had no weakness.  He has no upper extremity symptoms.  He has no cranial nerve symptoms.  Curiously, he reports a vague sensation in his forehead that he describes as uncomfortable when he is sitting but not when he is lying down or standing.      He has had no trouble with bowel or bladder control aside from some urinary frequency and rarely incontinence.  He denies a Lhermitte phenomenon.      He did have an MRI scan done of the lumbar plexus on 2020 that was unremarkable, but it only went up to the L3 level.      He had a lower extremity EMG and nerve conduction studies done by Dr. Hines on 2020 and this was a normal study.       Normal or negative laboratory work included a B12, hemoglobin A1c, hepatitis C, Treponema pallidum and liver functions.  He had a normal basic metabolic panel in January.      He did have a low TSH in 12/2018 suggesting subclinical hyperthyroidism.  He did see Endocrinology about this.  He had a thyroid nodule.  No intervention was done at that time, and by 01/2020, his TSH was normal.      PAST MEDICAL HISTORY:  Notable for hypertension, hyperlipidemia, depression and restless leg syndrome.      CURRENT MEDICATIONS:   1.  Aspirin.   2.  Caffeine   3.  Citalopram.   4.  Flonase.   5.  Gabapentin 300 mg in morning, 300 mg at midday and 900 mg at night.   6.  Lisinopril.   7.  Multivitamin.   8.  Fish oil.   9.  Zocor.   10.  Flomax.      ALLERGIES:  He is not allergic to any medications.      FAMILY HISTORY:  Notable for his father having multiple sclerosis.      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  He does not smoke or use alcohol.      PHYSICAL EXAMINATION:  Examination reveals the patient's heart rate is 57.  Blood pressure 116/74.  Pupils are equal, round and react well to light.  He has full extraocular motility.  Facial strength and sensation are normal, and otherwise, cranial nerves II-XII are intact.  Motor examination reveals normal strength.  Sensory examination is notable for diminished vibratory sense in the toes on the right only.  He also reports a relative decrease in cold appreciation involving the distal aspects of both feet.  Pinprick and position sense are intact.  Finger-to-nose and heel-to-shin are done well.  His gait is normal.  He is able to tandem walk, although this is mildly difficult for him.  Romberg is negative.  Reflexes are 2 to 3+ in the upper extremities with somewhat increased finger flexor reflexes, particularly on the right.  Lower extremity reflexes are 3+.  The plantar responses are a bit difficult to  but possibly extensor bilaterally.       IMPRESSION:  Intermittent lower extremity paresthesias.      This does not sound like a peripheral neuropathic process given the variability related to different positions.  It is interesting he does have a positive family history of multiple sclerosis and his reflexes are somewhat brisk with equivocal plantar responses.      PLAN:  For further evaluation, he is going to have cervical and thoracic MRI scans to look for any myelopathic process.  I am also going to do a formal lumbar MRI scan because the study done of the plexus did not go above L3.      I will communicate these results to him.        Sincerely,         ALLEN SEAMAN MD             D: 10/05/2020   T: 10/05/2020   MT: chauncey      Name:     FARRUKH PEACOCK   MRN:      [id number]        Account:      [id number]   :      1960           Service Date: 10/05/2020      Document: [id number]          Again, thank you for allowing me to participate in the care of your patient.      Sincerely,    Allen Seaman MD

## 2020-10-05 NOTE — LETTER
10/5/2020       RE: Juan Francisco Booker  [address]  [place] [address]     Dear Colleague,    Thank you for referring your patient, Juan Francisco Booker, to the [facility] [place] at [facility]. Please see a copy of my visit note below.    Service Date: 10/05/2020      Ivon Red MD   [facility]   [address] [place]  [address]      RE: Juan Francisco Booker   MRN: [id number]   : 1960      Dear Dr. Red:      I saw your patient, Juan Francisco Booker.  He is a 60-year-old male here for evaluation of intermittent tingling and numbness involving his lower extremities.      He has had tingling on the bottom of his feet for several years.  He also has restless legs and takes gabapentin for this.      Over the past 6 months, his symptoms have changed.  He notes when sitting he will get a tingling that starts on the top of his feet and then goes to the soles and then up to the legs, mainly posterior to about the knee level.  It to some extent depends upon what kind of chair he is sitting in.  For example, if he is in a recliner or if he has his feet elevated, it does not happen.  If he gets up and moves around, the symptoms resolve, but if he gets up and stand still, the symptoms will persist.        He has had no weakness.  He has no upper extremity symptoms.  He has no cranial nerve symptoms.  Curiously, he reports a vague sensation in his forehead that he describes as uncomfortable when he is sitting but not when he is lying down or standing.      He has had no trouble with bowel or bladder control aside from some urinary frequency and rarely incontinence.  He denies a Lhermitte phenomenon.      He did have an MRI scan done of the lumbar plexus on 2020 that was unremarkable, but it only went up to the L3 level.      He had a lower extremity EMG and nerve conduction studies done by Dr. Hines on 2020 and this was a normal study.       Normal or negative laboratory work included a B12, hemoglobin A1c, hepatitis C, Treponema pallidum and liver functions.  He had a normal basic metabolic panel in January.      He did have a low TSH in 12/2018 suggesting subclinical hyperthyroidism.  He did see Endocrinology about this.  He had a thyroid nodule.  No intervention was done at that time, and by 01/2020, his TSH was normal.      PAST MEDICAL HISTORY:  Notable for hypertension, hyperlipidemia, depression and restless leg syndrome.      CURRENT MEDICATIONS:   1.  Aspirin.   2.  Caffeine   3.  Citalopram.   4.  Flonase.   5.  Gabapentin 300 mg in morning, 300 mg at midday and 900 mg at night.   6.  Lisinopril.   7.  Multivitamin.   8.  Fish oil.   9.  Zocor.   10.  Flomax.      ALLERGIES:  He is not allergic to any medications.      FAMILY HISTORY:  Notable for his father having multiple sclerosis.      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  He does not smoke or use alcohol.      PHYSICAL EXAMINATION:  Examination reveals the patient's heart rate is 57.  Blood pressure 116/74.  Pupils are equal, round and react well to light.  He has full extraocular motility.  Facial strength and sensation are normal, and otherwise, cranial nerves II-XII are intact.  Motor examination reveals normal strength.  Sensory examination is notable for diminished vibratory sense in the toes on the right only.  He also reports a relative decrease in cold appreciation involving the distal aspects of both feet.  Pinprick and position sense are intact.  Finger-to-nose and heel-to-shin are done well.  His gait is normal.  He is able to tandem walk, although this is mildly difficult for him.  Romberg is negative.  Reflexes are 2 to 3+ in the upper extremities with somewhat increased finger flexor reflexes, particularly on the right.  Lower extremity reflexes are 3+.  The plantar responses are a bit difficult to  but possibly extensor bilaterally.       IMPRESSION:  Intermittent lower extremity paresthesias.      This does not sound like a peripheral neuropathic process given the variability related to different positions.  It is interesting he does have a positive family history of multiple sclerosis and his reflexes are somewhat brisk with equivocal plantar responses.      PLAN:  For further evaluation, he is going to have cervical and thoracic MRI scans to look for any myelopathic process.  I am also going to do a formal lumbar MRI scan because the study done of the plexus did not go above L3.      I will communicate these results to him.        Again, thank you for allowing me to participate in the care of your patient.  Sincerely,        ELI WAHL MD  D: 10/05/2020   T: 10/05/2020   MT: chauncey      Name:     FARRUKH PEACOCK   MRN:      [id number]        Account:      [id number]   :      1960           Service Date: 10/05/2020      Document: [id number]

## 2020-10-19 ENCOUNTER — MYC MEDICAL ADVICE (OUTPATIENT)
Dept: FAMILY MEDICINE | Facility: CLINIC | Age: 60
End: 2020-10-19

## 2020-10-19 DIAGNOSIS — E78.5 HYPERLIPIDEMIA LDL GOAL <130: ICD-10-CM

## 2020-10-19 RX ORDER — SIMVASTATIN 40 MG
40 TABLET ORAL AT BEDTIME
Qty: 90 TABLET | Refills: 0 | Status: SHIPPED | OUTPATIENT
Start: 2020-10-19 | End: 2021-01-13

## 2020-10-19 NOTE — TELEPHONE ENCOUNTER
Last office visit: 08/25/2020  Last refill: 10/29/2019 for #90 with 3 refills    Medication refilled per standing order.    Stefani Sarkar RN

## 2020-10-22 ENCOUNTER — HOSPITAL ENCOUNTER (OUTPATIENT)
Dept: MRI IMAGING | Facility: CLINIC | Age: 60
End: 2020-10-22
Attending: PSYCHIATRY & NEUROLOGY
Payer: COMMERCIAL

## 2020-10-22 DIAGNOSIS — R20.0 BILATERAL LEG NUMBNESS: ICD-10-CM

## 2020-10-22 DIAGNOSIS — Z82.0 FAMILY HISTORY OF MS (MULTIPLE SCLEROSIS): ICD-10-CM

## 2020-10-22 PROCEDURE — 72148 MRI LUMBAR SPINE W/O DYE: CPT | Mod: 26 | Performed by: RADIOLOGY

## 2020-10-22 PROCEDURE — 72157 MRI CHEST SPINE W/O & W/DYE: CPT

## 2020-10-22 PROCEDURE — 72156 MRI NECK SPINE W/O & W/DYE: CPT

## 2020-10-22 PROCEDURE — 72148 MRI LUMBAR SPINE W/O DYE: CPT

## 2020-10-22 PROCEDURE — 255N000002 HC RX 255 OP 636: Performed by: PSYCHIATRY & NEUROLOGY

## 2020-10-22 PROCEDURE — 72156 MRI NECK SPINE W/O & W/DYE: CPT | Mod: 26 | Performed by: RADIOLOGY

## 2020-10-22 PROCEDURE — 72157 MRI CHEST SPINE W/O & W/DYE: CPT | Mod: 26 | Performed by: RADIOLOGY

## 2020-10-22 PROCEDURE — A9585 GADOBUTROL INJECTION: HCPCS | Performed by: PSYCHIATRY & NEUROLOGY

## 2020-10-22 RX ORDER — GADOBUTROL 604.72 MG/ML
10 INJECTION INTRAVENOUS ONCE
Status: COMPLETED | OUTPATIENT
Start: 2020-10-22 | End: 2020-10-22

## 2020-10-22 RX ADMIN — GADOBUTROL 8.6 ML: 604.72 INJECTION INTRAVENOUS at 12:27

## 2020-11-09 ENCOUNTER — OFFICE VISIT (OUTPATIENT)
Dept: FAMILY MEDICINE | Facility: CLINIC | Age: 60
End: 2020-11-09
Payer: COMMERCIAL

## 2020-11-09 VITALS
SYSTOLIC BLOOD PRESSURE: 121 MMHG | DIASTOLIC BLOOD PRESSURE: 80 MMHG | WEIGHT: 191 LBS | RESPIRATION RATE: 18 BRPM | HEART RATE: 59 BPM | HEIGHT: 69 IN | BODY MASS INDEX: 28.29 KG/M2 | TEMPERATURE: 98.5 F | OXYGEN SATURATION: 97 %

## 2020-11-09 DIAGNOSIS — I10 BENIGN ESSENTIAL HYPERTENSION: ICD-10-CM

## 2020-11-09 DIAGNOSIS — E53.8 VITAMIN B 12 DEFICIENCY: ICD-10-CM

## 2020-11-09 DIAGNOSIS — R42 DIZZINESS: ICD-10-CM

## 2020-11-09 DIAGNOSIS — K40.91 UNILATERAL RECURRENT INGUINAL HERNIA WITHOUT OBSTRUCTION OR GANGRENE: Primary | ICD-10-CM

## 2020-11-09 PROCEDURE — 36415 COLL VENOUS BLD VENIPUNCTURE: CPT | Performed by: FAMILY MEDICINE

## 2020-11-09 PROCEDURE — 99214 OFFICE O/P EST MOD 30 MIN: CPT | Mod: GC | Performed by: STUDENT IN AN ORGANIZED HEALTH CARE EDUCATION/TRAINING PROGRAM

## 2020-11-09 PROCEDURE — 83921 ORGANIC ACID SINGLE QUANT: CPT | Performed by: FAMILY MEDICINE

## 2020-11-09 RX ORDER — LISINOPRIL 20 MG/1
20 TABLET ORAL DAILY
Qty: 30 TABLET | Refills: 0 | Status: SHIPPED | OUTPATIENT
Start: 2020-11-09 | End: 2020-11-12

## 2020-11-09 ASSESSMENT — MIFFLIN-ST. JEOR: SCORE: 1660.13

## 2020-11-09 NOTE — PROGRESS NOTES
"       HPI       Juan Francisco Booker is a 60 year old  who presents for   Chief Complaint   Patient presents with     Follow Up     BP     Groin Pain     x 1 month, feeling sore     Dizziness     x 1.5 weeks,  when working out on tredmill or walking, pt informs to have felt dizzy and has noticed that his BP would sometimes be lower than usual       Groin pain more in R than left over past month . Notices only when exerting. No testicle pain. No hx of hernias or surgery to belly.     While on treadmill couple times a week will feel dizzy and need to quit. BP overall not exercising are lower now after losing 120lbs! No chest pain, dyspnea, exercise intolerance. Sometimes can finish workout w/o issues. But when dizzy BP is always on low end             Problem, Medication and Allergy Lists were reviewed and updated if needed..    Patient is an established patient of this clinic..         Review of Systems:   Review of Systems   Constitutional: Negative for appetite change, chills, fever and unexpected weight change.   Respiratory: Negative for cough and shortness of breath.    Cardiovascular: Negative for chest pain.   Gastrointestinal: Negative for abdominal distention, abdominal pain, diarrhea and nausea.   Genitourinary: Negative for decreased urine volume and difficulty urinating.   Musculoskeletal: Negative for arthralgias, back pain, myalgias and neck pain.   Neurological: Positive for dizziness. Negative for headaches.            Physical Exam:     Vitals:    11/09/20 1610   BP: 121/80   Pulse: 59   Resp: 18   Temp: 98.5  F (36.9  C)   TempSrc: Oral   SpO2: 97%   Weight: 86.6 kg (191 lb)   Height: 1.742 m (5' 8.58\")     Body mass index is 28.55 kg/m .  Vitals were reviewed and were normal     Physical Exam  Constitutional:       General: He is not in acute distress.     Appearance: He is well-developed.   HENT:      Head: Normocephalic and atraumatic.   Eyes:      General:         Right eye: No discharge.         Left " eye: No discharge.      Conjunctiva/sclera: Conjunctivae normal.   Cardiovascular:      Rate and Rhythm: Normal rate and regular rhythm.      Heart sounds: No murmur.   Pulmonary:      Effort: Pulmonary effort is normal. No respiratory distress.   Genitourinary:     Comments: R inguinal hernia only present w valsalva. Reducible non tender   Musculoskeletal:         General: No deformity.   Skin:     Coloration: Skin is not pale.      Findings: No erythema or rash.   Neurological:      Mental Status: He is alert and oriented to person, place, and time.      Coordination: Coordination normal.           Results:      Results from this visit  Results for orders placed or performed in visit on 11/09/20   Methylmalonic Acid     Status: None   Result Value Ref Range    Methylmalonic Acid 0.28 0.00 - 0.40 umol/L       Assessment and Plan        1. Benign essential hypertension w dizziness during exercise   Will reduce lisinopril dosing. Likely lower dose needed based on weight reduction but also new exercise/weight loss regimen reducing overall HTN. Continue tracking BP. If his Bps stay stable and still symptomatic w exercise or if ever has any signs of angina will do stress test. But no signs/sx of this on history and alternative explanation more plausible. No signs structural heart disease (no murmur). But if persisting or evolvingECG in clinic and referral for ECHO exercise stress test    2. Unilateral recurrent inguinal hernia without obstruction or gangrene  Referral to surgery. Discussed signs strangulation   - GENERAL SURGERY ADULT REFERRAL - INTERNAL    3. Vitamin B 12 deficiency  Hx of being on metformin and b12 lower end normal. But methylmalonic in goal. No need to recheck for 1 year  - Methylmalonic Acid    4. Dizziness  See above          There are no discontinued medications.    Options for treatment and follow-up care were reviewed with the patient. Juan Francisco Booker  engaged in the decision making process and  verbalized understanding of the options discussed and agreed with the final plan.    Krishna Ren MD

## 2020-11-09 NOTE — PATIENT INSTRUCTIONS
1. I think looking at blood pressures we are going to low in general but also too low in exercise. Will see if changing your blood pressure meds helps. If you have chest pain, pass out, or these changes don't work in 1-2 weeks I would want to do a stress test on you.     2. See surgeon for groin pain. If you have severe unrelenting belly pain before then come back.     3. I will mychart you result on b12

## 2020-11-10 DIAGNOSIS — F32.A DEPRESSION, UNSPECIFIED DEPRESSION TYPE: ICD-10-CM

## 2020-11-10 RX ORDER — CITALOPRAM HYDROBROMIDE 10 MG/1
10 TABLET ORAL DAILY
Qty: 90 TABLET | Refills: 3 | Status: SHIPPED | OUTPATIENT
Start: 2020-11-10 | End: 2021-11-12

## 2020-11-10 NOTE — TELEPHONE ENCOUNTER
REFERRAL INFORMATION:    Referring Provider:  Dr. Krishna Ren     Referring Clinic:  [facility]     Reason for Visit/Diagnosis: Unilateral recurrent inguinal hernia w/out obstruction or gangrene      FUTURE VISIT INFORMATION:    Appointment Date: 11/12/2020    Appointment Time: 2 PM      NOTES STATUS DETAILS   OFFICE NOTE from Referring Provider Internal 11/9/2020 Office visit with Dr. Ren    OFFICE NOTE from Other Specialist N/A    HOSPITAL DISCHARGE SUMMARY/  ED VISITS N/A    OPERATIVE REPORT N/A    EGD Internal 10/20/16   PERTINENT LABS Internal    PATHOLOGY REPORTS (RELATED) Internal 10/20/16   IMAGING  N/A

## 2020-11-10 NOTE — TELEPHONE ENCOUNTER
"Request for medication refill:  citalopram (CELEXA) 10 MG tablet    Providers if patient needs an appointment and you are willing to give a one month supply please refill for one month and  send a letter/MyChart using \".SMILLIMITEDREFILL\" .smillimited and route chart to \"P [facility] \" (Giving one month refill in non controlled medications is strongly recommended before denial)    If refill has been denied, meaning absolutely no refills without visit, please complete the smart phrase \".smirxrefuse\" and route it to the \"P [facility] MED REFILLS\"  pool to inform the patient and the pharmacy.    Michelle Palm MA        "

## 2020-11-12 ENCOUNTER — PRE VISIT (OUTPATIENT)
Dept: SURGERY | Facility: CLINIC | Age: 60
End: 2020-11-12

## 2020-11-12 ENCOUNTER — VIRTUAL VISIT (OUTPATIENT)
Dept: SURGERY | Facility: CLINIC | Age: 60
End: 2020-11-12
Payer: COMMERCIAL

## 2020-11-12 VITALS — HEIGHT: 69 IN | WEIGHT: 182 LBS | BODY MASS INDEX: 26.96 KG/M2

## 2020-11-12 DIAGNOSIS — R10.30 SEVERE GROIN PAIN: Primary | ICD-10-CM

## 2020-11-12 DIAGNOSIS — I10 BENIGN ESSENTIAL HYPERTENSION: ICD-10-CM

## 2020-11-12 DIAGNOSIS — R42 DIZZINESS: ICD-10-CM

## 2020-11-12 LAB — METHYLMALONATE SERPL-SCNC: 0.28 UMOL/L (ref 0–0.4)

## 2020-11-12 PROCEDURE — 99201 PR OFFICE/OUTPT VISIT, NEW, LEVEL I: CPT | Mod: 95 | Performed by: SURGERY

## 2020-11-12 RX ORDER — LISINOPRIL 20 MG/1
20 TABLET ORAL DAILY
Qty: 90 TABLET | Refills: 3 | Status: SHIPPED | OUTPATIENT
Start: 2020-11-12 | End: 2021-11-17

## 2020-11-12 ASSESSMENT — PAIN SCALES - GENERAL: PAINLEVEL: MILD PAIN (2)

## 2020-11-12 ASSESSMENT — MIFFLIN-ST. JEOR: SCORE: 1619.26

## 2020-11-12 NOTE — PROGRESS NOTES
"Juan Francisco Booker is a 60 year old male who is being evaluated via a billable video visit.      The patient has been notified of following:     \"This video visit will be conducted via a call between you and your physician/provider. We have found that certain health care needs can be provided without the need for an in-person physical exam.  This service lets us provide the care you need with a video conversation.  If a prescription is necessary we can send it directly to your pharmacy.  If lab work is needed we can place an order for that and you can then stop by our lab to have the test done at a later time.    Video visits are billed at different rates depending on your insurance coverage.  Please reach out to your insurance provider with any questions.    If during the course of the call the physician/provider feels a video visit is not appropriate, you will not be charged for this service.\"    Patient has given verbal consent for Video visit? Yes  How would you like to obtain your AVS? MyChart  If you are dropped from the video visit, the video invite should be resent to: Send to e-mail at: [email or web address]  Will anyone else be joining your video visit? No    During this virtual visit the patient is located in [place], patient verifies this as the location during the entirety of this visit.     Video-Visit Details    Type of service:  Video Visit    Video Start Time: 1:50 PM  Video End Time: 1:58 PM    Originating Location (pt. Location): [place]    Distant Location (provider location):  [facility] GENERAL SURGERY CLINIC [place]     Platform used for Video Visit: Ethel Villa MD              New Hernia Consultation Note    I spoke with Tomi briefly to discuss groin pain.    This started about 1 month ago while lifting heavy object.    I am not able to examine his groin.  He doesn't have x-rays documenting presence of hernia.    There is a clinic note from urology 2 years documenting absence of inguinal " "hernia.    No prior groin surgery.    Ht 1.742 m (5' 8.58\")   Wt 82.6 kg (182 lb)   BMI 27.21 kg/m      Lost 120 lbs over the past 3-4 years by focusing on good foods.    I told Tomi that it would be difficult to complete a discussion about hernia repair without seeing him in person and providing better confirmation and consultative advice about the hernia; we will do this next week.        "

## 2020-11-12 NOTE — NURSING NOTE
"Chief Complaint   Patient presents with     Consult     New appointment for hernia.       Vitals:    11/12/20 1338   Weight: 82.6 kg (182 lb)   Height: 1.742 m (5' 8.58\")       Body mass index is 27.21 kg/m .                            FABBY WATERS, EMT    "

## 2020-11-12 NOTE — TELEPHONE ENCOUNTER
"Patient's insurance requesting 90 day fill    Request for medication refill:    Providers if patient needs an appointment and you are willing to give a one month supply please refill for one month and  send a letter/MyChart using \".SMILLIMITEDREFILL\" .smillimited and route chart to \"P [facility] \" (Giving one month refill in non controlled medications is strongly recommended before denial)    If refill has been denied, meaning absolutely no refills without visit, please complete the smart phrase \".smirxrefuse\" and route it to the \"P [facility] MED REFILLS\"  pool to inform the patient and the pharmacy.    Michelle Palm MA        "

## 2020-11-12 NOTE — LETTER
"11/12/2020       RE: Juan Francisco Booker  [address]  [place] [address]     Dear Colleague,    Thank you for referring your patient, Juan Francisco Booker, to the [facility] GENERAL SURGERY [facility] at [facility]. Please see a copy of my visit note below.    Juan Francisco Booker is a 60 year old male who is being evaluated via a billable video visit.      The patient has been notified of following:     \"This video visit will be conducted via a call between you and your physician/provider. We have found that certain health care needs can be provided without the need for an in-person physical exam.  This service lets us provide the care you need with a video conversation.  If a prescription is necessary we can send it directly to your pharmacy.  If lab work is needed we can place an order for that and you can then stop by our lab to have the test done at a later time.    Video visits are billed at different rates depending on your insurance coverage.  Please reach out to your insurance provider with any questions.    If during the course of the call the physician/provider feels a video visit is not appropriate, you will not be charged for this service.\"    Patient has given verbal consent for Video visit? Yes  How would you like to obtain your AVS? MyChart  If you are dropped from the video visit, the video invite should be resent to: Send to e-mail at: [email or web address]  Will anyone else be joining your video visit? No    During this virtual visit the patient is located in [place], patient verifies this as the location during the entirety of this visit.         Video-Visit Details    Type of service:  Video Visit    Video Start Time: 1:50 PM  Video End Time: 1:58 PM    Originating Location (pt. Location): Home    Distant Location (provider location):  [facility] SURGERY [facility]     Platform used for Video Visit: [employer]        New Hernia Consultation " "Note    I spoke with Tomi briefly to discuss groin pain.    This started about 1 month ago while lifting heavy object.    I am not able to examine his groin.  He doesn't have x-rays documenting presence of hernia.    There is a clinic note from urology 2 years documenting absence of inguinal hernia.    No prior groin surgery.    Ht 1.742 m (5' 8.58\")   Wt 82.6 kg (182 lb)   BMI 27.21 kg/m      Lost 120 lbs over the past 3-4 years by focusing on good foods.    I told Tomi that it would be difficult to complete a discussion about hernia repair without seeing him in person and providing better confirmation and consultative advice about the hernia; we will do this next week.    Again, thank you for allowing me to participate in the care of your patient.      Sincerely,    Laith Villa MD      "

## 2020-11-15 ASSESSMENT — ENCOUNTER SYMPTOMS
ARTHRALGIAS: 0
DIARRHEA: 0
ABDOMINAL PAIN: 0
MYALGIAS: 0
DIZZINESS: 1
HEADACHES: 0
BACK PAIN: 0
FEVER: 0
APPETITE CHANGE: 0
CHILLS: 0
NECK PAIN: 0
UNEXPECTED WEIGHT CHANGE: 0
NAUSEA: 0
COUGH: 0
ABDOMINAL DISTENTION: 0
DIFFICULTY URINATING: 0
SHORTNESS OF BREATH: 0

## 2020-11-18 ASSESSMENT — ENCOUNTER SYMPTOMS
MYALGIAS: 1
MUSCLE CRAMPS: 0
SLEEP DISTURBANCES DUE TO BREATHING: 0
CONSTIPATION: 0
PALPITATIONS: 0
BLOOD IN STOOL: 0
STIFFNESS: 0
LIGHT-HEADEDNESS: 1
DIFFICULTY URINATING: 1
BOWEL INCONTINENCE: 0
ORTHOPNEA: 0
EXERCISE INTOLERANCE: 0
ARTHRALGIAS: 0
ABDOMINAL PAIN: 1
VOMITING: 0
HEARTBURN: 0
HYPERTENSION: 0
NAUSEA: 1
NECK PAIN: 0
SYNCOPE: 0
FLANK PAIN: 0
JAUNDICE: 0
JOINT SWELLING: 0
MUSCLE WEAKNESS: 0
HYPOTENSION: 1
BLOATING: 0
DIARRHEA: 0
DYSURIA: 0
RECTAL PAIN: 0
HEMATURIA: 0
LEG PAIN: 0
BACK PAIN: 1

## 2020-11-19 ENCOUNTER — OFFICE VISIT (OUTPATIENT)
Dept: SURGERY | Facility: CLINIC | Age: 60
End: 2020-11-19
Payer: COMMERCIAL

## 2020-11-19 VITALS
WEIGHT: 187.5 LBS | BODY MASS INDEX: 27.77 KG/M2 | HEIGHT: 69 IN | SYSTOLIC BLOOD PRESSURE: 132 MMHG | OXYGEN SATURATION: 99 % | DIASTOLIC BLOOD PRESSURE: 81 MMHG | HEART RATE: 64 BPM

## 2020-11-19 DIAGNOSIS — R10.32 LEFT GROIN PAIN: Primary | ICD-10-CM

## 2020-11-19 DIAGNOSIS — K40.30 UNILATERAL INGUINAL HERNIA WITH OBSTRUCTION AND WITHOUT GANGRENE, RECURRENCE NOT SPECIFIED: Primary | ICD-10-CM

## 2020-11-19 PROCEDURE — 99213 OFFICE O/P EST LOW 20 MIN: CPT | Performed by: SURGERY

## 2020-11-19 RX ORDER — CEFAZOLIN SODIUM 2 G/50ML
2 SOLUTION INTRAVENOUS
Status: CANCELLED | OUTPATIENT
Start: 2020-11-19

## 2020-11-19 RX ORDER — CEFAZOLIN SODIUM 1 G/50ML
1 INJECTION, SOLUTION INTRAVENOUS SEE ADMIN INSTRUCTIONS
Status: CANCELLED | OUTPATIENT
Start: 2020-11-19

## 2020-11-19 ASSESSMENT — PAIN SCALES - GENERAL: PAINLEVEL: MILD PAIN (2)

## 2020-11-19 ASSESSMENT — MIFFLIN-ST. JEOR: SCORE: 1644.2

## 2020-11-19 NOTE — NURSING NOTE
"Chief Complaint   Patient presents with     RECHECK     Follow up hernia appt.       Vitals:    11/19/20 1255   BP: 132/81   BP Location: Left arm   Patient Position: Sitting   Cuff Size: Adult Regular   Pulse: 64   SpO2: 99%   Weight: 85 kg (187 lb 8 oz)   Height: 1.742 m (5' 8.58\")       Body mass index is 28.03 kg/m .                            FABBY WATERS, EMT    "

## 2020-11-19 NOTE — LETTER
"11/19/2020       RE: Juan Francisco Booker  [address]  [place] [address]     Dear Colleague,    Thank you for referring your patient, Juan Francisco Booker, to the [facility] GENERAL SURGERY CLINIC [place] at [facility]. Please see a copy of my visit note below.    New Hernia Consultation Note    Juan Francisco Booker  [id number]  1960    Requesting Provider: Krishna Ren    Dear Ivon Red,    I was asked by Krishna Ren to see this patient for the following problem:    CHIEF COMPLAINT:  Right inguinal hernia    HISTORY OF PRESENT ILLNESS:  Location: right inguinal  Severity: Mild     Tomi has a right inguinal hernia which has been bothering him for just over a month.    No ED visits; no hospitalizations; causes pain in right groin without significant pain at rest.    Seems to improve at night.  No prior hernia and no history of abdominal surgery.    I initially saw him one week earlier on video visit.  In person visit necessary to confirm diagnosis.      Patient Supplied Answers To HerQLes Assessment Questionnaire  n/a    NUTRITIONAL STATUS:  Lab Results   Component Value Date    ALBUMIN 3.8 03/27/2015       Body mass index is 28.03 kg/m .    Patient is not immunosuppressed.    Patient is not a current smoker.    Past Medical History:   Diagnosis Date     Anxiety      Benign neoplasm of colon 3/2/2015     Depression      Depressive disorder 1/15/2000     Difficult intubation      ETOH abuse 3/15/2009    in remission     Gastro-oesophageal reflux disease 1/15/2010     Hyperlipidemia 1/1/2000     Hypoxemia      Morbid obesity (H)      Nasal polyps 1/1/2015     TIMOTHY on CPAP 8/13/2012    Severe (uses 5-6 hours as able)     Other and unspecified hyperlipidemia 10/25/2012     Personal history of colonic polyps 2/207/15    Multiple \"neg for FAP\"     Pre-diabetes      Prediabetes 5/24/2017     Restless leg      Skin tag      Surgical complication 1/6/2015 "    difficulty inserting a tube down my throat     Unspecified essential hypertension 10/25/2012       Patient Active Problem List   Diagnosis     Obstructive sleep apnea     Insomnia     Excessive sleepiness     Chronic nasal congestion     Other and unspecified alcohol dependence, unspecified drinking behavior     Lesion of ulnar nerve     Hyperlipidemia     Essential hypertension     Obesity     Benign neoplasm of colon     Hyperlipidemia     Moderate major depression (H)     Gastric polyps     Impaired glucose tolerance test     Prediabetes     Skin tag       Past Surgical History:   Procedure Laterality Date     AS COLONOSCOPY THRU STOMA W BIOPSY/CAUTERY TUMOR/POLYP/LESION  2/27/15    colon polyps     COLONOSCOPY  2/27/2015    x 2     COLONOSCOPY N/A 1/6/2020    Procedure: COLONOSCOPY, WITH POLYPECTOMY AND BIOPSY;  Surgeon: Omer Salas MD;  [facility]     COLONOSCOPY WITH CO2 INSUFFLATION N/A 10/20/2016    Procedure: COLONOSCOPY WITH CO2 INSUFFLATION;  Surgeon: Juan Francisco Beltran MD;  [facility]     ENT SURGERY  1/5/2011    [facility]     ESOPHAGOSCOPY, GASTROSCOPY, DUODENOSCOPY (EGD), COMBINED N/A 10/20/2016    Procedure: COMBINED ESOPHAGOSCOPY, GASTROSCOPY, DUODENOSCOPY (EGD);  Surgeon: Juan Francisco Beltran MD;  [facility]     RELEASE CARPAL TUNNEL Right 6/14/2017    Procedure: RELEASE CARPAL TUNNEL;  Right Open Carpal Tunnel Release;  Surgeon: Aracelis Lowe MD;  [facility]     RELEASE CARPAL TUNNEL Left 12/29/2017    Procedure: RELEASE CARPAL TUNNEL;  Left Open Carpal Tunnel Release;  Surgeon: Aracelis Lowe MD;  [facility]       MEDICATIONS:  Current Outpatient Medications   Medication     acetaminophen (TYLENOL) 500 MG tablet     aspirin 81 MG tablet     Caffeine 100 MG TABS     citalopram (CELEXA) 10 MG tablet     DENTA 5000 PLUS 1.1 % CREA     fluticasone (FLONASE) 50 MCG/ACT nasal spray     FLUZONE QUADRIVALENT 0.5 ML injection     hydrochlorothiazide  (HYDRODIURIL) 12.5 MG tablet     lisinopril (ZESTRIL) 20 MG tablet     Multiple Vitamin (DAILY MULTIVITAMIN PO)     ORDER FOR DME     simvastatin (ZOCOR) 40 MG tablet     tamsulosin (FLOMAX) 0.4 MG capsule     gabapentin (NEURONTIN) 300 MG capsule     No current facility-administered medications for this visit.        ALLERGIES:  Allergies   Allergen Reactions     Grass        Social History     Socioeconomic History     Marital status: Single     Spouse name: None     Number of children: None     Years of education: None     Highest education level: None   Occupational History     None   Social Needs     Financial resource strain: None     Food insecurity     Worry: None     Inability: None     Transportation needs     Medical: None     Non-medical: None   Tobacco Use     Smoking status: Never Smoker     Smokeless tobacco: Never Used   Substance and Sexual Activity     Alcohol use: Yes     Alcohol/week: 0.0 standard drinks     Comment: In remission since      Drug use: No     Sexual activity: Never   Lifestyle     Physical activity     Days per week: None     Minutes per session: None     Stress: None   Relationships     Social connections     Talks on phone: None     Gets together: None     Attends Jew service: None     Active member of club or organization: None     Attends meetings of clubs or organizations: None     Relationship status: None     Intimate partner violence     Fear of current or ex partner: None     Emotionally abused: None     Physically abused: None     Forced sexual activity: None   Other Topics Concern     Parent/sibling w/ CABG, MI or angioplasty before 65F 55M? Not Asked   Social History Narrative    Single.  Lives alone.  Retired.  Worked at [employer].    No children.    1 brother -  of liver cancer, etoh    Mother  of cancer ? Type    Father  - complications of MS       Family History   Problem Relation Age of Onset     Other Cancer Mother      Cancer Mother      Other  Cancer Brother      Cancer Brother      Cerebrovascular Disease Maternal Grandfather         ,, ,, ,, ,, ,, ,, ,, ,     Alcohol/Drug Brother      Cancer Brother         pancreatic; liver     Other Cancer Brother      Asthma No family hx of      Anesthesia Reaction No family hx of      Colon Polyps No family hx of      Crohn's Disease No family hx of      Ulcerative Colitis No family hx of      Colon Cancer No family hx of        Review of Systems     Constitutional:  Negative for fever, chills, weight loss, weight gain, fatigue, decreased appetite, night sweats, recent stressors, height gain, height loss, post-operative complications, incisional pain, hallucinations, increased energy, hyperactivity and confused.   HENT:  Negative for ear pain, hearing loss, tinnitus, nosebleeds, trouble swallowing, hoarse voice, mouth sores, sore throat, ear discharge, tooth pain, gum tenderness, taste disturbance, smell disturbance, hearing aid, bleeding gums, dry mouth, sinus pain, sinus congestion and neck mass.    Eyes:  Negative for double vision, pain, redness, eye pain, decreased vision, eye watering, eye bulging, eye dryness, flashing lights, spots, floaters, strabismus, tunnel vision, jaundice and eye irritation.   Respiratory:   Negative for cough, hemoptysis, sputum production, shortness of breath, wheezing, sleep disturbances due to breathing, snores loudly, respiratory pain, dyspnea on exertion, cough disturbing sleep and postural dyspnea.    Cardiovascular:  Positive for hypotension and light-headedness. Negative for chest pain, dyspnea on exertion, palpitations, orthopnea, fingers/toes turn blue, hypertension, syncope, history of heart murmur, pacemaker, few scattered varicosities, leg pain, sleep disturbances due to breathing, exercise intolerance and edema.   Gastrointestinal:  Positive for nausea and abdominal pain. Negative for heartburn, vomiting, diarrhea, constipation, blood in stool, melena, rectal pain,  "bloating, bowel incontinence, jaundice, coffee ground emesis and change in stool.   Genitourinary:  Positive for difficulty urinating. Negative for bladder incontinence, dysuria, urgency, hematuria, flank pain, nocturia, voiding less frequently, scrotal pain, ulcerations, penile discharge, male genitourinary complaint and reduced libido.   Musculoskeletal:  Positive for myalgias and back pain. Negative for joint swelling, arthralgias, stiffness, muscle cramps, neck pain, bone pain, muscle weakness and fracture.   Skin:  Negative for nail changes, itching, poor wound healing, rash, hair changes, skin changes, acne, warts, poor wound healing, scarring, flaky skin, Raynaud's phenomenon, sensitivity to sunlight and skin thickening.   Neurological:  Positive for light-headedness. Negative for dizziness, tingling, tremors, speech change, seizures, loss of consciousness, weakness, numbness, headaches, disturbances in coordination, extremity numbness, memory loss, difficulty walking and paralysis.   Endo/Heme:  Negative for anemia, swollen glands and bruises/bleeds easily.   Psychiatric/Behavioral:  Negative for depression, hallucinations, memory loss, decreased concentration, mood swings and panic attacks.    Endocrine:  Negative for altered temperature regulation, polyphagia, polydipsia, unwanted hair growth and change in facial hair.      Orders Placed This Encounter   Procedures     PAC Visit Referral (For [facility] Only)       PHYSICAL EXAMINATION:  Vital Signs: /81 (BP Location: Left arm, Patient Position: Sitting, Cuff Size: Adult Regular)   Pulse 64   Ht 1.742 m (5' 8.58\")   Wt 85 kg (187 lb 8 oz)   SpO2 99%   BMI 28.03 kg/m    HEENT: NCAT; MMM;   Lungs: Breathing unlabored  Abdomen: soft, no prior incisions     PHYSICAL EXAM AREA OF INTEREST:  easily reducible. and right sided hernia at inguinal location.  No left hernia.  Not incarcerated.    IMAGING:  CT scan reviewed: none available.    Not necessary for " diagnosis.    ASSESSMENT:  inguinal  Hernia size is less than 5cm in size.    DISCUSSION OF RISKS:  I discussed the alternatives, benefits, risks and possible complications of hernia repair with the patient. The risks of hernia surgery with and without mesh are described below.    Based on FDA s analysis of medical device adverse event reports and of peer-reviewed, scientific literature, the most common adverse events for all surgical repair of hernias--with or without mesh--are pain, infection, hernia recurrence, scar-like tissue that sticks tissues together (adhesion), blockage of the large or small intestine (obstruction), bleeding, abnormal connection between organs, vessels, or intestines (fistula), fluid build-up at the surgical site (seroma), and a hole in neighboring tissues or organs (perforation).  Some other potential adverse events that can occur following hernia repair with mesh are mesh migration and mesh shrinkage (contraction).    http://www.fda.gov/MedicalDevices/ProductsandMedicalProcedures/ImplantsandProsthetics/HerniaSurgicalMesh/default.htm    PLAN:  Hernia surgery is indicated, Same day surgery and Length of procedure is estimated to be 90 minutes     PAC visit    Laparoscopic right inguinal hernia repair.  Can be [facility] or [place].   Tier 3.  PAC for preop.  GETA.    Sincerely,    Laith Villa MD

## 2020-11-19 NOTE — PROGRESS NOTES
Pt w claudication like sx chronically from spinal stenosis.. but new 6 weeks of dull ache in groin bilaterally. R side was a hernia on my exam. Seeing gen surgery for coming up    But left side I did not get to examine well due to time. No injury to area, and pain persisting for 6 weeks now. Evaluation to see if consistent w groin strain and just needs PT.. or he also has a number of hemangiomas on prior exam including in the left ischium. If not consistent with groin strain could this be contributing and what best to do?      Krishna Ren MD

## 2020-12-01 DIAGNOSIS — G62.9 PERIPHERAL POLYNEUROPATHY: ICD-10-CM

## 2020-12-02 RX ORDER — GABAPENTIN 300 MG/1
CAPSULE ORAL
Qty: 450 CAPSULE | Refills: 3 | Status: SHIPPED | OUTPATIENT
Start: 2020-12-02 | End: 2021-11-12

## 2020-12-15 ENCOUNTER — TELEPHONE (OUTPATIENT)
Dept: SURGERY | Facility: CLINIC | Age: 60
End: 2020-12-15

## 2020-12-15 NOTE — TELEPHONE ENCOUNTER
Called patient to discuss surgery date. I told him at this time we are not scheduling tier 3 but did tell him when I get the green light I will call him with some possible dates for laparoscopic right inguinal herniorrhaphy with Dr. Villa. He was fine with that, just wanted to know what was going on.

## 2020-12-19 ASSESSMENT — ENCOUNTER SYMPTOMS
PALPITATIONS: 0
LIGHT-HEADEDNESS: 1
SHORTNESS OF BREATH: 0
POLYDIPSIA: 0
SORE THROAT: 0
NAUSEA: 1
DYSURIA: 0
HEADACHES: 0
PANIC: 0
SINUS CONGESTION: 0
NERVOUS/ANXIOUS: 0
FEVER: 0
ALTERED TEMPERATURE REGULATION: 0
TINGLING: 0
NIGHT SWEATS: 0
HOARSE VOICE: 0
CONSTIPATION: 0
EYE REDNESS: 0
BLOOD IN STOOL: 0
SPEECH CHANGE: 0
NECK PAIN: 0
BRUISES/BLEEDS EASILY: 0
MYALGIAS: 1
JAUNDICE: 0
FLANK PAIN: 0
RESPIRATORY PAIN: 0
SNORES LOUDLY: 0
EYE IRRITATION: 0
EXTREMITY NUMBNESS: 0
JOINT SWELLING: 0
COUGH: 0
DECREASED CONCENTRATION: 0
WEIGHT LOSS: 0
POOR WOUND HEALING: 0
SLEEP DISTURBANCES DUE TO BREATHING: 0
TROUBLE SWALLOWING: 0
WHEEZING: 0
NAIL CHANGES: 0
LEG PAIN: 0
DEPRESSION: 0
MUSCLE CRAMPS: 0
FATIGUE: 0
CHILLS: 0
TREMORS: 0
BACK PAIN: 1
NUMBNESS: 0
DOUBLE VISION: 0
MUSCLE WEAKNESS: 0
HEMATURIA: 0
DISTURBANCES IN COORDINATION: 0
DYSPNEA ON EXERTION: 0
COUGH DISTURBING SLEEP: 0
SWOLLEN GLANDS: 0
HYPOTENSION: 1
PARALYSIS: 0
DECREASED APPETITE: 0
POLYPHAGIA: 0
HEARTBURN: 0
STIFFNESS: 0
LOSS OF CONSCIOUSNESS: 0
SINUS PAIN: 0
HEMOPTYSIS: 0
VOMITING: 0
DIZZINESS: 0
RECTAL PAIN: 0
NECK MASS: 0
HYPERTENSION: 0
WEIGHT GAIN: 0
SEIZURES: 0
EXERCISE INTOLERANCE: 0
ORTHOPNEA: 0
ARTHRALGIAS: 0
TASTE DISTURBANCE: 0
DIFFICULTY URINATING: 1
WEAKNESS: 0
EYE PAIN: 0
INSOMNIA: 0
INCREASED ENERGY: 0
DIARRHEA: 0
SYNCOPE: 0
ABDOMINAL PAIN: 1
POSTURAL DYSPNEA: 0
HALLUCINATIONS: 0
SMELL DISTURBANCE: 0
SPUTUM PRODUCTION: 0
BOWEL INCONTINENCE: 0
SKIN CHANGES: 0
MEMORY LOSS: 0
BLOATING: 0
EYE WATERING: 0

## 2020-12-20 NOTE — PROGRESS NOTES
"    New Hernia Consultation Note      Juan Francisco Booker  [id number]  1960    Requesting Provider: Krishna Ren    Dear Ivon Red,    I was asked by Krishna Ren to see this patient for the following problem:    CHIEF COMPLAINT:  Right inguinal hernia    HISTORY OF PRESENT ILLNESS:  Location: right inguinal  Severity: Mild     Tomi has a right inguinal hernia which has been bothering him for just over a month.    No ED visits; no hospitalizations; causes pain in right groin without significant pain at rest.    Seems to improve at night.  No prior hernia and no history of abdominal surgery.    I initially saw him one week earlier on video visit.  In person visit necessary to confirm diagnosis.      Patient Supplied Answers To HerQLes Assessment Questionnaire  n/a    NUTRITIONAL STATUS:  Lab Results   Component Value Date    ALBUMIN 3.8 03/27/2015       Body mass index is 28.03 kg/m .    Patient is not immunosuppressed.    Patient is not a current smoker.    Past Medical History:   Diagnosis Date     Anxiety      Benign neoplasm of colon 3/2/2015     Depression      Depressive disorder 1/15/2000     Difficult intubation      ETOH abuse 3/15/2009    in remission     Gastro-oesophageal reflux disease 1/15/2010     Hyperlipidemia 1/1/2000     Hypoxemia      Morbid obesity (H)      Nasal polyps 1/1/2015     TIMOTHY on CPAP 8/13/2012    Severe (uses 5-6 hours as able)     Other and unspecified hyperlipidemia 10/25/2012     Personal history of colonic polyps 2/207/15    Multiple \"neg for FAP\"     Pre-diabetes      Prediabetes 5/24/2017     Restless leg      Skin tag      Surgical complication 1/6/2015    difficulty inserting a tube down my throat     Unspecified essential hypertension 10/25/2012       Patient Active Problem List   Diagnosis     Obstructive sleep apnea     Insomnia     Excessive sleepiness     Chronic nasal congestion     Other and unspecified alcohol dependence, unspecified drinking " behavior     Lesion of ulnar nerve     Hyperlipidemia     Essential hypertension     Obesity     Benign neoplasm of colon     Hyperlipidemia     Moderate major depression (H)     Gastric polyps     Impaired glucose tolerance test     Prediabetes     Skin tag       Past Surgical History:   Procedure Laterality Date     AS COLONOSCOPY THRU STOMA W BIOPSY/CAUTERY TUMOR/POLYP/LESION  2/27/15    colon polyps     COLONOSCOPY  2/27/2015    x 2     COLONOSCOPY N/A 1/6/2020    Procedure: COLONOSCOPY, WITH POLYPECTOMY AND BIOPSY;  Surgeon: Omer Salas MD;  Location: [facility]     COLONOSCOPY WITH CO2 INSUFFLATION N/A 10/20/2016    Procedure: COLONOSCOPY WITH CO2 INSUFFLATION;  Surgeon: Juan Francisco Beltran MD;  Location: [facility] OR     ENT SURGERY  1/5/2011    [facility]     ESOPHAGOSCOPY, GASTROSCOPY, DUODENOSCOPY (EGD), COMBINED N/A 10/20/2016    Procedure: COMBINED ESOPHAGOSCOPY, GASTROSCOPY, DUODENOSCOPY (EGD);  Surgeon: Juan Francisco Beltran MD;  Location: [facility] OR     RELEASE CARPAL TUNNEL Right 6/14/2017    Procedure: RELEASE CARPAL TUNNEL;  Right Open Carpal Tunnel Release;  Surgeon: Aracelis Lowe MD;  Location: [facility]     RELEASE CARPAL TUNNEL Left 12/29/2017    Procedure: RELEASE CARPAL TUNNEL;  Left Open Carpal Tunnel Release;  Surgeon: Aracelis Lowe MD;  Location: [facility] OR       MEDICATIONS:  Current Outpatient Medications   Medication     acetaminophen (TYLENOL) 500 MG tablet     aspirin 81 MG tablet     Caffeine 100 MG TABS     citalopram (CELEXA) 10 MG tablet     DENTA 5000 PLUS 1.1 % CREA     fluticasone (FLONASE) 50 MCG/ACT nasal spray     FLUZONE QUADRIVALENT 0.5 ML injection     hydrochlorothiazide (HYDRODIURIL) 12.5 MG tablet     lisinopril (ZESTRIL) 20 MG tablet     Multiple Vitamin (DAILY MULTIVITAMIN PO)     ORDER FOR DME     simvastatin (ZOCOR) 40 MG tablet     tamsulosin (FLOMAX) 0.4 MG capsule     gabapentin (NEURONTIN) 300 MG capsule     No current facility-administered medications for  this visit.        ALLERGIES:  Allergies   Allergen Reactions     Grass        Social History     Socioeconomic History     Marital status: Single     Spouse name: None     Number of children: None     Years of education: None     Highest education level: None   Occupational History     None   Social Needs     Financial resource strain: None     Food insecurity     Worry: None     Inability: None     Transportation needs     Medical: None     Non-medical: None   Tobacco Use     Smoking status: Never Smoker     Smokeless tobacco: Never Used   Substance and Sexual Activity     Alcohol use: Yes     Alcohol/week: 0.0 standard drinks     Comment: In remission since      Drug use: No     Sexual activity: Never   Lifestyle     Physical activity     Days per week: None     Minutes per session: None     Stress: None   Relationships     Social connections     Talks on phone: None     Gets together: None     Attends Scientologist service: None     Active member of club or organization: None     Attends meetings of clubs or organizations: None     Relationship status: None     Intimate partner violence     Fear of current or ex partner: None     Emotionally abused: None     Physically abused: None     Forced sexual activity: None   Other Topics Concern     Parent/sibling w/ CABG, MI or angioplasty before 65F 55M? Not Asked   Social History Narrative    Single.  Lives alone.  Retired.  Worked at [employer].    No children.    1 brother -  of liver cancer, etoh    Mother  of cancer ? Type    Father  - complications of MS       Family History   Problem Relation Age of Onset     Other Cancer Mother      Cancer Mother      Other Cancer Brother      Cancer Brother      Cerebrovascular Disease Maternal Grandfather         ,, ,, ,, ,, ,, ,, ,, ,     Alcohol/Drug Brother      Cancer Brother         pancreatic; liver     Other Cancer Brother      Asthma No family hx of      Anesthesia Reaction No family hx of      Colon Polyps  No family hx of      Crohn's Disease No family hx of      Ulcerative Colitis No family hx of      Colon Cancer No family hx of        Review of Systems     Constitutional:  Negative for fever, chills, weight loss, weight gain, fatigue, decreased appetite, night sweats, recent stressors, height gain, height loss, post-operative complications, incisional pain, hallucinations, increased energy, hyperactivity and confused.   HENT:  Negative for ear pain, hearing loss, tinnitus, nosebleeds, trouble swallowing, hoarse voice, mouth sores, sore throat, ear discharge, tooth pain, gum tenderness, taste disturbance, smell disturbance, hearing aid, bleeding gums, dry mouth, sinus pain, sinus congestion and neck mass.    Eyes:  Negative for double vision, pain, redness, eye pain, decreased vision, eye watering, eye bulging, eye dryness, flashing lights, spots, floaters, strabismus, tunnel vision, jaundice and eye irritation.   Respiratory:   Negative for cough, hemoptysis, sputum production, shortness of breath, wheezing, sleep disturbances due to breathing, snores loudly, respiratory pain, dyspnea on exertion, cough disturbing sleep and postural dyspnea.    Cardiovascular:  Positive for hypotension and light-headedness. Negative for chest pain, dyspnea on exertion, palpitations, orthopnea, fingers/toes turn blue, hypertension, syncope, history of heart murmur, pacemaker, few scattered varicosities, leg pain, sleep disturbances due to breathing, exercise intolerance and edema.   Gastrointestinal:  Positive for nausea and abdominal pain. Negative for heartburn, vomiting, diarrhea, constipation, blood in stool, melena, rectal pain, bloating, bowel incontinence, jaundice, coffee ground emesis and change in stool.   Genitourinary:  Positive for difficulty urinating. Negative for bladder incontinence, dysuria, urgency, hematuria, flank pain, nocturia, voiding less frequently, scrotal pain, ulcerations, penile discharge, male  "genitourinary complaint and reduced libido.   Musculoskeletal:  Positive for myalgias and back pain. Negative for joint swelling, arthralgias, stiffness, muscle cramps, neck pain, bone pain, muscle weakness and fracture.   Skin:  Negative for nail changes, itching, poor wound healing, rash, hair changes, skin changes, acne, warts, poor wound healing, scarring, flaky skin, Raynaud's phenomenon, sensitivity to sunlight and skin thickening.   Neurological:  Positive for light-headedness. Negative for dizziness, tingling, tremors, speech change, seizures, loss of consciousness, weakness, numbness, headaches, disturbances in coordination, extremity numbness, memory loss, difficulty walking and paralysis.   Endo/Heme:  Negative for anemia, swollen glands and bruises/bleeds easily.   Psychiatric/Behavioral:  Negative for depression, hallucinations, memory loss, decreased concentration, mood swings and panic attacks.    Endocrine:  Negative for altered temperature regulation, polyphagia, polydipsia, unwanted hair growth and change in facial hair.      Orders Placed This Encounter   Procedures     PAC Visit Referral (For [facility] Only)       PHYSICAL EXAMINATION:  Vital Signs: /81 (BP Location: Left arm, Patient Position: Sitting, Cuff Size: Adult Regular)   Pulse 64   Ht 1.742 m (5' 8.58\")   Wt 85 kg (187 lb 8 oz)   SpO2 99%   BMI 28.03 kg/m    HEENT: NCAT; MMM;   Lungs: Breathing unlabored  Abdomen: soft, no prior incisions     PHYSICAL EXAM AREA OF INTEREST:  easily reducible. and right sided hernia at inguinal location.  No left hernia.  Not incarcerated.    IMAGING:  CT scan reviewed: none available.    Not necessary for diagnosis.    ASSESSMENT:  inguinal  Hernia size is less than 5cm in size.    DISCUSSION OF RISKS:  I discussed the alternatives, benefits, risks and possible complications of hernia repair with the patient. The risks of hernia surgery with and without mesh are described below.    Based on FDA s " analysis of medical device adverse event reports and of peer-reviewed, scientific literature, the most common adverse events for all surgical repair of hernias--with or without mesh--are pain, infection, hernia recurrence, scar-like tissue that sticks tissues together (adhesion), blockage of the large or small intestine (obstruction), bleeding, abnormal connection between organs, vessels, or intestines (fistula), fluid build-up at the surgical site (seroma), and a hole in neighboring tissues or organs (perforation).  Some other potential adverse events that can occur following hernia repair with mesh are mesh migration and mesh shrinkage (contraction).    http://www.fda.gov/MedicalDevices/ProductsandMedicalProcedures/ImplantsandProsthetics/HerniaSurgicalMesh/default.htm    PLAN:  Hernia surgery is indicated, Same day surgery and Length of procedure is estimated to be 90 minutes     PAC visit    Laparoscopic right inguinal hernia repair.  Can be [facility] or [place].   Tier 3.  PAC for preop.  GETA.      Sincerely,    Laith Villa MD

## 2021-01-05 RX ORDER — FLUTICASONE PROPIONATE 50 MCG
1-2 SPRAY, SUSPENSION (ML) NASAL DAILY
Qty: 16 G | Refills: 3 | Status: SHIPPED | OUTPATIENT
Start: 2021-01-05 | End: 2021-04-07

## 2021-01-05 NOTE — TELEPHONE ENCOUNTER
Pending RX  Fluticasone Prop 50 mcg spray, DISP:  16.0GM Sixteen,  SIG:  Spray 1-2 sprays into both nostrils daily,  R^ 3      Last Written Prescription Date: 9/29/2020  Last Fill Quantity:16 gr.    # refills: 3  Last Office Visit:3/18/2020  Future Office visit:   RTC one year    Routed Dr. Varma for review    Routing refill request to provider for review/approval because:  Drug not on the G, P or [facility] refill protocol or controlled substance

## 2021-01-13 DIAGNOSIS — E78.5 HYPERLIPIDEMIA LDL GOAL <130: ICD-10-CM

## 2021-01-13 RX ORDER — SIMVASTATIN 40 MG
40 TABLET ORAL AT BEDTIME
Qty: 90 TABLET | Refills: 0 | Status: SHIPPED | OUTPATIENT
Start: 2021-01-13 | End: 2021-04-02

## 2021-01-13 NOTE — TELEPHONE ENCOUNTER

## 2021-01-26 ENCOUNTER — HOSPITAL ENCOUNTER (OUTPATIENT)
Facility: AMBULATORY SURGERY CENTER | Age: 61
End: 2021-01-26
Attending: SURGERY
Payer: COMMERCIAL

## 2021-01-26 DIAGNOSIS — K40.30 UNILATERAL INGUINAL HERNIA WITH OBSTRUCTION AND WITHOUT GANGRENE, RECURRENCE NOT SPECIFIED: ICD-10-CM

## 2021-02-11 NOTE — TELEPHONE ENCOUNTER
FUTURE VISIT INFORMATION      SURGERY INFORMATION:    Date: 3.19.21    Location: [facility] OR    Surgeon:  Dr. Villa    Anesthesia Type:  General    Procedure: RIGHT HERNIORRHAPHY, INGUINAL, LAPAROSCOPIC    Consult: 20    RECORDS REQUESTED FROM:       Primary Care Provider: Dr. Red    Most recent EKG+ Tracin16    Most recent Cardiac Stress Test:13 exercise stress test    Most recent Sleep Study:  14

## 2021-02-22 ENCOUNTER — TELEPHONE (OUTPATIENT)
Dept: SURGERY | Facility: CLINIC | Age: 61
End: 2021-02-22

## 2021-02-22 NOTE — TELEPHONE ENCOUNTER
Patient had called stating he did not have anyone who could stay with him after his surgery which is scheduled on 3/19/21 at the [facility] with Dr. Villa and would like to look at another date. Called back this morning stating that he now has someone who can stay with him and would like to keep the 3/19 date.

## 2021-03-08 ENCOUNTER — ANESTHESIA EVENT (OUTPATIENT)
Dept: SURGERY | Facility: CLINIC | Age: 61
End: 2021-03-08
Payer: COMMERCIAL

## 2021-03-08 ENCOUNTER — TELEPHONE (OUTPATIENT)
Dept: SURGERY | Facility: CLINIC | Age: 61
End: 2021-03-08

## 2021-03-08 ENCOUNTER — PRE VISIT (OUTPATIENT)
Dept: SURGERY | Facility: CLINIC | Age: 61
End: 2021-03-08

## 2021-03-08 ENCOUNTER — OFFICE VISIT (OUTPATIENT)
Dept: SURGERY | Facility: CLINIC | Age: 61
End: 2021-03-08
Payer: COMMERCIAL

## 2021-03-08 VITALS
TEMPERATURE: 97.6 F | SYSTOLIC BLOOD PRESSURE: 104 MMHG | HEIGHT: 68 IN | DIASTOLIC BLOOD PRESSURE: 67 MMHG | OXYGEN SATURATION: 97 % | BODY MASS INDEX: 28.08 KG/M2 | HEART RATE: 70 BPM | RESPIRATION RATE: 16 BRPM | WEIGHT: 185.3 LBS

## 2021-03-08 DIAGNOSIS — K40.90 NON-RECURRENT UNILATERAL INGUINAL HERNIA WITHOUT OBSTRUCTION OR GANGRENE: ICD-10-CM

## 2021-03-08 DIAGNOSIS — Z01.818 PREOP EXAMINATION: Primary | ICD-10-CM

## 2021-03-08 DIAGNOSIS — Z01.818 PREOP EXAMINATION: ICD-10-CM

## 2021-03-08 LAB
ANION GAP SERPL CALCULATED.3IONS-SCNC: 4 MMOL/L (ref 3–14)
BUN SERPL-MCNC: 19 MG/DL (ref 7–30)
CALCIUM SERPL-MCNC: 8.3 MG/DL (ref 8.5–10.1)
CHLORIDE SERPL-SCNC: 108 MMOL/L (ref 94–109)
CO2 SERPL-SCNC: 30 MMOL/L (ref 20–32)
CREAT SERPL-MCNC: 0.78 MG/DL (ref 0.66–1.25)
ERYTHROCYTE [DISTWIDTH] IN BLOOD BY AUTOMATED COUNT: 12.5 % (ref 10–15)
GFR SERPL CREATININE-BSD FRML MDRD: >90 ML/MIN/{1.73_M2}
GLUCOSE SERPL-MCNC: 88 MG/DL (ref 70–99)
HCT VFR BLD AUTO: 38.8 % (ref 40–53)
HGB BLD-MCNC: 13.1 G/DL (ref 13.3–17.7)
MCH RBC QN AUTO: 32 PG (ref 26.5–33)
MCHC RBC AUTO-ENTMCNC: 33.8 G/DL (ref 31.5–36.5)
MCV RBC AUTO: 95 FL (ref 78–100)
PLATELET # BLD AUTO: 182 10E9/L (ref 150–450)
POTASSIUM SERPL-SCNC: 4 MMOL/L (ref 3.4–5.3)
RBC # BLD AUTO: 4.09 10E12/L (ref 4.4–5.9)
SODIUM SERPL-SCNC: 143 MMOL/L (ref 133–144)
WBC # BLD AUTO: 4.1 10E9/L (ref 4–11)

## 2021-03-08 PROCEDURE — 36415 COLL VENOUS BLD VENIPUNCTURE: CPT | Performed by: PATHOLOGY

## 2021-03-08 PROCEDURE — 80048 BASIC METABOLIC PNL TOTAL CA: CPT | Performed by: PATHOLOGY

## 2021-03-08 PROCEDURE — 99205 OFFICE O/P NEW HI 60 MIN: CPT | Performed by: PHYSICIAN ASSISTANT

## 2021-03-08 PROCEDURE — 85027 COMPLETE CBC AUTOMATED: CPT | Performed by: PATHOLOGY

## 2021-03-08 ASSESSMENT — ENCOUNTER SYMPTOMS: SEIZURES: 0

## 2021-03-08 ASSESSMENT — MIFFLIN-ST. JEOR: SCORE: 1620.02

## 2021-03-08 ASSESSMENT — LIFESTYLE VARIABLES: TOBACCO_USE: 0

## 2021-03-08 NOTE — ANESTHESIA PREPROCEDURE EVALUATION
"Anesthesia Pre-Procedure Evaluation    Patient: Juan Francisco Booker   MRN: [id number] : 1960        Preoperative Diagnosis: * No surgery found *   Procedure :      Past Medical History:   Diagnosis Date     Anxiety      Benign neoplasm of colon 3/2/2015     Depression      Depressive disorder 1/15/2000     Difficult intubation      ETOH abuse 3/15/2009    in remission     Gastro-oesophageal reflux disease 1/15/2010     Hyperlipidemia 2000     Hypoxemia      Morbid obesity (H)      Nasal polyps 2015     TIMOTHY on CPAP 2012    Severe (uses 5-6 hours as able)     Other and unspecified hyperlipidemia 10/25/2012     Personal history of colonic polyps 2/207/15    Multiple \"neg for FAP\"     Pre-diabetes      Prediabetes 2017     Restless leg      Skin tag      Surgical complication 2015    difficulty inserting a tube down my throat     Unspecified essential hypertension 10/25/2012      Past Surgical History:   Procedure Laterality Date     AS COLONOSCOPY THRU STOMA W BIOPSY/CAUTERY TUMOR/POLYP/LESION  2/27/15    colon polyps     COLONOSCOPY  2/27/2015    x 2     COLONOSCOPY N/A 2020    Procedure: COLONOSCOPY, WITH POLYPECTOMY AND BIOPSY;  Surgeon: Omer Salas MD;  Location:  GI     COLONOSCOPY WITH CO2 INSUFFLATION N/A 10/20/2016    Procedure: COLONOSCOPY WITH CO2 INSUFFLATION;  Surgeon: Juan Francisco Beltran MD;  [facility]     ENT SURGERY  2011    [facility]     ESOPHAGOSCOPY, GASTROSCOPY, DUODENOSCOPY (EGD), COMBINED N/A 10/20/2016    Procedure: COMBINED ESOPHAGOSCOPY, GASTROSCOPY, DUODENOSCOPY (EGD);  Surgeon: Juan Francisco Beltran MD;  [facility]     RELEASE CARPAL TUNNEL Right 2017    Procedure: RELEASE CARPAL TUNNEL;  Right Open Carpal Tunnel Release;  Surgeon: Aracelis Lowe MD;  Location:  OR     RELEASE CARPAL TUNNEL Left 2017    Procedure: RELEASE CARPAL TUNNEL;  Left Open Carpal Tunnel Release;  Surgeon: Aracelis Lowe MD;  [facility]: " [facility]      Allergies   Allergen Reactions     Grass       Social History     Tobacco Use     Smoking status: Never Smoker     Smokeless tobacco: Never Used   Substance Use Topics     Alcohol use: Yes     Alcohol/week: 0.0 standard drinks     Comment: In remission since 2009      Wt Readings from Last 1 Encounters:   03/08/21 84.1 kg (185 lb 4.8 oz)        Anesthesia Evaluation   Pt has had prior anesthetic.     History of anesthetic complications  - difficult airway.  Very large tongue, short, thick neck, beard.  2016 record: Difficult (Small mouth opening, large amount of redundant tissue in posterior pharynx, large tongue.); Airway Size: 7.5 (ETT was difficult to pass through vocal cords once fiberoptic was through.  .    ROS/MED HX  ENT/Pulmonary:     (+) sleep apnea, uses CPAP,  (-) tobacco use and asthma   Neurologic: Comment: RLS    (+) peripheral neuropathy, - feet.  (-) no seizures and no CVA   Cardiovascular:     (+) Dyslipidemia hypertension-----Previous cardiac testing   Echo: Date: Results:    Stress Test: Date: 2013 Results:  Technically difficult study. Extremely poor acoustic windows despite the use   of contrast for endocardial definition. Probably normal stress echocardiogram   within the technical limitations above. If the degree of clinical suspicion   for CAD is high, consider myocardial perfusion imaging study.    Stress Findings   Patient was given 5.5ml mixture of 1.5ml Definity and 8.5ml saline.   Maximum workload 125 cronin.   RPP 19992.   The maximum dose of atropine was .8mg.   There was no new ST segment depression.   No electrocardiographic evidence of ischemia.   Target Heart Rate was achieved.   The patient did not exhibit any symptoms during exercise.   Normal heart rate response to exercise.   Normal blood pressure response to exercise.      Baseline EKG/Symptoms   Normal baseline electrocardiogram.    ECG Reviewed: Date: Results:    Cath:  Date: Results:      METS/Exercise Tolerance:  4 - Raking leaves, gardening Comment: Able to carry groceries upstairs. Currently limits weight of packages due to hernia.   Hematologic:  - neg hematologic  ROS  (-) history of blood clots and history of blood transfusion   Musculoskeletal:  - neg musculoskeletal ROS     GI/Hepatic:  - neg GI/hepatic ROS  (-) GERD and liver disease   Renal/Genitourinary:  - neg Renal ROS     Endo:  - neg endo ROS  (-) Type II DM   Psychiatric/Substance Use:     (+) psychiatric history anxiety and depression     Infectious Disease:  - neg infectious disease ROS     Malignancy:  - neg malignancy ROS     Other:            Physical Exam    Airway        Mallampati: IV   TM distance: > 3 FB   Neck ROM: full   Mouth opening: > 3 cm    Respiratory Devices and Support         Dental  no notable dental history         Cardiovascular   cardiovascular exam normal          Pulmonary   pulmonary exam normal                OUTSIDE LABS:  CBC:   Lab Results   Component Value Date    HGB 13.7 02/24/2020    HGB 14.1 10/24/2018    HCT 46.3 10/24/2018    HCT 35.4 (L) 07/05/2016     BMP:   Lab Results   Component Value Date    .0 01/13/2020    .4 10/24/2018    POTASSIUM 4.0 01/13/2020    POTASSIUM 4.1 10/24/2018    CHLORIDE 102.0 01/13/2020    CHLORIDE 100.9 10/24/2018    CO2 31.0 01/13/2020    CO2 27.7 10/24/2018    BUN 14.2 01/13/2020    BUN 18.2 10/24/2018    CR 0.9 01/13/2020    CR 0.9 10/24/2018    GLC 78.8 01/13/2020    GLC 85.0 10/24/2018    .8 (H) 10/24/2018     COAGS: No results found for: PTT, INR, FIBR  POC: No results found for: BGM, HCG, HCGS  HEPATIC:   Lab Results   Component Value Date    ALBUMIN 3.8 03/27/2015    PROTTOTAL 6.3 (L) 06/26/2020    ALT 16.3 06/26/2020    AST 15.7 06/26/2020    ALKPHOS 55.0 06/26/2020    BILITOTAL 0.7 06/26/2020     OTHER:   Lab Results   Component Value Date    PH 7.51 (H) 10/17/2013    A1C 5.0 08/25/2020    ZHEN 9.7 01/13/2020    TSH 0.60 01/13/2020    T4 0.96 12/19/2018    T3 126  12/19/2018       Anesthesia Plan    ASA Status:  2   NPO Status:  NPO Appropriate    Anesthesia Type: General.     - Airway: ETT   Induction: Propofol.   Maintenance: Balanced.   Techniques and Equipment:     - Lines/Monitors: 2nd IV     Consents            Postoperative Care    Pain management: IV analgesics.   PONV prophylaxis: Ondansetron (or other 5HT-3)     Comments:              PAC Discussion and Assessment    ASA Classification: 3  Case is suitable for: ASC  Anesthetic techniques and relevant risks discussed: GA  Invasive monitoring and risk discussed: No    Possibility and Risk of blood transfusion discussed: No            PAC Resident/NP Anesthesia Assessment: Juan Francisco Booker is a 61 year old male scheduled to undergo RIGHT HERNIORRHAPHY, INGUINAL, LAPAROSCOPIC with Laith Villa MD tentatively scheduled on 3/19/21?at [facility]?for treatment of Unilateral inguinal hernia with obstruction and without gangrene, recurrence not specified.       Pt has had prior anesthetic.     History of anesthetic complications  - difficult airway.  Very large tongue, short, thick neck, beard.  2016 record: Difficult (Small mouth opening, large amount of redundant tissue in posterior pharynx, large tongue.); Airway Size: 7.5 (ETT was difficult to pass through vocal cords once fiberoptic was through.      # History of difficult intubation:  10/20/16; 0815; Mask Ventilation: Not attempted (Native Airway); Ease of Intubation: Difficult (Small mouth opening, large amount of redundant tissue in posterior pharynx, large tongue.); Airway Size: 7.5 (ETT was difficult to pass through vocal cords once fiberoptic was through.  Consider using 7.0 ETT on next intubation.);  Cuffed;  Oral;  Blade Type: Fiberoptic Bronchoscope;  Place by: honl;  Insertion Attempts: 1;  Secured at (cm)to lip: 24 cm;  Breath Sounds: Equal, clear and bilateral;  End Tidal CO2: Present;  Dentition: Intact;  Grade View of Cords: 1;  Airway  Adjuncts:  Fiber optic (Fiberoptic intubation.  Pre-op Precedex @ 0.2 mcg/kg/hr, 4% lidocaine nebulizer, and 0.2 mg Glycopyrrolate.  In room, Precedex to 0.5, lidocaine gargle, 5% lidocaine gel to tongue depressor and 5% lidocaine gel to Q-tips in posterior pharynx.)      He has the following specific operative considerations:       # VTE risk: 0.5%   # Risk of PONV score = 2.??If > 2, anti-emetic intervention recommended.   # Anesthesia considerations:??Refer to PAC assessment in anesthesia records            CARDIAC: METS 4,   Able to carry groceries upstairs. Currently limits weight of packages due to hernia.     #?RCRI :?No serious cardiac risks.??0.4% risk of major adverse cardiac event.      #  Stress test 2013: no ischemia     # HTN, managed on lisinopril, will hold DOS???      PULMONARY:      # TIMOTHY w/ CPAP     # Hx difficult intubation, see note above     # Never smoked     # No asthma or inhaler use      GI:      # Hx GERD, resolved following wt loss       ENDO: BMI 28     # Hx pre-DM, resolved following wt loss. A1c on 8/25/20 was 5.0        HEME:      # On ASA 81 mg, will stop 7d prior      ORTHO:      # full ROM of neck     NEURO/PSYCH:      # Anxiety, depression    # Hx ETOH abuse, sober since 2009    # RLS  ?       Patient was discussed with Dr Trujillo.     ** The patient has a documented history of a difficult intubation. Current recommendation is for the patient to have the above procedure performed at [facility] or [facility] due to the high probability of an awake intubation and appropriate available support, if required. Staff message sent to Dr. Villa.     Patient is optimized and is acceptable candidate for the proposed procedure @ [facility] or [facility]. No further diagnostic evaluation is needed.    Final plan per anesthesiologist on day of surgery.         Reviewed and Signed by PAC Mid-Level Provider/Resident  Mid-Level Provider/Resident: Gloria Martin PA-C  Date: 3/8/21  Time: 4236    Attending Anesthesiologist Anesthesia  Assessment: Tomi has a history of a difficult intubation, and has since undergone EGD/colo with us in 2016 with awake fiberoptic intubation (see 2016 anesthetic record). Since that anesthetic, he has lost a significant amount of weight (BMI was 47 then, and is 28 today). However, on exam he has a very large tongue, Mall IV. He does have >4 fb TMD and full neck ROM. Despite the weight loss, he still has TIMOTHY requiring CPAP. I would be suspicious for difficult airway for this upcoming procedure, and discussed possible awake fiberoptic intubation. Tomi does not recall the last awake intubation. He is agreeable to this possibility for this surgery. Could consider taking a look with laryngoscopy after intubation to assess his need for future awake intubations (airway may be improved given his weight loss).    [facility] (not [facility]) due to difficulty airway history, possible need for awake fiberoptic intubation.    Final plan per DOS anesthesiologist.   Reviewed and Signed by PAC Anesthesiologist  Anesthesiologist: Dorina Trujillo MD  Date: 3/8/2021                     Gloria Martin PA-C

## 2021-03-08 NOTE — TELEPHONE ENCOUNTER
Called patient to discuss surgery which was to be scheduled on 3/19 at the [facility]. Only 1 case could be put on per surgery scheduling. Asked patient if he could have surgery on 3/24. He can do that day. I did discuss he would need to call the scheduling line to reschedule his COVID test to Saturday, 3/20.

## 2021-03-08 NOTE — PATIENT INSTRUCTIONS
Preparing for Your Surgery      Name:  Juan Francisco Booker   MRN:  [id number]   :  1960   Today's Date:  3/8/2021       Arriving for surgery:  Surgery date:  Tentative date 3/19/21  Arrival time:      Restrictions due to COVID 19:  One consistent visitor per patient is allowed  No ill visitors  All visitors must wear face mask     parking is available for anyone with mobility limitations or disabilities.  ([place]  24 hours/ 7 days a week; [facility]  7 am- 3:30 pm, Mon- Fri)    Please come to:     To Be Determined     What can I eat or drink?  -  You may eat and drink normally for up to 8 hours before your surgery.  -  You may have clear liquids until 2 hours before surgery.     Examples of clear liquids:  Water  Clear broth  Juices (apple, white grape, white cranberry  and cider) without pulp  Noncarbonated, powder based beverages  (lemonade and Chago-Aid)  Sodas (Sprite, 7-Up, ginger ale and seltzer)  Coffee or tea (without milk or cream)  Gatorade    -  No Alcohol for at least 24 hours before surgery     Which medicines can I take?    Hold Aspirin for 7 days before surgery.     Hold Multivitamins for 7 days before surgery.    Hold Supplements for 7 days before surgery.  Hold Ibuprofen (Advil, Motrin) for 1 day before surgery--unless otherwise directed by surgeon.  Hold Naproxen (Aleve) for 4 days before surgery.    -  DO NOT take these medications the day of surgery:  Caffeine   Lisinopril         -  PLEASE TAKE these medications the day of surgery:  Citalopram  Flonase   Gabapentin  Tamsulosin   Acetaminophen if needed       How do I prepare myself?  - Please take 2 showers before surgery using Scrubcare or Hibiclens soap.    Use this soap only from the neck to your toes.     Leave the soap on your skin for one minute--then rinse thoroughly.      You may use your own shampoo and conditioner; no other hair products.   - Please remove all jewelry and body piercings.  - No lotions, deodorants or  fragrance.  - No makeup or fingernail polish.   - Bring your ID and insurance card.    -If you use a CPAP Machine please bring it to surgery     - All patients are required to have a Covid-19 test within 4 days of surgery/procedure.      -Patients will be contacted by the [facility] scheduling team within 1 week of surgery to make an appointment.      - Patients may call the Scheduling team at [phone] if they have not been scheduled within 4 days of  surgery.      ALL PATIENTS GOING HOME THE SAME DAY OF SURGERY ARE REQUIRED TO HAVE A RESPONSIBLE ADULT TO DRIVE AND BE IN ATTENDANCE WITH THEM FOR 24 HOURS FOLLOWING SURGERY     Questions or Concerns:    - For any questions regarding the day of surgery or your hospital stay, please contact the Pre Admission Nursing Office at [phone].       - If you have health changes between today and your surgery please call your surgeon.       For questions after surgery please call your surgeons office.

## 2021-03-08 NOTE — H&P
"  Pre-Operative H & P     CC:  Preoperative exam to assess for increased cardiopulmonary risk while undergoing surgery and anesthesia.    Date of Encounter: 3/8/2021  Primary Care Physician:  Ivon Red  Reason for Visit:?Unilateral inguinal hernia with obstruction and without gangrene, recurrence not specified??      HPI??   Juan Francisco Booker is a 62 y/o male who presents for pre-operative H&P in preparation for RIGHT HERNIORRHAPHY, INGUINAL, LAPAROSCOPIC with Laith Villa MD tentatively scheduled on 3/19/21?at [facility] [place]?for treatment of Unilateral inguinal hernia with obstruction and without gangrene, recurrence not specified.?Patient is being evaluated for comorbid conditions of HTN, HLD, TIMOTHY w/ CPAP, RLS, anxiety, depression, history of ETOH abuse, GERD, history of difficult intubation.     Mr. Booker has a history of a right inguinal hernia which has been bothering him for several months. He denies prior ED visits & hospitalizations. The hernia causes pain in right groin without significant pain at rest. Symptoms seem to improve at night.  No prior hernia and no history of abdominal surgery. He now presents for the above procedure.    History was obtained from patient & chart review.     Past Medical History  Past Medical History:   Diagnosis Date     Anxiety      Benign neoplasm of colon 3/2/2015     Depression      Depressive disorder 1/15/2000     Difficult intubation      ETOH abuse 3/15/2009    in remission     Gastro-oesophageal reflux disease 1/15/2010     Hyperlipidemia 1/1/2000     Hypoxemia      Morbid obesity (H)      Nasal polyps 1/1/2015     TIMOTHY on CPAP 8/13/2012    Severe (uses 5-6 hours as able)     Other and unspecified hyperlipidemia 10/25/2012     Personal history of colonic polyps 2/207/15    Multiple \"neg for FAP\"     Pre-diabetes      Prediabetes 5/24/2017     Restless leg      Skin tag      Surgical complication 1/6/2015    difficulty inserting a tube down my " throat     Unspecified essential hypertension 10/25/2012       Past Surgical History  Past Surgical History:   Procedure Laterality Date     AS COLONOSCOPY THRU STOMA W BIOPSY/CAUTERY TUMOR/POLYP/LESION  2/27/15    colon polyps     COLONOSCOPY  2/27/2015    x 2     COLONOSCOPY N/A 1/6/2020    Procedure: COLONOSCOPY, WITH POLYPECTOMY AND BIOPSY;  Surgeon: Omer Salas MD;  [facility]: [facility]     COLONOSCOPY WITH CO2 INSUFFLATION N/A 10/20/2016    Procedure: COLONOSCOPY WITH CO2 INSUFFLATION;  Surgeon: Juan Francisco Beltran MD;  Location: [facility] OR     ENT SURGERY  1/5/2011    [facility]     ESOPHAGOSCOPY, GASTROSCOPY, DUODENOSCOPY (EGD), COMBINED N/A 10/20/2016    Procedure: COMBINED ESOPHAGOSCOPY, GASTROSCOPY, DUODENOSCOPY (EGD);  Surgeon: Juan Francisco Beltran MD;  Location: [facility] OR     RELEASE CARPAL TUNNEL Right 6/14/2017    Procedure: RELEASE CARPAL TUNNEL;  Right Open Carpal Tunnel Release;  Surgeon: Aracelis Lowe MD;  [facility]     RELEASE CARPAL TUNNEL Left 12/29/2017    Procedure: RELEASE CARPAL TUNNEL;  Left Open Carpal Tunnel Release;  Surgeon: Aracelis Lowe MD;  Location: [facility] OR       Hx of Blood transfusions/reactions: denies     Hx of abnormal bleeding or anti-platelet use: on ASA 81 mg    Menstrual history: No LMP for male patient.:      Steroid use in the last year: denies    Personal or FH with difficulty with Anesthesia:  Difficult intubation in 2016 (see notes below)    Prior to Admission Medications  Current Outpatient Medications   Medication Sig Dispense Refill     acetaminophen (TYLENOL) 500 MG tablet Take 350 tablets by mouth every evening        aspirin 81 MG tablet Take 1 tablet by mouth daily AM       Caffeine 100 MG TABS Take 2 tablets by mouth daily as needed       citalopram (CELEXA) 10 MG tablet Take 1 tablet (10 mg) by mouth daily 90 tablet 3     DENTA 5000 PLUS 1.1 % CREA APPLY A PEA SIZED AMOUNT TO TOOTHBRUSH, BRUSH ONTO ROOT AREAS THOROUGHLY, THEN SPIT OUT AT  BEDTIME.  2     fluticasone (FLONASE) 50 MCG/ACT nasal spray Spray 1-2 sprays into both nostrils daily 16 g 3     gabapentin (NEURONTIN) 300 MG capsule 1 in morning, 1 at midday, 3 at night 450 capsule 3     lisinopril (ZESTRIL) 20 MG tablet Take 1 tablet (20 mg) by mouth daily 90 tablet 3     Multiple Vitamin (DAILY MULTIVITAMIN PO) Take 1 tablet by mouth daily AM       ORDER FOR DME Use your CPAP device as directed by your provider.       simvastatin (ZOCOR) 40 MG tablet Take 1 tablet (40 mg) by mouth At Bedtime 90 tablet 0     tamsulosin (FLOMAX) 0.4 MG capsule Take 2 capsules (0.8 mg) by mouth daily 30 min after a meal 180 capsule 4     FLUZONE QUADRIVALENT 0.5 ML injection TO BE ADMINISTERED BY PHARMACIST FOR IMMUNIZATION  0       Allergies  Allergies   Allergen Reactions     Grass        Social History  Social History     Socioeconomic History     Marital status: Single     Spouse name: Not on file     Number of children: Not on file     Years of education: Not on file     Highest education level: Not on file   Occupational History     Not on file   Social Needs     Financial resource strain: Not on file     Food insecurity     Worry: Not on file     Inability: Not on file     Transportation needs     Medical: Not on file     Non-medical: Not on file   Tobacco Use     Smoking status: Never Smoker     Smokeless tobacco: Never Used   Substance and Sexual Activity     Alcohol use: Yes     Alcohol/week: 0.0 standard drinks     Comment: In remission since 2009     Drug use: No     Sexual activity: Never   Lifestyle     Physical activity     Days per week: Not on file     Minutes per session: Not on file     Stress: Not on file   Relationships     Social connections     Talks on phone: Not on file     Gets together: Not on file     Attends Congregation service: Not on file     Active member of club or organization: Not on file     Attends meetings of clubs or organizations: Not on file     Relationship status: Not on  file     Intimate partner violence     Fear of current or ex partner: Not on file     Emotionally abused: Not on file     Physically abused: Not on file     Forced sexual activity: Not on file   Other Topics Concern     Parent/sibling w/ CABG, MI or angioplasty before 65F 55M? Not Asked   Social History Narrative    Single.  Lives alone.  Retired.  Worked at [employer].    No children.    1 brother -  of liver cancer, etoh    Mother  of cancer ? Type    Father  - complications of MS       Family History  Family History   Problem Relation Age of Onset     Other Cancer Mother      Cancer Mother      Other Cancer Brother      Cancer Brother      Cerebrovascular Disease Maternal Grandfather         ,, ,, ,, ,, ,, ,, ,, ,     Alcohol/Drug Brother      Cancer Brother         pancreatic; liver     Other Cancer Brother      Asthma No family hx of      Anesthesia Reaction No family hx of      Colon Polyps No family hx of      Crohn's Disease No family hx of      Ulcerative Colitis No family hx of      Colon Cancer No family hx of      Cardiovascular No family hx of      Deep Vein Thrombosis (DVT) No family hx of          ROS/MED HX  The complete review of systems is negative other than noted in the HPI or here.  Patient denies recent illness, fever and respiratory infection during past month.  Pt denies steroid use during past year.    ENT/Pulmonary:     (+) sleep apnea, uses CPAP,  (-) tobacco use and asthma   Neurologic: Comment: RLS    (+) peripheral neuropathy, - feet.  (-) no seizures and no CVA   Cardiovascular:     (+) Dyslipidemia hypertension-----Previous cardiac testing   Echo: Date: Results:    Stress Test: Date:  Results:  Technically difficult study. Extremely poor acoustic windows despite the use   of contrast for endocardial definition. Probably normal stress echocardiogram   within the technical limitations above. If the degree of clinical suspicion   for CAD is high, consider myocardial  "perfusion imaging study.    Stress Findings   Patient was given 5.5ml mixture of 1.5ml Definity and 8.5ml saline.   Maximum workload 125 cronin.   RPP 08098.   The maximum dose of atropine was .8mg.   There was no new ST segment depression.   No electrocardiographic evidence of ischemia.   Target Heart Rate was achieved.   The patient did not exhibit any symptoms during exercise.   Normal heart rate response to exercise.   Normal blood pressure response to exercise.      Baseline EKG/Symptoms   Normal baseline electrocardiogram.    ECG Reviewed: Date: Results:    Cath:  Date: Results:      METS/Exercise Tolerance: 4 - Raking leaves, gardening Comment: Able to carry groceries upstairs. Currently limits weight of packages due to hernia.   Hematologic:  - neg hematologic  ROS  (-) history of blood clots and history of blood transfusion   Musculoskeletal:  - neg musculoskeletal ROS     GI/Hepatic:  - neg GI/hepatic ROS  (-) GERD and liver disease   Renal/Genitourinary:  - neg Renal ROS     Endo:  - neg endo ROS  (-) Type II DM   Psychiatric/Substance Use:     (+) psychiatric history anxiety and depression     Infectious Disease:  - neg infectious disease ROS     Malignancy:  - neg malignancy ROS     Other:              Temp: 97.6  F (36.4  C) Temp src: Oral BP: 104/67 Pulse: 70   Resp: 16 SpO2: 97 %         185 lbs 4.8 oz  5' 8\"   Body mass index is 28.17 kg/m .       Physical Exam  Constitutional: Awake, alert, cooperative, no apparent distress, and appears stated age.  Eyes: Pupils equal, round and reactive to light, extra ocular muscles intact, sclera clear, conjunctiva normal.  HENT: Normocephalic, oral pharynx with moist mucus membranes, good dentition. Large tongue. No goiter appreciated. No removable dental hardware.  Respiratory: Clear to auscultation bilaterally, no crackles or wheezing. No SOB when supine.  Cardiovascular: Regular rate and rhythm, normal S1 and S2, and no murmur noted.  Carotids +2, no bruits. " No edema. Palpable pulses to radial & DP arteries.   GI: Normal bowel sounds, soft, non-distended, non-tender, no masses palpated.    Lymph/Hematologic: No cervical lymphadenopathy and no supraclavicular lymphadenopathy.  Genitourinary:  deferred  Skin: Warm and dry.  No rashes. Numerous nevi scattered on head, neck, upper body.  Musculoskeletal: full ROM of neck. There is no redness, warmth, or swelling of the joints. Gross motor strength is normal.    Neurologic: Awake, alert, oriented to name, place and time. Cranial nerves II-XII are grossly intact. Gait is normal. Ambulates from chair to exam table, seats self, lies supine and sits back up w/o assistance.  Neuropsychiatric: Calm, cooperative. Normal affect. Pleasant. Answers questions appropriately, follows commands w/o difficulty.        PRIOR LABS/DIAGNOSTIC STUDIES:    All labs and imaging personally reviewed      STRESS TEST 2013  Interpretation Summary   Technically difficult study. Extremely poor acoustic windows despite the use    of contrast for endocardial definition. Probably normal stress echocardiogram    within the technical limitations above. If the degree of clinical suspicion    for CAD is high, consider myocardial perfusion imaging study.    Stress Findings   Patient was given 5.5ml mixture of 1.5ml Definity and 8.5ml saline.   Maximum workload 125 cronin.   RPP 86488.   The maximum dose of atropine was .8mg.   There was no new ST segment depression.   No electrocardiographic evidence of ischemia.   Target Heart Rate was achieved.   The patient did not exhibit any symptoms during exercise.   Normal heart rate response to exercise.   Normal blood pressure response to exercise.       Baseline EKG/Symptoms   Normal baseline electrocardiogram.      CBC:   Lab Results   Component Value Date    HGB 13.7 02/24/2020    HGB 14.1 10/24/2018    HCT 46.3 10/24/2018    HCT 35.4 (L) 07/05/2016     BMP:   Lab Results   Component Value Date    .0  01/13/2020    .4 10/24/2018    POTASSIUM 4.0 01/13/2020    POTASSIUM 4.1 10/24/2018    CHLORIDE 102.0 01/13/2020    CHLORIDE 100.9 10/24/2018    CO2 31.0 01/13/2020    CO2 27.7 10/24/2018    BUN 14.2 01/13/2020    BUN 18.2 10/24/2018    CR 0.9 01/13/2020    CR 0.9 10/24/2018    GLC 78.8 01/13/2020    GLC 85.0 10/24/2018    .8 (H) 10/24/2018     COAGS: No results found for: PTT, INR, FIBR  POC: No results found for: BGM, HCG, HCGS  HEPATIC:   Lab Results   Component Value Date    ALBUMIN 3.8 03/27/2015    PROTTOTAL 6.3 (L) 06/26/2020    ALT 16.3 06/26/2020    AST 15.7 06/26/2020    ALKPHOS 55.0 06/26/2020    BILITOTAL 0.7 06/26/2020     OTHER:   Lab Results   Component Value Date    PH 7.51 (H) 10/17/2013    A1C 5.0 08/25/2020    ZHEN 9.7 01/13/2020    TSH 0.60 01/13/2020    T4 0.96 12/19/2018    T3 126 12/19/2018     Labs today: (personally reviewed)  BMP, CBC    Sodium 133 - 144 mmol/L 143      Potassium 3.4 - 5.3 mmol/L 4.0     Chloride 94 - 109 mmol/L 108     Carbon Dioxide 20 - 32 mmol/L 30     Anion Gap 3 - 14 mmol/L 4     Glucose 70 - 99 mg/dL 88     Urea Nitrogen 7 - 30 mg/dL 19     Creatinine 0.66 - 1.25 mg/dL 0.78     GFR Estimate >60 mL/min/ >90    Comment: Non  GFR Calc   Starting 12/18/2018, serum creatinine based estimated GFR (eGFR) will be   calculated using the Chronic Kidney Disease Epidemiology Collaboration   (CKD-EPI) equation.     GFR Estimate If Black >60 mL/min/ >90    Comment:  GFR Calc   Starting 12/18/2018, serum creatinine based estimated GFR (eGFR) will be   calculated using the Chronic Kidney Disease Epidemiology Collaboration   (CKD-EPI) equation.     Calcium 8.5 - 10.1 mg/dL 8.3       WBC 4.0 - 11.0 10e9/L 4.1      RBC Count 4.4 - 5.9 10e12/L 4.09Low      Hemoglobin 13.3 - 17.7 g/dL 13.1Low      Hematocrit 40.0 - 53.0 % 38.8Low      MCV 78 - 100 fl 95     MCH 26.5 - 33.0 pg 32.0     MCHC 31.5 - 36.5 g/dL 33.8     RDW 10.0 - 15.0 % 12.5      Platelet Count 150 - 450 10e9/L 182        ASSESSMENT and PLAN  Juan Francisco Booker is a 61 year old male scheduled to undergo RIGHT HERNIORRHAPHY, INGUINAL, LAPAROSCOPIC with Laith Villa MD tentatively scheduled on 3/19/21?at [facility] [place]?for treatment of Unilateral inguinal hernia with obstruction and without gangrene, recurrence not specified.       Pt has had prior anesthetic.     History of anesthetic complications  - difficult airway.  Very large tongue, short, thick neck, beard.  2016 record: Difficult (Small mouth opening, large amount of redundant tissue in posterior pharynx, large tongue.); Airway Size: 7.5 (ETT was difficult to pass through vocal cords once fiberoptic was through.      # History of difficult intubation:  10/20/16; 0815; Mask Ventilation: Not attempted (Native Airway); Ease of Intubation: Difficult (Small mouth opening, large amount of redundant tissue in posterior pharynx, large tongue.); Airway Size: 7.5 (ETT was difficult to pass through vocal cords once fiberoptic was through.  Consider using 7.0 ETT on next intubation.);  Cuffed;  Oral;  Blade Type: Fiberoptic Bronchoscope;  Place by: honl;  Insertion Attempts: 1;  Secured at (cm)to lip: 24 cm;  Breath Sounds: Equal, clear and bilateral;  End Tidal CO2: Present;  Dentition: Intact;  Grade View of Cords: 1;  Airway Adjuncts:  Fiber optic (Fiberoptic intubation.  Pre-op Precedex @ 0.2 mcg/kg/hr, 4% lidocaine nebulizer, and 0.2 mg Glycopyrrolate.  In room, Precedex to 0.5, lidocaine gargle, 5% lidocaine gel to tongue depressor and 5% lidocaine gel to Q-tips in posterior pharynx.)      He has the following specific operative considerations:       # VTE risk: 0.5%   # Risk of PONV score = 2.??If > 2, anti-emetic intervention recommended.   # Anesthesia considerations:??Refer to PAC assessment in anesthesia records            CARDIAC: METS 4,   Able to carry groceries upstairs. Currently limits weight of packages due to  hernia.     #?RCRI :?No serious cardiac risks.??0.4% risk of major adverse cardiac event.      #  Stress test 2013: no ischemia     # HTN, managed on lisinopril, will hold DOS???      PULMONARY:      # TIMOTHY w/ CPAP     # Hx difficult intubation, see note above     # Never smoked     # No asthma or inhaler use      GI:      # Hx GERD, resolved following wt loss       ENDO: BMI 28     # Hx pre-DM, resolved following wt loss. A1c on 8/25/20 was 5.0        HEME:      # On ASA 81 mg, will stop 7d prior      ORTHO:      # full ROM of neck     NEURO/PSYCH:      # Anxiety, depression    # Hx ETOH abuse, sober since 2009    # RLS  ?       Patient was discussed with Dr Trujillo.     ** The patient has a documented history of a difficult intubation. Current recommendation is for the patient to have the above procedure performed at [facility] or [facility] due to the high probability of an awake intubation and appropriate available support, if required. Staff message sent to Dr. Villa.     Patient is optimized and is acceptable candidate for the proposed procedure @ [facility] or [facility]. No further diagnostic evaluation is needed.    Final plan per anesthesiologist on day of surgery.       Arrival time, NPO, shower and medication instructions provided by nursing staff today.   Preparing For Your Surgery handout given.        Gloria Martin PA-C  Preoperative Assessment Center  [facility]  Phone: [phone]  Fax: [phone]

## 2021-03-09 ENCOUNTER — HOSPITAL ENCOUNTER (OUTPATIENT)
Facility: AMBULATORY SURGERY CENTER | Age: 61
End: 2021-03-09
Attending: SURGERY
Payer: COMMERCIAL

## 2021-03-09 DIAGNOSIS — Z20.822 ENCOUNTER FOR LABORATORY TESTING FOR COVID-19 VIRUS: Primary | ICD-10-CM

## 2021-03-15 DIAGNOSIS — G47.33 OBSTRUCTIVE SLEEP APNEA: Primary | ICD-10-CM

## 2021-03-20 DIAGNOSIS — Z20.822 ENCOUNTER FOR LABORATORY TESTING FOR COVID-19 VIRUS: ICD-10-CM

## 2021-03-20 LAB
LABORATORY COMMENT REPORT: NORMAL
SARS-COV-2 RNA RESP QL NAA+PROBE: NEGATIVE
SARS-COV-2 RNA RESP QL NAA+PROBE: NORMAL
SPECIMEN SOURCE: NORMAL
SPECIMEN SOURCE: NORMAL

## 2021-03-20 PROCEDURE — 87635 SARS-COV-2 COVID-19 AMP PRB: CPT | Mod: 90 | Performed by: PATHOLOGY

## 2021-03-24 ENCOUNTER — ANESTHESIA (OUTPATIENT)
Dept: SURGERY | Facility: CLINIC | Age: 61
End: 2021-03-24
Payer: COMMERCIAL

## 2021-03-24 ENCOUNTER — HOSPITAL ENCOUNTER (OUTPATIENT)
Facility: CLINIC | Age: 61
Discharge: HOME | End: 2021-03-24
Attending: SURGERY | Admitting: SURGERY
Payer: COMMERCIAL

## 2021-03-24 VITALS
OXYGEN SATURATION: 98 % | HEART RATE: 51 BPM | HEIGHT: 68 IN | DIASTOLIC BLOOD PRESSURE: 57 MMHG | RESPIRATION RATE: 16 BRPM | TEMPERATURE: 97.7 F | SYSTOLIC BLOOD PRESSURE: 102 MMHG | BODY MASS INDEX: 27.67 KG/M2 | WEIGHT: 182.54 LBS

## 2021-03-24 DIAGNOSIS — K40.30 UNILATERAL INGUINAL HERNIA WITH OBSTRUCTION AND WITHOUT GANGRENE, RECURRENCE NOT SPECIFIED: Primary | ICD-10-CM

## 2021-03-24 LAB
ABO + RH BLD: NORMAL
ABO + RH BLD: NORMAL
BLD GP AB SCN SERPL QL: NORMAL
BLOOD BANK CMNT PATIENT-IMP: NORMAL
GLUCOSE BLDC GLUCOMTR-MCNC: 89 MG/DL (ref 70–99)
POTASSIUM SERPL-SCNC: 3.9 MMOL/L (ref 3.4–5.3)
SPECIMEN EXP DATE BLD: NORMAL

## 2021-03-24 PROCEDURE — 82962 GLUCOSE BLOOD TEST: CPT

## 2021-03-24 PROCEDURE — 360N000077 HC SURGERY LEVEL 4, PER MIN: Performed by: SURGERY

## 2021-03-24 PROCEDURE — 250N000011 HC RX IP 250 OP 636: Performed by: ANESTHESIOLOGY

## 2021-03-24 PROCEDURE — 250N000013 HC RX MED GY IP 250 OP 250 PS 637: Performed by: ANESTHESIOLOGY

## 2021-03-24 PROCEDURE — 250N000025 HC SEVOFLURANE, PER MIN: Performed by: SURGERY

## 2021-03-24 PROCEDURE — 36415 COLL VENOUS BLD VENIPUNCTURE: CPT | Performed by: ANESTHESIOLOGY

## 2021-03-24 PROCEDURE — 86850 RBC ANTIBODY SCREEN: CPT | Performed by: ANESTHESIOLOGY

## 2021-03-24 PROCEDURE — 250N000009 HC RX 250: Performed by: NURSE ANESTHETIST, CERTIFIED REGISTERED

## 2021-03-24 PROCEDURE — 86901 BLOOD TYPING SEROLOGIC RH(D): CPT | Performed by: ANESTHESIOLOGY

## 2021-03-24 PROCEDURE — 258N000003 HC RX IP 258 OP 636: Performed by: NURSE ANESTHETIST, CERTIFIED REGISTERED

## 2021-03-24 PROCEDURE — 250N000011 HC RX IP 250 OP 636: Performed by: NURSE ANESTHETIST, CERTIFIED REGISTERED

## 2021-03-24 PROCEDURE — 710N000010 HC RECOVERY PHASE 1, LEVEL 2, PER MIN: Performed by: SURGERY

## 2021-03-24 PROCEDURE — 999N000141 HC STATISTIC PRE-PROCEDURE NURSING ASSESSMENT: Performed by: SURGERY

## 2021-03-24 PROCEDURE — 272N000001 HC OR GENERAL SUPPLY STERILE: Performed by: SURGERY

## 2021-03-24 PROCEDURE — 84132 ASSAY OF SERUM POTASSIUM: CPT | Performed by: ANESTHESIOLOGY

## 2021-03-24 PROCEDURE — 250N000009 HC RX 250: Performed by: SURGERY

## 2021-03-24 PROCEDURE — 258N000003 HC RX IP 258 OP 636: Performed by: ANESTHESIOLOGY

## 2021-03-24 PROCEDURE — C1781 MESH (IMPLANTABLE): HCPCS | Performed by: SURGERY

## 2021-03-24 PROCEDURE — 710N000012 HC RECOVERY PHASE 2, PER MINUTE: Performed by: SURGERY

## 2021-03-24 PROCEDURE — 250N000011 HC RX IP 250 OP 636: Performed by: SURGERY

## 2021-03-24 PROCEDURE — 86900 BLOOD TYPING SEROLOGIC ABO: CPT | Performed by: ANESTHESIOLOGY

## 2021-03-24 PROCEDURE — 370N000017 HC ANESTHESIA TECHNICAL FEE, PER MIN: Performed by: SURGERY

## 2021-03-24 DEVICE — MESH PROGRIP LAPAROSCOPIC 5.9X3.9" PARIETEX SELF-FIX LPG1510: Type: IMPLANTABLE DEVICE | Site: INGUINAL | Status: FUNCTIONAL

## 2021-03-24 RX ORDER — SODIUM CHLORIDE, SODIUM LACTATE, POTASSIUM CHLORIDE, CALCIUM CHLORIDE 600; 310; 30; 20 MG/100ML; MG/100ML; MG/100ML; MG/100ML
INJECTION, SOLUTION INTRAVENOUS CONTINUOUS
Status: DISCONTINUED | OUTPATIENT
Start: 2021-03-24 | End: 2021-03-24 | Stop reason: HOSPADM

## 2021-03-24 RX ORDER — FENTANYL CITRATE 50 UG/ML
INJECTION, SOLUTION INTRAMUSCULAR; INTRAVENOUS PRN
Status: DISCONTINUED | OUTPATIENT
Start: 2021-03-24 | End: 2021-03-24

## 2021-03-24 RX ORDER — LIDOCAINE HYDROCHLORIDE 40 MG/ML
INJECTION, SOLUTION RETROBULBAR PRN
Status: DISCONTINUED | OUTPATIENT
Start: 2021-03-24 | End: 2021-03-24

## 2021-03-24 RX ORDER — ONDANSETRON 4 MG/1
4 TABLET, ORALLY DISINTEGRATING ORAL ONCE
Status: COMPLETED | OUTPATIENT
Start: 2021-03-24 | End: 2021-03-24

## 2021-03-24 RX ORDER — GLYCOPYRROLATE 0.2 MG/ML
INJECTION, SOLUTION INTRAMUSCULAR; INTRAVENOUS PRN
Status: DISCONTINUED | OUTPATIENT
Start: 2021-03-24 | End: 2021-03-24

## 2021-03-24 RX ORDER — OXYCODONE HYDROCHLORIDE 5 MG/1
5 TABLET ORAL EVERY 4 HOURS PRN
Status: COMPLETED | OUTPATIENT
Start: 2021-03-24 | End: 2021-03-24

## 2021-03-24 RX ORDER — OXYCODONE HYDROCHLORIDE 5 MG/1
5-10 TABLET ORAL EVERY 4 HOURS PRN
Qty: 16 TABLET | Refills: 0 | Status: SHIPPED | OUTPATIENT
Start: 2021-03-24 | End: 2021-04-21

## 2021-03-24 RX ORDER — LIDOCAINE 40 MG/G
CREAM TOPICAL
Status: DISCONTINUED | OUTPATIENT
Start: 2021-03-24 | End: 2021-03-24 | Stop reason: HOSPADM

## 2021-03-24 RX ORDER — ONDANSETRON 2 MG/ML
INJECTION INTRAMUSCULAR; INTRAVENOUS PRN
Status: DISCONTINUED | OUTPATIENT
Start: 2021-03-24 | End: 2021-03-24

## 2021-03-24 RX ORDER — CEFAZOLIN SODIUM 2 G/100ML
2 INJECTION, SOLUTION INTRAVENOUS
Status: DISCONTINUED | OUTPATIENT
Start: 2021-03-24 | End: 2021-03-24 | Stop reason: HOSPADM

## 2021-03-24 RX ORDER — DEXAMETHASONE SODIUM PHOSPHATE 4 MG/ML
INJECTION, SOLUTION INTRA-ARTICULAR; INTRALESIONAL; INTRAMUSCULAR; INTRAVENOUS; SOFT TISSUE PRN
Status: DISCONTINUED | OUTPATIENT
Start: 2021-03-24 | End: 2021-03-24

## 2021-03-24 RX ORDER — CEFAZOLIN SODIUM 2 G/100ML
2 INJECTION, SOLUTION INTRAVENOUS SEE ADMIN INSTRUCTIONS
Status: DISCONTINUED | OUTPATIENT
Start: 2021-03-24 | End: 2021-03-24 | Stop reason: HOSPADM

## 2021-03-24 RX ORDER — ACETAMINOPHEN 325 MG/1
975 TABLET ORAL ONCE
Status: COMPLETED | OUTPATIENT
Start: 2021-03-24 | End: 2021-03-24

## 2021-03-24 RX ORDER — EPHEDRINE SULFATE 50 MG/ML
INJECTION, SOLUTION INTRAMUSCULAR; INTRAVENOUS; SUBCUTANEOUS PRN
Status: DISCONTINUED | OUTPATIENT
Start: 2021-03-24 | End: 2021-03-24

## 2021-03-24 RX ADMIN — FENTANYL CITRATE 50 MCG: 50 INJECTION, SOLUTION INTRAMUSCULAR; INTRAVENOUS at 13:26

## 2021-03-24 RX ADMIN — SODIUM CHLORIDE, POTASSIUM CHLORIDE, SODIUM LACTATE AND CALCIUM CHLORIDE 1000 ML: 600; 310; 30; 20 INJECTION, SOLUTION INTRAVENOUS at 17:00

## 2021-03-24 RX ADMIN — FENTANYL CITRATE 50 MCG: 50 INJECTION, SOLUTION INTRAMUSCULAR; INTRAVENOUS at 12:51

## 2021-03-24 RX ADMIN — SODIUM CHLORIDE, POTASSIUM CHLORIDE, SODIUM LACTATE AND CALCIUM CHLORIDE: 600; 310; 30; 20 INJECTION, SOLUTION INTRAVENOUS at 11:27

## 2021-03-24 RX ADMIN — Medication 5 MG: at 13:38

## 2021-03-24 RX ADMIN — SODIUM CHLORIDE, POTASSIUM CHLORIDE, SODIUM LACTATE AND CALCIUM CHLORIDE: 600; 310; 30; 20 INJECTION, SOLUTION INTRAVENOUS at 13:11

## 2021-03-24 RX ADMIN — ROCURONIUM BROMIDE 10 MG: 10 INJECTION INTRAVENOUS at 13:41

## 2021-03-24 RX ADMIN — ONDANSETRON 4 MG: 2 INJECTION INTRAMUSCULAR; INTRAVENOUS at 13:02

## 2021-03-24 RX ADMIN — ROCURONIUM BROMIDE 50 MG: 10 INJECTION INTRAVENOUS at 12:00

## 2021-03-24 RX ADMIN — Medication 10 MG: at 13:41

## 2021-03-24 RX ADMIN — MIDAZOLAM 1 MG: 1 INJECTION INTRAMUSCULAR; INTRAVENOUS at 11:36

## 2021-03-24 RX ADMIN — DEXMEDETOMIDINE HYDROCHLORIDE 4 MCG: 100 INJECTION, SOLUTION INTRAVENOUS at 11:37

## 2021-03-24 RX ADMIN — ACETAMINOPHEN 975 MG: 325 TABLET, FILM COATED ORAL at 18:18

## 2021-03-24 RX ADMIN — PHENYLEPHRINE HYDROCHLORIDE 100 MCG: 10 INJECTION INTRAVENOUS at 12:35

## 2021-03-24 RX ADMIN — LIDOCAINE HYDROCHLORIDE 5 ML: 40 INJECTION, SOLUTION RETROBULBAR; TOPICAL at 11:29

## 2021-03-24 RX ADMIN — CEFAZOLIN 2 G: 10 INJECTION, POWDER, FOR SOLUTION INTRAVENOUS at 12:10

## 2021-03-24 RX ADMIN — GLYCOPYRROLATE 0.2 MG: 0.2 INJECTION, SOLUTION INTRAMUSCULAR; INTRAVENOUS at 11:19

## 2021-03-24 RX ADMIN — SUGAMMADEX 200 MG: 100 INJECTION, SOLUTION INTRAVENOUS at 13:48

## 2021-03-24 RX ADMIN — DEXMEDETOMIDINE HYDROCHLORIDE 4 MCG: 100 INJECTION, SOLUTION INTRAVENOUS at 11:36

## 2021-03-24 RX ADMIN — DEXMEDETOMIDINE HYDROCHLORIDE 4 MCG: 100 INJECTION, SOLUTION INTRAVENOUS at 11:31

## 2021-03-24 RX ADMIN — ROCURONIUM BROMIDE 20 MG: 10 INJECTION INTRAVENOUS at 12:56

## 2021-03-24 RX ADMIN — ONDANSETRON 4 MG: 4 TABLET, ORALLY DISINTEGRATING ORAL at 18:23

## 2021-03-24 RX ADMIN — GLYCOPYRROLATE 0.2 MG: 0.2 INJECTION, SOLUTION INTRAMUSCULAR; INTRAVENOUS at 11:34

## 2021-03-24 RX ADMIN — DEXMEDETOMIDINE HYDROCHLORIDE 8 MCG: 100 INJECTION, SOLUTION INTRAVENOUS at 11:38

## 2021-03-24 RX ADMIN — DEXAMETHASONE SODIUM PHOSPHATE 4 MG: 4 INJECTION, SOLUTION INTRA-ARTICULAR; INTRALESIONAL; INTRAMUSCULAR; INTRAVENOUS; SOFT TISSUE at 13:02

## 2021-03-24 RX ADMIN — OXYCODONE HYDROCHLORIDE 5 MG: 5 TABLET ORAL at 15:10

## 2021-03-24 RX ADMIN — GLYCOPYRROLATE 0.2 MG: 0.2 INJECTION, SOLUTION INTRAMUSCULAR; INTRAVENOUS at 11:28

## 2021-03-24 ASSESSMENT — MIFFLIN-ST. JEOR: SCORE: 1607.5

## 2021-03-24 NOTE — OR NURSING
RENAY Khan at bedside in phase II. Updated on continued hypotension and dizziness. Orders for more IVF, more PO fluids and more time to recover. Will continue to monitor. Family at bedside.

## 2021-03-24 NOTE — ADDENDUM NOTE
Addendum  created 03/24/21 1534 by Bulmaro Hyatt MD    Clinical Note Signed, Intraprocedure Blocks edited

## 2021-03-24 NOTE — ANESTHESIA CARE TRANSFER NOTE
Patient: Juan Francisco Booker    Procedure(s):  HERNIORRHAPHY, INGUINAL, LAPAROSCOPIC    Diagnosis: Unilateral inguinal hernia with obstruction and without gangrene, recurrence not specified [K40.30]  Diagnosis Additional Information: No value filed.    Anesthesia Type:   General     Note:    Oropharynx: oropharynx clear of all foreign objects and spontaneously breathing  Level of Consciousness: awake  Oxygen Supplementation: nasal cannula  Level of Supplemental Oxygen (L/min / FiO2): 4  Independent Airway: airway patency satisfactory and stable  Dentition: dentition unchanged  Vital Signs Stable: post-procedure vital signs reviewed and stable  Report to RN Given: handoff report given  Patient transferred to: PACU    Handoff Report: Identifed the Patient, Identified the Reponsible Provider, Reviewed the pertinent medical history, Discussed the surgical course, Reviewed Intra-OP anesthesia mangement and issues during anesthesia, Set expectations for post-procedure period and Allowed opportunity for questions and acknowledgement of understanding      Vitals: (Last set prior to Anesthesia Care Transfer)  CRNA VITALS  3/24/2021 1337 - 3/24/2021 1420      3/24/2021             NIBP:  114/81    Pulse:  71    SpO2:  98 %    Resp Rate (observed):  10        Electronically Signed By: SUZIE Russo CRNA  March 24, 2021  2:20 PM

## 2021-03-24 NOTE — ANESTHESIA PROCEDURE NOTES
Airway       Patient location during procedure: OR  Staff -        Anesthesiologist:  Bulmaro Hyatt MD       Performed By: anesthesiologist  Consent for Airway        Urgency: elective  Indications and Patient Condition       Indications for airway management: kayli-procedural       Induction type:awake       Mask difficulty assessment: 0 - not attempted    Final Airway Details       Final airway type: endotracheal airway       Successful airway: ETT - single and Oral  Endotracheal Airway Details        ETT size (mm): 7.0       Cuffed: yes       Successful intubation technique: flexible bronchoscopy       Position: Left       Measured from: lips       Secured at (cm): 23       Bite block used: None    Post intubation assessment        Placement verified by: capnometry, equal breath sounds and chest rise        Number of attempts at approach: 1       Secured with: other (comment) (tube tamer)       Ease of procedure: easy       Dentition: Intact and Unchanged    Medication(s) Administered   Medication Administration Time: 3/24/2021 11:42 AM    Additional Comments       Awake fiberoptic intubation for history of difficult intubation.  Total of 0.6 mg glycopyrrolate, 4% lidocaine nebulizer, 20 mcg Precedex, 1 mg Versed, and 3 mL 4% lidocaine atomizer used. Patient tolerated well. Placement confirmed by direct visualization with bronchoscope.    Looked with CMAC D blade after procedure (while still intubated) and able to view glottis and visualize cords.

## 2021-03-24 NOTE — OR NURSING
Pt was C/O feeling dizzy. His LR was restarted and encouraged him to drink some fluids. His BP improved after some fluid and he is feeling better. Currently watching TV and communicating with staff well.

## 2021-03-24 NOTE — ANESTHESIA POSTPROCEDURE EVALUATION
Patient: Juan Francisco Booker    Procedure(s):  HERNIORRHAPHY, INGUINAL, LAPAROSCOPIC    Diagnosis:Unilateral inguinal hernia with obstruction and without gangrene, recurrence not specified [K40.30]  Diagnosis Additional Information: No value filed.    Anesthesia Type:  General    Note:  Disposition: Outpatient   Postop Pain Control: Uneventful            Sign Out: Well controlled pain   PONV: No   Neuro/Psych: Uneventful            Sign Out: Acceptable/Baseline neuro status   Airway/Respiratory: Uneventful            Sign Out: Acceptable/Baseline resp. status   CV/Hemodynamics: Uneventful            Sign Out: Acceptable CV status   Other NRE: NONE   DID A NON-ROUTINE EVENT OCCUR? No         Last vitals:  Vitals:    03/24/21 1410 03/24/21 1415 03/24/21 1430   BP: 114/63 112/62 108/70   Pulse: 69 57 54   Resp: 16 16 11   Temp: 34.7  C (94.5  F)     SpO2: 98% 99% 100%       Last vitals prior to Anesthesia Care Transfer:  CRNA VITALS  3/24/2021 1337 - 3/24/2021 1437      3/24/2021             NIBP:  114/81    Pulse:  71    SpO2:  98 %    Resp Rate (observed):  10          Electronically Signed By: Collette Oliver MD  March 24, 2021  2:59 PM

## 2021-03-24 NOTE — BRIEF OP NOTE
"[facility]    Brief Operative Note    Pre-operative diagnosis: Unilateral inguinal hernia with obstruction and without gangrene, recurrence not specified [K40.30]  Post-operative diagnosis Same as pre-operative diagnosis    Procedure: Procedure(s):  HERNIORRHAPHY, INGUINAL, LAPAROSCOPIC  Surgeon: Surgeon(s) and Role:     * Laith Villa MD - Primary     * Saleem Rivas MD - Resident - Assisting  Anesthesia: General   Estimated blood loss: Less than 10 ml  Drains: None  Specimens: * No specimens in log *  Findings:   None.  Complications: None.  Implants:   Implant Name Type Inv. Item Serial No.  Lot No. LRB No. Used Action   MESH PROGRIP LAPAROSCOPIC 5.9X3.9\" PARIETEX SELF-FIX [id number] Mesh MESH PROGRIP LAPAROSCOPIC 5.9X3.9\" PARI[employer]EX SELF-FIX [id number]  [employer] [id number] Right 1 Implanted         "

## 2021-03-24 NOTE — OP NOTE
"[facility]    Operative Note    Pre-operative diagnosis: Right reducible inguinal hernia      Post-operative diagnosis Right indirect inguinal hernia      Procedure: Laparoscopic right inguinal hernia repair ( MALCOM/ Transabdominal Pre-Peritoneal repair)     Surgeon: Surgeon(s) and Role:     * Laith Villa MD - Primary     * Saleem Rivas MD - Resident - Assisting      Anesthesia: General      Estimated blood loss: 20 ml      Drains: None     Specimens: * No specimens in log *     Findings: Indirect inguinal hernia sac     Complications: None.     Implants (name of mesh): Parietex progrip (5.9 x 3.9 \")       OPERATIVE INDICATIONS:  Juan Francisco Booker is a 61 year old year old M with a right inguinal hernia     After understanding the risks and benefits of proceeding with surgery, the patient has an indication for laparoscopic inguinal hernia repair and consented to undergo surgery.    I reviewed the risks of surgery with Juan Francisco Booker .    These include, but are not limited to, death, myocardial infarction, pneumonia, urinary tract infection, deep venous thrombosis with or without pulmonary embolus, abdominal infection from bowel injury or abscess, bowel obstruction, wound infection, and bleeding.    OPERATIVE DETAILS:  The patient was brought to the operating room and prepared in a routine fashion.  A timeout was performed prior to surgery and documented by the nursing team.    Under the benefits of general anesthesia, the patient was positioned supine and both arms were tucked.  A garcia catheter was placed and the abdomen was prepped and draped sterilely.    Abdominal entry was achieved with a Veress needle in LUQ , establishing pneumoperitoneum at 15 m mHg followed  placement of 5 mm port, Additional 10 mm port was placed medial to the first port in LUQ (subcostal location) and 5 mm port was placed in RUQ. Patient was placed in steep Ttrendelenburg.      The " peritoneum was incised and the preperitoneal space was established with blunt dissection and electrocautery. A complete dissection of the space was performed by pushing peritoneal flap down and elevating the inferior epigastric arteries. Once we had adequate dissected out the sac and reduced it from the inguinal canal, a piece of Progrip mesh (see above for type of mesh) was prepared and passed through the 10mm port and unfurled in the right groin first.  Appropriate coverage was made of the hernia locations, including the direct, indirect, and femoral locations.  The lower lip of the mesh was placed above the peritoneal reflection.    Tacking was not used. The peritoneal flap was closed with running 0 polysorb sutures laparoscopically.     The properitoneal space was desufflated under direct visualization. Complete hemostasis was achieved.    The skin was closed at the 3 incisions using 4-0 monocryl suture and dermabond was applied.    I was present for all critical components of the operation and all needle and sponge counts were correct x2 at the end of the procedure.    Laith Villa MD  Surgery  [phone] (hospital )          Original report prepared by    Saleem Rivas MD   PGY 5

## 2021-03-25 NOTE — OR NURSING
RENAY Khan at bedside in phase II again. Aware patient's BP has improved after interventions, patient feeling better. Patient has supplies at home to check blood pressure and will continue to monitor at home. Patient ready for discharge. Rain teaching done with Angelica (s.o.) at bedside. All questions answered. Prescriptions picked up at pharmacy.

## 2021-03-25 NOTE — DISCHARGE INSTRUCTIONS
[facility]   Adult Discharge Orders & Instructions     For 24 hours after surgery    1. Get plenty of rest.  A responsible adult must stay with you for at least 24 hours after you leave the hospital.   2. Do not drive or use heavy equipment.  If you have weakness or tingling, don't drive or use heavy equipment until this feeling goes away.  3. Do not drink alcohol.  4. Avoid strenuous or risky activities.  Ask for help when climbing stairs.   5. You may feel lightheaded.  IF so, sit for a few minutes before standing.  Have someone help you get up.   6. If you have nausea (feel sick to your stomach): Drink only clear liquids such as apple juice, ginger ale, broth or 7-Up.  Rest may also help.  Be sure to drink enough fluids.  Move to a regular diet as you feel able.  7. You may have a slight fever. Call the doctor if your fever is over 100 F (37.7 C) (taken under the tongue) or lasts longer than 24 hours.  8. You may have a dry mouth, a sore throat, muscle aches or trouble sleeping.  These should go away after 24 hours.  9. Do not make important or legal decisions.   Call your doctor for any of the followin.  Signs of infection (fever, growing tenderness at the surgery site, a large amount of drainage or bleeding, severe pain, foul-smelling drainage, redness, swelling).    2. It has been over 8 to 10 hours since surgery and you are still not able to urinate (pass water).    3.  Headache for over 24 hours.    4.  Numbness, tingling or weakness the day after surgery (if you had spinal anesthesia).  To contact a doctor call Dr. Daisy mata [phone].    After hours [phone] ask for General Surgery resident on call.

## 2021-03-26 ENCOUNTER — PATIENT OUTREACH (OUTPATIENT)
Dept: ENDOCRINOLOGY | Facility: CLINIC | Age: 61
End: 2021-03-26

## 2021-03-29 ENCOUNTER — TELEPHONE (OUTPATIENT)
Dept: NURSING | Facility: CLINIC | Age: 61
End: 2021-03-29

## 2021-03-29 NOTE — TELEPHONE ENCOUNTER
RN Post-Op/Post-Discharge Care Coordination Note    Mr. Juan Francisco Booker is a 61 year old male who underwent laparoscopic bilateral inguinal hernia on 3/24/21 with  Dr. Chetan Villa.  Spoke with Patient.    Support  Patient able to care for self independently     Health Status  Fevers/chills: Patient denies any fever or chills.  Nausea/Vomiting: Patient denies nausea/vomiting.  Eating/drinking: Patient is able to eat and drink without any complaints.  Bowel habits: Patient reports having a normal bowel movement.  Drains (TOBY): patient has a garcia catheter.  Stattes urine is clear but red.  States he is having some discomfort at the meatus.  Incisions: Patient denies any signs and symptoms of infection..  Wound closure:  Skin Sealant  Pain: Rates incisional pain at a 3.  Complaining of meatal pain from catheter.  Has worsened in last two days.  Has some bloody drainage.  New Medications:  Oxycodone.  Has only been using Tylenol for pain    Activity/Restrictions  No lifting in excess of 15-20 pounds for 3-4 weeks    Equipment  None    Pathology reviewed with patient:  N/A    Forms/Letters  No    All of his questions were answered including reviewing restrictions, and wound care.  He will call this office if he has any further questions and/or concerns.      Patient has a follow up appointment on    A copy of this note was routed to the primary surgeon.      Whom and When to Call  Patient acknowledges understanding of how to manage any medication changes and   when to seek medical care.     Patient advised that if after hour medical concerns arise to please call [phone] and choose option 4 to speak to the physician on call.     Dr. Villa notified of patient concerns regarding his catheter.  Urology contacted for additional patient care suggestions.  Patient to try bacitracin ointment on meatal area, make sure there is a little slack on catheter, wear loose clothing.  Patient notified of care instructions.

## 2021-03-29 NOTE — TELEPHONE ENCOUNTER
.  [facility]: Nurse Triage Note  SITUATION/BACKGROUND                                                      Juan Francisco Booker is a 61 year old male s/pHERNIORRHAPHY, INGUINAL, LAPAROSCOPIC (Right Groin) procedure on 3/24/21.     Patient calling to report having penis discomfort at garcia insertion and groin pain. Penis appears slightly irritated and has burning. Initially, pain was intermittent. Pain is becoming more constant, rates 3/10.     Urine output is sufficient about 3000 ml/day. Patient empties bag 3x/day. Urine remains light red in color with sediment and mucous strands noted. Has no odor. Afebrile.   Incision sites remain closed and no drainage, redness or swelling noted.     Routed to Surgery as High Priority to review and follow up call to patient at [phone].

## 2021-04-01 ENCOUNTER — OFFICE VISIT (OUTPATIENT)
Dept: SURGERY | Facility: CLINIC | Age: 61
End: 2021-04-01
Payer: COMMERCIAL

## 2021-04-01 VITALS
BODY MASS INDEX: 27.99 KG/M2 | HEIGHT: 68 IN | SYSTOLIC BLOOD PRESSURE: 125 MMHG | HEART RATE: 71 BPM | TEMPERATURE: 98.5 F | WEIGHT: 184.7 LBS | DIASTOLIC BLOOD PRESSURE: 79 MMHG

## 2021-04-01 DIAGNOSIS — K40.30 UNILATERAL INGUINAL HERNIA WITH OBSTRUCTION AND WITHOUT GANGRENE, RECURRENCE NOT SPECIFIED: Primary | ICD-10-CM

## 2021-04-01 PROCEDURE — 99024 POSTOP FOLLOW-UP VISIT: CPT | Performed by: SURGERY

## 2021-04-01 ASSESSMENT — PAIN SCALES - GENERAL: PAINLEVEL: MILD PAIN (2)

## 2021-04-01 ASSESSMENT — MIFFLIN-ST. JEOR: SCORE: 1617.29

## 2021-04-01 NOTE — NURSING NOTE
"Chief Complaint   Patient presents with     RECHECK     Follow up 1 week post op.       Vitals:    04/01/21 0704   BP: 125/79   BP Location: Left arm   Patient Position: Sitting   Cuff Size: Adult Regular   Pulse: 71   Temp: 98.5  F (36.9  C)   TempSrc: Oral   Weight: 83.8 kg (184 lb 11.2 oz)   Height: 1.727 m (5' 8\")       Body mass index is 28.08 kg/m .                            FABBY WATERS, EMT    "

## 2021-04-01 NOTE — LETTER
"4/1/2021       RE: Juan Francisco Booker  [address]  [place] [address]     Dear Colleague,    Thank you for referring your patient, Juan Francisco Booker, to the [facility] GENERAL SURGERY [facility] [place] at [facility]. Please see a copy of my visit note below.    Patient underwent prior inguinal hernia surgery and this occurred 6 days ago.    Juan Francisco Booker overall has the following complaints: current garcia catheter.    Difficult bladder catheterization required urology assistance.    Urine is blood-tinged; however, no clots.    Garcia removed in clinic.    On exam:  /79 (BP Location: Left arm, Patient Position: Sitting, Cuff Size: Adult Regular)   Pulse 71   Temp 98.5  F (36.9  C) (Oral)   Ht 1.727 m (5' 8\")   Wt 83.8 kg (184 lb 11.2 oz)   BMI 28.08 kg/m    Lung exam: breathing unlabored  Abdominal exam: soft; nontender; nondistended; incisions c/d/i; there is swelling at scrotum, also bruising    Plan:  F/u as needed.    Will need Emergency Department or urology follow-up if problems with urinary retention.    No orders of the defined types were placed in this encounter.      Laith Villa MD  Surgery  [phone] (hospital )  [phone] (clinic nurses)            "

## 2021-04-01 NOTE — PROGRESS NOTES
"Patient underwent prior inguinal hernia surgery and this occurred 6 days ago.    Juan Francisco Booker overall has the following complaints: current garcia catheter.    Difficult bladder catheterization required urology assistance.    Urine is blood-tinged; however, no clots.    Garcia removed in clinic.        On exam:  /79 (BP Location: Left arm, Patient Position: Sitting, Cuff Size: Adult Regular)   Pulse 71   Temp 98.5  F (36.9  C) (Oral)   Ht 1.727 m (5' 8\")   Wt 83.8 kg (184 lb 11.2 oz)   BMI 28.08 kg/m    Lung exam: breathing unlabored  Abdominal exam: soft; nontender; nondistended; incisions c/d/i; there is swelling at scrotum, also bruising    Plan:  F/u as needed.    Will need Emergency Department or urology follow-up if problems with urinary retention.    No orders of the defined types were placed in this encounter.          Laith Villa MD  Surgery  [phone] (hospital )  [phone] (clinic nurses)                "

## 2021-04-02 DIAGNOSIS — E78.5 HYPERLIPIDEMIA LDL GOAL <130: ICD-10-CM

## 2021-04-02 RX ORDER — SIMVASTATIN 40 MG
40 TABLET ORAL AT BEDTIME
Qty: 90 TABLET | Refills: 3 | Status: SHIPPED | OUTPATIENT
Start: 2021-04-02 | End: 2022-04-11

## 2021-04-05 DIAGNOSIS — R35.0 URINARY FREQUENCY: ICD-10-CM

## 2021-04-05 RX ORDER — TAMSULOSIN HYDROCHLORIDE 0.4 MG/1
0.8 CAPSULE ORAL DAILY
Qty: 60 CAPSULE | Refills: 0 | Status: SHIPPED | OUTPATIENT
Start: 2021-04-05 | End: 2021-04-30

## 2021-04-05 NOTE — TELEPHONE ENCOUNTER
Last Clinic Visit: 2/18/20 recommended 6 month follow up, no upcoming appointments scheduled.  30 day refill provided per protocol, routed to clinic scheduling for follow up

## 2021-04-07 RX ORDER — FLUTICASONE PROPIONATE 50 MCG
1-2 SPRAY, SUSPENSION (ML) NASAL DAILY
Qty: 48 G | Refills: 1 | Status: SHIPPED | OUTPATIENT
Start: 2021-04-07 | End: 2021-11-12

## 2021-04-07 NOTE — TELEPHONE ENCOUNTER
Pended RX Fluticasone Prop 50 mcg spray, DISP: 48.0 GM (16.0 GM sixteen X 3 patient requesting 3 months supply.)   ,   R^1  Last Written Prescription Date:  1/5/2021  Last Fill Quantity: 16.0 gm   # refills: 3  Patient only has one available from this prescription, wants 3 month supply.  Last Office Visit: 3/18/2020  Future Office visit:   4/21/2021 scheduled    Routed Dr. Varma for review    Routing refill request to provider for review/approval because:  Drug not on the [facility], P or [facility] refill protocol or controlled substance

## 2021-04-20 NOTE — PROGRESS NOTES
Juan Francisco Booker is a 61 year old who is being evaluated via a billable video visit.      How would you like to obtain your AVS? MyChart  If the video visit is dropped, the invitation should be resent by: Send to e-mail at: [email or web address]  Will anyone else be joining your video visit? Veronica      Olivia Joseph    Video Start Time: 2:30 PM  Video-Visit Details    Type of service:  Video Visit    Video End Time:2:54 PM    Originating Location (pt. Location): Home    Distant Location (provider location):  @[email or web address]@     Platform used for Video Visit: [facility] [place]   Outpatient Sleep Medicine Follow-up Visit  April 21, 2021    Name: Juan Francisco Booker MRN# [id number]   Age: 61 year old YOB: 1960     Date of Consultation: April 21, 2021  Consultation is requested by: No referring provider defined for this encounter.  Primary care provider: Ivon Red           Assessment and Plan:     Sleep Diagnoses:    Severe obstructive sleep apnea with improved sleep quality, daytime sleepiness     Depression     Obesity with 135 pound weight loss and recent weight loss program     Summary Recommendations:     Use calling audio with early morning awakenings and avoid morning light  Continue using CPAP return to clinic in 1 year or earlier if you have recurrent complaints of snoring sleep disruption or daytime sleepiness  If you do take a day off CPAP and record overnight snoring with snore lab or similar application, it likely means that you have residual sleep apnea.  Repeat testing may be warranted if you do not have snoring, sleep disruption or daytime sleepiness off CPAP.          History of Present Illness:      Juan Francisco Booker is a 61 year  gentleman with severe obstructive sleep apnea who has had long-term problems with effective therapy management due to sleep disruption has markedly improved sleep quality with the exception of early morning awakenings with amputation of the last  hour or so of sleep.  He is going to bed around midnight and awakening around 6-7 AM with an average CPAP use equivalent to his estimated sleep time of 6-1/2 hours.  He is highly compliant with CPAP therapy without significant residual sleep apnea.  He is using light blocking drapes on his windows and will use a calming method for early awakenings moving forward.     PREVIOUS SLEEP STUDIES:  Date: 9/29/2015  AHI: 66.5               Problem list:     Patient Active Problem List   Diagnosis     Obstructive sleep apnea     Insomnia     Excessive sleepiness     Chronic nasal congestion     Other and unspecified alcohol dependence, unspecified drinking behavior     Lesion of ulnar nerve     Hyperlipidemia     Essential hypertension     Obesity     Benign neoplasm of colon     Hyperlipidemia     Moderate major depression (H)     Gastric polyps     Impaired glucose tolerance test     Prediabetes     Skin tag     Unilateral inguinal hernia with obstruction and without gangrene, recurrence not specified               Most Recent SCALES:    EPWORTH SLEEPINESS SCALE WITHIN 1 YEAR WITHIN 10 DAYS   Sitting and reading 1 1   Watching TV 1 1   Sitting, inactive in a public place (theatre or mtg.) 0  0    As a passenger in a car 2 2   Lying down to rest in the afternoon when circumstance permit 2 2   Sitting and talking to someone 0 0   Sitting quietly after lunch without alcohol 0 0   In a car, while stopped for a few minutes in traffic 0 0   TOTAL SCORE 6 6       INSOMNIA SEVERITY INDEX WITHIN 1 YEAR   Difficulty falling asleep 0   Difficult staying asleep 1   Problems waking up to early 3   How SATISFIED/DISSATISFIED are you with your CURRENT sleep pattern? 3   How NOTICEABLE to others do you think your sleep pattern is in terms of your quality of life? 1   How WORRIED/DISTRESSED are you about your current sleep pattern? 1   To what extent do you consider your sleep problem to INTERFERE with your daily fuctioning(e.g. daytime  fatigue, mood, ability to function at work/daily chores, concentration, mood,etc.) CURRENTLY? 3   INSOMNIA SEVERITY INDEX TOTAL SCORE 12    --absence of insomnia (0-7); sub-threshold insomnia (8-14); moderate insomnia (15-21); and severe insomnia (22-28)--    JOAQUÍN Total Score: 12      Objective:  CPAP Compliance Targets:   >70% days > 4 hours AHI < 5   30 days ending April 21, 2021  Mask type:  Full Face Mask             Medications:     Current Outpatient Medications   Medication Sig     aspirin 81 MG tablet Take 1 tablet by mouth daily AM     Caffeine 100 MG TABS Take 2 tablets by mouth daily as needed     citalopram (CELEXA) 10 MG tablet Take 1 tablet (10 mg) by mouth daily     DENTA 5000 PLUS 1.1 % CREA APPLY A PEA SIZED AMOUNT TO TOOTHBRUSH, BRUSH ONTO ROOT AREAS THOROUGHLY, THEN SPIT OUT AT BEDTIME.     fluticasone (FLONASE) 50 MCG/ACT nasal spray Spray 1-2 sprays into both nostrils daily     FLUZONE QUADRIVALENT 0.5 ML injection TO BE ADMINISTERED BY PHARMACIST FOR IMMUNIZATION     gabapentin (NEURONTIN) 300 MG capsule 1 in morning, 1 at midday, 3 at night     lisinopril (ZESTRIL) 20 MG tablet Take 1 tablet (20 mg) by mouth daily     Multiple Vitamin (DAILY MULTIVITAMIN PO) Take 1 tablet by mouth daily AM     ORDER FOR DME Use your CPAP device as directed by your provider.     simvastatin (ZOCOR) 40 MG tablet Take 1 tablet (40 mg) by mouth At Bedtime     tamsulosin (FLOMAX) 0.4 MG capsule Take 2 capsules (0.8 mg) by mouth daily 30 min after a meal For additional refills, please schedule a follow-up appointment at [phone]     No current facility-administered medications for this visit.         Allergies   Allergen Reactions     Grass             Problem List:     Patient Active Problem List   Diagnosis     Obstructive sleep apnea     Insomnia     Excessive sleepiness     Chronic nasal congestion     Other and unspecified alcohol dependence, unspecified drinking behavior     Lesion of ulnar nerve      "Hyperlipidemia     Essential hypertension     Obesity     Benign neoplasm of colon     Hyperlipidemia     Moderate major depression (H)     Gastric polyps     Impaired glucose tolerance test     Prediabetes     Skin tag     Unilateral inguinal hernia with obstruction and without gangrene, recurrence not specified            Past Medical History:     Does not need 02 supplement at night   Past Medical History:   Diagnosis Date     Anxiety      Benign neoplasm of colon 3/2/2015     Depression      Depressive disorder 1/15/2000     Difficult intubation      ETOH abuse 3/15/2009    in remission     Gastro-oesophageal reflux disease 1/15/2010     Hyperlipidemia 1/1/2000     Hypoxemia      Morbid obesity (H)      Nasal polyps 1/1/2015     TIMOTHY on CPAP 8/13/2012    Severe (uses 5-6 hours as able)     Other and unspecified hyperlipidemia 10/25/2012     Personal history of colonic polyps 2/207/15    Multiple \"neg for FAP\"     Pre-diabetes      Prediabetes 5/24/2017     Restless leg      Skin tag      Surgical complication 1/6/2015    difficulty inserting a tube down my throat     Unspecified essential hypertension 10/25/2012             Past Surgical History:    No h/o  upper airway surgery  Past Surgical History:   Procedure Laterality Date     AS COLONOSCOPY THRU STOMA W BIOPSY/CAUTERY TUMOR/POLYP/LESION  2/27/15    colon polyps     COLONOSCOPY  2/27/2015    x 2     COLONOSCOPY N/A 1/6/2020    Procedure: COLONOSCOPY, WITH POLYPECTOMY AND BIOPSY;  Surgeon: Omer Salas MD;  Location:  GI     COLONOSCOPY WITH CO2 INSUFFLATION N/A 10/20/2016    Procedure: COLONOSCOPY WITH CO2 INSUFFLATION;  Surgeon: Juan Francisco Beltran MD;  Location: [facility] OR     ENT SURGERY  1/5/2011    [facility]     ESOPHAGOSCOPY, GASTROSCOPY, DUODENOSCOPY (EGD), COMBINED N/A 10/20/2016    Procedure: COMBINED ESOPHAGOSCOPY, GASTROSCOPY, DUODENOSCOPY (EGD);  Surgeon: Juan Francisco Beltran MD;  [facility]     LAPAROSCOPIC HERNIORRHAPHY " INGUINAL Right 3/24/2021    Procedure: HERNIORRHAPHY, INGUINAL, LAPAROSCOPIC;  Surgeon: Laith Villa MD;  Location: [facility] OR     RELEASE CARPAL TUNNEL Right 6/14/2017    Procedure: RELEASE CARPAL TUNNEL;  Right Open Carpal Tunnel Release;  Surgeon: Aracelis Lowe MD;  Location: [facility] OR     RELEASE CARPAL TUNNEL Left 12/29/2017    Procedure: RELEASE CARPAL TUNNEL;  Left Open Carpal Tunnel Release;  Surgeon: Aracelis Lowe MD;  Location: [facility] OR       GENERAL: Healthy, alert and no distress  EYES: Eyes grossly normal to inspection.  No discharge or erythema, or obvious scleral/conjunctival abnormalities.  RESP: No audible wheeze, cough, or visible cyanosis.  No visible retractions or increased work of breathing.    SKIN: Visible skin clear. No significant rash, abnormal pigmentation or lesions.  NEURO: Cranial nerves grossly intact.  Mentation and speech appropriate for age.  PSYCH: Mentation appears normal, affect normal/bright, judgement and insight intact, normal speech and appearance well-groomed.        Copy to: Ivon Red MD 4/21/2021       Total time spent with patient: 25 min >50% counseling and 10 minutes documenting and reviewing records

## 2021-04-21 ENCOUNTER — VIRTUAL VISIT (OUTPATIENT)
Dept: SLEEP MEDICINE | Facility: CLINIC | Age: 61
End: 2021-04-21
Payer: COMMERCIAL

## 2021-04-21 DIAGNOSIS — G47.33 OSA (OBSTRUCTIVE SLEEP APNEA): Primary | ICD-10-CM

## 2021-04-21 PROCEDURE — 99214 OFFICE O/P EST MOD 30 MIN: CPT | Mod: 95 | Performed by: INTERNAL MEDICINE

## 2021-04-22 ENCOUNTER — OFFICE VISIT (OUTPATIENT)
Dept: SURGERY | Facility: CLINIC | Age: 61
End: 2021-04-22
Payer: COMMERCIAL

## 2021-04-22 VITALS
HEIGHT: 68 IN | WEIGHT: 182.5 LBS | OXYGEN SATURATION: 99 % | BODY MASS INDEX: 27.66 KG/M2 | HEART RATE: 72 BPM | DIASTOLIC BLOOD PRESSURE: 81 MMHG | TEMPERATURE: 98.5 F | SYSTOLIC BLOOD PRESSURE: 127 MMHG

## 2021-04-22 DIAGNOSIS — K40.30 UNILATERAL INGUINAL HERNIA WITH OBSTRUCTION AND WITHOUT GANGRENE, RECURRENCE NOT SPECIFIED: Primary | ICD-10-CM

## 2021-04-22 PROCEDURE — 99024 POSTOP FOLLOW-UP VISIT: CPT | Performed by: SURGERY

## 2021-04-22 ASSESSMENT — MIFFLIN-ST. JEOR: SCORE: 1607.31

## 2021-04-22 ASSESSMENT — PAIN SCALES - GENERAL: PAINLEVEL: MILD PAIN (2)

## 2021-04-22 NOTE — PROGRESS NOTES
"Patient underwent prior laparoscopic right inguinal hernia surgery and this occurred 4 weeks ago.    Juan Francisco Booker overall has the following complaints: still swelling at right groin and into scrotum; has been using heat pack to right groin.    On exam:  /81 (BP Location: Left arm, Patient Position: Sitting, Cuff Size: Adult Regular)   Pulse 72   Temp 98.5  F (36.9  C) (Oral)   Ht 1.727 m (5' 8\")   Wt 82.8 kg (182 lb 8 oz)   SpO2 99%   BMI 27.75 kg/m    Lung exam: breathing unlabored  Abdominal exam: soft; nontender; nondistended; incisions c/d/i    Very swollen inguinal region and scrotum      Plan:  Ice pack to right groin and under scrotum.    No orders of the defined types were placed in this encounter.          Laith Villa MD  Surgery  [phone] (hospital )  [phone] (clinic nurses)                "

## 2021-04-22 NOTE — NURSING NOTE
Verid message created for April 2022 to remind patient to schedule annual follow up.  Also attached ESS and JOAQUÍN to message.  Order for supplies to [facility].

## 2021-04-22 NOTE — LETTER
"4/22/2021       RE: Juan Francisco Booker  [address]  [place] [address]     Dear Colleague,    Thank you for referring your patient, Juan Francisco Booker, to the [facility] GENERAL SURGERY CLINIC [place] at [facility]. Please see a copy of my visit note below.    Patient underwent prior laparoscopic right inguinal hernia surgery and this occurred 4 weeks ago.    Juan Francisco Booker overall has the following complaints: still swelling at right groin and into scrotum; has been using heat pack to right groin.    On exam:  /81 (BP Location: Left arm, Patient Position: Sitting, Cuff Size: Adult Regular)   Pulse 72   Temp 98.5  F (36.9  C) (Oral)   Ht 1.727 m (5' 8\")   Wt 82.8 kg (182 lb 8 oz)   SpO2 99%   BMI 27.75 kg/m    Lung exam: breathing unlabored  Abdominal exam: soft; nontender; nondistended; incisions c/d/i    Very swollen inguinal region and scrotum      Plan:  Ice pack to right groin and under scrotum.    No orders of the defined types were placed in this encounter.    Laith Villa MD  Surgery  [phone] (hospital )  [phone] (clinic nurses)        "

## 2021-04-22 NOTE — NURSING NOTE
"Chief Complaint   Patient presents with     RECHECK     Follow up for swelling.       Vitals:    04/22/21 1124   BP: 127/81   BP Location: Left arm   Patient Position: Sitting   Cuff Size: Adult Regular   Pulse: 72   Temp: 98.5  F (36.9  C)   TempSrc: Oral   SpO2: 99%   Weight: 82.8 kg (182 lb 8 oz)   Height: 1.727 m (5' 8\")       Body mass index is 27.75 kg/m .                            FABBY WATERS, EMT    "

## 2021-04-29 DIAGNOSIS — R35.0 URINARY FREQUENCY: ICD-10-CM

## 2021-04-30 RX ORDER — TAMSULOSIN HYDROCHLORIDE 0.4 MG/1
0.8 CAPSULE ORAL DAILY
Qty: 60 CAPSULE | Refills: 0 | Status: SHIPPED | OUTPATIENT
Start: 2021-04-30 | End: 2021-05-11

## 2021-05-03 ENCOUNTER — PRE VISIT (OUTPATIENT)
Dept: UROLOGY | Facility: CLINIC | Age: 61
End: 2021-05-03

## 2021-05-03 NOTE — TELEPHONE ENCOUNTER
Reason for visit: Follow up     Relevant information: Hx of urinary frequency    Records/imaging/labs/orders: in EPIC    Pt called: no    At Rooming: normal virtual

## 2021-05-09 ENCOUNTER — HEALTH MAINTENANCE LETTER (OUTPATIENT)
Age: 61
End: 2021-05-09

## 2021-05-11 ENCOUNTER — VIRTUAL VISIT (OUTPATIENT)
Dept: UROLOGY | Facility: CLINIC | Age: 61
End: 2021-05-11
Attending: PHYSICIAN ASSISTANT
Payer: COMMERCIAL

## 2021-05-11 DIAGNOSIS — N40.1 BENIGN PROSTATIC HYPERPLASIA WITH WEAK URINARY STREAM: Primary | ICD-10-CM

## 2021-05-11 DIAGNOSIS — R35.0 URINARY FREQUENCY: ICD-10-CM

## 2021-05-11 DIAGNOSIS — R39.12 BENIGN PROSTATIC HYPERPLASIA WITH WEAK URINARY STREAM: Primary | ICD-10-CM

## 2021-05-11 PROCEDURE — 99213 OFFICE O/P EST LOW 20 MIN: CPT | Mod: 95 | Performed by: PHYSICIAN ASSISTANT

## 2021-05-11 RX ORDER — DUTASTERIDE 0.5 MG/1
0.5 CAPSULE, LIQUID FILLED ORAL DAILY
Qty: 60 CAPSULE | Refills: 5 | Status: SHIPPED | OUTPATIENT
Start: 2021-05-11 | End: 2022-12-06

## 2021-05-11 RX ORDER — TAMSULOSIN HYDROCHLORIDE 0.4 MG/1
0.8 CAPSULE ORAL DAILY
Qty: 60 CAPSULE | Refills: 11 | Status: SHIPPED | OUTPATIENT
Start: 2021-05-11 | End: 2022-06-03

## 2021-05-11 ASSESSMENT — PATIENT HEALTH QUESTIONNAIRE - PHQ9: SUM OF ALL RESPONSES TO PHQ QUESTIONS 1-9: 1

## 2021-05-11 NOTE — PATIENT INSTRUCTIONS
PLAN:   - Continue tamsulosin 0.8mg daily - refilled today  - Start avodart 0.5mg daily --> may take 3-6 months to notice some benefit  - Next time you get labs, lets check 1) PSA and 2) urinalysis   - Return in 3-4 months to discuss symptoms.  Sooner as needed    LUZ MARIA [facility]

## 2021-05-11 NOTE — LETTER
5/11/2021       RE: Juan Francisco Booker  [address]  [place] [address]     Dear Colleague,    Thank you for referring your patient, Juan Francisco Booker, to the [facility]OLOGY [facility] [place] at [facility]. Please see a copy of my visit note below.    Tomi is a 61 year old who is being evaluated via a billable telephon visit.      TELEPHONE DURATION: 26 minutes (1:14 - 1:30 + 10 minutes documentation on DOS)        HPI: Mr. Juan Francisco Booker is a 61 year old year old male presenting today for evaluation of chief complaint(s): Follow Up (yearly follow up)    Today, he is unaccompanied on the telephone visit    Mr. Juan Francisco Booker has PMH significant for HTN, HLD, gerd, obesity, prediabetes (HGB A1C 6.1%), depression, TIMOTHY, BPH (on flomax 0.8mg daily - started 8/29/18).   - 12/5/18 - seen in Urology clinic with urinary frequency.  UA was normal, PVR was zero, CM was normal.  He was eating 2 boxes of Outshine frozen fruit bars per day and taking caffeine pills to help him stay awake.  A bladder diary revealed 8 ounces of water and 19 outshine bars per day.  He voided 7 times in 24 hours.  Void volumes ranged between 25-100cc. He was asked to cut down on popsicles, drink more water, start flomax.   - 1/21/20 - symptoms were stable,. AUA SS was 21 (4,4,1,5,2,2,3).  He had lost 70lbs and had cut down on fruit popsicles, replacing this with water.  Chief complaint was difficulty emptying.   - 2/11/20 - urodynamics:  POSTPROCEDURE DIAGNOSES:  -Large bladder capacity (859 mL) with heightened early sensations.  -Good bladder compliance without DO/DOI.  -No JOSEPH.  -Moderate detrusor contraction during voiding to max Pdet ~35 cm H2O, which he supplements with Valsalva effort. He first attempts to void from a seated position on the [employer] chair, but is then assisted to standing for a second attempt.  -Borderline slow flow rate (Qmax 12 mL/s) with initial voiding in the  "UDS suite, with an intermittent, fluctuating flow curve and incomplete bladder emptying ( mL). He then voids to completion into a private uroflow, with a final PVR of 0 mL.   -Increased EMG activity during voiding, which possibly correlates with Valsalva effort/movement, although could also suggest pelvic floor involvement.  -BOOI does not suggest bladder outlet obstruction.  -Fluoroscopy reveals a smooth bladder wall without diverticulae or VUR. The bladder neck was closed during filling and open during voiding.     2/18/20 - Taking Flomax 0.8mg daily. Had lost a total of 100lbs.  AUA SS today is 17 (4,3,0,4,2,2,2) \"mostly disappointed\". More difficulty emptying.  Urinary frequency 6-7 times per day.  At night x1. Recommended timed voiding.  Continuing to drink 64 ounces of water per day.  Possible visit with Dr. Cade.     Today he returns in routine followup.   URINARY symptoms - Is having urinary frequency.  May need to urinate at least every hour.  This is a problem if he goes for a walk or if he wanted to fly on a plane. Nocturia x 0 (around 6-7am) then has troubles getting back to sleep.  Rare urgency.  No urge incontinence.On a positive note, he feels he empties his bladder, but he often needs to return to the toilet 10-15 minutes later to empty fully.   Has some caffeine first thing in the morning then none later in the day   No dysuria or UTIs.    Currently drinking 4-5 x 16 ounce glasses of water per day.    Bowels:  No constipation.  No diarrhea.    1/21/20 - PSA 1.25ug/L   Currently drinking 4-5 x 16 ounce glasses of water per day.    Bowels:  No constipation.  No diarrhea.    - 1/21/20 - PSA 1.25ug/L   - 3/8/21 - fasting glucose 88mg/dL  - Had herniorrhaphy appt 2 months ago.  Still has pain in that inguinal area but has an appt with his surgeon next week.        Current Outpatient Medications   Medication Sig Dispense Refill     aspirin 81 MG tablet Take 1 tablet by mouth daily AM       " Caffeine 100 MG TABS Take 2 tablets by mouth daily as needed       citalopram (CELEXA) 10 MG tablet Take 1 tablet (10 mg) by mouth daily 90 tablet 3     DENTA 5000 PLUS 1.1 % CREA APPLY A PEA SIZED AMOUNT TO TOOTHBRUSH, BRUSH ONTO ROOT AREAS THOROUGHLY, THEN SPIT OUT AT BEDTIME.  2     fluticasone (FLONASE) 50 MCG/ACT nasal spray Spray 1-2 sprays into both nostrils daily 48 g 1     FLUZONE QUADRIVALENT 0.5 ML injection TO BE ADMINISTERED BY PHARMACIST FOR IMMUNIZATION  0     gabapentin (NEURONTIN) 300 MG capsule 1 in morning, 1 at midday, 3 at night 450 capsule 3     lisinopril (ZESTRIL) 20 MG tablet Take 1 tablet (20 mg) by mouth daily 90 tablet 3     Multiple Vitamin (DAILY MULTIVITAMIN PO) Take 1 tablet by mouth daily AM       ORDER FOR DME Use your CPAP device as directed by your provider.       simvastatin (ZOCOR) 40 MG tablet Take 1 tablet (40 mg) by mouth At Bedtime 90 tablet 3     tamsulosin (FLOMAX) 0.4 MG capsule Take 2 capsules (0.8 mg) by mouth daily 30 min after a meal.   Please keep visit for 5/11/2021 for further refills. Thank you 60 capsule 0       ALLERGIES: Grass      REVIEW OF SYSTEMS:  As above in HPI    GENERAL PHYSICAL EXAM:   Vitals: There were no vitals taken for this visit.  There is no height or weight on file to calculate BMI.    NEURO: Alert and oriented x 3.  PSYCH: Normal mood and affect, pleasant and agreeable during interview    RADIOLOGY: The following tests were reviewed: none recent relevant    LABS: The last test results for Mr. Juan Francisco Booker were reviewed:  PSA -   Lab Results   Component Value Date    PSA 1.25 01/21/2020    PSA 1.58 12/19/2018    PSA 1.26 11/27/2018     BMP -   Recent Labs   Lab Test 03/24/21  1015 03/08/21  1430 01/13/20  1321 10/24/18  1354   NA  --  143 140.0 137.4   POTASSIUM 3.9 4.0 4.0 4.1   CHLORIDE  --  108 102.0 100.9   CO2  --  30 31.0 27.7   BUN  --  19 14.2 18.2   CR  --  0.78 0.9 0.9   GLC  --  88 78.8 85.0  106.8*   ZHEN  --  8.3* 9.7 9.3        CBC -   Recent Labs   Lab Test 03/08/21  1430 02/24/20  1521 10/24/18  1354   WBC 4.1  --   --    HGB 13.1* 13.7 14.1     --   --        ASSESSMENT:   1) BPH with luts - frequency    PLAN:   - Would normally start an OAB medication but his bladder contractility is poor and I am afraid I'll cause urinary retention.   - COntinue tamsulosin 0.8mg daily - refilled today  - Start avodart 0.5mg daily --> may take 3-6 months to notice some benefit  - Next time you get labs, lets check 1) PSA and 2) urinalysis   - Return in 3-4 months to discuss symptoms.  Sooner as needed    TELEPHONE DURATION: 26 minutes (1:14 - 1:30 + 10 minutes documentation on DOS)    Deena Matta PA-C  Department of Urologic Surgery

## 2021-05-11 NOTE — PROGRESS NOTES
Tomi is a 61 year old who is being evaluated via a billable telephon visit.      TELEPHONE DURATION: 26 minutes (1:14 - 1:30 + 10 minutes documentation on DOS)

## 2021-05-11 NOTE — PROGRESS NOTES
HPI: Mr. Juan Francisco Booker is a 61 year old year old male presenting today for evaluation of chief complaint(s): Follow Up (yearly follow up)    Today, he is unaccompanied on the telephone visit    Mr. Juan Francisco Booker has PMH significant for HTN, HLD, gerd, obesity, prediabetes (HGB A1C 6.1%), depression, TIMOTHY, BPH (on flomax 0.8mg daily - started 8/29/18).   - 12/5/18 - seen in Urology clinic with urinary frequency.  UA was normal, PVR was zero, CM was normal.  He was eating 2 boxes of Outshine frozen fruit bars per day and taking caffeine pills to help him stay awake.  A bladder diary revealed 8 ounces of water and 19 outshine bars per day.  He voided 7 times in 24 hours.  Void volumes ranged between 25-100cc. He was asked to cut down on popsicles, drink more water, start flomax.   - 1/21/20 - symptoms were stable,. AUA SS was 21 (4,4,1,5,2,2,3).  He had lost 70lbs and had cut down on fruit popsicles, replacing this with water.  Chief complaint was difficulty emptying.   - 2/11/20 - urodynamics:  POSTPROCEDURE DIAGNOSES:  -Large bladder capacity (859 mL) with heightened early sensations.  -Good bladder compliance without DO/DOI.  -No JOSEPH.  -Moderate detrusor contraction during voiding to max Pdet ~35 cm H2O, which he supplements with Valsalva effort. He first attempts to void from a seated position on the [employer] chair, but is then assisted to standing for a second attempt.  -Borderline slow flow rate (Qmax 12 mL/s) with initial voiding in the UDS suite, with an intermittent, fluctuating flow curve and incomplete bladder emptying ( mL). He then voids to completion into a private uroflow, with a final PVR of 0 mL.   -Increased EMG activity during voiding, which possibly correlates with Valsalva effort/movement, although could also suggest pelvic floor involvement.  -BOOI does not suggest bladder outlet obstruction.  -Fluoroscopy reveals a smooth bladder wall without diverticulae or VUR. The bladder neck was  "closed during filling and open during voiding.     2/18/20 - Taking Flomax 0.8mg daily. Had lost a total of 100lbs.  AUA SS today is 17 (4,3,0,4,2,2,2) \"mostly disappointed\". More difficulty emptying.  Urinary frequency 6-7 times per day.  At night x1. Recommended timed voiding.  Continuing to drink 64 ounces of water per day.  Possible visit with Dr. Cade.     Today he returns in routine followup.   URINARY symptoms - Is having urinary frequency.  May need to urinate at least every hour.  This is a problem if he goes for a walk or if he wanted to fly on a plane. Nocturia x 0 (around 6-7am) then has troubles getting back to sleep.  Rare urgency.  No urge incontinence.On a positive note, he feels he empties his bladder, but he often needs to return to the toilet 10-15 minutes later to empty fully.   Has some caffeine first thing in the morning then none later in the day   No dysuria or UTIs.    Currently drinking 4-5 x 16 ounce glasses of water per day.    Bowels:  No constipation.  No diarrhea.    1/21/20 - PSA 1.25ug/L   Currently drinking 4-5 x 16 ounce glasses of water per day.    Bowels:  No constipation.  No diarrhea.    - 1/21/20 - PSA 1.25ug/L   - 3/8/21 - fasting glucose 88mg/dL  - Had herniorrhaphy appt 2 months ago.  Still has pain in that inguinal area but has an appt with his surgeon next week.        Current Outpatient Medications   Medication Sig Dispense Refill     aspirin 81 MG tablet Take 1 tablet by mouth daily AM       Caffeine 100 MG TABS Take 2 tablets by mouth daily as needed       citalopram (CELEXA) 10 MG tablet Take 1 tablet (10 mg) by mouth daily 90 tablet 3     DENTA 5000 PLUS 1.1 % CREA APPLY A PEA SIZED AMOUNT TO TOOTHBRUSH, BRUSH ONTO ROOT AREAS THOROUGHLY, THEN SPIT OUT AT BEDTIME.  2     fluticasone (FLONASE) 50 MCG/ACT nasal spray Spray 1-2 sprays into both nostrils daily 48 g 1     FLUZONE QUADRIVALENT 0.5 ML injection TO BE ADMINISTERED BY PHARMACIST FOR IMMUNIZATION  0     " gabapentin (NEURONTIN) 300 MG capsule 1 in morning, 1 at midday, 3 at night 450 capsule 3     lisinopril (ZESTRIL) 20 MG tablet Take 1 tablet (20 mg) by mouth daily 90 tablet 3     Multiple Vitamin (DAILY MULTIVITAMIN PO) Take 1 tablet by mouth daily AM       ORDER FOR DME Use your CPAP device as directed by your provider.       simvastatin (ZOCOR) 40 MG tablet Take 1 tablet (40 mg) by mouth At Bedtime 90 tablet 3     tamsulosin (FLOMAX) 0.4 MG capsule Take 2 capsules (0.8 mg) by mouth daily 30 min after a meal.   Please keep visit for 5/11/2021 for further refills. Thank you 60 capsule 0       ALLERGIES: Grass      REVIEW OF SYSTEMS:  As above in HPI    GENERAL PHYSICAL EXAM:   Vitals: There were no vitals taken for this visit.  There is no height or weight on file to calculate BMI.    NEURO: Alert and oriented x 3.  PSYCH: Normal mood and affect, pleasant and agreeable during interview    RADIOLOGY: The following tests were reviewed: none recent relevant    LABS: The last test results for Mr. Juan Francisco Booker were reviewed:  PSA -   Lab Results   Component Value Date    PSA 1.25 01/21/2020    PSA 1.58 12/19/2018    PSA 1.26 11/27/2018     BMP -   Recent Labs   Lab Test 03/24/21  1015 03/08/21  1430 01/13/20  1321 10/24/18  1354   NA  --  143 140.0 137.4   POTASSIUM 3.9 4.0 4.0 4.1   CHLORIDE  --  108 102.0 100.9   CO2  --  30 31.0 27.7   BUN  --  19 14.2 18.2   CR  --  0.78 0.9 0.9   GLC  --  88 78.8 85.0  106.8*   ZHEN  --  8.3* 9.7 9.3       CBC -   Recent Labs   Lab Test 03/08/21  1430 02/24/20  1521 10/24/18  1354   WBC 4.1  --   --    HGB 13.1* 13.7 14.1     --   --        ASSESSMENT:   1) BPH with luts - frequency    PLAN:   - Would normally start an OAB medication but his bladder contractility is poor and I am afraid I'll cause urinary retention.   - COntinue tamsulosin 0.8mg daily - refilled today  - Start avodart 0.5mg daily --> may take 3-6 months to notice some benefit  - Next time you get labs,  lets check 1) PSA and 2) urinalysis   - Return in 3-4 months to discuss symptoms.  Sooner as needed    TELEPHONE DURATION: 26 minutes (1:14 - 1:30 + 10 minutes documentation on DOS)    Deena Matta PA-C  Department of Urologic Surgery

## 2021-05-17 ASSESSMENT — ENCOUNTER SYMPTOMS
WEIGHT GAIN: 0
DIFFICULTY URINATING: 0
INCREASED ENERGY: 0
WEIGHT LOSS: 0
FLANK PAIN: 0
DECREASED APPETITE: 0
FATIGUE: 1
DYSURIA: 0
POLYDIPSIA: 0
ALTERED TEMPERATURE REGULATION: 0
HALLUCINATIONS: 0
HEMATURIA: 0
FEVER: 0
NIGHT SWEATS: 0
POLYPHAGIA: 0
CHILLS: 0

## 2021-05-20 ENCOUNTER — OFFICE VISIT (OUTPATIENT)
Dept: SURGERY | Facility: CLINIC | Age: 61
End: 2021-05-20
Payer: COMMERCIAL

## 2021-05-20 VITALS
DIASTOLIC BLOOD PRESSURE: 61 MMHG | HEART RATE: 74 BPM | WEIGHT: 180.8 LBS | BODY MASS INDEX: 27.4 KG/M2 | OXYGEN SATURATION: 98 % | SYSTOLIC BLOOD PRESSURE: 136 MMHG | HEIGHT: 68 IN

## 2021-05-20 DIAGNOSIS — R10.31 GROIN PAIN, RIGHT: Primary | ICD-10-CM

## 2021-05-20 PROCEDURE — 99024 POSTOP FOLLOW-UP VISIT: CPT | Performed by: SURGERY

## 2021-05-20 ASSESSMENT — PAIN SCALES - GENERAL: PAINLEVEL: NO PAIN (0)

## 2021-05-20 ASSESSMENT — MIFFLIN-ST. JEOR: SCORE: 1599.6

## 2021-05-20 NOTE — LETTER
"5/20/2021       RE: Juan Francisco Booker  [address]  [place] [address]     Dear Colleague,    Thank you for referring your patient, Juan Francisco Booker, to the [facility] SURGERY [facility] [place] at [facility]. Please see a copy of my visit note below.    Patient underwent prior Laparoscopic inguinal hernia 6 weeks ago.    Juan Francisco Booker overall has the following complaints: persistent lump    On exam:  /61 (BP Location: Left arm, Patient Position: Chair, Cuff Size: Adult Regular)   Pulse 74   Ht 1.727 m (5' 8\")   Wt 82 kg (180 lb 12.8 oz)   SpO2 98%   BMI 27.49 kg/m    Lung exam: breathing unlabored  Abdominal exam: soft; nontender; nondistended; incisions c/d/i    Plan:    Orders Placed This Encounter   Procedures     US Testicular & Scrotum w Doppler Ltd           Laith Villa MD  Surgery  [phone] (hospital )  [phone] (clinic nurses)                Answers for HPI/ROS submitted by the patient on 5/17/2021   General Symptoms: Yes  Skin Symptoms: No  HENT Symptoms: No  EYE SYMPTOMS: No  HEART SYMPTOMS: No  LUNG SYMPTOMS: No  INTESTINAL SYMPTOMS: No  URINARY SYMPTOMS: Yes  REPRODUCTIVE SYMPTOMS: No  SKELETAL SYMPTOMS: No  BLOOD SYMPTOMS: No  NERVOUS SYSTEM SYMPTOMS: No  MENTAL HEALTH SYMPTOMS: No  Fever: No  Loss of appetite: No  Weight loss: No  Weight gain: No  Fatigue: Yes  Night sweats: No  Chills: No  Increased stress: No  Excessive hunger: No  Excessive thirst: No  Feeling hot or cold when others believe the temperature is normal: No  Loss of height: No  Post-operative complications: Yes  Surgical site pain: Yes  Hallucinations: No  Change in or Loss of Energy: No  Hyperactivity: No  Confusion: No  Trouble holding urine or incontinence: No  Pain or burning: No  Trouble starting or stopping: No  Increased frequency of urination: Yes  Blood in urine: No  Decreased frequency of urination: No  Frequent nighttime " urination: No  Flank pain: No  Difficulty emptying bladder: No        Again, thank you for allowing me to participate in the care of your patient.      Sincerely,    Laith Villa MD

## 2021-05-20 NOTE — NURSING NOTE
"Chief Complaint   Patient presents with     Follow Up     1 Month Follow-up Unilateral Inguinal Hernia Surgery       Vitals:    05/20/21 1104   BP: 136/61   BP Location: Left arm   Patient Position: Chair   Cuff Size: Adult Regular   Pulse: 74   SpO2: 98%   Weight: 82 kg (180 lb 12.8 oz)   Height: 1.727 m (5' 8\")       Body mass index is 27.49 kg/m .                            "

## 2021-05-21 ENCOUNTER — HOSPITAL ENCOUNTER (OUTPATIENT)
Dept: ULTRASOUND IMAGING | Facility: CLINIC | Age: 61
Discharge: HOME | End: 2021-05-21
Attending: SURGERY | Admitting: SURGERY
Payer: COMMERCIAL

## 2021-05-21 DIAGNOSIS — R10.31 GROIN PAIN, RIGHT: ICD-10-CM

## 2021-05-21 PROCEDURE — 93976 VASCULAR STUDY: CPT | Mod: 26 | Performed by: RADIOLOGY

## 2021-05-21 PROCEDURE — 76870 US EXAM SCROTUM: CPT

## 2021-05-21 PROCEDURE — 76870 US EXAM SCROTUM: CPT | Mod: 26 | Performed by: RADIOLOGY

## 2021-05-27 DIAGNOSIS — N43.3 HYDROCELE: Primary | ICD-10-CM

## 2021-06-07 ENCOUNTER — PRE VISIT (OUTPATIENT)
Dept: UROLOGY | Facility: CLINIC | Age: 61
End: 2021-06-07

## 2021-06-07 NOTE — TELEPHONE ENCOUNTER
Reason for Visit: Consult    Diagnosis: Hydrocele    Orders/Procedures/Records: in system    Contact Patient: n/a    Rooming Requirements: normal      Jaja Carson LPN  06/07/21  2:20 PM

## 2021-06-11 ENCOUNTER — VIRTUAL VISIT (OUTPATIENT)
Dept: UROLOGY | Facility: CLINIC | Age: 61
End: 2021-06-11
Attending: SURGERY
Payer: COMMERCIAL

## 2021-06-11 DIAGNOSIS — N43.2 OTHER HYDROCELE: ICD-10-CM

## 2021-06-11 PROCEDURE — 99213 OFFICE O/P EST LOW 20 MIN: CPT | Mod: 95 | Performed by: NURSE PRACTITIONER

## 2021-06-11 ASSESSMENT — PATIENT HEALTH QUESTIONNAIRE - PHQ9: SUM OF ALL RESPONSES TO PHQ QUESTIONS 1-9: 0

## 2021-06-11 NOTE — PATIENT INSTRUCTIONS
UROLOGY CLINIC VISIT PATIENT INSTRUCTIONS    -Continue to monitor the scrotal swelling for the next 2 months.  -Let me know via [email or web address] if this swelling does not begin to improve.     If you have any issues, questions or concerns in the meantime, do not hesitate to contact us at [phone] or via [email or web address].     It was a pleasure meeting with you today.  Thank you for allowing me and my team the privilege of caring for you today.  YOU are the reason we are here, and I truly hope we provided you with the excellent service you deserve.  Please let us know if there is anything else we can do for you so that we can be sure you are leaving completely satisfied with your care experience.    Peri Santoro, CNP

## 2021-06-11 NOTE — PROGRESS NOTES
esha Krishna is a 61 year old who is being evaluated via a billable video visit.      How would you like to obtain your AVS? Kidzillionshart  If the video visit is dropped, the invitation should be resent by: Send to e-mail at: [email or web address]  Will anyone else be joining your video visit? No    Video Start Time: 2:59 PM  Video-Visit Details    Type of service:  Video Visit    Video End Time: 3:16 PM    Originating Location (pt. Location): Home    Distant Location (provider location):  [facility] UROLOG[facility] [place]     Platform used for Video Visit: Vineloop      Juan Francisco Booker complains of   Chief Complaint   Patient presents with     Consult     hydrocele         Assessment/Plan:  61 year old male, s/p right inguinal hernia repair on 3/23/21, who developed a right-sided hydrocele post-operatively, which has remained fairly stable in size now for the past few months. Not significantly bothered by this.   -Recommend utilizing well-supporting undergarments, compression shorts, etc.   -Continue to monitor hydrocele size for the next ~2 months.  -Patient will follow up with me via Kidzillionshart if his hydrocele does not improve, at which time he could see Dr. Blackwood to discuss management options moving forward, if bothersome.     Peri Santoro, CNP  Department of Urology      Subjective:   61 year old male with a history of BPH with LUTS (urgency and PVD), previously followed by Deena Matta in our clinic, who presents for a new complaint of hydrocele. Per chart review, he underwent right-sided inguinal hernia repair on 3/24/21. About one month later, he developed right-sided testicular/scrotal pain. Ultrasound obtained, showing a right hydrocele with diffuse scrotal thickening.     Today, he states that he has no pain in the scrotum itself on the right side, but continues to have some groin discomfort after surgery. Size of the hydrocele seemed to improve in the weeks following his surgery, but not has remained about the  several for the past few months.       Objective:     Exam:   Patient is a 61 year old male   General Appearance: Well groomed, hygenic  Respiratory: No cough, no respiratory distress or labored breathing  Musculoskeletal: Grossly normal with no gross deficits  Skin: No discoloration or apparent rashes  Neurologic: No tremors  Psychiatric: Alert and oriented  Further examination is deferred due to the nature of our visit.

## 2021-06-11 NOTE — LETTER
6/11/2021       RE: Juan Francisco Booker  [address]  [place] [address]     Dear Colleague,    Thank you for referring your patient, Juan Francisco Booker, to the [facility] UROLOGY CLINIC [place] at [facility]. Please see a copy of my visit note below.    esha Krishna is a 61 year old who is being evaluated via a billable video visit.      How would you like to obtain your AVS? MyChart  If the video visit is dropped, the invitation should be resent by: Send to e-mail at: [email or web address]  Will anyone else be joining your video visit? No    Video Start Time: 2:59 PM  Video-Visit Details    Type of service:  Video Visit    Video End Time: 3:16 PM    Originating Location (pt. Location): Home    Distant Location (provider location):  [facility] UROLOG[facility]     Platform used for Video Visit: Ethel      Juan Francisco Booker complains of   Chief Complaint   Patient presents with     Consult     hydrocele         Assessment/Plan:  61 year old male, s/p right inguinal hernia repair on 3/23/21, who developed a right-sided hydrocele post-operatively, which has remained fairly stable in size now for the past few months. Not significantly bothered by this.   -Recommend utilizing well-supporting undergarments, compression shorts, etc.   -Continue to monitor hydrocele size for the next ~2 months.  -Patient will follow up with me via MyChart if his hydrocele does not improve, at which time he could see Dr. Blackwood to discuss management options moving forward, if bothersome.     Peri Santoro, CNP  Department of Urology      Subjective:   61 year old male with a history of BPH with LUTS (urgency and PVD), previously followed by Deena Matta in our clinic, who presents for a new complaint of hydrocele. Per chart review, he underwent right-sided inguinal hernia repair on 3/24/21. About one month later, he developed right-sided testicular/scrotal pain. Ultrasound  obtained, showing a right hydrocele with diffuse scrotal thickening.     Today, he states that he has no pain in the scrotum itself on the right side, but continues to have some groin discomfort after surgery. Size of the hydrocele seemed to improve in the weeks following his surgery, but not has remained about the several for the past few months.       Objective:     Exam:   Patient is a 61 year old male   General Appearance: Well groomed, hygenic  Respiratory: No cough, no respiratory distress or labored breathing  Musculoskeletal: Grossly normal with no gross deficits  Skin: No discoloration or apparent rashes  Neurologic: No tremors  Psychiatric: Alert and oriented  Further examination is deferred due to the nature of our visit.

## 2021-06-21 NOTE — PROGRESS NOTES
"Patient underwent prior Laparoscopic inguinal hernia 6 weeks ago.    Juan Francisco Booker overall has the following complaints: persistent lump    On exam:  /61 (BP Location: Left arm, Patient Position: Chair, Cuff Size: Adult Regular)   Pulse 74   Ht 1.727 m (5' 8\")   Wt 82 kg (180 lb 12.8 oz)   SpO2 98%   BMI 27.49 kg/m    Lung exam: breathing unlabored  Abdominal exam: soft; nontender; nondistended; incisions c/d/i    Plan:    Orders Placed This Encounter   Procedures     US Testicular & Scrotum w Doppler Ltd           Laith Villa MD  Surgery  [phone] (hospital )  [phone] (clinic nurses)                Answers for HPI/ROS submitted by the patient on 5/17/2021   General Symptoms: Yes  Skin Symptoms: No  HENT Symptoms: No  EYE SYMPTOMS: No  HEART SYMPTOMS: No  LUNG SYMPTOMS: No  INTESTINAL SYMPTOMS: No  URINARY SYMPTOMS: Yes  REPRODUCTIVE SYMPTOMS: No  SKELETAL SYMPTOMS: No  BLOOD SYMPTOMS: No  NERVOUS SYSTEM SYMPTOMS: No  MENTAL HEALTH SYMPTOMS: No  Fever: No  Loss of appetite: No  Weight loss: No  Weight gain: No  Fatigue: Yes  Night sweats: No  Chills: No  Increased stress: No  Excessive hunger: No  Excessive thirst: No  Feeling hot or cold when others believe the temperature is normal: No  Loss of height: No  Post-operative complications: Yes  Surgical site pain: Yes  Hallucinations: No  Change in or Loss of Energy: No  Hyperactivity: No  Confusion: No  Trouble holding urine or incontinence: No  Pain or burning: No  Trouble starting or stopping: No  Increased frequency of urination: Yes  Blood in urine: No  Decreased frequency of urination: No  Frequent nighttime urination: No  Flank pain: No  Difficulty emptying bladder: No    "

## 2021-06-28 ENCOUNTER — PRE VISIT (OUTPATIENT)
Dept: UROLOGY | Facility: CLINIC | Age: 61
End: 2021-06-28

## 2021-06-28 NOTE — TELEPHONE ENCOUNTER
Reason for visit: Follow up     Relevant information: Hx of BPH with LUTS - frequency    Records/imaging/labs/orders: in Epic; PSA and UA ordered    Pt called: no    At Rooming: normal

## 2021-07-01 ENCOUNTER — TELEPHONE (OUTPATIENT)
Dept: FAMILY MEDICINE | Facility: CLINIC | Age: 61
End: 2021-07-01

## 2021-07-01 NOTE — TELEPHONE ENCOUNTER
[facility] phone call message- general phone call:    Reason for call: Returning patient call to schedule lab appointment. Left message on patient voicemail.    Action Desired: If/when patient calls back, please schedule lab appointment    OK to leave a message on voice mail? Yes    Advised patient response may take up to 2 business days: No    Primary language: English      needed? No    Call taken on July 1, 2021 at 2:42 PM by Anita Rodriguez    [facility] to [facility] TRIAGE

## 2021-07-02 DIAGNOSIS — I10 BENIGN ESSENTIAL HYPERTENSION: ICD-10-CM

## 2021-07-02 DIAGNOSIS — R35.0 URINARY FREQUENCY: ICD-10-CM

## 2021-07-02 DIAGNOSIS — R73.03 PREDIABETES: ICD-10-CM

## 2021-07-02 DIAGNOSIS — N40.1 BENIGN PROSTATIC HYPERPLASIA WITH WEAK URINARY STREAM: ICD-10-CM

## 2021-07-02 DIAGNOSIS — R39.12 BENIGN PROSTATIC HYPERPLASIA WITH WEAK URINARY STREAM: ICD-10-CM

## 2021-07-02 DIAGNOSIS — E78.5 HYPERLIPIDEMIA LDL GOAL <130: ICD-10-CM

## 2021-07-02 LAB
ALBUMIN UR-MCNC: NEGATIVE MG/DL
ANION GAP SERPL CALCULATED.3IONS-SCNC: 2 MMOL/L (ref 3–14)
APPEARANCE UR: CLEAR
BILIRUB UR QL STRIP: NEGATIVE
BUN SERPL-MCNC: 16 MG/DL (ref 7–30)
CALCIUM SERPL-MCNC: 9 MG/DL (ref 8.5–10.1)
CHLORIDE SERPL-SCNC: 108 MMOL/L (ref 94–109)
CHOLEST SERPL-MCNC: 124 MG/DL
CO2 SERPL-SCNC: 30 MMOL/L (ref 20–32)
COLOR UR AUTO: ABNORMAL
CREAT SERPL-MCNC: 0.83 MG/DL (ref 0.66–1.25)
GFR SERPL CREATININE-BSD FRML MDRD: >90 ML/MIN/{1.73_M2}
GLUCOSE SERPL-MCNC: 85 MG/DL (ref 70–99)
GLUCOSE UR STRIP-MCNC: NEGATIVE MG/DL
HBA1C MFR BLD: 5.4 % (ref 0–5.6)
HDLC SERPL-MCNC: 41 MG/DL
HGB UR QL STRIP: NEGATIVE
KETONES UR STRIP-MCNC: NEGATIVE MG/DL
LDLC SERPL CALC-MCNC: 51 MG/DL
LEUKOCYTE ESTERASE UR QL STRIP: NEGATIVE
MUCOUS THREADS #/AREA URNS LPF: PRESENT /LPF
NITRATE UR QL: NEGATIVE
NONHDLC SERPL-MCNC: 84 MG/DL
PH UR STRIP: 7.5 PH (ref 5–7)
POTASSIUM SERPL-SCNC: 4 MMOL/L (ref 3.4–5.3)
PSA SERPL-MCNC: 0.51 UG/L (ref 0–4)
RBC #/AREA URNS AUTO: 1 /HPF (ref 0–2)
SODIUM SERPL-SCNC: 140 MMOL/L (ref 133–144)
SOURCE: ABNORMAL
SP GR UR STRIP: 1.01 (ref 1–1.03)
SQUAMOUS #/AREA URNS AUTO: 0 /HPF (ref 0–1)
TRIGL SERPL-MCNC: 163 MG/DL
UROBILINOGEN UR STRIP-MCNC: NORMAL MG/DL (ref 0–2)
WBC #/AREA URNS AUTO: 1 /HPF (ref 0–5)

## 2021-07-02 PROCEDURE — 36415 COLL VENOUS BLD VENIPUNCTURE: CPT | Performed by: PHYSICIAN ASSISTANT

## 2021-07-02 PROCEDURE — 81001 URINALYSIS AUTO W/SCOPE: CPT | Performed by: PHYSICIAN ASSISTANT

## 2021-07-02 PROCEDURE — 83036 HEMOGLOBIN GLYCOSYLATED A1C: CPT | Performed by: PHYSICIAN ASSISTANT

## 2021-07-02 PROCEDURE — 80048 BASIC METABOLIC PNL TOTAL CA: CPT | Performed by: PHYSICIAN ASSISTANT

## 2021-07-02 PROCEDURE — 80061 LIPID PANEL: CPT | Performed by: PHYSICIAN ASSISTANT

## 2021-07-02 PROCEDURE — 84153 ASSAY OF PSA TOTAL: CPT | Performed by: PHYSICIAN ASSISTANT

## 2021-07-09 ENCOUNTER — MYC MEDICAL ADVICE (OUTPATIENT)
Dept: SLEEP MEDICINE | Facility: CLINIC | Age: 61
End: 2021-07-09

## 2021-07-13 ENCOUNTER — VIRTUAL VISIT (OUTPATIENT)
Dept: UROLOGY | Facility: CLINIC | Age: 61
End: 2021-07-13
Payer: COMMERCIAL

## 2021-07-13 VITALS — WEIGHT: 170 LBS | HEIGHT: 69 IN | BODY MASS INDEX: 25.18 KG/M2

## 2021-07-13 DIAGNOSIS — R35.0 URINARY FREQUENCY: Primary | ICD-10-CM

## 2021-07-13 PROCEDURE — 99213 OFFICE O/P EST LOW 20 MIN: CPT | Mod: 95 | Performed by: PHYSICIAN ASSISTANT

## 2021-07-13 ASSESSMENT — PATIENT HEALTH QUESTIONNAIRE - PHQ9: SUM OF ALL RESPONSES TO PHQ QUESTIONS 1-9: 1

## 2021-07-13 ASSESSMENT — PAIN SCALES - GENERAL: PAINLEVEL: NO PAIN (0)

## 2021-07-13 ASSESSMENT — MIFFLIN-ST. JEOR: SCORE: 1566.49

## 2021-07-13 NOTE — PROGRESS NOTES
Tomi is a 61 year old who is being evaluated via a billable video visit.      How would you like to obtain your AVS? [email or web address]  If the video visit is dropped, the invitation should be resent by: Send to e-mail at: [email or web address]  Will anyone else be joining your video visit? No    Video Start Time: 11:08  Video-Visit Details    Type of service:  Video Visit    Video End Time:11:23    Originating Location (pt. Location): Home    Distant Location (provider location):  [facility]     Platform used for Video Visit: CloudCover   VIDEO VISIT:  21 minutes ( 11:08 - 11:23 + 6 minutes documentation on DOS)

## 2021-07-13 NOTE — PATIENT INSTRUCTIONS
PLAN:   - Would normally start an OAB medication but his bladder contractility is poor and I am afraid I'll cause urinary retention.   - Continue tamsulosin 0.8mg daily - refilled today  - Start avodart 0.5mg daily --> may take 3-6 months to notice some benefit  - Keep a 24- 48 hour bladder diary   - Return next available for in-person visit to discuss bladder diary and do Uroflow / postvoid residual.  Please arrive for that appointment with a full bladder, if possible.     [facility]

## 2021-07-13 NOTE — LETTER
7/13/2021       RE: Juan Francisco Booker  [address]  [place] [address]     Dear Colleague,    Thank you for referring your patient, Juan Francisco Booker, to the [facility] [place] at [facility]. Please see a copy of my visit note below.    Tomi is a 61 year old who is being evaluated via a billable video visit.      How would you like to obtain your AVS? MyChart  If the video visit is dropped, the invitation should be resent by: Send to e-mail at: [email or web address]  Will anyone else be joining your video visit? No    Video Start Time: 11:08  Video-Visit Details    Type of service:  Video Visit    Video End Time:11:23    Originating Location (pt. Location): Home    Distant Location (provider location):  [facility] [place]     Platform used for Video Visit: [employer] TV   VIDEO VISIT:  21 minutes ( 11:08 - 11:23 + 6 minutes documentation on DOS)    HPI: Mr. Juan Francisco Booker is a 61 year old year old male presenting today for evaluation of chief complaint(s): Follow Up (BPH)    Today, he is unaccompanied on video    Mr. Juan Francisco Booker has PMH significant for HTN, HLD, gerd, obesity, prediabetes (HGB A1C 5.4%), depression, TIMOTHY, BPH (on flomax 0.8mg daily - started 8/29/18, on avodart - started 5/11/21).   - 12/5/18 - Seen with urinary frequency.  UA was normal, PVR was zero, CM was normal.  He was eating 2 boxes of Outshine frozen fruit bars per day and taking caffeine pills to help him stay awake.  A bladder diary revealed 8 ounces of water and 19 outshine bars per day.  He voided 7 times in 24 hours.  Void volumes ranged between 25-100cc. He was asked to cut down on popsicles, drink more water, started flomax.   - 1/21/20 - stable symptoms,AUA SS 21 (4,4,1,5,2,2,3).  He had lost 70lbs and had cut down on fruit popsicles, replacing this with water.  Chief complaint was difficulty emptying.   - 2/11/20 - urodynamics show  "large capacity (859 mL) with heightened early sensations, normal compliance, no DO/DOI. No JOSEPH. With voiding Pdetmax ~35 cm H2O with Valsalva supplementation. Qmax 12 mL/s with initial voiding in the UDS suite, with an intermittent, fluctuating flow curve and incomplete bladder emptying ( mL). He then voided to completion into a private uroflow, with a final PVR of 0 mL. Increased EMG activity during voiding, \"which possibly correlates with Valsalva effort/movement, although could also suggest pelvic floor involvement\".     -2/18/20 - Taking Flomax 0.8mg daily. Had lost a total of 100lbs.  AUA SS -17 (4,3,0,4,2,2,2) \"mostly disappointed\". More difficulty emptying.  Urinary frequency 6-7 times per day.  At night x1. Recommended timed voiding.  Continuing to drink 64 ounces of water per day.    - 5/11/21 - Hourly urinary frequency.  Nocturia x 0 (around 6-7am) then has troubles getting back to sleep.  Rare urgency.  No urge incontinence. With double voiding, he felt he was emptying.  Continue tamsulosin 0.8mg and added avodart.   - 6/11/21 - Seen by Peri Santoro on for a right-sided hydrocele that had developed following a RIH repair on 3/23/21.  Conservative mgmt strategies were recommended.     Today he returns in routine followup. No significant change in voiding symptoms.  The other day he voided 15 times in the daytime and 1-2 times at night.  Has been toilet mapping and trying to adapt. His voiding schedule is relatively consistent - he doesn't have good and bad days.    Has had some recent labs:  7/2/21 - urinalysis normal  7/2/21 - PSA 0.51ug/L  7/2/21 - HGB A1C 5.4%    - Takes 200mg caffeine in the morning \"to help him wake up.\"  Doesn't drink coffee or diet pops anymore. Still eats outshine bars - may eat two per day, once per week.  Also has a watermelon day.    - On a typical day, drinks noncaffeinated green-tea and water (total 7-8 x eight ounce glasses per day)   - Bowels:  Normal  - Hydrocele " "still present, perhaps a little smaller.  He feels comfortable continuing to monitor.      Current Outpatient Medications   Medication Sig Dispense Refill     aspirin 81 MG tablet Take 1 tablet by mouth daily AM       Caffeine 100 MG TABS Take 2 tablets by mouth daily as needed       citalopram (CELEXA) 10 MG tablet Take 1 tablet (10 mg) by mouth daily 90 tablet 3     DENTA 5000 PLUS 1.1 % CREA APPLY A PEA SIZED AMOUNT TO TOOTHBRUSH, BRUSH ONTO ROOT AREAS THOROUGHLY, THEN SPIT OUT AT BEDTIME.  2     dutasteride (AVODART) 0.5 MG capsule Take 1 capsule (0.5 mg) by mouth daily 60 capsule 5     fluticasone (FLONASE) 50 MCG/ACT nasal spray Spray 1-2 sprays into both nostrils daily 48 g 1     FLUZONE QUADRIVALENT 0.5 ML injection TO BE ADMINISTERED BY PHARMACIST FOR IMMUNIZATION  0     gabapentin (NEURONTIN) 300 MG capsule 1 in morning, 1 at midday, 3 at night 450 capsule 3     lisinopril (ZESTRIL) 20 MG tablet Take 1 tablet (20 mg) by mouth daily 90 tablet 3     Multiple Vitamin (DAILY MULTIVITAMIN PO) Take 1 tablet by mouth daily AM       simvastatin (ZOCOR) 40 MG tablet Take 1 tablet (40 mg) by mouth At Bedtime 90 tablet 3     tamsulosin (FLOMAX) 0.4 MG capsule Take 2 capsules (0.8 mg) by mouth daily 30 min after a meal.   Please keep visit for 5/11/2021 for further refills. Thank you 60 capsule 11     ORDER FOR DME Use your CPAP device as directed by your provider.         ALLERGIES: Grass      REVIEW OF SYSTEMS:  As above in HPI    GENERAL PHYSICAL EXAM:   Vitals: Ht 1.753 m (5' 9\")   Wt 77.1 kg (170 lb)   BMI 25.10 kg/m    Body mass index is 25.1 kg/m .    GENERAL: Well groomed, well developed, well nourished male in NAD.  NEURO: Alert and oriented x 3.  PSYCH: Normal mood and affect, pleasant and agreeable during interview     RADIOLOGY: The following tests were reviewed:   EXAMINATION: US TESTICULAR AND SCROTAL, 5/21/2021 10:35 AM      COMPARISON: None.     HISTORY: Had laparoscopic inguinal hernia repair on the " right,  evaluate scrotal contents     TECHNIQUE: The scrotum was scanned in standard fashion with  specialized ultrasound transducer(s) using both grey scale and limited  color Doppler techniques.     Findings:  The testes demonstrate normal and symmetric echotexture and  vascularity. No evidence of a focal lesion.  The right testicle  measures 4.6 x 2.8 x 3  cm and the left measures 6 x 3.8 x 3.1 cm.  There is no evidence of torsion.     There is a moderate right hydrocele measuring 7.5 cm. There is no  evidence of a significant varicocele.     Both the right and left epididymis are within normal limits.     There is scrotal thickening throughout. The right inguinal canal is  within normal limits.                                                                      Impression:   Right hydrocele with diffuse scrotal thickening, otherwise normal  testicular ultrasound.    LABS: The last test results for Mr. Juan Francisco Booker were reviewed:  PSA -   Lab Results   Component Value Date    PSA 0.51 07/02/2021    PSA 1.25 01/21/2020    PSA 1.58 12/19/2018    PSA 1.26 11/27/2018     BMP -   Recent Labs   Lab Test 07/02/21  1302 03/24/21  1015 03/08/21  1430 01/13/20  1321     --  143 140.0   POTASSIUM 4.0 3.9 4.0 4.0   CHLORIDE 108  --  108 102.0   CO2 30  --  30 31.0   BUN 16  --  19 14.2   CR 0.83  --  0.78 0.9   GLC 85  --  88 78.8   ZHEN 9.0  --  8.3* 9.7       CBC -   Recent Labs   Lab Test 03/08/21  1430 02/24/20  1521 10/24/18  1354   WBC 4.1  --   --    HGB 13.1* 13.7 14.1     --   --           ASSESSMENT:   1) BPH with luts - frequency  2) right hydrocele    PLAN:   - Would normally start an OAB medication but his bladder contractility is poor and I am afraid I'll cause urinary retention.   - Continue tamsulosin 0.8mg daily - refilled today  - Start avodart 0.5mg daily --> may take 3-6 months to notice some benefit  - Keep a 24- 48 hour bladder diary   - Return next available for in-person visit to discuss  bladder diary and do Uroflow / postvoid residual.  Please arrive for that appointment with a full bladder, if possible.   - Could consider cystoscopy with Dr. Cade.  Could consider myrbetriq if he empties fine.     VIDEO VISIT:  21 minutes ( 11:08 - 11:23 + 6 minutes documentation on DOS)    Deena Matta PA-C  Department of Urologic Surgery

## 2021-07-13 NOTE — PROGRESS NOTES
"HPI: Mr. Juan Francisco Booker is a 61 year old year old male presenting today for evaluation of chief complaint(s): Follow Up (BPH)    Today, he is unaccompanied on video    Mr. Juan Francisco Booker has PMH significant for HTN, HLD, gerd, obesity, prediabetes (HGB A1C 5.4%), depression, TIMOTHY, BPH (on flomax 0.8mg daily - started 8/29/18, on avodart - started 5/11/21).   - 12/5/18 - Seen with urinary frequency.  UA was normal, PVR was zero, CM was normal.  He was eating 2 boxes of Outshine frozen fruit bars per day and taking caffeine pills to help him stay awake.  A bladder diary revealed 8 ounces of water and 19 outshine bars per day.  He voided 7 times in 24 hours.  Void volumes ranged between 25-100cc. He was asked to cut down on popsicles, drink more water, started flomax.   - 1/21/20 - stable symptoms,AUA SS 21 (4,4,1,5,2,2,3).  He had lost 70lbs and had cut down on fruit popsicles, replacing this with water.  Chief complaint was difficulty emptying.   - 2/11/20 - urodynamics show large capacity (859 mL) with heightened early sensations, normal compliance, no DO/DOI. No JOSEPH. With voiding Pdetmax ~35 cm H2O with Valsalva supplementation. Qmax 12 mL/s with initial voiding in the UDS suite, with an intermittent, fluctuating flow curve and incomplete bladder emptying ( mL). He then voided to completion into a private uroflow, with a final PVR of 0 mL. Increased EMG activity during voiding, \"which possibly correlates with Valsalva effort/movement, although could also suggest pelvic floor involvement\".     -2/18/20 - Taking Flomax 0.8mg daily. Had lost a total of 100lbs.  AUA SS -17 (4,3,0,4,2,2,2) \"mostly disappointed\". More difficulty emptying.  Urinary frequency 6-7 times per day.  At night x1. Recommended timed voiding.  Continuing to drink 64 ounces of water per day.    - 5/11/21 - Hourly urinary frequency.  Nocturia x 0 (around 6-7am) then has troubles getting back to sleep.  Rare urgency.  No urge incontinence. With " "double voiding, he felt he was emptying.  Continue tamsulosin 0.8mg and added avodart.   - 6/11/21 - Seen by Peri Santoro on for a right-sided hydrocele that had developed following a RIH repair on 3/23/21.  Conservative mgmt strategies were recommended.     Today he returns in routine followup. No significant change in voiding symptoms.  The other day he voided 15 times in the daytime and 1-2 times at night.  Has been toilet mapping and trying to adapt. His voiding schedule is relatively consistent - he doesn't have good and bad days.    Has had some recent labs:  7/2/21 - urinalysis normal  7/2/21 - PSA 0.51ug/L  7/2/21 - HGB A1C 5.4%    - Takes 200mg caffeine in the morning \"to help him wake up.\"  Doesn't drink coffee or diet pops anymore. Still eats outshine bars - may eat two per day, once per week.  Also has a watermelon day.    - On a typical day, drinks noncaffeinated green-tea and water (total 7-8 x eight ounce glasses per day)   - Bowels:  Normal  - Hydrocele still present, perhaps a little smaller.  He feels comfortable continuing to monitor.      Current Outpatient Medications   Medication Sig Dispense Refill     aspirin 81 MG tablet Take 1 tablet by mouth daily AM       Caffeine 100 MG TABS Take 2 tablets by mouth daily as needed       citalopram (CELEXA) 10 MG tablet Take 1 tablet (10 mg) by mouth daily 90 tablet 3     DENTA 5000 PLUS 1.1 % CREA APPLY A PEA SIZED AMOUNT TO TOOTHBRUSH, BRUSH ONTO ROOT AREAS THOROUGHLY, THEN SPIT OUT AT BEDTIME.  2     dutasteride (AVODART) 0.5 MG capsule Take 1 capsule (0.5 mg) by mouth daily 60 capsule 5     fluticasone (FLONASE) 50 MCG/ACT nasal spray Spray 1-2 sprays into both nostrils daily 48 g 1     FLUZONE QUADRIVALENT 0.5 ML injection TO BE ADMINISTERED BY PHARMACIST FOR IMMUNIZATION  0     gabapentin (NEURONTIN) 300 MG capsule 1 in morning, 1 at midday, 3 at night 450 capsule 3     lisinopril (ZESTRIL) 20 MG tablet Take 1 tablet (20 mg) by mouth daily 90 " "tablet 3     Multiple Vitamin (DAILY MULTIVITAMIN PO) Take 1 tablet by mouth daily AM       simvastatin (ZOCOR) 40 MG tablet Take 1 tablet (40 mg) by mouth At Bedtime 90 tablet 3     tamsulosin (FLOMAX) 0.4 MG capsule Take 2 capsules (0.8 mg) by mouth daily 30 min after a meal.   Please keep visit for 5/11/2021 for further refills. Thank you 60 capsule 11     ORDER FOR DME Use your CPAP device as directed by your provider.         ALLERGIES: Grass      REVIEW OF SYSTEMS:  As above in HPI    GENERAL PHYSICAL EXAM:   Vitals: Ht 1.753 m (5' 9\")   Wt 77.1 kg (170 lb)   BMI 25.10 kg/m    Body mass index is 25.1 kg/m .    GENERAL: Well groomed, well developed, well nourished male in NAD.  NEURO: Alert and oriented x 3.  PSYCH: Normal mood and affect, pleasant and agreeable during interview     RADIOLOGY: The following tests were reviewed:   EXAMINATION: US TESTICULAR AND SCROTAL, 5/21/2021 10:35 AM      COMPARISON: None.     HISTORY: Had laparoscopic inguinal hernia repair on the right,  evaluate scrotal contents     TECHNIQUE: The scrotum was scanned in standard fashion with  specialized ultrasound transducer(s) using both grey scale and limited  color Doppler techniques.     Findings:  The testes demonstrate normal and symmetric echotexture and  vascularity. No evidence of a focal lesion.  The right testicle  measures 4.6 x 2.8 x 3  cm and the left measures 6 x 3.8 x 3.1 cm.  There is no evidence of torsion.     There is a moderate right hydrocele measuring 7.5 cm. There is no  evidence of a significant varicocele.     Both the right and left epididymis are within normal limits.     There is scrotal thickening throughout. The right inguinal canal is  within normal limits.                                                                      Impression:   Right hydrocele with diffuse scrotal thickening, otherwise normal  testicular ultrasound.    LABS: The last test results for Mr. Juan Francisco Booker were reviewed:  PSA - "   Lab Results   Component Value Date    PSA 0.51 07/02/2021    PSA 1.25 01/21/2020    PSA 1.58 12/19/2018    PSA 1.26 11/27/2018     BMP -   Recent Labs   Lab Test 07/02/21  1302 03/24/21  1015 03/08/21  1430 01/13/20  1321     --  143 140.0   POTASSIUM 4.0 3.9 4.0 4.0   CHLORIDE 108  --  108 102.0   CO2 30  --  30 31.0   BUN 16  --  19 14.2   CR 0.83  --  0.78 0.9   GLC 85  --  88 78.8   ZHEN 9.0  --  8.3* 9.7       CBC -   Recent Labs   Lab Test 03/08/21  1430 02/24/20  1521 10/24/18  1354   WBC 4.1  --   --    HGB 13.1* 13.7 14.1     --   --           ASSESSMENT:   1) BPH with luts - frequency  2) right hydrocele    PLAN:   - Would normally start an OAB medication but his bladder contractility is poor and I am afraid I'll cause urinary retention.   - Continue tamsulosin 0.8mg daily - refilled today  - Start avodart 0.5mg daily --> may take 3-6 months to notice some benefit  - Keep a 24- 48 hour bladder diary   - Return next available for in-person visit to discuss bladder diary and do Uroflow / postvoid residual.  Please arrive for that appointment with a full bladder, if possible.   - Could consider cystoscopy with Dr. Cade.  Could consider myrbetriq if he empties fine.     VIDEO VISIT:  21 minutes ( 11:08 - 11:23 + 6 minutes documentation on DOS)    Deena Matta PA-C  Department of Urologic Surgery

## 2021-07-14 ENCOUNTER — OFFICE VISIT (OUTPATIENT)
Dept: FAMILY MEDICINE | Facility: CLINIC | Age: 61
End: 2021-07-14
Payer: COMMERCIAL

## 2021-07-14 VITALS
DIASTOLIC BLOOD PRESSURE: 78 MMHG | RESPIRATION RATE: 16 BRPM | BODY MASS INDEX: 26.22 KG/M2 | HEART RATE: 66 BPM | HEIGHT: 69 IN | TEMPERATURE: 98.1 F | WEIGHT: 177 LBS | SYSTOLIC BLOOD PRESSURE: 120 MMHG | OXYGEN SATURATION: 95 %

## 2021-07-14 DIAGNOSIS — N50.89 TESTICULAR SWELLING, LEFT: ICD-10-CM

## 2021-07-14 DIAGNOSIS — M54.50 CHRONIC MIDLINE LOW BACK PAIN WITHOUT SCIATICA: ICD-10-CM

## 2021-07-14 DIAGNOSIS — R20.0 LOWER EXTREMITY NUMBNESS: ICD-10-CM

## 2021-07-14 DIAGNOSIS — Z00.00 ROUTINE GENERAL MEDICAL EXAMINATION AT A HEALTH CARE FACILITY: Primary | ICD-10-CM

## 2021-07-14 DIAGNOSIS — G89.29 CHRONIC MIDLINE LOW BACK PAIN WITHOUT SCIATICA: ICD-10-CM

## 2021-07-14 PROCEDURE — 90471 IMMUNIZATION ADMIN: CPT | Mod: 59 | Performed by: STUDENT IN AN ORGANIZED HEALTH CARE EDUCATION/TRAINING PROGRAM

## 2021-07-14 PROCEDURE — 99396 PREV VISIT EST AGE 40-64: CPT | Mod: GC | Performed by: STUDENT IN AN ORGANIZED HEALTH CARE EDUCATION/TRAINING PROGRAM

## 2021-07-14 PROCEDURE — 90732 PPSV23 VACC 2 YRS+ SUBQ/IM: CPT | Performed by: STUDENT IN AN ORGANIZED HEALTH CARE EDUCATION/TRAINING PROGRAM

## 2021-07-14 PROCEDURE — 99214 OFFICE O/P EST MOD 30 MIN: CPT | Mod: 25 | Performed by: STUDENT IN AN ORGANIZED HEALTH CARE EDUCATION/TRAINING PROGRAM

## 2021-07-14 SDOH — ECONOMIC STABILITY: TRANSPORTATION INSECURITY
IN THE PAST 12 MONTHS, HAS LACK OF TRANSPORTATION KEPT YOU FROM MEETINGS, WORK, OR FROM GETTING THINGS NEEDED FOR DAILY LIVING?: NO

## 2021-07-14 SDOH — HEALTH STABILITY: PHYSICAL HEALTH: ON AVERAGE, HOW MANY DAYS PER WEEK DO YOU ENGAGE IN MODERATE TO STRENUOUS EXERCISE (LIKE A BRISK WALK)?: 6 DAYS

## 2021-07-14 SDOH — ECONOMIC STABILITY: FOOD INSECURITY: WITHIN THE PAST 12 MONTHS, YOU WORRIED THAT YOUR FOOD WOULD RUN OUT BEFORE YOU GOT MONEY TO BUY MORE.: NEVER TRUE

## 2021-07-14 SDOH — ECONOMIC STABILITY: TRANSPORTATION INSECURITY
IN THE PAST 12 MONTHS, HAS THE LACK OF TRANSPORTATION KEPT YOU FROM MEDICAL APPOINTMENTS OR FROM GETTING MEDICATIONS?: NO

## 2021-07-14 SDOH — HEALTH STABILITY: PHYSICAL HEALTH: ON AVERAGE, HOW MANY MINUTES DO YOU ENGAGE IN EXERCISE AT THIS LEVEL?: 90 MIN

## 2021-07-14 SDOH — ECONOMIC STABILITY: FOOD INSECURITY: WITHIN THE PAST 12 MONTHS, THE FOOD YOU BOUGHT JUST DIDN'T LAST AND YOU DIDN'T HAVE MONEY TO GET MORE.: NEVER TRUE

## 2021-07-14 SDOH — ECONOMIC STABILITY: INCOME INSECURITY: IN THE LAST 12 MONTHS, WAS THERE A TIME WHEN YOU WERE NOT ABLE TO PAY THE MORTGAGE OR RENT ON TIME?: NO

## 2021-07-14 ASSESSMENT — SOCIAL DETERMINANTS OF HEALTH (SDOH)
WITHIN THE LAST YEAR, HAVE TO BEEN RAPED OR FORCED TO HAVE ANY KIND OF SEXUAL ACTIVITY BY YOUR PARTNER OR EX-PARTNER?: NO
WITHIN THE LAST YEAR, HAVE YOU BEEN KICKED, HIT, SLAPPED, OR OTHERWISE PHYSICALLY HURT BY YOUR PARTNER OR EX-PARTNER?: NO
WITHIN THE LAST YEAR, HAVE YOU BEEN AFRAID OF YOUR PARTNER OR EX-PARTNER?: NO
HOW HARD IS IT FOR YOU TO PAY FOR THE VERY BASICS LIKE FOOD, HOUSING, MEDICAL CARE, AND HEATING?: NOT HARD AT ALL
WITHIN THE LAST YEAR, HAVE YOU BEEN HUMILIATED OR EMOTIONALLY ABUSED IN OTHER WAYS BY YOUR PARTNER OR EX-PARTNER?: NO

## 2021-07-14 ASSESSMENT — MIFFLIN-ST. JEOR: SCORE: 1594.75

## 2021-07-14 NOTE — PROGRESS NOTES
Male Physical Note          HPI         Concerns today:     Chronic tingling on legs  Back Soreness   Has had for about 1 year, not getting worse, mild numbness, not getting any weakness, has had back pain in the past with PT helping. Back pain worse with standing for long periods, but now having issues with sitting, can't stay in recliner which is new. Same on both sides mainly back of legs and anterior shins. Mainly gets tingling with sitting for long periods of time. Getting up or laying down it gets better. Standing for long periods may provoke it. No hx of trauma. Trouble concentrating. Lives in a Condo (built from 1982). Back pain is lower back, aches, mainly in middle. No swollen joints or hip pain. Has lost weight over the last 3 years lost 140 lbs, intentional.     Headaches   Only gets with long periods of sitting, not with lying down. Can be a 3-4. Tylenol helps with it. No neck pain or stiffness. No light or noise sensitivity. Not waking up at night from a headache. No fevers. No lymphadenopathy.        Patient Active Problem List   Diagnosis     Obstructive sleep apnea     Insomnia     Excessive sleepiness     Chronic nasal congestion     Other and unspecified alcohol dependence, unspecified drinking behavior     Lesion of ulnar nerve     Hyperlipidemia     Essential hypertension     Obesity     Benign neoplasm of colon     Hyperlipidemia     Moderate major depression (H)     Gastric polyps     Impaired glucose tolerance test     Prediabetes     Skin tag     Unilateral inguinal hernia with obstruction and without gangrene, recurrence not specified       Past Medical History:   Diagnosis Date     Anxiety      Benign neoplasm of colon 3/2/2015     Depression      Depressive disorder 1/15/2000     Difficult intubation      ETOH abuse 3/15/2009    in remission     Gastro-oesophageal reflux disease 1/15/2010     Hyperlipidemia 1/1/2000     Hypoxemia      Morbid obesity (H)      Nasal polyps 1/1/2015      "TIMOTHY on CPAP 8/13/2012    Severe (uses 5-6 hours as able)     Other and unspecified hyperlipidemia 10/25/2012     Personal history of colonic polyps 2/207/15    Multiple \"neg for FAP\"     Pre-diabetes      Prediabetes 5/24/2017     Restless leg      Skin tag      Surgical complication 1/6/2015    difficulty inserting a tube down my throat     Unspecified essential hypertension 10/25/2012        Family History   Problem Relation Age of Onset     Other Cancer Mother      Cancer Mother      Other Cancer Brother      Cancer Brother      Cerebrovascular Disease Maternal Grandfather         ,, ,, ,, ,, ,, ,, ,, ,     Alcohol/Drug Brother      Cancer Brother         pancreatic; liver     Other Cancer Brother      Asthma No family hx of      Anesthesia Reaction No family hx of      Colon Polyps No family hx of      Crohn's Disease No family hx of      Ulcerative Colitis No family hx of      Colon Cancer No family hx of      Cardiovascular No family hx of      Deep Vein Thrombosis (DVT) No family hx of               Review of Systems:     Review of Systems:  CONSTITUTIONAL: NEGATIVE for fever, chills, change in weight  INTEGUMENTARY/SKIN: NEGATIVE for worrisome rashes, moles or lesions  EYES: NEGATIVE for vision changes or irritation  ENT/MOUTH: NEGATIVE for ear, mouth and throat problems  RESP: NEGATIVE for significant cough or SOB  CV: NEGATIVE for chest pain, palpitations or peripheral edema  GI: NEGATIVE for nausea, abdominal pain, heartburn, or change in bowel habits   male :negative for dysuria, hematuria, decreased urinary stream, erectile dysfunction, positive for testicular swelling  MUSCULOSKELETAL:NEGATIVE for significant arthralgias or myalgia and POSITIVE  for back pain   NEURO: POSITIVE for numbness or tingling and NEGATIVE for behavior changes, dizziness/lightheadedness and dysarthria  ENDOCRINE: NEGATIVE for temperature intolerance, skin/hair changes  HEME/ALLERGY: NEGATIVE for bleeding " problems  PSYCHIATRIC: NEGATIVE for changes in mood or affect  Sleep:   Uses CPAP           Social History     Social History     Socioeconomic History     Marital status: Single     Spouse name: Not on file     Number of children: Not on file     Years of education: Not on file     Highest education level: Not on file   Occupational History     Occupation: Retired   Tobacco Use     Smoking status: Never Smoker     Smokeless tobacco: Never Used   Substance and Sexual Activity     Alcohol use: Not Currently     Comment: In remission since 2009     Drug use: No     Sexual activity: Never   Other Topics Concern     Parent/sibling w/ CABG, MI or angioplasty before 65F 55M? Not Asked   Social History Narrative    Single.  Lives alone.  Retired.  Worked at [employer].    No children.    1 brother -  of liver cancer, etoh    Mother  of cancer ? Type    Father  - complications of MS     Social Determinants of Health     Financial Resource Strain: Low Risk      Difficulty of Paying Living Expenses: Not hard at all   Food Insecurity: No Food Insecurity     Worried About Running Out of Food in the Last Year: Never true     Ran Out of Food in the Last Year: Never true   Transportation Needs: No Transportation Needs     Lack of Transportation (Medical): No     Lack of Transportation (Non-Medical): No   Physical Activity: Sufficiently Active     Days of Exercise per Week: 6 days     Minutes of Exercise per Session: 90 min   Stress:      Feeling of Stress :    Social Connections:      Frequency of Communication with Friends and Family:      Frequency of Social Gatherings with Friends and Family:      Attends Zoroastrian Services:      Active Member of Clubs or Organizations:      Attends Club or Organization Meetings:      Marital Status:    Intimate Partner Violence: Not At Risk     Fear of Current or Ex-Partner: No     Emotionally Abused: No     Physically Abused: No     Sexually Abused: No       Marital  "Status:Single  Who lives in your household? Lives alone in [facility]    Has anyone hurt you physically, for example by pushing, hitting, slapping or kicking you or forcing you to have sex? Denies  Do you feel threatened or controlled by a partner, ex-partner or anyone in your life? Denies    Sexual Health     Sexual concerns: No   STI History: Neg      Recommended Screening     Cholesterol Level (>44 yo or at risk):  Date done 7/2/2021 and ASA use (>3% risk in 5 y):  Already taking  Colon CA Screening (>50-75 ):  Date done 1/6/2020  Result(s) Tubular adenoma, negative for high-grade dysplasia, due for repeat 2023         Physical Exam:     Vitals: /78   Pulse 66   Temp 98.1  F (36.7  C) (Oral)   Resp 16   Ht 1.747 m (5' 8.78\")   Wt 80.3 kg (177 lb)   SpO2 95%   BMI 26.31 kg/m    BMI= Body mass index is 26.31 kg/m .  GENERAL: healthy, alert and no distress  EYES: Eyes grossly normal to inspection, extraocular movements - intact, and PERRL  HENT: ear canals- normal; TMs- normal; Nose- normal; Mouth- no ulcers, no lesions  NECK: no tenderness, no adenopathy, no asymmetry, no masses, no stiffness; thyroid- normal to palpation  RESP: lungs clear to auscultation - no rales, no rhonchi, no wheezes  BREAST: no masses, no tenderness, no nipple discharge, no palpable axillary masses or adenopathy  CV: regular rates and rhythm, normal S1 S2, no S3 or S4 and no murmur, no click or rub -  ABDOMEN: soft, no tenderness, no  hepatosplenomegaly, no masses, normal bowel sounds  MS: extremities- no gross deformities noted, no edema  SKIN: no suspicious lesions, no rashes  NEURO: strength and tone- normal, sensory exam- grossly normal, mentation- intact, speech- normal, reflexes- symmetric  BACK: no CVA tenderness, no paralumbar tenderness  Comprehensive back pain exam:  No tenderness, Range of motion not limited by pain, Lower extremity strength functional and equal on both sides, Lower extremity reflexes within normal limits " bilaterally, Lower extremity sensation normal and equal on both sides and Straight leg raise negative bilaterally  - male: bilateral testicular swelling: R not translucent and L translucent, swelling not painful testicles- normal, no atrophy, no masses;  no inguinal hernias (reparied)  PSYCH: Alert and oriented times 3; speech- coherent , normal rate and volume; able to articulate logical thoughts, able to abstract reason, no tangential thoughts, no hallucinations or delusions, affect- normal  LYMPHATICS: ant. cervical- normal, post. cervical- normal, axillary- normal, supraclavicular- normal, inguinal- normal    Assessment and Plan      Juan Francisco was seen today for physical and musculoskeletal problem.    Diagnoses and all orders for this visit:    Routine general medical examination at a health care facility  - PPV 23 vaccination    Lower extremity numbness  Most likely due to foraminal stenosis given that tingling is positional. Per Neurology recommended repeat MRI at 3-6 months with any changes. Per patient things seem different and it has been 6 months so will get MRI lumbar.   -     MR Lumbar Spine w/o Contrast; Future  -     PHYSICAL THERAPY REFERRAL - INTERNAL; Future    Chronic midline low back pain without sciatica  -     MR Lumbar Spine w/o Contrast; Future  -     PHYSICAL THERAPY REFERRAL - INTERNAL; Future    Testicular swelling, left  Recent inguinal hernia repair so some swelling expected, however right side is not translucent so likely a varicocele, has follow up with general surgery.       1. Health Care Maintenance: Normal Physical Exam    PLAN:  1.Routine follow up in one year.    Options for treatment and follow-up care were reviewed with the patient. Juan Francisco Booker and/or guardian engaged in the decision making process and verbalized understanding of the options discussed and agreed with the final plan.    Ivon Red MD

## 2021-07-14 NOTE — PROGRESS NOTES
Preceptor Attestation:    I discussed the patient with the resident and evaluated the patient in person. I have verified the content of the note, which accurately reflects my assessment of the patient and the plan of care.   Supervising Physician:  Melissa Scanlon MD.

## 2021-07-14 NOTE — PATIENT INSTRUCTIONS
Patient Education   Here is the plan from today's visit    1. Routine general medical examination at a health care facility  Follow up in one year    2. Lower extremity numbness  Follow up with Neurology after MRI and myself  - MR Lumbar Spine w/o Contrast; Future  - PHYSICAL THERAPY REFERRAL - INTERNAL; Future    3. Chronic midline low back pain without sciatica  Follow up in 1 month  - MR Lumbar Spine w/o Contrast; Future  - PHYSICAL THERAPY REFERRAL - INTERNAL; Future    4. Testicular swelling, left  Follow up with general surgery as planned      Please call or return to clinic if your symptoms don't go away.    Follow up plan  No follow-ups on file.    Thank you for coming to [facility] today.  COVID-19 Vaccine:  If you are eligible for the COVID-19 vaccine, you can schedule via [employer] or call [place] Scheduling at [phone]. If you need assistance with scheduling, please speak to a Care Coordinator or your provider.   Lab Testing:  **If you had lab testing today and your results are reassuring or normal they will be mailed to you or sent through [employer] within 7 days.   **If the lab tests need quick action we will call you with the results.  **If you are having labs done on a different day, please call [phone] to schedule at [facility] or [phone] for other [facility] locations.   The phone number we will call with results is # [phone] (home) . If this is not the best number please call our clinic and change the number.  Medication Refills:  If you need any refills please call your pharmacy and they will contact us.   If you need to  your refill at a new pharmacy, please contact the new pharmacy directly. The new pharmacy will help you get your medications transferred faster.   Scheduling:  If you have any concerns about today's visit or wish to schedule another appointment please call our office during normal business hours [phone] (8-5:00 M-F)  If a  referral was made to a [facility] and you don't get a call from central scheduling please call [phone].  If a Mammogram was ordered for you at [facility] call [phone] to schedule or change your appointment.  If you had an EKG/XRay/CT/Ultrasound/MRI ordered the number is [phone] to schedule or change your radiology appointment.   Medical Concerns:  If you have urgent medical concerns please call [phone] at any time of the day.    Ivon Red MD       Preventive Health Recommendations  Male Ages 50 - 64    Yearly exam:             See your health care provider every year in order to  o   Review health changes.   o   Discuss preventive care.    o   Review your medicines if your doctor has prescribed any.     Have a cholesterol test every 5 years, or more frequently if you are at risk for high cholesterol/heart disease.     Have a diabetes test (fasting glucose) every three years. If you are at risk for diabetes, you should have this test more often.     Have a colonoscopy at age 50, or have a yearly FIT test (stool test). These exams will check for colon cancer.      Talk with your health care provider about whether or not a prostate cancer screening test (PSA) is right for you.    You should be tested each year for STDs (sexually transmitted diseases), if you re at risk.     Shots: Get a flu shot each year. Get a tetanus shot every 10 years.     Nutrition:    Eat at least 5 servings of fruits and vegetables daily.     Eat whole-grain bread, whole-wheat pasta and brown rice instead of white grains and rice.     Get adequate Calcium and Vitamin D.     Lifestyle    Exercise for at least 150 minutes a week (30 minutes a day, 5 days a week). This will help you control your weight and prevent disease.     Limit alcohol to one drink per day.     No smoking.     Wear sunscreen to prevent skin cancer.     See your dentist every six months for an exam and cleaning.      See your eye doctor every 1 to 2 years.

## 2021-08-07 ENCOUNTER — ANCILLARY PROCEDURE (OUTPATIENT)
Dept: MRI IMAGING | Facility: CLINIC | Age: 61
End: 2021-08-07
Attending: FAMILY MEDICINE
Payer: COMMERCIAL

## 2021-08-07 DIAGNOSIS — G89.29 CHRONIC MIDLINE LOW BACK PAIN WITHOUT SCIATICA: ICD-10-CM

## 2021-08-07 DIAGNOSIS — R20.0 LOWER EXTREMITY NUMBNESS: ICD-10-CM

## 2021-08-07 DIAGNOSIS — M54.50 CHRONIC MIDLINE LOW BACK PAIN WITHOUT SCIATICA: ICD-10-CM

## 2021-08-07 PROCEDURE — 72148 MRI LUMBAR SPINE W/O DYE: CPT | Performed by: RADIOLOGY

## 2021-08-09 ENCOUNTER — MYC MEDICAL ADVICE (OUTPATIENT)
Dept: FAMILY MEDICINE | Facility: CLINIC | Age: 61
End: 2021-08-09

## 2021-08-12 ENCOUNTER — HOSPITAL ENCOUNTER (OUTPATIENT)
Dept: PHYSICAL THERAPY | Facility: REHABILITATION | Age: 61
End: 2021-08-12
Payer: COMMERCIAL

## 2021-08-12 DIAGNOSIS — G89.29 CHRONIC BILATERAL LOW BACK PAIN WITH BILATERAL SCIATICA: Primary | ICD-10-CM

## 2021-08-12 DIAGNOSIS — M54.41 CHRONIC BILATERAL LOW BACK PAIN WITH BILATERAL SCIATICA: Primary | ICD-10-CM

## 2021-08-12 DIAGNOSIS — M54.42 CHRONIC BILATERAL LOW BACK PAIN WITH BILATERAL SCIATICA: Primary | ICD-10-CM

## 2021-08-12 DIAGNOSIS — R20.0 BILATERAL LEG NUMBNESS: ICD-10-CM

## 2021-08-12 PROCEDURE — 97161 PT EVAL LOW COMPLEX 20 MIN: CPT | Mod: GP | Performed by: PHYSICAL THERAPIST

## 2021-08-12 PROCEDURE — 97110 THERAPEUTIC EXERCISES: CPT | Mod: GP | Performed by: PHYSICAL THERAPIST

## 2021-08-12 NOTE — PROGRESS NOTES
"   08/12/21 1000   General Information   Type of Visit Initial OP Ortho PT Evaluation   Start of Care Date 08/12/21   Referring Physician Ivon Red MD   Patient/Family Goals Statement sit in chair for longer period of time   Orders Evaluate and Treat   Date of Order 07/14/21   Certification Required? No   Medical Diagnosis Chronic midline low back pain without sciatica, LE numbness   Surgical/Medical history reviewed Yes  Past Medical History:   Diagnosis Date     Anxiety      Benign neoplasm of colon 3/2/2015     Depression      Depressive disorder 1/15/2000     Difficult intubation      ETOH abuse 3/15/2009    in remission     Gastro-oesophageal reflux disease 1/15/2010     Hyperlipidemia 1/1/2000     Hypoxemia      Morbid obesity (H)      Nasal polyps 1/1/2015     TIMOTHY on CPAP 8/13/2012    Severe (uses 5-6 hours as able)     Other and unspecified hyperlipidemia 10/25/2012     Personal history of colonic polyps 2/207/15    Multiple \"neg for FAP\"     Pre-diabetes      Prediabetes 5/24/2017     Restless leg      Skin tag      Surgical complication 1/6/2015    difficulty inserting a tube down my throat     Unspecified essential hypertension 10/25/2012     Past Surgical History:   Procedure Laterality Date     AS COLONOSCOPY THRU STOMA W BIOPSY/CAUTERY TUMOR/POLYP/LESION  2/27/15    colon polyps     COLONOSCOPY  2/27/2015    x 2     COLONOSCOPY N/A 1/6/2020    Procedure: COLONOSCOPY, WITH POLYPECTOMY AND BIOPSY;  Surgeon: Omer Salas MD;  Location:  GI     COLONOSCOPY WITH CO2 INSUFFLATION N/A 10/20/2016    Procedure: COLONOSCOPY WITH CO2 INSUFFLATION;  Surgeon: Juan Francisco Beltran MD;  Location: [facility] OR     ENT SURGERY  1/5/2011    [facility]     ESOPHAGOSCOPY, GASTROSCOPY, DUODENOSCOPY (EGD), COMBINED N/A 10/20/2016    Procedure: COMBINED ESOPHAGOSCOPY, GASTROSCOPY, DUODENOSCOPY (EGD);  Surgeon: Juan Francisco Beltran MD;  Location: [facility] OR     LAPAROSCOPIC HERNIORRHAPHY INGUINAL Right 3/24/2021 "    Procedure: HERNIORRHAPHY, INGUINAL, LAPAROSCOPIC;  Surgeon: Laith Villa MD;  Location: [facility] OR     RELEASE CARPAL TUNNEL Right 6/14/2017    Procedure: RELEASE CARPAL TUNNEL;  Right Open Carpal Tunnel Release;  Surgeon: Aracelis Lowe MD;  Location: [facility] OR     RELEASE CARPAL TUNNEL Left 12/29/2017    Procedure: RELEASE CARPAL TUNNEL;  Left Open Carpal Tunnel Release;  Surgeon: Aracelis Lowe MD;  Location: [facility] OR     Patient Active Problem List   Diagnosis     Obstructive sleep apnea     Insomnia     Excessive sleepiness     Chronic nasal congestion     Other and unspecified alcohol dependence, unspecified drinking behavior     Lesion of ulnar nerve     Hyperlipidemia     Essential hypertension     Obesity     Benign neoplasm of colon     Hyperlipidemia     Moderate major depression (H)     Gastric polyps     Impaired glucose tolerance test     Prediabetes     Skin tag     Unilateral inguinal hernia with obstruction and without gangrene, recurrence not specified        Precautions/Limitations   (hemangiomas of spine)   Presentation and Etiology   Pertinent history of current problem (include personal factors and/or comorbidities that impact the POC) Patient presents to therapy today with complaints of (B) low back pain, (B) leg numbness. Leg numbness occurs in sitting, associated with headaches. Denies leg weakness. Pain level 2/10, best 0/10, worst 6/10. Date of onset/duration of symptoms is 3-4 years ago, better with PT, now worsening for the past 4 months. Onset was insidious, gradual. Symptoms are intermittent. Patient reports a chronic history of similar symptoms. Patient describes their previous level of function as not limited. Symptoms worsen with sitting, standing. Symptoms improve with PT. He has resumed his previous PT HEP after having hernia repair in 5/2021.  Functional limitations: sitting 10-15 min , standing 10-15 min.  Employment status: retired.   Impairments A. Pain;K. Numbness    Functional Limitations perform activities of daily living   Chronicity Recurrent   Current / Previous Interventions   Diagnostic Tests: MRI   MRI Results Results   MRI results Hemangiomas in lumbar spine, unchanged since 2020  MR LUMBAR SPINE W/O CONTRAST 8/7/2021 2:35 PM     Provided History: Low back pain, progressive neurologic deficit; Lower  extremity numbness; Chronic midline low back pain without sciatica;Chronic midline low back pain without sciatica     ICD-10: Lower extremity numbness; Chronic midline low back pain without sciatica; Chronic midline low back pain without sciatica     Comparison: Lumbar spine MRI from 10/22/2020.     Technique: Sagittal T1-weighted, sagittal STIR, 3D volumetric axial and sagittal reconstructed T2-weighted images of the lumbar spine were  obtained without intravenous contrast.      Findings: There are 5 lumbar-type vertebrae, counting down from the C2 vertebra.  The tip of the conus medullaris is at L1. Grade I retrolisthesis at L4-5. Multilevel mild disc height narrowing, most  pronounced at T12-L2. Multilevel disc degeneration.      T2 hypo-/isointense, T2 hyperintense area within the left L2 pedicle with associated edema, unchanged compared to 10/22/2020. T1 and T2 hyperintense lesion within the L2 to vertebral body, most likely  represents hemangioma. T1 and T2 mixed intensity lesion within the L4 and S2 vertebral body, may represent atypical hemangioma. Multiple unchanged T1 hypointensities within the lumbar spine. Anterior  wedge-shaped compression deformities of T12 and L4, unchanged compared to 10/22/2020.   Unchanged 1 cm right L4-5 possible facet joint synovial cyst.     On a level by level basis:  T11-12: Bilateral facet arthropathy. No significant spinal canal or neuroforamina stenosis.  T12-L1: No significant spinal canal or neuroforaminal stenosis.  L1-2: Posterior disc bulge and superimposed right foraminal small disc protrusion with resultant mild to  moderate right neural foraminal stenosis. Mild left neural foraminal stenosis. No significant spinal  canal stenosis.  L2-3: Bilateral mild facet adenopathy. No significant spinal canal or neuroforaminal stenosis.  L3-4: Posterior disc bulge. Bilateral facet arthropathy and ligamentum flavum thickening. Bilateral moderate neural foraminal stenosis.Moderate spinal canal stenosis.   L4-5: Posterior disc bulge and superimposed left foraminal disc protrusion. Bilateral moderate neural foraminal stenosis. Mild spinal canal stenosis.   L5-S1: Bilateral facet arthropathy. Mild to moderate neural foraminal stenosis. No significant spinal canal stenosis.     Impression:   1. Multiple unchanged presumed hemangiomas as described above.   2. Anterior wedge-shaped compression deformities of T12 and L4, unchanged compared to 10/22/2020.  3. Multilevel lumbar spondylosis, most pronounced at L3-4 with moderate spinal canal and moderate bilateral neural foraminal stenosis. No significant change in overall finding compared to10/22/2020.   Prior Level of Function   Prior Level of Function-Mobility not limited   Prior Level of Function-ADLs not limited   Current Level of Function   Patient role/employment history F. Retired   Living environment Apartment/condo   Fall Risk Screen   Fall screen completed by PT   Have you fallen 2 or more times in the past year? No   Have you fallen and had an injury in the past year? No   Is patient a fall risk? No   Abuse Screen (yes response referral indicated)   Feels Unsafe at Home or Work/School no   Feels Threatened by Someone no   Does Anyone Try to Keep You From Having Contact with Others or Doing Things Outside Your Home? no   System Outcome Measures   Outcome Measures Low Back Pain (see Oswestry and Link)  (26/100 TAMY)   Lumbar Spine/SI Objective Findings   Posture mod forward head posture,increased thoracic kyphosis, mild scoliosis, decreased lumbar lordosis, (R) crest and PSIS elevated in  standing   Flexion ROM 90%   Extension ROM 60-70% midline LBP   Right Side Bending ROM 80-90%   Left Side Bending ROM 80-90%   Hip Screen IR (L) 15/(R) 18, ER WNL (B)    Hip Flexion (L2) Strength 5/5 (B)   Knee Extension (L3) Strength 5/5 (B)   Ankle Dorsiflexion (L4) Strength 5/5 (B)   Great Toe Extension (L5) Strength 5/5 (B)   SLR 62 (L)/ 67 (R)   Segmental Mobility moderately decreased with pain in lumbar spine w/PA pressures   Patellar Tendon Reflexes  WNL (B)   Achilles Tendon Reflexes WNL (B)    Palpation Mild tenderness (L) post/lat femur,    Planned Therapy Interventions   Planned Therapy Interventions manual therapy;neuromuscular re-education;ROM;strengthening;stretching   Clinical Impression   Criteria for Skilled Therapeutic Interventions Met yes, treatment indicated   PT Diagnosis (B) low back pain, (B) sciatica   Influenced by the following impairments decreased trunk and hip ROM, decreased lumbar segmental mobility, mild tenderness   Functional limitations due to impairments sitting, standing   Clinical Presentation Stable/Uncomplicated   Clinical Decision Making (Complexity) Low complexity   Therapy Frequency other (see comments)  (1-2x/week)   Predicted Duration of Therapy Intervention (days/wks) up to 12 weeks/12 visits   Risk & Benefits of therapy have been explained Yes   Patient, Family & other staff in agreement with plan of care Yes   Ortho Goal 1   Goal Identifier HEP   Goal Description independent with home exercise program and self management of symptoms in 8 weeks   Ortho Goal 2   Goal Identifier sitting   Goal Description increase sitting tolerance to 30 min for eating and watching TV in 8 week   Ortho Goal 3   Goal Identifier standing   Goal Description able to stand for 30 min for meal prep and household tasks in 8 weeks   Total Evaluation Time   PT Eval, Low Complexity Minutes ([id number]) 35

## 2021-08-12 NOTE — PROGRESS NOTES
"   08/12/21 1000   Signing Clinician's Name / Credentials   Signing clinician's name / credentials Rachel Zuñiga, PT   Session Number   Session Number 1/12   Ortho Goal 1   Goal Identifier HEP   Goal Description independent with home exercise program and self management of symptoms in 8 weeks   Ortho Goal 2   Goal Identifier sitting   Goal Description increase sitting tolerance to 30 min for eating and watching TV in 8 week   Ortho Goal 3   Goal Identifier standing   Goal Description able to stand for 30 min for meal prep and household tasks in 8 weeks   Therapeutic Procedure/exercise   Therapeutic Procedures: strength, endurance, ROM, flexibillity minutes (66943) 23   Skilled Intervention ther ex   Treatment Detail supine LTR 5x10\", abd set/leg ext 10 x (B), bridging 10 x 5\", LIBIA 2-3 min   Education   Learner Patient   Readiness Eager   Method Booklet/handout;Demonstration   Response Demonstrates Understanding   Plan   Home program Previous HEP- abd set/arm/leg ext, bird dog, SLS, rows, pulldowns, SKC. Added LTR, modifed to abd set/leg ext, bridging, LIBIA   Plan for next session Continue with POC. Manual therapy, progression of HEP, patient education.    Comments   Comments Ref provider: Ivon Red MD   Total Session Time   Timed Code Treatment Minutes 23   Total Treatment Time (sum of timed and untimed services) 23   AMBULATORY CLINICS ONLY-MEDICAL AND TREATMENT DIAGNOSIS   Medical Diagnosis Chronic midline low back pain without sciatica, LE numbness   PT Diagnosis (B) low back pain, (B) sciatica     "

## 2021-08-27 ENCOUNTER — DOCUMENTATION ONLY (OUTPATIENT)
Dept: SLEEP MEDICINE | Facility: CLINIC | Age: 61
End: 2021-08-27
Payer: COMMERCIAL

## 2021-08-27 NOTE — PROGRESS NOTES
Patient called and stated he was sent a [employer] advanced. Helped patient set his news settings added serial number.

## 2021-08-30 ENCOUNTER — HOSPITAL ENCOUNTER (OUTPATIENT)
Dept: PHYSICAL THERAPY | Facility: REHABILITATION | Age: 61
End: 2021-08-30
Payer: COMMERCIAL

## 2021-08-30 DIAGNOSIS — G89.29 CHRONIC BILATERAL LOW BACK PAIN WITH BILATERAL SCIATICA: Primary | ICD-10-CM

## 2021-08-30 DIAGNOSIS — M54.42 CHRONIC BILATERAL LOW BACK PAIN WITH BILATERAL SCIATICA: Primary | ICD-10-CM

## 2021-08-30 DIAGNOSIS — M54.41 CHRONIC BILATERAL LOW BACK PAIN WITH BILATERAL SCIATICA: Primary | ICD-10-CM

## 2021-08-30 DIAGNOSIS — R20.0 BILATERAL LEG NUMBNESS: ICD-10-CM

## 2021-08-30 PROCEDURE — 97110 THERAPEUTIC EXERCISES: CPT | Mod: GP | Performed by: PHYSICAL THERAPIST

## 2021-08-30 PROCEDURE — 97140 MANUAL THERAPY 1/> REGIONS: CPT | Mod: GP | Performed by: PHYSICAL THERAPIST

## 2021-08-30 NOTE — ADDENDUM NOTE
Encounter addended by: Rachel Zuñiga, PT on: 8/30/2021 9:55 AM   Actions taken: Clinical Note Signed

## 2021-08-30 NOTE — PROGRESS NOTES
"   08/30/21 0800   Signing Clinician's Name / Credentials   Signing clinician's name / credentials Rachel Zuñiga, PT   Session Number   Session Number 2/12   Ortho Goal 1   Goal Identifier HEP   Goal Description independent with home exercise program and self management of symptoms in 8 weeks   Ortho Goal 2   Goal Identifier sitting   Goal Description increase sitting tolerance to 30 min for eating and watching TV in 8 week   Ortho Goal 3   Goal Identifier standing   Goal Description able to stand for 30 min for meal prep and household tasks in 8 weeks   Subjective Report   Subjective Report Low back, leg symptoms are about the same. Still primarily with sitting, standing, 10-15 min tolerance.    Objective Measure 1   Details cranial scan (+) for (L) lumbar ligaments.    Objective Measure 2   Details Mod fascial restrictions of lumbar paraspinals, LS junction and dural tube   Therapeutic Procedure/exercise   Therapeutic Procedures: strength, endurance, ROM, flexibillity minutes (56673) 10   Skilled Intervention ther ex   Treatment Detail LIBIA rev, SKC/DKC 5 x 10\" ea   Patient Response good   Manual Therapy   Skilled Intervention MFR, counterstrain   Treatment Detail (L) LFL3-4-MS, (R) LFL2-3-S, supine LS traction    Manual Therapy: Mobilization, MFR, MLD, friction massage minutes (37686) 15   Education   Learner Patient   Readiness Eager   Method Booklet/handout;Demonstration   Response Demonstrates Understanding   Plan   Home program Previous HEP- abd set/arm/leg ext, bird dog, SLS, rows, pulldowns, SKC. Added LTR, modifed to abd set/leg ext, bridging, LIBIA, SKC/DKC   Plan for next session Continue with POC. Manual therapy, progression of HEP, patient education.    Comments   Comments Ref provider: Ivon Red MD   Total Session Time   Timed Code Treatment Minutes 25   Total Treatment Time (sum of timed and untimed services) 25   AMBULATORY CLINICS ONLY-MEDICAL AND TREATMENT DIAGNOSIS   PT Diagnosis (B) low back " pain, (B) sciatica

## 2021-09-03 ENCOUNTER — HOSPITAL ENCOUNTER (OUTPATIENT)
Dept: PHYSICAL THERAPY | Facility: REHABILITATION | Age: 61
End: 2021-09-03
Payer: COMMERCIAL

## 2021-09-03 DIAGNOSIS — M54.41 CHRONIC BILATERAL LOW BACK PAIN WITH BILATERAL SCIATICA: Primary | ICD-10-CM

## 2021-09-03 DIAGNOSIS — M54.42 CHRONIC BILATERAL LOW BACK PAIN WITH BILATERAL SCIATICA: Primary | ICD-10-CM

## 2021-09-03 DIAGNOSIS — G89.29 CHRONIC BILATERAL LOW BACK PAIN WITH BILATERAL SCIATICA: Primary | ICD-10-CM

## 2021-09-03 DIAGNOSIS — R20.0 BILATERAL LEG NUMBNESS: ICD-10-CM

## 2021-09-03 PROCEDURE — 97140 MANUAL THERAPY 1/> REGIONS: CPT | Mod: GP | Performed by: PHYSICAL THERAPIST

## 2021-09-03 PROCEDURE — 97110 THERAPEUTIC EXERCISES: CPT | Mod: GP | Performed by: PHYSICAL THERAPIST

## 2021-09-03 NOTE — PROGRESS NOTES
"   09/03/21 1100   Signing Clinician's Name / Credentials   Signing clinician's name / credentials Rachel Zuñiga, PT   Session Number   Session Number 3/12   Ortho Goal 1   Goal Identifier HEP   Goal Description independent with home exercise program and self management of symptoms in 8 weeks   Goal Progress progressing   Ortho Goal 2   Goal Identifier sitting   Goal Description increase sitting tolerance to 30 min for eating and watching TV in 8 week   Goal Progress tolerance is 10-15 min   Ortho Goal 3   Goal Identifier standing   Goal Description able to stand for 30 min for meal prep and household tasks in 8 weeks   Goal Progress tolerance is 10-15 min   Subjective Report   Subjective Report Pain 1/10. Doing about the same. Sitting, standing are still main functional complaints. Pain in (B) calves and dorsal foot. That is the only time he has leg pain. LBP is sometimes sore after taking a 90 min walk.   Objective Measure 1   Details SLR (L) 60, (R) 63   Objective Measure 2   Details Mod fascial restrictions of lumbar paraspinals, LS junction, (R) SI jt   Therapeutic Procedure/exercise   Therapeutic Procedures: strength, endurance, ROM, flexibillity minutes (50505) 10   Skilled Intervention ther ex   Patient Response good   Treatment Detail supine HS stretch 5 x 10\", gastroc/soleus stretch   Manual Therapy   Manual Therapy: Mobilization, MFR, MLD, friction massage minutes (68026) 15   Skilled Intervention MFR, counterstrain   Treatment Detail prone LS traction, (R) SI joint/LPL5, (R) lumba paraspinals.    Patient Response good   Education   Learner Patient   Readiness Eager   Method Booklet/handout;Demonstration   Response Demonstrates Understanding   Plan   Home program Previous HEP- abd set/arm/leg ext, bird dog, SLS, rows, pulldowns, SKC. Added LTR, modifed to abd set/leg ext, bridging, LIBIA, SKC/DKC, supine HS stretch, gastroc/soleus stretch   Plan for next session Continue with POC. Manual therapy, progression " of HEP, patient education.    Comments   Comments Ref provider: Ivon Red MD   Total Session Time   Timed Code Treatment Minutes 25   Total Treatment Time (sum of timed and untimed services) 25   AMBULATORY CLINICS ONLY-MEDICAL AND TREATMENT DIAGNOSIS   PT Diagnosis (B) low back pain, (B) sciatica

## 2021-09-07 ENCOUNTER — PRE VISIT (OUTPATIENT)
Dept: UROLOGY | Facility: CLINIC | Age: 61
End: 2021-09-07

## 2021-09-07 NOTE — TELEPHONE ENCOUNTER
Reason for visit: follow up     Relevant information: urinary frequency    Records/imaging/labs/orders: in epic    Pt called: no    At Rooming: flow/PVR

## 2021-09-10 ENCOUNTER — HOSPITAL ENCOUNTER (OUTPATIENT)
Dept: PHYSICAL THERAPY | Facility: REHABILITATION | Age: 61
End: 2021-09-10
Payer: COMMERCIAL

## 2021-09-10 DIAGNOSIS — G89.29 CHRONIC BILATERAL LOW BACK PAIN WITH BILATERAL SCIATICA: Primary | ICD-10-CM

## 2021-09-10 DIAGNOSIS — M54.41 CHRONIC BILATERAL LOW BACK PAIN WITH BILATERAL SCIATICA: Primary | ICD-10-CM

## 2021-09-10 DIAGNOSIS — R20.0 BILATERAL LEG NUMBNESS: ICD-10-CM

## 2021-09-10 DIAGNOSIS — M54.42 CHRONIC BILATERAL LOW BACK PAIN WITH BILATERAL SCIATICA: Primary | ICD-10-CM

## 2021-09-10 PROCEDURE — 97140 MANUAL THERAPY 1/> REGIONS: CPT | Mod: GP | Performed by: PHYSICAL THERAPIST

## 2021-09-10 NOTE — PROGRESS NOTES
09/10/21 1000   Signing Clinician's Name / Credentials   Signing clinician's name / credentials Rachel Zuñiga, PT   Session Number   Session Number 4/12   Ortho Goal 1   Goal Identifier HEP   Goal Description independent with home exercise program and self management of symptoms in 8 weeks   Goal Progress progressing   Ortho Goal 2   Goal Identifier sitting   Goal Description increase sitting tolerance to 30 min for eating and watching TV in 8 week   Goal Progress tolerance is 10-15 min   Ortho Goal 3   Goal Identifier standing   Goal Description able to stand for 30 min for meal prep and household tasks in 8 weeks   Goal Progress tolerance is 10-15 min   Subjective Report   Subjective Report Doing a little better. Back is a little less painful. Hasn't had to do as much prolonged sitting this week. Leg pain is about the same.    Objective Measure 1   Details trunk ROM: flex 80%, ext 75% (+)   Objective Measure 2   Details fascial restrictions of LS junction.    Manual Therapy   Manual Therapy: Mobilization, MFR, MLD, friction massage minutes (63360) 25   Skilled Intervention MFR, counterstrain   Patient Response good   Treatment Detail supine LS traction, (B) sacral plexus,    Education   Learner Patient   Readiness Eager   Method Booklet/handout;Demonstration   Response Demonstrates Understanding   Plan   Home program Previous HEP- abd set/arm/leg ext, bird dog, SLS, rows, pulldowns, SKC. Added LTR, modifed to abd set/leg ext, bridging, LIBIA, SKC/DKC, supine HS stretch, gastroc/soleus stretch   Plan for next session Continue with POC. Manual therapy, progression of HEP, patient education.    Comments   Comments Ref provider: Ivon Red MD   Total Session Time   Timed Code Treatment Minutes 25   Total Treatment Time (sum of timed and untimed services) 25   AMBULATORY CLINICS ONLY-MEDICAL AND TREATMENT DIAGNOSIS   PT Diagnosis (B) low back pain, (B) sciatica

## 2021-09-13 ENCOUNTER — PRE VISIT (OUTPATIENT)
Dept: UROLOGY | Facility: CLINIC | Age: 61
End: 2021-09-13

## 2021-09-13 NOTE — TELEPHONE ENCOUNTER
Reason for visit: cystoscopy      Dx/Hx/Sx: BPH, urinary frequency, incomplete emptying Deena Matta patient     Records/imaging/labs/orders: in epic     At Rooming: have an extra luer lock syringe to collect urine during the cysto. No urojet, cipro

## 2021-09-14 ENCOUNTER — HOSPITAL ENCOUNTER (OUTPATIENT)
Dept: PHYSICAL THERAPY | Facility: REHABILITATION | Age: 61
End: 2021-09-14
Payer: COMMERCIAL

## 2021-09-14 DIAGNOSIS — M54.42 CHRONIC BILATERAL LOW BACK PAIN WITH BILATERAL SCIATICA: Primary | ICD-10-CM

## 2021-09-14 DIAGNOSIS — R20.0 BILATERAL LEG NUMBNESS: ICD-10-CM

## 2021-09-14 DIAGNOSIS — G89.29 CHRONIC BILATERAL LOW BACK PAIN WITH BILATERAL SCIATICA: Primary | ICD-10-CM

## 2021-09-14 DIAGNOSIS — M54.41 CHRONIC BILATERAL LOW BACK PAIN WITH BILATERAL SCIATICA: Primary | ICD-10-CM

## 2021-09-14 PROCEDURE — 97140 MANUAL THERAPY 1/> REGIONS: CPT | Mod: GP | Performed by: PHYSICAL THERAPIST

## 2021-09-14 NOTE — PROGRESS NOTES
09/14/21 1100   Signing Clinician's Name / Credentials   Signing clinician's name / credentials Rachel Zuñiga, PT   Session Number   Session Number 5/12   Ortho Goal 1   Goal Identifier HEP   Goal Description independent with home exercise program and self management of symptoms in 8 weeks   Goal Progress progressing   Ortho Goal 2   Goal Identifier sitting   Goal Description increase sitting tolerance to 30 min for eating and watching TV in 8 week   Goal Progress tolerance is 10-15 min   Ortho Goal 3   Goal Identifier standing   Goal Description able to stand for 30 min for meal prep and household tasks in 8 weeks   Goal Progress tolerance is 10-15 min   Subjective Report   Subjective Report Pain level 1/10. Not feeling too bad. sitting/standing tolerance is still about 10-15 min.    Objective Measure 1   Details initial cranial scan (+) for (R) lumbar ligaments.    Manual Therapy   Manual Therapy: Mobilization, MFR, MLD, friction massage minutes (59241) 30   Skilled Intervention MFR, counterstrain   Patient Response good   Treatment Detail (R) LFL3-5-MS, (R) PLLL5-MS, supine LS traction, (R) lumbar erector spinae release     Assessments Completed   Assessments Completed HEP/POC compliance is  good .Patient demonstrates understanding/independence with home program. Response to Intervention is fair. Sitting/standing tolerance remains limited. Patient is appropriate to continue with skilled physical therapy intervention, as indicated by initial plan of care.   Education   Learner Patient   Readiness Eager   Method Booklet/handout;Demonstration   Response Demonstrates Understanding   Plan   Home program Previous HEP- abd set/arm/leg ext, bird dog, SLS, rows, pulldowns, SKC. Added LTR, modifed to abd set/leg ext, bridging, LIBIA, SKC/DKC, supine HS stretch, gastroc/soleus stretch   Plan for next session Continue with POC. Manual therapy, progression of HEP, patient education.    Comments   Comments Ref provider: Teofilo  Ivon Christine MD   Total Session Time   Timed Code Treatment Minutes 30   Total Treatment Time (sum of timed and untimed services) 30   AMBULATORY CLINICS ONLY-MEDICAL AND TREATMENT DIAGNOSIS   PT Diagnosis (B) low back pain, (B) sciatica

## 2021-09-15 ENCOUNTER — OFFICE VISIT (OUTPATIENT)
Dept: UROLOGY | Facility: CLINIC | Age: 61
End: 2021-09-15
Payer: COMMERCIAL

## 2021-09-15 VITALS
WEIGHT: 177 LBS | HEIGHT: 69 IN | HEART RATE: 77 BPM | BODY MASS INDEX: 26.22 KG/M2 | DIASTOLIC BLOOD PRESSURE: 68 MMHG | SYSTOLIC BLOOD PRESSURE: 99 MMHG

## 2021-09-15 DIAGNOSIS — N40.1 BENIGN PROSTATIC HYPERPLASIA WITH WEAK URINARY STREAM: ICD-10-CM

## 2021-09-15 DIAGNOSIS — R39.12 BENIGN PROSTATIC HYPERPLASIA WITH WEAK URINARY STREAM: ICD-10-CM

## 2021-09-15 DIAGNOSIS — R35.0 URINARY FREQUENCY: Primary | ICD-10-CM

## 2021-09-15 PROCEDURE — 51798 US URINE CAPACITY MEASURE: CPT | Performed by: UROLOGY

## 2021-09-15 PROCEDURE — 51741 ELECTRO-UROFLOWMETRY FIRST: CPT | Performed by: UROLOGY

## 2021-09-15 PROCEDURE — 99214 OFFICE O/P EST MOD 30 MIN: CPT | Mod: 25 | Performed by: UROLOGY

## 2021-09-15 PROCEDURE — 52000 CYSTOURETHROSCOPY: CPT | Performed by: UROLOGY

## 2021-09-15 RX ORDER — CIPROFLOXACIN 500 MG/1
500 TABLET, FILM COATED ORAL ONCE
Status: COMPLETED | OUTPATIENT
Start: 2021-09-15 | End: 2021-09-15

## 2021-09-15 RX ORDER — SODIUM FLUORIDE 6 MG/ML
PASTE, DENTIFRICE DENTAL
COMMUNITY
Start: 2021-04-13

## 2021-09-15 RX ORDER — MIRABEGRON 25 MG/1
25 TABLET, FILM COATED, EXTENDED RELEASE ORAL DAILY
Status: DISCONTINUED | OUTPATIENT
Start: 2021-09-15 | End: 2021-09-15 | Stop reason: HOSPADM

## 2021-09-15 RX ADMIN — CIPROFLOXACIN 500 MG: 500 TABLET, FILM COATED ORAL at 16:12

## 2021-09-15 ASSESSMENT — PAIN SCALES - GENERAL: PAINLEVEL: NO PAIN (0)

## 2021-09-15 ASSESSMENT — MIFFLIN-ST. JEOR: SCORE: 1594.75

## 2021-09-15 ASSESSMENT — PATIENT HEALTH QUESTIONNAIRE - PHQ9: SUM OF ALL RESPONSES TO PHQ QUESTIONS 1-9: 4

## 2021-09-15 NOTE — LETTER
11/02/19 2200   Treatment Plan   Plan Type Updated Plan   Goals   Patient Reported Goal #1 \"to be able to trust others again\"   Pt Reported Goal #1 Type Long Term Goal   Goal #1 Progression Outcome Met - continue evaluating goal progress toward completion   Goal #2 denies suicidal ideation   Goal #2 Type Short Term Goal   Goal #2 Progression Initiated   OT/Art Therapy Involved? Not Applicable   Additional Medical Outcomes Pain   Pain Outcome Acceptable pain/comfort level is achieved/maintained.   Pain Outcome Progression Outcome Met - continue evaluating goal progress toward completion   Focus Indicators   Indicators Urge To Harm Self;Physical Pain;Sleep Problems;Paranoia;Negative Thoughts;Fear;Anxiety   Patient Objectives   Objectives Patient will attend and participate in therapeutic groups;Patient will collaborate with treatment team in planning continuing care;Patient will demonstrate compliance with and knowledge of medications;Patient will identify alternatives to self harm   OT/Art Therapy Objectives Not applicable   Therapy Interventions   Interventions Assist patient to develop relapse prevention plan using triggers and warning signs;Assist patient to identify manageable tasks as a way to goal achievement;Educate patient, family, significant other about illness, plan, and medications;Encourage group attendance to learn and reinforce positive coping strategies   OT/Art Therapy Interventions Not applicable   Treatment Plan Agreement   Patient has reviewed and agrees to the above treatment plan Yes   Parent/Guardian has reviewed and agrees to the above treament plan Not applicable        11/02/19 2200   Treatment Plan   Plan Type Updated Plan   Goals   Patient Reported Goal #1 \"to be able to trust others again\"   Pt Reported Goal #1 Type Long Term Goal   Goal #1 Progression Outcome Met - continue evaluating goal progress toward completion   Goal #2 denies suicidal ideation   Goal #2 Type Short Term Goal  "9/15/2021       RE: Juan Francisco Booker  [address]  [place] [address]     Dear Colleague,    Thank you for referring your patient, Juan Francisco Booker, to the [facility] [place] [facility]. Please see a copy of my visit note below.    UROLOGY CLINIC    HPI  Juan Francisco Booker is a 61 year old male, with history of LUTS and hydrocele, here for cystoscopy as part of work up for his LUTS and overactive bladder.      The patient denies any change in his symptoms. He still has to strain to urinate and has not noticed any major change in symptoms.     Hydrocele is stable in size and he is not bothered by it.     No changes to health, hospitalizations or new diagnoses in the interim    PHYSICAL EXAM  BP 99/68   Pulse 77   Ht 1.747 m (5' 8.78\")   Wt 80.3 kg (177 lb)   BMI 26.31 kg/m     Constitutional: AO, pleasant, NAD  Resp: Non-labored breathing on room air  Abd: Soft, NT, ND,     EXAM:  Phallus circumcised, meatus adequate, no plaques palpated.   Left hydrocele non tender, no discoloration,  Right testis descended , size is normal , consistency is normal. No intra-testicular masses.   CM: not done       Labs     I reviewed his recent labs and discussed with him. Significant for normal creatinine and PSA.     Lab Results   Component Value Date    CR 0.83 07/02/2021    CR 0.78 03/08/2021    CR 0.9 01/13/2020     PSA   Date Value Ref Range Status   07/02/2021 0.51 0 - 4 ug/L Final     Comment:     Assay Method:  Chemiluminescence using Siemens Vista analyzer   01/21/2020 1.25 0 - 4 ug/L Final     Comment:     Assay Method:  Chemiluminescence using Siemens Vista analyzer   12/19/2018 1.58 0 - 4 ug/L Final     Comment:     Assay Method:  Chemiluminescence using Siemens Vista analyzer   11/27/2018 1.26 0 - 4 ug/L Final     Comment:     Assay Method:  Chemiluminescence using Siemens Vista analyzer       Imaging     No imaging to review "     Cystoscopy procedure     CYSTOSCOPY PROCEDURE:  After sterile preparation and draping of the patient,  a 16-Amharic flexible cystoscope was introduced via the urethra.  It was passed without difficulty into the bladder. his bladder [place] to [place] distance was 4 cm. The urethra was open without evidence of stricture.  The ureteral orifices were orthotopic. There was some bulging in the posterior bladder wall most likely due to patient's chronic constipation. The bladder mucosa was evaluated using both antegrade and retroflex views and thisshowed no evidence of any lesions or significant trabeculation. Scope was then removed and patient tolerated the procedure well.    10 total minutes was spent for cystoscopy procedure         Uroflowmetry     Volume 150 ml   Flow time 48.2 seconds  Q-Max   7.9 ml/sec  TQ max 21.5 seconds   Qmean 3.1 cc/s  Total time 61.8 seconds      PVR 6 cc      ASSESSMENT AND PLAN  LUTS and BPH failed to respond to conservative management   No clear evidence of obstruction on Uroflowmetry    - Urinary urgency and LUTS:   I explained to him that cystoscopy showed some mild bladder wall trabeculation and a relatively enlarged prostate but it did not appear obstructive.  His uroflowmetry results also does not support an obstructive pattern.  Therefore, I agree with Deena LOERA that the main issue appears to be an overactive bladder.  I believe the medication like Myrbetriq could help with his urgency without causing urinary retention.    -Add Myrbetriq to his drug regimen  -Continue tamsulosin and Avodart    - Hydrocele   He is not bothered by it and it has not changed in size.  He wants to continue observation at this point.    30 total minutes , apart from cystoscopic procedure, spent on the date of the encounter including direct interaction with the patient, performing chart review, documentation and further activities as noted above.        Bhakti Bolaños MD   Department of Urology    Goal #2 Progression Initiated   OT/Art Therapy Involved? Not Applicable   Additional Medical Outcomes Pain   Pain Outcome Acceptable pain/comfort level is achieved/maintained.   Pain Outcome Progression Outcome Met - continue evaluating goal progress toward completion   Focus Indicators   Indicators Urge To Harm Self;Physical Pain;Sleep Problems;Paranoia;Negative Thoughts;Fear;Anxiety   Patient Objectives   Objectives Patient will attend and participate in therapeutic groups;Patient will collaborate with treatment team in planning continuing care;Patient will demonstrate compliance with and knowledge of medications;Patient will identify alternatives to self harm   OT/Art Therapy Objectives Not applicable   Therapy Interventions   Interventions Assist patient to develop relapse prevention plan using triggers and warning signs;Assist patient to identify manageable tasks as a way to goal achievement;Educate patient, family, significant other about illness, plan, and medications;Encourage group attendance to learn and reinforce positive coping strategies   OT/Art Therapy Interventions Not applicable   Treatment Plan Agreement   Patient has reviewed and agrees to the above treatment plan Yes   Parent/Guardian has reviewed and agrees to the above treament plan Not applicable   Patient denies SI at this time. Visible on unit, interactive with peers.  Patient agrees to notify staff if suicidal thoughts, plan or intent arise at any point through out the shift and to utilize positive coping skills to maintain safety on the unit. Staff will continue to monitor and follow the current treatment plan in place.         [facility]

## 2021-09-15 NOTE — PROGRESS NOTES
"UROLOGY CLINIC    HPI  Juan Francisco Booker is a 61 year old male, with history of LUTS and hydrocele, here for cystoscopy as part of work up for his LUTS and overactive bladder.      The patient denies any change in his symptoms. He still has to strain to urinate and has not noticed any major change in symptoms.     Hydrocele is stable in size and he is not bothered by it.     No changes to health, hospitalizations or new diagnoses in the interim    PHYSICAL EXAM  BP 99/68   Pulse 77   Ht 1.747 m (5' 8.78\")   Wt 80.3 kg (177 lb)   BMI 26.31 kg/m     Constitutional: AO, pleasant, NAD  Resp: Non-labored breathing on room air  Abd: Soft, NT, ND,     EXAM:  Phallus circumcised, meatus adequate, no plaques palpated.   Left hydrocele non tender, no discoloration,  Right testis descended , size is normal , consistency is normal. No intra-testicular masses.   CM: not done       Labs     I reviewed his recent labs and discussed with him. Significant for normal creatinine and PSA.     Lab Results   Component Value Date    CR 0.83 07/02/2021    CR 0.78 03/08/2021    CR 0.9 01/13/2020     PSA   Date Value Ref Range Status   07/02/2021 0.51 0 - 4 ug/L Final     Comment:     Assay Method:  Chemiluminescence using Siemens Vista analyzer   01/21/2020 1.25 0 - 4 ug/L Final     Comment:     Assay Method:  Chemiluminescence using Siemens Vista analyzer   12/19/2018 1.58 0 - 4 ug/L Final     Comment:     Assay Method:  Chemiluminescence using Siemens Vista analyzer   11/27/2018 1.26 0 - 4 ug/L Final     Comment:     Assay Method:  Chemiluminescence using Siemens Vista analyzer       Imaging     No imaging to review     Cystoscopy procedure     CYSTOSCOPY PROCEDURE:  After sterile preparation and draping of the patient,  a 16-Panamanian flexible cystoscope was introduced via the urethra.  It was passed without difficulty into the bladder. his bladder [place] to [place] distance was 4 cm. The urethra was open without evidence of " stricture.  The ureteral orifices were orthotopic. There was some bulging in the posterior bladder wall most likely due to patient's chronic constipation. The bladder mucosa was evaluated using both antegrade and retroflex views and thisshowed no evidence of any lesions or significant trabeculation. Scope was then removed and patient tolerated the procedure well.    10 total minutes was spent for cystoscopy procedure         Uroflowmetry     Volume 150 ml   Flow time 48.2 seconds  Q-Max   7.9 ml/sec  TQ max 21.5 seconds   Qmean 3.1 cc/s  Total time 61.8 seconds      PVR 6 cc      ASSESSMENT AND PLAN  LUTS and BPH failed to respond to conservative management   No clear evidence of obstruction on Uroflowmetry    - Urinary urgency and LUTS:   I explained to him that cystoscopy showed some mild bladder wall trabeculation and a relatively enlarged prostate but it did not appear obstructive.  His uroflowmetry results also does not support an obstructive pattern.  Therefore, I agree with Deena LOERA that the main issue appears to be an overactive bladder.  I believe the medication like Myrbetriq could help with his urgency without causing urinary retention.    -Add Myrbetriq to his drug regimen  -Continue tamsulosin and Avodart    - Hydrocele   He is not bothered by it and it has not changed in size.  He wants to continue observation at this point.    30 total minutes , apart from cystoscopic procedure, spent on the date of the encounter including direct interaction with the patient, performing chart review, documentation and further activities as noted above.        Bhakti Bolaños MD   Department of Urology   [facility]

## 2021-09-15 NOTE — NURSING NOTE
"Chief Complaint   Patient presents with     Cystoscopy     imcomplete emptying and urinary frequency       Blood pressure 99/68, pulse 77, height 1.747 m (5' 8.78\"), weight 80.3 kg (177 lb). Body mass index is 26.31 kg/m .    Patient Active Problem List   Diagnosis     Obstructive sleep apnea     Insomnia     Excessive sleepiness     Chronic nasal congestion     Other and unspecified alcohol dependence, unspecified drinking behavior     Lesion of ulnar nerve     Hyperlipidemia     Essential hypertension     Obesity     Benign neoplasm of colon     Hyperlipidemia     Moderate major depression (H)     Gastric polyps     Impaired glucose tolerance test     Prediabetes     Skin tag     Unilateral inguinal hernia with obstruction and without gangrene, recurrence not specified       Allergies   Allergen Reactions     Grass        Current Outpatient Medications   Medication Sig Dispense Refill     aspirin 81 MG tablet Take 1 tablet by mouth daily AM       Caffeine 100 MG TABS Take 2 tablets by mouth daily as needed       citalopram (CELEXA) 10 MG tablet Take 1 tablet (10 mg) by mouth daily 90 tablet 3     DENTA 5000 PLUS 1.1 % CREA APPLY A PEA SIZED AMOUNT TO TOOTHBRUSH, BRUSH ONTO ROOT AREAS THOROUGHLY, THEN SPIT OUT AT BEDTIME.  2     dutasteride (AVODART) 0.5 MG capsule Take 1 capsule (0.5 mg) by mouth daily 60 capsule 5     fluticasone (FLONASE) 50 MCG/ACT nasal spray Spray 1-2 sprays into both nostrils daily 48 g 1     gabapentin (NEURONTIN) 300 MG capsule 1 in morning, 1 at midday, 3 at night 450 capsule 3     lisinopril (ZESTRIL) 20 MG tablet Take 1 tablet (20 mg) by mouth daily 90 tablet 3     Multiple Vitamin (DAILY MULTIVITAMIN PO) Take 1 tablet by mouth daily AM       ORDER FOR DME Use your CPAP device as directed by your provider.       simvastatin (ZOCOR) 40 MG tablet Take 1 tablet (40 mg) by mouth At Bedtime 90 tablet 3     SODIUM FLUORIDE 5000 PPM 1.1 % PSTE BRUSH TEETH WITH PEA SIZED AMOUNT AT BEDTIME AND " DO NOT RINSE       tamsulosin (FLOMAX) 0.4 MG capsule Take 2 capsules (0.8 mg) by mouth daily 30 min after a meal.   Please keep visit for 2021 for further refills. Thank you 60 capsule 11       Social History     Tobacco Use     Smoking status: Never Smoker     Smokeless tobacco: Never Used   Substance Use Topics     Alcohol use: Not Currently     Comment: In remission since      Drug use: No       Invasive Procedure Safety Checklist:    Procedure: Cystoscopy    Action: Complete sections and checkboxes as appropriate.    Pre-procedure:  1. Patient ID Verified with 2 identifiers (Johana and  or MRN) : YES    2. Procedure and site verified with patient/designee (when able) : YES    3. Accurate consent documentation in medical record : YES    4. H&P (or appropriate assessment) documented in medical record : N/A  H&P must be up to 30 days prior to procedure an updated within 24 hours of                 Procedure as applicable.     5. Relevant diagnostic and radiology test results appropriately labeled and displayed as applicable : YES    6. Blood products, implants, devices, and/or special equipment available for the procedure as applicable : YES    7. Procedure site(s) marked with provider initials [Exclusions: none] : NO    8. Marking not required. Reason : Yes  Procedure does not require site marking    Time Out:     Time-Out performed immediately prior to starting procedure, including verbal and active participation of all team members addressing: YES    1. Correct patient identity.  2. Confirmed that the correct side and site are marked.  3. An accurate procedure to be done.  4. Agreement on the procedure to be done.  5. Correct patient position.  6. Relevant images and results are properly labeled and appropriately displayed.  7. The need to administer antibiotics or fluids for irrigation purposes during the procedure as applicable.  8. Safety precautions based on patient history or medication  use.    During Procedure: Verification of correct person, site, and procedure occurs any time the responsibility for care of the patient is transferred to another member of the care team.    The following medication was given:     MEDICATION:  Ciprofloxacin  ROUTE: oral   SITE: oral   DOSE: 500mg  LOT #: [id number]  : [employer]  EXPIRATION DATE: 06/2022  NDC#: 7245-2157-95   Was there drug waste? No    Prior to med admin, verified patient identity using patient's name and date of birth.  Due to med administration, patient instructed to remain in clinic for 15 minutes  afterwards, and to report any adverse reaction to me immediately.    Drug Amount Wasted:  None.  Vial/Syringe: Syringe      Radha Pretty  9/15/2021  3:08 PM

## 2021-09-16 ENCOUNTER — OFFICE VISIT (OUTPATIENT)
Dept: UROLOGY | Facility: CLINIC | Age: 61
End: 2021-09-16
Payer: COMMERCIAL

## 2021-09-16 VITALS
DIASTOLIC BLOOD PRESSURE: 73 MMHG | HEIGHT: 69 IN | BODY MASS INDEX: 25.92 KG/M2 | SYSTOLIC BLOOD PRESSURE: 111 MMHG | WEIGHT: 175 LBS | HEART RATE: 64 BPM

## 2021-09-16 DIAGNOSIS — K59.09 OTHER CONSTIPATION: ICD-10-CM

## 2021-09-16 DIAGNOSIS — N40.1 BENIGN PROSTATIC HYPERPLASIA WITH WEAK URINARY STREAM: ICD-10-CM

## 2021-09-16 DIAGNOSIS — R39.12 BENIGN PROSTATIC HYPERPLASIA WITH WEAK URINARY STREAM: ICD-10-CM

## 2021-09-16 DIAGNOSIS — R35.0 URINARY FREQUENCY: Primary | ICD-10-CM

## 2021-09-16 PROCEDURE — 99213 OFFICE O/P EST LOW 20 MIN: CPT | Performed by: PHYSICIAN ASSISTANT

## 2021-09-16 RX ORDER — POLYETHYLENE GLYCOL 3350 17 G/17G
8.5-17 POWDER, FOR SOLUTION ORAL DAILY
Qty: 510 G | Refills: 11 | Status: SHIPPED | OUTPATIENT
Start: 2021-09-16 | End: 2022-09-13

## 2021-09-16 RX ORDER — MIRABEGRON 25 MG/1
25 TABLET, FILM COATED, EXTENDED RELEASE ORAL DAILY
Qty: 30 TABLET | Refills: 11 | Status: SHIPPED | OUTPATIENT
Start: 2021-09-16 | End: 2021-12-02

## 2021-09-16 RX ORDER — FINASTERIDE 5 MG/1
5 TABLET, FILM COATED ORAL DAILY
Qty: 90 TABLET | Refills: 3 | Status: SHIPPED | OUTPATIENT
Start: 2021-09-16 | End: 2022-08-17

## 2021-09-16 ASSESSMENT — PAIN SCALES - GENERAL: PAINLEVEL: NO PAIN (0)

## 2021-09-16 ASSESSMENT — MIFFLIN-ST. JEOR: SCORE: 1589.17

## 2021-09-16 NOTE — NURSING NOTE
"Chief Complaint   Patient presents with     Follow Up     Urinary frequency       Blood pressure 111/73, pulse 64, height 1.753 m (5' 9\"), weight 79.4 kg (175 lb). Body mass index is 25.84 kg/m .    Patient Active Problem List   Diagnosis     Obstructive sleep apnea     Insomnia     Excessive sleepiness     Chronic nasal congestion     Other and unspecified alcohol dependence, unspecified drinking behavior     Lesion of ulnar nerve     Hyperlipidemia     Essential hypertension     Obesity     Benign neoplasm of colon     Hyperlipidemia     Moderate major depression (H)     Gastric polyps     Impaired glucose tolerance test     Prediabetes     Skin tag     Unilateral inguinal hernia with obstruction and without gangrene, recurrence not specified       Allergies   Allergen Reactions     Grass        Current Outpatient Medications   Medication Sig Dispense Refill     aspirin 81 MG tablet Take 1 tablet by mouth daily AM       Caffeine 100 MG TABS Take 2 tablets by mouth daily as needed       citalopram (CELEXA) 10 MG tablet Take 1 tablet (10 mg) by mouth daily 90 tablet 3     DENTA 5000 PLUS 1.1 % CREA APPLY A PEA SIZED AMOUNT TO TOOTHBRUSH, BRUSH ONTO ROOT AREAS THOROUGHLY, THEN SPIT OUT AT BEDTIME.  2     dutasteride (AVODART) 0.5 MG capsule Take 1 capsule (0.5 mg) by mouth daily 60 capsule 5     fluticasone (FLONASE) 50 MCG/ACT nasal spray Spray 1-2 sprays into both nostrils daily 48 g 1     gabapentin (NEURONTIN) 300 MG capsule 1 in morning, 1 at midday, 3 at night 450 capsule 3     lisinopril (ZESTRIL) 20 MG tablet Take 1 tablet (20 mg) by mouth daily 90 tablet 3     Multiple Vitamin (DAILY MULTIVITAMIN PO) Take 1 tablet by mouth daily AM       ORDER FOR DME Use your CPAP device as directed by your provider.       simvastatin (ZOCOR) 40 MG tablet Take 1 tablet (40 mg) by mouth At Bedtime 90 tablet 3     SODIUM FLUORIDE 5000 PPM 1.1 % PSTE BRUSH TEETH WITH PEA SIZED AMOUNT AT BEDTIME AND DO NOT RINSE       " tamsulosin (FLOMAX) 0.4 MG capsule Take 2 capsules (0.8 mg) by mouth daily 30 min after a meal.   Please keep visit for 5/11/2021 for further refills. Thank you 60 capsule 11       Social History     Tobacco Use     Smoking status: Never Smoker     Smokeless tobacco: Never Used   Substance Use Topics     Alcohol use: Not Currently     Comment: In remission since 2009     Drug use: No       CORBIN Reynaga  9/16/2021  1:06 PM

## 2021-09-16 NOTE — PATIENT INSTRUCTIONS
- Stop dutasteride (too expensive) and start finasteride 5mg daily - works just as well and is cheaper for you. continue tamsulosin and avodart  - Add myrbetriq 25mg daily.  May take a couple weeks before you notice a difference.  But you should notice less urgency and urinary frequency.  Stop thismedication if you are having difficulty urinating  - Start miralax 1 cup per day to have a normal soft bowel movement every day. Back off if your BM is too loose.    - Remember that caffeine can cause urgency and frequency.   - Lets make some progress!!!  - Return 2 months.      [facility]

## 2021-09-16 NOTE — LETTER
"9/16/2021       RE: Juan Francisco Booker  [address]  [place] [address]     Dear Colleague,    Thank you for referring your patient, Juan Francisco Booker, to the [facility] [place] [facility]. Please see a copy of my visit note below.    HPI: Mr. Juan Francisco Booker is a 61 year old year old male presenting today for evaluation of chief complaint(s): No chief complaint on file.    Today, he is unaccompanied     Mr. Juan Francisco Booker has PMH significant for HTN, HLD, gerd, obesity, prediabetes (HGB A1C 5.4%), depression, TIMOTHY, BPH (on flomax 0.8mg daily - started 8/29/18, on avodart - started 5/11/21).   - 12/5/18 - Seen with urinary frequency.  UA was normal, PVR was zero, CM was normal.  Eating 2 boxes of Outshine frozen fruit bars per day and taking caffeine pills to help him stay awake.  A bladder diary revealed 8 ounces of water and 19 outshine bars per day.  He voided 7 times in 24 hours.  Void volumes ranged between 25-100cc. He was asked to cut down on popsicles, drink more water, started flomax.   - 1/21/20 - stable symptoms,AUA SS 21 (4,4,1,5,2,2,3).  He had lost 70lbs and had cut down on fruit popsicles, replacing this with water.  Chief complaint was difficulty emptying.   - 2/11/20 - urodynamics show large capacity (859 mL) with heightened early sensations, normal compliance, no DO/DOI. No JOSEPH. With voiding Pdetmax ~35 cm H2O with Valsalva supplementation. Qmax 12 mL/s with initial voiding in the UDS suite, with an intermittent, fluctuating flow curve and incomplete bladder emptying ( mL). He then voided to completion into a private uroflow, with a final PVR of 0 mL. Increased EMG activity during voiding, \"which possibly correlates with Valsalva effort/movement, although could also suggest pelvic floor involvement\".     -2/18/20 - Taking Flomax 0.8mg daily. Had lost a total of 100lbs.  AUA SS -17 (4,3,0,4,2,2,2) \"mostly " "disappointed\". More difficulty emptying.  Urinary frequency 6-7 times per day.  At night x1. Recommended timed voiding.  Continuing to drink 64 ounces of water per day.    - 5/11/21 - Hourly urinary frequency.  Nocturia x 0 (around 6-7am) then has troubles getting back to sleep.  Rare urgency.  No urge incontinence. With double voiding, he felt he was emptying.  Continue tamsulosin 0.8mg and added avodart.   - 6/11/21 - Seen by Peri Santoro on for a right-sided hydrocele that had developed following a RIH repair on 3/23/21.  Conservative mgmt strategies were recommended.    - 7/13/21 - No significant change in voiding symptoms.  The other day he voided 15 times in the daytime and 1-2 times at night.  Has been toilet mapping and trying to adapt. His voiding schedule is relatively consistent - he doesn't have good and bad days.    9/15/21 - Cystoscopy with Dr. Bolaños.  Bulging in the posterior bladder wall due to chronic constipation. UROFLOW: voided 150cc with Tmax 7.9mL/s, Qave 3.1mL/s. Total void time 62 seconds. PVR 6cc.  Continued on tamsulosin and avodart. Recommended starting myrbetriq    Today he returns in routine followup.    Still taking 200mg per day of caffeine, similar to previous.  Only in the morning.   No other caffeine sources.   Does have some constipation - difficult to completely evacuate stool  Continues to have urinary frequency - bothersome  AUA SS:  22( 5,4,4,3,4,1,1) \"Unhappy\"    REVIEW OF DIAGNOSTICS:  7/2/21 - urinalysis normal  7/2/21 - PSA 0.51ug/L  7/2/21 - HGB A1C 5.4%     - Takes 200mg caffeine in the morning \"to help him wake up.\"  Doesn't drink coffee or diet pops anymore. Still eats outshine bars - may eat two per day, once per week.  Also has a watermelon day.    - On a typical day, drinks noncaffeinated green-tea and water (total 7-8 x eight ounce glasses per day)   - Bowels:  Normal  - Hydrocele still present, perhaps a little smaller.  He feels comfortable continuing to " "monitor.      Current Outpatient Medications   Medication Sig Dispense Refill     aspirin 81 MG tablet Take 1 tablet by mouth daily AM       Caffeine 100 MG TABS Take 2 tablets by mouth daily as needed       citalopram (CELEXA) 10 MG tablet Take 1 tablet (10 mg) by mouth daily 90 tablet 3     DENTA 5000 PLUS 1.1 % CREA APPLY A PEA SIZED AMOUNT TO TOOTHBRUSH, BRUSH ONTO ROOT AREAS THOROUGHLY, THEN SPIT OUT AT BEDTIME.  2     dutasteride (AVODART) 0.5 MG capsule Take 1 capsule (0.5 mg) by mouth daily 60 capsule 5     fluticasone (FLONASE) 50 MCG/ACT nasal spray Spray 1-2 sprays into both nostrils daily 48 g 1     gabapentin (NEURONTIN) 300 MG capsule 1 in morning, 1 at midday, 3 at night 450 capsule 3     lisinopril (ZESTRIL) 20 MG tablet Take 1 tablet (20 mg) by mouth daily 90 tablet 3     Multiple Vitamin (DAILY MULTIVITAMIN PO) Take 1 tablet by mouth daily AM       ORDER FOR DME Use your CPAP device as directed by your provider.       simvastatin (ZOCOR) 40 MG tablet Take 1 tablet (40 mg) by mouth At Bedtime 90 tablet 3     SODIUM FLUORIDE 5000 PPM 1.1 % PSTE BRUSH TEETH WITH PEA SIZED AMOUNT AT BEDTIME AND DO NOT RINSE       tamsulosin (FLOMAX) 0.4 MG capsule Take 2 capsules (0.8 mg) by mouth daily 30 min after a meal.   Please keep visit for 5/11/2021 for further refills. Thank you 60 capsule 11       ALLERGIES: Grass      REVIEW OF SYSTEMS:  As above in HPI    GENERAL PHYSICAL EXAM:   Vitals: /73   Pulse 64   Ht 1.753 m (5' 9\")   Wt 79.4 kg (175 lb)   BMI 25.84 kg/m    Body mass index is 25.84 kg/m .    GENERAL: Well groomed, well developed, well nourished male in NAD.  NEURO: Alert and oriented x 3.  PSYCH: Normal mood and affect, pleasant and agreeable during interview and exam.    LABS: The last test results for Mr. Juan Francisco Booker were reviewed:  PSA -   Lab Results   Component Value Date    PSA 0.51 07/02/2021    PSA 1.25 01/21/2020    PSA 1.58 12/19/2018    PSA 1.26 11/27/2018     BMP -   Recent " Labs   Lab Test 07/02/21  1302 03/24/21  1015 03/08/21  1430 01/13/20  1321 01/13/20  1321     --  143  --  140.0   POTASSIUM 4.0 3.9 4.0   < > 4.0   CHLORIDE 108  --  108  --  102.0   CO2 30  --  30  --  31.0   BUN 16  --  19  --  14.2   CR 0.83  --  0.78  --  0.9   GLC 85  --  88  --  78.8   ZHEN 9.0  --  8.3*  --  9.7    < > = values in this interval not displayed.       CBC -   Recent Labs   Lab Test 03/08/21  1430 02/24/20  1521 10/24/18  1354   WBC 4.1  --   --    HGB 13.1* 13.7 14.1     --   --        ASSESSMENT:   1) BPH with luts (obstructive and irritative  2) Constipation    PLAN:   - COncern that constipation may be worsening bladder symptoms.   - Start miralax 1 cup per day to have a normal soft bowel movement every day. Back off if your BM is too loose.    - Remember that caffeine can cause urgency and frequency.   - Stop dutasteride (too expensive) and start finasteride 5mg daily - works just as well and is cheaper for you.   - continue tamsulosin  - Add myrbetriq 25mg daily.  May take a couple weeks before you notice a difference.  But you should notice less urgency and urinary frequency.  Stop this medication if you are having difficulty urinating  - Lets make some progress!!!  - Return 2 months.      VISIT DURATION: 20 minutes (1:09 - 1:21 + 8 minutes documentation on DOS)    Deena Matta PA-C  Department of Urologic Surgery

## 2021-09-16 NOTE — PROGRESS NOTES
"HPI: Mr. Juan Francisco Booker is a 61 year old year old male presenting today for evaluation of chief complaint(s): No chief complaint on file.    Today, he is unaccompanied     Mr. Juan Francisco Booker has PMH significant for HTN, HLD, gerd, obesity, prediabetes (HGB A1C 5.4%), depression, TIMOTHY, BPH (on flomax 0.8mg daily - started 8/29/18, on avodart - started 5/11/21).   - 12/5/18 - Seen with urinary frequency.  UA was normal, PVR was zero, CM was normal.  Eating 2 boxes of Outshine frozen fruit bars per day and taking caffeine pills to help him stay awake.  A bladder diary revealed 8 ounces of water and 19 outshine bars per day.  He voided 7 times in 24 hours.  Void volumes ranged between 25-100cc. He was asked to cut down on popsicles, drink more water, started flomax.   - 1/21/20 - stable symptoms,AUA SS 21 (4,4,1,5,2,2,3).  He had lost 70lbs and had cut down on fruit popsicles, replacing this with water.  Chief complaint was difficulty emptying.   - 2/11/20 - urodynamics show large capacity (859 mL) with heightened early sensations, normal compliance, no DO/DOI. No JOSEPH. With voiding Pdetmax ~35 cm H2O with Valsalva supplementation. Qmax 12 mL/s with initial voiding in the UDS suite, with an intermittent, fluctuating flow curve and incomplete bladder emptying ( mL). He then voided to completion into a private uroflow, with a final PVR of 0 mL. Increased EMG activity during voiding, \"which possibly correlates with Valsalva effort/movement, although could also suggest pelvic floor involvement\".     -2/18/20 - Taking Flomax 0.8mg daily. Had lost a total of 100lbs.  AUA SS -17 (4,3,0,4,2,2,2) \"mostly disappointed\". More difficulty emptying.  Urinary frequency 6-7 times per day.  At night x1. Recommended timed voiding.  Continuing to drink 64 ounces of water per day.    - 5/11/21 - Hourly urinary frequency.  Nocturia x 0 (around 6-7am) then has troubles getting back to sleep.  Rare urgency.  No urge incontinence. With " "double voiding, he felt he was emptying.  Continue tamsulosin 0.8mg and added avodart.   - 6/11/21 - Seen by Peri Santoro on for a right-sided hydrocele that had developed following a RIH repair on 3/23/21.  Conservative mgmt strategies were recommended.    - 7/13/21 - No significant change in voiding symptoms.  The other day he voided 15 times in the daytime and 1-2 times at night.  Has been toilet mapping and trying to adapt. His voiding schedule is relatively consistent - he doesn't have good and bad days.    9/15/21 - Cystoscopy with Dr. Bolaños.  Bulging in the posterior bladder wall due to chronic constipation. UROFLOW: voided 150cc with Tmax 7.9mL/s, Qave 3.1mL/s. Total void time 62 seconds. PVR 6cc.  Continued on tamsulosin and avodart. Recommended starting myrbetriq    Today he returns in routine followup.    Still taking 200mg per day of caffeine, similar to previous.  Only in the morning.   No other caffeine sources.   Does have some constipation - difficult to completely evacuate stool  Continues to have urinary frequency - bothersome  AUA SS:  22( 5,4,4,3,4,1,1) \"Unhappy\"    REVIEW OF DIAGNOSTICS:  7/2/21 - urinalysis normal  7/2/21 - PSA 0.51ug/L  7/2/21 - HGB A1C 5.4%     - Takes 200mg caffeine in the morning \"to help him wake up.\"  Doesn't drink coffee or diet pops anymore. Still eats outshine bars - may eat two per day, once per week.  Also has a watermelon day.    - On a typical day, drinks noncaffeinated green-tea and water (total 7-8 x eight ounce glasses per day)   - Bowels:  Normal  - Hydrocele still present, perhaps a little smaller.  He feels comfortable continuing to monitor.      Current Outpatient Medications   Medication Sig Dispense Refill     aspirin 81 MG tablet Take 1 tablet by mouth daily AM       Caffeine 100 MG TABS Take 2 tablets by mouth daily as needed       citalopram (CELEXA) 10 MG tablet Take 1 tablet (10 mg) by mouth daily 90 tablet 3     DENTA 5000 PLUS 1.1 % CREA APPLY A " "PEA SIZED AMOUNT TO TOOTHBRUSH, BRUSH ONTO ROOT AREAS THOROUGHLY, THEN SPIT OUT AT BEDTIME.  2     dutasteride (AVODART) 0.5 MG capsule Take 1 capsule (0.5 mg) by mouth daily 60 capsule 5     fluticasone (FLONASE) 50 MCG/ACT nasal spray Spray 1-2 sprays into both nostrils daily 48 g 1     gabapentin (NEURONTIN) 300 MG capsule 1 in morning, 1 at midday, 3 at night 450 capsule 3     lisinopril (ZESTRIL) 20 MG tablet Take 1 tablet (20 mg) by mouth daily 90 tablet 3     Multiple Vitamin (DAILY MULTIVITAMIN PO) Take 1 tablet by mouth daily AM       ORDER FOR DME Use your CPAP device as directed by your provider.       simvastatin (ZOCOR) 40 MG tablet Take 1 tablet (40 mg) by mouth At Bedtime 90 tablet 3     SODIUM FLUORIDE 5000 PPM 1.1 % PSTE BRUSH TEETH WITH PEA SIZED AMOUNT AT BEDTIME AND DO NOT RINSE       tamsulosin (FLOMAX) 0.4 MG capsule Take 2 capsules (0.8 mg) by mouth daily 30 min after a meal.   Please keep visit for 5/11/2021 for further refills. Thank you 60 capsule 11       ALLERGIES: Grass      REVIEW OF SYSTEMS:  As above in HPI    GENERAL PHYSICAL EXAM:   Vitals: /73   Pulse 64   Ht 1.753 m (5' 9\")   Wt 79.4 kg (175 lb)   BMI 25.84 kg/m    Body mass index is 25.84 kg/m .    GENERAL: Well groomed, well developed, well nourished male in NAD.  NEURO: Alert and oriented x 3.  PSYCH: Normal mood and affect, pleasant and agreeable during interview and exam.    LABS: The last test results for Mr. Juan Francisco Booker were reviewed:  PSA -   Lab Results   Component Value Date    PSA 0.51 07/02/2021    PSA 1.25 01/21/2020    PSA 1.58 12/19/2018    PSA 1.26 11/27/2018     BMP -   Recent Labs   Lab Test 07/02/21  1302 03/24/21  1015 03/08/21  1430 01/13/20  1321 01/13/20  1321     --  143  --  140.0   POTASSIUM 4.0 3.9 4.0   < > 4.0   CHLORIDE 108  --  108  --  102.0   CO2 30  --  30  --  31.0   BUN 16  --  19  --  14.2   CR 0.83  --  0.78  --  0.9   GLC 85  --  88  --  78.8   ZHEN 9.0  --  8.3*  --  9.7    " < > = values in this interval not displayed.       CBC -   Recent Labs   Lab Test 03/08/21  1430 02/24/20  1521 10/24/18  1354   WBC 4.1  --   --    HGB 13.1* 13.7 14.1     --   --        ASSESSMENT:   1) BPH with luts (obstructive and irritative  2) Constipation    PLAN:   - COncern that constipation may be worsening bladder symptoms.   - Start miralax 1 cup per day to have a normal soft bowel movement every day. Back off if your BM is too loose.    - Remember that caffeine can cause urgency and frequency.   - Stop dutasteride (too expensive) and start finasteride 5mg daily - works just as well and is cheaper for you.   - continue tamsulosin  - Add myrbetriq 25mg daily.  May take a couple weeks before you notice a difference.  But you should notice less urgency and urinary frequency.  Stop this medication if you are having difficulty urinating  - Lets make some progress!!!  - Return 2 months.      VISIT DURATION: 20 minutes (1:09 - 1:21 + 8 minutes documentation on DOS)    Deena Matta PA-C  Department of Urologic Surgery

## 2021-09-17 ENCOUNTER — HOSPITAL ENCOUNTER (OUTPATIENT)
Dept: PHYSICAL THERAPY | Facility: REHABILITATION | Age: 61
End: 2021-09-17
Payer: COMMERCIAL

## 2021-09-17 DIAGNOSIS — G89.29 CHRONIC BILATERAL LOW BACK PAIN WITH BILATERAL SCIATICA: Primary | ICD-10-CM

## 2021-09-17 DIAGNOSIS — M54.42 CHRONIC BILATERAL LOW BACK PAIN WITH BILATERAL SCIATICA: Primary | ICD-10-CM

## 2021-09-17 DIAGNOSIS — M54.41 CHRONIC BILATERAL LOW BACK PAIN WITH BILATERAL SCIATICA: Primary | ICD-10-CM

## 2021-09-17 DIAGNOSIS — R20.0 BILATERAL LEG NUMBNESS: ICD-10-CM

## 2021-09-17 PROCEDURE — 97140 MANUAL THERAPY 1/> REGIONS: CPT | Mod: GP | Performed by: PHYSICAL THERAPIST

## 2021-09-17 NOTE — PROGRESS NOTES
09/17/21 1200   Signing Clinician's Name / Credentials   Signing clinician's name / credentials Rachel Zuñiga, KARLIE   Session Number   Session Number 6/12   Ortho Goal 1   Goal Identifier HEP   Goal Description independent with home exercise program and self management of symptoms in 8 weeks   Goal Progress progressing   Ortho Goal 2   Goal Identifier sitting   Goal Description increase sitting tolerance to 30 min for eating and watching TV in 8 week   Goal Progress tolerance is 10-15 min   Ortho Goal 3   Goal Identifier standing   Goal Description able to stand for 30 min for meal prep and household tasks in 8 weeks   Goal Progress tolerance is 10-15 min   Subjective Report   Subjective Report Pain level 0/10 right now. Sitting and standing tolerance remains limited. WIll be traveling and is a little concerned about the flight and standing in lines. Usually feels a little better after treatment.    Objective Measure 1   Details Mod fascial restrictions of LS junction, (B) SI joints.    Manual Therapy   Manual Therapy: Mobilization, MFR, MLD, friction massage minutes (48474) 25   Skilled Intervention MFR, counterstrain   Patient Response good   Treatment Detail supine LS traction, (B) lumbar erector spinae, (L) UPL5, (L) LPL5, (R) LPL5, (R) sacrotub lig   Assessments Completed   Assessments Completed HEP/POC compliance is  good .Patient demonstrates understanding/independence with home program. Response to Intervention is fair. Sitting/standing tolerance remains limited. Patient is appropriate to continue with skilled physical therapy intervention, as indicated by initial plan of care.   Education   Learner Patient   Readiness Eager   Method Booklet/handout;Demonstration   Response Demonstrates Understanding   Plan   Home program Previous HEP- abd set/arm/leg ext, bird dog, SLS, rows, pulldowns, SKC. Added LTR, modifed to abd set/leg ext, bridging, LIBIA, SKC/DKC, supine HS stretch, gastroc/soleus stretch   Plan for  next session Continue with POC. Manual therapy, progression of HEP, patient education.    Comments   Comments Ref provider: Ivon Red MD   Total Session Time   Timed Code Treatment Minutes 25   Total Treatment Time (sum of timed and untimed services) 25   AMBULATORY CLINICS ONLY-MEDICAL AND TREATMENT DIAGNOSIS   PT Diagnosis (B) low back pain, (B) sciatica

## 2021-09-20 ENCOUNTER — HOSPITAL ENCOUNTER (OUTPATIENT)
Dept: PHYSICAL THERAPY | Facility: REHABILITATION | Age: 61
End: 2021-09-20
Payer: COMMERCIAL

## 2021-09-20 DIAGNOSIS — M54.41 CHRONIC BILATERAL LOW BACK PAIN WITH BILATERAL SCIATICA: Primary | ICD-10-CM

## 2021-09-20 DIAGNOSIS — R20.0 BILATERAL LEG NUMBNESS: ICD-10-CM

## 2021-09-20 DIAGNOSIS — M54.42 CHRONIC BILATERAL LOW BACK PAIN WITH BILATERAL SCIATICA: Primary | ICD-10-CM

## 2021-09-20 DIAGNOSIS — G89.29 CHRONIC BILATERAL LOW BACK PAIN WITH BILATERAL SCIATICA: Primary | ICD-10-CM

## 2021-09-20 PROCEDURE — 97140 MANUAL THERAPY 1/> REGIONS: CPT | Mod: GP | Performed by: PHYSICAL THERAPIST

## 2021-09-20 NOTE — PROGRESS NOTES
09/20/21 0900   Signing Clinician's Name / Credentials   Signing clinician's name / credentials Rachel Zuñiga PT   Session Number   Session Number 7/12   Ortho Goal 1   Goal Identifier HEP   Goal Description independent with home exercise program and self management of symptoms in 8 weeks   Goal Progress progressing   Ortho Goal 2   Goal Identifier sitting   Goal Description increase sitting tolerance to 30 min for eating and watching TV in 8 week   Goal Progress tolerance is 10-15 min   Ortho Goal 3   Goal Identifier standing   Goal Description able to stand for 30 min for meal prep and household tasks in 8 weeks   Goal Progress tolerance is 10-15 min   Subjective Report   Subjective Report Stiff this morning after sitting to pack for trip. Pain 1/10. Pain level was up to 5-6/10 with sitting.    Objective Measure 1   Details Mod fascial restrictions of LS junction, R>L SI joints.    Manual Therapy   Manual Therapy: Mobilization, MFR, MLD, friction massage minutes ([id number]) 25   Skilled Intervention MFR, counterstrain   Patient Response good   Treatment Detail supine LS traction, (B) UPL5, (B) LPL5, (R) HFO-SI   Assessments Completed   Assessments Completed HEP/POC compliance is  good .Patient demonstrates understanding/independence with home program. Response to Intervention is fair. Sitting/standing tolerance remains limited. Patient is appropriate to continue with skilled physical therapy intervention, as indicated by initial plan of care.   Education   Learner Patient   Readiness Eager   Method Booklet/handout;Demonstration   Response Demonstrates Understanding   Plan   Home program Previous HEP- abd set/arm/leg ext, bird dog, SLS, rows, pulldowns, SKC. Added LTR, modifed to abd set/leg ext, bridging, LIBIA, SKC/DKC, supine HS stretch, gastroc/soleus stretch   Plan for next session Follow up in 4 weeks. Patient will be out of town for a couple weeks. Continue with POC. Manual therapy, progression of HEP, patient  education.    Comments   Comments Ref provider: Ivon Red MD   Total Session Time   Timed Code Treatment Minutes 25   Total Treatment Time (sum of timed and untimed services) 25   AMBULATORY CLINICS ONLY-MEDICAL AND TREATMENT DIAGNOSIS   PT Diagnosis (B) low back pain, (B) sciatica

## 2021-10-12 ENCOUNTER — HOSPITAL ENCOUNTER (OUTPATIENT)
Dept: PHYSICAL THERAPY | Facility: REHABILITATION | Age: 61
End: 2021-10-12
Payer: COMMERCIAL

## 2021-10-12 DIAGNOSIS — G89.29 CHRONIC BILATERAL LOW BACK PAIN WITH BILATERAL SCIATICA: Primary | ICD-10-CM

## 2021-10-12 DIAGNOSIS — M54.42 CHRONIC BILATERAL LOW BACK PAIN WITH BILATERAL SCIATICA: Primary | ICD-10-CM

## 2021-10-12 DIAGNOSIS — R20.0 BILATERAL LEG NUMBNESS: ICD-10-CM

## 2021-10-12 DIAGNOSIS — M54.41 CHRONIC BILATERAL LOW BACK PAIN WITH BILATERAL SCIATICA: Primary | ICD-10-CM

## 2021-10-12 PROCEDURE — 97140 MANUAL THERAPY 1/> REGIONS: CPT | Mod: GP | Performed by: PHYSICAL THERAPIST

## 2021-10-12 NOTE — PROGRESS NOTES
10/12/21 1400   Signing Clinician's Name / Credentials   Signing clinician's name / credentials Rachel Zuñiga, PT   Session Number   Session Number 8/12   Ortho Goal 1   Goal Identifier HEP   Goal Description independent with home exercise program and self management of symptoms in 8 weeks   Goal Progress progressing   Ortho Goal 2   Goal Identifier sitting   Goal Description increase sitting tolerance to 30 min for eating and watching TV in 8 week   Goal Progress tolerance is 10-15 min   Ortho Goal 3   Goal Identifier standing   Goal Description able to stand for 30 min for meal prep and household tasks in 8 weeks   Goal Progress tolerance is 10-15 min   Subjective Report   Subjective Report Pain 2/10. Returns from vacation. Plane trip was hard on his back. Legs didn't bother him that much on vacation. A little bit sore today in midline low back, some tingling in (B) dorsal feet. Some increased pain with sitting at a concert.    Manual Therapy   Manual Therapy: Mobilization, MFR, MLD, friction massage minutes ([id number]) 25   Skilled Intervention MFR, counterstrain   Patient Response good   Treatment Detail supine LS traction, (B) lumbar plexus, (B) sacral plexus    Assessments Completed   Assessments Completed HEP/POC compliance is  good .Patient demonstrates understanding/independence with home program. Response to Intervention is fair. Sitting/standing tolerance remains limited. Patient is appropriate to continue with skilled physical therapy intervention, as indicated by initial plan of care.   Education   Learner Patient   Readiness Eager   Method Booklet/handout;Demonstration   Response Demonstrates Understanding   Plan   Home program Previous HEP- abd set/arm/leg ext, bird dog, SLS, rows, pulldowns, SKC. Added LTR, modifed to abd set/leg ext, bridging, LIBIA, SKC/DKC, supine HS stretch, gastroc/soleus stretch   Updates to plan of care Check (R) inguinal area next visit.    Plan for next session Continue with POC.     Comments   Comments Ref provider: Ivon Red MD   Total Session Time   Timed Code Treatment Minutes 25   Total Treatment Time (sum of timed and untimed services) 25   AMBULATORY CLINICS ONLY-MEDICAL AND TREATMENT DIAGNOSIS   PT Diagnosis (B) low back pain, (B) sciatica

## 2021-10-21 ENCOUNTER — HOSPITAL ENCOUNTER (OUTPATIENT)
Dept: PHYSICAL THERAPY | Facility: REHABILITATION | Age: 61
End: 2021-10-21
Payer: COMMERCIAL

## 2021-10-21 DIAGNOSIS — R20.0 BILATERAL LEG NUMBNESS: ICD-10-CM

## 2021-10-21 DIAGNOSIS — M54.42 CHRONIC BILATERAL LOW BACK PAIN WITH BILATERAL SCIATICA: Primary | ICD-10-CM

## 2021-10-21 DIAGNOSIS — G89.29 CHRONIC BILATERAL LOW BACK PAIN WITH BILATERAL SCIATICA: Primary | ICD-10-CM

## 2021-10-21 DIAGNOSIS — M54.41 CHRONIC BILATERAL LOW BACK PAIN WITH BILATERAL SCIATICA: Primary | ICD-10-CM

## 2021-10-21 PROCEDURE — 97140 MANUAL THERAPY 1/> REGIONS: CPT | Mod: GP | Performed by: PHYSICAL THERAPIST

## 2021-10-21 NOTE — PROGRESS NOTES
10/21/21 1500   Signing Clinician's Name / Credentials   Signing clinician's name / credentials Rachel Zuñiga, PT   Session Number   Session Number 9/12   Ortho Goal 1   Goal Identifier HEP   Goal Description independent with home exercise program and self management of symptoms in 8 weeks   Goal Progress progressing   Ortho Goal 2   Goal Identifier sitting   Goal Description increase sitting tolerance to 30 min for eating and watching TV in 8 week   Goal Progress tolerance is 10-15 min   Ortho Goal 3   Goal Identifier standing   Goal Description able to stand for 30 min for meal prep and household tasks in 8 weeks   Goal Progress tolerance is 10-15 min   Subjective Report   Subjective Report A little sore in low back today after walking some downtown. Pain w/sitting is variable.    Objective Measure 1   Details cranial scan (+) for lumbar rami.    Objective Measure 2   Details tenderness of (R) upper lumbar spine. (L) ant/med femur   Manual Therapy   Manual Therapy: Mobilization, MFR, MLD, friction massage minutes ([id number]) 25   Skilled Intervention MFR, counterstrain   Patient Response good   Treatment Detail (R) GRCL1-2-N, (L) [id number], supine LS traction,    Assessments Completed   Assessments Completed HEP/POC compliance is  good .Patient demonstrates understanding/independence with home program. Response to Intervention is fair. Sitting/standing tolerance remains limited. Patient is appropriate to continue with skilled physical therapy intervention, as indicated by initial plan of care.   Education   Learner Patient   Readiness Eager   Method Booklet/handout;Demonstration   Response Demonstrates Understanding   Plan   Home program Previous HEP- abd set/arm/leg ext, bird dog, SLS, rows, pulldowns, SKC. Added LTR, modifed to abd set/leg ext, bridging, LIBIA, SKC/DKC, supine HS stretch, gastroc/soleus stretch   Plan for next session Continue with POC. Continue to assess for lumbar neural/fascial dysfunction    Comments   Comments Ref provider: Ivon Red MD   Total Session Time   Timed Code Treatment Minutes 25   Total Treatment Time (sum of timed and untimed services) 25   AMBULATORY CLINICS ONLY-MEDICAL AND TREATMENT DIAGNOSIS   PT Diagnosis (B) low back pain, (B) sciatica

## 2021-10-26 ENCOUNTER — HOSPITAL ENCOUNTER (OUTPATIENT)
Dept: PHYSICAL THERAPY | Facility: REHABILITATION | Age: 61
End: 2021-10-26
Payer: COMMERCIAL

## 2021-10-26 DIAGNOSIS — M54.42 CHRONIC BILATERAL LOW BACK PAIN WITH BILATERAL SCIATICA: Primary | ICD-10-CM

## 2021-10-26 DIAGNOSIS — R20.0 BILATERAL LEG NUMBNESS: ICD-10-CM

## 2021-10-26 DIAGNOSIS — M54.41 CHRONIC BILATERAL LOW BACK PAIN WITH BILATERAL SCIATICA: Primary | ICD-10-CM

## 2021-10-26 DIAGNOSIS — G89.29 CHRONIC BILATERAL LOW BACK PAIN WITH BILATERAL SCIATICA: Primary | ICD-10-CM

## 2021-10-26 PROCEDURE — 97140 MANUAL THERAPY 1/> REGIONS: CPT | Mod: GP | Performed by: PHYSICAL THERAPIST

## 2021-10-26 NOTE — PROGRESS NOTES
10/26/21 1200   Signing Clinician's Name / Credentials   Signing clinician's name / credentials Rachel uZñiga, PT   Session Number   Session Number 10/12   Ortho Goal 1   Goal Identifier HEP   Goal Description independent with home exercise program and self management of symptoms in 8 weeks   Goal Progress progressing   Ortho Goal 2   Goal Identifier sitting   Goal Description increase sitting tolerance to 30 min for eating and watching TV in 8 week   Goal Progress tolerance is 10-15 min   Ortho Goal 3   Goal Identifier standing   Goal Description able to stand for 30 min for meal prep and household tasks in 8 weeks   Goal Progress tolerance is 10-15 min   Subjective Report   Subjective Report Was really sore this morning. Got better after driving and sitting in a chair. for a short time. Main complaint is LBP. Pain 2/10.    Objective Measure 1   Details cranial scan (+) for (L) LE arterials, somatic nn.    Objective Measure 2   Details fascial restrictions of lower leg, (L) foot.    Manual Therapy   Manual Therapy: Mobilization, MFR, MLD, friction massage minutes ([id number]) 25   Skilled Intervention MFR, counterstrain   Patient Response good   Treatment Detail (L) MTAR1-3-N, (L) SFIB-N ,(L) DFIB-N, (B) TIB-N,    Assessments Completed   Assessments Completed HEP/POC compliance is  good .Patient demonstrates understanding/independence with home program. Response to Intervention is fair. Sitting/standing tolerance remains limited. Patient is appropriate to continue with skilled physical therapy intervention, as indicated by initial plan of care.   Education   Learner Patient   Readiness Eager   Method Booklet/handout;Demonstration   Response Demonstrates Understanding   Plan   Home program Previous HEP- abd set/arm/leg ext, bird dog, SLS, rows, pulldowns, SKC. Added LTR, modifed to abd set/leg ext, bridging, LIBIA, SKC/DKC, supine HS stretch, gastroc/soleus stretch   Plan for next session Continue with POC. Continue to  assess for lumbar neural/fascial dysfunction   Comments   Comments Ref provider: Ivon Red MD   Total Session Time   Timed Code Treatment Minutes 25   Total Treatment Time (sum of timed and untimed services) 25   AMBULATORY CLINICS ONLY-MEDICAL AND TREATMENT DIAGNOSIS   PT Diagnosis (B) low back pain, (B) sciatica

## 2021-10-28 ENCOUNTER — HOSPITAL ENCOUNTER (OUTPATIENT)
Dept: PHYSICAL THERAPY | Facility: REHABILITATION | Age: 61
End: 2021-10-28
Payer: COMMERCIAL

## 2021-10-28 DIAGNOSIS — G89.29 CHRONIC BILATERAL LOW BACK PAIN WITH BILATERAL SCIATICA: Primary | ICD-10-CM

## 2021-10-28 DIAGNOSIS — M54.42 CHRONIC BILATERAL LOW BACK PAIN WITH BILATERAL SCIATICA: Primary | ICD-10-CM

## 2021-10-28 DIAGNOSIS — R20.0 BILATERAL LEG NUMBNESS: ICD-10-CM

## 2021-10-28 DIAGNOSIS — M54.41 CHRONIC BILATERAL LOW BACK PAIN WITH BILATERAL SCIATICA: Primary | ICD-10-CM

## 2021-10-28 PROCEDURE — 97140 MANUAL THERAPY 1/> REGIONS: CPT | Mod: GP | Performed by: PHYSICAL THERAPIST

## 2021-10-28 NOTE — PROGRESS NOTES
10/28/21 1200   Signing Clinician's Name / Credentials   Signing clinician's name / credentials Rachel Zuñiga, PT   Session Number   Session Number 11/12   Ortho Goal 1   Goal Identifier HEP   Goal Description independent with home exercise program and self management of symptoms in 8 weeks   Goal Progress progressing   Ortho Goal 2   Goal Identifier sitting   Goal Description increase sitting tolerance to 30 min for eating and watching TV in 8 week   Goal Progress tolerance is 10-15 min   Ortho Goal 3   Goal Identifier standing   Goal Description able to stand for 30 min for meal prep and household tasks in 8 weeks   Goal Progress tolerance is 10-15 min   Subjective Report   Subjective Report Doesn't feel he's making progress. Continues to have back pain, headaches, LE tingling with sitting, with prolonged standing. No problem with walking. Has a neuro consult in about 3 weeks.    Objective Measure 1   Details mod fascial restrictions of dural tube, LS junction.    Manual Therapy   Manual Therapy: Mobilization, MFR, MLD, friction massage minutes ([id number]) 25   Skilled Intervention MFR, counterstrain   Patient Response good   Treatment Detail dural tube rock/glide, supine LS traction    Assessments Completed   Assessments Completed HEP/POC compliance is  good .Patient demonstrates understanding/independence with home program. Response to Intervention is fair. Sitting/standing tolerance remains limited. Patient is appropriate to continue with skilled physical therapy intervention, as indicated by initial plan of care.   Education   Learner Patient   Readiness Eager   Method Booklet/handout;Demonstration   Response Demonstrates Understanding   Communication with other professionals   Communication with other professionals will contact referring provider about patient status.    Plan   Home program Previous HEP- abd set/arm/leg ext, bird dog, SLS, rows, pulldowns, SKC. Added LTR, modifed to abd set/leg ext, bridging,  LIBIA, SKC/DKC, supine HS stretch, gastroc/soleus stretch   Plan for next session neuro consult in 3 weeks. Hold on PT for now.    Comments   Comments Ref provider: Ivon Red MD   Total Session Time   Timed Code Treatment Minutes 25   Total Treatment Time (sum of timed and untimed services) 25   AMBULATORY CLINICS ONLY-MEDICAL AND TREATMENT DIAGNOSIS   PT Diagnosis (B) low back pain, (B) sciatica                                                                                [facility] Service    Outpatient Physical Therapy Discharge Note  Patient: Juan Francisco Booker  : 1960    Beginning/End Dates of Reporting Period:  21 to 10/28/21    Referring Provider: Ivon Red MD     Therapy Diagnosis: LE numbness, chronic midline LBP w/out sciatica     Client Self Report: Doesn't feel he's making progress. Continues to have back pain, headaches, LE tingling with sitting, with prolonged standing. No problem with walking. Has a neuro consult in about 3 weeks.     Objective Measurements:     Details: mod fascial restrictions of dural tube, LS junction.             Goals:  Goal Identifier HEP   Goal Description independent with home exercise program and self management of symptoms in 8 weeks   Target Date     Date Met      Progress (detail required for progress note): progressing     Goal Identifier sitting   Goal Description increase sitting tolerance to 30 min for eating and watching TV in 8 week   Target Date     Date Met      Progress (detail required for progress note): tolerance is 10-15 min     Goal Identifier standing   Goal Description able to stand for 30 min for meal prep and household tasks in 8 weeks   Target Date     Date Met      Progress (detail required for progress note): tolerance is 10-15 min     Goal Identifier     Goal Description     Target Date     Date Met      Progress (detail required for progress note):       Goal Identifier     Goal Description     Target Date      Date Met      Progress (detail required for progress note):       Goal Identifier     Goal Description     Target Date     Date Met      Progress (detail required for progress note):       Goal Identifier     Goal Description     Target Date     Date Met      Progress (detail required for progress note):       Goal Identifier     Goal Description     Target Date     Date Met      Progress (detail required for progress note):             Plan:  Discharge from therapy. Patient to have neuro consult.     Discharge:    Reason for Discharge: Patient has not responded to PT.     Equipment Issued:     Discharge Plan: Patient to continue home program.

## 2021-11-11 DIAGNOSIS — F32.A DEPRESSION, UNSPECIFIED DEPRESSION TYPE: ICD-10-CM

## 2021-11-11 DIAGNOSIS — G62.9 PERIPHERAL POLYNEUROPATHY: ICD-10-CM

## 2021-11-12 RX ORDER — CITALOPRAM HYDROBROMIDE 10 MG/1
10 TABLET ORAL DAILY
Qty: 90 TABLET | Refills: 3 | Status: SHIPPED | OUTPATIENT
Start: 2021-11-12 | End: 2022-10-18

## 2021-11-12 RX ORDER — GABAPENTIN 300 MG/1
CAPSULE ORAL
Qty: 450 CAPSULE | Refills: 3 | Status: SHIPPED | OUTPATIENT
Start: 2021-11-12 | End: 2022-10-18

## 2021-11-12 RX ORDER — FLUTICASONE PROPIONATE 50 MCG
1-2 SPRAY, SUSPENSION (ML) NASAL DAILY
Qty: 48 G | Refills: 1 | Status: SHIPPED | OUTPATIENT
Start: 2021-11-12 | End: 2022-07-14

## 2021-11-12 NOTE — TELEPHONE ENCOUNTER
Pended RX Fluticasone Prop 50 mcg spray, DISP:  48 g, SIG: Spray 1-2 sprays into both nostrils daily, R^ 1      Last Written Prescription Date:  4/7/2021  Last Fill Quantity: 48 g # refills: 1  Last Office Visit: 4/21/2021  Future Office visit:     Routed Dr. Varma for review.   Next 5 appointments (look out 90 days)    Dec 02, 2021 11:30 AM  (Arrive by 11:15 AM)  Return Visit with LUZ MARIA Padgett  [facility] [place] ([facility] ) [address] [place] [address]  [address] [id number] [phone]           Routing refill request to provider for review/approval because:  Drug not on the [facility], [facility] or [facility] refill protocol or controlled substance

## 2021-11-16 DIAGNOSIS — R42 DIZZINESS: ICD-10-CM

## 2021-11-16 DIAGNOSIS — I10 BENIGN ESSENTIAL HYPERTENSION: ICD-10-CM

## 2021-11-16 ASSESSMENT — ENCOUNTER SYMPTOMS
TASTE DISTURBANCE: 0
INCREASED ENERGY: 0
TINGLING: 1
MYALGIAS: 0
TROUBLE SWALLOWING: 0
FLANK PAIN: 0
WEIGHT GAIN: 0
ALTERED TEMPERATURE REGULATION: 0
SMELL DISTURBANCE: 0
MUSCLE CRAMPS: 0
LOSS OF CONSCIOUSNESS: 0
FEVER: 0
DIZZINESS: 0
POLYDIPSIA: 0
HEMATURIA: 0
SINUS CONGESTION: 0
WEIGHT LOSS: 0
NUMBNESS: 0
SEIZURES: 0
TREMORS: 0
MUSCLE WEAKNESS: 0
STIFFNESS: 0
MEMORY LOSS: 0
BACK PAIN: 1
NECK PAIN: 0
HEADACHES: 1
DYSURIA: 0
HALLUCINATIONS: 0
HOARSE VOICE: 0
FATIGUE: 0
NIGHT SWEATS: 0
ARTHRALGIAS: 0
DECREASED APPETITE: 0
NECK MASS: 0
DISTURBANCES IN COORDINATION: 0
CHILLS: 0
SORE THROAT: 0
WEAKNESS: 0
DIFFICULTY URINATING: 1
PARALYSIS: 0
SINUS PAIN: 0
SPEECH CHANGE: 0
JOINT SWELLING: 0

## 2021-11-16 NOTE — TELEPHONE ENCOUNTER
"Request for medication refill:  lisinopril (ZESTRIL) 20 MG tablet    Providers if patient needs an appointment and you are willing to give a one month supply please refill for one month and  send a letter/MyChart using \".SMILLIMITEDREFILL\" .smillimited and route chart to \"P [facility] \" (Giving one month refill in non controlled medications is strongly recommended before denial)    If refill has been denied, meaning absolutely no refills without visit, please complete the smart phrase \".smirxrefuse\" and route it to the \"P SMI MED REFILLS\"  pool to inform the patient and the pharmacy.    Michelle Palm MA        "

## 2021-11-17 RX ORDER — LISINOPRIL 20 MG/1
20 TABLET ORAL DAILY
Qty: 90 TABLET | Refills: 3 | Status: SHIPPED | OUTPATIENT
Start: 2021-11-17 | End: 2022-10-18

## 2021-11-19 ENCOUNTER — OFFICE VISIT (OUTPATIENT)
Dept: NEUROLOGY | Facility: CLINIC | Age: 61
End: 2021-11-19
Payer: COMMERCIAL

## 2021-11-19 VITALS
DIASTOLIC BLOOD PRESSURE: 77 MMHG | HEART RATE: 63 BPM | OXYGEN SATURATION: 99 % | SYSTOLIC BLOOD PRESSURE: 120 MMHG | RESPIRATION RATE: 16 BRPM

## 2021-11-19 DIAGNOSIS — R42 DIZZINESS: ICD-10-CM

## 2021-11-19 DIAGNOSIS — G89.29 CHRONIC BILATERAL LOW BACK PAIN WITHOUT SCIATICA: ICD-10-CM

## 2021-11-19 DIAGNOSIS — M54.50 CHRONIC BILATERAL LOW BACK PAIN WITHOUT SCIATICA: ICD-10-CM

## 2021-11-19 DIAGNOSIS — R20.0 BILATERAL LEG NUMBNESS: ICD-10-CM

## 2021-11-19 DIAGNOSIS — G44.209 TENSION HEADACHE: Primary | ICD-10-CM

## 2021-11-19 PROCEDURE — 99215 OFFICE O/P EST HI 40 MIN: CPT | Performed by: INTERNAL MEDICINE

## 2021-11-19 ASSESSMENT — PAIN SCALES - GENERAL: PAINLEVEL: NO PAIN (1)

## 2021-11-19 NOTE — LETTER
11/19/2021       RE: Juan Francisco Booker  [address]  [place] [address]     Dear Colleague,    Thank you for referring your patient, Juan Francisco Booker, to the [facility] NEUROLOGY [facility]IN[facility] [place] [facility]. Please see a copy of my visit note below.    [facility] Neurology Consultation    Juan Francisco Booker MRN# [id number]   Age: 61 year old YOB: 1960     Requesting physician: Referred Self  Ivon Red     Reason for Consultation: numbness/tingling, headaches, dizziness, low back pain      History of Presenting Symptoms:   Juan Francisco Booker is a 61 year old male who presents today for evaluation of lower extremity numbness/tingling, headaches, dizziness, and low back pain.      Patient previously saw Dr Seaman, last in 10/2020. He has tingling on the tops of both feet. This has been stable to improving over the last 2-3 years. He takes gabapentin for the tingling and restless legs. This has improved his restless legs. His tingling is now not as painful.     He also has tingling in the right pinky finger. He denies any elbow or neck pain.     He has had headaches for most of his life. They have been more bothersome as of recently and are happening every day. They seem to be mainly be associated with sitting. He will notice it with sitting in the recliner and working on his computer. He doesn't notice it with lying down. He also can get them if he stands in the same place for a while. When he gets the headache he will get up and walk around and usually it improves within 15 minutes. He takes tylenol about 3 times a week and it can sometimes help. A typical headache is about a 4/10. The headaches are usually associated with a dizziness, which he describes as fuzziness or lightheadedness        He denies nausea, or light/sound sensitivity, or blurry vision with the headaches.     He wears a CPAP machine at night. He feels refreshed when he walks.  "Sleep is better compared to before.       Past Medical History:     Patient Active Problem List   Diagnosis     Obstructive sleep apnea     Insomnia     Excessive sleepiness     Chronic nasal congestion     Other and unspecified alcohol dependence, unspecified drinking behavior     Lesion of ulnar nerve     Hyperlipidemia     Essential hypertension     Obesity     Benign neoplasm of colon     Hyperlipidemia     Moderate major depression (H)     Gastric polyps     Impaired glucose tolerance test     Prediabetes     Skin tag     Unilateral inguinal hernia with obstruction and without gangrene, recurrence not specified     Past Medical History:   Diagnosis Date     Anxiety      Benign neoplasm of colon 3/2/2015     Depression      Depressive disorder 1/15/2000     Difficult intubation      ETOH abuse 3/15/2009    in remission     Gastro-oesophageal reflux disease 1/15/2010     Hyperlipidemia 1/1/2000     Hypoxemia      Morbid obesity (H)      Nasal polyps 1/1/2015     TIMOTHY on CPAP 8/13/2012    Severe (uses 5-6 hours as able)     Other and unspecified hyperlipidemia 10/25/2012     Personal history of colonic polyps 2/207/15    Multiple \"neg for FAP\"     Pre-diabetes      Prediabetes 5/24/2017     Restless leg      Skin tag      Surgical complication 1/6/2015    difficulty inserting a tube down my throat     Unspecified essential hypertension 10/25/2012        Past Surgical History:     Past Surgical History:   Procedure Laterality Date     AS COLONOSCOPY THRU STOMA W BIOPSY/CAUTERY TUMOR/POLYP/LESION  2/27/15    colon polyps     COLONOSCOPY  2/27/2015    x 2     COLONOSCOPY N/A 1/6/2020    Procedure: COLONOSCOPY, WITH POLYPECTOMY AND BIOPSY;  Surgeon: Omer Salas MD;  Location:  GI     COLONOSCOPY WITH CO2 INSUFFLATION N/A 10/20/2016    Procedure: COLONOSCOPY WITH CO2 INSUFFLATION;  Surgeon: Juan Francisco Beltran MD;  [facility]     ENT SURGERY  1/5/2011    [facility]     ESOPHAGOSCOPY, GASTROSCOPY, " DUODENOSCOPY (EGD), COMBINED N/A 10/20/2016    Procedure: COMBINED ESOPHAGOSCOPY, GASTROSCOPY, DUODENOSCOPY (EGD);  Surgeon: Juan Francisco Beltran MD;  [facility] OR     LAPAROSCOPIC HERNIORRHAPHY INGUINAL Right 3/24/2021    Procedure: HERNIORRHAPHY, INGUINAL, LAPAROSCOPIC;  Surgeon: Laith Villa MD;  Location: [facility] OR     RELEASE CARPAL TUNNEL Right 6/14/2017    Procedure: RELEASE CARPAL TUNNEL;  Right Open Carpal Tunnel Release;  Surgeon: Aracelis Lowe MD;  [facility]     RELEASE CARPAL TUNNEL Left 12/29/2017    Procedure: RELEASE CARPAL TUNNEL;  Left Open Carpal Tunnel Release;  Surgeon: Aracelis Lowe MD;  [facility] OR        Social History:     Social History     Tobacco Use     Smoking status: Never Smoker     Smokeless tobacco: Never Used   Substance Use Topics     Alcohol use: Not Currently     Comment: In remission since 2009     Drug use: No        Family History:     Family History   Problem Relation Age of Onset     Other Cancer Mother      Cancer Mother      Other Cancer Brother      Cancer Brother      Cerebrovascular Disease Maternal Grandfather         ,, ,, ,, ,, ,, ,, ,, ,     Alcohol/Drug Brother      Cancer Brother         pancreatic; liver     Other Cancer Brother      Asthma No family hx of      Anesthesia Reaction No family hx of      Colon Polyps No family hx of      Crohn's Disease No family hx of      Ulcerative Colitis No family hx of      Colon Cancer No family hx of      Cardiovascular No family hx of      Deep Vein Thrombosis (DVT) No family hx of         Medications:     Current Outpatient Medications   Medication Sig     aspirin 81 MG tablet Take 1 tablet by mouth daily AM     Caffeine 100 MG TABS Take 2 tablets by mouth daily as needed     citalopram (CELEXA) 10 MG tablet Take 1 tablet (10 mg) by mouth daily     DENTA 5000 PLUS 1.1 % CREA APPLY A PEA SIZED AMOUNT TO TOOTHBRUSH, BRUSH ONTO ROOT AREAS THOROUGHLY, THEN SPIT OUT AT BEDTIME.     dutasteride  (AVODART) 0.5 MG capsule Take 1 capsule (0.5 mg) by mouth daily     finasteride (PROSCAR) 5 MG tablet Take 1 tablet (5 mg) by mouth daily     fluticasone (FLONASE) 50 MCG/ACT nasal spray Spray 1-2 sprays into both nostrils daily     gabapentin (NEURONTIN) 300 MG capsule 1 in morning, 1 at midday, 3 at night     lisinopril (ZESTRIL) 20 MG tablet Take 1 tablet (20 mg) by mouth daily     mirabegron (MYRBETRIQ) 25 MG 24 hr tablet Take 1 tablet (25 mg) by mouth daily     Multiple Vitamin (DAILY MULTIVITAMIN PO) Take 1 tablet by mouth daily AM     ORDER FOR DME Use your CPAP device as directed by your provider.     polyethylene glycol (MIRALAX) 17 GM/Dose powder Take 9-17 g by mouth daily     simvastatin (ZOCOR) 40 MG tablet Take 1 tablet (40 mg) by mouth At Bedtime     SODIUM FLUORIDE 5000 PPM 1.1 % PSTE BRUSH TEETH WITH PEA SIZED AMOUNT AT BEDTIME AND DO NOT RINSE     tamsulosin (FLOMAX) 0.4 MG capsule Take 2 capsules (0.8 mg) by mouth daily 30 min after a meal.   Please keep visit for 5/11/2021 for further refills. Thank you     No current facility-administered medications for this visit.        Allergies:     Allergies   Allergen Reactions     Grass         Review of Systems:   As above     Physical Exam:   Vitals: /77   Pulse 63   Resp 16   SpO2 99%    General: Seated comfortably in no acute distress.  HEENT: Neck supple with normal range of motion. Mild paracervical tightness.  Optic discs sharp and vasculature normal on funduscopic exam.   Lungs: breathing comfortably  Extremities: no edema  Skin: No rashes  Neurologic:     Mental Status: Fully alert, attentive. Normal memory and fund of knowledge. Language normal, speech clear and fluent, no paraphasic errors.      Cranial Nerves: Visual fields intact. PERRL. EOMI with normal smooth pursuit. Facial sensation intact/symmetric. Facial movements symmetric. Hearing not formally tested but intact to conversation. Palate elevation symmetric, uvula midline. No  dysarthria. Shoulder shrug strong bilaterally. Tongue protrusion midline.     Motor: No tremors or other abnormal movements observed. Muscle tone normal throughout. Normal/symmetric rapid finger tapping. Strength 5/5 throughout upper and lower extremities.     Deep Tendon Reflexes: 2+/symmetric throughout upper and lower extremities with exception of 1+ ankle jerks. No clonus. Toes downgoing bilaterally.     Sensory: Intact/symmetric to light touch, pinprick, temperature, vibration and proprioception throughout upper and lower extremities. Negative Romberg.      Coordination: Finger-nose-finger and heel-shin intact without dysmetria. Rapid alternating movements intact/symmetric with normal speed and rhythm.     Gait: Normal, steady casual gait. Able to walk on toes, heels and tandem with mild difficulty.         Data: Pertinent prior to visit   Imaging:  MRI lumbar 8/2021  Impression:   1. Multiple unchanged presumed hemangiomas as described above.   2. Anterior wedge-shaped compression deformities of T12 and L4,  unchanged compared to 10/22/2020.  3. Multilevel lumbar spondylosis, most pronounced at L3-4 with  moderate spinal canal and moderate bilateral neural foraminal  stenosis. No significant change in overall finding compared  To10/22/2020.    MRI cervical/thoracic/lumbar 10/2020  Impression:  1. No myelopathic signal in the spinal cord.  2. Numerous probable hemangiomas in C5, C6, L2, S2, S3, and the left  iliac bone. Other areas of low T1 signal intensity in the marrow were  also present. Review of prior imaging, if available, would be helpful  to ensure stability. Metastatic disease is thought unlikely, but is  difficult to exclude.  3. Degenerative mild to moderate spinal canal narrowing at L3-4 mild  to moderate bilateral neural foraminal narrowing at L3-4 and L4-5.  Moderate right greater than left neural foraminal narrowing at C5-6.    Procedures:  EMG 7/2020  Interpretation:  This is a normal study.  There is no electrophysiologic evidence of a large-fiber polyneuropathy or lumbosacral radiculopathy affecting the lower limbs on the basis of this study.      Laboratory:  A1c 5.4 (7/2021)  B12 331 (8/2020)  Hep C negative (6/2020)         Assessment and Plan:   Assessment:  Juan Francisco Booker is a 61 year old male who presents today for evaluation of lower extremity numbness/tingling, headaches, dizziness, and low back pain. Patient previously followed with Dr Seaman. He has had extensive work-up for lower extremity tingling as above, which has not revealed an etiology. Symptoms are not as bothersome now compared to last appointment. He doesn't have exam findings suggestive of small fiber neuropathy. Headaches and dizziness appears to be positional, provoked by prolonged sitting or standing upright. Symptoms typically resolve 15 minutes later after changing positions. An atypical presentation of tension headaches is a possibility. Thoracic outlet ultrasound ordered to evaluate for extra cranial venous compression as an etiology as well. Physical therapy referral will be considered pending ultrasound. We discussed slight increase in gabapentin dosage to help with headaches.     Patient also reports chronic back pain without sciatic. The majority of the pain is in the left lower back area. He previously did physical therapy without significant benefit. Referral placed to physiatry for evaluation of musculoskeletal causes of back pain.         Plan:  - Increase gabapentin to 900 mg BID  - Thoracic outlet ultrasound - Please measure internal jugular vein velocities in the head neutral, flexion, extension, and head turned positions  - Physiatry referral    Follow up in Neurology clinic in 3 months or earlier as needed should new concerns arise.    Bulmaro Deshpande MD   of Neurology  [facility]      The total time of this encounter today amounted to 55 minutes. This time included time spent with  the patient, prep work, ordering tests, and performing post visit documentation.

## 2021-11-19 NOTE — PROGRESS NOTES
[facility] Neurology Consultation    Juan Francisco Booker MRN# [id number]   Age: 61 year old YOB: 1960     Requesting physician: Referred Self  Ivon Red     Reason for Consultation: numbness/tingling, headaches, dizziness, low back pain      History of Presenting Symptoms:   Juan Francisco Booker is a 61 year old male who presents today for evaluation of lower extremity numbness/tingling, headaches, dizziness, and low back pain.      Patient previously saw Dr Seaman, last in 10/2020. He has tingling on the tops of both feet. This has been stable to improving over the last 2-3 years. He takes gabapentin for the tingling and restless legs. This has improved his restless legs. His tingling is now not as painful.     He also has tingling in the right pinky finger. He denies any elbow or neck pain.     He has had headaches for most of his life. They have been more bothersome as of recently and are happening every day. They seem to be mainly be associated with sitting. He will notice it with sitting in the recliner and working on his computer. He doesn't notice it with lying down. He also can get them if he stands in the same place for a while. When he gets the headache he will get up and walk around and usually it improves within 15 minutes. He takes tylenol about 3 times a week and it can sometimes help. A typical headache is about a 4/10. The headaches are usually associated with a dizziness, which he describes as fuzziness or lightheadedness        He denies nausea, or light/sound sensitivity, or blurry vision with the headaches.     He wears a CPAP machine at night. He feels refreshed when he walks. Sleep is better compared to before.       Past Medical History:     Patient Active Problem List   Diagnosis     Obstructive sleep apnea     Insomnia     Excessive sleepiness     Chronic nasal congestion     Other and unspecified alcohol dependence, unspecified drinking behavior     Lesion of ulnar nerve      "Hyperlipidemia     Essential hypertension     Obesity     Benign neoplasm of colon     Hyperlipidemia     Moderate major depression (H)     Gastric polyps     Impaired glucose tolerance test     Prediabetes     Skin tag     Unilateral inguinal hernia with obstruction and without gangrene, recurrence not specified     Past Medical History:   Diagnosis Date     Anxiety      Benign neoplasm of colon 3/2/2015     Depression      Depressive disorder 1/15/2000     Difficult intubation      ETOH abuse 3/15/2009    in remission     Gastro-oesophageal reflux disease 1/15/2010     Hyperlipidemia 1/1/2000     Hypoxemia      Morbid obesity (H)      Nasal polyps 1/1/2015     TIMOTHY on CPAP 8/13/2012    Severe (uses 5-6 hours as able)     Other and unspecified hyperlipidemia 10/25/2012     Personal history of colonic polyps 2/207/15    Multiple \"neg for FAP\"     Pre-diabetes      Prediabetes 5/24/2017     Restless leg      Skin tag      Surgical complication 1/6/2015    difficulty inserting a tube down my throat     Unspecified essential hypertension 10/25/2012        Past Surgical History:     Past Surgical History:   Procedure Laterality Date     AS COLONOSCOPY THRU STOMA W BIOPSY/CAUTERY TUMOR/POLYP/LESION  2/27/15    colon polyps     COLONOSCOPY  2/27/2015    x 2     COLONOSCOPY N/A 1/6/2020    Procedure: COLONOSCOPY, WITH POLYPECTOMY AND BIOPSY;  Surgeon: Omer Salas MD;  Location:  GI     COLONOSCOPY WITH CO2 INSUFFLATION N/A 10/20/2016    Procedure: COLONOSCOPY WITH CO2 INSUFFLATION;  Surgeon: Juan Francisco Beltran MD;  [facility] OR     ENT SURGERY  1/5/2011    [facility]     ESOPHAGOSCOPY, GASTROSCOPY, DUODENOSCOPY (EGD), COMBINED N/A 10/20/2016    Procedure: COMBINED ESOPHAGOSCOPY, GASTROSCOPY, DUODENOSCOPY (EGD);  Surgeon: Juan Francisco Beltran MD;  Location: [facility] OR     LAPAROSCOPIC HERNIORRHAPHY INGUINAL Right 3/24/2021    Procedure: HERNIORRHAPHY, INGUINAL, LAPAROSCOPIC;  Surgeon: Laith Villa, " MD;  Location: [facility] OR     RELEASE CARPAL TUNNEL Right 6/14/2017    Procedure: RELEASE CARPAL TUNNEL;  Right Open Carpal Tunnel Release;  Surgeon: Aracelis Lowe MD;  [facility]     RELEASE CARPAL TUNNEL Left 12/29/2017    Procedure: RELEASE CARPAL TUNNEL;  Left Open Carpal Tunnel Release;  Surgeon: Aracelis Lowe MD;  [facility] OR        Social History:     Social History     Tobacco Use     Smoking status: Never Smoker     Smokeless tobacco: Never Used   Substance Use Topics     Alcohol use: Not Currently     Comment: In remission since 2009     Drug use: No        Family History:     Family History   Problem Relation Age of Onset     Other Cancer Mother      Cancer Mother      Other Cancer Brother      Cancer Brother      Cerebrovascular Disease Maternal Grandfather         ,, ,, ,, ,, ,, ,, ,, ,     Alcohol/Drug Brother      Cancer Brother         pancreatic; liver     Other Cancer Brother      Asthma No family hx of      Anesthesia Reaction No family hx of      Colon Polyps No family hx of      Crohn's Disease No family hx of      Ulcerative Colitis No family hx of      Colon Cancer No family hx of      Cardiovascular No family hx of      Deep Vein Thrombosis (DVT) No family hx of         Medications:     Current Outpatient Medications   Medication Sig     aspirin 81 MG tablet Take 1 tablet by mouth daily AM     Caffeine 100 MG TABS Take 2 tablets by mouth daily as needed     citalopram (CELEXA) 10 MG tablet Take 1 tablet (10 mg) by mouth daily     DENTA 5000 PLUS 1.1 % CREA APPLY A PEA SIZED AMOUNT TO TOOTHBRUSH, BRUSH ONTO ROOT AREAS THOROUGHLY, THEN SPIT OUT AT BEDTIME.     dutasteride (AVODART) 0.5 MG capsule Take 1 capsule (0.5 mg) by mouth daily     finasteride (PROSCAR) 5 MG tablet Take 1 tablet (5 mg) by mouth daily     fluticasone (FLONASE) 50 MCG/ACT nasal spray Spray 1-2 sprays into both nostrils daily     gabapentin (NEURONTIN) 300 MG capsule 1 in morning, 1 at midday, 3 at night      lisinopril (ZESTRIL) 20 MG tablet Take 1 tablet (20 mg) by mouth daily     mirabegron (MYRBETRIQ) 25 MG 24 hr tablet Take 1 tablet (25 mg) by mouth daily     Multiple Vitamin (DAILY MULTIVITAMIN PO) Take 1 tablet by mouth daily AM     ORDER FOR DME Use your CPAP device as directed by your provider.     polyethylene glycol (MIRALAX) 17 GM/Dose powder Take 9-17 g by mouth daily     simvastatin (ZOCOR) 40 MG tablet Take 1 tablet (40 mg) by mouth At Bedtime     SODIUM FLUORIDE 5000 PPM 1.1 % PSTE BRUSH TEETH WITH PEA SIZED AMOUNT AT BEDTIME AND DO NOT RINSE     tamsulosin (FLOMAX) 0.4 MG capsule Take 2 capsules (0.8 mg) by mouth daily 30 min after a meal.   Please keep visit for 5/11/2021 for further refills. Thank you     No current facility-administered medications for this visit.        Allergies:     Allergies   Allergen Reactions     Grass         Review of Systems:   As above     Physical Exam:   Vitals: /77   Pulse 63   Resp 16   SpO2 99%    General: Seated comfortably in no acute distress.  HEENT: Neck supple with normal range of motion. Mild paracervical tightness.  Optic discs sharp and vasculature normal on funduscopic exam.   Lungs: breathing comfortably  Extremities: no edema  Skin: No rashes  Neurologic:     Mental Status: Fully alert, attentive. Normal memory and fund of knowledge. Language normal, speech clear and fluent, no paraphasic errors.      Cranial Nerves: Visual fields intact. PERRL. EOMI with normal smooth pursuit. Facial sensation intact/symmetric. Facial movements symmetric. Hearing not formally tested but intact to conversation. Palate elevation symmetric, uvula midline. No dysarthria. Shoulder shrug strong bilaterally. Tongue protrusion midline.     Motor: No tremors or other abnormal movements observed. Muscle tone normal throughout. Normal/symmetric rapid finger tapping. Strength 5/5 throughout upper and lower extremities.     Deep Tendon Reflexes: 2+/symmetric throughout  upper and lower extremities with exception of 1+ ankle jerks. No clonus. Toes downgoing bilaterally.     Sensory: Intact/symmetric to light touch, pinprick, temperature, vibration and proprioception throughout upper and lower extremities. Negative Romberg.      Coordination: Finger-nose-finger and heel-shin intact without dysmetria. Rapid alternating movements intact/symmetric with normal speed and rhythm.     Gait: Normal, steady casual gait. Able to walk on toes, heels and tandem with mild difficulty.         Data: Pertinent prior to visit   Imaging:  MRI lumbar 8/2021  Impression:   1. Multiple unchanged presumed hemangiomas as described above.   2. Anterior wedge-shaped compression deformities of T12 and L4,  unchanged compared to 10/22/2020.  3. Multilevel lumbar spondylosis, most pronounced at L3-4 with  moderate spinal canal and moderate bilateral neural foraminal  stenosis. No significant change in overall finding compared  To10/22/2020.    MRI cervical/thoracic/lumbar 10/2020  Impression:  1. No myelopathic signal in the spinal cord.  2. Numerous probable hemangiomas in C5, C6, L2, S2, S3, and the left  iliac bone. Other areas of low T1 signal intensity in the marrow were  also present. Review of prior imaging, if available, would be helpful  to ensure stability. Metastatic disease is thought unlikely, but is  difficult to exclude.  3. Degenerative mild to moderate spinal canal narrowing at L3-4 mild  to moderate bilateral neural foraminal narrowing at L3-4 and L4-5.  Moderate right greater than left neural foraminal narrowing at C5-6.    Procedures:  EMG 7/2020  Interpretation:  This is a normal study. There is no electrophysiologic evidence of a large-fiber polyneuropathy or lumbosacral radiculopathy affecting the lower limbs on the basis of this study.      Laboratory:  A1c 5.4 (7/2021)  B12 331 (8/2020)  Hep C negative (6/2020)         Assessment and Plan:   Assessment:  Juan Francisco Booker is a 61 year old  male who presents today for evaluation of lower extremity numbness/tingling, headaches, dizziness, and low back pain. Patient previously followed with Dr Seaman. He has had extensive work-up for lower extremity tingling as above, which has not revealed an etiology. Symptoms are not as bothersome now compared to last appointment. He doesn't have exam findings suggestive of small fiber neuropathy. Headaches and dizziness appears to be positional, provoked by prolonged sitting or standing upright. Symptoms typically resolve 15 minutes later after changing positions. An atypical presentation of tension headaches is a possibility. Thoracic outlet ultrasound ordered to evaluate for extra cranial venous compression as an etiology as well. Physical therapy referral will be considered pending ultrasound. We discussed slight increase in gabapentin dosage to help with headaches.     Patient also reports chronic back pain without sciatic. The majority of the pain is in the left lower back area. He previously did physical therapy without significant benefit. Referral placed to physiatry for evaluation of musculoskeletal causes of back pain.         Plan:  - Increase gabapentin to 900 mg BID  - Thoracic outlet ultrasound - Please measure internal jugular vein velocities in the head neutral, flexion, extension, and head turned positions  - Physiatry referral    Follow up in Neurology clinic in 3 months or earlier as needed should new concerns arise.    Bulmaro Deshpande MD   of Neurology  [facility]      The total time of this encounter today amounted to 55 minutes. This time included time spent with the patient, prep work, ordering tests, and performing post visit documentation.

## 2021-11-22 NOTE — ADDENDUM NOTE
Encounter addended by: Rachel Zuñiga, PT on: 11/22/2021 2:13 PM   Actions taken: Episode resolved, Clinical Note Signed

## 2021-11-23 ENCOUNTER — OFFICE VISIT (OUTPATIENT)
Dept: NEUROSURGERY | Facility: CLINIC | Age: 61
End: 2021-11-23
Payer: COMMERCIAL

## 2021-11-23 VITALS
HEIGHT: 68 IN | DIASTOLIC BLOOD PRESSURE: 79 MMHG | WEIGHT: 181.6 LBS | SYSTOLIC BLOOD PRESSURE: 127 MMHG | BODY MASS INDEX: 27.52 KG/M2 | HEART RATE: 53 BPM

## 2021-11-23 DIAGNOSIS — M99.04 SACROILIAC JOINT SOMATIC DYSFUNCTION: Primary | ICD-10-CM

## 2021-11-23 DIAGNOSIS — G89.29 CHRONIC BILATERAL LOW BACK PAIN WITHOUT SCIATICA: ICD-10-CM

## 2021-11-23 DIAGNOSIS — M54.50 CHRONIC BILATERAL LOW BACK PAIN WITHOUT SCIATICA: ICD-10-CM

## 2021-11-23 PROCEDURE — 99204 OFFICE O/P NEW MOD 45 MIN: CPT | Mod: 25 | Performed by: PREVENTIVE MEDICINE

## 2021-11-23 PROCEDURE — 98925 OSTEOPATH MANJ 1-2 REGIONS: CPT | Performed by: PREVENTIVE MEDICINE

## 2021-11-23 ASSESSMENT — PAIN SCALES - GENERAL: PAINLEVEL: MILD PAIN (3)

## 2021-11-23 ASSESSMENT — MIFFLIN-ST. JEOR: SCORE: 1603.23

## 2021-11-23 NOTE — LETTER
11/23/2021         RE: Juan Francisco Booker  [address]  [place] [address]        Dear Colleague,    Thank you for referring your patient, Juan Francisco Booker, to the [facility] [place]. Please see a copy of my visit note below.        SUBJECTIVE:  HPI:  Juan Francisco Booker  Is a 61 year old male who presents today for new patient evaluation of low back pain upon referral from Dr. Bulmaro Deshpande    He has tingling on the tops of both feet. This has been stable to improving over the last 2-3 years. He takes gabapentin for the tingling and restless legs. This has improved his restless legs. His tingling is now not as painful.  Pain onset was 3 to 4 years ago and it improved initially with some physical therapy and then he slipped on the ice a couple of times and the pain has gotten worse since then which was about 3 years ago.  Recently he tried physical therapy for about 2 months and it did not help.    His pain is left greater than right and he points to the SI area.  There is no radiating numbness tingling or weakness in his legs unless he sitting for short period of time and then he gets it on the tops of both ankles and both feet but it skips the rest of his leg.  No significant bowel or bladder dysfunction, unless he is far away from bathroom and cannot get there in time and has had a couple of bladder incontinence issues.  He does have a urologist.  And he does have some BPH with some urgency and slower voids.  No saddle anesthesia.  Does not have a history of cancer or prolonged steroid use.  He has been successful with 140 pounds of volitional weight loss but no unexplained weight loss.    SYMPTOMS WORSENED W ITH sitting, treadmill on an incline, standing  SYMPTOMS IMPROVED WITH sitting in a recliner    Pain score, disability index score and diagram reviewed.  See questionnaire.      ROS:  Specifically negative for bowel/bladder dysfunction, balance changes, headache, leg  pain/numbness/weakness, fevers, chills, night sweats, unexplained weight loss;  otherwise unremarkable.   Please see the patient's intake questionnaire from today for details.    Treatment to Date: PT, gabapentin, which Dr. Deshpande recently asked him to increase, but so far no change in symptoms with that.    MEDICATIONS:  Reviewed.    ALLERGIES:  Reviewed.     Reviewed past medical, surgical, and family history.    Pertinent for TIMOTHY treated with CPAP, hypertension, gastric polyps, prediabetes.  No pertinent surgical history.  He is on chronic aspirin therapy and gabapentin.    SOCIAL HX: He is single.  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  He walks for 1 hour a day on a treadmill      OBJECTIVE:  --CONSTITUTIONAL:   No acute distress.  The patient is well nourished and well groomed.  --PSYCHIATRIC:  Appropriate mood and affect. The patient is awake, alert, oriented to person, place, time and answering questions appropriately with clear speech.    --SKIN:  Skin over the face, bilateral lower extremities, and posterior torso is clean, dry, intact without rashes.  Multiple skin tags and seborrheic keratoses noted  --RESPIRATORY: Normal respiratory excursion and effort, and no dyspnea.   --GAIT:  is non-antalgic. Flat foot, heel and toe walking:  normal   .  One half squat and rise   normal    .  Minor back pain with squat  --STANDING EXAMINATION:    Symmetry of spine/pelvis   unremarkable   .      Range of motion full and painless.   Standing flexion    positive left.    Kanika's sign   negative    .     Stork test   negative    .   --NEUROLOGICAL:     ROMBERG, TANDEM WALK, PRONATOR DRIFT:   Normal.   .  SENSATION to light touch is intact in bilateral thighs, lower legs and feet.   REFLEXES:  patellar 2+, and achilles 1+.  Babinski is negative. No clonus.  MANUAL MOTOR TESTING:  L3- S1 Myotomes, Femoral, Obturator, Peroneal and Tibial nerves 5/5   DURAL STRETCH TESTS:  SLR negative.   Femoral Stretch Test deferred.   --PELVIC/HIP JOINTS:                Long Sitting   negative   leg lengths even.    Hip scour   negative   .    Hip Impingement   negative   .   SHAR   negative   but on the left causes nonconcordant inguinal pain.     Piriformis   negative   but on the right causes Noncon cord and inguinal pain.   Spring testing negative SI and lumbar.      PELVIC ALIGNMENT subtle but possible left anterior innominate rotation.   --LUMBAR/GLUTEAL MUSCLES: Nontender and no trigger points or spasm.    --ABDOMINAL:  Non-distended.  Nontender.  Signs of massive weight loss.  --VASCULAR: Femoral pulses 2+ and bounding. Lower extremity capillary refill, temperature and color normal.         IMAGING: Images and reports reviewed.    MRI lumbar 8/2021    L3-4: Posterior disc bulge. Bilateral facet arthropathy and ligamentum flavum thickening. Bilateral moderate neural foraminal stenosis. Moderate spinal canal stenosis.      L4-5: Posterior disc bulge and superimposed left foraminal disc  protrusion. Bilateral moderate neural foraminal stenosis. Mild spinal canal stenosis.     Impression:   1. Multiple unchanged presumed hemangiomas as described above.   2. Anterior wedge-shaped compression deformities of T12 and L4,  unchanged compared to 10/22/2020.  3. Multilevel lumbar spondylosis, most pronounced at L3-4 with  moderate spinal canal and moderate bilateral neural foraminal  stenosis. No significant change in overall finding compared  To10/22/2020.    Procedures:  EMG 7/2020  This is a normal study.      Procedure-OMT:  Manual medicine was done, and it is unclear if he really did have a Pelvic Joint Dysfunction to begin with, and his exam was pretty normal to begin with as well and no complications are noted but it is not clear that we made much of a difference in his findings    ASSESSMENT: Juan Francisco Booker is a 61 year old male who presents  today for new patient evaluation of: Chronic left greater than  right low back pain.  I am not 100% convinced that he really did have a Pelvic Joint Dysfunction but it appears it may have been a left anterior innominate rotation.  It is also quite possible the with his prior obesity, the there is some DJD within the SI joints.  Neurologically he is intact and I am not convinced that his lumbar MRI findings are relevant to his symptoms        PLAN:  No particular treatment plan other than to continue to advance his activities as tolerated and follow-up with me in 3 weeks to see if this is made a difference.    I would like to thank Dr. Bulmaro Deshpande for the opportunity to participate in the care of this very pleasant patient.      Back to his PCP about the advisability of continuing to use preventive baby aspirin.    Advised patient to call the [facility] if symptoms worsen or you have problems controlling bladder and bowel function or worsening leg weakness.     Please note: Voice recognition software was used in this dictation.  It may therefore contain typographical errors.    Braydon Chou MD             Again, thank you for allowing me to participate in the care of your patient.        Sincerely,        Braydon Chou MD

## 2021-11-23 NOTE — PATIENT INSTRUCTIONS
It was really nice to meet you Tomi.  I am impressed with your fitness and weight loss.  Possibly had a Pelvic Joint Dysfunction which we treated today but I am not 100% convinced.  I like to see back in 3 weeks to see if it made a difference as you continue to advance her activities.  Continue the home program you learned through physical therapy and your treadmill, and see if you do better on an incline.  If that flares up your back pain then just go to a flatter angle and a higher speed to burn off calories.  You would have to have a lot more symptoms before I recommend more aggressive intervention such as injections surgery or burning of nerves.

## 2021-11-23 NOTE — NURSING NOTE
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    Sports: no  Activities: walking             /79   Pulse 53   Ht 1.727 m (5' 8\")   Wt 82.4 kg (181 lb 9.6 oz)   BMI 27.61 kg/m        Allergies   Allergen Reactions     Grass        Current Outpatient Medications   Medication     aspirin 81 MG tablet     Caffeine 100 MG TABS     citalopram (CELEXA) 10 MG tablet     dutasteride (AVODART) 0.5 MG capsule     finasteride (PROSCAR) 5 MG tablet     fluticasone (FLONASE) 50 MCG/ACT nasal spray     gabapentin (NEURONTIN) 300 MG capsule     lisinopril (ZESTRIL) 20 MG tablet     mirabegron (MYRBETRIQ) 25 MG 24 hr tablet     Multiple Vitamin (DAILY MULTIVITAMIN PO)     ORDER FOR DME     polyethylene glycol (MIRALAX) 17 GM/Dose powder     simvastatin (ZOCOR) 40 MG tablet     SODIUM FLUORIDE 5000 PPM 1.1 % PSTE     tamsulosin (FLOMAX) 0.4 MG capsule     No current facility-administered medications for this visit.         Darla Severin-Brown, RAMIREZ  "

## 2021-11-23 NOTE — PROGRESS NOTES
SUBJECTIVE:  HPI:  Juan Francisco Booker  Is a 61 year old male who presents today for new patient evaluation of low back pain upon referral from Dr. Bulmaro Deshpande    He has tingling on the tops of both feet. This has been stable to improving over the last 2-3 years. He takes gabapentin for the tingling and restless legs. This has improved his restless legs. His tingling is now not as painful.  Pain onset was 3 to 4 years ago and it improved initially with some physical therapy and then he slipped on the ice a couple of times and the pain has gotten worse since then which was about 3 years ago.  Recently he tried physical therapy for about 2 months and it did not help.    His pain is left greater than right and he points to the SI area.  There is no radiating numbness tingling or weakness in his legs unless he sitting for short period of time and then he gets it on the tops of both ankles and both feet but it skips the rest of his leg.  No significant bowel or bladder dysfunction, unless he is far away from bathroom and cannot get there in time and has had a couple of bladder incontinence issues.  He does have a urologist.  And he does have some BPH with some urgency and slower voids.  No saddle anesthesia.  Does not have a history of cancer or prolonged steroid use.  He has been successful with 140 pounds of volitional weight loss but no unexplained weight loss.    SYMPTOMS WORSENED W ITH sitting, treadmill on an incline, standing  SYMPTOMS IMPROVED WITH sitting in a recliner    Pain score, disability index score and diagram reviewed.  See questionnaire.      ROS:  Specifically negative for bowel/bladder dysfunction, balance changes, headache, leg pain/numbness/weakness, fevers, chills, night sweats, unexplained weight loss;  otherwise unremarkable.   Please see the patient's intake questionnaire from today for details.    Treatment to Date: PT, gabapentin, which Dr. Deshpande recently asked him to increase, but so far no  change in symptoms with that.    MEDICATIONS:  Reviewed.    ALLERGIES:  Reviewed.     Reviewed past medical, surgical, and family history.    Pertinent for TIMOTHY treated with CPAP, hypertension, gastric polyps, prediabetes.  No pertinent surgical history.  He is on chronic aspirin therapy and gabapentin.    SOCIAL HX: He is single.  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  He walks for 1 hour a day on a treadmill      OBJECTIVE:  --CONSTITUTIONAL:   No acute distress.  The patient is well nourished and well groomed.  --PSYCHIATRIC:  Appropriate mood and affect. The patient is awake, alert, oriented to person, place, time and answering questions appropriately with clear speech.    --SKIN:  Skin over the face, bilateral lower extremities, and posterior torso is clean, dry, intact without rashes.  Multiple skin tags and seborrheic keratoses noted  --RESPIRATORY: Normal respiratory excursion and effort, and no dyspnea.   --GAIT:  is non-antalgic. Flat foot, heel and toe walking:  normal   .  One half squat and rise   normal    .  Minor back pain with squat  --STANDING EXAMINATION:    Symmetry of spine/pelvis   unremarkable   .      Range of motion full and painless.   Standing flexion    positive left.    Kanika's sign   negative    .     Stork test   negative    .   --NEUROLOGICAL:     ROMBERG, TANDEM WALK, PRONATOR DRIFT:   Normal.   .  SENSATION to light touch is intact in bilateral thighs, lower legs and feet.   REFLEXES:  patellar 2+, and achilles 1+.  Babinski is negative. No clonus.  MANUAL MOTOR TESTING:  L3- S1 Myotomes, Femoral, Obturator, Peroneal and Tibial nerves 5/5   DURAL STRETCH TESTS:  SLR negative.  Femoral Stretch Test deferred.   --PELVIC/HIP JOINTS:                Long Sitting   negative   leg lengths even.    Hip scour   negative   .    Hip Impingement   negative   .   SHAR   negative   but on the left causes nonconcordant inguinal pain.     Piriformis   negative   but  on the right causes Noncon cord and inguinal pain.   Spring testing negative SI and lumbar.      PELVIC ALIGNMENT subtle but possible left anterior innominate rotation.   --LUMBAR/GLUTEAL MUSCLES: Nontender and no trigger points or spasm.    --ABDOMINAL:  Non-distended.  Nontender.  Signs of massive weight loss.  --VASCULAR: Femoral pulses 2+ and bounding. Lower extremity capillary refill, temperature and color normal.         IMAGING: Images and reports reviewed.    MRI lumbar 8/2021    L3-4: Posterior disc bulge. Bilateral facet arthropathy and ligamentum flavum thickening. Bilateral moderate neural foraminal stenosis. Moderate spinal canal stenosis.      L4-5: Posterior disc bulge and superimposed left foraminal disc  protrusion. Bilateral moderate neural foraminal stenosis. Mild spinal canal stenosis.     Impression:   1. Multiple unchanged presumed hemangiomas as described above.   2. Anterior wedge-shaped compression deformities of T12 and L4,  unchanged compared to 10/22/2020.  3. Multilevel lumbar spondylosis, most pronounced at L3-4 with  moderate spinal canal and moderate bilateral neural foraminal  stenosis. No significant change in overall finding compared  To10/22/2020.    Procedures:  EMG 7/2020  This is a normal study.      Procedure-OMT:  Manual medicine was done, and it is unclear if he really did have a Pelvic Joint Dysfunction to begin with, and his exam was pretty normal to begin with as well and no complications are noted but it is not clear that we made much of a difference in his findings    ASSESSMENT: Juan Francisco Booker is a 61 year old male who presents  today for new patient evaluation of: Chronic left greater than right low back pain.  I am not 100% convinced that he really did have a Pelvic Joint Dysfunction but it appears it may have been a left anterior innominate rotation.  It is also quite possible the with his prior obesity, the there is some DJD within the SI joints.  Neurologically he  is intact and I am not convinced that his lumbar MRI findings are relevant to his symptoms        PLAN:  No particular treatment plan other than to continue to advance his activities as tolerated and follow-up with me in 3 weeks to see if this is made a difference.    I would like to thank Dr. Bulmaro Deshpande for the opportunity to participate in the care of this very pleasant patient.      Back to his PCP about the advisability of continuing to use preventive baby aspirin.    Advised patient to call the [facility] if symptoms worsen or you have problems controlling bladder and bowel function or worsening leg weakness.     Please note: Voice recognition software was used in this dictation.  It may therefore contain typographical errors.    Braydon Chou MD

## 2021-11-24 ENCOUNTER — ANCILLARY PROCEDURE (OUTPATIENT)
Dept: ULTRASOUND IMAGING | Facility: CLINIC | Age: 61
End: 2021-11-24
Attending: INTERNAL MEDICINE
Payer: COMMERCIAL

## 2021-11-24 DIAGNOSIS — G44.209 TENSION HEADACHE: ICD-10-CM

## 2021-11-24 DIAGNOSIS — R42 DIZZINESS: ICD-10-CM

## 2021-11-24 PROCEDURE — 93930 UPPER EXTREMITY STUDY: CPT | Mod: GC | Performed by: RADIOLOGY

## 2021-11-24 PROCEDURE — 93970 EXTREMITY STUDY: CPT | Mod: GC | Performed by: RADIOLOGY

## 2021-11-26 ENCOUNTER — PRE VISIT (OUTPATIENT)
Dept: UROLOGY | Facility: CLINIC | Age: 61
End: 2021-11-26
Payer: COMMERCIAL

## 2021-11-26 NOTE — TELEPHONE ENCOUNTER
Reason for visit: follow up      Relevant information: urinary frequency    Records/imaging/labs/orders: in epic    Pt called: no    At Rooming: normal

## 2021-11-29 ENCOUNTER — TELEPHONE (OUTPATIENT)
Dept: NEUROLOGY | Facility: CLINIC | Age: 61
End: 2021-11-29
Payer: COMMERCIAL

## 2021-11-29 DIAGNOSIS — R42 DIZZINESS: Primary | ICD-10-CM

## 2021-11-29 DIAGNOSIS — I87.1: ICD-10-CM

## 2021-12-02 ENCOUNTER — OFFICE VISIT (OUTPATIENT)
Dept: UROLOGY | Facility: CLINIC | Age: 61
End: 2021-12-02
Payer: COMMERCIAL

## 2021-12-02 VITALS
BODY MASS INDEX: 26.36 KG/M2 | WEIGHT: 178 LBS | SYSTOLIC BLOOD PRESSURE: 110 MMHG | HEIGHT: 69 IN | DIASTOLIC BLOOD PRESSURE: 69 MMHG | HEART RATE: 62 BPM

## 2021-12-02 DIAGNOSIS — R35.0 URINARY FREQUENCY: Primary | ICD-10-CM

## 2021-12-02 PROCEDURE — 99213 OFFICE O/P EST LOW 20 MIN: CPT | Performed by: PHYSICIAN ASSISTANT

## 2021-12-02 ASSESSMENT — MIFFLIN-ST. JEOR: SCORE: 1602.78

## 2021-12-02 ASSESSMENT — PAIN SCALES - GENERAL: PAINLEVEL: NO PAIN (0)

## 2021-12-02 NOTE — LETTER
12/2/2021       RE: Juan Francisco Booker  [address]  [place] [address]     Dear Colleague,    Thank you for referring your patient, Juan Francisco Booker, to the [facility] [place] at [facility]. Please see a copy of my visit note below.    HPI: Mr. Juan Francisco Booker is a 61 year old year old male presenting today for evaluation of chief complaint(s): Follow Up (urinary frequency )    Today, he is accompanied    Mr. Juan Francisco Booker has PMH significant for HTN, HLD, gerd, obesity, prediabetes (HGB A1C 5.4%), depression, TIMOTHY, BPH (on flomax 0.8mg daily - started 8/29/18, on avodart - started 5/11/21).  He has followed with urology since 2018.  Please see note from 9/16/21 for a summary of his history.  Essentially he has tried dietary sugar reduction, caffeine reduction, mgmt of     Seen on 9/16/21 - was started on miralax daily.  Asked to reduce caffeine.  He switched from dutasteride to finasteride and was continued on tamsulosin. We added myrbetriq 25mg daily.     -has not been taking Myrbetriq - too expensive.   -10-12 times/day, no incontinence  -some urgency  -incomplete emptying, double voiding, straining with urination   -no hematuria  -no dysuria  -less straining with bowel movements, softer/more frequent stools on miralax  -caffeine tabs reduced from 200 to 100 mg/day  - He is having some neck stiffness and may see physical therapy for that.      Current Outpatient Medications   Medication Sig Dispense Refill     aspirin 81 MG tablet Take 1 tablet by mouth daily AM       Caffeine 100 MG TABS Take 2 tablets by mouth daily as needed       citalopram (CELEXA) 10 MG tablet Take 1 tablet (10 mg) by mouth daily 90 tablet 3     dutasteride (AVODART) 0.5 MG capsule Take 1 capsule (0.5 mg) by mouth daily 60 capsule 5     finasteride (PROSCAR) 5 MG tablet Take 1 tablet (5 mg) by mouth daily 90 tablet 3     fluticasone (FLONASE) 50 MCG/ACT nasal  "spray Spray 1-2 sprays into both nostrils daily 48 g 1     gabapentin (NEURONTIN) 300 MG capsule 1 in morning, 1 at midday, 3 at night (Patient taking differently: 2 in morning, 3 at night) 450 capsule 3     lisinopril (ZESTRIL) 20 MG tablet Take 1 tablet (20 mg) by mouth daily 90 tablet 3     mirabegron (MYRBETRIQ) 25 MG 24 hr tablet Take 1 tablet (25 mg) by mouth daily 30 tablet 11     Multiple Vitamin (DAILY MULTIVITAMIN PO) Take 1 tablet by mouth daily AM       ORDER FOR DME Use your CPAP device as directed by your provider.       polyethylene glycol (MIRALAX) 17 GM/Dose powder Take 9-17 g by mouth daily 510 g 11     simvastatin (ZOCOR) 40 MG tablet Take 1 tablet (40 mg) by mouth At Bedtime 90 tablet 3     SODIUM FLUORIDE 5000 PPM 1.1 % PSTE BRUSH TEETH WITH PEA SIZED AMOUNT AT BEDTIME AND DO NOT RINSE       tamsulosin (FLOMAX) 0.4 MG capsule Take 2 capsules (0.8 mg) by mouth daily 30 min after a meal.   Please keep visit for 5/11/2021 for further refills. Thank you 60 capsule 11       ALLERGIES: Grass      REVIEW OF SYSTEMS:  As above in HPI    GENERAL PHYSICAL EXAM:   Vitals: /69   Pulse 62   Ht 1.753 m (5' 9\")   Wt 80.7 kg (178 lb)   BMI 26.29 kg/m    Body mass index is 26.29 kg/m .    GENERAL: Well groomed, well developed, well nourished male in NAD.  NEURO: Alert and oriented x 3.  PSYCH: Normal mood and affect, pleasant and agreeable during interview and exam.    LABS: The last test results for Mr. Juan Francisco Booker were reviewed:  PSA -   Lab Results   Component Value Date    PSA 0.51 07/02/2021    PSA 1.25 01/21/2020    PSA 1.58 12/19/2018    PSA 1.26 11/27/2018     BMP -   Recent Labs   Lab Test 07/02/21  1302 03/24/21  1015 03/08/21  1430 01/13/20  1321     --  143 140.0   POTASSIUM 4.0 3.9 4.0 4.0   CHLORIDE 108  --  108 102.0   CO2 30  --  30 31.0   BUN 16  --  19 14.2   CR 0.83  --  0.78 0.9   GLC 85  --  88 78.8   ZHEN 9.0  --  8.3* 9.7       CBC -   Recent Labs   Lab Test 03/08/21  1430 " 02/24/20  1521 10/24/18  1354   WBC 4.1  --   --    HGB 13.1* 13.7 14.1     --   --        ASSESSMENT:   1) BPH with luts (obstructive and irritative) --> UDS previously has shown sensory urgency  2) Constipation    PLAN:   - Myrbetriq was too expensive  - Continue your medications for now (tamsulosin, finasteride, miralax (to prevent constipation).  Agree with less caffeine (since caffeine can worsen urinary urgency)  - We will add PELVIC FLOOR PHYSICAL THERAPY - referral placed - please help the patient schedule this.   - Other options discussed include trospium or Interstim.  Trospium might be nice because it helps with sensory urgency - on the other hand, it may worsen his constipation. Will consider for next time.   - Return in 3-4 months to see how you're doing    VISIT DURATION: 20 minutes (11:50 - 12:05)    Deena Matta PA-C  Department of Urologic Surgery

## 2021-12-02 NOTE — PROGRESS NOTES
HPI: Mr. Juan Francisco Booker is a 61 year old year old male presenting today for evaluation of chief complaint(s): Follow Up (urinary frequency )    Today, he is accompanied    Mr. Juan Francisco Booker has PMH significant for HTN, HLD, gerd, obesity, prediabetes (HGB A1C 5.4%), depression, TIMOTHY, BPH (on flomax 0.8mg daily - started 8/29/18, on avodart - started 5/11/21).  He has followed with urology since 2018.  Please see note from 9/16/21 for a summary of his history.  Essentially he has tried dietary sugar reduction, caffeine reduction, mgmt of     Seen on 9/16/21 - was started on miralax daily.  Asked to reduce caffeine.  He switched from dutasteride to finasteride and was continued on tamsulosin. We added myrbetriq 25mg daily.     -has not been taking Myrbetriq - too expensive.   -10-12 times/day, no incontinence  -some urgency  -incomplete emptying, double voiding, straining with urination   -no hematuria  -no dysuria  -less straining with bowel movements, softer/more frequent stools on miralax  -caffeine tabs reduced from 200 to 100 mg/day  - He is having some neck stiffness and may see physical therapy for that.      Current Outpatient Medications   Medication Sig Dispense Refill     aspirin 81 MG tablet Take 1 tablet by mouth daily AM       Caffeine 100 MG TABS Take 2 tablets by mouth daily as needed       citalopram (CELEXA) 10 MG tablet Take 1 tablet (10 mg) by mouth daily 90 tablet 3     dutasteride (AVODART) 0.5 MG capsule Take 1 capsule (0.5 mg) by mouth daily 60 capsule 5     finasteride (PROSCAR) 5 MG tablet Take 1 tablet (5 mg) by mouth daily 90 tablet 3     fluticasone (FLONASE) 50 MCG/ACT nasal spray Spray 1-2 sprays into both nostrils daily 48 g 1     gabapentin (NEURONTIN) 300 MG capsule 1 in morning, 1 at midday, 3 at night (Patient taking differently: 2 in morning, 3 at night) 450 capsule 3     lisinopril (ZESTRIL) 20 MG tablet Take 1 tablet (20 mg) by mouth daily 90 tablet 3     mirabegron (MYRBETRIQ) 25  "MG 24 hr tablet Take 1 tablet (25 mg) by mouth daily 30 tablet 11     Multiple Vitamin (DAILY MULTIVITAMIN PO) Take 1 tablet by mouth daily AM       ORDER FOR DME Use your CPAP device as directed by your provider.       polyethylene glycol (MIRALAX) 17 GM/Dose powder Take 9-17 g by mouth daily 510 g 11     simvastatin (ZOCOR) 40 MG tablet Take 1 tablet (40 mg) by mouth At Bedtime 90 tablet 3     SODIUM FLUORIDE 5000 PPM 1.1 % PSTE BRUSH TEETH WITH PEA SIZED AMOUNT AT BEDTIME AND DO NOT RINSE       tamsulosin (FLOMAX) 0.4 MG capsule Take 2 capsules (0.8 mg) by mouth daily 30 min after a meal.   Please keep visit for 5/11/2021 for further refills. Thank you 60 capsule 11       ALLERGIES: Grass      REVIEW OF SYSTEMS:  As above in HPI    GENERAL PHYSICAL EXAM:   Vitals: /69   Pulse 62   Ht 1.753 m (5' 9\")   Wt 80.7 kg (178 lb)   BMI 26.29 kg/m    Body mass index is 26.29 kg/m .    GENERAL: Well groomed, well developed, well nourished male in NAD.  NEURO: Alert and oriented x 3.  PSYCH: Normal mood and affect, pleasant and agreeable during interview and exam.    LABS: The last test results for Mr. Juan Francisco Booker were reviewed:  PSA -   Lab Results   Component Value Date    PSA 0.51 07/02/2021    PSA 1.25 01/21/2020    PSA 1.58 12/19/2018    PSA 1.26 11/27/2018     BMP -   Recent Labs   Lab Test 07/02/21  1302 03/24/21  1015 03/08/21  1430 01/13/20  1321     --  143 140.0   POTASSIUM 4.0 3.9 4.0 4.0   CHLORIDE 108  --  108 102.0   CO2 30  --  30 31.0   BUN 16  --  19 14.2   CR 0.83  --  0.78 0.9   GLC 85  --  88 78.8   ZHEN 9.0  --  8.3* 9.7       CBC -   Recent Labs   Lab Test 03/08/21  1430 02/24/20  1521 10/24/18  1354   WBC 4.1  --   --    HGB 13.1* 13.7 14.1     --   --        ASSESSMENT:   1) BPH with luts (obstructive and irritative) --> UDS previously has shown sensory urgency  2) Constipation    PLAN:   - Myrbetriq was too expensive  - Continue your medications for now (tamsulosin, " finasteride, miralax (to prevent constipation).  Agree with less caffeine (since caffeine can worsen urinary urgency)  - We will add PELV[facility] FLOOR PHYSICAL THERAPY - referral placed - please help the patient schedule this.   - Other options discussed include trospium or Interstim.  Trospium might be nice because it helps with sensory urgency - on the other hand, it may worsen his constipation. Will consider for next time.   - Return in 3-4 months to see how you're doing    VISIT DURATION: 20 minutes (11:50 - 12:05)    Deena Matta PA-C  Department of Urologic Surgery

## 2021-12-02 NOTE — NURSING NOTE
"Chief Complaint   Patient presents with     Follow Up     urinary frequency        Blood pressure 110/69, pulse 62, height 1.753 m (5' 9\"), weight 80.7 kg (178 lb). Body mass index is 26.29 kg/m .    Patient Active Problem List   Diagnosis     Obstructive sleep apnea     Insomnia     Excessive sleepiness     Chronic nasal congestion     Other and unspecified alcohol dependence, unspecified drinking behavior     Lesion of ulnar nerve     Hyperlipidemia     Essential hypertension     Obesity     Benign neoplasm of colon     Hyperlipidemia     Moderate major depression (H)     Gastric polyps     Impaired glucose tolerance test     Prediabetes     Skin tag     Unilateral inguinal hernia with obstruction and without gangrene, recurrence not specified       Allergies   Allergen Reactions     Grass        Current Outpatient Medications   Medication Sig Dispense Refill     aspirin 81 MG tablet Take 1 tablet by mouth daily AM       Caffeine 100 MG TABS Take 2 tablets by mouth daily as needed       citalopram (CELEXA) 10 MG tablet Take 1 tablet (10 mg) by mouth daily 90 tablet 3     dutasteride (AVODART) 0.5 MG capsule Take 1 capsule (0.5 mg) by mouth daily 60 capsule 5     finasteride (PROSCAR) 5 MG tablet Take 1 tablet (5 mg) by mouth daily 90 tablet 3     fluticasone (FLONASE) 50 MCG/ACT nasal spray Spray 1-2 sprays into both nostrils daily 48 g 1     gabapentin (NEURONTIN) 300 MG capsule 1 in morning, 1 at midday, 3 at night (Patient taking differently: 2 in morning, 3 at night) 450 capsule 3     lisinopril (ZESTRIL) 20 MG tablet Take 1 tablet (20 mg) by mouth daily 90 tablet 3     mirabegron (MYRBETRIQ) 25 MG 24 hr tablet Take 1 tablet (25 mg) by mouth daily 30 tablet 11     Multiple Vitamin (DAILY MULTIVITAMIN PO) Take 1 tablet by mouth daily AM       ORDER FOR DME Use your CPAP device as directed by your provider.       polyethylene glycol (MIRALAX) 17 GM/Dose powder Take 9-17 g by mouth daily 510 g 11     simvastatin " (ZOCOR) 40 MG tablet Take 1 tablet (40 mg) by mouth At Bedtime 90 tablet 3     SODIUM FLUORIDE 5000 PPM 1.1 % PSTE BRUSH TEETH WITH PEA SIZED AMOUNT AT BEDTIME AND DO NOT RINSE       tamsulosin (FLOMAX) 0.4 MG capsule Take 2 capsules (0.8 mg) by mouth daily 30 min after a meal.   Please keep visit for 5/11/2021 for further refills. Thank you 60 capsule 11       Social History     Tobacco Use     Smoking status: Never Smoker     Smokeless tobacco: Never Used   Substance Use Topics     Alcohol use: Not Currently     Comment: In remission since 2009     Drug use: No       Erin Mcdonald  12/2/2021  11:39 AM

## 2021-12-02 NOTE — PATIENT INSTRUCTIONS
- Continue your medications for now  - We will add PELVIC FLOOR PHYSICAL THERAPY - referral placed - please help the patient schedule this.   - Return in 3-4 months to see how you;re doing!    [facility]

## 2021-12-03 ASSESSMENT — PATIENT HEALTH QUESTIONNAIRE - PHQ9: SUM OF ALL RESPONSES TO PHQ QUESTIONS 1-9: 3

## 2021-12-07 ENCOUNTER — ANCILLARY PROCEDURE (OUTPATIENT)
Dept: CT IMAGING | Facility: CLINIC | Age: 61
End: 2021-12-07
Attending: INTERNAL MEDICINE
Payer: COMMERCIAL

## 2021-12-07 DIAGNOSIS — R42 DIZZINESS: ICD-10-CM

## 2021-12-07 DIAGNOSIS — I87.1: ICD-10-CM

## 2021-12-07 PROCEDURE — 70496 CT ANGIOGRAPHY HEAD: CPT | Mod: GC | Performed by: RADIOLOGY

## 2021-12-07 PROCEDURE — 70498 CT ANGIOGRAPHY NECK: CPT | Mod: GC | Performed by: RADIOLOGY

## 2021-12-07 RX ORDER — IOPAMIDOL 755 MG/ML
75 INJECTION, SOLUTION INTRAVASCULAR ONCE
Status: COMPLETED | OUTPATIENT
Start: 2021-12-07 | End: 2021-12-07

## 2021-12-07 RX ADMIN — IOPAMIDOL 75 ML: 755 INJECTION, SOLUTION INTRAVASCULAR at 14:09

## 2021-12-08 ENCOUNTER — TELEPHONE (OUTPATIENT)
Dept: NEUROLOGY | Facility: CLINIC | Age: 61
End: 2021-12-08
Payer: COMMERCIAL

## 2021-12-08 DIAGNOSIS — G54.0 THORACIC OUTLET SYNDROME: ICD-10-CM

## 2021-12-08 DIAGNOSIS — G44.209 TENSION HEADACHE: Primary | ICD-10-CM

## 2021-12-08 NOTE — TELEPHONE ENCOUNTER
Called Tomi regarding CT venogram results. There is no significant jugular vein stenosis noted on the study.     Ultrasound previously was suggestive of subclavian vein stenosis in different positions. Thoracic outlet syndrome is a possible explanation for positional headaches. The other more likely possibility is that headaches are tension-type and triggered by certain positions. Referral to physical therapy placed for treatment of these potential causes.     Bulmaro Deshpande MD

## 2021-12-10 ENCOUNTER — THERAPY VISIT (OUTPATIENT)
Dept: PHYSICAL THERAPY | Facility: CLINIC | Age: 61
End: 2021-12-10
Attending: INTERNAL MEDICINE
Payer: COMMERCIAL

## 2021-12-10 DIAGNOSIS — G54.0 THORACIC OUTLET SYNDROME: ICD-10-CM

## 2021-12-10 DIAGNOSIS — M54.2 CERVICAL PAIN: Primary | ICD-10-CM

## 2021-12-10 DIAGNOSIS — G44.209 TENSION HEADACHE: ICD-10-CM

## 2021-12-10 PROCEDURE — 97161 PT EVAL LOW COMPLEX 20 MIN: CPT | Mod: GP

## 2021-12-10 PROCEDURE — 97110 THERAPEUTIC EXERCISES: CPT | Mod: GP

## 2021-12-10 PROCEDURE — 97530 THERAPEUTIC ACTIVITIES: CPT | Mod: GP

## 2021-12-10 NOTE — PROGRESS NOTES
[facility]    OUTPATIENT Physical Therapy ORTHOPEDIC EVALUATION  PLAN OF TREATMENT FOR OUTPATIENT REHABILITATION  (COMPLETE FOR INITIAL CLAIMS ONLY)  Patient's Last Name, First Name, M.I.  YOB: 1960  Juan Francisco Booker    Provider s Name:  [facility]   Medical Record No.  [id number]   Start of Care Date:      Onset Date:   09/01/21   Type:     _X__PT   ___OT Medical Diagnosis:    Encounter Diagnoses   Name Primary?     Tension headache      Thoracic outlet syndrome      Cervical pain Yes        Treatment Diagnosis:  Tension headaches, cervical pain        Goals:     12/10/21 0500   Treating Provider   Treating Provider Kely Nolasco DPT, PT   Contact Information   Procedure Code: The timed procedures billed today were performed sequentially within this encounter. Therapeutic Exercise;Neuromuscular Re-Education;Manual Therapy;Therapeutic Activities   Minutes: Therapeutic exercise 15   Minutes: Neuromuscular re-education 5   Minutes: Manual therapy 5   Minutes: Therapeutic activities 10   Rxs Authorized 8   Rxs Used 1   Diagnosis Tension headaches, cervical pain   Insurance [employer]   DOI 09/01/21   Date SOAP completed 12/10/21   Date PN/DC completed 12/10/21   Subjective See initial eval   Objective See initial eval   Manual Therapy - Self mobs are part of the HEP unless other   Sub Occipital Release 5 min   ROM/Strengthening - Therapeutic Exercise - All exercises are part of HEP unless otherwise noted   Cervical Retraction  Seated x8 moderately inc sx, not in HEP   PTRX Therapeutic Exercise   Therapeutic Exercise 1 Upper Trapezius Stretch   Therapeutic Exercise Notes 1 x1 min/side with R>L stretch feeling HEP - Sets: 2 Reps: 60s  Sessions per day: Daily Notes: Gentle stretch  not painful   Therapeutic Exercise 2 Supine Cervical Retraction   Therapeutic Exercise Notes 2 x8  with patient having increased headache. Education to limit to 5 reps 3x/day HEP - Sets: 1 Reps: 5 with 5 second holds Sessions per day: 3x/day No Notes   PTRX Neuromuscular Re-education    Neuromuscular Re-education 1 Scapular Retraction/Depression   Neuromuscular Re-Education Exercise 1 x10 with verbal and tactile cues for technique. HEP - Sets: 1 Reps: 10 with 10 second holds Sessions per day: 3x/day No Notes   Other Exer/Activities/Educ - All exercises are part of HEP unless otherwise noted   Exercise 1 TA   Description 1 Education on: findings, POC, posture       Therapy Frequency:     Predicted Duration of Therapy Intervention:       Kely Nolasco, PT                 I CERTIFY THE NEED FOR THESE SERVICES FURNISHED UNDER        THIS PLAN OF TREATMENT AND WHILE UNDER MY CARE     (Physician attestation of this document indicates review and certification of the therapy plan).                       Certification Date From:    12/10/2021  Certification Date To:   3/10/2022    Referring Provider:  Bulmaro Deshpande    Initial Assessment        See Epic Evaluation

## 2021-12-13 NOTE — PROGRESS NOTES
Subjective:  Juan Francisco Booker is a 61 year old male who presents today for follow-up regarding Pelvic Joint Dysfunction, manifesting on 11/23/2021 as a left anterior innominate rotation.    PRIOR HISTORY from 11/23/2021:  He has tingling on the tops of both feet. This has been stable to improving over the last 2-3 years. He takes gabapentin for the tingling and restless legs. This has improved his restless legs. His tingling is now not as painful.  Pain onset was 3 to 4 years ago and it improved initially with some physical therapy and then he slipped on the ice a couple of times and the pain has gotten worse since then which was about 3 years ago.  Recently he tried physical therapy for about 2 months and it did not help.     His pain is left greater than right and he points to the SI area.  There is no radiating numbness tingling or weakness in his legs unless he sitting for short period of time and then he gets it on the tops of both ankles and both feet but it skips the rest of his leg.  No significant bowel or bladder dysfunction, unless he is far away from bathroom and cannot get there in time and has had a couple of bladder incontinence issues.  He does have a urologist.  And he does have some BPH with some urgency and slower voids.  No saddle anesthesia.  Does not have a history of cancer or prolonged steroid use.  He has been successful with 140 pounds of volitional weight loss but no unexplained weight loss.    INTERIM HISTORY:    The adjustment of his pelvis really did not impact his symptoms.  Since then, he has had gradual worsening of his pain in the lumbar area on the left and right side of the back pain he actually points to about the L1-2 area.  There is been no new injury.  There is been no change in the pre-existing numbness and tingling in his feet bilateral feet.  No weakness bowel or bladder dysfunction or saddle anesthesia.  He continues on his weight loss and exercise regimen.  Pain is really  worsened if he is doing prolonged sitting in an upright position and better if he standing walking or sitting in a reclining position.  It is impacting his quality of life at this point where he would like to do something to try and alleviate it.    Past medical history is reviewed and is unchanged for any new medical diagnoses in the interim. Pertinent for TIMOTHY treated with CPAP, hypertension, gastric polyps, prediabetes.  No pertinent surgical history.  He is on chronic aspirin therapy and gabapentin.     SOCIAL HX: He is single.  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  He walks for 1 hour a day on a treadmill    Review of Systems:   Pertinent negatives include no fevers, chills, unexplained weight loss, bowel incontinence, bladder incontinence, trips, stumbles, falls.  All others reviewed and are negative. See patient's intake questionnaire from today for pain diagram, and further details.     IMAGING: Images and reports reviewed.     MRI lumbar 8/2021     L3-4: Posterior disc bulge. Bilateral facet arthropathy and ligamentum flavum thickening. Bilateral moderate neural foraminal stenosis. Moderate spinal canal stenosis.      L4-5: Posterior disc bulge and superimposed left foraminal disc  protrusion. Bilateral moderate neural foraminal stenosis. Mild spinal canal stenosis.      Impression:   1. Multiple unchanged presumed hemangiomas as described above.   2. Anterior wedge-shaped compression deformities of T12 and L4,  unchanged compared to 10/22/2020.  3. Multilevel lumbar spondylosis, most pronounced at L3-4 with  moderate spinal canal and moderate bilateral neural foraminal  stenosis. No significant change in overall finding compared  To10/22/2020.  (OM-I have reviewed the images again today and looked at the SI joints and they look pretty good.)     Procedures:  EMG 7/2020  This is a normal study.      Objective:    CONSTITUTIONAL:  Vital signs as above.  No acute distress.  The  patient is well nourished and well groomed.    PSYCHIATRIC:  The patient is awake, alert, oriented to person, place and time.  The patient is answering questions appropriately with clear speech.  Normal affect.  Able to follow commands  SKIN:  Skin over the face, posterior torso, and  extremities is free of significant lesions.    RESPIRATORY:  normal respiratory excursion and no dyspnea.  MUSCULOSKELETAL:  Gait is non-antalgic.  The patient is able to heel and toe walk without any difficulty.   Squat and rise normal.   .  Back ROM: Full and painless in flexion.  Full and not consistently painful in extension.  A little bit of pain that is not his chief complaint when I combined flexion with left but not with right side bending  Spring testing negative over the SI joints and no recurrent pelvic joint dysfunction..  Spring testing is uncomfortable over L4 and L5, reproducing his chief complaint, but not cranial to that.     NEUROLOGICAL:    Motor:  L3-S1 myotomes 5/5     Sensation to light touch is intact in the bilateral lower extremities.    SLR negative   Pelvis: Neutral    Assessment     Juan Francisco Booker  is a 61 year old y.o. male who presents today for follow-up regarding 3 to 4 years worth of midline low back pain.  Trial of adjustment for a possible Pelvic Joint Dysfunction last time really did not impact his symptoms and I am not convinced he actually has a Pelvic Joint Dysfunction.  He does have multilevel spondylosis.  He has lost 140 pounds of weight, but there has been a total taken on his spinal structures.  Today the pain seems to be coming from the lower lumbar facets      Plan:  I discussed an RFA work-up as opposed to a MedX approach and he prefers the latter.  He lives in [facility] will send him to our [facility] site for an 8 to 10-week lumbar strengthening program and plan to see him back in 4 months.  Based on his exam, I think he will be able to tolerate the extension program.  In the  interim, he can do the home program he is learned with prior PT, which was not particularly effective for him.    Advised patient to call or return early if symptoms worsen, or having problems controlling bladder and bowel function or worsening leg weakness.     Please note: Voice recognition software was used in this dictation.  It may therefore contain typographical errors.  Braydon Chou MD

## 2021-12-15 ENCOUNTER — OFFICE VISIT (OUTPATIENT)
Dept: NEUROSURGERY | Facility: CLINIC | Age: 61
End: 2021-12-15
Payer: COMMERCIAL

## 2021-12-15 VITALS
HEART RATE: 52 BPM | DIASTOLIC BLOOD PRESSURE: 75 MMHG | HEIGHT: 69 IN | BODY MASS INDEX: 26.36 KG/M2 | WEIGHT: 178 LBS | SYSTOLIC BLOOD PRESSURE: 123 MMHG

## 2021-12-15 DIAGNOSIS — R29.898 MUSCULAR DECONDITIONING: ICD-10-CM

## 2021-12-15 DIAGNOSIS — G89.29 CHRONIC BILATERAL LOW BACK PAIN WITHOUT SCIATICA: Primary | ICD-10-CM

## 2021-12-15 DIAGNOSIS — M54.50 CHRONIC BILATERAL LOW BACK PAIN WITHOUT SCIATICA: Primary | ICD-10-CM

## 2021-12-15 PROCEDURE — 99214 OFFICE O/P EST MOD 30 MIN: CPT | Performed by: PREVENTIVE MEDICINE

## 2021-12-15 ASSESSMENT — MIFFLIN-ST. JEOR: SCORE: 1602.78

## 2021-12-15 ASSESSMENT — PAIN SCALES - GENERAL: PAINLEVEL: MODERATE PAIN (4)

## 2021-12-15 NOTE — NURSING NOTE
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     Sports: no  Activities: walking         /75   Pulse 52   Ht 1.753 m (5' 9\")   Wt 80.7 kg (178 lb)   BMI 26.29 kg/m        Allergies   Allergen Reactions     Grass        Current Outpatient Medications   Medication     aspirin 81 MG tablet     Caffeine 100 MG TABS     citalopram (CELEXA) 10 MG tablet     dutasteride (AVODART) 0.5 MG capsule     finasteride (PROSCAR) 5 MG tablet     fluticasone (FLONASE) 50 MCG/ACT nasal spray     gabapentin (NEURONTIN) 300 MG capsule     lisinopril (ZESTRIL) 20 MG tablet     Multiple Vitamin (DAILY MULTIVITAMIN PO)     ORDER FOR DME     polyethylene glycol (MIRALAX) 17 GM/Dose powder     simvastatin (ZOCOR) 40 MG tablet     SODIUM FLUORIDE 5000 PPM 1.1 % PSTE     tamsulosin (FLOMAX) 0.4 MG capsule     No current facility-administered medications for this visit.         Darla Severin-Brown, AMILCARN  "

## 2021-12-15 NOTE — PATIENT INSTRUCTIONS
It is good to see you again Tomi.  I do not think you have a pelvic joint problem that we can address that will help your pain and I am thinking at this point it is more the facet joints in your lower lumbar segments that is causing this.  We will try the strengthening program I discussed with you and I will plan to see you back in 4 months and hopefully you will be feeling a lot better.  If not we can always go with a more interventional route with the facet blocks leading towards a radiofrequency ablation procedure.    Assessment     Juan Francisco Booker  is a 61 year old y.o. male who presents today for follow-up regarding 3 to 4 years worth of midline low back pain.  Trial of adjustment for a possible Pelvic Joint Dysfunction last time really did not impact his symptoms and I am not convinced he actually has a Pelvic Joint Dysfunction.  He does have multilevel spondylosis.  He has lost 140 pounds of weight, but there has been a total taken on his spinal structures.  Today the pain seems to be coming from the lower lumbar facets      Plan:  I discussed an RFA work-up as opposed to a MedX approach and he prefers the latter.  [facility] lives in [facility] to 10-week lumbar strengthening program and plan to see him back in 4 months.  Based on his exam, I think he will be able to tolerate the extension program.  In the interim, he can do the home program he is learned with prior PT, which was not particularly effective for him.

## 2021-12-15 NOTE — LETTER
12/15/2021         RE: Juan Francisco Booker  [address]  [place] [address]        Dear Colleague,    Thank you for referring your patient, Juan Francisco Booker, to the [facility] [place]. Please see a copy of my visit note below.      Subjective:  Juan Francisco Booker is a 61 year old male who presents today for follow-up regarding Pelvic Joint Dysfunction, manifesting on 11/23/2021 as a left anterior innominate rotation.    PRIOR HISTORY from 11/23/2021:  He has tingling on the tops of both feet. This has been stable to improving over the last 2-3 years. He takes gabapentin for the tingling and restless legs. This has improved his restless legs. His tingling is now not as painful.  Pain onset was 3 to 4 years ago and it improved initially with some physical therapy and then he slipped on the ice a couple of times and the pain has gotten worse since then which was about 3 years ago.  Recently he tried physical therapy for about 2 months and it did not help.     His pain is left greater than right and he points to the SI area.  There is no radiating numbness tingling or weakness in his legs unless he sitting for short period of time and then he gets it on the tops of both ankles and both feet but it skips the rest of his leg.  No significant bowel or bladder dysfunction, unless he is far away from bathroom and cannot get there in time and has had a couple of bladder incontinence issues.  He does have a urologist.  And he does have some BPH with some urgency and slower voids.  No saddle anesthesia.  Does not have a history of cancer or prolonged steroid use.  He has been successful with 140 pounds of volitional weight loss but no unexplained weight loss.    INTERIM HISTORY:    The adjustment of his pelvis really did not impact his symptoms.  Since then, he has had gradual worsening of his pain in the lumbar area on the left and right side of the back pain he actually points to about  the L1-2 area.  There is been no new injury.  There is been no change in the pre-existing numbness and tingling in his feet bilateral feet.  No weakness bowel or bladder dysfunction or saddle anesthesia.  He continues on his weight loss and exercise regimen.  Pain is really worsened if he is doing prolonged sitting in an upright position and better if he standing walking or sitting in a reclining position.  It is impacting his quality of life at this point where he would like to do something to try and alleviate it.    Past medical history is reviewed and is unchanged for any new medical diagnoses in the interim. Pertinent for TIMOTHY treated with CPAP, hypertension, gastric polyps, prediabetes.  No pertinent surgical history.  He is on chronic aspirin therapy and gabapentin.     SOCIAL HX: He is single.  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  He walks for 1 hour a day on a treadmill    Review of Systems:   Pertinent negatives include no fevers, chills, unexplained weight loss, bowel incontinence, bladder incontinence, trips, stumbles, falls.  All others reviewed and are negative. See patient's intake questionnaire from today for pain diagram, and further details.     IMAGING: Images and reports reviewed.     MRI lumbar 8/2021     L3-4: Posterior disc bulge. Bilateral facet arthropathy and ligamentum flavum thickening. Bilateral moderate neural foraminal stenosis. Moderate spinal canal stenosis.      L4-5: Posterior disc bulge and superimposed left foraminal disc  protrusion. Bilateral moderate neural foraminal stenosis. Mild spinal canal stenosis.      Impression:   1. Multiple unchanged presumed hemangiomas as described above.   2. Anterior wedge-shaped compression deformities of T12 and L4,  unchanged compared to 10/22/2020.  3. Multilevel lumbar spondylosis, most pronounced at L3-4 with  moderate spinal canal and moderate bilateral neural foraminal  stenosis. No significant change in  overall finding compared  To10/22/2020.  (OM-I have reviewed the images again today and looked at the SI joints and they look pretty good.)     Procedures:  EMG 7/2020  This is a normal study.      Objective:    CONSTITUTIONAL:  Vital signs as above.  No acute distress.  The patient is well nourished and well groomed.    PSYCHIATRIC:  The patient is awake, alert, oriented to person, place and time.  The patient is answering questions appropriately with clear speech.  Normal affect.  Able to follow commands  SKIN:  Skin over the face, posterior torso, and  extremities is free of significant lesions.    RESPIRATORY:  normal respiratory excursion and no dyspnea.  MUSCULOSKELETAL:  Gait is non-antalgic.  The patient is able to heel and toe walk without any difficulty.   Squat and rise normal.   .  Back ROM: Full and painless in flexion.  Full and not consistently painful in extension.  A little bit of pain that is not his chief complaint when I combined flexion with left but not with right side bending  Spring testing negative over the SI joints and no recurrent pelvic joint dysfunction..  Spring testing is uncomfortable over L4 and L5, reproducing his chief complaint, but not cranial to that.     NEUROLOGICAL:    Motor:  L3-S1 myotomes 5/5     Sensation to light touch is intact in the bilateral lower extremities.    SLR negative   Pelvis: Neutral    Assessment     Juan Francisco Booker  is a 61 year old y.o. male who presents today for follow-up regarding 3 to 4 years worth of midline low back pain.  Trial of adjustment for a possible Pelvic Joint Dysfunction last time really did not impact his symptoms and I am not convinced he actually has a Pelvic Joint Dysfunction.  He does have multilevel spondylosis.  He has lost 140 pounds of weight, but there has been a total taken on his spinal structures.  Today the pain seems to be coming from the lower lumbar facets      Plan:  I discussed an RFA work-up as opposed to a MedX  approach and he prefers the latter.  [place] [facility] site for an 8 to 10-week lumbar strengthening program and plan to see him back in 4 months.  Based on his exam, I think he will be able to tolerate the extension program.  In the interim, he can do the home program he is learned with prior PT, which was not particularly effective for him.    Advised patient to call or return early if symptoms worsen, or having problems controlling bladder and bowel function or worsening leg weakness.     Please note: Voice recognition software was used in this dictation.  It may therefore contain typographical errors.  Braydon Chou MD      Again, thank you for allowing me to participate in the care of your patient.        Sincerely,        Braydon Chou MD

## 2021-12-23 ENCOUNTER — THERAPY VISIT (OUTPATIENT)
Dept: PHYSICAL THERAPY | Facility: CLINIC | Age: 61
End: 2021-12-23
Payer: COMMERCIAL

## 2021-12-23 DIAGNOSIS — G44.209 TENSION HEADACHE: Primary | ICD-10-CM

## 2021-12-23 DIAGNOSIS — M54.2 CERVICAL PAIN: ICD-10-CM

## 2021-12-23 PROCEDURE — 97530 THERAPEUTIC ACTIVITIES: CPT | Mod: GP

## 2021-12-23 PROCEDURE — 97110 THERAPEUTIC EXERCISES: CPT | Mod: GP

## 2021-12-23 PROCEDURE — 97140 MANUAL THERAPY 1/> REGIONS: CPT | Mod: GP

## 2021-12-28 ENCOUNTER — MYC MEDICAL ADVICE (OUTPATIENT)
Dept: UROLOGY | Facility: CLINIC | Age: 61
End: 2021-12-28
Payer: COMMERCIAL

## 2021-12-31 ENCOUNTER — THERAPY VISIT (OUTPATIENT)
Dept: PHYSICAL THERAPY | Facility: CLINIC | Age: 61
End: 2021-12-31
Payer: COMMERCIAL

## 2021-12-31 DIAGNOSIS — G44.209 TENSION HEADACHE: Primary | ICD-10-CM

## 2021-12-31 DIAGNOSIS — M54.2 CERVICAL PAIN: ICD-10-CM

## 2021-12-31 PROCEDURE — 97110 THERAPEUTIC EXERCISES: CPT | Mod: GP

## 2021-12-31 PROCEDURE — 97530 THERAPEUTIC ACTIVITIES: CPT | Mod: GP

## 2022-01-13 ENCOUNTER — THERAPY VISIT (OUTPATIENT)
Dept: PHYSICAL THERAPY | Facility: CLINIC | Age: 62
End: 2022-01-13
Payer: COMMERCIAL

## 2022-01-13 DIAGNOSIS — R39.15 URINARY URGENCY: Primary | ICD-10-CM

## 2022-01-13 DIAGNOSIS — R35.0 URINARY FREQUENCY: ICD-10-CM

## 2022-01-13 PROCEDURE — 97112 NEUROMUSCULAR REEDUCATION: CPT | Mod: GP

## 2022-01-13 PROCEDURE — 97161 PT EVAL LOW COMPLEX 20 MIN: CPT | Mod: GP

## 2022-01-13 PROCEDURE — 97530 THERAPEUTIC ACTIVITIES: CPT | Mod: GP

## 2022-01-13 NOTE — PROGRESS NOTES
"Physical Therapy Initial Evaluation  Subjective:  The history is provided by the patient.   Therapist Generated HPI Evaluation  Problem details: Per urology notes, pt has diagnosis of BPH with LUTS. Pt reports difficult completely emptying when urinating, has had sx for a few years now. He begins urination sitting then sometimes stands to get more out. He also c/o urinary frequency and feels that he always has to go.   Exercise: 1 hour on treadmill daily, other PT exercises  In a typical day he reads, watches TV, exercise, volunteers at homework center  Goals: \"go when I want to\", fully empty when urinating    Urination:  Do you leak on the way to the bathroom or with a strong urge to void? n   Do you leak with cough,sneeze, jumping, running? n  Any other activities that cause leaking?  n  Do you have triggers that make you feel you can't wait to go to the bathroom? y What are they?  Getting close to the bathroom .  Type of pad and number used per day? 0  When you leak what is the amount? 0  How long can you delay the need to urinate? 10-15 min.   Do you feel excessive pressure in pelvic floor:n.   Frequency of daytime urination: 15  Frequency of nighttime urination: 1-2  Can you stop the flow of urine when on the toilet? Unsure   Is the volume of urine passed usually:  (8sec rule= 250ml with average bladder storing 400-600ml)  Small   Do you strain to pass urine? y  Do you have a slow or hesitant urinary stream? y  Do you have difficulty initiating the urine stream? n  Is urination painful? 0  How many bladder infections have you had in last 12 months? 0  Fluid intake(one glass is 8oz or one cup)  8 glasses/day, herbal tea, 1 caffinated glasses/day or caffeine pill. 0 alcohol glasses/day.    Bowel habits:  Frequency of bowel movements? 1-2 times a day  Consistancy of stool?  [place] Stool Scale 4  Do you ignore the urge to defecate? n  Do you strain to pass stool? Y, taking Miralax for a few months which has helped " soften stool. Not fully emptying 50% of the time    Pelvic Pain:  Do you have any pelvic pain with intercourse, exams? Occasionally when eat too much or no BMs  Are you sexually active? n  Have you ever been worried for your physical safety? no  Have you practiced the PF(kegel) exercises for 4 or more weeks?no   Any history of abdominal surgery? Hernia surgery  .         Type of problem:  Pelvic dysfunction.    This is a chronic condition.                         Patient Health History  Juan Francisco Booker being seen for urinary problems.     Date of Onset: 2-3 years ago.   Problem occurred: unknown   Pain is reported as 5/10 on pain scale.  General health as reported by patient is good.  Pertinent medical history includes: chemical dependency, depression, high blood pressure, migraines/headaches, numbness/tingling, overweight and sleep disorder/apnea.   Red flags:  Changes in bowel and bladder habits.  Medical allergies: none.   Surgeries include:  Other. Other surgery history details: Carpal tunnel, hernia repair.    Current medications:  Anti-depressants, cardiac medication, high blood pressure medication and pain medication.    Current occupation is Retired.   Primary job tasks include:  Prolonged sitting.                                    Objective:  System                                 Pelvic Dysfunction Evaluation:          Abdominal Wall:    Diastasis Recti:  Normal  Trigger Points:  NA          External Assessment:  External assessment pelvic: Gluteal recruitment noted with attempts to contract pelvic floor and with bearing down.   Skin Condition:  Normal          Muscle Contraction/Perineal Mobility:  No movement and substitution    SEMG Biofeedback:    Equipment:  MR 25    Diana electrode placement--Perianal:  Perianal  Baseline EMG PM:  4.4 uV in sidelying    Peak pelvic muscle contraction:  12 uV with substitutions noted, poor and slow relaxation with cueing required      Position:  Sidelying                      General     ROS    Assessment/Plan:    Patient is a 61 year old male with pelvic complaints.    Patient has the following significant findings with corresponding treatment plan.                Diagnosis 1:  Urinary frequency/urgency with poor PFM coordination  Impaired muscle performance - biofeedback and neuro re-education  Decreased function - therapeutic activities and home program  Impaired posture - neuro re-education and therapeutic activities    Therapy Evaluation Codes:   1) History comprised of:   Personal factors that impact the plan of care:      None.    Comorbidity factors that impact the plan of care are:      Bowel/bladder changes, Chemical Dependency, Depression, High blood pressure, Migraines/headaches, Numbness/tingling, Overweight and Sleep disorder/apnea.     Medications impacting care: Anti-depressant, Cardiac, High blood pressure and Pain.  2) Examination of Body Systems comprised of:   Body structures and functions that impact the plan of care:      Hip, Lumbar spine and Pelvis.   Activity limitations that impact the plan of care are:      Walking, Frequency and Urgency.  3) Clinical presentation characteristics are:   Stable/Uncomplicated.  4) Decision-Making    Low complexity using standardized patient assessment instrument and/or measureable assessment of functional outcome.  Cumulative Therapy Evaluation is: Low complexity.    Previous and current functional limitations:  (See Goal Flow Sheet for this information)    Short term and Long term goals: (See Goal Flow Sheet for this information)     Communication ability:  Patient appears to be able to clearly communicate and understand verbal and written communication and follow directions correctly.  Treatment Explanation - The following has been discussed with the patient:   RX ordered/plan of care  Anticipated outcomes  Possible risks and side effects  This patient would benefit from PT intervention to resume normal activities.    Rehab potential is good.    Frequency:  1 X week, once daily  Duration:  for 12 weeks  Discharge Plan:  Achieve all LTG.  Independent in home treatment program.  Reach maximal therapeutic benefit.    Please refer to the daily flowsheet for treatment today, total treatment time and time spent performing 1:1 timed codes.

## 2022-01-17 ENCOUNTER — THERAPY VISIT (OUTPATIENT)
Dept: PHYSICAL THERAPY | Facility: CLINIC | Age: 62
End: 2022-01-17
Payer: COMMERCIAL

## 2022-01-17 DIAGNOSIS — G44.209 TENSION HEADACHE: ICD-10-CM

## 2022-01-17 DIAGNOSIS — M54.2 CERVICAL PAIN: Primary | ICD-10-CM

## 2022-01-17 PROCEDURE — 97110 THERAPEUTIC EXERCISES: CPT | Mod: GP

## 2022-01-17 PROCEDURE — 97140 MANUAL THERAPY 1/> REGIONS: CPT | Mod: GP

## 2022-01-18 ENCOUNTER — HOSPITAL ENCOUNTER (OUTPATIENT)
Dept: PHYSICAL THERAPY | Facility: CLINIC | Age: 62
End: 2022-01-18
Attending: PREVENTIVE MEDICINE
Payer: COMMERCIAL

## 2022-01-18 DIAGNOSIS — R29.898 MUSCULAR DECONDITIONING: ICD-10-CM

## 2022-01-18 DIAGNOSIS — M54.50 CHRONIC BILATERAL LOW BACK PAIN WITHOUT SCIATICA: ICD-10-CM

## 2022-01-18 DIAGNOSIS — G89.29 CHRONIC BILATERAL LOW BACK PAIN WITHOUT SCIATICA: ICD-10-CM

## 2022-01-18 PROCEDURE — 97161 PT EVAL LOW COMPLEX 20 MIN: CPT | Mod: GP | Performed by: PHYSICAL THERAPIST

## 2022-01-18 PROCEDURE — 97110 THERAPEUTIC EXERCISES: CPT | Mod: GP | Performed by: PHYSICAL THERAPIST

## 2022-01-18 NOTE — PROGRESS NOTES
Lumbar MedX Initial testing   1/18/22 4-week Re-test   AROM (full=  0-72  lumbar) 0-54    Max Extension Torque  272    Flex: ext ratio (ideal 1.4:1) 4,32:1        Date 1/18/22   Lumbar Parameters    Top Dead Center (TDC) 18   Counterbalance (CB) 385   Seat Pad 1   Femur Restraint 5   Week/Visit    Enter Week/Visit # W1V1   Weight (lbs) 100#   Reps (#) 20   Time 215s   ROM (degrees) 0-54   Pain    Flex:Ext ratio 4.32:1   Cervical Parameters    Top Dead Center (TDC)    Counterbalance (CB)    Seat Height    Week/Visit    Enter Week/Visit #    Weight (lbs)    Reps (#)    Time    ROM (degrees)    Pain    Flex:Ext ratio      Date    Exercise

## 2022-01-19 ENCOUNTER — THERAPY VISIT (OUTPATIENT)
Dept: PHYSICAL THERAPY | Facility: CLINIC | Age: 62
End: 2022-01-19
Attending: PHYSICIAN ASSISTANT
Payer: COMMERCIAL

## 2022-01-19 DIAGNOSIS — R35.0 URINARY FREQUENCY: ICD-10-CM

## 2022-01-19 PROCEDURE — 97530 THERAPEUTIC ACTIVITIES: CPT | Mod: GP | Performed by: PHYSICAL THERAPIST

## 2022-01-19 PROCEDURE — 97110 THERAPEUTIC EXERCISES: CPT | Mod: GP | Performed by: PHYSICAL THERAPIST

## 2022-01-20 ENCOUNTER — HOSPITAL ENCOUNTER (OUTPATIENT)
Dept: PHYSICAL THERAPY | Facility: CLINIC | Age: 62
End: 2022-01-20
Payer: COMMERCIAL

## 2022-01-20 DIAGNOSIS — M54.50 CHRONIC BILATERAL LOW BACK PAIN WITHOUT SCIATICA: Primary | ICD-10-CM

## 2022-01-20 DIAGNOSIS — R29.898 MUSCULAR DECONDITIONING: ICD-10-CM

## 2022-01-20 DIAGNOSIS — G89.29 CHRONIC BILATERAL LOW BACK PAIN WITHOUT SCIATICA: Primary | ICD-10-CM

## 2022-01-20 PROCEDURE — 97110 THERAPEUTIC EXERCISES: CPT | Mod: GP | Performed by: PHYSICAL THERAPIST

## 2022-01-20 NOTE — PROGRESS NOTES
"Daily Assessment:  Tomi is presenting for his 1st follow-up in the MedX program. He tolerated the initiation day well with good muscle recovery. PT initiated the rotary torso with conservative weight due to the recent hernia repair 9 months ago. PT corrected bridge form this visit and encouraged higher reps. Patient also educated on towel roll under pelvis for the seated position and also to decrease sacral sitting    Lumbar MedX Initial Testing   1/18/22   AROM (full=  0-72  lumbar) 0-54   Max Extension Torque  272#   Flex: ext ratio (ideal 1.4:1) 4.32:1       Date 1/20/22 1/18/22   Lumbar Parameters     Top Dead Center (TDC) 18 18   Counterbalance (CB) 385 385   Seat Pad 1 1   Femur Restraint 5 5   Week/Visit     Enter Week/Visit # Wk 1 v2 W1V1   Weight (lbs) 106# 100#   Reps (#) 27 20   Time 188 s 215s   ROM (degrees) 0-54 0-54   Pain     Flex:Ext ratio  4.32:1   Cervical Parameters     Top Dead Center (TDC)     Counterbalance (CB)     Seat Height     Week/Visit     Enter Week/Visit #     Weight (lbs)     Reps (#)     Time     ROM (degrees)     Pain     Flex:Ext ratio       Date 1/20/22   Exercise    treadmill X 4 min   Rotary Torso 90' 30# to R   Bridge with toes lifted X 12   Dead bug 2 x 20\"                                     "

## 2022-01-24 ENCOUNTER — HOSPITAL ENCOUNTER (OUTPATIENT)
Dept: PHYSICAL THERAPY | Facility: CLINIC | Age: 62
End: 2022-01-24
Payer: COMMERCIAL

## 2022-01-24 PROCEDURE — 97110 THERAPEUTIC EXERCISES: CPT | Mod: GP | Performed by: PHYSICAL THERAPIST

## 2022-01-24 NOTE — PROGRESS NOTES
" Daily Assessment:  Tomi is presenting for his 2nd follow-up in the MedX program. Tolerating Medx well with more appropriate reps to fatigue with 14# jump in weight today. PT added hip stretching today as previous hip stretching hadn't been comfortable following hernia repair. Will plan to do further strengthening next session.     Lumbar MedX Initial Testing   1/18/22   AROM (full=  0-72  lumbar) 0-54   Max Extension Torque  272#   Flex: ext ratio (ideal 1.4:1) 4.32:1       Date 1/24/22 1/20/22 1/18/22   Lumbar Parameters      Top Dead Center (TDC) 18 18 18   Counterbalance (CB) 385 385 385   Seat Pad 1 1 1   Femur Restraint 5 5 5   Week/Visit      Enter Week/Visit # Wk 2 V1 Wk 1 v2 W1V1   Weight (lbs) 120# 106# 100#   Reps (#) 21 27 20   Time 240 188 s 215s   ROM (degrees) 0-54 0-54 0-54   Pain      Flex:Ext ratio   4.32:1   Cervical Parameters      Top Dead Center (TDC)      Counterbalance (CB)      Seat Height      Week/Visit      Enter Week/Visit #      Weight (lbs)      Reps (#)      Time      ROM (degrees)      Pain      Flex:Ext ratio        Date 1/24/2022 1/20/22   Exercise     treadmill X 4 min X 4 min   Rotary Torso 90' 34# to L 30# to R   Bridge with toes lifted  X 12   Dead bug  2 x 20\"   Hip flexor stretch kneeling X 30 seconds    Piriformis stretch X 30 seconds                                    "

## 2022-01-26 ENCOUNTER — THERAPY VISIT (OUTPATIENT)
Dept: PHYSICAL THERAPY | Facility: CLINIC | Age: 62
End: 2022-01-26
Attending: PHYSICIAN ASSISTANT
Payer: COMMERCIAL

## 2022-01-26 DIAGNOSIS — R39.15 URINARY URGENCY: Primary | ICD-10-CM

## 2022-01-26 PROCEDURE — 97530 THERAPEUTIC ACTIVITIES: CPT | Mod: GP | Performed by: PHYSICAL THERAPIST

## 2022-01-28 ENCOUNTER — HOSPITAL ENCOUNTER (OUTPATIENT)
Dept: PHYSICAL THERAPY | Facility: CLINIC | Age: 62
End: 2022-01-28
Payer: COMMERCIAL

## 2022-01-28 DIAGNOSIS — R29.898 MUSCULAR DECONDITIONING: ICD-10-CM

## 2022-01-28 DIAGNOSIS — G89.29 CHRONIC BILATERAL LOW BACK PAIN WITHOUT SCIATICA: Primary | ICD-10-CM

## 2022-01-28 DIAGNOSIS — M54.50 CHRONIC BILATERAL LOW BACK PAIN WITHOUT SCIATICA: Primary | ICD-10-CM

## 2022-01-28 PROCEDURE — 97110 THERAPEUTIC EXERCISES: CPT | Mod: GP | Performed by: PHYSICAL THERAPIST

## 2022-01-28 NOTE — PROGRESS NOTES
" Daily Assessment:  Tomi is presenting for his 3rd follow-up in the MedX program. Tolerating Medx well with appropriate reps to fatigue. PT progressed HEP today with core strengthening. Will plan to continue to gradually progress HEP.    Lumbar MedX Initial Testing   1/18/22   AROM (full=  0-72  lumbar) 0-54   Max Extension Torque  272#   Flex: ext ratio (ideal 1.4:1) 4.32:1       Date 1/28/2022 1/24/22 1/20/22 1/18/22   Lumbar Parameters       Top Dead Center (TDC) 18 18 18 18   Counterbalance (CB) 385 385 385 385   Seat Pad 1 1 1 1   Femur Restraint 5 5 5 5   Week/Visit       Enter Week/Visit # Wk2 V 2 Wk 2 V1 Wk 1 v2 W1V1   Weight (lbs) 125# 120# 106# 100#   Reps (#) 21 21 27 20   Time 186 240 188 s 215s   ROM (degrees) 0-54 0-54 0-54 0-54   Pain       Flex:Ext ratio    4.32:1   Cervical Parameters       Top Dead Center (TDC)       Counterbalance (CB)       Seat Height       Week/Visit       Enter Week/Visit #       Weight (lbs)       Reps (#)       Time       ROM (degrees)       Pain       Flex:Ext ratio         Date 1/28/2022 1/24/2022 1/20/22   Exercise      treadmill X 4 min X 4 min X 4 min   Rotary Torso 90' 36# to R 34# to L 30# to R   Bridge with toes lifted   X 12   Dead bug   2 x 20\"   Hip flexor stretch kneeling  X 30 seconds    Piriformis stretch  X 30 seconds    Hip Abduction X 10     Quadruped leg extensions X 10                               "

## 2022-01-31 ENCOUNTER — HOSPITAL ENCOUNTER (OUTPATIENT)
Dept: PHYSICAL THERAPY | Facility: CLINIC | Age: 62
End: 2022-01-31
Payer: COMMERCIAL

## 2022-01-31 DIAGNOSIS — R29.898 MUSCULAR DECONDITIONING: ICD-10-CM

## 2022-01-31 DIAGNOSIS — G89.29 CHRONIC BILATERAL LOW BACK PAIN WITHOUT SCIATICA: Primary | ICD-10-CM

## 2022-01-31 DIAGNOSIS — M54.50 CHRONIC BILATERAL LOW BACK PAIN WITHOUT SCIATICA: Primary | ICD-10-CM

## 2022-01-31 PROCEDURE — 97110 THERAPEUTIC EXERCISES: CPT | Mod: GP | Performed by: PHYSICAL THERAPIST

## 2022-01-31 NOTE — PROGRESS NOTES
" Daily Assessment:  Tomi is presenting for his 4th follow-up in the MedX program. Tolerating Medx well with appropriate reps to fatigue. Review of HEP today with some cues for form and keeping neutral spine. Will plan to continue to gradually progress HEP.    Lumbar MedX Initial Testing   1/18/22   AROM (full=  0-72  lumbar) 0-54   Max Extension Torque  272#   Flex: ext ratio (ideal 1.4:1) 4.32:1       Date 1/31/2022 1/28/2022 1/24/22 1/20/22 1/18/22   Lumbar Parameters        Top Dead Center (TDC) 18 18 18 18 18   Counterbalance (CB) 385 385 385 385 385   Seat Pad 1 1 1 1 1   Femur Restraint 5 5 5 5 5   Week/Visit        Enter Week/Visit # Wk 3 V 1 Wk2 V 2 Wk 2 V1 Wk 1 v2 W1V1   Weight (lbs) 130# 125# 120# 106# 100#   Reps (#) 28 21 21 27 20   Time  186 240 188 s 215s   ROM (degrees) 0-54 0-54 0-54 0-54 0-54   Pain        Flex:Ext ratio     4.32:1   Cervical Parameters        Top Dead Center (TDC)        Counterbalance (CB)        Seat Height        Week/Visit        Enter Week/Visit #        Weight (lbs)        Reps (#)        Time        ]ROM (degrees)        Pain        Flex:Ext ratio          Date 1/31/2022 1/28/2022 1/24/2022 1/20/22   Exercise       treadmill 4 min X 4 min X 4 min X 4 min   Rotary Torso 90' 36# to L 36# to R 34# to L 30# to R   Bridge with toes lifted    X 12   Dead bug X 10    2 x 20\"   Hip flexor stretch kneeling   X 30 seconds    Piriformis stretch   X 30 seconds    Hip Abduction X 10  X 10     Quadruped leg extensions X 10 cues for form X 10     Reformer leg press X 10 all springs      Reformer bridge X 10 all springs      Reformer 90/90 pulldowns X 10 3R               "

## 2022-02-02 ENCOUNTER — THERAPY VISIT (OUTPATIENT)
Dept: PHYSICAL THERAPY | Facility: CLINIC | Age: 62
End: 2022-02-02
Payer: COMMERCIAL

## 2022-02-02 DIAGNOSIS — G44.209 TENSION HEADACHE: Primary | ICD-10-CM

## 2022-02-02 PROCEDURE — 97110 THERAPEUTIC EXERCISES: CPT | Mod: GP

## 2022-02-02 PROCEDURE — 97530 THERAPEUTIC ACTIVITIES: CPT | Mod: GP

## 2022-02-02 NOTE — PROGRESS NOTES
PROGRESS REPORT    Juan Francisco has been in therapy from December 10, 2021 to Feb 2, 2022 with 5 visits for treatment of headaches and possible TOS.    Therapist Impression:  Patient has made little progress in PT since beginning, no numerical change on the NDI and continues to have headaches every other day due to prolonged sitting. Patient had full ACROM today without pain, however it immediately brought on a headache. Vertebral artery test was performed LEE ANN with patient having dizziness on the right with a positive test. Patient has a follow up with Dr. Deshpande on Friday, patient educated to hold on lying down exercises until seeing his MD.     Subjective:  Patient states since beginning PT, he has had a little progress. Overall prolonged sitting continues to cause headaches, has had them every other day since last visit.     Objective:   ACROM: WNL, pain-free, ROM did bring on headache within 10 seconds.   Positive Vertebral artery test on R with dizziness present, patient unable to keep eyelids open, unable to assess nystagmus.      ASSESSMENT/PLAN  Updated problem list and treatment plan: Tension headaches. Pain - HEP  Decreased function - HEP  Impaired muscle performance - HEP  Impaired posture - HEP  STG/LTGs have been met or progress has been made towards goals:  Patient has met short term goal, continues to require PT intervention to meet LTG.   Assessment of Progress: The patient's progress has plateaued.  Self Management Plans:  Patient has been instructed in a home treatment program.  Patient is independent in a home treatment program.  Patient  has been instructed in self management of symptoms.  I have re-evaluated this patient and find that the nature, scope, duration and intensity of the therapy is appropriate for the medical condition of the patient.  Juan Francisco continues to require the following intervention to meet STG and LTG's:  PT    Recommendations:  This patient would benefit from continued therapy.      Frequency:  1 X week, once daily  Duration:  for 3 weeks    This patient would benefit from further evaluation from MD. Patient has follow up visit on 2/4.           Please refer to the daily flowsheet for treatment today, total treatment time and time spent performing 1:1 timed codes.

## 2022-02-04 ENCOUNTER — HOSPITAL ENCOUNTER (OUTPATIENT)
Dept: PHYSICAL THERAPY | Facility: CLINIC | Age: 62
End: 2022-02-04

## 2022-02-04 ENCOUNTER — OFFICE VISIT (OUTPATIENT)
Dept: NEUROLOGY | Facility: CLINIC | Age: 62
End: 2022-02-04
Payer: COMMERCIAL

## 2022-02-04 VITALS
SYSTOLIC BLOOD PRESSURE: 122 MMHG | DIASTOLIC BLOOD PRESSURE: 81 MMHG | BODY MASS INDEX: 26.73 KG/M2 | RESPIRATION RATE: 16 BRPM | OXYGEN SATURATION: 97 % | WEIGHT: 181 LBS | HEART RATE: 60 BPM

## 2022-02-04 DIAGNOSIS — G44.209 TENSION HEADACHE: Primary | ICD-10-CM

## 2022-02-04 DIAGNOSIS — M54.50 CHRONIC BILATERAL LOW BACK PAIN WITHOUT SCIATICA: Primary | ICD-10-CM

## 2022-02-04 DIAGNOSIS — G89.29 CHRONIC BILATERAL LOW BACK PAIN WITHOUT SCIATICA: Primary | ICD-10-CM

## 2022-02-04 DIAGNOSIS — R29.898 MUSCULAR DECONDITIONING: ICD-10-CM

## 2022-02-04 DIAGNOSIS — R20.0 BILATERAL LEG NUMBNESS: ICD-10-CM

## 2022-02-04 PROCEDURE — 99215 OFFICE O/P EST HI 40 MIN: CPT | Performed by: INTERNAL MEDICINE

## 2022-02-04 PROCEDURE — 97110 THERAPEUTIC EXERCISES: CPT | Mod: GP | Performed by: PHYSICAL THERAPIST

## 2022-02-04 RX ORDER — TIZANIDINE 2 MG/1
TABLET ORAL
Qty: 60 TABLET | Refills: 5 | Status: SHIPPED | OUTPATIENT
Start: 2022-02-04 | End: 2022-12-08

## 2022-02-04 ASSESSMENT — PAIN SCALES - GENERAL: PAINLEVEL: MODERATE PAIN (4)

## 2022-02-04 NOTE — PROGRESS NOTES
[facility] Neurology Follow Up Visit    Juan Francisco Booker MRN# [id number]   Age: 62 year old YOB: 1960     Brief history of symptoms: The patient was initially seen in neurologic consultation on 11/19/2021 for evaluation of numbness/tingling, headaches, dizziness, low back pain. Please see the comprehensive neurologic consultation note from that date in the Epic records for details.     Interval history:   He continues to have headaches especially on days where he has prolonged sitting. He hasn't found benefit with physical therapy exercises. After getting a headache, usually headache will improve after walking around for about 20 minutes. He usually doesn't take a medication for the headaches.     Tingling is a little better since increasing dosage of gabapentin.     He is also doing physical therapy for low back pain. He saw Dr Chou in physiatry.       Past Medical History:     Patient Active Problem List   Diagnosis     Obstructive sleep apnea     Insomnia     Excessive sleepiness     Chronic nasal congestion     Other and unspecified alcohol dependence, unspecified drinking behavior     Lesion of ulnar nerve     Hyperlipidemia     Essential hypertension     Obesity     Benign neoplasm of colon     Hyperlipidemia     Moderate major depression (H)     Gastric polyps     Cervical pain     Impaired glucose tolerance test     Prediabetes     Skin tag     Unilateral inguinal hernia with obstruction and without gangrene, recurrence not specified     Tension headache     Past Medical History:   Diagnosis Date     Anxiety      Benign neoplasm of colon 3/2/2015     Depression      Depressive disorder 1/15/2000     Difficult intubation      ETOH abuse 3/15/2009    in remission     Gastro-oesophageal reflux disease 1/15/2010     Hyperlipidemia 1/1/2000     Hypoxemia      Morbid obesity (H)      Nasal polyps 1/1/2015     TIMOTHY on CPAP 8/13/2012    Severe (uses 5-6 hours as able)     Other and unspecified hyperlipidemia  "10/25/2012     Personal history of colonic polyps 2/207/15    Multiple \"neg for FAP\"     Pre-diabetes      Prediabetes 5/24/2017     Restless leg      Skin tag      Surgical complication 1/6/2015    difficulty inserting a tube down my throat     Unspecified essential hypertension 10/25/2012        Past Surgical History:     Past Surgical History:   Procedure Laterality Date     AS COLONOSCOPY THRU STOMA W BIOPSY/CAUTERY TUMOR/POLYP/LESION  2/27/15    colon polyps     COLONOSCOPY  2/27/2015    x 2     COLONOSCOPY N/A 1/6/2020    Procedure: COLONOSCOPY, WITH POLYPECTOMY AND BIOPSY;  Surgeon: Omer Salas MD;  Location:  GI     COLONOSCOPY WITH CO2 INSUFFLATION N/A 10/20/2016    Procedure: COLONOSCOPY WITH CO2 INSUFFLATION;  Surgeon: Juan Francisco Beltran MD;  Location:  OR     ENT SURGERY  1/5/2011    [facility]     ESOPHAGOSCOPY, GASTROSCOPY, DUODENOSCOPY (EGD), COMBINED N/A 10/20/2016    Procedure: COMBINED ESOPHAGOSCOPY, GASTROSCOPY, DUODENOSCOPY (EGD);  Surgeon: Juan Francisco Beltran MD;  [facility]     LAPAROSCOPIC HERNIORRHAPHY INGUINAL Right 3/24/2021    Procedure: HERNIORRHAPHY, INGUINAL, LAPAROSCOPIC;  Surgeon: Laith Villa MD;  Location: [facility] OR     RELEASE CARPAL TUNNEL Right 6/14/2017    Procedure: RELEASE CARPAL TUNNEL;  Right Open Carpal Tunnel Release;  Surgeon: Aracelis Lowe MD;  Location: [facility] OR     RELEASE CARPAL TUNNEL Left 12/29/2017    Procedure: RELEASE CARPAL TUNNEL;  Left Open Carpal Tunnel Release;  Surgeon: Aracelis Lowe MD;  Location: [facility] OR        Social History:     Social History     Tobacco Use     Smoking status: Never Smoker     Smokeless tobacco: Never Used   Substance Use Topics     Alcohol use: Not Currently     Comment: In remission since 2009     Drug use: No        Family History:     Family History   Problem Relation Age of Onset     Other Cancer Mother      Cancer Mother      Other Cancer Brother      Cancer Brother      Cerebrovascular " Disease Maternal Grandfather         ,, ,, ,, ,, ,, ,, ,, ,     Alcohol/Drug Brother      Cancer Brother         pancreatic; liver     Other Cancer Brother      Asthma No family hx of      Anesthesia Reaction No family hx of      Colon Polyps No family hx of      Crohn's Disease No family hx of      Ulcerative Colitis No family hx of      Colon Cancer No family hx of      Cardiovascular No family hx of      Deep Vein Thrombosis (DVT) No family hx of         Medications:     Current Outpatient Medications   Medication Sig     aspirin 81 MG tablet Take 1 tablet by mouth daily AM     Caffeine 100 MG TABS Take 2 tablets by mouth daily as needed     citalopram (CELEXA) 10 MG tablet Take 1 tablet (10 mg) by mouth daily     dutasteride (AVODART) 0.5 MG capsule Take 1 capsule (0.5 mg) by mouth daily     finasteride (PROSCAR) 5 MG tablet Take 1 tablet (5 mg) by mouth daily     fluticasone (FLONASE) 50 MCG/ACT nasal spray Spray 1-2 sprays into both nostrils daily     gabapentin (NEURONTIN) 300 MG capsule 1 in morning, 1 at midday, 3 at night (Patient taking differently: 3 in morning, 3 at night)     lisinopril (ZESTRIL) 20 MG tablet Take 1 tablet (20 mg) by mouth daily     Multiple Vitamin (DAILY MULTIVITAMIN PO) Take 1 tablet by mouth daily AM     ORDER FOR DME Use your CPAP device as directed by your provider.     polyethylene glycol (MIRALAX) 17 GM/Dose powder Take 9-17 g by mouth daily     simvastatin (ZOCOR) 40 MG tablet Take 1 tablet (40 mg) by mouth At Bedtime     SODIUM FLUORIDE 5000 PPM 1.1 % PSTE BRUSH TEETH WITH PEA SIZED AMOUNT AT BEDTIME AND DO NOT RINSE     tamsulosin (FLOMAX) 0.4 MG capsule Take 2 capsules (0.8 mg) by mouth daily 30 min after a meal.   Please keep visit for 5/11/2021 for further refills. Thank you     No current facility-administered medications for this visit.        Allergies:     Allergies   Allergen Reactions     Grass         Review of Systems:   As above     Physical Exam:   Vitals: BP  122/81   Pulse 60   Resp 16   Wt 82.1 kg (181 lb)   SpO2 97%   BMI 26.73 kg/m     General: Seated comfortably in no acute distress.  HEENT: Neck supple with normal range of motion. Mild bilateral paracervical tightness.  Optic discs sharp and vasculature normal on funduscopic exam.   Lungs: breathing comfortably  Neurologic:     Mental Status: Fully alert, attentive. Normal memory and fund of knowledge. Language normal, speech clear and fluent, no paraphasic errors.     Cranial Nerves: Visual fields intact. PERRL. EOMI with normal smooth pursuit. Facial sensation intact/symmetric. Facial movements symmetric. Hearing not formally tested but intact to conversation. Palate elevation symmetric, uvula midline. No dysarthria. Shoulder shrug strong bilaterally. Tongue protrusion midline.     Motor: No tremors or other abnormal movements observed. Muscle tone normal throughout. Normal/symmetric rapid finger tapping. Strength 5/5 throughout upper and lower extremities.     Deep Tendon Reflexes: 2+/symmetric throughout upper and lower extremities. No clonus.      Sensory: Intact/symmetric to light touch throughout upper and lower extremities. Negative Romberg.      Coordination: Finger-nose-finger and heel-shin intact without dysmetria. Rapid alternating movements intact/symmetric with normal speed and rhythm.     Gait: Normal, steady casual gait.      Data reviewed on previous visits    Imaging:  MRI lumbar 8/2021  Impression:   1. Multiple unchanged presumed hemangiomas as described above.   2. Anterior wedge-shaped compression deformities of T12 and L4,  unchanged compared to 10/22/2020.  3. Multilevel lumbar spondylosis, most pronounced at L3-4 with  moderate spinal canal and moderate bilateral neural foraminal  stenosis. No significant change in overall finding compared  To10/22/2020.     MRI cervical/thoracic/lumbar 10/2020  Impression:  1. No myelopathic signal in the spinal cord.  2. Numerous probable hemangiomas  in C5, C6, L2, S2, S3, and the left  iliac bone. Other areas of low T1 signal intensity in the marrow were  also present. Review of prior imaging, if available, would be helpful  to ensure stability. Metastatic disease is thought unlikely, but is  difficult to exclude.  3. Degenerative mild to moderate spinal canal narrowing at L3-4 mild  to moderate bilateral neural foraminal narrowing at L3-4 and L4-5.  Moderate right greater than left neural foraminal narrowing at C5-6.     Procedures:  EMG 7/2020  Interpretation:  This is a normal study. There is no electrophysiologic evidence of a large-fiber polyneuropathy or lumbosacral radiculopathy affecting the lower limbs on the basis of this study.      Laboratory:  A1c 5.4 (7/2021)  B12 331 (8/2020)  Hep C negative (6/2020)      Pertinent Investigations since last visit:   CTV 12/7/2021  Impression:  No evidence of thrombus within the major intracranial  venous sinuses or in the major neck veins. Mild compression of the  right internal jugular vein by the carotid bifurcation with head  turned to the right. No significant narrowing in neutral position.    TOS US 11/2021  IMPRESSION: Thoracic outlet/inlet physiology suggested. Correlate for  symptoms.  1. RIGHT:       A. No subclavian venous stenosis suggested at rest.       B. More than quadrupling of velocities suggest subclavian venous  narrowing with maneuvers. No occlusion demonstrated.       C. No internal jugular venous stenosis suggested with maneuvers.       D. No arterial stenosis suggested with maneuvers.  2. LEFT:       A. No subclavian venous stenosis suggested at rest.       B. More than tripling of velocities in 135 and 180 degrees  suggest subclavian narrowing with maneuvers. No occlusion  demonstrated.       C. More than 3 times decrease velocity head right suggest  internal jugular narrowing. No occlusion demonstrated.       D. PPG diminished in Arms 180. No occlusion demonstrated.            Assessment  and Plan:   Assessment:  Juan Francisco Booker is a 61 year old male who presents today for evaluation of lower extremity numbness/tingling, headaches and dizziness. Patient previously followed with Dr Seaman. He has had extensive work-up for lower extremity tingling as above, which has not revealed an etiology. He doesn't have exam findings suggestive of small fiber neuropathy. He has had benefit with gabapentin.     Headaches and dizziness are positional, provoked by prolonged sitting or standing upright. Symptoms typically resolve 15-20 minutes later after changing positions. These headaches have been going on over the last 9 months. Thoracic outlet ultrasound and CTV does not show convincing abnormality to explain symptoms. MRI brain ordered to evaluate for structural lesion to explain symptoms. An atypical presentation of tension headaches is a possibility. He hasn't had benefit thus far with physical therapy. Will refer patient back to physiatry for opinion on management of neck tension and possible contribution to tension headaches. Will do trial of tizanidine nightly for headaches.        Plan:  - MRI brain without contrast  - Follow-up again with Dr Chou  - Tizanidine 2 mg nightly; can increase to 4 mg nightly after 1 week if needed  - Continue gabapentin 900/300/900 for numbness/tingling    Follow up in Neurology clinic in 3-4 months or earlier as needed should new concerns arise.    Bulmaro Deshpande MD   of Neurology  [facility]    The total time of this encounter today amounted to 41 minutes. This time included time spent with the patient, prep work, ordering tests, and performing post visit documentation.

## 2022-02-04 NOTE — PROGRESS NOTES
Subjective: 0/10 pain right now in lower back. Is noticing some right anterior hip pain at 1/10, wondering if the bridge is irritating this. He is concerned about this d/t having previous hernia surgery a year ago and doesn't want to irritate things here.       Daily Assessment:  Tomi is presenting for his 5th follow-up in the [facility] program. Tolerating Medx well with appropriate reps to fatigue, does have soreness following, but continues to tolerate. We continue to work on abdominal strength to help with posture and core stability to decrease back pain.  Will plan to continue to gradually progress HEP.    Lumbar MedX Initial Testing   1/18/22   AROM (full=  0-72  lumbar) 0-54   Max Extension Torque  272#   Flex: ext ratio (ideal 1.4:1) 4.32:1       Date 2/4/2022 1/31/2022 1/28/2022 1/24/22 1/20/22 1/18/22   Lumbar Parameters         Top Dead Center (TDC) 18 18 18 18 18 18   Counterbalance (CB) 385 385 385 385 385 385   Seat Pad 1 1 1 1 1 1   Femur Restraint 5 5 5 5 5 5   Week/Visit         Enter Week/Visit # Wk 3 V 2 Wk 3 V 1 Wk2 V 2 Wk 2 V1 Wk 1 v2 W1V1   Weight (lbs) 133# 130# 125# 120# 106# 100#   Reps (#) 17 28 21 21 27 20   Time 178  186 240 188 s 215s   ROM (degrees) 0-57 (further range today!) 0-54 0-54 0-54 0-54 0-54   Pain 4/10 following        Flex:Ext ratio      4.32:1   Cervical Parameters         Top Dead Center (TDC)         Counterbalance (CB)         Seat Height         Week/Visit         Enter Week/Visit #         Weight (lbs)         Reps (#)         Time         ]ROM (degrees)         Pain         Flex:Ext ratio           Date 2/4/2022 1/31/2022 1/28/2022 1/24/2022 1/20/22   Exercise        treadmill 4 min 4 min X 4 min X 4 min X 4 min   Rotary Torso 90' 38# to R 36# to L 36# to R 34# to L 30# to R   Bridge with toes lifted Switch to feet up on couch to activate hamstrings more and decrease groin pain.    X 12   Dead bug Reviewed, able to do with ok form, cues for abdominal activation X 10    2  "x 20\"   Hip flexor stretch kneeling    X 30 seconds    Piriformis stretch    X 30 seconds    Hip Abduction  X 10  X 10     Quadruped leg extensions  X 10 cues for form X 10     Reformer leg press  X 10 all springs      Reformer bridge x10 double leg , x10 single leg all springs X 10 all springs      Reformer 90/90 pulldowns  X 10 3R                "

## 2022-02-04 NOTE — LETTER
2/4/2022       RE: Juan Francisco Booker  [address]  [place] [address]     Dear Colleague,    Thank you for referring your patient, Juan Francisco Booker, to the [facility] NEUROLOGY [facility]. Please see a copy of my visit note below.    [facility] Neurology Follow Up Visit    Juan Francisco Booker MRN# [id number]   Age: 62 year old YOB: 1960     Brief history of symptoms: The patient was initially seen in neurologic consultation on 11/19/2021 for evaluation of numbness/tingling, headaches, dizziness, low back pain. Please see the comprehensive neurologic consultation note from that date in the Epic records for details.     Interval history:   He continues to have headaches especially on days where he has prolonged sitting. He hasn't found benefit with physical therapy exercises. After getting a headache, usually headache will improve after walking around for about 20 minutes. He usually doesn't take a medication for the headaches.     Tingling is a little better since increasing dosage of gabapentin.     He is also doing physical therapy for low back pain. He saw Dr Chou in physiatry.       Past Medical History:     Patient Active Problem List   Diagnosis     Obstructive sleep apnea     Insomnia     Excessive sleepiness     Chronic nasal congestion     Other and unspecified alcohol dependence, unspecified drinking behavior     Lesion of ulnar nerve     Hyperlipidemia     Essential hypertension     Obesity     Benign neoplasm of colon     Hyperlipidemia     Moderate major depression (H)     Gastric polyps     Cervical pain     Impaired glucose tolerance test     Prediabetes     Skin tag     Unilateral inguinal hernia with obstruction and without gangrene, recurrence not specified     Tension headache     Past Medical History:   Diagnosis Date     Anxiety      Benign neoplasm of colon 3/2/2015     Depression      Depressive disorder  "1/15/2000     Difficult intubation      ETOH abuse 3/15/2009    in remission     Gastro-oesophageal reflux disease 1/15/2010     Hyperlipidemia 1/1/2000     Hypoxemia      Morbid obesity (H)      Nasal polyps 1/1/2015     TIMOTHY on CPAP 8/13/2012    Severe (uses 5-6 hours as able)     Other and unspecified hyperlipidemia 10/25/2012     Personal history of colonic polyps 2/207/15    Multiple \"neg for FAP\"     Pre-diabetes      Prediabetes 5/24/2017     Restless leg      Skin tag      Surgical complication 1/6/2015    difficulty inserting a tube down my throat     Unspecified essential hypertension 10/25/2012        Past Surgical History:     Past Surgical History:   Procedure Laterality Date     AS COLONOSCOPY THRU STOMA W BIOPSY/CAUTERY TUMOR/POLYP/LESION  2/27/15    colon polyps     COLONOSCOPY  2/27/2015    x 2     COLONOSCOPY N/A 1/6/2020    Procedure: COLONOSCOPY, WITH POLYPECTOMY AND BIOPSY;  Surgeon: Omer Salas MD;  Location:  GI     COLONOSCOPY WITH CO2 INSUFFLATION N/A 10/20/2016    Procedure: COLONOSCOPY WITH CO2 INSUFFLATION;  Surgeon: Juan Francisco Beltran MD;  Location: [facility] OR     ENT SURGERY  1/5/2011    [facility]     ESOPHAGOSCOPY, GASTROSCOPY, DUODENOSCOPY (EGD), COMBINED N/A 10/20/2016    Procedure: COMBINED ESOPHAGOSCOPY, GASTROSCOPY, DUODENOSCOPY (EGD);  Surgeon: Juan Francisco Beltran MD;  Location: [facility] OR     LAPAROSCOPIC HERNIORRHAPHY INGUINAL Right 3/24/2021    Procedure: HERNIORRHAPHY, INGUINAL, LAPAROSCOPIC;  Surgeon: Laith Villa MD;  Location: [facility] OR     RELEASE CARPAL TUNNEL Right 6/14/2017    Procedure: RELEASE CARPAL TUNNEL;  Right Open Carpal Tunnel Release;  Surgeon: Aracelis Lowe MD;  Location: [facility] OR     RELEASE CARPAL TUNNEL Left 12/29/2017    Procedure: RELEASE CARPAL TUNNEL;  Left Open Carpal Tunnel Release;  Surgeon: Aracelis Lowe MD;  Location: [facility] OR        Social History:     Social History     Tobacco Use     Smoking status: Never Smoker     " Smokeless tobacco: Never Used   Substance Use Topics     Alcohol use: Not Currently     Comment: In remission since 2009     Drug use: No        Family History:     Family History   Problem Relation Age of Onset     Other Cancer Mother      Cancer Mother      Other Cancer Brother      Cancer Brother      Cerebrovascular Disease Maternal Grandfather         ,, ,, ,, ,, ,, ,, ,, ,     Alcohol/Drug Brother      Cancer Brother         pancreatic; liver     Other Cancer Brother      Asthma No family hx of      Anesthesia Reaction No family hx of      Colon Polyps No family hx of      Crohn's Disease No family hx of      Ulcerative Colitis No family hx of      Colon Cancer No family hx of      Cardiovascular No family hx of      Deep Vein Thrombosis (DVT) No family hx of         Medications:     Current Outpatient Medications   Medication Sig     aspirin 81 MG tablet Take 1 tablet by mouth daily AM     Caffeine 100 MG TABS Take 2 tablets by mouth daily as needed     citalopram (CELEXA) 10 MG tablet Take 1 tablet (10 mg) by mouth daily     dutasteride (AVODART) 0.5 MG capsule Take 1 capsule (0.5 mg) by mouth daily     finasteride (PROSCAR) 5 MG tablet Take 1 tablet (5 mg) by mouth daily     fluticasone (FLONASE) 50 MCG/ACT nasal spray Spray 1-2 sprays into both nostrils daily     gabapentin (NEURONTIN) 300 MG capsule 1 in morning, 1 at midday, 3 at night (Patient taking differently: 3 in morning, 3 at night)     lisinopril (ZESTRIL) 20 MG tablet Take 1 tablet (20 mg) by mouth daily     Multiple Vitamin (DAILY MULTIVITAMIN PO) Take 1 tablet by mouth daily AM     ORDER FOR DME Use your CPAP device as directed by your provider.     polyethylene glycol (MIRALAX) 17 GM/Dose powder Take 9-17 g by mouth daily     simvastatin (ZOCOR) 40 MG tablet Take 1 tablet (40 mg) by mouth At Bedtime     SODIUM FLUORIDE 5000 PPM 1.1 % PSTE BRUSH TEETH WITH PEA SIZED AMOUNT AT BEDTIME AND DO NOT RINSE     tamsulosin (FLOMAX) 0.4 MG capsule Take  2 capsules (0.8 mg) by mouth daily 30 min after a meal.   Please keep visit for 5/11/2021 for further refills. Thank you     No current facility-administered medications for this visit.        Allergies:     Allergies   Allergen Reactions     Grass         Review of Systems:   As above     Physical Exam:   Vitals: /81   Pulse 60   Resp 16   Wt 82.1 kg (181 lb)   SpO2 97%   BMI 26.73 kg/m     General: Seated comfortably in no acute distress.  HEENT: Neck supple with normal range of motion. Mild bilateral paracervical tightness.  Optic discs sharp and vasculature normal on funduscopic exam.   Lungs: breathing comfortably  Neurologic:     Mental Status: Fully alert, attentive. Normal memory and fund of knowledge. Language normal, speech clear and fluent, no paraphasic errors.     Cranial Nerves: Visual fields intact. PERRL. EOMI with normal smooth pursuit. Facial sensation intact/symmetric. Facial movements symmetric. Hearing not formally tested but intact to conversation. Palate elevation symmetric, uvula midline. No dysarthria. Shoulder shrug strong bilaterally. Tongue protrusion midline.     Motor: No tremors or other abnormal movements observed. Muscle tone normal throughout. Normal/symmetric rapid finger tapping. Strength 5/5 throughout upper and lower extremities.     Deep Tendon Reflexes: 2+/symmetric throughout upper and lower extremities. No clonus.      Sensory: Intact/symmetric to light touch throughout upper and lower extremities. Negative Romberg.      Coordination: Finger-nose-finger and heel-shin intact without dysmetria. Rapid alternating movements intact/symmetric with normal speed and rhythm.     Gait: Normal, steady casual gait.      Data reviewed on previous visits    Imaging:  MRI lumbar 8/2021  Impression:   1. Multiple unchanged presumed hemangiomas as described above.   2. Anterior wedge-shaped compression deformities of T12 and L4,  unchanged compared to 10/22/2020.  3. Multilevel  lumbar spondylosis, most pronounced at L3-4 with  moderate spinal canal and moderate bilateral neural foraminal  stenosis. No significant change in overall finding compared  To10/22/2020.     MRI cervical/thoracic/lumbar 10/2020  Impression:  1. No myelopathic signal in the spinal cord.  2. Numerous probable hemangiomas in C5, C6, L2, S2, S3, and the left  iliac bone. Other areas of low T1 signal intensity in the marrow were  also present. Review of prior imaging, if available, would be helpful  to ensure stability. Metastatic disease is thought unlikely, but is  difficult to exclude.  3. Degenerative mild to moderate spinal canal narrowing at L3-4 mild  to moderate bilateral neural foraminal narrowing at L3-4 and L4-5.  Moderate right greater than left neural foraminal narrowing at C5-6.     Procedures:  EMG 7/2020  Interpretation:  This is a normal study. There is no electrophysiologic evidence of a large-fiber polyneuropathy or lumbosacral radiculopathy affecting the lower limbs on the basis of this study.      Laboratory:  A1c 5.4 (7/2021)  B12 331 (8/2020)  Hep C negative (6/2020)      Pertinent Investigations since last visit:   CTV 12/7/2021  Impression:  No evidence of thrombus within the major intracranial  venous sinuses or in the major neck veins. Mild compression of the  right internal jugular vein by the carotid bifurcation with head  turned to the right. No significant narrowing in neutral position.    TOS US 11/2021  IMPRESSION: Thoracic outlet/inlet physiology suggested. Correlate for  symptoms.  1. RIGHT:       A. No subclavian venous stenosis suggested at rest.       B. More than quadrupling of velocities suggest subclavian venous  narrowing with maneuvers. No occlusion demonstrated.       C. No internal jugular venous stenosis suggested with maneuvers.       D. No arterial stenosis suggested with maneuvers.  2. LEFT:       A. No subclavian venous stenosis suggested at rest.       B. More than  tripling of velocities in 135 and 180 degrees  suggest subclavian narrowing with maneuvers. No occlusion  demonstrated.       C. More than 3 times decrease velocity head right suggest  internal jugular narrowing. No occlusion demonstrated.       D. PPG diminished in Arms 180. No occlusion demonstrated.            Assessment and Plan:   Assessment:  Juan Francisco Booker is a 61 year old male who presents today for evaluation of lower extremity numbness/tingling, headaches and dizziness. Patient previously followed with Dr Seaman. He has had extensive work-up for lower extremity tingling as above, which has not revealed an etiology. He doesn't have exam findings suggestive of small fiber neuropathy. He has had benefit with gabapentin.     Headaches and dizziness are positional, provoked by prolonged sitting or standing upright. Symptoms typically resolve 15-20 minutes later after changing positions. These headaches have been going on over the last 9 months. Thoracic outlet ultrasound and CTV does not show convincing abnormality to explain symptoms. MRI brain ordered to evaluate for structural lesion to explain symptoms. An atypical presentation of tension headaches is a possibility. He hasn't had benefit thus far with physical therapy. Will refer patient back to physiatry for opinion on management of neck tension and possible contribution to tension headaches. Will do trial of tizanidine nightly for headaches.        Plan:  - MRI brain without contrast  - Follow-up again with Dr Chou  - Tizanidine 2 mg nightly; can increase to 4 mg nightly after 1 week if needed  - Continue gabapentin 900/300/900 for numbness/tingling    Follow up in Neurology clinic in 3-4 months or earlier as needed should new concerns arise.    Bulmaro Deshpande MD   of Neurology  [facility]    The total time of this encounter today amounted to 41 minutes. This time included time spent with the patient, prep work, ordering  tests, and performing post visit documentation.        Again, thank you for allowing me to participate in the care of your patient.      Sincerely,    Bulmaro Deshpande MD

## 2022-02-07 ENCOUNTER — TELEPHONE (OUTPATIENT)
Dept: NEUROLOGY | Facility: CLINIC | Age: 62
End: 2022-02-07
Payer: COMMERCIAL

## 2022-02-07 NOTE — TELEPHONE ENCOUNTER
----- Message from Braydon Chou MD sent at 2/7/2022  8:40 AM CST -----  Regarding: RE: suspected tension headaches  I would be happy to.  I will send this to my team to give him a call and see if he wants to come in sooner.ThanksBraydon  ----- Message -----  From: Bulmaro Deshpande MD  Sent: 2/4/2022  12:00 PM CST  To: Braydon Chou MD  Subject: suspected tension headaches                      Hi Braydon,     You saw Tomi previously for low back pain. He is currently doing physical therapy for this.     I am also seeing him for unusual positional headaches. They come on after prolonged sitting and get better with walking around. Headache is mainly frontal and is also associated with neck tension. I suspect these may be a form of tension headaches. I ordered an MRI brain because it is a new type of headache. He has gotten better so far from PT aimed at treatment of tension headaches.     Would you be willing to see him sooner for opinion on whether he might benefit from neck injections or other specific PT exercises for tension headaches? It looks like he is scheduled in April.    Thanks  Bulmaro

## 2022-02-07 NOTE — TELEPHONE ENCOUNTER
I called patient and LM for him to call us back and schedule sooner appt with Dr. Chou-see message below.    Jolanta Knapp LPN

## 2022-02-08 ENCOUNTER — OFFICE VISIT (OUTPATIENT)
Dept: NEUROSURGERY | Facility: CLINIC | Age: 62
End: 2022-02-08
Payer: COMMERCIAL

## 2022-02-08 ENCOUNTER — HOSPITAL ENCOUNTER (OUTPATIENT)
Dept: PHYSICAL THERAPY | Facility: CLINIC | Age: 62
End: 2022-02-08
Payer: COMMERCIAL

## 2022-02-08 VITALS
SYSTOLIC BLOOD PRESSURE: 110 MMHG | WEIGHT: 182.3 LBS | HEIGHT: 69 IN | HEART RATE: 68 BPM | OXYGEN SATURATION: 98 % | DIASTOLIC BLOOD PRESSURE: 70 MMHG | BODY MASS INDEX: 27 KG/M2

## 2022-02-08 DIAGNOSIS — G89.29 CHRONIC BILATERAL LOW BACK PAIN WITHOUT SCIATICA: Primary | ICD-10-CM

## 2022-02-08 DIAGNOSIS — M54.50 CHRONIC BILATERAL LOW BACK PAIN WITHOUT SCIATICA: ICD-10-CM

## 2022-02-08 DIAGNOSIS — R17 JAUNDICE: ICD-10-CM

## 2022-02-08 DIAGNOSIS — M75.42 IMPINGEMENT SYNDROME OF LEFT SHOULDER: ICD-10-CM

## 2022-02-08 DIAGNOSIS — M54.2 NECK PAIN: Primary | ICD-10-CM

## 2022-02-08 DIAGNOSIS — R29.898 MUSCULAR DECONDITIONING: ICD-10-CM

## 2022-02-08 DIAGNOSIS — G44.209 TENSION HEADACHE: ICD-10-CM

## 2022-02-08 DIAGNOSIS — M54.50 CHRONIC BILATERAL LOW BACK PAIN WITHOUT SCIATICA: Primary | ICD-10-CM

## 2022-02-08 DIAGNOSIS — G89.29 CHRONIC BILATERAL LOW BACK PAIN WITHOUT SCIATICA: ICD-10-CM

## 2022-02-08 PROCEDURE — 97110 THERAPEUTIC EXERCISES: CPT | Mod: GP | Performed by: PHYSICAL THERAPIST

## 2022-02-08 PROCEDURE — 99215 OFFICE O/P EST HI 40 MIN: CPT | Performed by: PREVENTIVE MEDICINE

## 2022-02-08 ASSESSMENT — MIFFLIN-ST. JEOR: SCORE: 1617.29

## 2022-02-08 ASSESSMENT — PAIN SCALES - GENERAL: PAINLEVEL: MODERATE PAIN (4)

## 2022-02-08 NOTE — PATIENT INSTRUCTIONS
It is great to see you doing so well Tomi. I think we're on the right track with your back and hopefully we can avoid injections there. Since that is going so well, let's add on MedX for the neck. I'll order an injection for your left shoulder bursa and I'm looking forward to seeing you back in April. See the assessment and plan below for further details. Contact your family doctor for some blood testing and I think it may be time for another physical.    Assessment       Juan Francisco Booker  is a 62 year old y.o. male who presents today for follow-up regarding    Resolved Pelvic Joint Dysfunction    Improving low back pain on ongoing MedX strengthening program    Headache, possible cervicogenic component, but no upper quadrant triggering and no evidence clinically of radiculopathy or impingement    Left shoulder impingement syndrome    Skin discoloration suspicious for either jaundice or anemia.      Plan:  I will have him follow-up with his family doctor for liver testing as well as a metabolic panel and CBC for the skin color issue. The good news is he feels fine. I'm encouraged by his progress with the low back MedX program and I will add on a cervical MedX program at the same facility. We talked about getting an MRI of the left shoulder versus just doing a steroid injection, and he prefers to do that so I'll order a subacromial steroid injection in her sports medicine facility or perhaps with Dr. Lewis he has an appointment to follow-up with me in April and we can check the neck and back at that time and also double check the shoulder. If the shoulder is no better I would recommend an MRI at that point. If the neck is no better I would recommend an MRI and consideration of an RFA work-up depending on the findings.

## 2022-02-08 NOTE — NURSING NOTE
"Reason For Visit:   Chief Complaint   Patient presents with     RECHECK     neck pain/headaches       Occupation: IT  Currently working? No.  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     Sports: no  Activities: walking           /70   Pulse 68   Ht 1.753 m (5' 9\")   Wt 82.7 kg (182 lb 4.8 oz)   SpO2 98%   BMI 26.92 kg/m        Allergies   Allergen Reactions     Grass        Current Outpatient Medications   Medication     Caffeine 100 MG TABS     citalopram (CELEXA) 10 MG tablet     dutasteride (AVODART) 0.5 MG capsule     finasteride (PROSCAR) 5 MG tablet     fluticasone (FLONASE) 50 MCG/ACT nasal spray     gabapentin (NEURONTIN) 300 MG capsule     lisinopril (ZESTRIL) 20 MG tablet     Multiple Vitamin (DAILY MULTIVITAMIN PO)     polyethylene glycol (MIRALAX) 17 GM/Dose powder     psyllium (METAMUCIL) 28.3 % packet     simvastatin (ZOCOR) 40 MG tablet     SODIUM FLUORIDE 5000 PPM 1.1 % PSTE     tamsulosin (FLOMAX) 0.4 MG capsule     tiZANidine (ZANAFLEX) 2 MG tablet     aspirin 81 MG tablet     ORDER FOR DME     No current facility-administered medications for this visit.         Darla Severin-Brown, LPN  "

## 2022-02-08 NOTE — PROGRESS NOTES
"Daily Assessment:  Tomi is presenting for his 6th follow-up in the MedX program. PT performed re-test this visit and he is demonstrating significant improvement in his flexion to extension ratio since the start of therapy. He is also noting the drive this morning to not be as bad as before.     Lumbar MedX 4-week Re-test  2/8/22 Initial Testing   1/18/22   AROM (full=  0-72  lumbar) 0-57 0-54   Max Extension Torque  266# 272#   Flex: ext ratio (ideal 1.4:1) 1.9:1 4.32:1       Date 2/8/22 2/4/2022 1/31/2022 1/28/2022 1/24/22 1/20/22 1/18/22   Lumbar Parameters          Top Dead Center (TDC) 18 18 18 18 18 18 18   Counterbalance (CB) 385 385 385 385 385 385 385   Seat Pad 1 1 1 1 1 1 1   Femur Restraint 5 5 5 5 5 5 5   Week/Visit          Enter Week/Visit # Wk 4 Wk 3 V 2 Wk 3 V 1 Wk2 V 2 Wk 2 V1 Wk 1 v2 W1V1   Weight (lbs) 266# Max  135# ex 133# 130# 125# 120# 106# 100#   Reps (#) 13 17 28 21 21 27 20   Time 125s  178  186 240 188 s 215s   ROM (degrees) 0-57 0-57 (further range today!) 0-54 0-54 0-54 0-54 0-54   Pain  4/10 following        Flex:Ext ratio 1.97:1      4.32:1   Cervical Parameters          Top Dead Center (TDC)          Counterbalance (CB)          Seat Height          Week/Visit          Enter Week/Visit #          Weight (lbs)          Reps (#)          Time          ]ROM (degrees)          Pain          Flex:Ext ratio            Date 2/8/22 2/4/2022 1/31/2022 1/28/2022 1/24/2022 1/20/22   Exercise         treadmill X 4 min 4 min 4 min X 4 min X 4 min X 4 min   Rotary Torso 90' 40# to L 38# to R 36# to L 36# to R 34# to L 30# to R   Bridge with toes lifted  Switch to feet up on couch to activate hamstrings more and decrease groin pain.    X 12   Dead bug  Reviewed, able to do with ok form, cues for abdominal activation X 10    2 x 20\"   Hip flexor stretch kneeling     X 30 seconds    Piriformis stretch     X 30 seconds    Hip Abduction   X 10  X 10     Quadruped leg extensions   X 10 cues for form X " "10     Reformer leg press X 20, all springs  X 10 all springs      Reformer bridge  x10 double leg , x10 single leg all springs X 10 all springs      Reformer 90/90 pulldowns   X 10 3R      Supine butterfly stretch X 20\"          "

## 2022-02-08 NOTE — LETTER
2/8/2022         RE: Juan Francisco Booker  [address]  [place] [address]        Dear Colleague,    Thank you for referring your patient, Juan Francisco Booker, to the [facility] [employer]. Please see a copy of my visit note below.      Subjective:  Juan Francisco Booker is a 62 year old male who presents today for follow-up regarding low back pain.  I treated him for a possible Pelvic Joint Dysfunction on 12/15/2021 but I was not convinced that was the issue.  He has multilevel lumbar spondylosis and has had massive volitional weight loss of 140 pounds.  I was wondering about lumbar facets as a pain generator.  I ordered a trial of a MedX program as opposed to an RFA work-up on 12/15/2021    PRIOR HISTORY from 11/23/2020   :  He has tingling on the tops of both feet. This has been stable to improving over the last 2-3 years. He takes gabapentin for the tingling and restless legs. This has improved his restless legs. His tingling is now not as painful.  Pain onset was 3 to 4 years ago and it improved initially with some physical therapy and then he slipped on the ice a couple of times and the pain has gotten worse since then which was about 3 years ago.  Recently he tried physical therapy for about 2 months and it did not help.     His pain is left greater than right and he points to the SI area.  There is no radiating numbness tingling or weakness in his legs unless he sitting for short period of time and then he gets it on the tops of both ankles and both feet but it skips the rest of his leg.  No significant bowel or bladder dysfunction, unless he is far away from bathroom and cannot get there in time and has had a couple of bladder incontinence issues.  He does have a urologist.  And he does have some BPH with some urgency and slower voids.  No saddle anesthesia.  Does not have a history of cancer or prolonged steroid use.  He has been successful with 140 pounds of volitional weight  loss but no unexplained weight loss.    INTERIM HISTORY:    PT notes indicate that he has been tolerating the MedX program well with some soreness following the treatments and he is progressing in his strength as well as repetitions.  He started out on 1/18/2022 at 100 pounds and 20 repetitions.  On 2/4/2022 he was at 133 pounds and 17 repetitions.  He also has improved 3 degrees and range of motion on 2/4/2022 for the 1st time.  For the first time today he started noticing that his sitting tolerance was better before the pain would come on and I think that is really encouraging after just 3 weeks of MedX treatment.    In the interim, he also wanted me to talk to him about neck pain upon recommendation of Dr. Deshpande, because his headaches might be associated with neck symptoms.  He had a CT venogram of the head and neck on 12/7/2021 which did not show any evidence of major thrombus or occlusion of the veins however there was mild compression above the right internal jugular vein by the carotid bifurcation when he turned his head to the right.    Past medical history is reviewed and is unchanged for any new medical diagnoses in the interim.  Pertinent for TIMOTHY treated with CPAP, hypertension, gastric polyps, prediabetes.  No pertinent surgical history.  He is on chronic aspirin therapy and gabapentin.        Social history is reviewed and is unchanged in the interim.  He is single.  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  He walks for 1 hour a day on a treadmill    Review of Systems:   Pertinent negatives include no fevers, chills, unexplained weight loss, bowel incontinence, bladder incontinence, trips, stumbles, falls.  All others reviewed and are negative. See patient's intake questionnaire from today for pain diagram, and further details.    IMAGING: Images and reports reviewed.     MRI lumbar 8/2021     L3-4: Posterior disc bulge. Bilateral facet arthropathy and ligamentum flavum  thickening. Bilateral moderate neural foraminal stenosis. Moderate spinal canal stenosis.      L4-5: Posterior disc bulge and superimposed left foraminal disc  protrusion. Bilateral moderate neural foraminal stenosis. Mild spinal canal stenosis.      Impression:   1. Multiple unchanged presumed hemangiomas as described above.   2. Anterior wedge-shaped compression deformities of T12 and L4,  unchanged compared to 10/22/2020.  3. Multilevel lumbar spondylosis, most pronounced at L3-4 with  moderate spinal canal and moderate bilateral neural foraminal  stenosis. No significant change in overall finding compared  To10/22/2020.  (OM-I have reviewed the images again today and looked at the SI joints and they look pretty good.)     Procedures:  EMG 7/2020  This is a normal study.       Objective:    CONSTITUTIONAL:  Vital signs as above.  No acute distress.  The patient is well nourished and well groomed.    PSYCHIATRIC:  The patient is awake, alert, oriented to person, place and time.  The patient is answering questions appropriately with clear speech.  Normal affect.  Able to follow commands  SKIN:  Skin over the face, posterior torso, and  extremities is free of significant lesions. He does have a sallow appearance to the skin of his palms and scalp and on the undersurface of his tongue but he does not have scleral icterus. It does not look like pallor in his conjunctive and I wonder about jaundice. In checking his laboratory testing I don't see liver testing done recently nor a CBC since 3/8/2021 when his hemoglobin and hematocrit were 13.1/38.8.  RESPIRATORY:  normal respiratory excursion and no dyspnea.  MUSCULOSKELETAL:  Gait is non-antalgic.  The patient is able to heel and toe walk without any difficulty.   Squat and rise normal.   .  Back ROM: Full and painless especially in extension today. He is moving about the room quite well    Cervical range of motion full fluid and painless and cervical compression and  Spurling's maneuver negative. Negative upper quadrant trigger points or tenderness..    Shoulder range of motion is symmetric but left shoulder is painful above 90 degrees in flexion and abduction. Left shoulder impingement 2/2/2/1. Left shoulder strength 5/5/5/5/5. Left shoulder tenderness 0/0/0/0/0  NEUROLOGICAL:    Motor:  L3-S1 as well as C5-T1 myotomes, axillary/median/radial/ulnar nerves 5/5     Sensation to light touch is intact in the bilateral lower and upper extremities.    SLR negative    Pelvis: No recurrent Pelvic Joint Dysfunction. Standing flexion, Kanika sign, and stork test all normal    Assessment       Juan Francisco Booker  is a 62 year old y.o. male who presents today for follow-up regarding    Resolved Pelvic Joint Dysfunction    Improving low back pain on ongoing MedX strengthening program    Headache, possible cervicogenic component, but no upper quadrant triggering and no evidence clinically of radiculopathy or impingement    Left shoulder impingement syndrome    Skin discoloration suspicious for either jaundice or anemia.      Plan:  I will have him follow-up with his family doctor for liver testing as well as a metabolic panel and CBC for the skin color issue. The good news is he feels fine. I'm encouraged by his progress with the low back MedX program and I will add on a cervical MedX program at the same facility. We talked about getting an MRI of the left shoulder versus just doing a steroid injection, and he prefers to do that so I'll order a subacromial steroid injection in her sports medicine facility or perhaps with Dr. Lewis he has an appointment to follow-up with me in April and we can check the neck and back at that time and also double check the shoulder. If the shoulder is no better I would recommend an MRI at that point. If the neck is no better I would recommend an MRI and consideration of an RFA work-up depending on the findings. He does have an upcoming brain MRI scheduled by   [facility]    Advised patient to call or return early if symptoms worsen, or having problems controlling bladder and bowel function or worsening leg weakness.     Please note: Voice recognition software was used in this dictation.  It may therefore contain typographical errors.  Braydon Chou MD      Again, thank you for allowing me to participate in the care of your patient.        Sincerely,        Braydon Chou MD

## 2022-02-08 NOTE — PROGRESS NOTES
Subjective:  Juan Francisco Booker is a 62 year old male who presents today for follow-up regarding low back pain.  I treated him for a possible Pelvic Joint Dysfunction on 12/15/2021 but I was not convinced that was the issue.  He has multilevel lumbar spondylosis and has had massive volitional weight loss of 140 pounds.  I was wondering about lumbar facets as a pain generator.  I ordered a trial of a MedX program as opposed to an RFA work-up on 12/15/2021    PRIOR HISTORY from 11/23/2020   :  He has tingling on the tops of both feet. This has been stable to improving over the last 2-3 years. He takes gabapentin for the tingling and restless legs. This has improved his restless legs. His tingling is now not as painful.  Pain onset was 3 to 4 years ago and it improved initially with some physical therapy and then he slipped on the ice a couple of times and the pain has gotten worse since then which was about 3 years ago.  Recently he tried physical therapy for about 2 months and it did not help.     His pain is left greater than right and he points to the SI area.  There is no radiating numbness tingling or weakness in his legs unless he sitting for short period of time and then he gets it on the tops of both ankles and both feet but it skips the rest of his leg.  No significant bowel or bladder dysfunction, unless he is far away from bathroom and cannot get there in time and has had a couple of bladder incontinence issues.  He does have a urologist.  And he does have some BPH with some urgency and slower voids.  No saddle anesthesia.  Does not have a history of cancer or prolonged steroid use.  He has been successful with 140 pounds of volitional weight loss but no unexplained weight loss.    INTERIM HISTORY:    PT notes indicate that he has been tolerating the MedX program well with some soreness following the treatments and he is progressing in his strength as well as repetitions.  He started out on 1/18/2022 at 100  pounds and 20 repetitions.  On 2/4/2022 he was at 133 pounds and 17 repetitions.  He also has improved 3 degrees and range of motion on 2/4/2022 for the 1st time.  For the first time today he started noticing that his sitting tolerance was better before the pain would come on and I think that is really encouraging after just 3 weeks of MedX treatment.    In the interim, he also wanted me to talk to him about neck pain upon recommendation of Dr. Deshpande, because his headaches might be associated with neck symptoms.  He had a CT venogram of the head and neck on 12/7/2021 which did not show any evidence of major thrombus or occlusion of the veins however there was mild compression above the right internal jugular vein by the carotid bifurcation when he turned his head to the right.    Past medical history is reviewed and is unchanged for any new medical diagnoses in the interim.  Pertinent for TIMOTHY treated with CPAP, hypertension, gastric polyps, prediabetes.  No pertinent surgical history.  He is on chronic aspirin therapy and gabapentin.        Social history is reviewed and is unchanged in the interim.  He is single.  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  He walks for 1 hour a day on a treadmill    Review of Systems:   Pertinent negatives include no fevers, chills, unexplained weight loss, bowel incontinence, bladder incontinence, trips, stumbles, falls.  All others reviewed and are negative. See patient's intake questionnaire from today for pain diagram, and further details.    IMAGING: Images and reports reviewed.     MRI lumbar 8/2021     L3-4: Posterior disc bulge. Bilateral facet arthropathy and ligamentum flavum thickening. Bilateral moderate neural foraminal stenosis. Moderate spinal canal stenosis.      L4-5: Posterior disc bulge and superimposed left foraminal disc  protrusion. Bilateral moderate neural foraminal stenosis. Mild spinal canal stenosis.      Impression:   1.  Multiple unchanged presumed hemangiomas as described above.   2. Anterior wedge-shaped compression deformities of T12 and L4,  unchanged compared to 10/22/2020.  3. Multilevel lumbar spondylosis, most pronounced at L3-4 with  moderate spinal canal and moderate bilateral neural foraminal  stenosis. No significant change in overall finding compared  To10/22/2020.  (OM-I have reviewed the images again today and looked at the SI joints and they look pretty good.)     Procedures:  EMG 7/2020  This is a normal study.       Objective:    CONSTITUTIONAL:  Vital signs as above.  No acute distress.  The patient is well nourished and well groomed.    PSYCHIATRIC:  The patient is awake, alert, oriented to person, place and time.  The patient is answering questions appropriately with clear speech.  Normal affect.  Able to follow commands  SKIN:  Skin over the face, posterior torso, and  extremities is free of significant lesions. He does have a sallow appearance to the skin of his palms and scalp and on the undersurface of his tongue but he does not have scleral icterus. It does not look like pallor in his conjunctive and I wonder about jaundice. In checking his laboratory testing I don't see liver testing done recently nor a CBC since 3/8/2021 when his hemoglobin and hematocrit were 13.1/38.8.  RESPIRATORY:  normal respiratory excursion and no dyspnea.  MUSCULOSKELETAL:  Gait is non-antalgic.  The patient is able to heel and toe walk without any difficulty.   Squat and rise normal.   .  Back ROM: Full and painless especially in extension today. He is moving about the room quite well    Cervical range of motion full fluid and painless and cervical compression and Spurling's maneuver negative. Negative upper quadrant trigger points or tenderness..    Shoulder range of motion is symmetric but left shoulder is painful above 90 degrees in flexion and abduction. Left shoulder impingement 2/2/2/1. Left shoulder strength 5/5/5/5/5.  Left shoulder tenderness 0/0/0/0/0  NEUROLOGICAL:    Motor:  L3-S1 as well as C5-T1 myotomes, axillary/median/radial/ulnar nerves 5/5     Sensation to light touch is intact in the bilateral lower and upper extremities.    SLR negative    Pelvis: No recurrent Pelvic Joint Dysfunction. Standing flexion, Kanika sign, and stork test all normal    Assessment       Juan Francisco Booker  is a 62 year old y.o. male who presents today for follow-up regarding    Resolved Pelvic Joint Dysfunction    Improving low back pain on ongoing MedX strengthening program    Headache, possible cervicogenic component, but no upper quadrant triggering and no evidence clinically of radiculopathy or impingement    Left shoulder impingement syndrome    Skin discoloration suspicious for either jaundice or anemia.      Plan:  I will have him follow-up with his family doctor for liver testing as well as a metabolic panel and CBC for the skin color issue. The good news is he feels fine. I'm encouraged by his progress with the low back MedX program and I will add on a cervical MedX program at the same facility. We talked about getting an MRI of the left shoulder versus just doing a steroid injection, and he prefers to do that so I'll order a subacromial steroid injection in her sports medicine facility or perhaps with Dr. Lewis he has an appointment to follow-up with me in April and we can check the neck and back at that time and also double check the shoulder. If the shoulder is no better I would recommend an MRI at that point. If the neck is no better I would recommend an MRI and consideration of an RFA work-up depending on the findings. He does have an upcoming brain MRI scheduled by Dr. Deshpande    Advised patient to call or return early if symptoms worsen, or having problems controlling bladder and bowel function or worsening leg weakness.     Please note: Voice recognition software was used in this dictation.  It may therefore contain typographical  errors.  Braydon Chou MD

## 2022-02-10 ENCOUNTER — HOSPITAL ENCOUNTER (OUTPATIENT)
Dept: PHYSICAL THERAPY | Facility: CLINIC | Age: 62
End: 2022-02-10
Payer: COMMERCIAL

## 2022-02-10 DIAGNOSIS — G44.209 TENSION HEADACHE: ICD-10-CM

## 2022-02-10 DIAGNOSIS — M54.50 CHRONIC BILATERAL LOW BACK PAIN WITHOUT SCIATICA: Primary | ICD-10-CM

## 2022-02-10 DIAGNOSIS — R29.898 MUSCULAR DECONDITIONING: ICD-10-CM

## 2022-02-10 DIAGNOSIS — G89.29 CHRONIC BILATERAL LOW BACK PAIN WITHOUT SCIATICA: Primary | ICD-10-CM

## 2022-02-10 PROCEDURE — 97110 THERAPEUTIC EXERCISES: CPT | Mod: GP | Performed by: PHYSICAL THERAPIST

## 2022-02-10 NOTE — PROGRESS NOTES
"Daily Assessment:  Tomi is presenting for his 7th follow-up in the MedX program. Patient continues to demonstrate good toleration for the MedX wit gradually advancing weight. HE noted less pain driving her today. Still reporting some dysfunction in the R anterior hip but will give him sometime with the adductor stretch.    Lumbar MedX 4-week Re-test  2/8/22 Initial Testing   1/18/22   AROM (full=  0-72  lumbar) 0-57 0-54   Max Extension Torque  266# 272#   Flex: ext ratio (ideal 1.4:1) 1.9:1 4.32:1       Date 2/10/22 2/8/22 2/4/2022  1/31/2022 1/28/2022 1/24/22 1/20/22 1/18/22   Lumbar Parameters           Top Dead Center (TDC) 18 18 18 18 18 18 18 18   Counterbalance (CB) 365 385 385 385 385 385 385 385   Seat Pad 1 1 1 1 1 1 1 1   Femur Restraint 5 5 5 5 5 5 5 5   Week/Visit           Enter Week/Visit # Wk 4 v 2 Wk 4 Wk 3 V 2 Wk 3 V 1 Wk2 V 2 Wk 2 V1 Wk 1 v2 W1V1   Weight (lbs) 136# 266# Max  135# ex 133# 130# 125# 120# 106# 100#   Reps (#) 19 13 17 28 21 21 27 20   Time 197 s 125s  178  186 240 188 s 215s   ROM (degrees) 0-57 0-57 0-57 (further range today!) 0-54 0-54 0-54 0-54 0-54   Pain   4/10 following        Flex:Ext ratio  1.97:1      4.32:1     Date 2/10/22 2/8/22 2/4/2022  1/31/2022   1/28/2022 1/24/2022 1/20/22   Exercise          treadmill X 4min X 4 min 4 min 4 min X 4 min X 4 min X 4 min   Rotary Torso 90' 42# to L 40# to L 38# to R 36# to L 36# to R 34# to L 30# to R   Bridge with toes lifted   Switch to feet up on couch to activate hamstrings more and decrease groin pain.    X 12   Dead bug   Reviewed, able to do with ok form, cues for abdominal activation X 10    2 x 20\"   Hip flexor stretch kneeling      X 30 seconds    Piriformis stretch      X 30 seconds    Hip Abduction    X 10  X 10     Quadruped leg extensions    X 10 cues for form X 10     Reformer leg press X 20, all springs  X 10, SL leg X 20, all springs  X 10 all springs      Reformer bridge X 15  x10 double leg , x10 single leg all " "springs X 10 all springs      Reformer 90/90 pulldowns X 12 3R   X 10 3R      Supine butterfly stretch  X 20\"          "

## 2022-02-17 ENCOUNTER — LAB (OUTPATIENT)
Dept: LAB | Facility: CLINIC | Age: 62
End: 2022-02-17
Payer: COMMERCIAL

## 2022-02-17 ENCOUNTER — HOSPITAL ENCOUNTER (OUTPATIENT)
Dept: PHYSICAL THERAPY | Facility: CLINIC | Age: 62
End: 2022-02-17
Payer: COMMERCIAL

## 2022-02-17 DIAGNOSIS — R17 JAUNDICE: ICD-10-CM

## 2022-02-17 DIAGNOSIS — M54.50 CHRONIC BILATERAL LOW BACK PAIN WITHOUT SCIATICA: Primary | ICD-10-CM

## 2022-02-17 DIAGNOSIS — R29.898 MUSCULAR DECONDITIONING: ICD-10-CM

## 2022-02-17 DIAGNOSIS — G89.29 CHRONIC BILATERAL LOW BACK PAIN WITHOUT SCIATICA: Primary | ICD-10-CM

## 2022-02-17 LAB
ALBUMIN SERPL-MCNC: 3.9 G/DL (ref 3.4–5)
ALP SERPL-CCNC: 66 U/L (ref 40–150)
ALT SERPL W P-5'-P-CCNC: 28 U/L (ref 0–70)
ANION GAP SERPL CALCULATED.3IONS-SCNC: 6 MMOL/L (ref 3–14)
AST SERPL W P-5'-P-CCNC: 25 U/L (ref 0–45)
BILIRUB SERPL-MCNC: 0.5 MG/DL (ref 0.2–1.3)
BUN SERPL-MCNC: 21 MG/DL (ref 7–30)
CALCIUM SERPL-MCNC: 9 MG/DL (ref 8.5–10.1)
CHLORIDE BLD-SCNC: 104 MMOL/L (ref 94–109)
CO2 SERPL-SCNC: 29 MMOL/L (ref 20–32)
CREAT SERPL-MCNC: 0.79 MG/DL (ref 0.66–1.25)
ERYTHROCYTE [DISTWIDTH] IN BLOOD BY AUTOMATED COUNT: 12.2 % (ref 10–15)
GFR SERPL CREATININE-BSD FRML MDRD: >90 ML/MIN/1.73M2
GLUCOSE BLD-MCNC: 79 MG/DL (ref 70–99)
HCT VFR BLD AUTO: 39.9 %
HGB BLD-MCNC: 12.8 G/DL (ref 13.3–17.7)
MCH RBC QN AUTO: 30.5 PG (ref 26.5–33)
MCHC RBC AUTO-ENTMCNC: 32.1 G/DL (ref 31.5–36.5)
MCV RBC AUTO: 95 FL (ref 78–100)
PLATELET # BLD AUTO: 191 10E3/UL (ref 150–450)
POTASSIUM BLD-SCNC: 4 MMOL/L (ref 3.4–5.3)
PROT SERPL-MCNC: 6.4 G/DL (ref 6.8–8.8)
RBC # BLD AUTO: 4.19 10E6/UL (ref 4.4–5.9)
SODIUM SERPL-SCNC: 139 MMOL/L (ref 133–144)
WBC # BLD AUTO: 4.5 10E3/UL (ref 4–11)

## 2022-02-17 PROCEDURE — 36415 COLL VENOUS BLD VENIPUNCTURE: CPT

## 2022-02-17 PROCEDURE — 85027 COMPLETE CBC AUTOMATED: CPT

## 2022-02-17 PROCEDURE — 97161 PT EVAL LOW COMPLEX 20 MIN: CPT | Mod: GP | Performed by: PHYSICAL THERAPIST

## 2022-02-17 PROCEDURE — 80053 COMPREHEN METABOLIC PANEL: CPT

## 2022-02-17 PROCEDURE — 97110 THERAPEUTIC EXERCISES: CPT | Mod: GP | Performed by: PHYSICAL THERAPIST

## 2022-02-17 NOTE — PROGRESS NOTES
Daily Assessment:  Tomi is presenting for his 9th follow-up in the MedX program. Patient continues to demonstrate good toleration for the MedX with gradually advancing weight. Patient would like to address cervicogenic HA's that occur 3-4/week.PT evaluated the cervical spine    History of Present of illness:  Non-radicular, suboccipital pain with tension in the B temporal head. He notes these HA's to occur ~3-4 times per week. No history of trauma or injury. Negative red flags. Getting up and moving around does help. He would like to address this along with his Lowback.    Lumbar MedX 4-week Re-test  2/8/22 Initial Testing   1/18/22   AROM (full=  0-72  lumbar) 0-57 0-54   Max Extension Torque  266# 272#   Flex: ext ratio (ideal 1.4:1) 1.9:1 4.32:1       Date 2/17/22 2/10/22 2/8/22 2/4/2022  1/31/2022 1/28/2022 1/24/22 1/20/22 1/18/22   Lumbar Parameters            Top Dead Center (TDC) 18 18 18 18 18 18 18 18 18   Counterbalance (CB) 365 365 385 385 385 385 385 385 385   Seat Pad 1 1 1 1 1 1 1 1 1   Femur Restraint 5 5 5 5 5 5 5 5 5   Week/Visit            Enter Week/Visit # Wk 5 Wk 4 v 2 Wk 4 Wk 3 V 2 Wk 3 V 1 Wk2 V 2 Wk 2 V1 Wk 1 v2 W1V1   Weight (lbs) 139# 136# 266# Max  135# ex 133# 130# 125# 120# 106# 100#   Reps (#) 22 19 13 17 28 21 21 27 20   Time 172 s 197 s 125s  178  186 240 188 s 215s   ROM (degrees) 0-57 0-57 0-57 0-57 (further range today!) 0-54 0-54 0-54 0-54 0-54   Pain    4/10 following        Flex:Ext ratio   1.97:1      4.32:1     Date 2/17/22 2/7/22 2/10/22 2/8/22 2/4/2022  1/31/2022   1/28/2022 1/24/2022 1/20/22   Exercise            treadmill X 4 min X 4 min X 4min X 4 min 4 min 4 min X 4 min X 4 min X 4 min   Rotary Torso 90' 46# to L 44# toR 42# to L 40# to L 38# to R 36# to L 36# to R 34# to L 30# to R   Bridge with toes lifted     Switch to feet up on couch to activate hamstrings more and decrease groin pain.    X 12   Dead bug     Reviewed, able to do with ok form, cues for abdominal  "activation X 10    2 x 20\"   Hip flexor stretch kneeling        X 30 seconds    Piriformis stretch        X 30 seconds    Hip Abduction      X 10  X 10     Quadruped leg extensions      X 10 cues for form X 10     Reformer leg press   X 20, all springs  X 10, SL leg X 20, all springs  X 10 all springs      Reformer bridge   X 15  x10 double leg , x10 single leg all springs X 10 all springs      Reformer 90/90 pulldowns   X 12 3R   X 10 3R      Supine butterfly stretch    X 20\"        Supine cervical retraction  X 10 hold 5 sec                                    "

## 2022-02-18 ENCOUNTER — THERAPY VISIT (OUTPATIENT)
Dept: PHYSICAL THERAPY | Facility: CLINIC | Age: 62
End: 2022-02-18
Payer: COMMERCIAL

## 2022-02-18 DIAGNOSIS — R39.15 URINARY URGENCY: Primary | ICD-10-CM

## 2022-02-18 DIAGNOSIS — R35.0 URINARY FREQUENCY: ICD-10-CM

## 2022-02-18 PROCEDURE — 97530 THERAPEUTIC ACTIVITIES: CPT | Mod: GP | Performed by: PHYSICAL THERAPIST

## 2022-02-18 PROCEDURE — 97112 NEUROMUSCULAR REEDUCATION: CPT | Mod: GP | Performed by: PHYSICAL THERAPIST

## 2022-02-21 ENCOUNTER — OFFICE VISIT (OUTPATIENT)
Dept: FAMILY MEDICINE | Facility: CLINIC | Age: 62
End: 2022-02-21
Payer: COMMERCIAL

## 2022-02-21 VITALS
DIASTOLIC BLOOD PRESSURE: 78 MMHG | OXYGEN SATURATION: 100 % | RESPIRATION RATE: 16 BRPM | BODY MASS INDEX: 26.96 KG/M2 | TEMPERATURE: 98.1 F | SYSTOLIC BLOOD PRESSURE: 123 MMHG | WEIGHT: 182 LBS | HEIGHT: 69 IN | HEART RATE: 77 BPM

## 2022-02-21 DIAGNOSIS — R17 JAUNDICE: Primary | ICD-10-CM

## 2022-02-21 DIAGNOSIS — F10.11 HISTORY OF ETOH ABUSE: ICD-10-CM

## 2022-02-21 DIAGNOSIS — D50.8 IRON DEFICIENCY ANEMIA SECONDARY TO INADEQUATE DIETARY IRON INTAKE: ICD-10-CM

## 2022-02-21 LAB
ERYTHROCYTE [DISTWIDTH] IN BLOOD BY AUTOMATED COUNT: 12.5 % (ref 10–15)
FERRITIN SERPL-MCNC: 35 NG/ML (ref 26–388)
HCT VFR BLD AUTO: 40.4 % (ref 40–53)
HGB BLD-MCNC: 13.4 G/DL (ref 13.3–17.7)
HOLD SPECIMEN: NORMAL
MCH RBC QN AUTO: 31.3 PG (ref 26.5–33)
MCHC RBC AUTO-ENTMCNC: 33.2 G/DL (ref 31.5–36.5)
MCV RBC AUTO: 94 FL (ref 78–100)
PLATELET # BLD AUTO: 191 10E3/UL (ref 150–450)
RBC # BLD AUTO: 4.28 10E6/UL (ref 4.4–5.9)
WBC # BLD AUTO: 4.5 10E3/UL (ref 4–11)

## 2022-02-21 PROCEDURE — 82728 ASSAY OF FERRITIN: CPT | Performed by: STUDENT IN AN ORGANIZED HEALTH CARE EDUCATION/TRAINING PROGRAM

## 2022-02-21 PROCEDURE — 36415 COLL VENOUS BLD VENIPUNCTURE: CPT | Performed by: STUDENT IN AN ORGANIZED HEALTH CARE EDUCATION/TRAINING PROGRAM

## 2022-02-21 PROCEDURE — 99214 OFFICE O/P EST MOD 30 MIN: CPT | Mod: GC | Performed by: STUDENT IN AN ORGANIZED HEALTH CARE EDUCATION/TRAINING PROGRAM

## 2022-02-21 PROCEDURE — 85027 COMPLETE CBC AUTOMATED: CPT | Performed by: STUDENT IN AN ORGANIZED HEALTH CARE EDUCATION/TRAINING PROGRAM

## 2022-02-21 NOTE — PROGRESS NOTES
Preceptor Attestation:    I discussed the patient with the resident and evaluated the patient in person. I have verified the content of the note, which accurately reflects my assessment of the patient and the plan of care.   Supervising Physician:  Travon Altamirano MD.

## 2022-02-21 NOTE — PROGRESS NOTES
Assessment & Plan     Jaundice  History of ETOH abuse  Pt noted to appear jaundiced per his neurologist,but pt hasn't noticed a change and neither do I. Pt does have more of a olive/darker skin tone at base line. No scleral icterus. Labs unremarkable, no elevated LFTs, ALP, or bilirubin. Has not sober since 2009 so not likely related to alcoholic cirrhosis (platelets wnl). Noted to be slightly anemic, but has been in the past see below. Not likely [place] due to regular BP and electrolytes.   - Continue to Monitor     Iron deficiency anemia secondary to inadequate dietary iron intake  Pt with anemia in the past, can't remember why, but appears maybe a colon polyp in 2015. Pt has colonoscopy 1/2020 with some polyps with recommended screening every 5 years and no changes to stool noted. Does not intake many food high in iron so that is a possibility.   - Discontinue ASA as not needed and increase risk for bleeds  - CBC with Platelets and Reflex to Iron Studies; Future  - Ferritin; Future  - CBC with Platelets and Reflex to Iron Studies  - Ferritin      Return in about 4 weeks (around 3/21/2022).    Ivon Red MD  [facility] RONAL Krishna is a 62 year old who presents for the following health issues     HPI     Jaundice and Anemia Follow Up  Neurologist thought face was yellow. Pt hasn't noticed a change. No new abdominal pain, has constipation. Lab unremarkable. Still has gallbladder, no pain after a fatty meal. No dark black stools since starting miralax. No blood in urine or stool. Eats a lot of chicken and not much spinach. Little more fatigued. No pain with eating or epigastrically. In remission from alcohol since 2009.    Hernia Follow Up  Has surgery on right side, had repaired with mesh. Noticing some pain there after exercise. No bulging.     Review of Systems   Constitutional, HEENT, cardiovascular, pulmonary, gi and gu systems are negative, except as otherwise noted.    "   Objective    /78   Pulse 77   Temp 98.1  F (36.7  C) (Oral)   Resp 16   Ht 1.753 m (5' 9.02\")   Wt 82.6 kg (182 lb)   SpO2 100%   BMI 26.86 kg/m    Body mass index is 26.86 kg/m .  Physical Exam   GENERAL: healthy, alert and no distress  EYES: Eyes grossly normal to inspection, PERRL and conjunctivae and sclerae normal  NECK: no adenopathy, no asymmetry, masses, or scars and thyroid normal to palpation  ABDOMEN: soft, nontender, no hepatosplenomegaly, no masses and bowel sounds normal   (male): normal male genitalia without lesions or urethral discharge, no hernia  MS: no gross musculoskeletal defects noted, no edema  SKIN: No jaundice, multiple skin tags and multiple moles in various shape and size throughout face and neck  PSYCH: mentation appears normal, affect normal/bright    Lab on 02/17/2022   Component Date Value Ref Range Status     Sodium 02/17/2022 139  133 - 144 mmol/L Final     Potassium 02/17/2022 4.0  3.4 - 5.3 mmol/L Final     Chloride 02/17/2022 104  94 - 109 mmol/L Final     Carbon Dioxide (CO2) 02/17/2022 29  20 - 32 mmol/L Final     Anion Gap 02/17/2022 6  3 - 14 mmol/L Final     Urea Nitrogen 02/17/2022 21  7 - 30 mg/dL Final     Creatinine 02/17/2022 0.79  0.66 - 1.25 mg/dL Final     Calcium 02/17/2022 9.0  8.5 - 10.1 mg/dL Final     Glucose 02/17/2022 79  70 - 99 mg/dL Final     Alkaline Phosphatase 02/17/2022 66  40 - 150 U/L Final     AST 02/17/2022 25  0 - 45 U/L Final     ALT 02/17/2022 28  0 - 70 U/L Final     Protein Total 02/17/2022 6.4 (A) 6.8 - 8.8 g/dL Final     Albumin 02/17/2022 3.9  3.4 - 5.0 g/dL Final     Bilirubin Total 02/17/2022 0.5  0.2 - 1.3 mg/dL Final     GFR Estimate 02/17/2022 >90  >60 mL/min/1.73m2 Final    Effective December 21, 2021 eGFRcr in adults is calculated using the 2021 CKD-EPI creatinine equation which includes age and gender (Minnie et al., NEJ, DOI: 10.1056/KFQOfm5241110)     WBC Count 02/17/2022 4.5  4.0 - 11.0 10e3/uL Final     RBC " Count 02/17/2022 4.19 (A) 4.40 - 5.90 10e6/uL Final     Hemoglobin 02/17/2022 12.8 (A) 13.3 - 17.7 g/dL Final     Hematocrit 02/17/2022 39.9  % Final     MCV 02/17/2022 95  78 - 100 fL Final     MCH 02/17/2022 30.5  26.5 - 33.0 pg Final     MCHC 02/17/2022 32.1  31.5 - 36.5 g/dL Final     RDW 02/17/2022 12.2  10.0 - 15.0 % Final     Platelet Count 02/17/2022 191  150 - 450 10e3/uL Final

## 2022-02-21 NOTE — PATIENT INSTRUCTIONS
Patient Education   Here is the plan from today's visit    1. Iron deficiency anemia secondary to inadequate dietary iron intake  Follow up in 1 month  Increase red meat or spinach in your diet   - CBC with Platelets and Reflex to Iron Studies; Future  - Ferritin; Future    Please call or return to clinic if your symptoms don't go away.    Follow up plan  Return in about 4 weeks (around 3/21/2022).    Thank you for coming to [facility] today.  Lab Testing:  **If you had lab testing today and your results are reassuring or normal they will be mailed to you or sent through [employer] within 7 days.   **If the lab tests need quick action we will call you with the results.  **If you are having labs done on a different day, please call [phone] to schedule at [facility] or [phone] for other [facility] Outpatient Lab locations. Labs do not offer walk-in appointments.  The phone number we will call with results is # [phone] (home) . If this is not the best number please call our clinic and change the number.  Medication Refills:  If you need any refills please call your pharmacy and they will contact us.   If you need to  your refill at a new pharmacy, please contact the new pharmacy directly. The new pharmacy will help you get your medications transferred faster.   Scheduling:  If you have any concerns about today's visit or wish to schedule another appointment please call our office during normal business hours [phone] (8-5:00 M-F)  If a referral was made to an [facility] specialty provider and you do not get a call from central scheduling, please refer to directions on your visit summary or call our office during normal business hours for assistance.   If a Mammogram was ordered for you at the [facility] call [phone] to schedule or change your appointment.  If you had an XRay/CT/Ultrasound/MRI ordered the number is [phone] to schedule or change your radiology  appointment.   [facility] has limited ultrasound appointments available on Wednesdays, if you would like your ultrasound at [facility], please call [phone] to schedule.   Medical Concerns:  If you have urgent medical concerns please call [phone] at any time of the day.    Ivon Red MD

## 2022-02-22 ENCOUNTER — HOSPITAL ENCOUNTER (OUTPATIENT)
Dept: PHYSICAL THERAPY | Facility: CLINIC | Age: 62
End: 2022-02-22
Payer: COMMERCIAL

## 2022-02-22 DIAGNOSIS — M54.50 CHRONIC BILATERAL LOW BACK PAIN WITHOUT SCIATICA: Primary | ICD-10-CM

## 2022-02-22 DIAGNOSIS — G89.29 CHRONIC BILATERAL LOW BACK PAIN WITHOUT SCIATICA: Primary | ICD-10-CM

## 2022-02-22 DIAGNOSIS — R29.898 MUSCULAR DECONDITIONING: ICD-10-CM

## 2022-02-22 PROCEDURE — 97110 THERAPEUTIC EXERCISES: CPT | Mod: GP | Performed by: PHYSICAL THERAPIST

## 2022-02-22 NOTE — PROGRESS NOTES
Daily Assessment:  Tomi is presenting for his 10th follow-up in the MedX program. Patient continues to demonstrate good toleration for the MedX with gradually advancing weight. He wanted therapist to know this visit that he has failed previous C-spine therapy. We discussed a trial of the MedX treatment with the neck and proceeded with testing. He is demonstrating significant ROM loss on testing this visit.    History of Present of illness:  Non-radicular, suboccipital pain with tension in the B temporal head. He notes these HA's to occur ~3-4 times per week. No history of trauma or injury. Negative red flags. Getting up and moving around does help. He would like to address this along with his Lowback.    Lumbar MedX 4-week Re-test  2/8/22 Initial Testing   1/18/22   AROM (full=  0-72  lumbar) 0-57 0-54   Max Extension Torque  266# 272#   Flex: ext ratio (ideal 1.4:1) 1.9:1 4.32:1       Date 2/22/22 2/17/22 2/10/22 2/8/22 2/4/2022  1/31/2022 1/28/2022 1/24/22 1/20/22 1/18/22   Lumbar Parameters             Top Dead Center (TDC) 18 18 18 18 18 18 18 18 18 18   Counterbalance (CB) 365 365 365 385 385 385 385 385 385 385   Seat Pad 1 1 1 1 1 1 1 1 1 1   Femur Restraint 5 5 5 5 5 5 5 5 5 5   Week/Visit             Enter Week/Visit # Wk 6 Wk 5 Wk 4 v 2 Wk 4 Wk 3 V 2 Wk 3 V 1 Wk2 V 2 Wk 2 V1 Wk 1 v2 W1V1   Weight (lbs) 142# 139# 136# 266# Max  135# ex 133# 130# 125# 120# 106# 100#   Reps (#) 25 22 19 13 17 28 21 21 27 20   Time 167 s 172 s 197 s 125s  178  186 240 188 s 215s   ROM (degrees) 0-57 0-57 0-57 0-57 0-57 (further range today!) 0-54 0-54 0-54 0-54 0-54   Pain     4/10 following        Flex:Ext ratio    1.97:1      4.32:1       Cervical MedX Initial testing   AROM (full= 0-120  cervical) 15-60   Max Extension Torque  192#   Flex: ext ratio (ideal 1.4:1) 1.78:1     Date    Cervical Parameters    Top Dead Center (TDC) 48   Counterbalance (CB) 2.0   Seat Height 355   Week/Visit    Enter Week/Visit # Wk 1 v 1   Weight  "(lbs) 192#   Reps (#) --   Time --   ROM (degrees) 15-60   Pain    Flex:Ext ratio 1.78:1       Date 2/22/22 2/17/22 2/7/22 2/10/22 2/8/22 2/4/2022  1/31/2022   1/28/2022 1/24/2022 1/20/22   Exercise             treadmill X 4 min X 4 min X 4 min X 4min X 4 min 4 min 4 min X 4 min X 4 min X 4 min   Rotary Torso 90' 48# to R 46# to L 44# toR 42# to L 40# to L 38# to R 36# to L 36# to R 34# to L 30# to R   Bridge with toes lifted      Switch to feet up on couch to activate hamstrings more and decrease groin pain.    X 12   Dead bug      Reviewed, able to do with ok form, cues for abdominal activation X 10    2 x 20\"   Hip flexor stretch kneeling         X 30 seconds    Piriformis stretch         X 30 seconds    Hip Abduction       X 10  X 10     Quadruped leg extensions       X 10 cues for form X 10     Reformer leg press    X 20, all springs  X 10, SL leg X 20, all springs  X 10 all springs      Reformer bridge    X 15  x10 double leg , x10 single leg all springs X 10 all springs      Reformer 90/90 pulldowns    X 12 3R   X 10 3R      Supine butterfly stretch     X 20\"        Supine cervical retraction   X 10 hold 5 sec                                      "

## 2022-02-22 NOTE — ADDENDUM NOTE
Encounter addended by: Ilene Varghese, PT on: 2/22/2022 10:37 AM   Actions taken: Flowsheet accepted, Charge Capture section accepted

## 2022-02-25 ENCOUNTER — ANCILLARY PROCEDURE (OUTPATIENT)
Dept: MRI IMAGING | Facility: CLINIC | Age: 62
End: 2022-02-25
Attending: INTERNAL MEDICINE
Payer: COMMERCIAL

## 2022-02-25 ENCOUNTER — TELEPHONE (OUTPATIENT)
Dept: PHYSICAL MEDICINE AND REHAB | Facility: CLINIC | Age: 62
End: 2022-02-25

## 2022-02-25 DIAGNOSIS — G44.209 TENSION HEADACHE: ICD-10-CM

## 2022-02-25 PROCEDURE — 70551 MRI BRAIN STEM W/O DYE: CPT | Performed by: RADIOLOGY

## 2022-02-25 NOTE — TELEPHONE ENCOUNTER
Writer SIOMARA with patient with appt date with Dr. Lewis on 03/03/22 at 2:30 pm at [facility] on the 3rd floor. Left our clinic number for any questions.

## 2022-03-02 ENCOUNTER — THERAPY VISIT (OUTPATIENT)
Dept: PHYSICAL THERAPY | Facility: CLINIC | Age: 62
End: 2022-03-02
Payer: COMMERCIAL

## 2022-03-02 DIAGNOSIS — R39.15 URINARY URGENCY: Primary | ICD-10-CM

## 2022-03-02 DIAGNOSIS — R35.0 URINARY FREQUENCY: ICD-10-CM

## 2022-03-02 PROCEDURE — 97110 THERAPEUTIC EXERCISES: CPT | Mod: GP | Performed by: PHYSICAL THERAPIST

## 2022-03-02 PROCEDURE — 97530 THERAPEUTIC ACTIVITIES: CPT | Mod: GP | Performed by: PHYSICAL THERAPIST

## 2022-03-03 ENCOUNTER — OFFICE VISIT (OUTPATIENT)
Dept: PHYSICAL MEDICINE AND REHAB | Facility: CLINIC | Age: 62
End: 2022-03-03
Payer: COMMERCIAL

## 2022-03-03 ENCOUNTER — HOSPITAL ENCOUNTER (OUTPATIENT)
Dept: PHYSICAL THERAPY | Facility: CLINIC | Age: 62
End: 2022-03-03

## 2022-03-03 VITALS
DIASTOLIC BLOOD PRESSURE: 77 MMHG | SYSTOLIC BLOOD PRESSURE: 137 MMHG | OXYGEN SATURATION: 97 % | TEMPERATURE: 97.5 F | RESPIRATION RATE: 16 BRPM | HEART RATE: 68 BPM

## 2022-03-03 DIAGNOSIS — R29.898 MUSCULAR DECONDITIONING: ICD-10-CM

## 2022-03-03 DIAGNOSIS — M54.2 CERVICAL PAIN: ICD-10-CM

## 2022-03-03 DIAGNOSIS — G89.29 CHRONIC BILATERAL LOW BACK PAIN WITHOUT SCIATICA: Primary | ICD-10-CM

## 2022-03-03 DIAGNOSIS — G44.209 TENSION HEADACHE: ICD-10-CM

## 2022-03-03 DIAGNOSIS — M75.52 SUBACROMIAL BURSITIS OF LEFT SHOULDER JOINT: Primary | ICD-10-CM

## 2022-03-03 DIAGNOSIS — M54.50 CHRONIC BILATERAL LOW BACK PAIN WITHOUT SCIATICA: Primary | ICD-10-CM

## 2022-03-03 PROCEDURE — 97110 THERAPEUTIC EXERCISES: CPT | Mod: GP | Performed by: PHYSICAL THERAPIST

## 2022-03-03 PROCEDURE — 20611 DRAIN/INJ JOINT/BURSA W/US: CPT | Mod: LT | Performed by: PHYSICAL MEDICINE & REHABILITATION

## 2022-03-03 RX ORDER — TRIAMCINOLONE ACETONIDE 40 MG/ML
40 INJECTION, SUSPENSION INTRA-ARTICULAR; INTRAMUSCULAR ONCE
Status: COMPLETED | OUTPATIENT
Start: 2022-03-03 | End: 2022-03-03

## 2022-03-03 RX ADMIN — TRIAMCINOLONE ACETONIDE 40 MG: 40 INJECTION, SUSPENSION INTRA-ARTICULAR; INTRAMUSCULAR at 15:31

## 2022-03-03 ASSESSMENT — PAIN SCALES - GENERAL: PAINLEVEL: NO PAIN (0)

## 2022-03-03 NOTE — PROGRESS NOTES
Daily Assessment:  Tomi is presenting for his 11th follow-up in the MedX program. Patient continues to demonstrate good toleration for the MedX with gradually advancing weight. He is currently in the 7th week of the lumbar program and 2nd week of the cervical program. He is verbalizing improvement in his ability to sit in his car but noting continued HA's.    History of Present of illness:  Non-radicular, suboccipital pain with tension in the B temporal head. He notes these HA's to occur ~3-4 times per week. No history of trauma or injury. Negative red flags. Getting up and moving around does help. He would like to address this along with his Lowback.    Lumbar MedX 4-week Re-test  2/8/22 Initial Testing   1/18/22   AROM (full=  0-72  lumbar) 0-57 0-54   Max Extension Torque  266# 272#   Flex: ext ratio (ideal 1.4:1) 1.9:1 4.32:1       Date 3/3/22 2/22/22 2/17/22 2/10/22 2/8/22 2/4/2022  1/31/2022 1/28/2022 1/24/22 1/20/22 1/18/22   Lumbar Parameters              Top Dead Center (TDC) 18 18 18 18 18 18 18 18 18 18 18   Counterbalance (CB) 365 365 365 365 385 385 385 385 385 385 385   Seat Pad 1 1 1 1 1 1 1 1 1 1 1   Femur Restraint 5 5 5 5 5 5 5 5 5 5 5   Week/Visit              Enter Week/Visit # Wk 7 Wk 6 Wk 5 Wk 4 v 2 Wk 4 Wk 3 V 2 Wk 3 V 1 Wk2 V 2 Wk 2 V1 Wk 1 v2 W1V1   Weight (lbs) 146# 142# 139# 136# 266# Max  135# ex 133# 130# 125# 120# 106# 100#   Reps (#) 19 25 22 19 13 17 28 21 21 27 20   Time 118 s 167 s 172 s 197 s 125s  178  186 240 188 s 215s   ROM (degrees) 0-57 0-57 0-57 0-57 0-57 0-57 (further range today!) 0-54 0-54 0-54 0-54 0-54   Pain      4/10 following        Flex:Ext ratio     1.97:1      4.32:1       Cervical MedX Initial testing  2/22/22   AROM (full= 0-120  cervical) 15-60   Max Extension Torque  192#   Flex: ext ratio (ideal 1.4:1) 1.78:1     Date 3/3/22 2/22/22   Cervical Parameters     Top Dead Center (TDC) 48 48   Counterbalance (CB) 2.0 2.0   Seat Height 355 355   Week/Visit     Enter  "Week/Visit # Wk 1 v 2 Wk 1 v 1   Weight (lbs) 102# ex 192# max   Reps (#) 30 --   Time 190  --   ROM (degrees) 15-60 15-60   Pain     Flex:Ext ratio  1.78:1       Date 3/3/22 2/22/22 2/17/22 2/7/22 2/10/22 2/8/22 2/4/2022  1/31/2022   1/28/2022 1/24/2022 1/20/22   Exercise              treadmill X 4 min X 4 min X 4 min X 4 min X 4min X 4 min 4 min 4 min X 4 min X 4 min X 4 min   Rotary Torso 90' 50# to L 48# to R 46# to L 44# toR 42# to L 40# to L 38# to R 36# to L 36# to R 34# to L 30# to R   Bridge with toes lifted       Switch to feet up on couch to activate hamstrings more and decrease groin pain.    X 12   Dead bug       Reviewed, able to do with ok form, cues for abdominal activation X 10    2 x 20\"   Hip flexor stretch kneeling          X 30 seconds    Piriformis stretch          X 30 seconds    Hip Abduction        X 10  X 10     Quadruped leg extensions        X 10 cues for form X 10     Reformer leg press     X 20, all springs  X 10, SL leg X 20, all springs  X 10 all springs      Reformer bridge     X 15  x10 double leg , x10 single leg all springs X 10 all springs      Reformer 90/90 pulldowns     X 12 3R   X 10 3R      Supine butterfly stretch      X 20\"        Supine cervical retraction    X 10 hold 5 sec          Cervical extension isometrics X 5 hold 5 seconds             Greenband shoulder extension with scap set X 15               "

## 2022-03-03 NOTE — PROGRESS NOTES
"PROCEDURE NOTE: Subacromial Bursa Injection Under Ultrasound Guidance    PROCEDURE DATE: 3/3/2022    PATIENT NAME: Juan Francisco Booker  YOB: 1960    ATTENDING PHYSICIAN: Simone Lewis MD  FELLOW/RESIDENT PHYSICIAN: Guillermo Lopes MD, PGY-III     PREOPERATIVE DIAGNOSIS:   Subacromial bursitis of left shoulder joint  (primary encounter diagnosis)     POSTOPERATIVE DIAGNOSIS: same    PROCEDURE PERFORMED: Left Subacromial Bursa Injection Under Ultrasound Guidance    ULTRASOUND WAS USED.    INDICATIONS FOR THE PROCEDURE:  Juan Francisco Booker is a 62 year old male who presents with shoulder pain.     PROCEDURE AND FINDINGS:  He was greeted in the clinic. The risk, benefits and alternatives to the procedure were again reviewed with him and informed consent was obtained and the patient agreed to proceed. A time-out was performed. Following review alternatives, benefits and risks, the procedure was carried out under sterile prep with sterile gel. The use of direct sonographic guidance was used to ensure accurate placement of the needle (rather than non-guided injection) and required to minimize the risk of bleeding or injury to nearby neurovascular structures.     A 15-6MHz ultrasound transducer was used to visualize the relevant structures and determine the optimal needle path for the procedure. A 25 gauge 2\" needle was advanced from lateral to medial utilizing an in-plane approach, under continuous ultrasound guidance to the left subacromial/subdeltoid bursal space. After negative aspiration, slow injection of the treatment solution 3mL total consisting of 1mL Kenalog (40mg/mL) and 2mL of 1% Lidocaine was instilled into affected area. The tip of the needle was visualized throughout the procedure. The remainder of the single-use vials were discarded.      Before the procedure, he reported a pain score of 3/10.   After the procedure, he reported a pain score of 2/10.      He tolerated the procedure well, was " discharged home in stable condition.     Follow-up will be in clinic with Dr. Braydon Chou.     COMPLICATIONS: None    COMMENTS: None    Procedure was performed by Dr. Lopes, resident physician, under my direct supervision.

## 2022-03-03 NOTE — LETTER
"3/3/2022       RE: Juan Francisco Booker  [address]  [place] [address]     Dear Colleague,    Thank you for referring your patient, Juan Francisco Booker, to the [facility] [place] at [facility]. Please see a copy of my visit note below.    PROCEDURE NOTE: Subacromial Bursa Injection Under Ultrasound Guidance    PROCEDURE DATE: 3/3/2022    PATIENT NAME: Juan Francisco Booker  YOB: 1960    ATTENDING PHYSICIAN: Simone Lewis MD  FELLOW/RESIDENT PHYSICIAN: Guillermo Lopes MD, PGY-III     PREOPERATIVE DIAGNOSIS:   Subacromial bursitis of left shoulder joint  (primary encounter diagnosis)     POSTOPERATIVE DIAGNOSIS: same    PROCEDURE PERFORMED: Left Subacromial Bursa Injection Under Ultrasound Guidance    ULTRASOUND WAS USED.    INDICATIONS FOR THE PROCEDURE:  Juan Francisco Booker is a 62 year old male who presents with shoulder pain.     PROCEDURE AND FINDINGS:  He was greeted in the clinic. The risk, benefits and alternatives to the procedure were again reviewed with him and informed consent was obtained and the patient agreed to proceed. A time-out was performed. Following review alternatives, benefits and risks, the procedure was carried out under sterile prep with sterile gel. The use of direct sonographic guidance was used to ensure accurate placement of the needle (rather than non-guided injection) and required to minimize the risk of bleeding or injury to nearby neurovascular structures.     A 15-6MHz ultrasound transducer was used to visualize the relevant structures and determine the optimal needle path for the procedure. A 25 gauge 2\" needle was advanced from lateral to medial utilizing an in-plane approach, under continuous ultrasound guidance to the left subacromial/subdeltoid bursal space. After negative aspiration, slow injection of the treatment solution 3mL total consisting of 1mL Kenalog " (40mg/mL) and 2mL of 1% Lidocaine was instilled into affected area. The tip of the needle was visualized throughout the procedure. The remainder of the single-use vials were discarded.      Before the procedure, he reported a pain score of 3/10.   After the procedure, he reported a pain score of 2/10.      He tolerated the procedure well, was discharged home in stable condition.     Follow-up will be in clinic with Dr. Braydon Chou.     COMPLICATIONS: None    COMMENTS: None    Procedure was performed by Dr. Lopes, resident physician, under my direct supervision.         Simone Lewis MD

## 2022-03-03 NOTE — PROGRESS NOTES
Physical Medicine and Rehabilitation  [facility] - Consult Note    Reason for Consult: ***  Referring Provider: Braydon Chou MD     History of Present Illness     Chart Review:  Patient seen by Dr. Chou for evaluation of subacromial bursa injection vs RFA?    HPI:  ***    Prior Treatment:  Ice/Heat: ***  Brace: ***  Physical Therapy:   ***      - Current Pain Medications -   ***    - Prior/Trialed Pain Medications -   ***    - Prior Procedures -   Date      Procedure     Improvement (%)  ***         - Prior Related Surgery -   Date      Procedure       ***    Other (acupuncture, OMT, CMM, TENS, DME, etc.): ***    Specialists Seen - (with most recent, available notes and clinic visits reviewed)   1. ***       Functional History:  Prior to injury, patient was able to ambulate *** blocks prior to rest due to ***.   Patient worked as a ***.    Social History:  History   Smoking Status     Never Smoker   Smokeless Tobacco     Never Used     Social History    Substance and Sexual Activity      Alcohol use: Not Currently        Comment: In remission since 2009    History   Drug Use No       Living Situation:  ***    Current Medications:  Current Outpatient Medications   Medication     Caffeine 100 MG TABS     citalopram (CELEXA) 10 MG tablet     dutasteride (AVODART) 0.5 MG capsule     finasteride (PROSCAR) 5 MG tablet     fluticasone (FLONASE) 50 MCG/ACT nasal spray     gabapentin (NEURONTIN) 300 MG capsule     lisinopril (ZESTRIL) 20 MG tablet     Multiple Vitamin (DAILY MULTIVITAMIN PO)     ORDER FOR DME     polyethylene glycol (MIRALAX) 17 GM/Dose powder     psyllium (METAMUCIL) 28.3 % packet     simvastatin (ZOCOR) 40 MG tablet     SODIUM FLUORIDE 5000 PPM 1.1 % PSTE     tamsulosin (FLOMAX) 0.4 MG capsule     tiZANidine (ZANAFLEX) 2 MG tablet     No current facility-administered medications for this visit.       Past Medical History:   Past Medical History:   Diagnosis Date     Anxiety      Benign neoplasm  "of colon 3/2/2015     Depression      Depressive disorder 1/15/2000     Difficult intubation      ETOH abuse 3/15/2009    in remission     Gastro-oesophageal reflux disease 1/15/2010     Hyperlipidemia 1/1/2000     Hypoxemia      Morbid obesity (H)      Nasal polyps 1/1/2015     TIMOTHY on CPAP 8/13/2012    Severe (uses 5-6 hours as able)     Other and unspecified hyperlipidemia 10/25/2012     Personal history of colonic polyps 2/207/15    Multiple \"neg for FAP\"     Pre-diabetes      Prediabetes 5/24/2017     Restless leg      Skin tag      Surgical complication 1/6/2015    difficulty inserting a tube down my throat     Unspecified essential hypertension 10/25/2012       Past Surgical History:  Past Surgical History:   Procedure Laterality Date     AS COLONOSCOPY THRU STOMA W BIOPSY/CAUTERY TUMOR/POLYP/LESION  2/27/15    colon polyps     COLONOSCOPY  2/27/2015    x 2     COLONOSCOPY N/A 1/6/2020    Procedure: COLONOSCOPY, WITH POLYPECTOMY AND BIOPSY;  Surgeon: Omer Salas MD;  Location:  GI     COLONOSCOPY WITH CO2 INSUFFLATION N/A 10/20/2016    Procedure: COLONOSCOPY WITH CO2 INSUFFLATION;  Surgeon: Juan Francisco Beltran MD;  Location: [facility] OR     ENT SURGERY  1/5/2011    [facility]     ESOPHAGOSCOPY, GASTROSCOPY, DUODENOSCOPY (EGD), COMBINED N/A 10/20/2016    Procedure: COMBINED ESOPHAGOSCOPY, GASTROSCOPY, DUODENOSCOPY (EGD);  Surgeon: Juan Francisco Beltran MD;  Location: [facility] OR     LAPAROSCOPIC HERNIORRHAPHY INGUINAL Right 3/24/2021    Procedure: HERNIORRHAPHY, INGUINAL, LAPAROSCOPIC;  Surgeon: Laith Villa MD;  Location: [facility] OR     RELEASE CARPAL TUNNEL Right 6/14/2017    Procedure: RELEASE CARPAL TUNNEL;  Right Open Carpal Tunnel Release;  Surgeon: Aracelis Lowe MD;  Location: [facility] OR     RELEASE CARPAL TUNNEL Left 12/29/2017    Procedure: RELEASE CARPAL TUNNEL;  Left Open Carpal Tunnel Release;  Surgeon: Aracelis Lowe MD;  Location: [facility] OR       Family Medical History:  Family History "   Problem Relation Age of Onset     Other Cancer Mother      Cancer Mother      Other Cancer Brother      Cancer Brother      Cerebrovascular Disease Maternal Grandfather         ,, ,, ,, ,, ,, ,, ,, ,     Alcohol/Drug Brother      Cancer Brother         pancreatic; liver     Other Cancer Brother      Asthma No family hx of      Anesthesia Reaction No family hx of      Colon Polyps No family hx of      Crohn's Disease No family hx of      Ulcerative Colitis No family hx of      Colon Cancer No family hx of      Cardiovascular No family hx of      Deep Vein Thrombosis (DVT) No family hx of        Allergies:  Allergies   Allergen Reactions     Grass          OBJECTIVE     VITALS  ***    PHYSICAL EXAMINATION:  GENERAL: Sitting comfortably in chair. No acute distress.   CARDIAC: No apparent chest discomfort. Well perfused.  PULMONARY: Non-labored. No respiratory distress.    NEURO  Strength   Right  Left  Shoulder abduction  5/5  5/5  Elbow flexion   5/5  5/5  Elbow Extension  5/5  5/5  Wrist Extension  5/5  5/5  Wrist Flexion   5/5  5/5  Finger Flexion   5/5  5/5  Finger Abduction  5/5  5/5  Hip Flexion   5/5  5/5  Knee Extension  5/5  5/5  Ankle dorsiflexion  5/5  5/5  Extensor Hallicus Longus 5/5  5/5    Sensation  Sensation intact to light touch bilateral. No difference in pin-prick sensation.    Reflexes   Right  Left  Biceps    2+  2+  Triceps   2+  2+  Brachioradialis  2+  2+  Meyer's   NEG  NEG  Patellar   2+  2+  Achilles   2+  2+  Clonus    NEG  NEG  Babinski   NEG  NEG    MSK  Inspection  No obvious deformities. No significant pelvic tilt. Normal lordosis of lumbar spine.    Palpation  No tenderness to palpation of midline spine, lumbar facets or paraspinal muscles or SI joint.    Range of motion  Back: Appropriate age match forward flexion and back extension.   Hips: Full ROM with bilateral hip flexion, internal and external rotation.    Provocation   Right  Left  *Radiculopathy*   Straight leg  raise  NEG  NEG    *SI Joint Provocation*    Kanika Finger   NEG  NEG  Distraction   NEG  NEG  Sacral thrust   NEG  NEG  Yoman    NEG  NEG   Gaenslen   NEG  NEG  Distraction   NEG  NEG  SHAR   NEG  NEG    *Deep Gluteal*           Labs   ***    Imaging     XR, LUMBAR SPINE COMPLETE ***  ***    MRI, LUMBAR SPINE ***  ***    XR HIP ***  ***    NCS/EMG ***    Assessment and Plan     Juan Francisco Booker is a pleasant 62 year old male who presents with ***    PLAN:  - ***  - RTC ***    Ready to learn, no apparent learning barriers.  Education provided on treatment plan according to patient's preferred learning style.  Patient verbalizes understanding.   __________________________________  Guillermo Lopes MD  Physical Medicine and Rehabilitation PGY3  Pager: [phone]    {2021 E&M time (Optional):288536}

## 2022-03-09 ENCOUNTER — PRE VISIT (OUTPATIENT)
Dept: UROLOGY | Facility: CLINIC | Age: 62
End: 2022-03-09
Payer: COMMERCIAL

## 2022-03-10 ENCOUNTER — HOSPITAL ENCOUNTER (OUTPATIENT)
Dept: PHYSICAL THERAPY | Facility: CLINIC | Age: 62
Discharge: HOME | End: 2022-03-10
Payer: COMMERCIAL

## 2022-03-10 DIAGNOSIS — R29.898 MUSCULAR DECONDITIONING: ICD-10-CM

## 2022-03-10 DIAGNOSIS — M54.50 CHRONIC BILATERAL LOW BACK PAIN WITHOUT SCIATICA: Primary | ICD-10-CM

## 2022-03-10 DIAGNOSIS — G89.29 CHRONIC BILATERAL LOW BACK PAIN WITHOUT SCIATICA: Primary | ICD-10-CM

## 2022-03-10 PROCEDURE — 97110 THERAPEUTIC EXERCISES: CPT | Mod: GP | Performed by: PHYSICAL THERAPIST

## 2022-03-10 NOTE — PROGRESS NOTES
Daily Assessment:  Tomi is presenting for his 12th PT visit in the [facility] program. 8-week Re-test this visit is looking great! He is demonstrating improvement in his overall max strength and improvement in the muscle balance. He continues to note HA's and PT encouraged the patient that we have just started to address his neck.    History of Present of illness:  Non-radicular, suboccipital pain with tension in the B temporal head. He notes these HA's to occur ~3-4 times per week. No history of trauma or injury. Negative red flags. Getting up and moving around does help. He would like to address this along with his Lowback.    Lumbar [facility] 8-week Re-test  3/10/22 4-week Re-test  2/8/22 Initial Testing   1/18/22   AROM (full=  0-72  lumbar) 0-57 0-57 0-54   Max Extension Torque  290#  266# 272#   Flex: ext ratio (ideal 1.4:1) 2.10:1 1.9:1 4.32:1       Date 3/10/22 3/3/22 2/22/22 2/17/22 2/10/22 2/8/22 2/4/2022  1/31/2022 1/28/2022 1/24/22 1/20/22 1/18/22   Lumbar Parameters               Top Dead Center (TDC) 18 18 18 18 18 18 18 18 18 18 18 18   Counterbalance (CB) 365 365 365 365 365 385 385 385 385 385 385 385   Seat Pad 1 1 1 1 1 1 1 1 1 1 1 1   Femur Restraint 5 5 5 5 5 5 5 5 5 5 5 5   Week/Visit               Enter Week/Visit # Wk 8 Wk 7 Wk 6 Wk 5 Wk 4 v 2 Wk 4 Wk 3 V 2 Wk 3 V 1 Wk2 V 2 Wk 2 V1 Wk 1 v2 W1V1   Weight (lbs) 290# max  148# ex 146# 142# 139# 136# 266# Max  135# ex 133# 130# 125# 120# 106# 100#   Reps (#) 11 19 25 22 19 13 17 28 21 21 27 20   Time 72 118 s 167 s 172 s 197 s 125s  178  186 240 188 s 215s   ROM (degrees) 0-57 0-57 0-57 0-57 0-57 0-57 0-57 (further range today!) 0-54 0-54 0-54 0-54 0-54   Pain       4/10 following        Flex:Ext ratio 2.1):1     1.97:1      4.32:1       Cervical MedX Initial testing  2/22/22   AROM (full= 0-120  cervical) 15-60   Max Extension Torque  192#   Flex: ext ratio (ideal 1.4:1) 1.78:1     Date 3/10/22 3/3/22 2/22/22   Cervical Parameters      Top Dead Center (TDC)  "48 48 48   Counterbalance (CB) 2.0 2.0 2.0   Seat Height 355 355 355   Week/Visit      Enter Week/Visit # Wk 2 v 1 Wk 1 v 2 Wk 1 v 1   Weight (lbs) 120# ex 102# ex 192# max   Reps (#) 30 30 --   Time 190 s 190  --   ROM (degrees) 15-90 15-60 15-60   Pain      Flex:Ext ratio   1.78:1       Date 3/10/22 3/3/22 2/22/22 2/17/22 2/7/22 2/10/22 2/8/22 2/4/2022  1/31/2022   1/28/2022 1/24/2022 1/20/22   Exercise               treadmill X 3 min X 4 min X 4 min X 4 min X 4 min X 4min X 4 min 4 min 4 min X 4 min X 4 min X 4 min   Rotary Torso 90' 52# to R 50# to L 48# to R 46# to L 44# toR 42# to L 40# to L 38# to R 36# to L 36# to R 34# to L 30# to R   Bridge with toes lifted        Switch to feet up on couch to activate hamstrings more and decrease groin pain.    X 12   Dead bug        Reviewed, able to do with ok form, cues for abdominal activation X 10    2 x 20\"   Hip flexor stretch kneeling           X 30 seconds    Piriformis stretch           X 30 seconds    Hip Abduction         X 10  X 10     Quadruped leg extensions         X 10 cues for form X 10     Reformer leg press      X 20, all springs  X 10, SL leg X 20, all springs  X 10 all springs      Reformer bridge      X 15  x10 double leg , x10 single leg all springs X 10 all springs      Reformer 90/90 pulldowns      X 12 3R   X 10 3R      Supine butterfly stretch       X 20\"        Supine cervical retraction     X 10 hold 5 sec          Cervical extension isometrics Demo'd  X 5 hold 5 seconds             Greenband shoulder extension with scap set X 10 X 15             Cervical flexion stretch X 20\"                               "

## 2022-03-14 ENCOUNTER — HOSPITAL ENCOUNTER (OUTPATIENT)
Dept: PHYSICAL THERAPY | Facility: CLINIC | Age: 62
Discharge: HOME | End: 2022-03-14
Payer: COMMERCIAL

## 2022-03-14 DIAGNOSIS — G89.29 CHRONIC BILATERAL LOW BACK PAIN WITHOUT SCIATICA: Primary | ICD-10-CM

## 2022-03-14 DIAGNOSIS — R29.898 MUSCULAR DECONDITIONING: ICD-10-CM

## 2022-03-14 DIAGNOSIS — M54.50 CHRONIC BILATERAL LOW BACK PAIN WITHOUT SCIATICA: Primary | ICD-10-CM

## 2022-03-14 DIAGNOSIS — G44.209 TENSION HEADACHE: ICD-10-CM

## 2022-03-14 DIAGNOSIS — M54.2 CERVICAL PAIN: ICD-10-CM

## 2022-03-14 PROCEDURE — 97110 THERAPEUTIC EXERCISES: CPT | Mod: GP | Performed by: PHYSICAL THERAPIST

## 2022-03-14 NOTE — PROGRESS NOTES
Daily Assessment:  Tomi is presenting for his 13th PT visit in the [facility] program. He continues to report headaches with sitting, improve once he gets up and moves around for 5-10 minutes. Review of HEP routine today, as he felt he had some duplication. We did discuss shifting to 1X/day with his strengthening exercises, and discontinued TA leg extensions. He will report back with how much this saves him for time. Will continue to work on cervical and scapular exercises to support neck.    History of Present of illness:  Non-radicular, suboccipital pain with tension in the B temporal head. He notes these HA's to occur ~3-4 times per week. No history of trauma or injury. Negative red flags. Getting up and moving around does help. He would like to address this along with his Lowback.    Lumbar MedX 8-week Re-test  3/10/22 4-week Re-test  2/8/22 Initial Testing   1/18/22   AROM (full=  0-72  lumbar) 0-57 0-57 0-54   Max Extension Torque  290#  266# 272#   Flex: ext ratio (ideal 1.4:1) 2.10:1 1.9:1 4.32:1       Date 3/14/2022 3/10/22 3/3/22 2/22/22 2/17/22 2/10/22 2/8/22 2/4/2022  1/31/2022 1/28/2022 1/24/22 1/20/22 1/18/22   Lumbar Parameters                Top Dead Center (TDC) 18 18 18 18 18 18 18 18 18 18 18 18 18   Counterbalance (CB) 365 365 365 365 365 365 385 385 385 385 385 385 385   Seat Pad 1 1 1 1 1 1 1 1 1 1 1 1 1   Femur Restraint 5 5 5 5 5 5 5 5 5 5 5 5 5   Week/Visit                Enter Week/Visit # Wk 9 Wk 8 Wk 7 Wk 6 Wk 5 Wk 4 v 2 Wk 4 Wk 3 V 2 Wk 3 V 1 Wk2 V 2 Wk 2 V1 Wk 1 v2 W1V1   Weight (lbs) 150# 290# max  148# ex 146# 142# 139# 136# 266# Max  135# ex 133# 130# 125# 120# 106# 100#   Reps (#) 18 11 19 25 22 19 13 17 28 21 21 27 20   Time 112 72 118 s 167 s 172 s 197 s 125s  178  186 240 188 s 215s   ROM (degrees) 0-57 0-57 0-57 0-57 0-57 0-57 0-57 0-57 (further range today!) 0-54 0-54 0-54 0-54 0-54   Pain        4/10 following        Flex:Ext ratio  2.1):1     1.97:1      4.32:1       Cervical [facility]  "Initial testing  2/22/22   AROM (full= 0-120  cervical) 15-60   Max Extension Torque  192#   Flex: ext ratio (ideal 1.4:1) 1.78:1     Date 3/14/2022 3/10/22 3/3/22 2/22/22   Cervical Parameters       Top Dead Center (TDC) 48 48 48 48   Counterbalance (CB) 2.0 2.0 2.0 2.0   Seat Height 335 355 355 355   Week/Visit       Enter Week/Visit # Wk 2 v2 Wk 2 v 1 Wk 1 v 2 Wk 1 v 1   Weight (lbs) 135# 120# ex 102# ex 192# max   Reps (#) 30 30 30 --   Time 221 190 s 190  --   ROM (degrees) 15-90 15-90 15-60 15-60   Pain       Flex:Ext ratio    1.78:1       Date 3/14/2022 3/10/22 3/3/22 2/22/22 2/17/22 2/7/22 2/10/22 2/8/22 2/4/2022  1/31/2022   1/28/2022 1/24/2022 1/20/22   Exercise                treadmill X 3 min X 3 min X 4 min X 4 min X 4 min X 4 min X 4min X 4 min 4 min 4 min X 4 min X 4 min X 4 min   Rotary Torso 90' 52#'s to L 52# to R 50# to L 48# to R 46# to L 44# toR 42# to L 40# to L 38# to R 36# to L 36# to R 34# to L 30# to R   Bridge with toes lifted         Switch to feet up on couch to activate hamstrings more and decrease groin pain.    X 12   Dead bug         Reviewed, able to do with ok form, cues for abdominal activation X 10    2 x 20\"   Hip flexor stretch kneeling            X 30 seconds    Piriformis stretch            X 30 seconds    Hip Abduction          X 10  X 10     Quadruped leg extensions          X 10 cues for form X 10     Reformer leg press       X 20, all springs  X 10, SL leg X 20, all springs  X 10 all springs      Reformer bridge       X 15  x10 double leg , x10 single leg all springs X 10 all springs      Reformer 90/90 pulldowns       X 12 3R   X 10 3R      Supine butterfly stretch        X 20\"        Supine cervical retraction      X 10 hold 5 sec          Cervical extension isometrics  Demo'd  X 5 hold 5 seconds             Greenband shoulder extension with scap set  X 10 X 15             Cervical flexion stretch  X 20\"                                "

## 2022-03-15 ENCOUNTER — THERAPY VISIT (OUTPATIENT)
Dept: PHYSICAL THERAPY | Facility: CLINIC | Age: 62
End: 2022-03-15
Payer: COMMERCIAL

## 2022-03-15 DIAGNOSIS — R35.0 URINARY FREQUENCY: ICD-10-CM

## 2022-03-15 DIAGNOSIS — R39.15 URINARY URGENCY: Primary | ICD-10-CM

## 2022-03-15 PROCEDURE — 97112 NEUROMUSCULAR REEDUCATION: CPT | Mod: GP | Performed by: PHYSICAL THERAPIST

## 2022-03-15 PROCEDURE — 97530 THERAPEUTIC ACTIVITIES: CPT | Mod: GP | Performed by: PHYSICAL THERAPIST

## 2022-03-17 ENCOUNTER — OFFICE VISIT (OUTPATIENT)
Dept: UROLOGY | Facility: CLINIC | Age: 62
End: 2022-03-17
Payer: COMMERCIAL

## 2022-03-17 VITALS — DIASTOLIC BLOOD PRESSURE: 76 MMHG | HEART RATE: 65 BPM | SYSTOLIC BLOOD PRESSURE: 113 MMHG

## 2022-03-17 DIAGNOSIS — R35.0 URINARY FREQUENCY: Primary | ICD-10-CM

## 2022-03-17 PROCEDURE — 99213 OFFICE O/P EST LOW 20 MIN: CPT | Performed by: PHYSICIAN ASSISTANT

## 2022-03-17 ASSESSMENT — PATIENT HEALTH QUESTIONNAIRE - PHQ9: SUM OF ALL RESPONSES TO PHQ QUESTIONS 1-9: 3

## 2022-03-17 NOTE — PATIENT INSTRUCTIONS
- Continue tamsulosin 0.8mg daily   - Continue finasteride   - Avoid constipation   - Continue work with therapist  - Return in 3 months with a PSA blood test first.  At that time we can discuss other medications vs other therapies like posterior tibial stimulation, interstim or Botox in the bladder.       [facility]

## 2022-03-17 NOTE — LETTER
3/17/2022       RE: Juan Francisco Booker  [address]  [place] [address]     Dear Colleague,    Thank you for referring your patient, Juan Francisco Booker, to the [facility] [place] at [facility]. Please see a copy of my visit note below.    UROLOGY Progress Note:    Mr. Juan Francisco Booker has PMH significant for HTN, HLD, gerd, obesity, prediabetes (HGB A1C 5.4%), depression, TIMOTHY, BPH (on flomax 0.8mg daily - started 8/29/18, on avodart - started 5/11/21).  He has followed with urology since 2018.  Please see previous note for a summary of his history.     He was last seen on 12/2/21 with frequency 10-12 times per day as well as urgency, incomplete emptying --> previous UDS has shown sensory urgency.  Myrbetriq was too expensive    Seeing pelvic floor physical therapist.    Slight improvement in terms of urinary frequency. Currently voiding 10-12 times per day.  Kept a bladder diary for the physical therapist but doesn't recall the specifics of the data.   Drinking 80 ounces of water per day (including herbal tea). At the recommendation of his physical therapist, he has stopped caffeine tablets and instead drinking 2 glasses of caffeinated tea in the mornings.  Staying awake fine during the daytime.    Urgency - sometimes.  He has been doing timed voiding lately.  Is paying attention to low vs strong urges.  Trying to do urge suppression for low amplitude urges (to minimize frequency).   At night - nocturia once per night.  Occasionally will sleep through the night without waking at all.   Bowels - some constipation off and on.  BMs can be incomplete.  Sometimes little round balls.    Does Miralax in the evening and fiber and rima seeds in the morning.  Physical therapy is working with him on constipation too.    Exercises first thing in the morning then trying to do massage  Frequency isn't a big deal in the winter when he is mostly home.  But  he is worried about the summer, when he is out and about more.     7/2/21 - PSA 0.51mg/dL    A/P: frequency/ BPH  - Continue tamsulosin 0.8mg daily   - Continue finasteride   - Avoid constipation   - Continue work with therapist  - Return in 3 months with a PSA blood test first.  At that time we can discuss other medications.  Myrbetriq was unaffordable and with some of the other meds there is concern for worsening constipation, but we should still discussion.  Could also consider other therapies like posterior tibial stimulation, interstim or Botox in the bladder. These were all discussed today.     VISIT DURATION: 24 minutes (3:32 - 3:52 + 5 minutes documentation on dos)    [facility]

## 2022-03-17 NOTE — PROGRESS NOTES
UROLOGY Progress Note:    Mr. Juan Francisco Booker has PMH significant for HTN, HLD, gerd, obesity, prediabetes (HGB A1C 5.4%), depression, TIMOTHY, BPH (on flomax 0.8mg daily - started 8/29/18, on avodart - started 5/11/21).  He has followed with urology since 2018.  Please see previous note for a summary of his history.     He was last seen on 12/2/21 with frequency 10-12 times per day as well as urgency, incomplete emptying --> previous UDS has shown sensory urgency.  Myrbetriq was too expensive    Seeing pelvic floor physical therapist.    Slight improvement in terms of urinary frequency. Currently voiding 10-12 times per day.  Kept a bladder diary for the physical therapist but doesn't recall the specifics of the data.   Drinking 80 ounces of water per day (including herbal tea). At the recommendation of his physical therapist, he has stopped caffeine tablets and instead drinking 2 glasses of caffeinated tea in the mornings.  Staying awake fine during the daytime.    Urgency - sometimes.  He has been doing timed voiding lately.  Is paying attention to low vs strong urges.  Trying to do urge suppression for low amplitude urges (to minimize frequency).   At night - nocturia once per night.  Occasionally will sleep through the night without waking at all.   Bowels - some constipation off and on.  BMs can be incomplete.  Sometimes little round balls.    Does Miralax in the evening and fiber and rima seeds in the morning.  Physical therapy is working with him on constipation too.    Exercises first thing in the morning then trying to do massage  Frequency isn't a big deal in the winter when he is mostly home.  But he is worried about the summer, when he is out and about more.     7/2/21 - PSA 0.51mg/dL    A/P: frequency/ BPH  - Continue tamsulosin 0.8mg daily   - Continue finasteride   - Avoid constipation   - Continue work with therapist  - Return in 3 months with a PSA blood test first.  At that time we can discuss other  medications.  Myrbetriq was unaffordable and with some of the other meds there is concern for worsening constipation, but we should still discussion.  Could also consider other therapies like posterior tibial stimulation, interstim or Botox in the bladder. These were all discussed today.     VISIT DURATION: 24 minutes (3:32 - 3:52 + 5 minutes documentation on dos)    [facility]

## 2022-03-17 NOTE — PROGRESS NOTES
HPI: Mr. Juan Francisco Booker is a 62 year old year old male presenting today for evaluation of chief complaint(s): Follow Up (urinary frequency )  .  ***    Today, he is accompanied by his ***    Mr. Juan Francisco Booker has PMH significant for HTN, HLD, gerd, obesity, prediabetes (HGB A1C 5.4%), depression, TIMOTHY, BPH (on flomax 0.8mg daily - started 8/29/18, on avodart - started 5/11/21).  He has followed with urology since 2018.  Please see note from 9/16/21 for a summary of his history.  Essentially he has tried dietary sugar reduction, caffeine reduction, mgmt of     He was last seen on 12/2/21 with frequency 10-12 times per day as well as urgency, incomplete emptying --> previous UDS has shown sensory urgency.  Myrbetriq was too expensive    Current Outpatient Medications   Medication Sig Dispense Refill     Caffeine 100 MG TABS Take 1 tablet by mouth daily as needed        citalopram (CELEXA) 10 MG tablet Take 1 tablet (10 mg) by mouth daily 90 tablet 3     dutasteride (AVODART) 0.5 MG capsule Take 1 capsule (0.5 mg) by mouth daily 60 capsule 5     finasteride (PROSCAR) 5 MG tablet Take 1 tablet (5 mg) by mouth daily 90 tablet 3     fluticasone (FLONASE) 50 MCG/ACT nasal spray Spray 1-2 sprays into both nostrils daily 48 g 1     gabapentin (NEURONTIN) 300 MG capsule 1 in morning, 1 at midday, 3 at night (Patient taking differently: 3 in morning, 3 at night) 450 capsule 3     lisinopril (ZESTRIL) 20 MG tablet Take 1 tablet (20 mg) by mouth daily 90 tablet 3     Multiple Vitamin (DAILY MULTIVITAMIN PO) Take 1 tablet by mouth daily AM       ORDER FOR DME Use your CPAP device as directed by your provider.       polyethylene glycol (MIRALAX) 17 GM/Dose powder Take 9-17 g by mouth daily 510 g 11     psyllium (METAMUCIL) 28.3 % packet Take 1 packet by mouth daily       simvastatin (ZOCOR) 40 MG tablet Take 1 tablet (40 mg) by mouth At Bedtime 90 tablet 3     SODIUM FLUORIDE 5000 PPM 1.1 % PSTE BRUSH TEETH WITH PEA SIZED AMOUNT AT  "BEDTIME AND DO NOT RINSE       tamsulosin (FLOMAX) 0.4 MG capsule Take 2 capsules (0.8 mg) by mouth daily 30 min after a meal.   Please keep visit for 5/11/2021 for further refills. Thank you 60 capsule 11     tiZANidine (ZANAFLEX) 2 MG tablet Take 1 tablet at night. If no side effects and no improvement in headaches after 1 week, can increase to 2 tablet at night. 60 tablet 5       ALLERGIES: Grass      REVIEW OF SYSTEMS:  Skin: {Skin:100::\"negative\"}  Eyes: {Eyes:200::\"negative\"}  Ears/Nose/Throat: {ENT:300::\"negative\"}  Respiratory: {Resp:400::\"No shortness of breath, dyspnea on exertion, cough, or hemoptysis\"}  Cardiovascular: {CV:500::\"negative\"}  Gastrointestinal: {GI:600::\"negative\"}  Genitourinary: {:700::\"as above\"}  Musculoskeletal: {Musc:800::\"negative\"}  Neurologic: {Neur:900::\"negative\"}  Psychiatric: {Psych:1000::\"negative\"}  Hematologic/Lymphatic/Immunologic: {Hem:1100::\"negative\"}  Endocrine: {Endo:1200::\"negative\"}    GENERAL PHYSICAL EXAM:   Vitals: /76   Pulse 65   There is no height or weight on file to calculate BMI.    GENERAL: Well groomed, well developed, well nourished male in NAD.  HEAD: Normocephalic. No masses, lesions, tenderness or abnormalities  NECK: Neck supple. No adenopathy. Thyroid symmetric, normal size  ENT:  ENT exam normal, no sinus tenderness  CV: RRR, no murmurs/rubs/gallops   RESPIRATORY: CTAB. No increased respiratory effort.   GI: Soft, NT, ND, no palpable masses.  Normoactive BS.  *** CVAT bilaterally.  MS: Full ROM in extremities, gait normal, normal muscle tone  SKIN: Warm to touch, dry.  No visible rashes or lesions on examined areas.  HEMATOLOGIC/LYMPHATIC/IMMUNOLOGIC: normal ant/post cervical, supraclavicular and inguinal nodes. *** No LE edema.  NEURO: Alert and oriented x 3.  PSYCH: Normal mood and affect, pleasant and agreeable during interview and exam.     :      Inguinal: no hernias or *** palpable lymph nodes      *** penis, no penile plaques or " lesions. Orthotopic location of the urethral meatus.       Scrotum normal.      Testicles of normal firmness and consistency, no masses.      Epididymes bilaterally without masses or tenderness.         Vas and cord normal bilaterally.    CM:      *** rectal tone, *** amount of stool in the rectal vault.      *** sized prostate, *** tenderness, nodules or asymmetry.    PVR: Residual urine by ultrasound was *** ml.           RADIOLOGY: The following tests were reviewed: ***    LABS: The last test results for Mr. Juan Francisco Booker were reviewed:  PSA -   Lab Results   Component Value Date    PSA 0.51 07/02/2021    PSA 1.25 01/21/2020    PSA 1.58 12/19/2018    PSA 1.26 11/27/2018     BMP -   Recent Labs   Lab Test 02/17/22  1337 07/02/21  1302 03/24/21  1015 03/08/21  1430    140  --  143   POTASSIUM 4.0 4.0 3.9 4.0   CHLORIDE 104 108  --  108   CO2 29 30  --  30   BUN 21 16  --  19   CR 0.79 0.83  --  0.78   GLC 79 85  --  88   HZEN 9.0 9.0  --  8.3*       CBC -   Recent Labs   Lab Test 02/21/22  1458 02/17/22  1337 03/08/21  1430   WBC 4.5 4.5 4.1   HGB 13.4 12.8* 13.1*    191 182       ASSESSMENT:   ***    PLAN:   ***      Deena Matta PA-C  Department of Urologic Surgery

## 2022-03-21 ENCOUNTER — HOSPITAL ENCOUNTER (OUTPATIENT)
Dept: PHYSICAL THERAPY | Facility: CLINIC | Age: 62
Discharge: HOME | End: 2022-03-21
Payer: COMMERCIAL

## 2022-03-21 DIAGNOSIS — G44.209 TENSION HEADACHE: ICD-10-CM

## 2022-03-21 DIAGNOSIS — G89.29 CHRONIC BILATERAL LOW BACK PAIN WITHOUT SCIATICA: Primary | ICD-10-CM

## 2022-03-21 DIAGNOSIS — M54.50 CHRONIC BILATERAL LOW BACK PAIN WITHOUT SCIATICA: Primary | ICD-10-CM

## 2022-03-21 DIAGNOSIS — M54.2 CERVICAL PAIN: ICD-10-CM

## 2022-03-21 PROCEDURE — 97110 THERAPEUTIC EXERCISES: CPT | Mod: GP | Performed by: PHYSICAL THERAPIST

## 2022-03-21 NOTE — PROGRESS NOTES
Subjective: Pt reports that the consolidation of HEP has saved him some time. Has noticed that when he sits on certain chairs with some cushioning but not a lot that his back will hurt. Otherwise feels that his back is progressing well.    Daily Assessment:  Tomi is presenting for his 14th PT visit in the MedX program. He continues to report headaches with sitting and when walking on the treadmill, does improve after standing up and after getting off the treadmill. Patient continues to tolerate lumbar and cervical medx today. Continue to work on cervical and scapular exercises to support neck. Pt is appropriate to continue skilled PT to address impairments.    History of Present of illness:  Non-radicular, suboccipital pain with tension in the B temporal head. He notes these HA's to occur ~3-4 times per week. No history of trauma or injury. Negative red flags. Getting up and moving around does help. He would like to address this along with his Lowback.    Lumbar MedX 8-week Re-test  3/10/22 4-week Re-test  2/8/22 Initial Testing   1/18/22   AROM (full=  0-72  lumbar) 0-57 0-57 0-54   Max Extension Torque  290#  266# 272#   Flex: ext ratio (ideal 1.4:1) 2.10:1 1.9:1 4.32:1       Date 03/21/22 3/14/2022 3/10/22 3/3/22 2/22/22 2/17/22 2/10/22 2/8/22 2/4/2022  1/31/2022 1/28/2022 1/24/22 1/20/22 1/18/22   Lumbar Parameters                 Top Dead Center (TDC) 18 18 18 18 18 18 18 18 18 18 18 18 18 18   Counterbalance (CB) 365 365 365 365 365 365 365 385 385 385 385 385 385 385   Seat Pad 1 1 1 1 1 1 1 1 1 1 1 1 1 1   Femur Restraint 5 5 5 5 5 5 5 5 5 5 5 5 5 5   Week/Visit                 Enter Week/Visit # Wk 10 Wk 9 Wk 8 Wk 7 Wk 6 Wk 5 Wk 4 v 2 Wk 4 Wk 3 V 2 Wk 3 V 1 Wk2 V 2 Wk 2 V1 Wk 1 v2 W1V1   Weight (lbs) 154# 150# 290# max  148# ex 146# 142# 139# 136# 266# Max  135# ex 133# 130# 125# 120# 106# 100#   Reps (#) 19 18 11 19 25 22 19 13 17 28 21 21 27 20   Time 127 112 72 118 s 167 s 172 s 197 s 125s  178  186 240  "188 s 215s   ROM (degrees) 0-57 0-57 0-57 0-57 0-57 0-57 0-57 0-57 0-57 (further range today!) 0-54 0-54 0-54 0-54 0-54   Pain         4/10 following        Flex:Ext ratio   2.1):1     1.97:1      4.32:1       Cervical MedX Initial testing  2/22/22   AROM (full= 0-120  cervical) 15-60   Max Extension Torque  192#   Flex: ext ratio (ideal 1.4:1) 1.78:1     Date 03/21/22 3/14/2022 3/10/22 3/3/22 2/22/22   Cervical Parameters        Top Dead Center (TDC) 48 48 48 48 48   Counterbalance (CB) 2.0 2.0 2.0 2.0 2.0   Seat Height 335 335 355 355 355   Week/Visit        Enter Week/Visit # Wk 3 V1 Wk 2 v2 Wk 2 v 1 Wk 1 v 2 Wk 1 v 1   Weight (lbs) 144# 135# 120# ex 102# ex 192# max   Reps (#) 30 30 30 30 --   Time 186 221 190 s 190  --   ROM (degrees) 15-90 15-90 15-90 15-60 15-60   Pain Almost to fatigue       Flex:Ext ratio     1.78:1       Date 03/21/22 3/14/2022 3/10/22 3/3/22 2/22/22 2/17/22 2/7/22 2/10/22 2/8/22 2/4/2022  1/31/2022   1/28/2022 1/24/2022 1/20/22   Exercise                 treadmill x4 min X 3 min X 3 min X 4 min X 4 min X 4 min X 4 min X 4min X 4 min 4 min 4 min X 4 min X 4 min X 4 min   Rotary Torso 90' 54# to R 52#'s to L 52# to R 50# to L 48# to R 46# to L 44# toR 42# to L 40# to L 38# to R 36# to L 36# to R 34# to L 30# to R   Bridge with toes lifted          Switch to feet up on couch to activate hamstrings more and decrease groin pain.    X 12   Dead bug          Reviewed, able to do with ok form, cues for abdominal activation X 10    2 x 20\"   Hip flexor stretch kneeling             X 30 seconds    Piriformis stretch             X 30 seconds    Hip Abduction           X 10  X 10     Quadruped leg extensions           X 10 cues for form X 10     Reformer leg press        X 20, all springs  X 10, SL leg X 20, all springs  X 10 all springs      Reformer bridge        X 15  x10 double leg , x10 single leg all springs X 10 all springs      Reformer 90/90 pulldowns        X 12 3R   X 10 3R      Supine " "butterfly stretch         X 20\"        Supine cervical retraction       X 10 hold 5 sec          Cervical extension isometrics   Demo'd  X 5 hold 5 seconds             Greenband shoulder extension with scap set Green band x15  X 10 X 15             Cervical flexion stretch   X 20\"              Standing horizontal adduction Green band x10                                                                                                       "

## 2022-03-31 ENCOUNTER — HOSPITAL ENCOUNTER (OUTPATIENT)
Dept: PHYSICAL THERAPY | Facility: CLINIC | Age: 62
Discharge: HOME | End: 2022-03-31
Payer: COMMERCIAL

## 2022-03-31 DIAGNOSIS — G44.209 TENSION HEADACHE: ICD-10-CM

## 2022-03-31 DIAGNOSIS — M54.2 CERVICAL PAIN: ICD-10-CM

## 2022-03-31 DIAGNOSIS — G89.29 CHRONIC BILATERAL LOW BACK PAIN WITHOUT SCIATICA: Primary | ICD-10-CM

## 2022-03-31 DIAGNOSIS — M54.50 CHRONIC BILATERAL LOW BACK PAIN WITHOUT SCIATICA: Primary | ICD-10-CM

## 2022-03-31 PROCEDURE — 97110 THERAPEUTIC EXERCISES: CPT | Mod: GP | Performed by: PHYSICAL THERAPIST

## 2022-03-31 NOTE — PROGRESS NOTES
Subjective: He is noting to continue be pleased with the LB and not much change with his neck. He noted the back to be bothering him when he was at the bar. PT reminded the patient of moving more frequently    Daily Assessment:  Tomi is presenting for his 15th PT visit in the MedX program. He is continuing to note improvement in his LB function and strength. PT encouraged the patient to move after sitting for a length of time. 4-week re-test on the neck MedX looks good today!    History of Present of illness:  Non-radicular, suboccipital pain with tension in the B temporal head. He notes these HA's to occur ~3-4 times per week. No history of trauma or injury. Negative red flags. Getting up and moving around does help. He would like to address this along with his Lowback.    Lumbar MedX 8-week Re-test  3/10/22 4-week Re-test  2/8/22 Initial Testing   1/18/22   AROM (full=  0-72  lumbar) 0-57 0-57 0-54   Max Extension Torque  290#  266# 272#   Flex: ext ratio (ideal 1.4:1) 2.10:1 1.9:1 4.32:1       Date 3/31/22 03/21/22 3/14/2022 3/10/22 3/3/22 2/22/22 2/17/22 2/10/22 2/8/22 2/4/2022  1/31/2022 1/28/2022 1/24/22 1/20/22 1/18/22   Lumbar Parameters                  Top Dead Center (TDC) 18 18 18 18 18 18 18 18 18 18 18 18 18 18 18   Counterbalance (CB) 365 365 365 365 365 365 365 365 385 385 385 385 385 385 385   Seat Pad 1 1 1 1 1 1 1 1 1 1 1 1 1 1 1   Femur Restraint 5 5 5 5 5 5 5 5 5 5 5 5 5 5 5   Week/Visit                  Enter Week/Visit # Wk 11 Wk 10 Wk 9 Wk 8 Wk 7 Wk 6 Wk 5 Wk 4 v 2 Wk 4 Wk 3 V 2 Wk 3 V 1 Wk2 V 2 Wk 2 V1 Wk 1 v2 W1V1   Weight (lbs) 158# 154# 150# 290# max  148# ex 146# 142# 139# 136# 266# Max  135# ex 133# 130# 125# 120# 106# 100#   Reps (#) 17 19 18 11 19 25 22 19 13 17 28 21 21 27 20   Time 107 s 127 112 72 118 s 167 s 172 s 197 s 125s  178  186 240 188 s 215s   ROM (degrees) 0-57 0-57 0-57 0-57 0-57 0-57 0-57 0-57 0-57 0-57 (further range today!) 0-54 0-54 0-54 0-54 0-54   Pain          4/10  "following        Flex:Ext ratio    2.1):1     1.97:1      4.32:1       Cervical MedX 4-week Re-test  3/31/22 Initial testing  2/22/22   AROM (full= 0-120  cervical) 15-93 15-60   Max Extension Torque  252# 192#   Flex: ext ratio (ideal 1.4:1) 1.75:1 1.78:1     Date 3/31/22 03/21/22 3/14/2022 3/10/22 3/3/22 2/22/22   Cervical Parameters         Top Dead Center (TDC) 48 48 48 48 48 48   Counterbalance (CB) 2 2.0 2.0 2.0 2.0 2.0   Seat Height 335 335 335 355 355 355   Week/Visit         Enter Week/Visit # Wk 4 Wk 3 V1 Wk 2 v2 Wk 2 v 1 Wk 1 v 2 Wk 1 v 1   Weight (lbs) 252# Max  153#ex 144# 135# 120# ex 102# ex 192# max   Reps (#) 30 30 30 30 30 --   Time 234 s 186 221 190 s 190  --   ROM (degrees) 18-93  Ex to 0 15-90 15-90 15-90 15-60 15-60   Pain  Almost to fatigue       Flex:Ext ratio      1.78:1       Date 3/31/22 03/21/22 3/14/2022 3/10/22 3/3/22 2/22/22 2/17/22 2/7/22 2/10/22 2/8/22 2/4/2022  1/31/2022   1/28/2022 1/24/2022 1/20/22   Exercise                  treadmill X 4 min x4 min X 3 min X 3 min X 4 min X 4 min X 4 min X 4 min X 4min X 4 min 4 min 4 min X 4 min X 4 min X 4 min   Rotary Torso 90' 56# to L 54# to R 52#'s to L 52# to R 50# to L 48# to R 46# to L 44# toR 42# to L 40# to L 38# to R 36# to L 36# to R 34# to L 30# to R   Bridge with toes lifted           Switch to feet up on couch to activate hamstrings more and decrease groin pain.    X 12   Dead bug           Reviewed, able to do with ok form, cues for abdominal activation X 10    2 x 20\"   Hip flexor stretch kneeling              X 30 seconds    Piriformis stretch              X 30 seconds    Hip Abduction            X 10  X 10     Quadruped leg extensions            X 10 cues for form X 10     Reformer leg press         X 20, all springs  X 10, SL leg X 20, all springs  X 10 all springs      Reformer bridge         X 15  x10 double leg , x10 single leg all springs X 10 all springs      Reformer 90/90 pulldowns         X 12 3R   X 10 3R    " "  Supine butterfly stretch          X 20\"        Supine cervical retraction        X 10 hold 5 sec          Cervical extension isometrics    Demo'd  X 5 hold 5 seconds             Greenband shoulder extension with scap set  Green band x15  X 10 X 15             Cervical flexion stretch    X 20\"              Standing horizontal adduction  Green band x10                                                                                                            "

## 2022-04-01 ENCOUNTER — THERAPY VISIT (OUTPATIENT)
Dept: PHYSICAL THERAPY | Facility: CLINIC | Age: 62
End: 2022-04-01
Payer: COMMERCIAL

## 2022-04-01 DIAGNOSIS — R35.0 URINARY FREQUENCY: ICD-10-CM

## 2022-04-01 DIAGNOSIS — R39.15 URINARY URGENCY: Primary | ICD-10-CM

## 2022-04-01 PROCEDURE — 97112 NEUROMUSCULAR REEDUCATION: CPT | Mod: GP | Performed by: PHYSICAL THERAPIST

## 2022-04-01 PROCEDURE — 97530 THERAPEUTIC ACTIVITIES: CPT | Mod: GP | Performed by: PHYSICAL THERAPIST

## 2022-04-01 PROCEDURE — 97110 THERAPEUTIC EXERCISES: CPT | Mod: GP | Performed by: PHYSICAL THERAPIST

## 2022-04-07 ENCOUNTER — HOSPITAL ENCOUNTER (OUTPATIENT)
Dept: PHYSICAL THERAPY | Facility: CLINIC | Age: 62
Discharge: HOME | End: 2022-04-07
Payer: COMMERCIAL

## 2022-04-07 DIAGNOSIS — M54.2 CERVICAL PAIN: ICD-10-CM

## 2022-04-07 DIAGNOSIS — G89.29 CHRONIC BILATERAL LOW BACK PAIN WITHOUT SCIATICA: Primary | ICD-10-CM

## 2022-04-07 DIAGNOSIS — G44.209 TENSION HEADACHE: ICD-10-CM

## 2022-04-07 DIAGNOSIS — M54.50 CHRONIC BILATERAL LOW BACK PAIN WITHOUT SCIATICA: Primary | ICD-10-CM

## 2022-04-07 DIAGNOSIS — R29.898 MUSCULAR DECONDITIONING: ICD-10-CM

## 2022-04-07 PROCEDURE — 97140 MANUAL THERAPY 1/> REGIONS: CPT | Mod: GP | Performed by: PHYSICAL THERAPIST

## 2022-04-07 PROCEDURE — 97110 THERAPEUTIC EXERCISES: CPT | Mod: GP | Performed by: PHYSICAL THERAPIST

## 2022-04-07 NOTE — PROGRESS NOTES
Subjective: He is noting to not be too bad, depends on what chair he sits in. He sat in a hard chair yesterday and that was not comfortable.    Daily Assessment:  Tomi is presenting for his 16th PT visit in the MedX program. He is continuing to note improvement in his LB function and strength. He is currently in the 12th week of the lumbar MedX program and 5th weeks of the Cervical MedX.    History of Present of illness:  Non-radicular, suboccipital pain with tension in the B temporal head. He notes these HA's to occur ~3-4 times per week. No history of trauma or injury. Negative red flags. Getting up and moving around does help. He would like to address this along with his Lowback.    Lumbar MedX 12-week Re-test  4/7/22 8-week Re-test  3/10/22 4-week Re-test  2/8/22 Initial Testing   1/18/22   AROM (full=  0-72  lumbar) 0-57 0-57 0-57 0-54   Max Extension Torque   290#  266# 272#   Flex: ext ratio (ideal 1.4:1) 2.17:1 2.10:1 1.9:1 4.32:1       Date 4/7/22 3/31/22 03/21/22 3/14/2022 3/10/22 3/3/22 2/22/22 2/17/22 2/10/22 2/8/22 2/4/2022  1/31/2022 1/28/2022 1/24/22 1/20/22 1/18/22   Lumbar Parameters                   Top Dead Center (TDC) 18 18 18 18 18 18 18 18 18 18 18 18 18 18 18 18   Counterbalance (CB) 365 365 365 365 365 365 365 365 365 385 385 385 385 385 385 385   Seat Pad 1 1 1 1 1 1 1 1 1 1 1 1 1 1 1 1   Femur Restraint 5 5 5 5 5 5 5 5 5 5 5 5 5 5 5 5   Week/Visit                   Enter Week/Visit # Wk 12 Wk 11 Wk 10 Wk 9 Wk 8 Wk 7 Wk 6 Wk 5 Wk 4 v 2 Wk 4 Wk 3 V 2 Wk 3 V 1 Wk2 V 2 Wk 2 V1 Wk 1 v2 W1V1   Weight (lbs) 269#Max  160#ex 158# 154# 150# 290# max  148# ex 146# 142# 139# 136# 266# Max  135# ex 133# 130# 125# 120# 106# 100#   Reps (#) 16  17 19 18 11 19 25 22 19 13 17 28 21 21 27 20   Time 106 s 107 s 127 112 72 118 s 167 s 172 s 197 s 125s  178  186 240 188 s 215s   ROM (degrees) 0-57 0-57 0-57 0-57 0-57 0-57 0-57 0-57 0-57 0-57 0-57 (further range today!) 0-54 0-54 0-54 0-54 0-54   Pain            "4/10 following        Flex:Ext ratio     2.1):1     1.97:1      4.32:1       Cervical MedX 4-week Re-test  3/31/22 Initial testing  2/22/22   AROM (full= 0-120  cervical) 15-93 15-60   Max Extension Torque  252# 192#   Flex: ext ratio (ideal 1.4:1) 1.75:1 1.78:1     Date 4/7/22 3/31/22 03/21/22 3/14/2022 3/10/22 3/3/22 2/22/22   Cervical Parameters          Top Dead Center (TDC) 48 48 48 48 48 48 48   Counterbalance (CB) 2 2 2.0 2.0 2.0 2.0 2.0   Seat Height 335 335 335 335 355 355 355   Week/Visit          Enter Week/Visit # Wk 5 Wk 4 Wk 3 V1 Wk 2 v2 Wk 2 v 1 Wk 1 v 2 Wk 1 v 1   Weight (lbs) 162# ex 252# Max  153#ex 144# 135# 120# ex 102# ex 192# max   Reps (#) 30 30 30 30 30 30 --   Time 108 s 234 s 186 221 190 s 190  --   ROM (degrees) 18-93 ext to 0 18-93  Ex to 0 15-90 15-90 15-90 15-60 15-60   Pain   Almost to fatigue       Flex:Ext ratio       1.78:1       Date 4/7/22 3/31/22 03/21/22 3/14/2022 3/10/22 3/3/22 2/22/22 2/17/22 2/7/22 2/10/22 2/8/22 2/4/2022  1/31/2022   1/28/2022 1/24/2022 1/20/22   Exercise                   treadmill X 3 min X 4 min x4 min X 3 min X 3 min X 4 min X 4 min X 4 min X 4 min X 4min X 4 min 4 min 4 min X 4 min X 4 min X 4 min   Rotary Torso 90' 58# to R 56# to L 54# to R 52#'s to L 52# to R 50# to L 48# to R 46# to L 44# toR 42# to L 40# to L 38# to R 36# to L 36# to R 34# to L 30# to R   Bridge with toes lifted            Switch to feet up on couch to activate hamstrings more and decrease groin pain.    X 12   Dead bug            Reviewed, able to do with ok form, cues for abdominal activation X 10    2 x 20\"   Hip flexor stretch kneeling               X 30 seconds    Piriformis stretch               X 30 seconds    Hip Abduction             X 10  X 10     Quadruped leg extensions             X 10 cues for form X 10     Reformer leg press          X 20, all springs  X 10, SL leg X 20, all springs  X 10 all springs      Reformer bridge          X 15  x10 double leg , x10 single leg " "all springs X 10 all springs      Reformer 90/90 pulldowns          X 12 3R   X 10 3R      Supine butterfly stretch           X 20\"        Supine cervical retraction         X 10 hold 5 sec          Cervical extension isometrics     Demo'd  X 5 hold 5 seconds             Greenband shoulder extension with scap set   Green band x15  X 10 X 15             Cervical flexion stretch     X 20\"              Standing horizontal adduction   Green band x10                                                                                                                 "

## 2022-04-08 ENCOUNTER — THERAPY VISIT (OUTPATIENT)
Dept: PHYSICAL THERAPY | Facility: CLINIC | Age: 62
End: 2022-04-08
Payer: COMMERCIAL

## 2022-04-08 DIAGNOSIS — R39.15 URINARY URGENCY: Primary | ICD-10-CM

## 2022-04-08 DIAGNOSIS — R35.0 URINARY FREQUENCY: ICD-10-CM

## 2022-04-08 PROCEDURE — 97112 NEUROMUSCULAR REEDUCATION: CPT | Mod: GP | Performed by: PHYSICAL THERAPIST

## 2022-04-08 PROCEDURE — 97530 THERAPEUTIC ACTIVITIES: CPT | Mod: GP | Performed by: PHYSICAL THERAPIST

## 2022-04-08 PROCEDURE — 97140 MANUAL THERAPY 1/> REGIONS: CPT | Mod: GP | Performed by: PHYSICAL THERAPIST

## 2022-04-11 DIAGNOSIS — E78.5 HYPERLIPIDEMIA LDL GOAL <130: ICD-10-CM

## 2022-04-11 RX ORDER — SIMVASTATIN 40 MG
40 TABLET ORAL AT BEDTIME
Qty: 90 TABLET | Refills: 3 | Status: SHIPPED | OUTPATIENT
Start: 2022-04-11 | End: 2023-04-20

## 2022-04-11 NOTE — TELEPHONE ENCOUNTER
"Request for medication refill:    simvastatin (ZOCOR) 40 MG tablet    Providers if patient needs an appointment and you are willing to give a one month supply please refill for one month and  send a letter/MyChart using \".SMILLIMITEDREFILL\" .smillimited and route chart to \"[facility] \" (Giving one month refill in non controlled medications is strongly recommended before denial)    If refill has been denied, meaning absolutely no refills without visit, please complete the smart phrase \".smirxrefuse\" and route it to the \"[facility] MED REFILLS\"  pool to inform the patient and the pharmacy.    Olivia Dickinson, CMA        "

## 2022-04-12 NOTE — PROGRESS NOTES
Subjective:  Juan Francisco Booker is a 62 year old male who presents today for follow-up regarding low back pain.  I treated him for a possible Pelvic Joint Dysfunction on 12/15/2021 but I was not convinced that was the issue.  He has multilevel lumbar spondylosis and has had massive volitional weight loss of 140 pounds.  I was wondering about lumbar facets as a pain generator.  I ordered a trial of a MedX program as opposed to an RFA work-up on 12/15/2021.    On 2/8/2022 I asked him to follow-up with his family doctor for liver testing as well as a metabolic panel and CBC for the skin color issue.  I advised adding on the cervical MedX program.  I ordered a left shoulder subacromial steroid injection and recommended follow-up with me for his neck and back prompting today's visit.  His assessment on 2/8/2022 was:    Resolved Pelvic Joint Dysfunction    Improving low back pain, ongoing MedX strengthening program    Headache, possible cervicogenic component, but no upper quadrant triggering and no evidence clinically of radiculopathy or neve impingement    Left shoulder impingement syndrome    Skin discoloration suspicious for either jaundice or anemia.       PRIOR HISTORY from 11/23/2020   :  He has tingling on the tops of both feet. This has been stable to improving over the last 2-3 years. He takes gabapentin for the tingling and restless legs. This has improved his restless legs. His tingling is now not as painful.  Pain onset was 3 to 4 years ago and it improved initially with some physical therapy and then he slipped on the ice a couple of times and the pain has gotten worse since then which was about 3 years ago.  Recently he tried physical therapy for about 2 months and it did not help.     His pain is left greater than right and he points to the SI area.  There is no radiating numbness tingling or weakness in his legs unless he sitting for short period of time and then he gets it on the tops of both ankles and  "both feet but it skips the rest of his leg.  No significant bowel or bladder dysfunction, unless he is far away from bathroom and cannot get there in time and has had a couple of bladder incontinence issues.  He does have a urologist.  And he does have some BPH with some urgency and slower voids.  No saddle anesthesia.  Does not have a history of cancer or prolonged steroid use.  He has been successful with 140 pounds of volitional weight loss but no unexplained weight loss.     Updated 2/8/2022 HISTORY:    PT notes indicate that he has been tolerating the MedX program well with some soreness following the treatments and he is progressing in his strength as well as repetitions.  He started out on 1/18/2022 at 100 pounds and 20 repetitions.  On 2/4/2022 he was at 133 pounds and 17 repetitions.  He also has improved 3 degrees and range of motion on 2/4/2022 for the 1st time.  For the first time today he started noticing that his sitting tolerance was better. Before the pain would come on and I think that is really encouraging after just 3 weeks of MedX treatment.     In the interim, he also wanted me to talk to him about neck pain upon recommendation of Dr. Deshpande, because his headaches might be associated with neck symptoms.  He had a CT venogram of the head and neck on 12/7/2021 which did not show any evidence of major thrombus or occlusion of the veins however there was mild compression above the right internal jugular vein by the carotid bifurcation when he turned his head to the right.       INTERIM HISTORY:    Tomi feels like his back is \"greatly\" improved.  His sitting tolerance is much better.  He still going through the lumbar MedX program.  He started the cervical MedX program a bit later and is finding that that is helping.  His headaches are no longer chronic and distracting but intermittent and annoying..  He also underwent a left shoulder subacromial space steroid injection on 3/3/2022 with a good " therapeutic response.  I had to remind him about that because he did not really remember very much about that.  He has no upper extremity radiating numbness tingling or weakness.  He has no lower extremity weakness or pain but he does have persistent intermittent anterior shin and dorsum of the feet bilateral tingling, primarily with prolonged sitting but occasionally with lying down as well..  The EMGs of his legs however did not reveal an answer for that.    Past medical history is reviewed and is unchanged for any new medical diagnoses in the interim.  Pertinent for TIMOTHY treated with CPAP, hypertension, gastric polyps, prediabetes.  No pertinent surgical history.  He is on chronic aspirin therapy and gabapentin.         Social history is reviewed and is unchanged in the interim.  He is single.  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  He walks for 1 hour a day on a treadmill    Review of systems: He has recently been put on iron having low ferritin and baseline borderline anemia of longstanding duration.  LFTs were normal on a comprehensive metabolic panel of 2/17/2022    IMAGING: Images and reports reviewed.     MRI lumbar 8/2021     L3-4: Posterior disc bulge. Bilateral facet arthropathy and ligamentum flavum thickening. Bilateral moderate neural foraminal stenosis. Moderate spinal canal stenosis.      L4-5: Posterior disc bulge and superimposed left foraminal disc  protrusion. Bilateral moderate neural foraminal stenosis. Mild spinal canal stenosis.      Impression:   1. Multiple unchanged presumed hemangiomas as described above.   2. Anterior wedge-shaped compression deformities of T12 and L4,  unchanged compared to 10/22/2020.  3. Multilevel lumbar spondylosis, most pronounced at L3-4 with  moderate spinal canal and moderate bilateral neural foraminal  stenosis. No significant change in overall finding compared  To10/22/2020.  (OM-I have reviewed the images again today and looked  at the SI joints and they look pretty good.)     Procedures:  EMG 7/2020 BLE  This is a normal study.      Objective:    CONSTITUTIONAL:  Vital signs as above.  No acute distress.  The patient is well nourished and well groomed.  He transitions well.  He moves about the room easily.  PSYCHIATRIC:  The patient is awake, alert, oriented to person, place and time.  The patient is answering questions appropriately with clear speech.  Normal affect.  Able to follow commands  SKIN:  Skin over the face, posterior torso, and  extremities is free of significant lesions.    RESPIRATORY:  normal respiratory excursion and no dyspnea.  MUSCULOSKELETAL:  Gait is non-antalgic.  The patient is able to heel and toe walk without any difficulty.   Squat and rise normal.   .  Back ROM: Full fluid and pain-free in fact hyper normal with extension and I cannot reproduce his leg symptoms with Kemps testing  Cervical ROM: Full fluid and pain-free with negative Spurling's maneuver and cervical compression test.  I cannot exacerbate his pre-existing headache that he came in with today with foraminal compression.  No upper quadrant tenderness or trigger points  Bilateral shoulder range of motion full fluid and symmetric with minor discomfort at the final 20 degrees of left shoulder abduction and flexion.  Left shoulder impingement testing is trace/0/0/0.  Left shoulder strength is 5/5/5/5/5.  Yergason's and speeds and Hart's 5.     NEUROLOGICAL:    Motor: C5-T1, L3-S1 myotomes, axillary/median/radial/ulnar nerves 5/5     Sensation to light touch is intact in the bilateral upper and lower extremities.  No tingling in the legs present currently.  SLR and slump negative      Assessment     Juan Francisco Booker  is a 62 year old y.o. male who presents today for follow-up regarding     Chronic low back pain improving with MedX substantially    Chronic bilateral lower leg and foot intermittent tingling, following an L5 distribution, likely associated  with bilateral L4-5 neural foraminal stenosis    Left shoulder impingement syndrome markedly improved following left subacromial space injection 3/3/2022    Chronic neck and upper back pain with headache markedly improving with MedX.    Skin discoloration with borderline anemia and normal LFTs that is probably normal for him.      Plan:  Guarding his low back he is doing great and we both agree that he does not need to do anything further beyond what he gets as a home program following completion of his lumbar MedX program.    His neck is improved dramatically and the main concern was headaches.  As long as he is feeling that the headaches are more manageable following completion of his cervical MedX program, I would not recommend more treatment.  If the headaches become more symptomatic, I will have him come back and we can discuss an RFA work-up.    If shoulder impingement syndrome has improved dramatically with 1 left subacromial space injection on 3/3/2022, implicating primarily bursitis.  His rotator cuff testing is asymptomatic but he does have some minimal persistent impingement findings.  If the shoulder gets worse come back and we can consider an MRI and/or repeat injection versus surgical evaluation.    Follow-up at this point is on a as needed basis as he is doing great and we both agree with this plan.    Advised patient to call or return early if symptoms worsen, or having problems controlling bladder and bowel function or worsening leg weakness.     Please note: Voice recognition software was used in this dictation.  It may therefore contain typographical errors.  Braydon Chou MD

## 2022-04-14 ENCOUNTER — HOSPITAL ENCOUNTER (OUTPATIENT)
Dept: PHYSICAL THERAPY | Facility: CLINIC | Age: 62
Discharge: HOME | End: 2022-04-14
Payer: COMMERCIAL

## 2022-04-14 DIAGNOSIS — M54.50 CHRONIC BILATERAL LOW BACK PAIN WITHOUT SCIATICA: Primary | ICD-10-CM

## 2022-04-14 DIAGNOSIS — R29.898 MUSCULAR DECONDITIONING: ICD-10-CM

## 2022-04-14 DIAGNOSIS — M54.2 CERVICAL PAIN: ICD-10-CM

## 2022-04-14 DIAGNOSIS — G44.209 TENSION HEADACHE: ICD-10-CM

## 2022-04-14 DIAGNOSIS — G89.29 CHRONIC BILATERAL LOW BACK PAIN WITHOUT SCIATICA: Primary | ICD-10-CM

## 2022-04-14 PROCEDURE — 97110 THERAPEUTIC EXERCISES: CPT | Mod: GP | Performed by: PHYSICAL THERAPIST

## 2022-04-14 PROCEDURE — 97140 MANUAL THERAPY 1/> REGIONS: CPT | Mod: GP | Performed by: PHYSICAL THERAPIST

## 2022-04-14 NOTE — PROGRESS NOTES
Subjective: He is noting to be pretty good. He is noting the neck and HA's to be better. He even says that he was at a concert last night and noted that it wasn't distracting him like it has in the past.     Daily Assessment:  Tomi is presenting for his 17th PT visit in the MedX program. He is continuing to note improvement in his LB function and strength. He is currently in the 13th week of the lumbar MedX program and 6th weeks of the Cervical MedX.    History of Present of illness:  Non-radicular, suboccipital pain with tension in the B temporal head. He notes these HA's to occur ~3-4 times per week. No history of trauma or injury. Negative red flags. Getting up and moving around does help. He would like to address this along with his Lowback.    Lumbar MedX 12-week Re-test  4/7/22 8-week Re-test  3/10/22 4-week Re-test  2/8/22 Initial Testing   1/18/22   AROM (full=  0-72  lumbar) 0-57 0-57 0-57 0-54   Max Extension Torque   290#  266# 272#   Flex: ext ratio (ideal 1.4:1) 2.17:1 2.10:1 1.9:1 4.32:1       Date 4/14/22 4/7/22 3/31/22 03/21/22 3/14/2022 3/10/22 3/3/22 2/22/22 2/17/22 2/10/22 2/8/22 2/4/2022  1/31/2022 1/28/2022 1/24/22 1/20/22 1/18/22   Lumbar Parameters                    Top Dead Center (TDC) 18 18 18 18 18 18 18 18 18 18 18 18 18 18 18 18 18   Counterbalance (CB) 365 365 365 365 365 365 365 365 365 365 385 385 385 385 385 385 385   Seat Pad 1 1 1 1 1 1 1 1 1 1 1 1 1 1 1 1 1   Femur Restraint 5 5 5 5 5 5 5 5 5 5 5 5 5 5 5 5 5   Week/Visit                    Enter Week/Visit # Wk 13 Wk 12 Wk 11 Wk 10 Wk 9 Wk 8 Wk 7 Wk 6 Wk 5 Wk 4 v 2 Wk 4 Wk 3 V 2 Wk 3 V 1 Wk2 V 2 Wk 2 V1 Wk 1 v2 W1V1   Weight (lbs) 162# ex 269#Max  160#ex 158# 154# 150# 290# max  148# ex 146# 142# 139# 136# 266# Max  135# ex 133# 130# 125# 120# 106# 100#   Reps (#) 26 16  17 19 18 11 19 25 22 19 13 17 28 21 21 27 20   Time 151 s 106 s 107 s 127 112 72 118 s 167 s 172 s 197 s 125s  178  186 240 188 s 215s   ROM (degrees) 0-57 0-57  "0-57 0-57 0-57 0-57 0-57 0-57 0-57 0-57 0-57 0-57 (further range today!) 0-54 0-54 0-54 0-54 0-54   Pain            4/10 following        Flex:Ext ratio      2.1):1     1.97:1      4.32:1       Cervical MedX 4-week Re-test  3/31/22 Initial testing  2/22/22   AROM (full= 0-120  cervical) 15-93 15-60   Max Extension Torque  252# 192#   Flex: ext ratio (ideal 1.4:1) 1.75:1 1.78:1     Date 4/14/22 4/7/22 3/31/22 03/21/22 3/14/2022 3/10/22 3/3/22 2/22/22   Cervical Parameters           Top Dead Center (TDC) 48 48 48 48 48 48 48 48   Counterbalance (CB) 2 2 2 2.0 2.0 2.0 2.0 2.0   Seat Height 335 335 335 335 335 355 355 355   Week/Visit           Enter Week/Visit # Wk 6 Wk 5 Wk 4 Wk 3 V1 Wk 2 v2 Wk 2 v 1 Wk 1 v 2 Wk 1 v 1   Weight (lbs) 177#ex 162# ex 252# Max  153#ex 144# 135# 120# ex 102# ex 192# max   Reps (#) 29 30 30 30 30 30 30 --   Time 157s 108 s 234 s 186 221 190 s 190  --   ROM (degrees) 18-93 18-93 ext to 0 18-93  Ex to 0 15-90 15-90 15-90 15-60 15-60   Pain    Almost to fatigue       Flex:Ext ratio        1.78:1       Date 4/14/22 4/7/22 3/31/22 03/21/22 3/14/2022 3/10/22 3/3/22 2/22/22 2/17/22 2/7/22 2/10/22 2/8/22 2/4/2022  1/31/2022   1/28/2022 1/24/2022 1/20/22   Exercise                    treadmill X 3 min X 3 min X 4 min x4 min X 3 min X 3 min X 4 min X 4 min X 4 min X 4 min X 4min X 4 min 4 min 4 min X 4 min X 4 min X 4 min   Rotary Torso 90' 60# to L 58# to R 56# to L 54# to R 52#'s to L 52# to R 50# to L 48# to R 46# to L 44# toR 42# to L 40# to L 38# to R 36# to L 36# to R 34# to L 30# to R   Bridge with toes lifted             Switch to feet up on couch to activate hamstrings more and decrease groin pain.    X 12   Dead bug             Reviewed, able to do with ok form, cues for abdominal activation X 10    2 x 20\"   Hip flexor stretch kneeling                X 30 seconds    Piriformis stretch                X 30 seconds    Hip Abduction              X 10  X 10     Quadruped leg extensions          " "    X 10 cues for form X 10     Reformer leg press           X 20, all springs  X 10, SL leg X 20, all springs  X 10 all springs      Reformer bridge           X 15  x10 double leg , x10 single leg all springs X 10 all springs      Reformer 90/90 pulldowns           X 12 3R   X 10 3R      Supine butterfly stretch            X 20\"        Supine cervical retraction          X 10 hold 5 sec          Cervical extension isometrics      Demo'd  X 5 hold 5 seconds             Greenband shoulder extension with scap set    Green band x15  X 10 X 15             Cervical flexion stretch      X 20\"              Standing horizontal adduction    Green band x10                                                                                                                      "

## 2022-04-18 ENCOUNTER — OFFICE VISIT (OUTPATIENT)
Dept: NEUROSURGERY | Facility: CLINIC | Age: 62
End: 2022-04-18
Payer: COMMERCIAL

## 2022-04-18 ENCOUNTER — LAB (OUTPATIENT)
Dept: LAB | Facility: CLINIC | Age: 62
End: 2022-04-18

## 2022-04-18 VITALS
SYSTOLIC BLOOD PRESSURE: 122 MMHG | DIASTOLIC BLOOD PRESSURE: 77 MMHG | WEIGHT: 182.7 LBS | HEART RATE: 61 BPM | BODY MASS INDEX: 27.06 KG/M2 | HEIGHT: 69 IN

## 2022-04-18 DIAGNOSIS — G89.29 CHRONIC BILATERAL LOW BACK PAIN WITHOUT SCIATICA: Primary | ICD-10-CM

## 2022-04-18 DIAGNOSIS — R31.0 GROSS HEMATURIA: ICD-10-CM

## 2022-04-18 DIAGNOSIS — M54.2 NECK PAIN: ICD-10-CM

## 2022-04-18 DIAGNOSIS — M54.50 CHRONIC BILATERAL LOW BACK PAIN WITHOUT SCIATICA: Primary | ICD-10-CM

## 2022-04-18 DIAGNOSIS — M75.42 IMPINGEMENT SYNDROME OF LEFT SHOULDER: ICD-10-CM

## 2022-04-18 LAB
ALBUMIN UR-MCNC: NEGATIVE MG/DL
APPEARANCE UR: CLEAR
BACTERIA #/AREA URNS HPF: NORMAL /HPF
BILIRUB UR QL STRIP: NEGATIVE
COLOR UR AUTO: YELLOW
GLUCOSE UR STRIP-MCNC: NEGATIVE MG/DL
HGB UR QL STRIP: ABNORMAL
KETONES UR STRIP-MCNC: NEGATIVE MG/DL
LEUKOCYTE ESTERASE UR QL STRIP: NEGATIVE
NITRATE UR QL: NEGATIVE
PH UR STRIP: 7.5 [PH] (ref 5–8)
RBC #/AREA URNS AUTO: NORMAL /HPF
SP GR UR STRIP: 1.01 (ref 1–1.03)
UROBILINOGEN UR STRIP-ACNC: 0.2 E.U./DL
WBC #/AREA URNS AUTO: NORMAL /HPF

## 2022-04-18 PROCEDURE — 99215 OFFICE O/P EST HI 40 MIN: CPT | Performed by: PREVENTIVE MEDICINE

## 2022-04-18 PROCEDURE — 81001 URINALYSIS AUTO W/SCOPE: CPT

## 2022-04-18 ASSESSMENT — PAIN SCALES - GENERAL: PAINLEVEL: MILD PAIN (3)

## 2022-04-18 NOTE — LETTER
4/18/2022         RE: Juan Francisco Booker  [address]  [place] [address]        Dear Colleague,    Thank you for referring your patient, Juan Francisco Booker, to the [facility] [place]. Please see a copy of my visit note below.      Subjective:  Juan Francisco Booker is a 62 year old male who presents today for follow-up regarding low back pain.  I treated him for a possible Pelvic Joint Dysfunction on 12/15/2021 but I was not convinced that was the issue.  He has multilevel lumbar spondylosis and has had massive volitional weight loss of 140 pounds.  I was wondering about lumbar facets as a pain generator.  I ordered a trial of a MedX program as opposed to an RFA work-up on 12/15/2021.    On 2/8/2022 I asked him to follow-up with his family doctor for liver testing as well as a metabolic panel and CBC for the skin color issue.  I advised adding on the cervical MedX program.  I ordered a left shoulder subacromial steroid injection and recommended follow-up with me for his neck and back prompting today's visit.  His assessment on 2/8/2022 was:    Resolved Pelvic Joint Dysfunction    Improving low back pain, ongoing MedX strengthening program    Headache, possible cervicogenic component, but no upper quadrant triggering and no evidence clinically of radiculopathy or neve impingement    Left shoulder impingement syndrome    Skin discoloration suspicious for either jaundice or anemia.       PRIOR HISTORY from 11/23/2020   :  He has tingling on the tops of both feet. This has been stable to improving over the last 2-3 years. He takes gabapentin for the tingling and restless legs. This has improved his restless legs. His tingling is now not as painful.  Pain onset was 3 to 4 years ago and it improved initially with some physical therapy and then he slipped on the ice a couple of times and the pain has gotten worse since then which was about 3 years ago.  Recently he tried physical therapy  "for about 2 months and it did not help.     His pain is left greater than right and he points to the SI area.  There is no radiating numbness tingling or weakness in his legs unless he sitting for short period of time and then he gets it on the tops of both ankles and both feet but it skips the rest of his leg.  No significant bowel or bladder dysfunction, unless he is far away from bathroom and cannot get there in time and has had a couple of bladder incontinence issues.  He does have a urologist.  And he does have some BPH with some urgency and slower voids.  No saddle anesthesia.  Does not have a history of cancer or prolonged steroid use.  He has been successful with 140 pounds of volitional weight loss but no unexplained weight loss.     Updated 2/8/2022 HISTORY:    PT notes indicate that he has been tolerating the MedX program well with some soreness following the treatments and he is progressing in his strength as well as repetitions.  He started out on 1/18/2022 at 100 pounds and 20 repetitions.  On 2/4/2022 he was at 133 pounds and 17 repetitions.  He also has improved 3 degrees and range of motion on 2/4/2022 for the 1st time.  For the first time today he started noticing that his sitting tolerance was better. Before the pain would come on and I think that is really encouraging after just 3 weeks of MedX treatment.     In the interim, he also wanted me to talk to him about neck pain upon recommendation of Dr. Deshpande, because his headaches might be associated with neck symptoms.  He had a CT venogram of the head and neck on 12/7/2021 which did not show any evidence of major thrombus or occlusion of the veins however there was mild compression above the right internal jugular vein by the carotid bifurcation when he turned his head to the right.       INTERIM HISTORY:    Tomi feels like his back is \"greatly\" improved.  His sitting tolerance is much better.  He still going through the lumbar MedX program.  He " started the cervical MedX program a bit later and is finding that that is helping.  His headaches are no longer chronic and distracting but intermittent and annoying..  He also underwent a left shoulder subacromial space steroid injection on 3/3/2022 with a good therapeutic response.  I had to remind him about that because he did not really remember very much about that.  He has no upper extremity radiating numbness tingling or weakness.  He has no lower extremity weakness or pain but he does have persistent intermittent anterior shin and dorsum of the feet bilateral tingling, primarily with prolonged sitting but occasionally with lying down as well..  The EMGs of his legs however did not reveal an answer for that.    Past medical history is reviewed and is unchanged for any new medical diagnoses in the interim.  Pertinent for TIMOTHY treated with CPAP, hypertension, gastric polyps, prediabetes.  No pertinent surgical history.  He is on chronic aspirin therapy and gabapentin.         Social history is reviewed and is unchanged in the interim.  He is single.  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  He walks for 1 hour a day on a treadmill    Review of systems: He has recently been put on iron having low ferritin and baseline borderline anemia of longstanding duration.  LFTs were normal on a comprehensive metabolic panel of 2/17/2022    IMAGING: Images and reports reviewed.     MRI lumbar 8/2021     L3-4: Posterior disc bulge. Bilateral facet arthropathy and ligamentum flavum thickening. Bilateral moderate neural foraminal stenosis. Moderate spinal canal stenosis.      L4-5: Posterior disc bulge and superimposed left foraminal disc  protrusion. Bilateral moderate neural foraminal stenosis. Mild spinal canal stenosis.      Impression:   1. Multiple unchanged presumed hemangiomas as described above.   2. Anterior wedge-shaped compression deformities of T12 and L4,  unchanged compared to  10/22/2020.  3. Multilevel lumbar spondylosis, most pronounced at L3-4 with  moderate spinal canal and moderate bilateral neural foraminal  stenosis. No significant change in overall finding compared  To10/22/2020.  (OM-I have reviewed the images again today and looked at the SI joints and they look pretty good.)     Procedures:  EMG 7/2020 BLE  This is a normal study.      Objective:    CONSTITUTIONAL:  Vital signs as above.  No acute distress.  The patient is well nourished and well groomed.  He transitions well.  He moves about the room easily.  PSYCHIATRIC:  The patient is awake, alert, oriented to person, place and time.  The patient is answering questions appropriately with clear speech.  Normal affect.  Able to follow commands  SKIN:  Skin over the face, posterior torso, and  extremities is free of significant lesions.    RESPIRATORY:  normal respiratory excursion and no dyspnea.  MUSCULOSKELETAL:  Gait is non-antalgic.  The patient is able to heel and toe walk without any difficulty.   Squat and rise normal.   .  Back ROM: Full fluid and pain-free in fact hyper normal with extension and I cannot reproduce his leg symptoms with Kemps testing  Cervical ROM: Full fluid and pain-free with negative Spurling's maneuver and cervical compression test.  I cannot exacerbate his pre-existing headache that he came in with today with foraminal compression.  No upper quadrant tenderness or trigger points  Bilateral shoulder range of motion full fluid and symmetric with minor discomfort at the final 20 degrees of left shoulder abduction and flexion.  Left shoulder impingement testing is trace/0/0/0.  Left shoulder strength is 5/5/5/5/5.  Yergason's and speeds and Calvert's 5.     NEUROLOGICAL:    Motor: C5-T1, L3-S1 myotomes, axillary/median/radial/ulnar nerves 5/5     Sensation to light touch is intact in the bilateral upper and lower extremities.  No tingling in the legs present currently.  SLR and slump negative       Assessment     Juan Francisco Booker  is a 62 year old y.o. male who presents today for follow-up regarding     Chronic low back pain improving with MedX substantially    Chronic bilateral lower leg and foot intermittent tingling, following an L5 distribution, likely associated with bilateral L4-5 neural foraminal stenosis    Left shoulder impingement syndrome markedly improved following left subacromial space injection 3/3/2022    Chronic neck and upper back pain with headache markedly improving with MedX.    Skin discoloration with borderline anemia and normal LFTs that is probably normal for him.      Plan:  Guarding his low back he is doing great and we both agree that he does not need to do anything further beyond what he gets as a home program following completion of his lumbar MedX program.    His neck is improved dramatically and the main concern was headaches.  As long as he is feeling that the headaches are more manageable following completion of his cervical MedX program, I would not recommend more treatment.  If the headaches become more symptomatic, I will have him come back and we can discuss an RFA work-up.    If shoulder impingement syndrome has improved dramatically with 1 left subacromial space injection on 3/3/2022, implicating primarily bursitis.  His rotator cuff testing is asymptomatic but he does have some minimal persistent impingement findings.  If the shoulder gets worse come back and we can consider an MRI and/or repeat injection versus surgical evaluation.    Follow-up at this point is on a as needed basis as he is doing great and we both agree with this plan.    Advised patient to call or return early if symptoms worsen, or having problems controlling bladder and bowel function or worsening leg weakness.     Please note: Voice recognition software was used in this dictation.  It may therefore contain typographical errors.  Braydon Chou MD      Again, thank you for allowing me to participate  in the care of your patient.        Sincerely,        Braydon Chou MD

## 2022-04-18 NOTE — PATIENT INSTRUCTIONS
It is great to see you doing so well Tomi.  At this point we can leave follow-up on an as-needed basis.  See the assessment and plan below for further details of our discussion today      Assessment     Juan Francisco Booker  is a 62 year old y.o. male who presents today for follow-up regarding   Chronic low back pain improving with MedX substantially  Chronic bilateral lower leg and foot intermittent tingling, following an L5 distribution, likely associated with bilateral L4-5 neural foraminal stenosis  Left shoulder impingement syndrome markedly improved following left subacromial space injection 3/3/2022  Chronic neck and upper back pain with headache markedly improving with MedX.  Skin discoloration with borderline anemia and normal LFTs that is probably normal for him.      Plan:  Guarding his low back he is doing great and we both agree that he does not need to do anything further beyond what he gets as a home program following completion of his lumbar MedX program.    His neck is improved dramatically and the main concern was headaches.  As long as he is feeling that the headaches are more manageable following completion of his cervical MedX program, I would not recommend more treatment.  If the headaches become more symptomatic, I will have him come back and we can discuss an RFA work-up.    If shoulder impingement syndrome has improved dramatically with 1 left subacromial space injection on 3/3/2022, implicating primarily bursitis.  His rotator cuff testing is asymptomatic but he does have some minimal persistent impingement findings.  If the shoulder gets worse come back and we can consider an MRI and/or repeat injection versus surgical evaluation.    Follow-up at this point is on a as needed basis as he is doing great and we both agree with this plan.

## 2022-04-18 NOTE — NURSING NOTE
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     Sports: no  Activities: walking           /77   Pulse 61   Ht 1.753 m (5' 9\")   Wt 82.9 kg (182 lb 11.2 oz)   BMI 26.98 kg/m        Allergies   Allergen Reactions     Grass        Current Outpatient Medications   Medication     Caffeine 100 MG TABS     citalopram (CELEXA) 10 MG tablet     dutasteride (AVODART) 0.5 MG capsule     finasteride (PROSCAR) 5 MG tablet     fluticasone (FLONASE) 50 MCG/ACT nasal spray     gabapentin (NEURONTIN) 300 MG capsule     lisinopril (ZESTRIL) 20 MG tablet     Multiple Vitamin (DAILY MULTIVITAMIN PO)     ORDER FOR DME     polyethylene glycol (MIRALAX) 17 GM/Dose powder     psyllium (METAMUCIL) 28.3 % packet     simvastatin (ZOCOR) 40 MG tablet     SODIUM FLUORIDE 5000 PPM 1.1 % PSTE     tamsulosin (FLOMAX) 0.4 MG capsule     tiZANidine (ZANAFLEX) 2 MG tablet     No current facility-administered medications for this visit.         Darla Severin-Brown, LPN  "

## 2022-04-19 ENCOUNTER — THERAPY VISIT (OUTPATIENT)
Dept: PHYSICAL THERAPY | Facility: CLINIC | Age: 62
End: 2022-04-19
Payer: COMMERCIAL

## 2022-04-19 DIAGNOSIS — R35.0 URINARY FREQUENCY: ICD-10-CM

## 2022-04-19 DIAGNOSIS — R39.15 URINARY URGENCY: Primary | ICD-10-CM

## 2022-04-19 PROBLEM — G44.209 TENSION HEADACHE: Status: RESOLVED | Noted: 2021-12-10 | Resolved: 2022-04-19

## 2022-04-19 PROCEDURE — 97110 THERAPEUTIC EXERCISES: CPT | Mod: GP | Performed by: PHYSICAL THERAPIST

## 2022-04-19 PROCEDURE — 97530 THERAPEUTIC ACTIVITIES: CPT | Mod: GP | Performed by: PHYSICAL THERAPIST

## 2022-04-19 NOTE — PROGRESS NOTES
Patient did not return for further treatment and no additional progress was noted.  Please refer to the progress note and goal flowsheet completed on 02/02/22 for discharge information.

## 2022-04-21 ENCOUNTER — HOSPITAL ENCOUNTER (OUTPATIENT)
Dept: PHYSICAL THERAPY | Facility: CLINIC | Age: 62
Discharge: HOME | End: 2022-04-21
Payer: COMMERCIAL

## 2022-04-21 DIAGNOSIS — M54.50 CHRONIC BILATERAL LOW BACK PAIN WITHOUT SCIATICA: Primary | ICD-10-CM

## 2022-04-21 DIAGNOSIS — G89.29 CHRONIC BILATERAL LOW BACK PAIN WITHOUT SCIATICA: Primary | ICD-10-CM

## 2022-04-21 DIAGNOSIS — R29.898 MUSCULAR DECONDITIONING: ICD-10-CM

## 2022-04-21 DIAGNOSIS — M54.2 CERVICAL PAIN: ICD-10-CM

## 2022-04-21 DIAGNOSIS — G44.209 TENSION HEADACHE: ICD-10-CM

## 2022-04-21 PROBLEM — R39.15 URINARY URGENCY: Status: ACTIVE | Noted: 2022-04-21

## 2022-04-21 PROBLEM — R35.0 URINARY FREQUENCY: Status: ACTIVE | Noted: 2022-04-21

## 2022-04-21 PROCEDURE — 97110 THERAPEUTIC EXERCISES: CPT | Mod: GP | Performed by: PHYSICAL THERAPIST

## 2022-04-21 NOTE — PROGRESS NOTES
PROGRESS  REPORT    Progress reporting period is from 1/13/2022 to 4/21/2022.       SUBJECTIVE  Subjective changes noted by patient:  yes.  Subjective: Feels like I have more times where I can make it 90 minutes between voids. Minimal night voids. Constipation is improving, sometimes will have a feeling of not getting bowel movement completely out.    Current pain level is 0/10 Current Pain level: 0/10.     Previous pain level was  0/10  .   Changes in function:  Yes (See Goal flowsheet attached for changes in current functional level)  Adverse reaction to treatment or activity: None    OBJECTIVE  Changes noted in objective findings:  Yes  Objective: AUA improvement with QOL mixed; pt has biggest concern with urinary urgency limiting 90 min walks.  Plan strated today to address specific urge with walking, using graded exposure and urge techniques over 4 week period.     ASSESSMENT/PLAN  Updated problem list and treatment plan: Urinary urgency/frequency: TE, TA, NMR  STG/LTGs have been met or progress has been made towards goals:  Yes (See Goal flow sheet completed today.)  Assessment of Progress: Patient is meeting short term goals and is progressing towards long term goals.  Self Management Plans:  Patient has been instructed in a home treatment program.  I have re-evaluated this patient and find that the nature, scope, duration and intensity of the therapy is appropriate for the medical condition of the patient.  Juan Francisco continues to require the following intervention to meet STG and LTG's:  PT    Recommendations:  This patient would benefit from continued therapy.     Frequency:  1 X every 4 weeks, once daily  Duration:  for 8  weeks        Please refer to the daily flowsheet for treatment today, total treatment time and time spent performing 1:1 timed codes.

## 2022-04-21 NOTE — PROGRESS NOTES
Daily Assessment:  Tomi is presenting for his 18th PT visit in the MedX program. He is continuing to note improvement in his LB function and strength. He is currently in the 14th week of the lumbar MedX program and 7th weeks of the Cervical MedX. Today he was not able to complete the lumbar MedX this visit. He was not able to make the full range and it felt way different to him. PT did check settings and performed calibration of the lumbar MedX after the visit.     History of Present of illness:  Non-radicular, suboccipital pain with tension in the B temporal head. He notes these HA's to occur ~3-4 times per week. No history of trauma or injury. Negative red flags. Getting up and moving around does help. He would like to address this along with his Lowback.    Lumbar MedX 12-week Re-test  4/7/22 8-week Re-test  3/10/22 4-week Re-test  2/8/22 Initial Testing   1/18/22   AROM (full=  0-72  lumbar) 0-57 0-57 0-57 0-54   Max Extension Torque   290#  266# 272#   Flex: ext ratio (ideal 1.4:1) 2.17:1 2.10:1 1.9:1 4.32:1       Date 4/21/22 4/14/22 4/7/22 3/31/22 03/21/22 3/14/2022 3/10/22 3/3/22 2/22/22 2/17/22 2/10/22 2/8/22 2/4/2022  1/31/2022 1/28/2022 1/24/22 1/20/22 1/18/22   Lumbar Parameters                     Top Dead Center (TDC) 18 18 18 18 18 18 18 18 18 18 18 18 18 18 18 18 18 18   Counterbalance (CB) 365 365 365 365 365 365 365 365 365 365 365 385 385 385 385 385 385 385   Seat Pad 1 1 1 1 1 1 1 1 1 1 1 1 1 1 1 1 1 1   Femur Restraint 5 5 5 5 5 5 5 5 5 5 5 5 5 5 5 5 5 5   Week/Visit                     Enter Week/Visit # Wk 14 Wk 13 Wk 12 Wk 11 Wk 10 Wk 9 Wk 8 Wk 7 Wk 6 Wk 5 Wk 4 v 2 Wk 4 Wk 3 V 2 Wk 3 V 1 Wk2 V 2 Wk 2 V1 Wk 1 v2 W1V1   Weight (lbs) 167# down to  160# 162# ex 269#Max  160#ex 158# 154# 150# 290# max  148# ex 146# 142# 139# 136# 266# Max  135# ex 133# 130# 125# 120# 106# 100#   Reps (#) 10-15 26 16  17 19 18 11 19 25 22 19 13 17 28 21 21 27 20   Time 147 s 151 s 106 s 107 s 127 112 72 118 s 167  "s 172 s 197 s 125s  178  186 240 188 s 215s   ROM (degrees) 0-57  Not full range 0-57 0-57 0-57 0-57 0-57 0-57 0-57 0-57 0-57 0-57 0-57 0-57 (further range today!) 0-54 0-54 0-54 0-54 0-54   Pain             4/10 following        Flex:Ext ratio       2.1):1     1.97:1      4.32:1       Cervical MedX 4-week Re-test  3/31/22 Initial testing  2/22/22   AROM (full= 0-120  cervical) 15-93 15-60   Max Extension Torque  252# 192#   Flex: ext ratio (ideal 1.4:1) 1.75:1 1.78:1     Date 4/21/22 4/14/22 4/7/22 3/31/22 03/21/22 3/14/2022 3/10/22 3/3/22 2/22/22   Cervical Parameters            Top Dead Center (TDC) 48 48 48 48 48 48 48 48 48   Counterbalance (CB) 2 2 2 2 2.0 2.0 2.0 2.0 2.0   Seat Height 335 335 335 335 335 335 355 355 355   Week/Visit            Enter Week/Visit # Wk 7 Wk 6 Wk 5 Wk 4 Wk 3 V1 Wk 2 v2 Wk 2 v 1 Wk 1 v 2 Wk 1 v 1   Weight (lbs) 192# 177#ex 162# ex 252# Max  153#ex 144# 135# 120# ex 102# ex 192# max   Reps (#) 31 29 30 30 30 30 30 30 --   Time 160 s 157s 108 s 234 s 186 221 190 s 190  --   ROM (degrees) 18-93 18-93 18-93 ext to 0 18-93  Ex to 0 15-90 15-90 15-90 15-60 15-60   Pain     Almost to fatigue       Flex:Ext ratio         1.78:1       Date 4/21/22 4/14/22 4/7/22 3/31/22 03/21/22 3/14/2022 3/10/22 3/3/22 2/22/22 2/17/22 2/7/22 2/10/22 2/8/22 2/4/2022  1/31/2022   1/28/2022 1/24/2022 1/20/22   Exercise                     treadmill X 3 min X 3 min X 3 min X 4 min x4 min X 3 min X 3 min X 4 min X 4 min X 4 min X 4 min X 4min X 4 min 4 min 4 min X 4 min X 4 min X 4 min   Rotary Torso 90' 60# to R 60# to L 58# to R 56# to L 54# to R 52#'s to L 52# to R 50# to L 48# to R 46# to L 44# toR 42# to L 40# to L 38# to R 36# to L 36# to R 34# to L 30# to R   Bridge with toes lifted              Switch to feet up on couch to activate hamstrings more and decrease groin pain.    X 12   Dead bug              Reviewed, able to do with ok form, cues for abdominal activation X 10    2 x 20\"   Hip flexor " "stretch kneeling                 X 30 seconds    Piriformis stretch                 X 30 seconds    Hip Abduction               X 10  X 10     Quadruped leg extensions               X 10 cues for form X 10     Reformer leg press            X 20, all springs  X 10, SL leg X 20, all springs  X 10 all springs      Reformer bridge            X 15  x10 double leg , x10 single leg all springs X 10 all springs      Reformer 90/90 pulldowns            X 12 3R   X 10 3R      Supine butterfly stretch             X 20\"        Supine cervical retraction           X 10 hold 5 sec          Cervical extension isometrics       Demo'd  X 5 hold 5 seconds             Greenband shoulder extension with scap set     Green band x15  X 10 X 15             Cervical flexion stretch       X 20\"              Standing horizontal adduction     Green band x10                                                                                                                           "

## 2022-04-29 ENCOUNTER — HOSPITAL ENCOUNTER (OUTPATIENT)
Dept: PHYSICAL THERAPY | Facility: CLINIC | Age: 62
Discharge: HOME | End: 2022-04-29
Payer: COMMERCIAL

## 2022-04-29 DIAGNOSIS — M54.2 CERVICAL PAIN: ICD-10-CM

## 2022-04-29 DIAGNOSIS — G89.29 CHRONIC BILATERAL LOW BACK PAIN WITHOUT SCIATICA: Primary | ICD-10-CM

## 2022-04-29 DIAGNOSIS — M54.50 CHRONIC BILATERAL LOW BACK PAIN WITHOUT SCIATICA: Primary | ICD-10-CM

## 2022-04-29 PROCEDURE — 97110 THERAPEUTIC EXERCISES: CPT | Mod: GP | Performed by: PHYSICAL THERAPIST

## 2022-04-29 NOTE — PROGRESS NOTES
Daily Assessment:  Tomi is presenting for his 19th PT visit in the MedX program. He continues to note overall improvement in low back and neck pain, with increased time of activity before pain occurs and ability to concentrate even with headaches. He is currently in the 15th week of the lumbar MedX program and 8th weeks of the Cervical MedX. Pt was able to tolerate lumbar medx well today, no issues. Retested cervical medx, pt demonstrates strength and ROM improvement from 4-week retest, however continues to test under 1 standard deviation below the norm. Discussion of posture when using a computer, suggestion of using a timer to remind pt to sit up with good posture. Pt is appropriate to continue skilled PT to address impairments.    History of Present of illness:  Non-radicular, suboccipital pain with tension in the B temporal head. He notes these HA's to occur ~3-4 times per week. No history of trauma or injury. Negative red flags. Getting up and moving around does help. He would like to address this along with his Lowback.    Lumbar MedX 12-week Re-test  4/7/22 8-week Re-test  3/10/22 4-week Re-test  2/8/22 Initial Testing   1/18/22   AROM (full=  0-72  lumbar) 0-57 0-57 0-57 0-54   Max Extension Torque   290#  266# 272#   Flex: ext ratio (ideal 1.4:1) 2.17:1 2.10:1 1.9:1 4.32:1       Date 4/29/2022 4/21/22 4/14/22 4/7/22 3/31/22 03/21/22 3/14/2022 3/10/22 3/3/22 2/22/22 2/17/22 2/10/22 2/8/22 2/4/2022  1/31/2022 1/28/2022 1/24/22 1/20/22 1/18/22   Lumbar Parameters                      Top Dead Center (TDC) 18 18 18 18 18 18 18 18 18 18 18 18 18 18 18 18 18 18 18   Counterbalance (CB) 365 365 365 365 365 365 365 365 365 365 365 365 385 385 385 385 385 385 385   Seat Pad 1 1 1 1 1 1 1 1 1 1 1 1 1 1 1 1 1 1 1   Femur Restraint 5 5 5 5 5 5 5 5 5 5 5 5 5 5 5 5 5 5 5   Week/Visit                      Enter Week/Visit # Wk 15 Wk 14 Wk 13 Wk 12 Wk 11 Wk 10 Wk 9 Wk 8 Wk 7 Wk 6 Wk 5 Wk 4 v 2 Wk 4 Wk 3 V 2 Wk 3 V 1 Wk2 V 2  Wk 2 V1 Wk 1 v2 W1V1   Weight (lbs) 160# 167# down to  160# 162# ex 269#Max  160#ex 158# 154# 150# 290# max  148# ex 146# 142# 139# 136# 266# Max  135# ex 133# 130# 125# 120# 106# 100#   Reps (#) 22 10-15 26 16  17 19 18 11 19 25 22 19 13 17 28 21 21 27 20   Time 137 147 s 151 s 106 s 107 s 127 112 72 118 s 167 s 172 s 197 s 125s  178  186 240 188 s 215s   ROM (degrees) 0-57 0-57  Not full range 0-57 0-57 0-57 0-57 0-57 0-57 0-57 0-57 0-57 0-57 0-57 0-57 (further range today!) 0-54 0-54 0-54 0-54 0-54   Pain No issues             4/10 following        Flex:Ext ratio        2.1):1     1.97:1      4.32:1       Cervical MedX 8-week re-test 4/29/2022 4-week Re-test  3/31/22 Initial testing  2/22/22   AROM (full= 0-120  cervical) 0-96 15-93 15-60   Max Extension Torque  295# 252# 192#   Flex: ext ratio (ideal 1.4:1) 1.37:1 1.75:1 1.78:1     Date 4/29/2022 4/21/22 4/14/22 4/7/22 3/31/22 03/21/22 3/14/2022 3/10/22 3/3/22 2/22/22   Cervical Parameters             Top Dead Center (TDC) 48 48 48 48 48 48 48 48 48 48   Counterbalance (CB) 2 2 2 2 2 2.0 2.0 2.0 2.0 2.0   Seat Height 335 335 335 335 335 335 335 355 355 355   Week/Visit             Enter Week/Visit # Wk 8  Wk 7 Wk 6 Wk 5 Wk 4 Wk 3 V1 Wk 2 v2 Wk 2 v 1 Wk 1 v 2 Wk 1 v 1   Weight (lbs) 204# 192# 177#ex 162# ex 252# Max  153#ex 144# 135# 120# ex 102# ex 192# max   Reps (#) 30 31 29 30 30 30 30 30 30 --   Time 190s 160 s 157s 108 s 234 s 186 221 190 s 190  --   ROM (degrees) 18-93  *pt was able to get 0-96 but system did not save, reassess if you want* 18-93 18-93 18-93 ext to 0 18-93  Ex to 0 15-90 15-90 15-90 15-60 15-60   Pain      Almost to fatigue       Flex:Ext ratio 1:37:1         1.78:1       Date 4/29/2022  4/21/22 4/14/22 4/7/22 3/31/22 03/21/22 3/14/2022 3/10/22 3/3/22 2/22/22 2/17/22 2/7/22 2/10/22 2/8/22 2/4/2022  1/31/2022   1/28/2022 1/24/2022 1/20/22   Exercise                      treadmill x4 min X 3 min X 3 min X 3 min X 4 min x4 min X 3 min X 3 min  "X 4 min X 4 min X 4 min X 4 min X 4min X 4 min 4 min 4 min X 4 min X 4 min X 4 min   Rotary Torso 90' 62# to L 60# to R 60# to L 58# to R 56# to L 54# to R 52#'s to L 52# to R 50# to L 48# to R 46# to L 44# toR 42# to L 40# to L 38# to R 36# to L 36# to R 34# to L 30# to R   Bridge with toes lifted               Switch to feet up on couch to activate hamstrings more and decrease groin pain.    X 12   Dead bug               Reviewed, able to do with ok form, cues for abdominal activation X 10    2 x 20\"   Hip flexor stretch kneeling                  X 30 seconds    Piriformis stretch                  X 30 seconds    Hip Abduction                X 10  X 10     Quadruped leg extensions                X 10 cues for form X 10     Reformer leg press             X 20, all springs  X 10, SL leg X 20, all springs  X 10 all springs      Reformer bridge             X 15  x10 double leg , x10 single leg all springs X 10 all springs      Reformer 90/90 pulldowns             X 12 3R   X 10 3R      Supine butterfly stretch              X 20\"        Supine cervical retraction            X 10 hold 5 sec          Cervical extension isometrics Reviewed, cued for equal pressure from head and hand       Demo'd  X 5 hold 5 seconds             Greenband shoulder extension with scap set      Green band x15  X 10 X 15             Cervical flexion stretch        X 20\"              Standing horizontal adduction      Green band x10                [place] and arrow Bx5                                                                                                               Stephanie Malloy, SPT  Charlie Neely, PT  I attest that I was present in the room, making skilled assessments and directing the service provided today.  I was responsible for the assessment and treatment of the patient.  During this time, I was not engaged in treating another patient or doing other tasks.  "

## 2022-05-02 DIAGNOSIS — G47.33 OBSTRUCTIVE SLEEP APNEA (ADULT) (PEDIATRIC): Primary | ICD-10-CM

## 2022-05-02 ASSESSMENT — SLEEP AND FATIGUE QUESTIONNAIRES
HOW LIKELY ARE YOU TO NOD OFF OR FALL ASLEEP WHILE SITTING AND TALKING TO SOMEONE: WOULD NEVER DOZE
HOW LIKELY ARE YOU TO NOD OFF OR FALL ASLEEP WHILE SITTING AND READING: MODERATE CHANCE OF DOZING
HOW LIKELY ARE YOU TO NOD OFF OR FALL ASLEEP WHILE SITTING INACTIVE IN A PUBLIC PLACE: SLIGHT CHANCE OF DOZING
HOW LIKELY ARE YOU TO NOD OFF OR FALL ASLEEP WHILE SITTING QUIETLY AFTER LUNCH WITHOUT ALCOHOL: WOULD NEVER DOZE
HOW LIKELY ARE YOU TO NOD OFF OR FALL ASLEEP WHILE WATCHING TV: SLIGHT CHANCE OF DOZING
HOW LIKELY ARE YOU TO NOD OFF OR FALL ASLEEP IN A CAR, WHILE STOPPED FOR A FEW MINUTES IN TRAFFIC: WOULD NEVER DOZE
HOW LIKELY ARE YOU TO NOD OFF OR FALL ASLEEP WHEN YOU ARE A PASSENGER IN A CAR FOR AN HOUR WITHOUT A BREAK: SLIGHT CHANCE OF DOZING
HOW LIKELY ARE YOU TO NOD OFF OR FALL ASLEEP WHILE LYING DOWN TO REST IN THE AFTERNOON WHEN CIRCUMSTANCES PERMIT: MODERATE CHANCE OF DOZING

## 2022-05-04 ENCOUNTER — VIRTUAL VISIT (OUTPATIENT)
Dept: SLEEP MEDICINE | Facility: CLINIC | Age: 62
End: 2022-05-04
Payer: COMMERCIAL

## 2022-05-04 VITALS — HEIGHT: 69 IN | BODY MASS INDEX: 25.92 KG/M2 | WEIGHT: 175 LBS

## 2022-05-04 DIAGNOSIS — G47.33 OSA (OBSTRUCTIVE SLEEP APNEA): Primary | ICD-10-CM

## 2022-05-04 PROCEDURE — 99214 OFFICE O/P EST MOD 30 MIN: CPT | Mod: 95 | Performed by: INTERNAL MEDICINE

## 2022-05-04 ASSESSMENT — PAIN SCALES - GENERAL: PAINLEVEL: MILD PAIN (2)

## 2022-05-04 NOTE — PROGRESS NOTES
[facility]   Outpatient Sleep Medicine Follow-up Visit  May 4, 2022    Name: Juan Francisco Booker MRN# [id number]   Age: 62 year old YOB: 1960     Date of Consultation: May 4, 2022  Consultation is requested by: No referring provider defined for this encounter.  Primary care provider: Ivon Red           Assessment and Plan:     Sleep Diagnoses:    Severe obstructive sleep apnea effectively treated with CPAP    Comorbid conditions:    Hypertension    Hyperlipidemia    Mood disturbance with depression    Summary Recommendations:    Return to clinic in 1 year or earlier if you have recurrent symptoms of difficulty sleeping, snoring or daytime sleepiness    When you awaken at night to go to the bathroom place her mask in the middle of the pillow so that you can put it back on when you return    Summary Counseling:  New sleep schedule recommendation: Review information discussed on healthy sleep habits in your after visit summary         History of Present Illness:     Juan Francisco Booker is a 62 year old male with a history of severe obstructive sleep apnea currently effectively treated with auto CPAP 8-16 with deployed pressure 13 and 100% adherence>4 hours with average use 6.6 hours with AHI < 10.  He has marked improvement in his restorative sleep and has regularized his sleep pattern with excellent adherence to therapy and disease control.      PREVIOUS SLEEP STUDIES:  Date: 2015  AHI: 66.5       Most Recent SCALES:    EPWORTH SLEEPINESS SCALE WITHIN 1 YEAR WITHIN 10 DAYS   Sitting and reading Moderate chance of dozing Moderate chance of dozing   Watching TV Slight chance of dozing Slight chance of dozing   Sitting, inactive in a public place (theatre or mtg.) Slight chance of dozing  Slight chance of dozing    As a passenger in a car Slight chance of dozing Slight chance of dozing   Lying down to rest in the afternoon when circumstance permit Moderate chance of dozing  Moderate chance of dozing   Sitting and talking to someone Would never doze Would never doze   Sitting quietly after lunch without alcohol Would never doze Would never doze   In a car, while stopped for a few minutes in traffic Would never doze Would never doze   TOTAL SCORE 7 7   Normal < 11         0--none    1--mild    2--moderate  3--severe      INSOMNIA SEVERITY INDEX WITHIN 1 YEAR   Difficulty falling asleep None   Difficult staying asleep Mild   Problems waking up to early Severe   How SATISFIED/DISSATISFIED are you with your CURRENT sleep pattern? Moderately Satisfied   How NOTICEABLE to others do you think your sleep pattern is in terms of your quality of life? A Little   How WORRIED/DISTRESSED are you about your current sleep pattern? Not at all Worried   To what extent do you consider your sleep problem to INTERFERE with your daily fuctioning(e.g. daytime fatigue, mood, ability to function at work/daily chores, concentration, mood,etc.) CURRENTLY? Somewhat   INSOMNIA SEVERITY INDEX TOTAL SCORE 9    --absence of insomnia (0-7); sub-threshold insomnia (8-14); moderate insomnia (15-21); and severe insomnia (22-28)--                         Medications:     Current Outpatient Medications   Medication Sig     Caffeine 100 MG TABS Take 1 tablet by mouth daily as needed      citalopram (CELEXA) 10 MG tablet Take 1 tablet (10 mg) by mouth daily     dutasteride (AVODART) 0.5 MG capsule Take 1 capsule (0.5 mg) by mouth daily     finasteride (PROSCAR) 5 MG tablet Take 1 tablet (5 mg) by mouth daily     fluticasone (FLONASE) 50 MCG/ACT nasal spray Spray 1-2 sprays into both nostrils daily     gabapentin (NEURONTIN) 300 MG capsule 1 in morning, 1 at midday, 3 at night (Patient taking differently: 3 in morning, 3 at night)     lisinopril (ZESTRIL) 20 MG tablet Take 1 tablet (20 mg) by mouth daily     Multiple Vitamin (DAILY MULTIVITAMIN PO) Take 1 tablet by mouth daily AM     ORDER FOR DME Use your CPAP device as  directed by your provider.     polyethylene glycol (MIRALAX) 17 GM/Dose powder Take 9-17 g by mouth daily     psyllium (METAMUCIL) 28.3 % packet Take 1 packet by mouth daily     simvastatin (ZOCOR) 40 MG tablet Take 1 tablet (40 mg) by mouth At Bedtime     SODIUM FLUORIDE 5000 PPM 1.1 % PSTE BRUSH TEETH WITH PEA SIZED AMOUNT AT BEDTIME AND DO NOT RINSE     tamsulosin (FLOMAX) 0.4 MG capsule Take 2 capsules (0.8 mg) by mouth daily 30 min after a meal.   Please keep visit for 5/11/2021 for further refills. Thank you     tiZANidine (ZANAFLEX) 2 MG tablet Take 1 tablet at night. If no side effects and no improvement in headaches after 1 week, can increase to 2 tablet at night.     No current facility-administered medications for this visit.        Allergies   Allergen Reactions     Grass             Problem List:     Patient Active Problem List   Diagnosis     Obstructive sleep apnea     Insomnia     Excessive sleepiness     Chronic nasal congestion     Lesion of ulnar nerve     Hyperlipidemia     Essential hypertension     Obesity     Benign neoplasm of colon     Hyperlipidemia     Moderate major depression (H)     Gastric polyps     Cervical pain     Impaired glucose tolerance test     Prediabetes     Skin tag     Unilateral inguinal hernia with obstruction and without gangrene, recurrence not specified     History of ETOH abuse     Urinary urgency     Urinary frequency            Past Medical History:     Does not need 02 supplement at night   Past Medical History:   Diagnosis Date     Anxiety      Benign neoplasm of colon 3/2/2015     Depression      Depressive disorder 1/15/2000     Difficult intubation      ETOH abuse 3/15/2009    in remission     Gastro-oesophageal reflux disease 1/15/2010     Hyperlipidemia 1/1/2000     Hypoxemia      Morbid obesity (H)      Nasal polyps 1/1/2015     TIMOTHY on CPAP 8/13/2012    Severe (uses 5-6 hours as able)     Other and unspecified hyperlipidemia 10/25/2012     Personal history  "of colonic polyps 2/207/15    Multiple \"neg for FAP\"     Pre-diabetes      Prediabetes 5/24/2017     Restless leg      Skin tag      Surgical complication 1/6/2015    difficulty inserting a tube down my throat     Unspecified essential hypertension 10/25/2012             Past Surgical History:    No  h/o  upper airway surgery  Past Surgical History:   Procedure Laterality Date     AS COLONOSCOPY THRU STOMA W BIOPSY/CAUTERY TUMOR/POLYP/LESION  2/27/15    colon polyps     COLONOSCOPY  2/27/2015    x 2     COLONOSCOPY N/A 1/6/2020    Procedure: COLONOSCOPY, WITH POLYPECTOMY AND BIOPSY;  Surgeon: Omer Salas MD;  Location:  GI     COLONOSCOPY WITH CO2 INSUFFLATION N/A 10/20/2016    Procedure: COLONOSCOPY WITH CO2 INSUFFLATION;  Surgeon: Juan Francisco Beltran MD;  Location:  OR     ENT SURGERY  1/5/2011    [facility]     ESOPHAGOSCOPY, GASTROSCOPY, DUODENOSCOPY (EGD), COMBINED N/A 10/20/2016    Procedure: COMBINED ESOPHAGOSCOPY, GASTROSCOPY, DUODENOSCOPY (EGD);  Surgeon: Juan Francisco Beltran MD;  Location:  OR     LAPAROSCOPIC HERNIORRHAPHY INGUINAL Right 3/24/2021    Procedure: HERNIORRHAPHY, INGUINAL, LAPAROSCOPIC;  Surgeon: Laith Villa MD;  Location: [facility] OR     RELEASE CARPAL TUNNEL Right 6/14/2017    Procedure: RELEASE CARPAL TUNNEL;  Right Open Carpal Tunnel Release;  Surgeon: Aracelis Lowe MD;  Location:  OR     RELEASE CARPAL TUNNEL Left 12/29/2017    Procedure: RELEASE CARPAL TUNNEL;  Left Open Carpal Tunnel Release;  Surgeon: Aracelis Lowe MD;  Location:  OR              Physical Examination:   Objective:    Reported vitals:  There were no vitals taken for this visit.   GENERAL: Healthy, alert and no distress  EYES: Eyes grossly normal to inspection.  No discharge or erythema, or obvious scleral/conjunctival abnormalities.  RESP: No audible wheeze, cough, or visible cyanosis.  No visible retractions or increased work of breathing.    SKIN: Visible skin clear. No " significant rash, abnormal pigmentation or lesions.  NEURO: Cranial nerves grossly intact.  Mentation and speech appropriate for age.  PSYCH: Mentation appears normal, affect normal/bright, judgement and insight intact, normal speech and appearance well-groomed.        Copy to: Ivon Red MD 5/4/2022         Total time spent reviewing medical records including previous testing and interpretation as well as direct patient contact and documentation on this date: 30 minutes

## 2022-05-04 NOTE — PROGRESS NOTES
Tomi is a 62 year old who is being evaluated via a billable video visit.      How would you like to obtain your AVS? MyChart  If the video visit is dropped, the invitation should be resent by: Send to e-mail at: [email or web address]  Will anyone else be joining your video visit? Veronica Frankel      Video Start Time: 10:30 AM  Video-Visit Details    Type of service:  Video Visit    Video End Time:10:45 AM    Originating Location (pt. Location): Home    Distant Location (provider location):  [facility]     Platform used for Video Visit: [employer]

## 2022-05-05 ENCOUNTER — HOSPITAL ENCOUNTER (OUTPATIENT)
Dept: PHYSICAL THERAPY | Facility: CLINIC | Age: 62
Discharge: HOME | End: 2022-05-05
Payer: COMMERCIAL

## 2022-05-05 DIAGNOSIS — R29.898 MUSCULAR DECONDITIONING: ICD-10-CM

## 2022-05-05 DIAGNOSIS — M54.2 CERVICAL PAIN: ICD-10-CM

## 2022-05-05 DIAGNOSIS — M54.50 CHRONIC BILATERAL LOW BACK PAIN WITHOUT SCIATICA: Primary | ICD-10-CM

## 2022-05-05 DIAGNOSIS — G89.29 CHRONIC BILATERAL LOW BACK PAIN WITHOUT SCIATICA: Primary | ICD-10-CM

## 2022-05-05 DIAGNOSIS — G44.209 TENSION HEADACHE: ICD-10-CM

## 2022-05-05 PROCEDURE — 97110 THERAPEUTIC EXERCISES: CPT | Mod: GP | Performed by: PHYSICAL THERAPIST

## 2022-05-05 NOTE — NURSING NOTE
Return to clinic in 1 year or earlier if you have recurrent symptoms of difficulty sleeping, snoring or daytime sleepiness. "[employer] message sent.    Sameer Schilling, Visit facilitator

## 2022-05-05 NOTE — PROGRESS NOTES
Daily Assessment:  Tomi is presenting for his 20th PT visit in the MedX program. He is currently in the 16th week of the MedX program. He is demonstrating some improvement this session on the lumbar MedX re-test an continues to exercise with high reps to fatigue on the cervical MedX.PT educated the patient further today on the proper resting positioning of the neck in sitting.    History of Present of illness:  Non-radicular, suboccipital pain with tension in the B temporal head. He notes these HA's to occur ~3-4 times per week. No history of trauma or injury. Negative red flags. Getting up and moving around does help. He would like to address this along with his Lowback.    Lumbar MedX 16-week Re-test 12-week Re-test  4/7/22 8-week Re-test  3/10/22 4-week Re-test  2/8/22 Initial Testing   1/18/22   AROM (full=  0-72  lumbar) 0-57 0-57 0-57 0-57 0-54   Max Extension Torque  278# 269# 290#  266# 272#   Flex: ext ratio (ideal 1.4:1) 2.19 2.17:1 2.10:1 1.9:1 4.32:1       Date 5/5/22 4/29/2022 4/21/22 4/14/22 4/7/22 3/31/22 03/21/22 3/14/2022 3/10/22 3/3/22 2/22/22 2/17/22 2/10/22 2/8/22 2/4/2022  1/31/2022 1/28/2022 1/24/22 1/20/22 1/18/22   Lumbar Parameters                       Top Dead Center (TDC) 18 18 18 18 18 18 18 18 18 18 18 18 18 18 18 18 18 18 18 18   Counterbalance (CB) 365 365 365 365 365 365 365 365 365 365 365 365 365 385 385 385 385 385 385 385   Seat Pad 1 1 1 1 1 1 1 1 1 1 1 1 1 1 1 1 1 1 1 1   Femur Restraint 5 5 5 5 5 5 5 5 5 5 5 5 5 5 5 5 5 5 5 5   Week/Visit                       Enter Week/Visit # Wk 16 Wk 15 Wk 14 Wk 13 Wk 12 Wk 11 Wk 10 Wk 9 Wk 8 Wk 7 Wk 6 Wk 5 Wk 4 v 2 Wk 4 Wk 3 V 2 Wk 3 V 1 Wk2 V 2 Wk 2 V1 Wk 1 v2 W1V1   Weight (lbs) 163# 160# 167# down to  160# 162# ex 269#Max  160#ex 158# 154# 150# 290# max  148# ex 146# 142# 139# 136# 266# Max  135# ex 133# 130# 125# 120# 106# 100#   Reps (#) 20 22 10-15 26 16  17 19 18 11 19 25 22 19 13 17 28 21 21 27 20   Time  137 147 s 151 s 106 s 107  s 127 112 72 118 s 167 s 172 s 197 s 125s  178  186 240 188 s 215s   ROM (degrees) 0-57 0-57 0-57  Not full range 0-57 0-57 0-57 0-57 0-57 0-57 0-57 0-57 0-57 0-57 0-57 0-57 (further range today!) 0-54 0-54 0-54 0-54 0-54   Pain  No issues             4/10 following        Flex:Ext ratio         2.1):1     1.97:1      4.32:1       Cervical MedX 8-week Re-test 4/29/2022 4-week Re-test  3/31/22 Initial testing  2/22/22   AROM (full= 0-120  cervical) 0-96 15-93 15-60   Max Extension Torque  295# 252# 192#   Flex: ext ratio (ideal 1.4:1) 1.37:1 1.75:1 1.78:1     Date 5/5/22 4/29/22 4/21/22 4/14/22 4/7/22 3/31/22 03/21/22 3/14/2022 3/10/22 3/3/22 2/22/22   Cervical Parameters              Top Dead Center (TDC) 48 48 48 48 48 48 48 48 48 48 48   Counterbalance (CB) 2 2 2 2 2 2 2.0 2.0 2.0 2.0 2.0   Seat Height 335 335 335 335 335 335 335 335 355 355 355   Week/Visit              Enter Week/Visit # Wk 9 Wk 8  Wk 7 Wk 6 Wk 5 Wk 4 Wk 3 V1 Wk 2 v2 Wk 2 v 1 Wk 1 v 2 Wk 1 v 1   Weight (lbs) 222# 204# 192# 177#ex 162# ex 252# Max  153#ex 144# 135# 120# ex 102# ex 192# max   Reps (#) 30 30 31 29 30 30 30 30 30 30 --   Time 199 s 190s 160 s 157s 108 s 234 s 186 221 190 s 190  --   ROM (degrees) 18-93 18-93  *pt was able to get 0-96 but system did not save, reassess if you want* 18-93 18-93 18-93 ext to 0 18-93  Ex to 0 15-90 15-90 15-90 15-60 15-60   Pain       Almost to fatigue       Flex:Ext ratio  1:37:1         1.78:1       Date 5/5/22 4/29/2022  4/21/22 4/14/22 4/7/22 3/31/22 03/21/22 3/14/2022 3/10/22 3/3/22 2/22/22 2/17/22 2/7/22 2/10/22 2/8/22 2/4/2022  1/31/2022   1/28/2022 1/24/2022 1/20/22   Exercise                       treadmill X 4 min x4 min X 3 min X 3 min X 3 min X 4 min x4 min X 3 min X 3 min X 4 min X 4 min X 4 min X 4 min X 4min X 4 min 4 min 4 min X 4 min X 4 min X 4 min   Rotary Torso 90' 64# to R 62# to L 60# to R 60# to L 58# to R 56# to L 54# to R 52#'s to L 52# to R 50# to L 48# to R 46# to L 44# toR 42#  "to L 40# to L 38# to R 36# to L 36# to R 34# to L 30# to R   Bridge with toes lifted                Switch to feet up on couch to activate hamstrings more and decrease groin pain.    X 12   Dead bug                Reviewed, able to do with ok form, cues for abdominal activation X 10    2 x 20\"   Hip flexor stretch kneeling                   X 30 seconds    Piriformis stretch                   X 30 seconds    Hip Abduction                 X 10  X 10     Quadruped leg extensions                 X 10 cues for form X 10     Reformer leg press              X 20, all springs  X 10, SL leg X 20, all springs  X 10 all springs      Reformer bridge              X 15  x10 double leg , x10 single leg all springs X 10 all springs      Reformer 90/90 pulldowns              X 12 3R   X 10 3R      Supine butterfly stretch               X 20\"        Supine cervical retraction             X 10 hold 5 sec          Cervical extension isometrics  Reviewed, cued for equal pressure from head and hand       Demo'd  X 5 hold 5 seconds             Greenband shoulder extension with scap set       Green band x15  X 10 X 15             Cervical flexion stretch         X 20\"              Standing horizontal adduction       Green band x10                [place] and arrow  Bx5                                                                                                                   "

## 2022-05-12 ENCOUNTER — HOSPITAL ENCOUNTER (OUTPATIENT)
Dept: PHYSICAL THERAPY | Facility: CLINIC | Age: 62
Discharge: HOME | End: 2022-05-12
Payer: COMMERCIAL

## 2022-05-12 DIAGNOSIS — G44.209 TENSION HEADACHE: ICD-10-CM

## 2022-05-12 DIAGNOSIS — R29.898 MUSCULAR DECONDITIONING: ICD-10-CM

## 2022-05-12 DIAGNOSIS — G89.29 CHRONIC BILATERAL LOW BACK PAIN WITHOUT SCIATICA: Primary | ICD-10-CM

## 2022-05-12 DIAGNOSIS — M54.50 CHRONIC BILATERAL LOW BACK PAIN WITHOUT SCIATICA: Primary | ICD-10-CM

## 2022-05-12 DIAGNOSIS — M54.2 CERVICAL PAIN: ICD-10-CM

## 2022-05-12 PROCEDURE — 97140 MANUAL THERAPY 1/> REGIONS: CPT | Mod: GP | Performed by: PHYSICAL THERAPIST

## 2022-05-12 PROCEDURE — 97110 THERAPEUTIC EXERCISES: CPT | Mod: GP | Performed by: PHYSICAL THERAPIST

## 2022-05-12 NOTE — PROGRESS NOTES
Daily Assessment:  Tomi is presenting for his 21st PT visit in the MedX program. He is currently in the 17th week of the MedX program. He is noting overall improvement in the intensity of the symptoms. We will continue until he reaches 12 weeks in the cervical program. PT continued with the manual therapy as the patient continues to note improvement following treatment. He is demonstrating reduced OA mobility.    History of Present of illness:  Non-radicular, suboccipital pain with tension in the B temporal head. He notes these HA's to occur ~3-4 times per week. No history of trauma or injury. Negative red flags. Getting up and moving around does help. He would like to address this along with his Lowback.    Lumbar MedX 16-week Re-test 12-week Re-test  4/7/22 8-week Re-test  3/10/22 4-week Re-test  2/8/22 Initial Testing   1/18/22   AROM (full=  0-72  lumbar) 0-57 0-57 0-57 0-57 0-54   Max Extension Torque  278# 269# 290#  266# 272#   Flex: ext ratio (ideal 1.4:1) 2.19 2.17:1 2.10:1 1.9:1 4.32:1       Date 5/12/22 5/5/22 4/29/2022 4/21/22 4/14/22 4/7/22 3/31/22 03/21/22 3/14/2022 3/10/22 3/3/22 2/22/22 2/17/22 2/10/22 2/8/22 2/4/2022  1/31/2022 1/28/2022 1/24/22 1/20/22 1/18/22   Lumbar Parameters                        Top Dead Center (TDC) 18 18 18 18 18 18 18 18 18 18 18 18 18 18 18 18 18 18 18 18 18   Counterbalance (CB) 365 365 365 365 365 365 365 365 365 365 365 365 365 365 385 385 385 385 385 385 385   Seat Pad 1 1 1 1 1 1 1 1 1 1 1 1 1 1 1 1 1 1 1 1 1   Femur Restraint 5 5 5 5 5 5 5 5 5 5 5 5 5 5 5 5 5 5 5 5 5   Week/Visit                        Enter Week/Visit # Wk 17 Wk 16 Wk 15 Wk 14 Wk 13 Wk 12 Wk 11 Wk 10 Wk 9 Wk 8 Wk 7 Wk 6 Wk 5 Wk 4 v 2 Wk 4 Wk 3 V 2 Wk 3 V 1 Wk2 V 2 Wk 2 V1 Wk 1 v2 W1V1   Weight (lbs) 166# 163# 160# 167# down to  160# 162# ex 269#Max  160#ex 158# 154# 150# 290# max  148# ex 146# 142# 139# 136# 266# Max  135# ex 133# 130# 125# 120# 106# 100#   Reps (#) 22 20 22 10-15 26 16  17 19 18  11 19 25 22 19 13 17 28 21 21 27 20   Time 132 s  137 147 s 151 s 106 s 107 s 127 112 72 118 s 167 s 172 s 197 s 125s  178  186 240 188 s 215s   ROM (degrees) 0-57 0-57 0-57 0-57  Not full range 0-57 0-57 0-57 0-57 0-57 0-57 0-57 0-57 0-57 0-57 0-57 0-57 (further range today!) 0-54 0-54 0-54 0-54 0-54   Pain   No issues             4/10 following        Flex:Ext ratio          2.1):1     1.97:1      4.32:1       Cervical MedX 8-week Re-test 4/29/2022 4-week Re-test  3/31/22 Initial testing  2/22/22   AROM (full= 0-120  cervical) 0-96 15-93 15-60   Max Extension Torque  295# 252# 192#   Flex: ext ratio (ideal 1.4:1) 1.37:1 1.75:1 1.78:1     Date 5/12/22 5/5/22 4/29/22 4/21/22 4/14/22 4/7/22 3/31/22 03/21/22 3/14/2022 3/10/22 3/3/22 2/22/22   Cervical Parameters               Top Dead Center (TDC) 48 48 48 48 48 48 48 48 48 48 48 48   Counterbalance (CB) 2 2 2 2 2 2 2 2.0 2.0 2.0 2.0 2.0   Seat Height 335 335 335 335 335 335 335 335 335 355 355 355   Week/Visit               Enter Week/Visit # Wk 10 Wk 9 Wk 8  Wk 7 Wk 6 Wk 5 Wk 4 Wk 3 V1 Wk 2 v2 Wk 2 v 1 Wk 1 v 2 Wk 1 v 1   Weight (lbs) 231# 222# 204# 192# 177#ex 162# ex 252# Max  153#ex 144# 135# 120# ex 102# ex 192# max   Reps (#) 31 30 30 31 29 30 30 30 30 30 30 --   Time 156 s 199 s 190s 160 s 157s 108 s 234 s 186 221 190 s 190  --   ROM (degrees) 18-93 18-93 18-93  *pt was able to get 0-96 but system did not save, reassess if you want* 18-93 18-93 18-93 ext to 0 18-93  Ex to 0 15-90 15-90 15-90 15-60 15-60   Pain        Almost to fatigue       Flex:Ext ratio   1:37:1         1.78:1       Date 5/12/22 5/5/22 4/29/2022  4/21/22 4/14/22 4/7/22 3/31/22 03/21/22 3/14/2022 3/10/22 3/3/22 2/22/22 2/17/22 2/7/22 2/10/22 2/8/22 2/4/2022  1/31/2022   1/28/2022 1/24/2022 1/20/22   Exercise                        treadmill X 4 min X 4 min x4 min X 3 min X 3 min X 3 min X 4 min x4 min X 3 min X 3 min X 4 min X 4 min X 4 min X 4 min X 4min X 4 min 4 min 4 min X 4 min X 4 min X  "4 min   Rotary Torso 90' 66# to L 64# to R 62# to L 60# to R 60# to L 58# to R 56# to L 54# to R 52#'s to L 52# to R 50# to L 48# to R 46# to L 44# toR 42# to L 40# to L 38# to R 36# to L 36# to R 34# to L 30# to R   Bridge with toes lifted                 Switch to feet up on couch to activate hamstrings more and decrease groin pain.    X 12   Dead bug                 Reviewed, able to do with ok form, cues for abdominal activation X 10    2 x 20\"   Hip flexor stretch kneeling                    X 30 seconds    Piriformis stretch                    X 30 seconds    Hip Abduction                  X 10  X 10     Quadruped leg extensions                  X 10 cues for form X 10     Reformer leg press               X 20, all springs  X 10, SL leg X 20, all springs  X 10 all springs      Reformer bridge               X 15  x10 double leg , x10 single leg all springs X 10 all springs      Reformer 90/90 pulldowns               X 12 3R   X 10 3R      Supine butterfly stretch                X 20\"        Supine cervical retraction              X 10 hold 5 sec          Cervical extension isometrics   Reviewed, cued for equal pressure from head and hand       Demo'd  X 5 hold 5 seconds             Greenband shoulder extension with scap set        Green band x15  X 10 X 15             Cervical flexion stretch          X 20\"              Standing horizontal adduction        Green band x10                [place] and arrow   Bx5                                                                                                                       "

## 2022-05-19 ENCOUNTER — HOSPITAL ENCOUNTER (OUTPATIENT)
Dept: PHYSICAL THERAPY | Facility: CLINIC | Age: 62
Discharge: HOME | End: 2022-05-19
Payer: COMMERCIAL

## 2022-05-19 DIAGNOSIS — M54.50 CHRONIC BILATERAL LOW BACK PAIN WITHOUT SCIATICA: Primary | ICD-10-CM

## 2022-05-19 DIAGNOSIS — G89.29 CHRONIC BILATERAL LOW BACK PAIN WITHOUT SCIATICA: Primary | ICD-10-CM

## 2022-05-19 DIAGNOSIS — M54.2 CERVICAL PAIN: ICD-10-CM

## 2022-05-19 DIAGNOSIS — G44.209 TENSION HEADACHE: ICD-10-CM

## 2022-05-19 DIAGNOSIS — R29.898 MUSCULAR DECONDITIONING: ICD-10-CM

## 2022-05-19 PROCEDURE — 97140 MANUAL THERAPY 1/> REGIONS: CPT | Mod: GP | Performed by: PHYSICAL THERAPIST

## 2022-05-19 PROCEDURE — 97110 THERAPEUTIC EXERCISES: CPT | Mod: GP | Performed by: PHYSICAL THERAPIST

## 2022-05-19 NOTE — PROGRESS NOTES
Daily Assessment:  Tomi is presenting for his 22nd PT visit in the MedX program. He is currently in the 18th week of the MedX program. He has noted a ton of improvement from the MedX. We will continue for a few more sessions to focus on the neck and finalize home program.     History of Present of illness:  Non-radicular, suboccipital pain with tension in the B temporal head. He notes these HA's to occur ~3-4 times per week. No history of trauma or injury. Negative red flags. Getting up and moving around does help. He would like to address this along with his Lowback.    Lumbar MedX 16-week Re-test 12-week Re-test  4/7/22 8-week Re-test  3/10/22 4-week Re-test  2/8/22 Initial Testing   1/18/22   AROM (full=  0-72  lumbar) 0-57 0-57 0-57 0-57 0-54   Max Extension Torque  278# 269# 290#  266# 272#   Flex: ext ratio (ideal 1.4:1) 2.19 2.17:1 2.10:1 1.9:1 4.32:1       Date 5/19/22 5/12/22 5/5/22 4/29/2022 4/21/22 4/14/22 4/7/22 3/31/22 03/21/22 3/14/2022 3/10/22 3/3/22 2/22/22 2/17/22 2/10/22 2/8/22 2/4/2022  1/31/2022 1/28/2022 1/24/22 1/20/22 1/18/22   Lumbar Parameters                         Top Dead Center 2(TDC) 18 18 18 18 18 18 18 18 18 18 18 18 18 18 18 18 18 18 18 18 18 18   Counterbalance (CB) 365 365 365 365 365 365 365 365 365 365 365 365 365 365 365 385 385 385 385 385 385 385   Seat Pad 1 1 1 1 1 1 1 1 1 1 1 1 1 1 1 1 1 1 1 1 1 1   Femur Restraint 5 5 5 5 5 5 5 5 5 5 5 5 5 5 5 5 5 5 5 5 5 5   Week/Visit                         Enter Week/Visit # Wk 18 Wk 17 Wk 16 Wk 15 Wk 14 Wk 13 Wk 12 Wk 11 Wk 10 Wk 9 Wk 8 Wk 7 Wk 6 Wk 5 Wk 4 v 2 Wk 4 Wk 3 V 2 Wk 3 V 1 Wk2 V 2 Wk 2 V1 Wk 1 v2 W1V1   Weight (lbs) 170# 166# 163# 160# 167# down to  160# 162# ex 269#Max  160#ex 158# 154# 150# 290# max  148# ex 146# 142# 139# 136# 266# Max  135# ex 133# 130# 125# 120# 106# 100#   Reps (#) 25 22 20 22 10-15 26 16  17 19 18 11 19 25 22 19 13 17 28 21 21 27 20   Time 145 132 s  137 147 s 151 s 106 s 107 s 127 112 72 118 s 167 s  172 s 197 s 125s  178  186 240 188 s 215s   ROM (degrees) 0-57 0-57 0-57 0-57 0-57  Not full range 0-57 0-57 0-57 0-57 0-57 0-57 0-57 0-57 0-57 0-57 0-57 0-57 (further range today!) 0-54 0-54 0-54 0-54 0-54   Pain    No issues             4/10 following        Flex:Ext ratio           2.1):1     1.97:1      4.32:1       Cervical MedX 8-week Re-test 4/29/2022 4-week Re-test  3/31/22 Initial testing  2/22/22   AROM (full= 0-120  cervical) 0-96 15-93 15-60   Max Extension Torque  295# 252# 192#   Flex: ext ratio (ideal 1.4:1) 1.37:1 1.75:1 1.78:1     Date 5/19/22 5/12/22 5/5/22 4/29/22 4/21/22 4/14/22 4/7/22 3/31/22 03/21/22 3/14/2022 3/10/22 3/3/22 2/22/22   Cervical Parameters                Top Dead Center (TDC) 48 48 48 48 48 48 48 48 48 48 48 48 48   Counterbalance (CB) 2 2 2 2 2 2 2 2 2.0 2.0 2.0 2.0 2.0   Seat Height 335 335 335 335 335 335 335 335 335 335 355 355 355   Week/Visit                Enter Week/Visit # Wk 11 Wk 10 Wk 9 Wk 8  Wk 7 Wk 6 Wk 5 Wk 4 Wk 3 V1 Wk 2 v2 Wk 2 v 1 Wk 1 v 2 Wk 1 v 1   Weight (lbs) 246# 231# 222# 204# 192# 177#ex 162# ex 252# Max  153#ex 144# 135# 120# ex 102# ex 192# max   Reps (#) 28 31 30 30 31 29 30 30 30 30 30 30 --   Time 193 s 156 s 199 s 190s 160 s 157s 108 s 234 s 186 221 190 s 190  --   ROM (degrees) 18-93 18-93 18-93 18-93  *pt was able to get 0-96 but system did not save, reassess if you want* 18-93 18-93 18-93 ext to 0 18-93  Ex to 0 15-90 15-90 15-90 15-60 15-60   Pain         Almost to fatigue       Flex:Ext ratio    1:37:1         1.78:1       Date 5/19/22 5/12/22 5/5/22 4/29/2022  4/21/22 4/14/22 4/7/22 3/31/22 03/21/22 3/14/2022 3/10/22 3/3/22 2/22/22 2/17/22 2/7/22 2/10/22 2/8/22 2/4/2022  1/31/2022   1/28/2022 1/24/2022 1/20/22   Exercise                         treadmill X 4 min X 4 min X 4 min x4 min X 3 min X 3 min X 3 min X 4 min x4 min X 3 min X 3 min X 4 min X 4 min X 4 min X 4 min X 4min X 4 min 4 min 4 min X 4 min X 4 min X 4 min   Rotary Torso 90' 68#  "to R 66# to L 64# to R 62# to L 60# to R 60# to L 58# to R 56# to L 54# to R 52#'s to L 52# to R 50# to L 48# to R 46# to L 44# toR 42# to L 40# to L 38# to R 36# to L 36# to R 34# to L 30# to R   Bridge with toes lifted                  Switch to feet up on couch to activate hamstrings more and decrease groin pain.    X 12   Dead bug                  Reviewed, able to do with ok form, cues for abdominal activation X 10    2 x 20\"   Hip flexor stretch kneeling                     X 30 seconds    Piriformis stretch                     X 30 seconds    Hip Abduction                   X 10  X 10     Quadruped leg extensions                   X 10 cues for form X 10     Reformer leg press                X 20, all springs  X 10, SL leg X 20, all springs  X 10 all springs      Reformer bridge                X 15  x10 double leg , x10 single leg all springs X 10 all springs      Reformer 90/90 pulldowns                X 12 3R   X 10 3R      Supine butterfly stretch                 X 20\"        Supine cervical retraction               X 10 hold 5 sec          Cervical extension isometrics    Reviewed, cued for equal pressure from head and hand       Demo'd  X 5 hold 5 seconds             Greenband shoulder extension with scap set         Green band x15  X 10 X 15             Cervical flexion stretch           X 20\"              Standing horizontal adduction         Green band x10                [place] and arrow    Bx5                                                                                                                           "

## 2022-05-31 DIAGNOSIS — R35.0 URINARY FREQUENCY: ICD-10-CM

## 2022-06-01 ENCOUNTER — THERAPY VISIT (OUTPATIENT)
Dept: PHYSICAL THERAPY | Facility: CLINIC | Age: 62
End: 2022-06-01
Payer: COMMERCIAL

## 2022-06-01 DIAGNOSIS — R39.15 URINARY URGENCY: Primary | ICD-10-CM

## 2022-06-01 DIAGNOSIS — R35.0 URINARY FREQUENCY: ICD-10-CM

## 2022-06-01 PROCEDURE — 97112 NEUROMUSCULAR REEDUCATION: CPT | Mod: GP | Performed by: PHYSICAL THERAPIST

## 2022-06-01 PROCEDURE — 97530 THERAPEUTIC ACTIVITIES: CPT | Mod: GP | Performed by: PHYSICAL THERAPIST

## 2022-06-01 PROCEDURE — 97110 THERAPEUTIC EXERCISES: CPT | Mod: GP | Performed by: PHYSICAL THERAPIST

## 2022-06-01 NOTE — PROGRESS NOTES
Discharge Note    Progress reporting period is from initial eval to Jun 1, 2022.      Please see information below for last relevant information on current status.  Patient seen for 10 visits.  SUBJECTIVE  Subjective changes noted by patient:  Problems with dribbling after voiding; not every single time.  Started 3 weeks ago.  Not sure why- may be more when I stand to empty.  Has been standing more to void lately.  Walking I need to void at 45 minutes.  Void is average.  Goes right before leaving house.  Void thais outside of walking still varies- 30 min to 2 hrs.  1-2 night voids.  .  Current pain level is  .     Previous pain level was   .   Changes in function:  Yes (See Goal flowsheet attached for changes in current functional level)  Adverse reaction to treatment or activity: None    OBJECTIVE  Changes noted in objective findings: Drinking less tea, but cont with 2 cups caffiene in am; water rest of day.  Some constipaton present.  Normal resting tone with good PFM recuruiment and relaxation response.     ASSESSMENT/PLAN  Diagnosis: Urinary frequency/urgency with poor PFM coordination    DIAGP:  There were no encounter diagnoses.  Updated problem list and treatment plan:   Decreased function - HEP  STG/LTGs have been met or progress has been made towards goals:  Yes, please see goal flowsheet for most current information  Assessment of Progress: current status is indep w hep, plateaued.    Last current status:     Self Management Plans:  HEP  I have re-evaluated this patient and find that the nature, scope, duration and intensity of the therapy is appropriate for the medical condition of the patient.  Juan Francisco continues to require the following intervention to meet STG and LTG's:  HEP.    Recommendations:  Discharge with current home program.  Patient to follow up with MD as needed.    Please refer to the daily flowsheet for treatment today, total treatment time and time spent performing 1:1 timed codes.

## 2022-06-02 ENCOUNTER — HOSPITAL ENCOUNTER (OUTPATIENT)
Dept: PHYSICAL THERAPY | Facility: CLINIC | Age: 62
Discharge: HOME | End: 2022-06-02
Payer: COMMERCIAL

## 2022-06-02 DIAGNOSIS — G44.209 TENSION HEADACHE: ICD-10-CM

## 2022-06-02 DIAGNOSIS — G89.29 CHRONIC BILATERAL LOW BACK PAIN WITHOUT SCIATICA: Primary | ICD-10-CM

## 2022-06-02 DIAGNOSIS — M54.50 CHRONIC BILATERAL LOW BACK PAIN WITHOUT SCIATICA: Primary | ICD-10-CM

## 2022-06-02 DIAGNOSIS — M54.2 CERVICAL PAIN: ICD-10-CM

## 2022-06-02 DIAGNOSIS — R29.898 MUSCULAR DECONDITIONING: ICD-10-CM

## 2022-06-02 PROCEDURE — 97110 THERAPEUTIC EXERCISES: CPT | Mod: GP | Performed by: PHYSICAL THERAPIST

## 2022-06-02 NOTE — PROGRESS NOTES
Daily Assessment:  Tomi is presenting for his 23rd PT visit in the MedX program. He is currently in the 19th week of the MedX program. He has noted a ton of improvement from the MedX. Patient presents with his previous exercises pictures and we began to go through his HEP and educate on ways to progress at home.     History of Present of illness:  Non-radicular, suboccipital pain with tension in the B temporal head. He notes these HA's to occur ~3-4 times per week. No history of trauma or injury. Negative red flags. Getting up and moving around does help. He would like to address this along with his Lowback.    Lumbar MedX 16-week Re-test 12-week Re-test  4/7/22 8-week Re-test  3/10/22 4-week Re-test  2/8/22 Initial Testing   1/18/22   AROM (full=  0-72  lumbar) 0-57 0-57 0-57 0-57 0-54   Max Extension Torque  278# 269# 290#  266# 272#   Flex: ext ratio (ideal 1.4:1) 2.19 2.17:1 2.10:1 1.9:1 4.32:1       Date 6/2/22 5/19/22 5/12/22 5/5/22 4/29/2022 4/21/22 4/14/22 4/7/22 3/31/22 03/21/22 3/14/2022 3/10/22 3/3/22 2/22/22 2/17/22 2/10/22 2/8/22 2/4/2022  1/31/2022 1/28/2022 1/24/22 1/20/22 1/18/22   Lumbar Parameters                          Top Dead Center 2(TDC) 18 18 18 18 18 18 18 18 18 18 18 18 18 18 18 18 18 18 18 18 18 18 18   Counterbalance (CB) 365 365 365 365 365 365 365 365 365 365 365 365 365 365 365 365 385 385 385 385 385 385 385   Seat Pad 1 1 1 1 1 1 1 1 1 1 1 1 1 1 1 1 1 1 1 1 1 1 1   Femur Restraint 5 5 5 5 5 5 5 5 5 5 5 5 5 5 5 5 5 5 5 5 5 5 5   Week/Visit                          Enter Week/Visit # Wk 19 Wk 18 Wk 17 Wk 16 Wk 15 Wk 14 Wk 13 Wk 12 Wk 11 Wk 10 Wk 9 Wk 8 Wk 7 Wk 6 Wk 5 Wk 4 v 2 Wk 4 Wk 3 V 2 Wk 3 V 1 Wk2 V 2 Wk 2 V1 Wk 1 v2 W1V1   Weight (lbs) 175# 170# 166# 163# 160# 167# down to  160# 162# ex 269#Max  160#ex 158# 154# 150# 290# max  148# ex 146# 142# 139# 136# 266# Max  135# ex 133# 130# 125# 120# 106# 100#   Reps (#) 13 25 22 20 22 10-15 26 16  17 19 18 11 19 25 22 19 13 17 28 21 21  "27 20   Time 119 s 145 132 s  137 147 s 151 s 106 s 107 s 127 112 72 118 s 167 s 172 s 197 s 125s  178  186 240 188 s 215s   ROM (degrees) 0-57 0-57 0-57 0-57 0-57 0-57  Not full range 0-57 0-57 0-57 0-57 0-57 0-57 0-57 0-57 0-57 0-57 0-57 0-57 (further range today!) 0-54 0-54 0-54 0-54 0-54   Pain     No issues             4/10 following        Flex:Ext ratio            2.1):1     1.97:1      4.32:1       Cervical MedX 12-week   Re-test  6/2/22 8-week Re-test 4/29/2022 4-week Re-test  3/31/22 Initial testing  2/22/22   AROM (full= 0-120  cervical) 0-96 0-96 15-93 15-60   Max Extension Torque  305# 295# 252# 192#   Flex: ext ratio (ideal 1.4:1) 1.74:1 1.37:1 1.75:1 1.78:1     Date 6/2/22 5/19/22 5/12/22 5/5/22 4/29/22 4/21/22 4/14/22 4/7/22 3/31/22 03/21/22 3/14/2022 3/10/22 3/3/22 2/22/22   Cervical Parameters                 Top Dead Center (TDC) 48 48 48 48 48 48 48 48 48 48 48 48 48 48   Counterbalance (CB) 2 2 2 2 2 2 2 2 2 2.0 2.0 2.0 2.0 2.0   Seat Height 335 335 335 335 335 335 335 335 335 335 335 355 355 355   Week/Visit                 Enter Week/Visit # Wk 12 Wk 11 Wk 10 Wk 9 Wk 8  Wk 7 Wk 6 Wk 5 Wk 4 Wk 3 V1 Wk 2 v2 Wk 2 v 1 Wk 1 v 2 Wk 1 v 1   Weight (lbs) 255# 246# 231# 222# 204# 192# 177#ex 162# ex 252# Max  153#ex 144# 135# 120# ex 102# ex 192# max   Reps (#) 28 28 31 30 30 31 29 30 30 30 30 30 30 --   Time 298 s 193 s 156 s 199 s 190s 160 s 157s 108 s 234 s 186 221 190 s 190  --   ROM (degrees) 18-93 18-93 18-93 18-93 18-93  *pt was able to get 0-96 but system did not save, reassess if you want* 18-93 18-93 18-93 ext to 0 18-93  Ex to 0 15-90 15-90 15-90 15-60 15-60   Pain          Almost to fatigue       Flex:Ext ratio     1:37:1         1.78:1       Date  5/19/22 5/12/22 5/5/22 4/29/2022  4/21/22 4/14/22 4/7/22 3/31/22 03/21/22 3/14/2022 3/10/22 3/3/22 2/22/22 2/17/22 2/7/22 2/10/22 2/8/22 2/4/2022  1/31/2022   1/28/2022 1/24/2022 1/20/22   Exercise                          treadmill X 3 min 30 \" " "X 4 min X 4 min X 4 min x4 min X 3 min X 3 min X 3 min X 4 min x4 min X 3 min X 3 min X 4 min X 4 min X 4 min X 4 min X 4min X 4 min 4 min 4 min X 4 min X 4 min X 4 min   Rotary Torso 90' 70# to L 68# to R 66# to L 64# to R 62# to L 60# to R 60# to L 58# to R 56# to L 54# to R 52#'s to L 52# to R 50# to L 48# to R 46# to L 44# toR 42# to L 40# to L 38# to R 36# to L 36# to R 34# to L 30# to R   Bridge with toes lifted Completed and edu on adding weight or hold time                  Switch to feet up on couch to activate hamstrings more and decrease groin pain.    X 12   Dead bug                   Reviewed, able to do with ok form, cues for abdominal activation X 10    2 x 20\"   Hip flexor stretch kneeling                      X 30 seconds    Piriformis stretch                      X 30 seconds    Hip Abduction                    X 10  X 10     Quadruped leg extensions                    X 10 cues for form X 10     Reformer leg press                 X 20, all springs  X 10, SL leg X 20, all springs  X 10 all springs      Reformer bridge                 X 15  x10 double leg , x10 single leg all springs X 10 all springs      Reformer 90/90 pulldowns                 X 12 3R   X 10 3R      Supine butterfly stretch                  X 20\"        Supine cervical retraction                X 10 hold 5 sec          Cervical extension isometrics     Reviewed, cued for equal pressure from head and hand       Demo'd  X 5 hold 5 seconds             Greenband shoulder extension with scap set Given blue  And inc hold time and edu on tandem and SLS         Green band x15  X 10 X 15             Cervical flexion stretch            X 20\"              Standing horizontal adduction          Green band x10                [place] and arrow     Bx5                                                                                                                               "

## 2022-06-03 RX ORDER — TAMSULOSIN HYDROCHLORIDE 0.4 MG/1
0.8 CAPSULE ORAL DAILY
Qty: 60 CAPSULE | Refills: 9 | Status: SHIPPED | OUTPATIENT
Start: 2022-06-03 | End: 2022-12-06

## 2022-06-03 NOTE — TELEPHONE ENCOUNTER
Last Clinic Visit: 3/17/2022  [facility] [place]  [place] 6/30/22

## 2022-06-07 ENCOUNTER — HOSPITAL ENCOUNTER (OUTPATIENT)
Dept: PHYSICAL THERAPY | Facility: CLINIC | Age: 62
Discharge: HOME | End: 2022-06-07
Payer: COMMERCIAL

## 2022-06-07 DIAGNOSIS — G44.209 TENSION HEADACHE: ICD-10-CM

## 2022-06-07 DIAGNOSIS — G89.29 CHRONIC BILATERAL LOW BACK PAIN WITHOUT SCIATICA: Primary | ICD-10-CM

## 2022-06-07 DIAGNOSIS — M54.2 CERVICAL PAIN: ICD-10-CM

## 2022-06-07 DIAGNOSIS — R29.898 MUSCULAR DECONDITIONING: ICD-10-CM

## 2022-06-07 DIAGNOSIS — M54.50 CHRONIC BILATERAL LOW BACK PAIN WITHOUT SCIATICA: Primary | ICD-10-CM

## 2022-06-07 PROCEDURE — 97110 THERAPEUTIC EXERCISES: CPT | Mod: GP | Performed by: PHYSICAL THERAPIST

## 2022-06-07 NOTE — PROGRESS NOTES
Daily Assessment:  Tomi is presenting for his 24th PT visit in the MedX program. He is currently in the 20th week of the MedX program. He continues to note  Stability in his symptoms, accepts challenge well and motivated to vontinue long-term with his HEP. We have been refining his exercises the last couple of visits for long-term management.     History of Present of illness:  Non-radicular, suboccipital pain with tension in the B temporal head. He notes these HA's to occur ~3-4 times per week. No history of trauma or injury. Negative red flags. Getting up and moving around does help. He would like to address this along with his Lowback.    Lumbar MedX 16-week Re-test 12-week Re-test  4/7/22 8-week Re-test  3/10/22 4-week Re-test  2/8/22 Initial Testing   1/18/22   AROM (full=  0-72  lumbar) 0-57 0-57 0-57 0-57 0-54   Max Extension Torque  278# 269# 290#  266# 272#   Flex: ext ratio (ideal 1.4:1) 2.19 2.17:1 2.10:1 1.9:1 4.32:1       Date 6/7/22 6/2/22 5/19/22 5/12/22 5/5/22 4/29/2022 4/21/22 4/14/22 4/7/22 3/31/22 03/21/22 3/14/2022 3/10/22 3/3/22 2/22/22 2/17/22 2/10/22 2/8/22 2/4/2022  1/31/2022 1/28/2022 1/24/22 1/20/22 1/18/22   Lumbar Parameters                           Top Dead Center 2(TDC) 18 18 18 18 18 18 18 18 18 18 18 18 18 18 18 18 18 18 18 18 18 18 18 18   Counterbalance (CB) 365 365 365 365 365 365 365 365 365 365 365 365 365 365 365 365 365 385 385 385 385 385 385 385   Seat Pad 1 1 1 1 1 1 1 1 1 1 1 1 1 1 1 1 1 1 1 1 1 1 1 1   Femur Restraint 5 5 5 5 5 5 5 5 5 5 5 5 5 5 5 5 5 5 5 5 5 5 5 5   Week/Visit                           Enter Week/Visit # Wk 20 Wk 19 Wk 18 Wk 17 Wk 16 Wk 15 Wk 14 Wk 13 Wk 12 Wk 11 Wk 10 Wk 9 Wk 8 Wk 7 Wk 6 Wk 5 Wk 4 v 2 Wk 4 Wk 3 V 2 Wk 3 V 1 Wk2 V 2 Wk 2 V1 Wk 1 v2 W1V1   Weight (lbs) 176# 175# 170# 166# 163# 160# 167# down to  160# 162# ex 269#Max  160#ex 158# 154# 150# 290# max  148# ex 146# 142# 139# 136# 266# Max  135# ex 133# 130# 125# 120# 106# 100#   Reps (#) 21 13  25 22 20 22 10-15 26 16  17 19 18 11 19 25 22 19 13 17 28 21 21 27 20   Time 153 s 119 s 145 132 s  137 147 s 151 s 106 s 107 s 127 112 72 118 s 167 s 172 s 197 s 125s  178  186 240 188 s 215s   ROM (degrees) 0-57 0-57 0-57 0-57 0-57 0-57 0-57  Not full range 0-57 0-57 0-57 0-57 0-57 0-57 0-57 0-57 0-57 0-57 0-57 0-57 (further range today!) 0-54 0-54 0-54 0-54 0-54   Pain      No issues             4/10 following        Flex:Ext ratio             2.1):1     1.97:1      4.32:1       Cervical MedX 12-week   Re-test  6/2/22 8-week Re-test 4/29/2022 4-week Re-test  3/31/22 Initial testing  2/22/22   AROM (full= 0-120  cervical) 0-96 0-96 15-93 15-60   Max Extension Torque  305# 295# 252# 192#   Flex: ext ratio (ideal 1.4:1) 1.74:1 1.37:1 1.75:1 1.78:1     Date 6/7/22 6/2/22 5/19/22 5/12/22 5/5/22 4/29/22 4/21/22 4/14/22 4/7/22 3/31/22 03/21/22 3/14/2022 3/10/22 3/3/22 2/22/22   Cervical Parameters                  Top Dead Center (C) 48 48 48 48 48 48 48 48 48 48 48 48 48 48 48   Counterbalance (CB) 2 2 2 2 2 2 2 2 2 2 2.0 2.0 2.0 2.0 2.0   Seat Height 335 335 335 335 335 335 335 335 335 335 335 335 355 355 355   Week/Visit                  Enter Week/Visit # Wk 13 Wk 12 Wk 11 Wk 10 Wk 9 Wk 8  Wk 7 Wk 6 Wk 5 Wk 4 Wk 3 V1 Wk 2 v2 Wk 2 v 1 Wk 1 v 2 Wk 1 v 1   Weight (lbs) 264# 255# 246# 231# 222# 204# 192# 177#ex 162# ex 252# Max  153#ex 144# 135# 120# ex 102# ex 192# max   Reps (#) 30 28 28 31 30 30 31 29 30 30 30 30 30 30 --   Time 217 s 298 s 193 s 156 s 199 s 190s 160 s 157s 108 s 234 s 186 221 190 s 190  --   ROM (degrees) 18-93 18-93 18-93 18-93 18-93 18-93  *pt was able to get 0-96 but system did not save, reassess if you want* 18-93 18-93 18-93 ext to 0 18-93  Ex to 0 15-90 15-90 15-90 15-60 15-60   Pain           Almost to fatigue       Flex:Ext ratio      1:37:1         1.78:1       Date 6/7/22  5/19/22 5/12/22 5/5/22 4/29/2022  4/21/22 4/14/22 4/7/22 3/31/22 03/21/22 3/14/2022 3/10/22 3/3/22 2/22/22 2/17/22  "2/7/22 2/10/22 2/8/22 2/4/2022  1/31/2022   1/28/2022 1/24/2022 1/20/22   Exercise                           treadmill X 4 min X 3 min 30 \" X 4 min X 4 min X 4 min x4 min X 3 min X 3 min X 3 min X 4 min x4 min X 3 min X 3 min X 4 min X 4 min X 4 min X 4 min X 4min X 4 min 4 min 4 min X 4 min X 4 min X 4 min   Rotary Torso 90' 72# to R 70# to L 68# to R 66# to L 64# to R 62# to L 60# to R 60# to L 58# to R 56# to L 54# to R 52#'s to L 52# to R 50# to L 48# to R 46# to L 44# toR 42# to L 40# to L 38# to R 36# to L 36# to R 34# to L 30# to R   Bridge with toes lifted  Completed and edu on adding weight or hold time                  Switch to feet up on couch to activate hamstrings more and decrease groin pain.    X 12   Dead bug                    Reviewed, able to do with ok form, cues for abdominal activation X 10    2 x 20\"   Hip flexor stretch kneeling                       X 30 seconds    Piriformis stretch                       X 30 seconds    Hip Abduction X 10 R SL                    X 10  X 10     Quadruped leg extensions X 10 SL                    X 10 cues for form X 10     Reformer leg press                  X 20, all springs  X 10, SL leg X 20, all springs  X 10 all springs      Reformer bridge                  X 15  x10 double leg , x10 single leg all springs X 10 all springs      Reformer 90/90 pulldowns                  X 12 3R   X 10 3R      Supine butterfly stretch                   X 20\"        Supine cervical retraction                 X 10 hold 5 sec          Cervical extension isometrics      Reviewed, cued for equal pressure from head and hand       Demo'd  X 5 hold 5 seconds             Greenband shoulder extension with scap set  Given blue  And inc hold time and edu on tandem and SLS         Green band x15  X 10 X 15             Cervical flexion stretch             X 20\"              Standing horizontal adduction           Green band x10                McIntyre and arrow      Bx5                   "

## 2022-06-09 ENCOUNTER — OFFICE VISIT (OUTPATIENT)
Dept: DERMATOLOGY | Facility: CLINIC | Age: 62
End: 2022-06-09
Attending: FAMILY MEDICINE
Payer: COMMERCIAL

## 2022-06-09 DIAGNOSIS — L91.8 ACROCHORDON: Primary | ICD-10-CM

## 2022-06-09 DIAGNOSIS — L82.1 SEBORRHEIC KERATOSIS: ICD-10-CM

## 2022-06-09 DIAGNOSIS — L85.3 XEROSIS OF SKIN: ICD-10-CM

## 2022-06-09 DIAGNOSIS — L72.0 EIC (EPIDERMAL INCLUSION CYST): ICD-10-CM

## 2022-06-09 DIAGNOSIS — D22.9 MULTIPLE NEVI: ICD-10-CM

## 2022-06-09 DIAGNOSIS — L83 ACANTHOSIS NIGRICANS: ICD-10-CM

## 2022-06-09 PROCEDURE — 99203 OFFICE O/P NEW LOW 30 MIN: CPT | Performed by: DERMATOLOGY

## 2022-06-09 ASSESSMENT — PAIN SCALES - GENERAL: PAINLEVEL: NO PAIN (0)

## 2022-06-09 NOTE — LETTER
6/9/2022       RE: Juan Francisco Booker  [address]  [place] [address]     Dear Colleague,    Thank you for referring your patient, Juan Francisco Booker, to the [facility] [place] at [facility]. Please see a copy of my visit note below.    [facility] Dermatology Note  Encounter Date: Jun 9, 2022  Office Visit     Dermatology Problem List:  1.  Acrochordon related to pre-diabetes  2. Polypoid seborrheic keratosis  3. Acanthosis nigricans  4. Epidermal inclusion cyst  5. Xerosis - Vaseline  ____________________________________________    Assessment & Plan:    # Acrochordon related to pre-diabetes  # Acanthosis nigricans  # Polypoid seborrheic keratosis  Exam consistent with many acrochordons and polypoid seborrheic keratoses. The number of these lesions correlate with the patient's weight. Counseled the patient on the benign nature of these lesions. Offered symptomatic treatment for irritable lesions, but patient declined. No lesions of concern today.     # Epidermal inclusion cyst on vertex scalp  Patient not bothered by cyst. Counseled on the benign nature of this lesion.    # Xerosis  Patient has mild xerosis of the dorsal hands that is worse in winter. Counseled on nature of condition and to moisturize with Vaseline within 5 minutes of showering/bathing.    Procedures Performed:   None    Follow-up: prn for new or changing lesions    Staff and Medical Student:     I, Edilma Lopes, MS4, saw and staffed the patient with Laith Barron MD.  [facility] [employer]    I was present with the medical student who participated in the service and in the documentation.  I have verified the history and personally performed the physical exam and medical decision making.  I agree with the assessment and plan of care as documented in the note.     Laith Barron MD  Dermatology  Attending  ____________________________________________    CC: Skin Check (Tomi is here today for a skin check - lots of skin tags )    HPI:  Mr. Juan Francisco Booker is a(n) 62 year old male who presents today as a new patient for skin tags and hand dryness.    Presents with 40 years of progressive 'skin tags' and growths.   - increased number of lesions with increased weight.   - occasionally some skintags harden and bleed  - no pruritis, burning, oozing  - no past treatment  - no family history of similar lesions  - he has pre-diabetes. No other history of endocrine disorders per patient.  - no history of cancer   - 20 polyps found in his colon per patient.   - Checked for neurofibromatosis 20 years ago per patient    Presents with 1 year of fissuring and dryness along the dorsal MP joints  - tx: lotion, switching to glycerin soaps  - mild pruritis  - worse in winter  - no history of eczema, allergy, asthma    No history or family history of skin cancer.     Patient is otherwise feeling well, without additional skin concerns.    Labs:  None reviewed.    Physical Exam:  Vitals: There were no vitals taken for this visit.  SKIN: Full skin, which includes the head/face, both arms, chest, back, abdomen,both legs, genitalia and/or groin buttocks, digits and/or nails, was examined.  - Approximately 50 skin colored pedunculated papules with thin stalks most concentrated on the face, neck, axillary vault, inframmary folds, and upper medial thighs  - Approximately 50 brown pedunculated exophytic lobulated papules most concentrated on the face, neck, axillary vault, inframmary folds, and upper medial thighs  - Fine white scale with fissures on the plantar feet  - hyperlinear skin across the dorsal hands  - symmetric velvety hyperpigmented thin plaques in the axillary vaults  - 1cm cyst with black punctum on the vertex scalp  - No other lesions of concern on areas examined.     Medications:  Current Outpatient Medications  "  Medication     Caffeine 100 MG TABS     citalopram (CELEXA) 10 MG tablet     dutasteride (AVODART) 0.5 MG capsule     finasteride (PROSCAR) 5 MG tablet     fluticasone (FLONASE) 50 MCG/ACT nasal spray     gabapentin (NEURONTIN) 300 MG capsule     lisinopril (ZESTRIL) 20 MG tablet     Multiple Vitamin (DAILY MULTIVITAMIN PO)     ORDER FOR DME     polyethylene glycol (MIRALAX) 17 GM/Dose powder     psyllium (METAMUCIL) 28.3 % packet     simvastatin (ZOCOR) 40 MG tablet     SODIUM FLUORIDE 5000 PPM 1.1 % PSTE     tamsulosin (FLOMAX) 0.4 MG capsule     tiZANidine (ZANAFLEX) 2 MG tablet     No current facility-administered medications for this visit.      Past Medical History:   Patient Active Problem List   Diagnosis     Obstructive sleep apnea     Insomnia     Excessive sleepiness     Chronic nasal congestion     Lesion of ulnar nerve     Hyperlipidemia     Essential hypertension     Obesity     Benign neoplasm of colon     Hyperlipidemia     Moderate major depression (H)     Gastric polyps     Cervical pain     Impaired glucose tolerance test     Prediabetes     Skin tag     Unilateral inguinal hernia with obstruction and without gangrene, recurrence not specified     History of ETOH abuse     Urinary urgency     Urinary frequency     Past Medical History:   Diagnosis Date     Anxiety      Benign neoplasm of colon 3/2/2015     Depression      Depressive disorder 1/15/2000     Difficult intubation      ETOH abuse 3/15/2009    in remission     Gastro-oesophageal reflux disease 1/15/2010     Hyperlipidemia 1/1/2000     Hypoxemia      Morbid obesity (H)      Nasal polyps 1/1/2015     TIMOTHY on CPAP 8/13/2012    Severe (uses 5-6 hours as able)     Other and unspecified hyperlipidemia 10/25/2012     Personal history of colonic polyps 2/207/15    Multiple \"neg for FAP\"     Pre-diabetes      Prediabetes 5/24/2017     Restless leg      Skin tag      Surgical complication 1/6/2015    difficulty inserting a tube down my throat "     Unspecified essential hypertension 10/25/2012        CC Kari Ley MD  [address] [address] on close of this encounter.

## 2022-06-09 NOTE — PROGRESS NOTES
[facility] Dermatology Note  Encounter Date: Jun 9, 2022  Office Visit     Dermatology Problem List:  1.  Acrochordon related to pre-diabetes  2. Polypoid seborrheic keratosis  3. Acanthosis nigricans  4. Epidermal inclusion cyst  5. Xerosis - Vaseline  ____________________________________________    Assessment & Plan:    # Acrochordon related to pre-diabetes  # Acanthosis nigricans  # Polypoid seborrheic keratosis  Exam consistent with many acrochordons and polypoid seborrheic keratoses. The number of these lesions correlate with the patient's weight. Counseled the patient on the benign nature of these lesions. Offered symptomatic treatment for irritable lesions, but patient declined. No lesions of concern today.     # Epidermal inclusion cyst on vertex scalp  Patient not bothered by cyst. Counseled on the benign nature of this lesion.    # Xerosis  Patient has mild xerosis of the dorsal hands that is worse in winter. Counseled on nature of condition and to moisturize with Vaseline within 5 minutes of showering/bathing.    Procedures Performed:   None    Follow-up: prn for new or changing lesions    Staff and Medical Student:     I, Edilma Lopes, MS4, saw and staffed the patient with Laith Barron MD.  [facility] [employer]    I was present with the medical student who participated in the service and in the documentation.  I have verified the history and personally performed the physical exam and medical decision making.  I agree with the assessment and plan of care as documented in the note.     Laith Barron MD  Dermatology Attending  ____________________________________________    CC: Skin Check (Tomi is here today for a skin check - lots of skin tags )    HPI:  Mr. Juan Francisco Booker is a(n) 62 year old male who presents today as a new patient for skin tags and hand dryness.    Presents with 40 years of progressive 'skin tags' and growths.   - increased number of lesions with  increased weight.   - occasionally some skintags harden and bleed  - no pruritis, burning, oozing  - no past treatment  - no family history of similar lesions  - he has pre-diabetes. No other history of endocrine disorders per patient.  - no history of cancer   - 20 polyps found in his colon per patient.   - Checked for neurofibromatosis 20 years ago per patient    Presents with 1 year of fissuring and dryness along the dorsal MP joints  - tx: lotion, switching to glycerin soaps  - mild pruritis  - worse in winter  - no history of eczema, allergy, asthma    No history or family history of skin cancer.     Patient is otherwise feeling well, without additional skin concerns.    Labs:  None reviewed.    Physical Exam:  Vitals: There were no vitals taken for this visit.  SKIN: Full skin, which includes the head/face, both arms, chest, back, abdomen,both legs, genitalia and/or groin buttocks, digits and/or nails, was examined.  - Approximately 50 skin colored pedunculated papules with thin stalks most concentrated on the face, neck, axillary vault, inframmary folds, and upper medial thighs  - Approximately 50 brown pedunculated exophytic lobulated papules most concentrated on the face, neck, axillary vault, inframmary folds, and upper medial thighs  - Fine white scale with fissures on the plantar feet  - hyperlinear skin across the dorsal hands  - symmetric velvety hyperpigmented thin plaques in the axillary vaults  - 1cm cyst with black punctum on the vertex scalp  - No other lesions of concern on areas examined.     Medications:  Current Outpatient Medications   Medication     Caffeine 100 MG TABS     citalopram (CELEXA) 10 MG tablet     dutasteride (AVODART) 0.5 MG capsule     finasteride (PROSCAR) 5 MG tablet     fluticasone (FLONASE) 50 MCG/ACT nasal spray     gabapentin (NEURONTIN) 300 MG capsule     lisinopril (ZESTRIL) 20 MG tablet     Multiple Vitamin (DAILY MULTIVITAMIN PO)     ORDER FOR DME     polyethylene  "glycol (MIRALAX) 17 GM/Dose powder     psyllium (METAMUCIL) 28.3 % packet     simvastatin (ZOCOR) 40 MG tablet     SODIUM FLUORIDE 5000 PPM 1.1 % PSTE     tamsulosin (FLOMAX) 0.4 MG capsule     tiZANidine (ZANAFLEX) 2 MG tablet     No current facility-administered medications for this visit.      Past Medical History:   Patient Active Problem List   Diagnosis     Obstructive sleep apnea     Insomnia     Excessive sleepiness     Chronic nasal congestion     Lesion of ulnar nerve     Hyperlipidemia     Essential hypertension     Obesity     Benign neoplasm of colon     Hyperlipidemia     Moderate major depression (H)     Gastric polyps     Cervical pain     Impaired glucose tolerance test     Prediabetes     Skin tag     Unilateral inguinal hernia with obstruction and without gangrene, recurrence not specified     History of ETOH abuse     Urinary urgency     Urinary frequency     Past Medical History:   Diagnosis Date     Anxiety      Benign neoplasm of colon 3/2/2015     Depression      Depressive disorder 1/15/2000     Difficult intubation      ETOH abuse 3/15/2009    in remission     Gastro-oesophageal reflux disease 1/15/2010     Hyperlipidemia 1/1/2000     Hypoxemia      Morbid obesity (H)      Nasal polyps 1/1/2015     TIMOTHY on CPAP 8/13/2012    Severe (uses 5-6 hours as able)     Other and unspecified hyperlipidemia 10/25/2012     Personal history of colonic polyps 2/207/15    Multiple \"neg for FAP\"     Pre-diabetes      Prediabetes 5/24/2017     Restless leg      Skin tag      Surgical complication 1/6/2015    difficulty inserting a tube down my throat     Unspecified essential hypertension 10/25/2012        CC Kari Ley MD  [address] [place] [address] on close of this encounter.    "

## 2022-06-09 NOTE — PATIENT INSTRUCTIONS
Your skin tags look benign.   - Try to wear daily suncreen of SPF 30 or higher.    For your hands:  - Your hands are normally dryer than the center of your body because of less oil glands. Dry skin is worse in the winter.   - Within 5 minutes of showering/bathing, use moisturizer. If possible, moisturize after washing hands. Vaseline is the best moisturizer.     You can come back whenever you like!

## 2022-06-09 NOTE — NURSING NOTE
Dermatology Rooming Note    Juan Francisco Booker's goals for this visit include:   Chief Complaint   Patient presents with     Skin Check     Tomi is here today for a skin check - lots of skin tags      ALESSANDRA Medina

## 2022-06-13 ENCOUNTER — VIRTUAL VISIT (OUTPATIENT)
Dept: EDUCATION SERVICES | Facility: CLINIC | Age: 62
End: 2022-06-13
Attending: FAMILY MEDICINE
Payer: COMMERCIAL

## 2022-06-13 DIAGNOSIS — E78.2 MIXED HYPERLIPIDEMIA: ICD-10-CM

## 2022-06-13 DIAGNOSIS — R73.03 PREDIABETES: ICD-10-CM

## 2022-06-13 PROCEDURE — 99207 PR DROP WITH A PROCEDURE: CPT | Performed by: DIETITIAN, REGISTERED

## 2022-06-13 PROCEDURE — 97802 MEDICAL NUTRITION INDIV IN: CPT | Mod: 95 | Performed by: DIETITIAN, REGISTERED

## 2022-06-13 NOTE — PROGRESS NOTES
Medical Nutrition Therapy  Visit Type:Initial assessment and intervention    Juan Francisco Booker presents today for MNT and education related to weight management and anemia.   He is accompanied by self.     ASSESSMENT:   Patient comments/concerns relating to nutrition: Tomi has recently lost weight and has also been dealing with some anemia, he is concerned that he is not getting enough iron in his diet.     Beans for protein/iron-   Salt -  Vegetables and carbohydrates    NUTRITION HISTORY:    Breakfast: turkey, onions, peppers with 1/2 cup corn, 2 clementines  Lunch: frozen dinner, mushrooms, onions, peppers  Dinner: frozen dinner (low carb) with zucchini mushrooms and tomatoes and 2 clementines  Snacks: carrots, strawberries, scone, clementines, frozen dinner, popcorn  Beverages: water and tea    Misses meals? rarely    Previous diet education:  No     Food allergies/intolerances: none mentioned    Diet is high in: fiber, fruits and vegetables  Diet is low in: calories and carbs    EXERCISE: moderate regular exercise program which includes walking     SOCIO/ECONOMIC:   Lives with: not assessed    MEDICATIONS:  Current Outpatient Medications   Medication     Caffeine 100 MG TABS     citalopram (CELEXA) 10 MG tablet     dutasteride (AVODART) 0.5 MG capsule     finasteride (PROSCAR) 5 MG tablet     fluticasone (FLONASE) 50 MCG/ACT nasal spray     gabapentin (NEURONTIN) 300 MG capsule     lisinopril (ZESTRIL) 20 MG tablet     Multiple Vitamin (DAILY MULTIVITAMIN PO)     ORDER FOR DME     polyethylene glycol (MIRALAX) 17 GM/Dose powder     psyllium (METAMUCIL) 28.3 % packet     simvastatin (ZOCOR) 40 MG tablet     SODIUM FLUORIDE 5000 PPM 1.1 % PSTE     tamsulosin (FLOMAX) 0.4 MG capsule     tiZANidine (ZANAFLEX) 2 MG tablet     No current facility-administered medications for this visit.       LABS:  Last Basic Metabolic Panel:  Lab Results   Component Value Date     02/17/2022     07/02/2021      Lab Results    Component Value Date    POTASSIUM 4.0 02/17/2022    POTASSIUM 4.0 07/02/2021     Lab Results   Component Value Date    CHLORIDE 104 02/17/2022    CHLORIDE 108 07/02/2021     Lab Results   Component Value Date    ZHEN 9.0 02/17/2022    ZHEN 9.0 07/02/2021     Lab Results   Component Value Date    CO2 29 02/17/2022    CO2 30 07/02/2021     Lab Results   Component Value Date    BUN 21 02/17/2022    BUN 16 07/02/2021     Lab Results   Component Value Date    CR 0.79 02/17/2022    CR 0.83 07/02/2021     Lab Results   Component Value Date    GLC 79 02/17/2022    GLC 85 07/02/2021       ANTHROPOMETRICS:  Vitals: There were no vitals taken for this visit.  There is no height or weight on file to calculate BMI.      Wt Readings from Last 5 Encounters:   05/04/22 79.4 kg (175 lb)   04/18/22 82.9 kg (182 lb 11.2 oz)   02/21/22 82.6 kg (182 lb)   02/08/22 82.7 kg (182 lb 4.8 oz)   02/04/22 82.1 kg (181 lb)       Weight Change: decreasing     NUTRITION DIAGNOSIS: Food- and nutrition-related knowledge deficit related to limited previous nutrition education as evidenced by patient statement and new referral.    NUTRITION INTERVENTION:  Education given to support: general nutrition guidelines, weight reduction, consistent meals, exercise, behavior modification and iron rich foods  Education Materials Provided: List of iron rich foods  Motivational Interviewing    Discussed Tomi's current diet, commended patient on creating a well balanced diet and his weight loss. He is concerned about his iron intake and that the %DV is not adding up to 100%. Writer explained that he doesn't need to get 100% DV everyday.  Reviewed iron rich foods and recommended that they be consumed with a source of vitamin C.     PATIENT'S BEHAVIOR CHANGE GOALS:   See Patient Instructions for patient stated behavior change goals. AVS was printed and given to patient at today's appointment.    MONITOR / EVALUATE:  RD will monitor/evaluate:  No follow-up  planned    FOLLOW-UP:  Follow up with RD as needed.  Call RD with questions/concerns.     MICHAEL Giron  Time spent in minutes: 30  Encounter: Individual

## 2022-06-13 NOTE — LETTER
6/13/2022         RE: Juan Francisco Booker  [address]  [place] [address]        Dear Colleague,    Thank you for referring your patient, Juan Francisco Booker, to the [facility]. Please see a copy of my visit note below.    Medical Nutrition Therapy  Visit Type:Initial assessment and intervention    Juan Francisco Booker presents today for MNT and education related to weight management and anemia.   He is accompanied by self.     ASSESSMENT:   Patient comments/concerns relating to nutrition: Tomi has recently lost weight and has also been dealing with some anemia, he is concerned that he is not getting enough iron in his diet.     Beans for protein/iron-   Salt -  Vegetables and carbohydrates    NUTRITION HISTORY:    Breakfast: turkey, onions, peppers with 1/2 cup corn, 2 clementines  Lunch: frozen dinner, mushrooms, onions, peppers  Dinner: frozen dinner (low carb) with zucchini mushrooms and tomatoes and 2 clementines  Snacks: carrots, strawberries, scone, clementines, frozen dinner, popcorn  Beverages: water and tea    Misses meals? rarely    Previous diet education:  No     Food allergies/intolerances: none mentioned    Diet is high in: fiber, fruits and vegetables  Diet is low in: calories and carbs    EXERCISE: moderate regular exercise program which includes walking     SOCIO/ECONOMIC:   Lives with: not assessed    MEDICATIONS:  Current Outpatient Medications   Medication     Caffeine 100 MG TABS     citalopram (CELEXA) 10 MG tablet     dutasteride (AVODART) 0.5 MG capsule     finasteride (PROSCAR) 5 MG tablet     fluticasone (FLONASE) 50 MCG/ACT nasal spray     gabapentin (NEURONTIN) 300 MG capsule     lisinopril (ZESTRIL) 20 MG tablet     Multiple Vitamin (DAILY MULTIVITAMIN PO)     ORDER FOR DME     polyethylene glycol (MIRALAX) 17 GM/Dose powder     psyllium (METAMUCIL) 28.3 % packet     simvastatin (ZOCOR) 40 MG tablet     SODIUM FLUORIDE 5000 PPM 1.1 % PSTE     tamsulosin (FLOMAX)  0.4 MG capsule     tiZANidine (ZANAFLEX) 2 MG tablet     No current facility-administered medications for this visit.       LABS:  Last Basic Metabolic Panel:  Lab Results   Component Value Date     02/17/2022     07/02/2021      Lab Results   Component Value Date    POTASSIUM 4.0 02/17/2022    POTASSIUM 4.0 07/02/2021     Lab Results   Component Value Date    CHLORIDE 104 02/17/2022    CHLORIDE 108 07/02/2021     Lab Results   Component Value Date    ZHEN 9.0 02/17/2022    ZHEN 9.0 07/02/2021     Lab Results   Component Value Date    CO2 29 02/17/2022    CO2 30 07/02/2021     Lab Results   Component Value Date    BUN 21 02/17/2022    BUN 16 07/02/2021     Lab Results   Component Value Date    CR 0.79 02/17/2022    CR 0.83 07/02/2021     Lab Results   Component Value Date    GLC 79 02/17/2022    GLC 85 07/02/2021       ANTHROPOMETRICS:  Vitals: There were no vitals taken for this visit.  There is no height or weight on file to calculate BMI.      Wt Readings from Last 5 Encounters:   05/04/22 79.4 kg (175 lb)   04/18/22 82.9 kg (182 lb 11.2 oz)   02/21/22 82.6 kg (182 lb)   02/08/22 82.7 kg (182 lb 4.8 oz)   02/04/22 82.1 kg (181 lb)       Weight Change: decreasing     NUTRITION DIAGNOSIS: Food- and nutrition-related knowledge deficit related to limited previous nutrition education as evidenced by patient statement and new referral.    NUTRITION INTERVENTION:  Education given to support: general nutrition guidelines, weight reduction, consistent meals, exercise, behavior modification and iron rich foods  Education Materials Provided: List of iron rich foods  Motivational Interviewing    Discussed Tomi's current diet, commended patient on creating a well balanced diet and his weight loss. He is concerned about his iron intake and that the %DV is not adding up to 100%. Writer explained that he doesn't need to get 100% DV everyday.  Reviewed iron rich foods and recommended that they be consumed with a source  of vitamin C.     PATIENT'S BEHAVIOR CHANGE GOALS:   See Patient Instructions for patient stated behavior change goals. AVS was printed and given to patient at today's appointment.    MONITOR / EVALUATE:  RD will monitor/evaluate:  No follow-up planned    FOLLOW-UP:  Follow up with RD as needed.  Call RD with questions/concerns.     MICHAEL GironES  Time spent in minutes: 30  Encounter: Individual

## 2022-06-16 ENCOUNTER — HOSPITAL ENCOUNTER (OUTPATIENT)
Dept: PHYSICAL THERAPY | Facility: CLINIC | Age: 62
Discharge: HOME | End: 2022-06-16
Payer: COMMERCIAL

## 2022-06-16 DIAGNOSIS — M54.2 CERVICAL PAIN: ICD-10-CM

## 2022-06-16 DIAGNOSIS — R29.898 MUSCULAR DECONDITIONING: ICD-10-CM

## 2022-06-16 DIAGNOSIS — G44.209 TENSION HEADACHE: ICD-10-CM

## 2022-06-16 DIAGNOSIS — M54.50 CHRONIC BILATERAL LOW BACK PAIN WITHOUT SCIATICA: Primary | ICD-10-CM

## 2022-06-16 DIAGNOSIS — G89.29 CHRONIC BILATERAL LOW BACK PAIN WITHOUT SCIATICA: Primary | ICD-10-CM

## 2022-06-16 PROCEDURE — 97110 THERAPEUTIC EXERCISES: CPT | Mod: GP | Performed by: PHYSICAL THERAPIST

## 2022-06-16 NOTE — PROGRESS NOTES
Daily Assessment:  Tomi is presenting for his 25th PT visit in the [facility] program. He accepts challenges well and is motivated to continue long-term with his HEP. We have been refining his exercises the last couple of visits for long-term management and have provided progressions.     History of Present of illness:  Non-radicular, suboccipital pain with tension in the B temporal head. He notes these HA's to occur ~3-4 times per week. No history of trauma or injury. Negative red flags. Getting up and moving around does help. He would like to address this along with his Lowback.    Lumbar MedX 16-week Re-test 12-week Re-test  4/7/22 8-week Re-test  3/10/22 4-week Re-test  2/8/22 Initial Testing   1/18/22   AROM (full=  0-72  lumbar) 0-57 0-57 0-57 0-57 0-54   Max Extension Torque  278# 269# 290#  266# 272#   Flex: ext ratio (ideal 1.4:1) 2.19 2.17:1 2.10:1 1.9:1 4.32:1       Date 6/16/22 6/7/22 6/2/22 5/19/22 5/12/22 5/5/22 4/29/2022 4/21/22 4/14/22 4/7/22 3/31/22 03/21/22 3/14/2022 3/10/22 3/3/22 2/22/22 2/17/22 2/10/22 2/8/22 2/4/2022  1/31/2022 1/28/2022 1/24/22 1/20/22 1/18/22   Lumbar Parameters                            Top Dead Center 2(TDC) 18 18 18 18 18 18 18 18 18 18 18 18 18 18 18 18 18 18 18 18 18 18 18 18 18   Counterbalance (CB) 365 365 365 365 365 365 365 365 365 365 365 365 365 365 365 365 365 365 385 385 385 385 385 385 385   Seat Pad 1 1 1 1 1 1 1 1 1 1 1 1 1 1 1 1 1 1 1 1 1 1 1 1 1   Femur Restraint 5 5 5 5 5 5 5 5 5 5 5 5 5 5 5 5 5 5 5 5 5 5 5 5 5   Week/Visit                            Enter Week/Visit # Wk 21 Wk 20 Wk 19 Wk 18 Wk 17 Wk 16 Wk 15 Wk 14 Wk 13 Wk 12 Wk 11 Wk 10 Wk 9 Wk 8 Wk 7 Wk 6 Wk 5 Wk 4 v 2 Wk 4 Wk 3 V 2 Wk 3 V 1 Wk2 V 2 Wk 2 V1 Wk 1 v2 W1V1   Weight (lbs) 180# 176# 175# 170# 166# 163# 160# 167# down to  160# 162# ex 269#Max  160#ex 158# 154# 150# 290# max  148# ex 146# 142# 139# 136# 266# Max  135# ex 133# 130# 125# 120# 106# 100#   Reps (#) 15 21 13 25 22 20 22 10-15 26 16  17  19 18 11 19 25 22 19 13 17 28 21 21 27 20   Time 115 s 153 s 119 s 145 132 s  137 147 s 151 s 106 s 107 s 127 112 72 118 s 167 s 172 s 197 s 125s  178  186 240 188 s 215s   ROM (degrees) 0-57 0-57 0-57 0-57 0-57 0-57 0-57 0-57  Not full range 0-57 0-57 0-57 0-57 0-57 0-57 0-57 0-57 0-57 0-57 0-57 0-57 (further range today!) 0-54 0-54 0-54 0-54 0-54   Pain       No issues             4/10 following        Flex:Ext ratio              2.1):1     1.97:1      4.32:1       Cervical MedX 12-week   Re-test  6/2/22 8-week Re-test 4/29/2022 4-week Re-test  3/31/22 Initial testing  2/22/22   AROM (full= 0-120  cervical) 0-96 0-96 15-93 15-60   Max Extension Torque  305# 295# 252# 192#   Flex: ext ratio (ideal 1.4:1) 1.74:1 1.37:1 1.75:1 1.78:1     Date 6/16/22 6/7/22 6/2/22 5/19/22 5/12/22 5/5/22 4/29/22 4/21/22 4/14/22 4/7/22 3/31/22 03/21/22 3/14/2022 3/10/22 3/3/22 2/22/22   Cervical Parameters                   Top Dead Center (C) 48 48 48 48 48 48 48 48 48 48 48 48 48 48 48 48   Counterbalance (CB) 2 2 2 2 2 2 2 2 2 2 2 2.0 2.0 2.0 2.0 2.0   Seat Height 335 335 335 335 335 335 335 335 335 335 335 335 335 355 355 355   Week/Visit                   Enter Week/Visit # Wk 14 Wk 13 Wk 12 Wk 11 Wk 10 Wk 9 Wk 8  Wk 7 Wk 6 Wk 5 Wk 4 Wk 3 V1 Wk 2 v2 Wk 2 v 1 Wk 1 v 2 Wk 1 v 1   Weight (lbs) 276# 264# 255# 246# 231# 222# 204# 192# 177#ex 162# ex 252# Max  153#ex 144# 135# 120# ex 102# ex 192# max   Reps (#) 33 30 28 28 31 30 30 31 29 30 30 30 30 30 30 --   Time 235s 217 s 298 s 193 s 156 s 199 s 190s 160 s 157s 108 s 234 s 186 221 190 s 190  --   ROM (degrees) 18-93, ex to 0 18-93 18-93 18-93 18-93 18-93 18-93  *pt was able to get 0-96 but system did not save, reassess if you want* 18-93 18-93 18-93 ext to 0 18-93  Ex to 0 15-90 15-90 15-90 15-60 15-60   Pain            Almost to fatigue       Flex:Ext ratio       1:37:1         1.78:1       Date 6/16/22 6/7/22  5/19/22 5/12/22 5/5/22 4/29/2022  4/21/22 4/14/22 4/7/22 3/31/22  "03/21/22 3/14/2022 3/10/22 3/3/22 2/22/22 2/17/22 2/7/22 2/10/22 2/8/22 2/4/2022  1/31/2022   1/28/2022 1/24/2022 1/20/22   Exercise                            treadmill X 4 min X 4 min X 3 min 30 \" X 4 min X 4 min X 4 min x4 min X 3 min X 3 min X 3 min X 4 min x4 min X 3 min X 3 min X 4 min X 4 min X 4 min X 4 min X 4min X 4 min 4 min 4 min X 4 min X 4 min X 4 min   Rotary Torso 90' 72# to L 72# to R 70# to L 68# to R 66# to L 64# to R 62# to L 60# to R 60# to L 58# to R 56# to L 54# to R 52#'s to L 52# to R 50# to L 48# to R 46# to L 44# toR 42# to L 40# to L 38# to R 36# to L 36# to R 34# to L 30# to R   Bridge with toes lifted   Completed and edu on adding weight or hold time                  Switch to feet up on couch to activate hamstrings more and decrease groin pain.    X 12   Dead bug                     Reviewed, able to do with ok form, cues for abdominal activation X 10    2 x 20\"   Hip flexor stretch kneeling                        X 30 seconds    Piriformis stretch                        X 30 seconds    Hip Abduction  X 10 R SL                    X 10  X 10     Quadruped leg extensions  X 10 SL                    X 10 cues for form X 10     Reformer leg press X30 DL  X20 B SL                  X 20, all springs  X 10, SL leg X 20, all springs  X 10 all springs      Reformer bridge                   X 15  x10 double leg , x10 single leg all springs X 10 all springs      Reformer 90/90 pulldowns                   X 12 3R   X 10 3R      Supine butterfly stretch                    X 20\"        Supine cervical retraction                  X 10 hold 5 sec          Cervical extension isometrics Reviewed and given progressionto use B hand pressure      Reviewed, cued for equal pressure from head and hand       Demo'd  X 5 hold 5 seconds             Greenband shoulder extension with scap set   Given blue  And inc hold time and edu on tandem and SLS         Green band x15  X 10 X 15             Cervical flexion " "stretch              X 20\"              Standing horizontal adduction            Green band x10                [place] and arrow       Bx5                                                                                                                                       "

## 2022-06-17 ENCOUNTER — LAB (OUTPATIENT)
Dept: LAB | Facility: CLINIC | Age: 62
End: 2022-06-17
Payer: COMMERCIAL

## 2022-06-17 DIAGNOSIS — R35.0 URINARY FREQUENCY: ICD-10-CM

## 2022-06-17 LAB — PSA SERPL-MCNC: 0.51 UG/L (ref 0–4)

## 2022-06-17 PROCEDURE — 36415 COLL VENOUS BLD VENIPUNCTURE: CPT

## 2022-06-17 PROCEDURE — 84153 ASSAY OF PSA TOTAL: CPT

## 2022-06-21 ENCOUNTER — HOSPITAL ENCOUNTER (OUTPATIENT)
Dept: PHYSICAL THERAPY | Facility: CLINIC | Age: 62
Discharge: HOME | End: 2022-06-21
Payer: COMMERCIAL

## 2022-06-21 DIAGNOSIS — G44.209 TENSION HEADACHE: ICD-10-CM

## 2022-06-21 DIAGNOSIS — R29.898 MUSCULAR DECONDITIONING: ICD-10-CM

## 2022-06-21 DIAGNOSIS — M54.2 CERVICAL PAIN: ICD-10-CM

## 2022-06-21 DIAGNOSIS — G89.29 CHRONIC BILATERAL LOW BACK PAIN WITHOUT SCIATICA: Primary | ICD-10-CM

## 2022-06-21 DIAGNOSIS — M54.50 CHRONIC BILATERAL LOW BACK PAIN WITHOUT SCIATICA: Primary | ICD-10-CM

## 2022-06-21 PROCEDURE — 97110 THERAPEUTIC EXERCISES: CPT | Mod: GP | Performed by: PHYSICAL THERAPIST

## 2022-06-21 NOTE — PROGRESS NOTES
Daily Assessment:  Tomi is presenting for his 26th and final PT visit in the [facility] program. We increased the weight in the cervical and lumbar MedX machines which he tolerated well. PT incorporated MFR to his suboccipitals and B UT which he verbalizes to feel good. PT educated on proper posture mechanics and repetitions of home exercise program. Patient to begin independent home program to continue improvement in his symptoms.     History of Present of illness:  Non-radicular, suboccipital pain with tension in the B temporal head. He notes these HA's to occur ~3-4 times per week. No history of trauma or injury. Negative red flags. Getting up and moving around does help. He would like to address this along with his Lowback.    Lumbar MedX 16-week Re-test 12-week Re-test  4/7/22 8-week Re-test  3/10/22 4-week Re-test  2/8/22 Initial Testing   1/18/22   AROM (full=  0-72  lumbar) 0-57 0-57 0-57 0-57 0-54   Max Extension Torque  278# 269# 290#  266# 272#   Flex: ext ratio (ideal 1.4:1) 2.19 2.17:1 2.10:1 1.9:1 4.32:1       Date 6/21/22 6/16/22 6/7/22 6/2/22 5/19/22 5/12/22 5/5/22 4/29/2022 4/21/22 4/14/22 4/7/22 3/31/22 03/21/22 3/14/2022 3/10/22 3/3/22 2/22/22 2/17/22 2/10/22 2/8/22 2/4/2022  1/31/2022 1/28/2022 1/24/22 1/20/22 1/18/22   Lumbar Parameters                             Top Dead Center 2(TDC) 18 18 18 18 18 18 18 18 18 18 18 18 18 18 18 18 18 18 18 18 18 18 18 18 18 18   Counterbalance (CB) 365 365 365 365 365 365 365 365 365 365 365 365 365 365 365 365 365 365 365 385 385 385 385 385 385 385   Seat Pad 1 1 1 1 1 1 1 1 1 1 1 1 1 1 1 1 1 1 1 1 1 1 1 1 1 1   Femur Restraint 5 5 5 5 5 5 5 5 5 5 5 5 5 5 5 5 5 5 5 5 5 5 5 5 5 5   Week/Visit                             Enter Week/Visit # Wk 22 Wk 21 Wk 20 Wk 19 Wk 18 Wk 17 Wk 16 Wk 15 Wk 14 Wk 13 Wk 12 Wk 11 Wk 10 Wk 9 Wk 8 Wk 7 Wk 6 Wk 5 Wk 4 v 2 Wk 4 Wk 3 V 2 Wk 3 V 1 Wk2 V 2 Wk 2 V1 Wk 1 v2 W1V1   Weight (lbs) 182# 180# 176# 175# 170# 166# 163# 160# 167# down  to  160# 162# ex 269#Max  160#ex 158# 154# 150# 290# max  148# ex 146# 142# 139# 136# 266# Max  135# ex 133# 130# 125# 120# 106# 100#   Reps (#) 15 15 21 13 25 22 20 22 10-15 26 16  17 19 18 11 19 25 22 19 13 17 28 21 21 27 20   Time 113s 115 s 153 s 119 s 145 132 s  137 147 s 151 s 106 s 107 s 127 112 72 118 s 167 s 172 s 197 s 125s  178  186 240 188 s 215s   ROM (degrees) 0-57 0-57 0-57 0-57 0-57 0-57 0-57 0-57 0-57  Not full range 0-57 0-57 0-57 0-57 0-57 0-57 0-57 0-57 0-57 0-57 0-57 0-57 (further range today!) 0-54 0-54 0-54 0-54 0-54   Pain        No issues             4/10 following        Flex:Ext ratio               2.1):1     1.97:1      4.32:1       Cervical MedX 12-week   Re-test  6/2/22 8-week Re-test 4/29/2022 4-week Re-test  3/31/22 Initial testing  2/22/22   AROM (full= 0-120  cervical) 0-96 0-96 15-93 15-60   Max Extension Torque  305# 295# 252# 192#   Flex: ext ratio (ideal 1.4:1) 1.74:1 1.37:1 1.75:1 1.78:1     Date 6/21/22 6/16/22 6/7/22 6/2/22 5/19/22 5/12/22 5/5/22 4/29/22 4/21/22 4/14/22 4/7/22 3/31/22 03/21/22 3/14/2022 3/10/22 3/3/22 2/22/22   Cervical Parameters                    Top Dead Center (TDC) 48 48 48 48 48 48 48 48 48 48 48 48 48 48 48 48 48   Counterbalance (CB) 2 2 2 2 2 2 2 2 2 2 2 2 2.0 2.0 2.0 2.0 2.0   Seat Height 335 335 335 335 335 335 335 335 335 335 335 335 335 335 355 355 355   Week/Visit                    Enter Week/Visit # Wk 15 Wk 14 Wk 13 Wk 12 Wk 11 Wk 10 Wk 9 Wk 8  Wk 7 Wk 6 Wk 5 Wk 4 Wk 3 V1 Wk 2 v2 Wk 2 v 1 Wk 1 v 2 Wk 1 v 1   Weight (lbs) 285# 276# 264# 255# 246# 231# 222# 204# 192# 177#ex 162# ex 252# Max  153#ex 144# 135# 120# ex 102# ex 192# max   Reps (#) 27 33 30 28 28 31 30 30 31 29 30 30 30 30 30 30 --   Time 157 s 235s 217 s 298 s 193 s 156 s 199 s 190s 160 s 157s 108 s 234 s 186 221 190 s 190  --   ROM (degrees) 18-93 18-93, ex to 0 18-93 18-93 18-93 18-93 18-93 18-93  *pt was able to get 0-96 but system did not save, reassess if you want* 18-93  "18-93 18-93 ext to 0 18-93  Ex to 0 15-90 15-90 15-90 15-60 15-60   Pain             Almost to fatigue       Flex:Ext ratio        1:37:1         1.78:1       Date 6/21/22 6/16/22 6/7/22  5/19/22 5/12/22 5/5/22 4/29/2022  4/21/22 4/14/22 4/7/22 3/31/22 03/21/22 3/14/2022 3/10/22 3/3/22 2/22/22 2/17/22 2/7/22 2/10/22 2/8/22 2/4/2022  1/31/2022   1/28/2022 1/24/2022 1/20/22   Exercise                             treadmill X 4 min X 4 min X 4 min X 3 min 30 \" X 4 min X 4 min X 4 min x4 min X 3 min X 3 min X 3 min X 4 min x4 min X 3 min X 3 min X 4 min X 4 min X 4 min X 4 min X 4min X 4 min 4 min 4 min X 4 min X 4 min X 4 min   Rotary Torso 90' 74# to R 72# to L 72# to R 70# to L 68# to R 66# to L 64# to R 62# to L 60# to R 60# to L 58# to R 56# to L 54# to R 52#'s to L 52# to R 50# to L 48# to R 46# to L 44# toR 42# to L 40# to L 38# to R 36# to L 36# to R 34# to L 30# to R   Bridge with toes lifted    Completed and edu on adding weight or hold time                  Switch to feet up on couch to activate hamstrings more and decrease groin pain.    X 12   Dead bug                      Reviewed, able to do with ok form, cues for abdominal activation X 10    2 x 20\"   Hip flexor stretch kneeling                         X 30 seconds    Piriformis stretch                         X 30 seconds    Hip Abduction   X 10 R SL                    X 10  X 10     Quadruped leg extensions   X 10 SL                    X 10 cues for form X 10     Reformer leg press X30 DL  X20 B SL X30 DL  X20 B SL                  X 20, all springs  X 10, SL leg X 20, all springs  X 10 all springs      Reformer bridge                    X 15  x10 double leg , x10 single leg all springs X 10 all springs      Reformer 90/90 pulldowns                    X 12 3R   X 10 3R      Supine butterfly stretch                     X 20\"        Supine cervical retraction                   X 10 hold 5 sec          Cervical extension isometrics  Reviewed and given " "progressionto use B hand pressure      Reviewed, cued for equal pressure from head and hand       Demo'd  X 5 hold 5 seconds             Greenband shoulder extension with scap set    Given blue  And inc hold time and edu on tandem and SLS         Green band x15  X 10 X 15             Cervical flexion stretch               X 20\"              Standing horizontal adduction             Green band x10                [place] and arrow        Bx5                                                                                                                                           "

## 2022-06-23 ENCOUNTER — PRE VISIT (OUTPATIENT)
Dept: UROLOGY | Facility: CLINIC | Age: 62
End: 2022-06-23

## 2022-06-23 NOTE — TELEPHONE ENCOUNTER
Reason for visit: follow up     Relevant information: urinary frequency    Records/imaging/labs/orders: PSA in epic    Pt called: no    At Rooming: normal

## 2022-06-30 ENCOUNTER — OFFICE VISIT (OUTPATIENT)
Dept: UROLOGY | Facility: CLINIC | Age: 62
End: 2022-06-30
Payer: COMMERCIAL

## 2022-06-30 VITALS — DIASTOLIC BLOOD PRESSURE: 74 MMHG | HEART RATE: 69 BPM | SYSTOLIC BLOOD PRESSURE: 112 MMHG

## 2022-06-30 DIAGNOSIS — R35.0 URINARY FREQUENCY: Primary | ICD-10-CM

## 2022-06-30 PROCEDURE — 99213 OFFICE O/P EST LOW 20 MIN: CPT | Performed by: PHYSICIAN ASSISTANT

## 2022-06-30 ASSESSMENT — PAIN SCALES - GENERAL: PAINLEVEL: NO PAIN (0)

## 2022-06-30 NOTE — LETTER
6/30/2022       RE: Juan Francisco Booker  [address]  [place] [address]     Dear Colleague,    Thank you for referring your patient, Juan Francisco Booker, to the [facility] [place] [facility]. Please see a copy of my visit note below.    HPI: Mr. Juan Francisco Booker is a 62 year old year old male presenting today for evaluation of chief complaint(s): Follow Up    Today, he is unaccompanied    Mr. Juan Francisco Booker has PMH significant for HTN, HLD, gerd, obesity, prediabetes (HGB A1C 5.4%), depression, TIMOTHY, BPH (on flomax 0.8mg daily - started 8/29/18, on avodart - started 5/11/21).  He has followed with urology since 2018.  Please see note from 9/16/21 for a summary of his history.  Essentially he has tried dietary sugar reduction, caffeine reduction, management of constipation, weight loss.  He has tried Myrbetriq 25mg - too expensive. Has tried pelvic floor physical therapy.  We have avoided anticholinergic -type OAB meds because of his severe constipation.  When we saw each other on 3/17/22 he elected to continue working with PFPT. He was voiding 10-12 times per day, drinking 80 ounces of water per day (including herbal tea), he had stopped caffeine tablets and was drinking 2 cups of caffeinated tea in the mornings instead.  Posterior tibial stimulation and interstim/botox were discussed.      Today he returns with a PSA of 0.51ug/L on Avodart.   - Currently voiding about 10 times per day.  Feels he is drinking a little less- typically 84 ounces.  Has 2 glasses of caffeinated tea in the morning but then stops the caffeine.  At night, wakes once and typically voids because he is awake.  No significant urgency. Still cannot walk for 1-1/2 hours without voiding.  Currently voids early in the walk. Stream is fairly good.  Is having some postvoid dribble.  Is trying to double void.    -Bowels - has made a lot of progress.  Uses fiber in the morning,  rima seeds and Miralax every day.  Forgets to do the abdominal massage.    -Weight is 180lb - has been having some ice cream and has gained 6lbs.       9/15/21- cystoscopy with Dr. Bolaños - no obstructing tissue.      2/14/20 - UDS did show:  -Large bladder capacity (859 mL) with heightened early sensations.  -Good bladder compliance without DO/DOI and no JOSEPH  -Moderate detrusor contraction during voiding to max Pdet ~35 cm H2O, which he supplements with Valsalva effort. He first attempts to void from a seated position on the [employer] chair, but is then assisted to standing for a second attempt.  -Borderline slow flow rate (Qmax 12 mL/s) with initial voiding in the UDS suite, with an intermittent, fluctuating flow curve and incomplete bladder emptying ( mL). He then voids to completion into a private uroflow, with a final PVR of 0 mL.   -Increased EMG activity during voiding, which possibly correlates with Valsalva effort/movement, although could also suggest pelvic floor involvement.  -BOOI does not suggest bladder outlet obstruction.  -Fluoroscopy reveals a smooth bladder wall without diverticulae or VUR. The bladder neck was closed during filling and open during voiding.       Current Outpatient Medications   Medication Sig Dispense Refill     Caffeine 100 MG TABS Take 1 tablet by mouth daily as needed        citalopram (CELEXA) 10 MG tablet Take 1 tablet (10 mg) by mouth daily 90 tablet 3     dutasteride (AVODART) 0.5 MG capsule Take 1 capsule (0.5 mg) by mouth daily 60 capsule 5     finasteride (PROSCAR) 5 MG tablet Take 1 tablet (5 mg) by mouth daily 90 tablet 3     fluticasone (FLONASE) 50 MCG/ACT nasal spray Spray 1-2 sprays into both nostrils daily 48 g 1     gabapentin (NEURONTIN) 300 MG capsule 1 in morning, 1 at midday, 3 at night (Patient taking differently: 3 in morning, 3 at night) 450 capsule 3     lisinopril (ZESTRIL) 20 MG tablet Take 1 tablet (20 mg) by mouth daily 90 tablet 3     Multiple  Vitamin (DAILY MULTIVITAMIN PO) Take 1 tablet by mouth daily AM       ORDER FOR DME Use your CPAP device as directed by your provider.       polyethylene glycol (MIRALAX) 17 GM/Dose powder Take 9-17 g by mouth daily 510 g 11     psyllium (METAMUCIL) 28.3 % packet Take 1 packet by mouth daily       simvastatin (ZOCOR) 40 MG tablet Take 1 tablet (40 mg) by mouth At Bedtime 90 tablet 3     SODIUM FLUORIDE 5000 PPM 1.1 % PSTE BRUSH TEETH WITH PEA SIZED AMOUNT AT BEDTIME AND DO NOT RINSE       tamsulosin (FLOMAX) 0.4 MG capsule Take 2 capsules (0.8 mg) by mouth daily 30 min after a meal. 60 capsule 9     tiZANidine (ZANAFLEX) 2 MG tablet Take 1 tablet at night. If no side effects and no improvement in headaches after 1 week, can increase to 2 tablet at night. 60 tablet 5       ALLERGIES: Grass      REVIEW OF SYSTEMS:  As above in HPI  GENERAL PHYSICAL EXAM:   Vitals: /74   Pulse 69   There is no height or weight on file to calculate BMI.    GENERAL: Well groomed, well developed, well nourished male in NAD.  NEURO: Alert and oriented x 3.  PSYCH: Normal mood and affect, pleasant and agreeable during interview and exam.     RADIOLOGY: The following tests were reviewed:   EXAMINATION: US TESTICULAR AND SCROTAL, 5/21/2021 10:35 AM      COMPARISON: None.     HISTORY: Had laparoscopic inguinal hernia repair on the right,  evaluate scrotal contents     TECHNIQUE: The scrotum was scanned in standard fashion with  specialized ultrasound transducer(s) using both grey scale and limited  color Doppler techniques.     Findings:  The testes demonstrate normal and symmetric echotexture and  vascularity. No evidence of a focal lesion.  The right testicle  measures 4.6 x 2.8 x 3  cm and the left measures 6 x 3.8 x 3.1 cm.  There is no evidence of torsion.     There is a moderate right hydrocele measuring 7.5 cm. There is no  evidence of a significant varicocele.     Both the right and left epididymis are within normal  limits.     There is scrotal thickening throughout. The right inguinal canal is  within normal limits.                                                                      Impression:   Right hydrocele with diffuse scrotal thickening, otherwise normal  testicular ultrasound.    LABS: The last test results for Mr. Juan Francisco Booker were reviewed:  PSA -   Lab Results   Component Value Date    PSA 0.51 06/17/2022    PSA 0.51 07/02/2021    PSA 1.25 01/21/2020    PSA 1.58 12/19/2018    PSA 1.26 11/27/2018     BMP -   Recent Labs   Lab Test 02/17/22  1337 07/02/21  1302 03/24/21  1015 03/08/21  1430    140  --  143   POTASSIUM 4.0 4.0 3.9 4.0   CHLORIDE 104 108  --  108   CO2 29 30  --  30   BUN 21 16  --  19   CR 0.79 0.83  --  0.78   GLC 79 85  --  88   ZHEN 9.0 9.0  --  8.3*       CBC -   Recent Labs   Lab Test 02/21/22  1458 02/17/22  1337 03/08/21  1430   WBC 4.5 4.5 4.1   HGB 13.4 12.8* 13.1*    191 182       ASSESSMENT:   1) BPH with luts (obstructive and irritative) --> UDS previously has shown sensory urgency  2) Constipation    PLAN:  - Continue Avodart (dutasteride)    - At your convenience, you can TRY taking the tamsulosin one pill per day x 14 days to see if this makes any difference.  If you notice no changes, take tamsulosin every other day x 14 days to see if that makes a difference.  If it doesn't make a difference (your symptoms aren't worsening) then try stopping the tamsulosin.  If you still have well-controlled symptoms then stay off the tamsulosin.    - Continue everything you're doing --> you're doing great with your lifestyle changes!  - Return 6 months (or sooner with any concerns).  We could always try a medication to calm the bladder (although these can worsen constipation).  We can also try a bladder stimulator.    - Have a great summer!    VISIT DURATION: 25 minutes (3:15 - 3:35 + 5 minutes documentation on DOS)    Deena Matta PA-C  Department of Urologic Surgery

## 2022-06-30 NOTE — PATIENT INSTRUCTIONS
PLAN:  - Continue Avodart (dutasteride)    - At your convenience, you can TRY taking the tamsulosin one pill per day x 14 days to see if this makes any difference.  If you notice no changes, take tamsulosin every other day x 14 days to see if that makes a difference.  If it doesn't make a difference (your symptoms aren't worsening) then try stopping the tamsulosin.  If you still have well-controlled symptoms then stay off the tamsulosin.    - Continue everything you're doing --> you're doing great with your lifestyle changes!  - Return 6 months (or sooner with any concerns).  We could always try a medication to calm the bladder (although these can worsen constipation).  We can also try a bladder stimulator.    - Have a great summer!    LUZ MARIA [facility]

## 2022-06-30 NOTE — PROGRESS NOTES
HPI: Mr. Juan Francisco Booker is a 62 year old year old male presenting today for evaluation of chief complaint(s): Follow Up    Today, he is unaccompanied    Mr. Juan Francisco Booker has PMH significant for HTN, HLD, gerd, obesity, prediabetes (HGB A1C 5.4%), depression, TIMOTHY, BPH (on flomax 0.8mg daily - started 8/29/18, on avodart - started 5/11/21).  He has followed with urology since 2018.  Please see note from 9/16/21 for a summary of his history.  Essentially he has tried dietary sugar reduction, caffeine reduction, management of constipation, weight loss.  He has tried Myrbetriq 25mg - too expensive. Has tried pelvic floor physical therapy.  We have avoided anticholinergic -type OAB meds because of his severe constipation.  When we saw each other on 3/17/22 he elected to continue working with PFPT. He was voiding 10-12 times per day, drinking 80 ounces of water per day (including herbal tea), he had stopped caffeine tablets and was drinking 2 cups of caffeinated tea in the mornings instead.  Posterior tibial stimulation and interstim/botox were discussed.      Today he returns with a PSA of 0.51ug/L on Avodart.   - Currently voiding about 10 times per day.  Feels he is drinking a little less- typically 84 ounces.  Has 2 glasses of caffeinated tea in the morning but then stops the caffeine.  At night, wakes once and typically voids because he is awake.  No significant urgency. Still cannot walk for 1-1/2 hours without voiding.  Currently voids early in the walk. Stream is fairly good.  Is having some postvoid dribble.  Is trying to double void.    -Bowels - has made a lot of progress.  Uses fiber in the morning, rima seeds and Miralax every day.  Forgets to do the abdominal massage.    -Weight is 180lb - has been having some ice cream and has gained 6lbs.       9/15/21- cystoscopy with Dr. Bolaños - no obstructing tissue.      2/14/20 - UDS did show:  -Large bladder capacity (859 mL) with heightened early  sensations.  -Good bladder compliance without DO/DOI and no JOSEPH  -Moderate detrusor contraction during voiding to max Pdet ~35 cm H2O, which he supplements with Valsalva effort. He first attempts to void from a seated position on the [employer] chair, but is then assisted to standing for a second attempt.  -Borderline slow flow rate (Qmax 12 mL/s) with initial voiding in the UDS suite, with an intermittent, fluctuating flow curve and incomplete bladder emptying ( mL). He then voids to completion into a private uroflow, with a final PVR of 0 mL.   -Increased EMG activity during voiding, which possibly correlates with Valsalva effort/movement, although could also suggest pelvic floor involvement.  -BOOI does not suggest bladder outlet obstruction.  -Fluoroscopy reveals a smooth bladder wall without diverticulae or VUR. The bladder neck was closed during filling and open during voiding.       Current Outpatient Medications   Medication Sig Dispense Refill     Caffeine 100 MG TABS Take 1 tablet by mouth daily as needed        citalopram (CELEXA) 10 MG tablet Take 1 tablet (10 mg) by mouth daily 90 tablet 3     dutasteride (AVODART) 0.5 MG capsule Take 1 capsule (0.5 mg) by mouth daily 60 capsule 5     finasteride (PROSCAR) 5 MG tablet Take 1 tablet (5 mg) by mouth daily 90 tablet 3     fluticasone (FLONASE) 50 MCG/ACT nasal spray Spray 1-2 sprays into both nostrils daily 48 g 1     gabapentin (NEURONTIN) 300 MG capsule 1 in morning, 1 at midday, 3 at night (Patient taking differently: 3 in morning, 3 at night) 450 capsule 3     lisinopril (ZESTRIL) 20 MG tablet Take 1 tablet (20 mg) by mouth daily 90 tablet 3     Multiple Vitamin (DAILY MULTIVITAMIN PO) Take 1 tablet by mouth daily AM       ORDER FOR DME Use your CPAP device as directed by your provider.       polyethylene glycol (MIRALAX) 17 GM/Dose powder Take 9-17 g by mouth daily 510 g 11     psyllium (METAMUCIL) 28.3 % packet Take 1 packet by mouth daily        simvastatin (ZOCOR) 40 MG tablet Take 1 tablet (40 mg) by mouth At Bedtime 90 tablet 3     SODIUM FLUORIDE 5000 PPM 1.1 % PSTE BRUSH TEETH WITH PEA SIZED AMOUNT AT BEDTIME AND DO NOT RINSE       tamsulosin (FLOMAX) 0.4 MG capsule Take 2 capsules (0.8 mg) by mouth daily 30 min after a meal. 60 capsule 9     tiZANidine (ZANAFLEX) 2 MG tablet Take 1 tablet at night. If no side effects and no improvement in headaches after 1 week, can increase to 2 tablet at night. 60 tablet 5       ALLERGIES: Grass      REVIEW OF SYSTEMS:  As above in HPI  GENERAL PHYSICAL EXAM:   Vitals: /74   Pulse 69   There is no height or weight on file to calculate BMI.    GENERAL: Well groomed, well developed, well nourished male in NAD.  NEURO: Alert and oriented x 3.  PSYCH: Normal mood and affect, pleasant and agreeable during interview and exam.     RADIOLOGY: The following tests were reviewed:   EXAMINATION: US TESTICULAR AND SCROTAL, 5/21/2021 10:35 AM      COMPARISON: None.     HISTORY: Had laparoscopic inguinal hernia repair on the right,  evaluate scrotal contents     TECHNIQUE: The scrotum was scanned in standard fashion with  specialized ultrasound transducer(s) using both grey scale and limited  color Doppler techniques.     Findings:  The testes demonstrate normal and symmetric echotexture and  vascularity. No evidence of a focal lesion.  The right testicle  measures 4.6 x 2.8 x 3  cm and the left measures 6 x 3.8 x 3.1 cm.  There is no evidence of torsion.     There is a moderate right hydrocele measuring 7.5 cm. There is no  evidence of a significant varicocele.     Both the right and left epididymis are within normal limits.     There is scrotal thickening throughout. The right inguinal canal is  within normal limits.                                                                      Impression:   Right hydrocele with diffuse scrotal thickening, otherwise normal  testicular ultrasound.    LABS: The last test results for  Mr. Juan Francisco Booker were reviewed:  PSA -   Lab Results   Component Value Date    PSA 0.51 06/17/2022    PSA 0.51 07/02/2021    PSA 1.25 01/21/2020    PSA 1.58 12/19/2018    PSA 1.26 11/27/2018     BMP -   Recent Labs   Lab Test 02/17/22  1337 07/02/21  1302 03/24/21  1015 03/08/21  1430    140  --  143   POTASSIUM 4.0 4.0 3.9 4.0   CHLORIDE 104 108  --  108   CO2 29 30  --  30   BUN 21 16  --  19   CR 0.79 0.83  --  0.78   GLC 79 85  --  88   ZHEN 9.0 9.0  --  8.3*       CBC -   Recent Labs   Lab Test 02/21/22  1458 02/17/22  1337 03/08/21  1430   WBC 4.5 4.5 4.1   HGB 13.4 12.8* 13.1*    191 182       ASSESSMENT:   1) BPH with luts (obstructive and irritative) --> UDS previously has shown sensory urgency  2) Constipation    PLAN:  - Continue Avodart (dutasteride)    - At your convenience, you can TRY taking the tamsulosin one pill per day x 14 days to see if this makes any difference.  If you notice no changes, take tamsulosin every other day x 14 days to see if that makes a difference.  If it doesn't make a difference (your symptoms aren't worsening) then try stopping the tamsulosin.  If you still have well-controlled symptoms then stay off the tamsulosin.    - Continue everything you're doing --> you're doing great with your lifestyle changes!  - Return 6 months (or sooner with any concerns).  We could always try a medication to calm the bladder (although these can worsen constipation).  We can also try a bladder stimulator.    - Have a great summer!    VISIT DURATION: 25 minutes (3:15 - 3:35 + 5 minutes documentation on DOS)    Deena Matta PA-C  Department of Urologic Surgery

## 2022-06-30 NOTE — NURSING NOTE
Chief Complaint   Patient presents with     Follow Up       Blood pressure 112/74, pulse 69. There is no height or weight on file to calculate BMI.    Patient Active Problem List   Diagnosis     Obstructive sleep apnea     Insomnia     Excessive sleepiness     Chronic nasal congestion     Lesion of ulnar nerve     Hyperlipidemia     Essential hypertension     Obesity     Benign neoplasm of colon     Hyperlipidemia     Moderate major depression (H)     Gastric polyps     Cervical pain     Impaired glucose tolerance test     Prediabetes     Skin tag     Unilateral inguinal hernia with obstruction and without gangrene, recurrence not specified     History of ETOH abuse     Urinary urgency     Urinary frequency       Allergies   Allergen Reactions     Grass        Current Outpatient Medications   Medication Sig Dispense Refill     Caffeine 100 MG TABS Take 1 tablet by mouth daily as needed        citalopram (CELEXA) 10 MG tablet Take 1 tablet (10 mg) by mouth daily 90 tablet 3     dutasteride (AVODART) 0.5 MG capsule Take 1 capsule (0.5 mg) by mouth daily 60 capsule 5     finasteride (PROSCAR) 5 MG tablet Take 1 tablet (5 mg) by mouth daily 90 tablet 3     fluticasone (FLONASE) 50 MCG/ACT nasal spray Spray 1-2 sprays into both nostrils daily 48 g 1     gabapentin (NEURONTIN) 300 MG capsule 1 in morning, 1 at midday, 3 at night (Patient taking differently: 3 in morning, 3 at night) 450 capsule 3     lisinopril (ZESTRIL) 20 MG tablet Take 1 tablet (20 mg) by mouth daily 90 tablet 3     Multiple Vitamin (DAILY MULTIVITAMIN PO) Take 1 tablet by mouth daily AM       ORDER FOR DME Use your CPAP device as directed by your provider.       polyethylene glycol (MIRALAX) 17 GM/Dose powder Take 9-17 g by mouth daily 510 g 11     psyllium (METAMUCIL) 28.3 % packet Take 1 packet by mouth daily       simvastatin (ZOCOR) 40 MG tablet Take 1 tablet (40 mg) by mouth At Bedtime 90 tablet 3     SODIUM FLUORIDE 5000 PPM 1.1 % PSTE BRUSH  TEETH WITH PEA SIZED AMOUNT AT BEDTIME AND DO NOT RINSE       tamsulosin (FLOMAX) 0.4 MG capsule Take 2 capsules (0.8 mg) by mouth daily 30 min after a meal. 60 capsule 9     tiZANidine (ZANAFLEX) 2 MG tablet Take 1 tablet at night. If no side effects and no improvement in headaches after 1 week, can increase to 2 tablet at night. 60 tablet 5       Social History     Tobacco Use     Smoking status: Never Smoker     Smokeless tobacco: Never Used   Substance Use Topics     Alcohol use: Not Currently     Comment: In remission since 2009     Drug use: Veronica Bello  6/30/2022  2:56 PM

## 2022-07-03 PROBLEM — D22.9 MULTIPLE NEVI: Status: ACTIVE | Noted: 2022-07-03

## 2022-07-03 PROBLEM — L82.1 SEBORRHEIC KERATOSIS: Status: ACTIVE | Noted: 2022-07-03

## 2022-07-03 PROBLEM — L83 ACANTHOSIS NIGRICANS: Status: ACTIVE | Noted: 2022-07-03

## 2022-07-03 PROBLEM — L91.8 ACROCHORDON: Status: ACTIVE | Noted: 2022-07-03

## 2022-07-13 NOTE — TELEPHONE ENCOUNTER
Pending Prescriptions:                       Disp   Refills    fluticasone (FLONASE) 50 MCG/ACT nasal sp*48 g   1            Sig: Spray 1-2 sprays into both nostrils daily    Last Written Prescription Date:  11/12/2021  Last Fill Quantity: 48 g,   # refills: 1  Last Office Visit with [facility], [facility] or [facility] prescribing provider: 5/4/2022  Future Office visit:   No follow up scheduled at this time.      ALESSANDRA Brewer

## 2022-07-14 RX ORDER — FLUTICASONE PROPIONATE 50 MCG
1-2 SPRAY, SUSPENSION (ML) NASAL DAILY
Qty: 48 G | Refills: 1 | Status: SHIPPED | OUTPATIENT
Start: 2022-07-14 | End: 2023-01-03

## 2022-08-15 DIAGNOSIS — R39.12 BENIGN PROSTATIC HYPERPLASIA WITH WEAK URINARY STREAM: Primary | ICD-10-CM

## 2022-08-15 DIAGNOSIS — N40.1 BENIGN PROSTATIC HYPERPLASIA WITH WEAK URINARY STREAM: Primary | ICD-10-CM

## 2022-08-17 RX ORDER — FINASTERIDE 5 MG/1
1 TABLET, FILM COATED ORAL DAILY
Qty: 90 TABLET | Refills: 3 | Status: SHIPPED | OUTPATIENT
Start: 2022-08-17 | End: 2022-12-06

## 2022-09-08 DIAGNOSIS — K59.09 OTHER CONSTIPATION: ICD-10-CM

## 2022-09-13 RX ORDER — POLYETHYLENE GLYCOL 3350 17 G/17G
9-17 POWDER, FOR SOLUTION ORAL DAILY
Qty: 578 G | Refills: 11 | Status: SHIPPED | OUTPATIENT
Start: 2022-09-13

## 2022-09-13 NOTE — TELEPHONE ENCOUNTER
POLYETHYLENE GLYCOL 3350 POWD  Last Written Prescription Date:  9/16/2021  Last Fill Quantity: 510,   # refills: 11  Last Office Visit :  6/30/2022  Future Office visit:   12/20/2022  Refer to clinic for review and refills      Cris Almeida RN  Central Triage Red Flags/Med Refills

## 2022-10-16 ENCOUNTER — HEALTH MAINTENANCE LETTER (OUTPATIENT)
Age: 62
End: 2022-10-16

## 2022-10-16 DIAGNOSIS — F32.A DEPRESSION, UNSPECIFIED DEPRESSION TYPE: ICD-10-CM

## 2022-10-16 DIAGNOSIS — I10 BENIGN ESSENTIAL HYPERTENSION: ICD-10-CM

## 2022-10-16 DIAGNOSIS — R42 DIZZINESS: ICD-10-CM

## 2022-10-16 DIAGNOSIS — G62.9 PERIPHERAL POLYNEUROPATHY: ICD-10-CM

## 2022-10-17 NOTE — TELEPHONE ENCOUNTER
"Request for medication refill:    citalopram (CELEXA) 10 MG tablet  gabapentin (NEURONTIN) 300 MG capsule  lisinopril (ZESTRIL) 20 MG tablet    Providers if patient needs an appointment and you are willing to give a one month supply please refill for one month and  send a letter/MyChart using \".SMILLIMITEDREFILL\" .smillimited and route chart to \"[facility] \" (Giving one month refill in non controlled medications is strongly recommended before denial)    If refill has been denied, meaning absolutely no refills without visit, please complete the smart phrase \".smirxrefuse\" and route it to the \"P [facility] MED REFILLS\"  pool to inform the patient and the pharmacy.    Kwadwo Schroeder MA        "

## 2022-10-18 RX ORDER — GABAPENTIN 300 MG/1
900 CAPSULE ORAL 2 TIMES DAILY
Qty: 540 CAPSULE | Refills: 3 | Status: SHIPPED | OUTPATIENT
Start: 2022-10-18 | End: 2024-03-07

## 2022-10-18 RX ORDER — LISINOPRIL 20 MG/1
20 TABLET ORAL DAILY
Qty: 90 TABLET | Refills: 3 | Status: SHIPPED | OUTPATIENT
Start: 2022-10-18 | End: 2023-10-31

## 2022-10-18 RX ORDER — CITALOPRAM HYDROBROMIDE 10 MG/1
10 TABLET ORAL DAILY
Qty: 90 TABLET | Refills: 3 | Status: SHIPPED | OUTPATIENT
Start: 2022-10-18 | End: 2023-10-31

## 2022-11-07 ENCOUNTER — TELEPHONE (OUTPATIENT)
Dept: UROLOGY | Facility: CLINIC | Age: 62
End: 2022-11-07

## 2022-11-07 NOTE — TELEPHONE ENCOUNTER
Called and left message with Urology number to reschedule 12/22/22 appointment to either 12/06/22 or 1/26/2023. In one of the slot with Jackie as Provider Jackie is not available.    Isabela Funes, Clinic , 11/07/22 12:38PM

## 2022-11-09 NOTE — PROGRESS NOTES
[facility]    Outpatient Physical Therapy Discharge Note  Patient: Juan Francisco Booker  : 1960    Beginning/End Dates of Reporting Period:  22 to 22    Referring Provider: Dr. Chou    Therapy Diagnosis: Cervical and Lumbar pain     Client Self Report: Patient is doing well. His headaches have not improved since last visit but overall since the start of PT the intensity has gone down.    Objective Measurements:  Objective Measure: oswestry: 34 on IE     Objective Measure: Pain level  Details: 2          Goals:  Goal Identifier sitting   Goal Description Pt will be able to sit in computer chair and drivers seat for 30 minutes with decrease pain of 0-1/10   Target Date 22   Date Met      Progress (detail required for progress note): Maybe better- today was better     Goal Identifier walking   Goal Description Pt will be able to complete TM workout of 1 hours with less pain at the end of the workout to 0-1/10   Target Date 22   Date Met      Progress (detail required for progress note): 3 following an hour     Goal Identifier 3   Goal Description Patient will decrease HA occurence <2/week in 8 weeks   Target Date 22   Date Met      Progress (detail required for progress note): Not as intense     Goal Identifier     Goal Description     Target Date     Date Met      Progress (detail required for progress note):       Goal Identifier     Goal Description     Target Date     Date Met      Progress (detail required for progress note):       Goal Identifier     Goal Description     Target Date     Date Met      Progress (detail required for progress note):       Goal Identifier     Goal Description     Target Date     Date Met      Progress (detail required for progress note):       Goal Identifier     Goal Description     Target Date     Date Met      Progress (detail required for progress  note):             Plan:  Discharge from therapy.    Discharge:    Reason for Discharge: pt was ready to start his independency with his HEP.      Equipment Issued:     Discharge Plan: Patient to continue home program.

## 2022-11-09 NOTE — ADDENDUM NOTE
Encounter addended by: Trena Izaguirre, PT on: 11/9/2022 10:38 AM   Actions taken: Episode resolved, Clinical Note Signed TRANSFER - IN REPORT:    Verbal report received from [facility] on [employer]  being received from [facility]) for routine progression of care      Report consisted of patients Situation, Background, Assessment and   Recommendations(SBAR). Information from the following report(s) SBAR and Procedure Summary was reviewed with the receiving nurse. Opportunity for questions and clarification was provided. Assessment will be completed upon patients arrival to unit and care will be assumed.

## 2022-12-01 ASSESSMENT — ENCOUNTER SYMPTOMS: FREQUENCY: 1

## 2022-12-06 ENCOUNTER — OFFICE VISIT (OUTPATIENT)
Dept: UROLOGY | Facility: CLINIC | Age: 62
End: 2022-12-06
Payer: COMMERCIAL

## 2022-12-06 VITALS — DIASTOLIC BLOOD PRESSURE: 85 MMHG | HEART RATE: 60 BPM | SYSTOLIC BLOOD PRESSURE: 135 MMHG

## 2022-12-06 DIAGNOSIS — R39.12 BENIGN PROSTATIC HYPERPLASIA WITH WEAK URINARY STREAM: ICD-10-CM

## 2022-12-06 DIAGNOSIS — N40.1 BENIGN PROSTATIC HYPERPLASIA WITH WEAK URINARY STREAM: ICD-10-CM

## 2022-12-06 PROCEDURE — 99213 OFFICE O/P EST LOW 20 MIN: CPT | Performed by: PHYSICIAN ASSISTANT

## 2022-12-06 RX ORDER — FINASTERIDE 5 MG/1
1 TABLET, FILM COATED ORAL DAILY
Qty: 90 TABLET | Refills: 3 | Status: SHIPPED | OUTPATIENT
Start: 2022-12-06 | End: 2023-12-20

## 2022-12-06 ASSESSMENT — PAIN SCALES - GENERAL: PAINLEVEL: NO PAIN (0)

## 2022-12-06 NOTE — LETTER
12/6/2022       RE: Juan Francisco Booker  [address]  [place] [address]     Dear Colleague,    Thank you for referring your patient, Juan Francisco Booker, to the [facility] UROLOG[facility] [place] at [facility]. Please see a copy of my visit note below.    HPI: Mr. Juan Francisco Booker is a 62 year old year old male presenting today for evaluation of chief complaint(s): Follow Up    Today, he is accompanied    Mr. Juan Francisco Booker has PMH significant for HTN, HLD, gerd, obesity, prediabetes (HGB A1C 5.4%), depression, TIMOTHY, BPH (on flomax 0.8mg daily - started 8/29/18, on avodart - started 5/11/21).  He has followed with urology since 2018.  Please see note from 9/16/21 for a summary of his history.  Essentially he has tried dietary sugar reduction, caffeine reduction, management of constipation, weight loss.  He has tried Myrbetriq 25mg - too expensive. Has tried pelvic floor physical therapy.  We have avoided anticholinergic -type OAB meds because of his severe constipation.  When we saw each other on 6/30/22 he continued avodart.  He was going to try reducing his tamsulosin dose and potentially even stopping the medication.      Today he returns:  - Often he voids two or three times.  The first time is a normal volume, then the second void, which takes place 5 minutes later, is much smaller.  Sometimes there is a third.   - Can have a little bit of postvoid dribbling.    - Nocturia - pretty good.  Sometimes nocturia x 0.  Kind of streaky.  Uses a CPAP at night.    - Daytime frequency - not too bad.  More than others but not too bothersome.    - Lately is drinking too much fluid.  Enjoys drinking tea.  Eating oranges and tomatoes (fruits and veggies with fluids).  No sugar free supplements except Citrucel  - Bowels have been much better on citrucil in the morning and metamucil in the evenings.  Miralax in the evenings.   - Does an hour on the treadmill every day.   Does need to stop in the middle of the treadmill to urinate.    - Did have an accident in a museum lately.  Hasn't been doing physical therapy exercises recently   No sexual side effects.    No issues with hair growth or breast tissue.      Review of Diagnostics:  6/27/22 - 0.51ug/L  4/18/22 - UA negative   9/15/21- cystoscopy with Dr. Bolaños - no obstructing tissue.    2/14/20 - UDS - bladder capacity 859cc with heightened early sensations.  Normal compliance.  With voiding, pDet soco to 75ugY5T and with valsalve he voided Qmax 12mL/s with final PVR of zero.      Current Outpatient Medications   Medication Sig Dispense Refill     finasteride (PROSCAR) 5 MG tablet Take 1 tablet (5 mg) by mouth daily 90 tablet 3     Caffeine 100 MG TABS Take 1 tablet by mouth daily as needed        citalopram (CELEXA) 10 MG tablet Take 1 tablet (10 mg) by mouth daily 90 tablet 3     dutasteride (AVODART) 0.5 MG capsule Take 1 capsule (0.5 mg) by mouth daily 60 capsule 5     fluticasone (FLONASE) 50 MCG/ACT nasal spray Spray 1-2 sprays into both nostrils daily 48 g 1     gabapentin (NEURONTIN) 300 MG capsule Take 3 capsules (900 mg) by mouth 2 times daily for 90 days 3 capsules in the morning and 3 capsules in the evening. 540 capsule 3     lisinopril (ZESTRIL) 20 MG tablet Take 1 tablet (20 mg) by mouth daily 90 tablet 3     Multiple Vitamin (DAILY MULTIVITAMIN PO) Take 1 tablet by mouth daily AM       ORDER FOR DME Use your CPAP device as directed by your provider.       polyethylene glycol (MIRALAX) 17 GM/Dose powder Take 9-17 g by mouth daily (Mix in liquid) 578 g 11     psyllium (METAMUCIL) 28.3 % packet Take 1 packet by mouth daily       simvastatin (ZOCOR) 40 MG tablet Take 1 tablet (40 mg) by mouth At Bedtime 90 tablet 3     SODIUM FLUORIDE 5000 PPM 1.1 % PSTE BRUSH TEETH WITH PEA SIZED AMOUNT AT BEDTIME AND DO NOT RINSE       tamsulosin (FLOMAX) 0.4 MG capsule Take 2 capsules (0.8 mg) by mouth daily 30 min after a meal. 60  capsule 9     tiZANidine (ZANAFLEX) 2 MG tablet Take 1 tablet at night. If no side effects and no improvement in headaches after 1 week, can increase to 2 tablet at night. 60 tablet 5       ALLERGIES: Grass      REVIEW OF SYSTEMS:  As above in HPI     GENERAL PHYSICAL EXAM:   Vitals: /85   Pulse 60   There is no height or weight on file to calculate BMI.    GENERAL: Well groomed, well developed, well nourished male in NAD.  NEURO: Alert and oriented x 3.  PSYCH: Normal mood and affect, pleasant and agreeable during interview and exam.    LABS: The last test results for Mr. Juan Francisco Booker were reviewed:  PSA -   Lab Results   Component Value Date    PSA 0.51 06/17/2022    PSA 0.51 07/02/2021    PSA 1.25 01/21/2020    PSA 1.58 12/19/2018    PSA 1.26 11/27/2018     BMP -   Recent Labs   Lab Test 02/17/22  1337 07/02/21  1302 03/24/21  1015 03/08/21  1430    140  --  143   POTASSIUM 4.0 4.0 3.9 4.0   CHLORIDE 104 108  --  108   CO2 29 30  --  30   BUN 21 16  --  19   CR 0.79 0.83  --  0.78   GLC 79 85  --  88   ZHEN 9.0 9.0  --  8.3*       CBC -   Recent Labs   Lab Test 02/21/22  1458 02/17/22  1337 03/08/21  1430   WBC 4.5 4.5 4.1   HGB 13.4 12.8* 13.1*    191 182     ASSESSMENT:   1) BPH with luts (obstructive and irritative) --> UDS previously has shown sensory urgency  2) Constipation       PLAN:   - Continue physical therapy exercises a few nights per week.    - Continue fiber and medications for constipation prevention  - Continue finasteride - refilled today  - Take time at the toilet to empty as much as possible.  Double void.     - Return in 1 year with a PSA     VISIT DURATION: 24 minutes (3:35 - 3:54 + 5 minutes documentation on DOS)    Deena Matta PA-C  Department of Urologic Surgery

## 2022-12-06 NOTE — NURSING NOTE
Chief Complaint   Patient presents with     Follow Up       Blood pressure 135/85, pulse 60. There is no height or weight on file to calculate BMI.    Patient Active Problem List   Diagnosis     Obstructive sleep apnea     Insomnia     Excessive sleepiness     Chronic nasal congestion     Lesion of ulnar nerve     Hyperlipidemia     Essential hypertension     Obesity     Benign neoplasm of colon     Hyperlipidemia     Moderate major depression (H)     Gastric polyps     Cervical pain     Impaired glucose tolerance test     Prediabetes     Skin tag     Unilateral inguinal hernia with obstruction and without gangrene, recurrence not specified     History of ETOH abuse     Urinary urgency     Urinary frequency     Multiple nevi     Acrochordon     Seborrheic keratosis     Acanthosis nigricans       Allergies   Allergen Reactions     Grass        Current Outpatient Medications   Medication Sig Dispense Refill     finasteride (PROSCAR) 5 MG tablet Take 1 tablet (5 mg) by mouth daily 90 tablet 3     Caffeine 100 MG TABS Take 1 tablet by mouth daily as needed        citalopram (CELEXA) 10 MG tablet Take 1 tablet (10 mg) by mouth daily 90 tablet 3     dutasteride (AVODART) 0.5 MG capsule Take 1 capsule (0.5 mg) by mouth daily 60 capsule 5     fluticasone (FLONASE) 50 MCG/ACT nasal spray Spray 1-2 sprays into both nostrils daily 48 g 1     gabapentin (NEURONTIN) 300 MG capsule Take 3 capsules (900 mg) by mouth 2 times daily for 90 days 3 capsules in the morning and 3 capsules in the evening. 540 capsule 3     lisinopril (ZESTRIL) 20 MG tablet Take 1 tablet (20 mg) by mouth daily 90 tablet 3     Multiple Vitamin (DAILY MULTIVITAMIN PO) Take 1 tablet by mouth daily AM       ORDER FOR DME Use your CPAP device as directed by your provider.       polyethylene glycol (MIRALAX) 17 GM/Dose powder Take 9-17 g by mouth daily (Mix in liquid) 578 g 11     psyllium (METAMUCIL) 28.3 % packet Take 1 packet by mouth daily       simvastatin  (ZOCOR) 40 MG tablet Take 1 tablet (40 mg) by mouth At Bedtime 90 tablet 3     SODIUM FLUORIDE 5000 PPM 1.1 % PSTE BRUSH TEETH WITH PEA SIZED AMOUNT AT BEDTIME AND DO NOT RINSE       tamsulosin (FLOMAX) 0.4 MG capsule Take 2 capsules (0.8 mg) by mouth daily 30 min after a meal. 60 capsule 9     tiZANidine (ZANAFLEX) 2 MG tablet Take 1 tablet at night. If no side effects and no improvement in headaches after 1 week, can increase to 2 tablet at night. 60 tablet 5       Social History     Tobacco Use     Smoking status: Never     Smokeless tobacco: Never   Substance Use Topics     Alcohol use: Not Currently     Comment: In remission since 2009     Drug use: No       Jose Bello  12/6/2022  3:30 PM

## 2022-12-06 NOTE — PATIENT INSTRUCTIONS
PLAN:   - Continue physical therapy exercises a few nights per week.    - Continue fiber and medications for constipation prevention  - Continue finasteride    - Return in 1 year with a PSA     [facility]

## 2022-12-06 NOTE — PROGRESS NOTES
HPI: Mr. Juan Francisco Booker is a 62 year old year old male presenting today for evaluation of chief complaint(s): Follow Up    Today, he is accompanied    Mr. Juan Francisco Booker has PMH significant for HTN, HLD, gerd, obesity, prediabetes (HGB A1C 5.4%), depression, TIMOTHY, BPH (on flomax 0.8mg daily - started 8/29/18, on avodart - started 5/11/21).  He has followed with urology since 2018.  Please see note from 9/16/21 for a summary of his history.  Essentially he has tried dietary sugar reduction, caffeine reduction, management of constipation, weight loss.  He has tried Myrbetriq 25mg - too expensive. Has tried pelvic floor physical therapy.  We have avoided anticholinergic -type OAB meds because of his severe constipation.  When we saw each other on 6/30/22 he continued avodart.  He was going to try reducing his tamsulosin dose and potentially even stopping the medication.      Today he returns:  - Often he voids two or three times.  The first time is a normal volume, then the second void, which takes place 5 minutes later, is much smaller.  Sometimes there is a third.   - Can have a little bit of postvoid dribbling.    - Nocturia - pretty good.  Sometimes nocturia x 0.  Kind of streaky.  Uses a CPAP at night.    - Daytime frequency - not too bad.  More than others but not too bothersome.    - Lately is drinking too much fluid.  Enjoys drinking tea.  Eating oranges and tomatoes (fruits and veggies with fluids).  No sugar free supplements except Citrucel  - Bowels have been much better on citrucil in the morning and metamucil in the evenings.  Miralax in the evenings.   - Does an hour on the treadmill every day.  Does need to stop in the middle of the treadmill to urinate.    - Did have an accident in a museum lately.  Hasn't been doing physical therapy exercises recently   No sexual side effects.    No issues with hair growth or breast tissue.      Review of Diagnostics:  6/27/22 - 0.51ug/L  4/18/22 - UA negative   9/15/21-  cystoscopy with Dr. Bolaños - no obstructing tissue.    2/14/20 - UDS - bladder capacity 859cc with heightened early sensations.  Normal compliance.  With voiding, pDet soco to [id number] and with valsalve he voided Qmax 12mL/s with final PVR of zero.      Current Outpatient Medications   Medication Sig Dispense Refill     finasteride (PROSCAR) 5 MG tablet Take 1 tablet (5 mg) by mouth daily 90 tablet 3     Caffeine 100 MG TABS Take 1 tablet by mouth daily as needed        citalopram (CELEXA) 10 MG tablet Take 1 tablet (10 mg) by mouth daily 90 tablet 3     dutasteride (AVODART) 0.5 MG capsule Take 1 capsule (0.5 mg) by mouth daily 60 capsule 5     fluticasone (FLONASE) 50 MCG/ACT nasal spray Spray 1-2 sprays into both nostrils daily 48 g 1     gabapentin (NEURONTIN) 300 MG capsule Take 3 capsules (900 mg) by mouth 2 times daily for 90 days 3 capsules in the morning and 3 capsules in the evening. 540 capsule 3     lisinopril (ZESTRIL) 20 MG tablet Take 1 tablet (20 mg) by mouth daily 90 tablet 3     Multiple Vitamin (DAILY MULTIVITAMIN PO) Take 1 tablet by mouth daily AM       ORDER FOR DME Use your CPAP device as directed by your provider.       polyethylene glycol (MIRALAX) 17 GM/Dose powder Take 9-17 g by mouth daily (Mix in liquid) 578 g 11     psyllium (METAMUCIL) 28.3 % packet Take 1 packet by mouth daily       simvastatin (ZOCOR) 40 MG tablet Take 1 tablet (40 mg) by mouth At Bedtime 90 tablet 3     SODIUM FLUORIDE 5000 PPM 1.1 % PSTE BRUSH TEETH WITH PEA SIZED AMOUNT AT BEDTIME AND DO NOT RINSE       tamsulosin (FLOMAX) 0.4 MG capsule Take 2 capsules (0.8 mg) by mouth daily 30 min after a meal. 60 capsule 9     tiZANidine (ZANAFLEX) 2 MG tablet Take 1 tablet at night. If no side effects and no improvement in headaches after 1 week, can increase to 2 tablet at night. 60 tablet 5       ALLERGIES: Grass      REVIEW OF SYSTEMS:  As above in HPI     GENERAL PHYSICAL EXAM:   Vitals: /85   Pulse 60   There is no  height or weight on file to calculate BMI.    GENERAL: Well groomed, well developed, well nourished male in NAD.  NEURO: Alert and oriented x 3.  PSYCH: Normal mood and affect, pleasant and agreeable during interview and exam.    LABS: The last test results for Mr. Juan Francisco Booker were reviewed:  PSA -   Lab Results   Component Value Date    PSA 0.51 06/17/2022    PSA 0.51 07/02/2021    PSA 1.25 01/21/2020    PSA 1.58 12/19/2018    PSA 1.26 11/27/2018     BMP -   Recent Labs   Lab Test 02/17/22  1337 07/02/21  1302 03/24/21  1015 03/08/21  1430    140  --  143   POTASSIUM 4.0 4.0 3.9 4.0   CHLORIDE 104 108  --  108   CO2 29 30  --  30   BUN 21 16  --  19   CR 0.79 0.83  --  0.78   GLC 79 85  --  88   ZHEN 9.0 9.0  --  8.3*       CBC -   Recent Labs   Lab Test 02/21/22  1458 02/17/22  1337 03/08/21  1430   WBC 4.5 4.5 4.1   HGB 13.4 12.8* 13.1*    191 182     ASSESSMENT:   1) BPH with luts (obstructive and irritative) --> UDS previously has shown sensory urgency  2) Constipation       PLAN:   - Continue physical therapy exercises a few nights per week.    - Continue fiber and medications for constipation prevention  - Continue finasteride - refilled today  - Take time at the toilet to empty as much as possible.  Double void.     - Return in 1 year with a PSA     VISIT DURATION: 24 minutes (3:35 - 3:54 + 5 minutes documentation on DOS)    Deena Matta PA-C  Department of Urologic Surgery

## 2022-12-08 ENCOUNTER — OFFICE VISIT (OUTPATIENT)
Dept: FAMILY MEDICINE | Facility: CLINIC | Age: 62
End: 2022-12-08
Payer: COMMERCIAL

## 2022-12-08 VITALS
OXYGEN SATURATION: 99 % | TEMPERATURE: 97.6 F | WEIGHT: 192.8 LBS | SYSTOLIC BLOOD PRESSURE: 144 MMHG | HEART RATE: 61 BPM | DIASTOLIC BLOOD PRESSURE: 86 MMHG | BODY MASS INDEX: 28.47 KG/M2

## 2022-12-08 DIAGNOSIS — K59.00 CONSTIPATION, UNSPECIFIED CONSTIPATION TYPE: ICD-10-CM

## 2022-12-08 DIAGNOSIS — Z00.00 ROUTINE GENERAL MEDICAL EXAMINATION AT A HEALTH CARE FACILITY: ICD-10-CM

## 2022-12-08 DIAGNOSIS — Z79.899 ON STATIN THERAPY: ICD-10-CM

## 2022-12-08 DIAGNOSIS — E78.5 HYPERLIPIDEMIA LDL GOAL <130: Primary | ICD-10-CM

## 2022-12-08 LAB
ALBUMIN SERPL BCG-MCNC: 4.3 G/DL (ref 3.5–5.2)
ALP SERPL-CCNC: 59 U/L (ref 40–129)
ALT SERPL W P-5'-P-CCNC: 24 U/L (ref 10–50)
ANION GAP SERPL CALCULATED.3IONS-SCNC: 9 MMOL/L (ref 7–15)
AST SERPL W P-5'-P-CCNC: 29 U/L (ref 10–50)
BILIRUB SERPL-MCNC: 0.3 MG/DL
BUN SERPL-MCNC: 12 MG/DL (ref 8–23)
CALCIUM SERPL-MCNC: 9.4 MG/DL (ref 8.8–10.2)
CHLORIDE SERPL-SCNC: 100 MMOL/L (ref 98–107)
CHOLEST SERPL-MCNC: 151 MG/DL
CREAT SERPL-MCNC: 0.85 MG/DL (ref 0.67–1.17)
DEPRECATED HCO3 PLAS-SCNC: 31 MMOL/L (ref 22–29)
GFR SERPL CREATININE-BSD FRML MDRD: >90 ML/MIN/1.73M2
GLUCOSE SERPL-MCNC: 92 MG/DL (ref 70–99)
HDLC SERPL-MCNC: 44 MG/DL
LDLC SERPL CALC-MCNC: 66 MG/DL
NONHDLC SERPL-MCNC: 107 MG/DL
POTASSIUM SERPL-SCNC: 4.6 MMOL/L (ref 3.4–5.3)
PROT SERPL-MCNC: 6.1 G/DL (ref 6.4–8.3)
SODIUM SERPL-SCNC: 140 MMOL/L (ref 136–145)
TRIGL SERPL-MCNC: 205 MG/DL

## 2022-12-08 PROCEDURE — 80053 COMPREHEN METABOLIC PANEL: CPT | Performed by: STUDENT IN AN ORGANIZED HEALTH CARE EDUCATION/TRAINING PROGRAM

## 2022-12-08 PROCEDURE — 99396 PREV VISIT EST AGE 40-64: CPT | Mod: GC | Performed by: STUDENT IN AN ORGANIZED HEALTH CARE EDUCATION/TRAINING PROGRAM

## 2022-12-08 PROCEDURE — 80061 LIPID PANEL: CPT | Performed by: STUDENT IN AN ORGANIZED HEALTH CARE EDUCATION/TRAINING PROGRAM

## 2022-12-08 PROCEDURE — 36415 COLL VENOUS BLD VENIPUNCTURE: CPT | Performed by: STUDENT IN AN ORGANIZED HEALTH CARE EDUCATION/TRAINING PROGRAM

## 2022-12-08 ASSESSMENT — ENCOUNTER SYMPTOMS: FREQUENCY: 1

## 2022-12-08 ASSESSMENT — PATIENT HEALTH QUESTIONNAIRE - PHQ9: SUM OF ALL RESPONSES TO PHQ QUESTIONS 1-9: 6

## 2022-12-08 NOTE — PROGRESS NOTES
"SUBJECTIVE:   CC: Tomi is an 62 year old who presents for preventative health visit.     Patient has been advised of split billing requirements and indicates understanding: Yes  Healthy Habits:     Getting at least 3 servings of Calcium per day:  Yes    Bi-annual eye exam:  Yes    Dental care twice a year:  Yes    Sleep apnea or symptoms of sleep apnea:  Sleep apnea    Diet:  Regular (no restrictions)    Frequency of exercise:  6-7 days/week    Duration of exercise:  45-60 minutes    Taking medications regularly:  Yes    Medication side effects:  None    PHQ-2 Total Score: 0    Additional concerns today:  Yes    Med Hx    No longer taking caffine pills    Celexa 10mg - MDD/SARAH - Ced    Finasteride 5mg - BPH - urologist (saw them 2 days ago)    Flonase 50mcg daily - Allergies     Gabapentin 300mg BID - Restless legs / peripheral neuropathy - Ced AND sleep doctor    CPAP    Lisinopril 20mg - HTN - SBP is usually low 130s/low 80s at home (has a cuff, once a month)    MV    Miralax every evening - Constipation    Citrucel - Constipation (alternative to metamucil)    Simvastatin 40mg - HLD     Tizanidine - headaches, didn't work, not taking     Prescription toothpaste  From dentist - sees them 3 times a year     Prescription glasses - around once a year     Fatigue    Not new - when he works out he feels quite tried afterword    Workout - treadmills     Home life    Live on his own in a condo    Has friends he spend with during the holidays    Volunteering (homework center, tax help); read and watching movies - right now reading \"the mystery\" by alva hdz, right now watching The Wire (on season 3)    Nutrition - No takeout, frozen dinners with cooked vegetables, does cook from scratch every once in a while    Physical activity - in the winter treadmill daily (1hr), in the summer daily walks (1hr)    ADLs - cooking / managing own finances / transferring     Sleep - once in night has to get up, depends on how " much fluid he drinks during the day - on CPAP 6-7hrs of total sleep     Today's PHQ-2 Score:   PHQ-2 ( 1999 [employer]) 12/1/2022   Q1: Little interest or pleasure in doing things 0   Q2: Feeling down, depressed or hopeless 0   PHQ-2 Score 0   PHQ-2 Total Score (12-17 Years)- Positive if 3 or more points; Administer PHQ-A if positive -   Q1: Little interest or pleasure in doing things Not at all   Q2: Feeling down, depressed or hopeless Not at all   PHQ-2 Score 0       Have you ever done Advance Care Planning? (For example, a Health Directive, POLST, or a discussion with a medical provider or your loved ones about your wishes): No, advance care planning information given to patient to review.  Patient declined advance care planning discussion at this time.    Social History     Tobacco Use     Smoking status: Never     Smokeless tobacco: Never   Substance Use Topics     Alcohol use: Not Currently     Comment: In remission since 2009     If you drink alcohol do you typically have >3 drinks per day or >7 drinks per week? No    Alcohol Use 12/1/2022   Prescreen: >3 drinks/day or >7 drinks/week? Not Applicable   No flowsheet data found.    Last PSA:   PSA   Date Value Ref Range Status   07/02/2021 0.51 0 - 4 ug/L Final     Comment:     Assay Method:  Chemiluminescence using Siemens Vista analyzer     PSA Tumor Marker   Date Value Ref Range Status   06/17/2022 0.51 0.00 - 4.00 ug/L Final       Reviewed orders with patient. Reviewed health maintenance and updated orders accordingly - Yes  Labs reviewed in EPIC    Reviewed and updated as needed this visit by clinical staff                  Reviewed and updated as needed this visit by Provider                 Past Medical History:   Diagnosis Date     Anxiety      Benign neoplasm of colon 3/2/2015     Depression      Depressive disorder 1/15/2000     Difficult intubation      ETOH abuse 3/15/2009    in remission     Gastro-oesophageal reflux disease 1/15/2010     Hyperlipidemia  "1/1/2000     Hypoxemia      Morbid obesity (H)      Nasal polyps 1/1/2015     TIMOTHY on CPAP 8/13/2012    Severe (uses 5-6 hours as able)     Other and unspecified hyperlipidemia 10/25/2012     Personal history of colonic polyps 2/207/15    Multiple \"neg for FAP\"     Pre-diabetes      Prediabetes 5/24/2017     Restless leg      Skin tag      Surgical complication 1/6/2015    difficulty inserting a tube down my throat     Unspecified essential hypertension 10/25/2012      Past Surgical History:   Procedure Laterality Date     AS COLONOSCOPY THRU STOMA W BIOPSY/CAUTERY TUMOR/POLYP/LESION  2/27/15    colon polyps     COLONOSCOPY  2/27/2015    x 2     COLONOSCOPY N/A 1/6/2020    Procedure: COLONOSCOPY, WITH POLYPECTOMY AND BIOPSY;  Surgeon: Omer Salas MD;  Location:  GI     COLONOSCOPY WITH CO2 INSUFFLATION N/A 10/20/2016    Procedure: COLONOSCOPY WITH CO2 INSUFFLATION;  Surgeon: Juan Francisco Beltran MD;  Location: [facility] OR     ENT SURGERY  1/5/2011    [facility]     ESOPHAGOSCOPY, GASTROSCOPY, DUODENOSCOPY (EGD), COMBINED N/A 10/20/2016    Procedure: COMBINED ESOPHAGOSCOPY, GASTROSCOPY, DUODENOSCOPY (EGD);  Surgeon: Juan Francisco Beltran MD;  Location: [facility] OR     LAPAROSCOPIC HERNIORRHAPHY INGUINAL Right 3/24/2021    Procedure: HERNIORRHAPHY, INGUINAL, LAPAROSCOPIC;  Surgeon: Laith Villa MD;  Location: [facility] OR     RELEASE CARPAL TUNNEL Right 6/14/2017    Procedure: RELEASE CARPAL TUNNEL;  Right Open Carpal Tunnel Release;  Surgeon: Aracelis Lowe MD;  Location: [facility] OR     RELEASE CARPAL TUNNEL Left 12/29/2017    Procedure: RELEASE CARPAL TUNNEL;  Left Open Carpal Tunnel Release;  Surgeon: Aracelis Lowe MD;  Location: [facility] OR       Review of Systems   Genitourinary: Positive for frequency.     CONSTITUTIONAL: NEGATIVE for fever, chills, change in weight  INTEGUMENTARY/SKIN: NEGATIVE for worrisome rashes, moles or lesions  EYES: NEGATIVE for vision changes or irritation  ENT: NEGATIVE for ear, " mouth and throat problems  RESP: NEGATIVE for significant cough or SOB  CV: NEGATIVE for chest pain, palpitations or peripheral edema  GI: NEGATIVE for nausea, abdominal pain, heartburn, or change in bowel habits  MUSCULOSKELETAL: NEGATIVE for significant arthralgias or myalgia  NEURO: NEGATIVE for weakness, dizziness or paresthesias  PSYCHIATRIC: NEGATIVE for changes in mood or affect    OBJECTIVE:   There were no vitals taken for this visit.    Physical Exam  GENERAL: healthy, alert and no distress  EYES: Eyes grossly normal to inspection, PERRL and conjunctivae and sclerae normal  HENT: ear canals and TM's normal, nose and mouth without ulcers or lesions  NECK: no adenopathy, no asymmetry, masses, or scars and thyroid normal to palpation  RESP: lungs clear to auscultation - no rales, rhonchi or wheezes  CV: regular rate and rhythm, normal S1 S2, no S3 or S4, no murmur, click or rub, no peripheral edema and peripheral pulses strong  ABDOMEN: soft, nontender, no hepatosplenomegaly, no masses and bowel sounds normal  MS: no gross musculoskeletal defects noted, no edema  SKIN: a number of pigmented pedunculated lesions scattered along the face / neck / shoulders    ASSESSMENT/PLAN:       ICD-10-CM    1. Hyperlipidemia LDL goal <130  E78.5 Lipid panel reflex to direct LDL Fasting     Comprehensive metabolic panel     Lipid panel reflex to direct LDL Fasting     Comprehensive metabolic panel     CANCELED: Lipid panel      2. Routine general medical examination at a health care facility  Z00.00       3. Constipation, unspecified constipation type  K59.00 methylcellulose (CITRUCEL) powder      4. On statin therapy  Z79.899 Comprehensive metabolic panel     Comprehensive metabolic panel     CANCELED: Lipid panel          Obtain Lipids, CMP to monitor statin therapy    Add in Citrucel powder to med list    He sees a separate dermatologist, and say them very recently - no concerns from their end about his skin findings -  "advised he should see them once a year since they are now monitoring these    Patient has been advised of split billing requirements and indicates understanding: Yes      COUNSELING:   Reviewed preventive health counseling, as reflected in patient instructions      BMI:   Estimated body mass index is 25.84 kg/m  as calculated from the following:    Height as of 5/4/22: 1.753 m (5' 9\").    Weight as of 5/4/22: 79.4 kg (175 lb).   Weight management plan: Discussed healthy diet and exercise guidelines      He reports that he has never smoked. He has never used smokeless tobacco.            Kalin Vega DO  [facility]  "

## 2022-12-08 NOTE — PROGRESS NOTES
Preceptor Attestation:    I discussed the patient with the resident and evaluated the patient in person. I have verified the content of the note, which accurately reflects my assessment of the patient and the plan of care.   Supervising Physician:  Pete Chavez MD, MD.

## 2023-01-02 NOTE — TELEPHONE ENCOUNTER
Pending Prescriptions:                       Disp   Refills    fluticasone (FLONASE) 50 MCG/ACT nasal sp*48 g   1            Sig: Spray 1-2 sprays into both nostrils daily    Last Written Prescription Date:  7/14/2022  Last Fill Quantity: 48g,   # refills: 1  Last Office Visit with [facility], [facility] or [facility] prescribing provider: 5/4/2022  Future Office visit:   No follow up scheduled at this time.    ALESSANDRA Brewer

## 2023-01-03 RX ORDER — FLUTICASONE PROPIONATE 50 MCG
1-2 SPRAY, SUSPENSION (ML) NASAL DAILY
Qty: 48 G | Refills: 1 | Status: SHIPPED | OUTPATIENT
Start: 2023-01-03 | End: 2023-09-11

## 2023-03-03 ASSESSMENT — SLEEP AND FATIGUE QUESTIONNAIRES
HOW LIKELY ARE YOU TO NOD OFF OR FALL ASLEEP WHEN YOU ARE A PASSENGER IN A CAR FOR AN HOUR WITHOUT A BREAK: SLIGHT CHANCE OF DOZING
HOW LIKELY ARE YOU TO NOD OFF OR FALL ASLEEP IN A CAR, WHILE STOPPED FOR A FEW MINUTES IN TRAFFIC: WOULD NEVER DOZE
HOW LIKELY ARE YOU TO NOD OFF OR FALL ASLEEP WHILE SITTING AND TALKING TO SOMEONE: WOULD NEVER DOZE
HOW LIKELY ARE YOU TO NOD OFF OR FALL ASLEEP WHILE SITTING QUIETLY AFTER LUNCH WITHOUT ALCOHOL: WOULD NEVER DOZE
HOW LIKELY ARE YOU TO NOD OFF OR FALL ASLEEP WHILE LYING DOWN TO REST IN THE AFTERNOON WHEN CIRCUMSTANCES PERMIT: HIGH CHANCE OF DOZING
HOW LIKELY ARE YOU TO NOD OFF OR FALL ASLEEP WHILE SITTING INACTIVE IN A PUBLIC PLACE: MODERATE CHANCE OF DOZING
HOW LIKELY ARE YOU TO NOD OFF OR FALL ASLEEP WHILE SITTING AND READING: MODERATE CHANCE OF DOZING
HOW LIKELY ARE YOU TO NOD OFF OR FALL ASLEEP WHILE WATCHING TV: MODERATE CHANCE OF DOZING

## 2023-03-08 ENCOUNTER — OFFICE VISIT (OUTPATIENT)
Dept: SLEEP MEDICINE | Facility: CLINIC | Age: 63
End: 2023-03-08
Payer: COMMERCIAL

## 2023-03-08 VITALS
SYSTOLIC BLOOD PRESSURE: 126 MMHG | HEIGHT: 69 IN | HEART RATE: 62 BPM | DIASTOLIC BLOOD PRESSURE: 82 MMHG | OXYGEN SATURATION: 99 % | WEIGHT: 189.8 LBS | BODY MASS INDEX: 28.11 KG/M2

## 2023-03-08 DIAGNOSIS — G47.33 OSA (OBSTRUCTIVE SLEEP APNEA): Primary | ICD-10-CM

## 2023-03-08 PROCEDURE — 99215 OFFICE O/P EST HI 40 MIN: CPT | Performed by: INTERNAL MEDICINE

## 2023-03-08 NOTE — PROGRESS NOTES
[facility]   Outpatient Sleep Medicine Follow-up Visit  March 8, 2023    Name: Juan Francisco Booker MRN# [id number]   Age: 63 year old YOB: 1960     Date of Consultation: March 8, 2023  Consultation is requested by: No referring provider defined for this encounter.  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  He has remarkable improvement with good adherence to CPAP with mild residual AHI of 10 and some residual daytime sleepiness.  If he misses a day of CPAP he has remarkable worsening of his daytime sleepiness and sleep disruption.  He denies other causes for daytime sleepiness and does not have restless leg syndrome pain syndrome or disruptive environment.  He does additionally wake up and find that he has removed the mask and the light on his room is on but he is back in bed.  He does not remember these awakenings and suspects he has gone to the bathroom without putting his CPAP mask on.  These episodes do suggest some potential sleepwalking events perhaps related to insufficient control of his sleep apnea.  Daytime sleepiness ([place] Sleepiness Scale 10) again suggests possible effects of insufficient control of sleep apnea although persistent sleepiness has been reported in patients with  chronic sleep apnea incompletely treated and would raise a question of modafinil if it is persistent.    It seems wise to first addressed incompletely treated sleep apnea before medication initiation.         PREVIOUS SLEEP STUDIES:  Date: 2015  AHI: 67    CPAP download:  Dates 2/6- 3/7/20/2023  Average use 6.6 hours, residual AHI 10.2, average time in large leak 9 minutes     Most Recent SCALES:    EPWORTH SLEEPINESS SCALE WITHIN 1 YEAR WITHIN 10 DAYS   Sitting and reading 2 2   Watching TV 2 2   Sitting, inactive in a public place (theatre or mtg.) 2  2    As a passenger in a car 1 1   Lying down to rest in the afternoon when circumstance permit 3 3   Sitting and talking to someone 0 0   Sitting quietly after lunch without alcohol 0 0   In a car, while stopped for a few minutes in traffic 0 0   TOTAL SCORE 10 10   Normal < 11         0--none    1--mild    2--moderate  3--severe      INSOMNIA SEVERITY INDEX WITHIN 1 YEAR   Difficulty falling asleep 0   Difficult staying asleep 0   Problems waking up to early 2   How SATISFIED/DISSATISFIED are you with your CURRENT sleep pattern? 2   How NOTICEABLE to others do you think your sleep pattern is in terms of your quality of life? 1   How WORRIED/DISTRESSED are you about your current sleep pattern? 1   To what extent do you consider your sleep problem to INTERFERE with your daily fuctioning(e.g. daytime fatigue, mood, ability to function at work/daily chores, concentration, mood,etc.) CURRENTLY? 3   INSOMNIA SEVERITY INDEX TOTAL SCORE 9    --absence of insomnia (0-7); sub-threshold insomnia (8-14); moderate insomnia (15-21); and severe insomnia (22-28)--          Objective:  CPAP Compliance Targets:   >70% days > 4 hours AHI < 5   30 days ending March 8, 2023  Mask type:  Full Face Mask  Of       Medications:     Current Outpatient Medications   Medication Sig     citalopram (CELEXA) 10 MG tablet Take 1 tablet (10 mg) by mouth daily     finasteride (PROSCAR) 5 MG tablet  Take 1 tablet (5 mg) by mouth daily     fluticasone (FLONASE) 50 MCG/ACT nasal spray Spray 1-2 sprays into both nostrils daily     gabapentin (NEURONTIN) 300 MG capsule Take 3 capsules (900 mg) by mouth 2 times daily for 90 days 3 capsules in the morning and 3 capsules in the evening.     lisinopril (ZESTRIL) 20 MG tablet Take 1 tablet (20 mg) by mouth daily     methylcellulose (CITRUCEL) powder Take 2 g by mouth daily     Multiple Vitamin (DAILY MULTIVITAMIN PO) Take 1 tablet by mouth daily AM     ORDER FOR DME Use your CPAP device as directed by your provider.     polyethylene glycol (MIRALAX) 17 GM/Dose powder Take 9-17 g by mouth daily (Mix in liquid)     simvastatin (ZOCOR) 40 MG tablet Take 1 tablet (40 mg) by mouth At Bedtime     SODIUM FLUORIDE 5000 PPM 1.1 % PSTE BRUSH TEETH WITH PEA SIZED AMOUNT AT BEDTIME AND DO NOT RINSE     No current facility-administered medications for this visit.        Allergies   Allergen Reactions     Grass             Problem List:     Patient Active Problem List   Diagnosis     Obstructive sleep apnea     Insomnia     Excessive sleepiness     Chronic nasal congestion     Lesion of ulnar nerve     Hyperlipidemia     Essential hypertension     Obesity     Benign neoplasm of colon     Hyperlipidemia     Moderate major depression (H)     Gastric polyps     Cervical pain     Impaired glucose tolerance test     Prediabetes     Skin tag     Unilateral inguinal hernia with obstruction and without gangrene, recurrence not specified     History of ETOH abuse     Urinary urgency     Urinary frequency     Multiple nevi     Acrochordon     Seborrheic keratosis     Acanthosis nigricans            Past Medical History:     Does not need 02 supplement at night   Past Medical History:   Diagnosis Date     Anxiety      Benign neoplasm of colon 3/2/2015     Depression      Depressive disorder 1/15/2000     Difficult intubation      ETOH abuse 3/15/2009    in remission     Gastro-oesophageal  "reflux disease 1/15/2010     Hyperlipidemia 1/1/2000     Hypoxemia      Morbid obesity (H)      Nasal polyps 1/1/2015     TIMOTHY on CPAP 8/13/2012    Severe (uses 5-6 hours as able)     Other and unspecified hyperlipidemia 10/25/2012     Personal history of colonic polyps 2/207/15    Multiple \"neg for FAP\"     Pre-diabetes      Prediabetes 5/24/2017     Restless leg      Skin tag      Surgical complication 1/6/2015    difficulty inserting a tube down my throat     Unspecified essential hypertension 10/25/2012             Past Surgical History:    No h/o  upper airway surgery  Past Surgical History:   Procedure Laterality Date     AS COLONOSCOPY THRU STOMA W BIOPSY/CAUTERY TUMOR/POLYP/LESION  2/27/15    colon polyps     COLONOSCOPY  2/27/2015    x 2     COLONOSCOPY N/A 1/6/2020    Procedure: COLONOSCOPY, WITH POLYPECTOMY AND BIOPSY;  Surgeon: Omer Salas MD;  Location:  GI     COLONOSCOPY WITH CO2 INSUFFLATION N/A 10/20/2016    Procedure: COLONOSCOPY WITH CO2 INSUFFLATION;  Surgeon: Juan Francisco Beltran MD;  Location:  OR     ENT SURGERY  1/5/2011    [facility]     ESOPHAGOSCOPY, GASTROSCOPY, DUODENOSCOPY (EGD), COMBINED N/A 10/20/2016    Procedure: COMBINED ESOPHAGOSCOPY, GASTROSCOPY, DUODENOSCOPY (EGD);  Surgeon: Juan Francisco Beltran MD;  Location:  OR     LAPAROSCOPIC HERNIORRHAPHY INGUINAL Right 3/24/2021    Procedure: HERNIORRHAPHY, INGUINAL, LAPAROSCOPIC;  Surgeon: Laith Villa MD;  Location:  OR     RELEASE CARPAL TUNNEL Right 6/14/2017    Procedure: RELEASE CARPAL TUNNEL;  Right Open Carpal Tunnel Release;  Surgeon: Aracelis Lowe MD;  Location:  OR     RELEASE CARPAL TUNNEL Left 12/29/2017    Procedure: RELEASE CARPAL TUNNEL;  Left Open Carpal Tunnel Release;  Surgeon: Aracelis Lowe MD;  Location:  OR              Physical Examination:     Reported vitals:  There were no vitals taken for this visit.   GENERAL: Healthy, alert and no distress  EYES: Eyes grossly " normal to inspection.  No discharge or erythema, or obvious scleral/conjunctival abnormalities.  RESP: No audible wheeze, cough, or visible cyanosis.  No visible retractions or increased work of breathing.    SKIN: Visible skin clear. No significant rash, abnormal pigmentation or lesions.  NEURO: Cranial nerves grossly intact.  Mentation and speech appropriate for age.  PSYCH: Mentation appears normal, affect normal/bright, judgement and insight intact, normal speech and appearance well-groomed.        Copy to: Kalin Vega MD 3/8/2023         Total time spent reviewing medical records including previous testing and interpretation as well as direct patient contact and documentation on this date: 40 minutes

## 2023-03-08 NOTE — PATIENT INSTRUCTIONS

## 2023-04-19 DIAGNOSIS — E78.5 HYPERLIPIDEMIA LDL GOAL <130: ICD-10-CM

## 2023-04-19 NOTE — TELEPHONE ENCOUNTER
"Request for medication refill:  simvastatin (ZOCOR) 40 MG tablet  Providers if patient needs an appointment and you are willing to give a one month supply please refill for one month and  send a letter/MyChart using \".SMILLIMITEDREFILL\" .smillimited and route chart to \"[facility] \" (Giving one month refill in non controlled medications is strongly recommended before denial)    If refill has been denied, meaning absolutely no refills without visit, please complete the smart phrase \".smirxrefuse\" and route it to the \"[facility] MED REFILLS\"  pool to inform the patient and the pharmacy.    Georgia Webster      "

## 2023-04-20 RX ORDER — SIMVASTATIN 40 MG
TABLET ORAL
Qty: 90 TABLET | Refills: 3 | Status: SHIPPED | OUTPATIENT
Start: 2023-04-20 | End: 2024-05-08

## 2023-05-30 ENCOUNTER — ANCILLARY PROCEDURE (OUTPATIENT)
Dept: GENERAL RADIOLOGY | Facility: CLINIC | Age: 63
End: 2023-05-30
Attending: FAMILY MEDICINE
Payer: COMMERCIAL

## 2023-05-30 ENCOUNTER — OFFICE VISIT (OUTPATIENT)
Dept: FAMILY MEDICINE | Facility: CLINIC | Age: 63
End: 2023-05-30
Payer: COMMERCIAL

## 2023-05-30 VITALS
SYSTOLIC BLOOD PRESSURE: 138 MMHG | DIASTOLIC BLOOD PRESSURE: 85 MMHG | HEART RATE: 66 BPM | OXYGEN SATURATION: 99 % | WEIGHT: 198.1 LBS | RESPIRATION RATE: 17 BRPM | HEIGHT: 68 IN | BODY MASS INDEX: 30.02 KG/M2 | TEMPERATURE: 98.7 F
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
## 2023-05-30 DIAGNOSIS — L72.0 INCLUSION CYST: ICD-10-CM

## 2023-05-30 DIAGNOSIS — R07.81 RIB PAIN ON RIGHT SIDE: ICD-10-CM
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
## 2023-05-30 PROBLEM — R73.09 IMPAIRED GLUCOSE TOLERANCE TEST: Status: RESOLVED | Noted: 2017-04-21 | Resolved: 2023-05-30

## 2023-05-30 PROCEDURE — 99213 OFFICE O/P EST LOW 20 MIN: CPT | Mod: GC | Performed by: STUDENT IN AN ORGANIZED HEALTH CARE EDUCATION/TRAINING PROGRAM

## 2023-05-30 PROCEDURE — 71101 X-RAY EXAM UNILAT RIBS/CHEST: CPT | Mod: RT | Performed by: RADIOLOGY

## 2023-05-30 RX ORDER — NAPROXEN 500 MG/1
500 TABLET ORAL 2 TIMES DAILY WITH MEALS
Qty: 20 TABLET | Refills: 0 | Status: SHIPPED | OUTPATIENT
Start: 2023-05-30 | End: 2023-06-09

## 2023-05-30 ASSESSMENT — PATIENT HEALTH QUESTIONNAIRE - PHQ9: SUM OF ALL RESPONSES TO PHQ QUESTIONS 1-9: 0

## 2023-05-30 NOTE — PROGRESS NOTES
Assessment & Plan     Fall, initial encounter  Rib Pain on right side  He has had right sided rib pain since a fall 3 weeks ago. Pain has been unchanged/worsening. This is concerning for muscle strain, bruised rib, or fractured rib. Bruised rib or small fracture likely. He does not have any swelling, redness, or asymmetry over the area. Abdominal exam without concern. Patient was counseled that such injuries can take at least 4-6 week before improving. Will obtain chest XR for further evaluation. Told to return if no improvement in the next 4 weeks.   - XR RIBS & CHEST RT G/E 3VW  - naproxen (NAPROSYN) 500 MG tablet  Dispense: 20 tablet; Refill: 0    Inclusion cyst  He has a new epidermal inclusion cyst on the crown of his head It is not bothering him or causing any discomfort so he is not interested in removing it at this time. He does see dermatology once a year.     Review of the result(s) of each unique test - x ray  Ordering of each unique test    FUTURE APPOINTMENTS:       - Follow-up visit in 1-2 mo if not improving or sooner as needed.    Return in about 4 weeks (around 6/27/2023), or if symptoms worsen or fail to improve.    Mabel Mendoza, MS4  [facility]    I was present with the medical student who participated in the service and in the documentation of this note. I have verified the history and personally performed the physical exam and medical decision making, and have verified the content of the note, which accurately reflects my assessment of the patient and the plan of care.    Abi Lowe DO  Family Medicine PGY-3  [facility], [facility]   Pronouns: She/Hers       Subjective   Tomi is a 63 year old, presenting for the following health issues:  rib pain (Bump on head)         View : No data to display.              HPI     Rib Pain: He tripped on the sidewalk 3 weeks and hurt the right side of his torso. Since then he has had right sided pain over  "his ribs. The pain has not been improving and thinks that it may be worsening. He does not have pain with inspiration. The pain is worse when laying flat on his back as well as extending his arm straight out or backwards. The pain is constant and mostly dull but when it is agitated it is sharp. He has minimal tenderness to pressure over the area. He is able to reproduce the pain when bending to the right side as well as extension forwards and backwards. He has not had any prior imaging and did not go see a doctor after his fall. He has not tried anything at home for the rib pain. No abdominal discomfort or GI symptoms. He has no trouble walking however he is unable to walk on his treadmill due to worsened pain. His treadmill is inclined and he tends to hold on to the side rails. He has also been unable to do his physical therapy exercises as they worsen the rib pain.    Bump on Head: He always had one bump on his head but noticed a new bump yesterday morning. It is not painful or itch. He is unsure how long it has been there. He does not put sunscreen on his head, but does wear a baseball hat all the time.     Review of Systems   Constitutional, HEENT, cardiovascular, pulmonary, gi and gu systems are negative, except as otherwise noted.      Objective    /85   Pulse 66   Temp 98.7  F (37.1  C)   Resp 17   Ht 1.728 m (5' 8.05\")   Wt 89.9 kg (198 lb 1.6 oz)   SpO2 99%   BMI 30.08 kg/m    Body mass index is 30.08 kg/m .  Physical Exam   GENERAL: healthy, alert and no distress  RESP: lungs clear to auscultation - no rales, rhonchi or wheezes  CV: regular rate and rhythm, normal S1 S2, no S3 or S4, no murmur, click or rub, no peripheral edema and peripheral pulses strong  ABDOMEN: soft, nontender, no hepatosplenomegaly, no masses   MS: no gross musculoskeletal defects noted, point tenderness over anterior aspect of R lower ribs, no asymmetry, bruising, or swelling  SKIN: two epidermal inclusion cysts on " scalp, R cyst > L, no suspicious lesions or rashes, many acrochordons and polypoid seborrheic keratose    Xray - Reviewed and interpreted by me, Dr. Lowe.     ----- Services Performed by a MEDICAL STUDENT in Presence of RESIDENT/FELLOW Physician-------

## 2023-09-11 NOTE — TELEPHONE ENCOUNTER
Pending Prescriptions:                       Disp   Refills    fluticasone (FLONASE) 50 MCG/ACT nasal sp*48 g   1            Sig: Spray 1-2 sprays into both nostrils daily    Last Written Prescription Date:  1/3/2023  Last Fill Quantity: 48 g,   # refills: 1  Last Office Visit with [facility], [facility] or [facility] prescribing provider: 3/8/2023  Future Office visit:   3/12/2024.      ALESSANDRA Brewer

## 2023-09-12 RX ORDER — FLUTICASONE PROPIONATE 50 MCG
1-2 SPRAY, SUSPENSION (ML) NASAL DAILY
Qty: 48 G | Refills: 1 | Status: SHIPPED | OUTPATIENT
Start: 2023-09-12 | End: 2024-05-07

## 2023-10-05 ENCOUNTER — TELEPHONE (OUTPATIENT)
Dept: FAMILY MEDICINE | Facility: CLINIC | Age: 63
End: 2023-10-05
Payer: COMMERCIAL

## 2023-10-05 DIAGNOSIS — G47.33 OSA (OBSTRUCTIVE SLEEP APNEA): Primary | ICD-10-CM

## 2023-10-05 NOTE — TELEPHONE ENCOUNTER
Order/Referral Request    Who is requesting: Patient    Orders being requested: CPAP Machine    Reason service is needed/diagnosis: Needs new CPAP machine, lost on flight.     When are orders needed by: ASAP    Has this been discussed with Provider: Yes    Does patient have a preference on a Group/Provider/Facility? [facility]    Does patient have an appointment scheduled?: Yes: 03/12/2024    Where to send orders: Place orders within Epic    Could we send this information to you in [employer] or would you prefer to receive a phone call?:   Patient would prefer a phone call   Okay to leave a detailed message?: Yes at Cell number on file:    Telephone Information:   Mobile [phone]

## 2023-10-10 ENCOUNTER — LAB (OUTPATIENT)
Dept: LAB | Facility: CLINIC | Age: 63
End: 2023-10-10
Payer: COMMERCIAL

## 2023-10-10 DIAGNOSIS — R39.12 BENIGN PROSTATIC HYPERPLASIA WITH WEAK URINARY STREAM: ICD-10-CM

## 2023-10-10 DIAGNOSIS — N40.1 BENIGN PROSTATIC HYPERPLASIA WITH WEAK URINARY STREAM: ICD-10-CM

## 2023-10-10 LAB — PSA SERPL DL<=0.01 NG/ML-MCNC: 0.54 NG/ML (ref 0–4.5)

## 2023-10-10 PROCEDURE — 36415 COLL VENOUS BLD VENIPUNCTURE: CPT

## 2023-10-10 PROCEDURE — 84153 ASSAY OF PSA TOTAL: CPT

## 2023-10-20 ENCOUNTER — DOCUMENTATION ONLY (OUTPATIENT)
Dept: SLEEP MEDICINE | Facility: CLINIC | Age: 63
End: 2023-10-20
Payer: COMMERCIAL

## 2023-10-20 DIAGNOSIS — G47.33 OBSTRUCTIVE SLEEP APNEA: Primary | ICD-10-CM

## 2023-10-20 NOTE — PROGRESS NOTES
Patient was offered choice of vendor and chose [facility].  Patient Juan Francisco Booker was set up at [place] on October 20, 2023. Patient received a Resmed Airsense 10 Pressures were set at  12-18 cm H2O.   Patient s ramp is 7 cm H2O for Auto and FLEX/EPR is EPR, 2.  Patient received a Resmed Mask name: [id number]  Full Face mask size Large, heated tubing and heated humidifier.  Patient has the following compliance requirements: using and visit requirements  Patient has a follow up on tbd.  Sam O Humes

## 2023-10-25 ENCOUNTER — PRE VISIT (OUTPATIENT)
Dept: UROLOGY | Facility: CLINIC | Age: 63
End: 2023-10-25
Payer: COMMERCIAL

## 2023-10-25 NOTE — TELEPHONE ENCOUNTER
Reason for Visit:  1 year Follow-up    Diagnosis: Benign prostatic hyperplasia with weak urinary stream    Records/imaging/labs/orders: 10/10/23 PSA    Rooming Requirements: Standard    Lowell Soto MA  10/25/2023  4:15 PM

## 2023-10-31 DIAGNOSIS — I10 BENIGN ESSENTIAL HYPERTENSION: ICD-10-CM

## 2023-10-31 DIAGNOSIS — F32.A DEPRESSION, UNSPECIFIED DEPRESSION TYPE: ICD-10-CM

## 2023-10-31 DIAGNOSIS — R42 DIZZINESS: ICD-10-CM

## 2023-10-31 RX ORDER — LISINOPRIL 20 MG/1
20 TABLET ORAL DAILY
Qty: 90 TABLET | Refills: 3 | Status: SHIPPED | OUTPATIENT
Start: 2023-10-31

## 2023-10-31 RX ORDER — CITALOPRAM HYDROBROMIDE 10 MG/1
10 TABLET ORAL DAILY
Qty: 90 TABLET | Refills: 3 | Status: SHIPPED | OUTPATIENT
Start: 2023-10-31

## 2023-10-31 NOTE — TELEPHONE ENCOUNTER
"Request for medication refill:  citalopram (CELEXA) 10 MG tablet     lisinopril (ZESTRIL) 20 MG tablet     Providers if patient needs an appointment and you are willing to give a one month supply please refill for one month and  send a letter/MyChart using \".SMILLIMITEDREFILL\" .smillimited and route chart to \"P [facility] \" (Giving one month refill in non controlled medications is strongly recommended before denial)    If refill has been denied, meaning absolutely no refills without visit, please complete the smart phrase \".smirxrefuse\" and route it to the \"P [facility] MED REFILLS\"  pool to inform the patient and the pharmacy.    Harris Perea CMA      "

## 2023-11-09 ENCOUNTER — OFFICE VISIT (OUTPATIENT)
Dept: UROLOGY | Facility: CLINIC | Age: 63
End: 2023-11-09
Payer: COMMERCIAL

## 2023-11-09 VITALS
SYSTOLIC BLOOD PRESSURE: 117 MMHG | HEART RATE: 79 BPM | BODY MASS INDEX: 28.79 KG/M2 | HEIGHT: 68 IN | WEIGHT: 190 LBS | DIASTOLIC BLOOD PRESSURE: 83 MMHG

## 2023-11-09 DIAGNOSIS — N40.1 BENIGN PROSTATIC HYPERPLASIA WITH WEAK URINARY STREAM: ICD-10-CM

## 2023-11-09 DIAGNOSIS — R35.0 URINARY FREQUENCY: Primary | ICD-10-CM

## 2023-11-09 DIAGNOSIS — R39.12 BENIGN PROSTATIC HYPERPLASIA WITH WEAK URINARY STREAM: ICD-10-CM

## 2023-11-09 LAB
ALBUMIN UR-MCNC: NEGATIVE MG/DL
APPEARANCE UR: CLEAR
BILIRUB UR QL STRIP: NEGATIVE
COLOR UR AUTO: ABNORMAL
GLUCOSE UR STRIP-MCNC: NEGATIVE MG/DL
HGB UR QL STRIP: NEGATIVE
KETONES UR STRIP-MCNC: NEGATIVE MG/DL
LEUKOCYTE ESTERASE UR QL STRIP: NEGATIVE
NITRATE UR QL: NEGATIVE
PH UR STRIP: 7.5 [PH] (ref 5–7)
RBC URINE: 2 /HPF
SP GR UR STRIP: 1.01 (ref 1–1.03)
UROBILINOGEN UR STRIP-MCNC: NORMAL MG/DL
WBC URINE: <1 /HPF

## 2023-11-09 PROCEDURE — 99213 OFFICE O/P EST LOW 20 MIN: CPT | Mod: 25 | Performed by: PHYSICIAN ASSISTANT

## 2023-11-09 PROCEDURE — 51798 US URINE CAPACITY MEASURE: CPT | Performed by: PHYSICIAN ASSISTANT

## 2023-11-09 PROCEDURE — 81001 URINALYSIS AUTO W/SCOPE: CPT | Performed by: PATHOLOGY

## 2023-11-09 ASSESSMENT — PAIN SCALES - GENERAL: PAINLEVEL: NO PAIN (0)

## 2023-11-09 NOTE — PROGRESS NOTES
HPI: Mr. Juan Francisco Booker is a 63 year old year old male presenting today for evaluation of chief complaint(s): No chief complaint on file.    Today, he is unaccompanied    Mr. Juan Francisco Booker has PMH significant for HTN, HLD, gerd, obesity, depression, TIMOTHY (on CPAP), BPH (on flomax 0.8mg daily - started 8/29/18, on avodart - started 5/11/21).  He has followed with urology since 2018.  Please see note from 9/16/21 for a summary of his history.  Essentially he has tried dietary sugar reduction, caffeine reduction, management of constipation, weight loss.  He has tried Myrbetriq 25mg - too expensive. Has tried pelvic floor physical therapy.  We have avoided anticholinergic -type OAB meds because of his severe constipation.  When we saw each other on 6/30/22 he continued avodart.  He was going to try reducing his tamsulosin dose and potentially even stopping the medication.      No new health challenges.  No new medications.    COVID last month - felt yucky for 2 days   Urinary symptoms:  After voiding he never feels like he finishes emptying.  Goes back to the toilet 5 minutes later and a small amount of urine with evacuate.  Then will have some post-void dribble.    - Fluid intake:  2 cups of caffeinated tea.  Might have more caffeine if he is out doing something.  Might drink another 2-4 cups of tea decaf.  Water - 12 ounces with citrucele.  Then another 12 ounces of water with Miralax.  Potentially a few more sips of water.    Eats lots of oranges!  No longer taking tamsulosin since 12/6/22.  Myrbetriq was too expensive, but thinks now he may have changed insurances.  We avoided anticholinergic because of constipation      Review of Diagnostics:  6/27/22 - 0.51ug/L  4/18/22 - UA negative   9/15/21- cystoscopy with Dr. Bolaños - no obstructing tissue.    2/14/20 - UDS - bladder capacity 859cc with heightened early sensations.  Normal compliance.  With voiding, pDet soco to [id number] and with valsalve he voided Qmax 12mL/s  "with final PVR of zero    Current Outpatient Medications   Medication Sig Dispense Refill    citalopram (CELEXA) 10 MG tablet TAKE 1 TABLET (10 MG) BY MOUTH DAILY. 90 tablet 3    lisinopril (ZESTRIL) 20 MG tablet TAKE 1 TABLET BY MOUTH EVERY DAY 90 tablet 3    finasteride (PROSCAR) 5 MG tablet Take 1 tablet (5 mg) by mouth daily 90 tablet 3    fluticasone (FLONASE) 50 MCG/ACT nasal spray Spray 1-2 sprays into both nostrils daily 48 g 1    gabapentin (NEURONTIN) 300 MG capsule Take 3 capsules (900 mg) by mouth 2 times daily for 90 days 3 capsules in the morning and 3 capsules in the evening. 540 capsule 3    methylcellulose (CITRUCEL) powder Take 2 g by mouth daily      Multiple Vitamin (DAILY MULTIVITAMIN PO) Take 1 tablet by mouth daily AM      ORDER FOR DME Use your CPAP device as directed by your provider.      polyethylene glycol (MIRALAX) 17 GM/Dose powder Take 9-17 g by mouth daily (Mix in liquid) 578 g 11    simvastatin (ZOCOR) 40 MG tablet TAKE 1 TABLET BY MOUTH AT BEDTIME 90 tablet 3    SODIUM FLUORIDE 5000 PPM 1.1 % PSTE BRUSH TEETH WITH PEA SIZED AMOUNT AT BEDTIME AND DO NOT RINSE         ALLERGIES: Grass      REVIEW OF SYSTEMS:  As above in HPI     GENERAL PHYSICAL EXAM:   Vitals: /83   Pulse 79   Ht 1.727 m (5' 8\")   Wt 86.2 kg (190 lb)   BMI 28.89 kg/m    Body mass index is 28.89 kg/m .    GENERAL: Well groomed, well developed, well nourished male in NAD.  NEURO: Alert and oriented x 3.  PSYCH: Normal mood and affect, pleasant and agreeable during interview and exam.    LABS: The last test results for Mr. Juan Francisco Booker were reviewed:  PSA -   Lab Results   Component Value Date    PSA 0.54 10/10/2023    PSA 0.51 06/17/2022    PSA 0.51 07/02/2021    PSA 1.25 01/21/2020    PSA 1.58 12/19/2018    PSA 1.26 11/27/2018     BMP -   Recent Labs   Lab Test 12/08/22  1042 02/17/22  1337 07/02/21  1302    139 140   POTASSIUM 4.6 4.0 4.0   CHLORIDE 100 104 108   CO2 31* 29 30   BUN 12.0 21 16   CR " 0.85 0.79 0.83   GLC 92 79 85   ZHEN 9.4 9.0 9.0       CBC -   Recent Labs   Lab Test 02/21/22  1458 02/17/22  1337 03/08/21  1430   WBC 4.5 4.5 4.1   HGB 13.4 12.8* 13.1*    191 182       ASSESSMENT:   1) BPH with luts (obstructive and irritative) --> UDS previously has shown sensory urgency  2) Constipation    PLAN:   - Your PSA (prostate cancer screening test) is normal.  We can repeat this in a year  - Urinalysis today.    - Postvoid residual to make sure you are emptying your bladder - PVR 5cc  - Food list given - some of these foods and beverages are bladder irritants for some people.  Take a look at your own diet to see if any changes can be made.  I would start by replacing some of your daytime tea with water.  It is possible that the caffeine and the teas are irritating your bladder and making you feel you need to urinate more frequently.    - Also - try to increase water intake to at least 48 ounces per day.    - Continue to avoid constipation, as you are  - If you are able to fix your symptoms with adding water and changing fluid intake, that's great!  If not, we consider adding back tamsulosin (flomax) ?  We could also consider adding myrbetriq (but would need to make sure it isn't too expensive).    - Let me know via [employer]  - Return in 1 year to see Kole Mcdonough PA-C with a PSA first.  Sooner, with problems     VISIT DURATION: 21 MINUTES (1:42 - 2:03 ON dos)    Deena Matta PA-C  Department of Urologic Surgery

## 2023-11-09 NOTE — NURSING NOTE
"Chief Complaint   Patient presents with    RECHECK     1 year follow up - Benign prostatic hyperplasia with weak urinary stream       Blood pressure 117/83, pulse 79, height 1.727 m (5' 8\"), weight 86.2 kg (190 lb). Body mass index is 28.89 kg/m .    Patient Active Problem List   Diagnosis    Obstructive sleep apnea    Insomnia    Excessive sleepiness    Chronic nasal congestion    Lesion of ulnar nerve    Hyperlipidemia    Essential hypertension    Obesity    Benign neoplasm of colon    Hyperlipidemia    Moderate major depression (H)    Gastric polyps    Cervical pain    Prediabetes    Skin tag    Unilateral inguinal hernia with obstruction and without gangrene, recurrence not specified    History of ETOH abuse    Urinary urgency    Urinary frequency    Multiple nevi    Acrochordon    Seborrheic keratosis    Acanthosis nigricans       Allergies   Allergen Reactions    Grass        Current Outpatient Medications   Medication Sig Dispense Refill    citalopram (CELEXA) 10 MG tablet TAKE 1 TABLET (10 MG) BY MOUTH DAILY. 90 tablet 3    finasteride (PROSCAR) 5 MG tablet Take 1 tablet (5 mg) by mouth daily 90 tablet 3    fluticasone (FLONASE) 50 MCG/ACT nasal spray Spray 1-2 sprays into both nostrils daily 48 g 1    lisinopril (ZESTRIL) 20 MG tablet TAKE 1 TABLET BY MOUTH EVERY DAY 90 tablet 3    methylcellulose (CITRUCEL) powder Take 2 g by mouth daily      Multiple Vitamin (DAILY MULTIVITAMIN PO) Take 1 tablet by mouth daily AM      ORDER FOR DME Use your CPAP device as directed by your provider.      polyethylene glycol (MIRALAX) 17 GM/Dose powder Take 9-17 g by mouth daily (Mix in liquid) 578 g 11    simvastatin (ZOCOR) 40 MG tablet TAKE 1 TABLET BY MOUTH AT BEDTIME 90 tablet 3    SODIUM FLUORIDE 5000 PPM 1.1 % PSTE BRUSH TEETH WITH PEA SIZED AMOUNT AT BEDTIME AND DO NOT RINSE      gabapentin (NEURONTIN) 300 MG capsule Take 3 capsules (900 mg) by mouth 2 times daily for 90 days 3 capsules in the morning and 3 capsules " in the evening. 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

## 2023-11-09 NOTE — PATIENT INSTRUCTIONS
"PLAN:   - Your PSA (prostate cancer screening test) is normal.  We can repeat this in a year  - Urinalysis today.    - Postvoid residual to make sure you are emptying your bladder  - Food list given - some of these foods and beverages are bladder irritants for some people.  Take a look at your own diet to see if any changes can be made.  I would start by replacing some of your daytime tea with water.  It is possible that the caffeine and the teas are irritating your bladder and making you feel you need to urinate more frequently.    - Also - try to increase water intake to at least 48 ounces per day.    - Continue to avoid constipation, as you are  - If you are able to fix your symptoms with adding water and changing fluid intake, that's great!  If not, we consider adding back tamsulosin (flomax) ?  We could also consider adding myrbetriq (but would need to make sure it isn't too expensive).    - Let me know via MyChart  - Return in 1 year to see Kole Mcdonough PA-C with a PSA first.  Sooner, with problems     [facility]           BLADDER IRRITANTS    Below is a list of foods that can irritate the bladder.     Caffeinated soft drinks  Coffee.  Tea.  Chocolate.  Acidic juices and fruits such as cranberry, lemon, orange, pineapple and other citrus as well as tart apple or cherry   Alcoholic beverages  Carbonated drinks, especially kolby  Aspartame/Nutrasweet.  Spicy foods  Tomato products    Substitutions that you can make in your daily diet:  - Herbals teas that don't contain caffeine or a large amount of citrus  - Ovaltine instead of chocolate drinks  - Less acidic fruits such as blueberry, banana, sweet (rather than tart) apples, pears, papaya.      Either:  - Consider limiting or avoiding these foods then monitoring the impact on your urinary symptoms   - Or consider an \"elimination diet.\"  Start by eliminating all these foods for 2 weeks.  Assess for progress.  If you are feeling better, slowly add " foods back - 1 new food every 3-5 days and assess for worsening.  This way you can pinpoint exactly which foods worsen your bladder symptoms.

## 2023-11-09 NOTE — LETTER
11/9/2023       RE: Juan Francisco Booker  [address]  [place] [address]     Dear Colleague,    Thank you for referring your patient, Juan Francisco Booker, to the [facility] [place] [facility]. Please see a copy of my visit note below.    HPI: Mr. Juan Francisco Booker is a 63 year old year old male presenting today for evaluation of chief complaint(s): No chief complaint on file.    Today, he is unaccompanied    Mr. Juan Francisco Booker has PMH significant for HTN, HLD, gerd, obesity, depression, TIMOTHY (on CPAP), BPH (on flomax 0.8mg daily - started 8/29/18, on avodart - started 5/11/21).  He has followed with urology since 2018.  Please see note from 9/16/21 for a summary of his history.  Essentially he has tried dietary sugar reduction, caffeine reduction, management of constipation, weight loss.  He has tried Myrbetriq 25mg - too expensive. Has tried pelvic floor physical therapy.  We have avoided anticholinergic -type OAB meds because of his severe constipation.  When we saw each other on 6/30/22 he continued avodart.  He was going to try reducing his tamsulosin dose and potentially even stopping the medication.      No new health challenges.  No new medications.    COVID last month - felt yucky for 2 days   Urinary symptoms:  After voiding he never feels like he finishes emptying.  Goes back to the toilet 5 minutes later and a small amount of urine with evacuate.  Then will have some post-void dribble.    - Fluid intake:  2 cups of caffeinated tea.  Might have more caffeine if he is out doing something.  Might drink another 2-4 cups of tea decaf.  Water - 12 ounces with citrucele.  Then another 12 ounces of water with Miralax.  Potentially a few more sips of water.    Eats lots of oranges!  No longer taking tamsulosin since 12/6/22.  Myrbetriq was too expensive, but thinks now he may have changed insurances.  We avoided anticholinergic because of  "constipation      Review of Diagnostics:  6/27/22 - 0.51ug/L  4/18/22 - UA negative   9/15/21- cystoscopy with Dr. Bolaños - no obstructing tissue.    2/14/20 - UDS - bladder capacity 859cc with heightened early sensations.  Normal compliance.  With voiding, pDet soco to 67vwP0H and with valsalve he voided Qmax 12mL/s with final PVR of zero    Current Outpatient Medications   Medication Sig Dispense Refill    citalopram (CELEXA) 10 MG tablet TAKE 1 TABLET (10 MG) BY MOUTH DAILY. 90 tablet 3    lisinopril (ZESTRIL) 20 MG tablet TAKE 1 TABLET BY MOUTH EVERY DAY 90 tablet 3    finasteride (PROSCAR) 5 MG tablet Take 1 tablet (5 mg) by mouth daily 90 tablet 3    fluticasone (FLONASE) 50 MCG/ACT nasal spray Spray 1-2 sprays into both nostrils daily 48 g 1    gabapentin (NEURONTIN) 300 MG capsule Take 3 capsules (900 mg) by mouth 2 times daily for 90 days 3 capsules in the morning and 3 capsules in the evening. 540 capsule 3    methylcellulose (CITRUCEL) powder Take 2 g by mouth daily      Multiple Vitamin (DAILY MULTIVITAMIN PO) Take 1 tablet by mouth daily AM      ORDER FOR DME Use your CPAP device as directed by your provider.      polyethylene glycol (MIRALAX) 17 GM/Dose powder Take 9-17 g by mouth daily (Mix in liquid) 578 g 11    simvastatin (ZOCOR) 40 MG tablet TAKE 1 TABLET BY MOUTH AT BEDTIME 90 tablet 3    SODIUM FLUORIDE 5000 PPM 1.1 % PSTE BRUSH TEETH WITH PEA SIZED AMOUNT AT BEDTIME AND DO NOT RINSE         ALLERGIES: Grass      REVIEW OF SYSTEMS:  As above in HPI     GENERAL PHYSICAL EXAM:   Vitals: /83   Pulse 79   Ht 1.727 m (5' 8\")   Wt 86.2 kg (190 lb)   BMI 28.89 kg/m    Body mass index is 28.89 kg/m .    GENERAL: Well groomed, well developed, well nourished male in NAD.  NEURO: Alert and oriented x 3.  PSYCH: Normal mood and affect, pleasant and agreeable during interview and exam.    LABS: The last test results for Mr. Juan Francisco Booker were reviewed:  PSA -   Lab Results   Component Value Date    " PSA 0.54 10/10/2023    PSA 0.51 06/17/2022    PSA 0.51 07/02/2021    PSA 1.25 01/21/2020    PSA 1.58 12/19/2018    PSA 1.26 11/27/2018     BMP -   Recent Labs   Lab Test 12/08/22  1042 02/17/22  1337 07/02/21  1302    139 140   POTASSIUM 4.6 4.0 4.0   CHLORIDE 100 104 108   CO2 31* 29 30   BUN 12.0 21 16   CR 0.85 0.79 0.83   GLC 92 79 85   ZHEN 9.4 9.0 9.0       CBC -   Recent Labs   Lab Test 02/21/22  1458 02/17/22  1337 03/08/21  1430   WBC 4.5 4.5 4.1   HGB 13.4 12.8* 13.1*    191 182       ASSESSMENT:   1) BPH with luts (obstructive and irritative) --> UDS previously has shown sensory urgency  2) Constipation    PLAN:   - Your PSA (prostate cancer screening test) is normal.  We can repeat this in a year  - Urinalysis today.    - Postvoid residual to make sure you are emptying your bladder - PVR 5cc  - Food list given - some of these foods and beverages are bladder irritants for some people.  Take a look at your own diet to see if any changes can be made.  I would start by replacing some of your daytime tea with water.  It is possible that the caffeine and the teas are irritating your bladder and making you feel you need to urinate more frequently.    - Also - try to increase water intake to at least 48 ounces per day.    - Continue to avoid constipation, as you are  - If you are able to fix your symptoms with adding water and changing fluid intake, that's great!  If not, we consider adding back tamsulosin (flomax) ?  We could also consider adding myrbetriq (but would need to make sure it isn't too expensive).    - Let me know via [employer]  - Return in 1 year to see Kole Mcdonough PA-C with a PSA first.  Sooner, with problems     VISIT DURATION: 21 MINUTES (1:42 - 2:03 ON dos)    Deena Matta PA-C  Department of Urologic Surgery

## 2023-11-10 ENCOUNTER — OFFICE VISIT (OUTPATIENT)
Dept: FAMILY MEDICINE | Facility: CLINIC | Age: 63
End: 2023-11-10
Payer: COMMERCIAL

## 2023-11-10 VITALS
DIASTOLIC BLOOD PRESSURE: 88 MMHG | HEART RATE: 60 BPM | SYSTOLIC BLOOD PRESSURE: 132 MMHG | BODY MASS INDEX: 31.33 KG/M2 | OXYGEN SATURATION: 97 % | RESPIRATION RATE: 18 BRPM | HEIGHT: 68 IN | WEIGHT: 206.7 LBS

## 2023-11-10 DIAGNOSIS — H69.93 DYSFUNCTION OF BOTH EUSTACHIAN TUBES: Primary | ICD-10-CM

## 2023-11-10 PROCEDURE — 99213 OFFICE O/P EST LOW 20 MIN: CPT | Mod: GC

## 2023-11-10 ASSESSMENT — ANXIETY QUESTIONNAIRES
7. FEELING AFRAID AS IF SOMETHING AWFUL MIGHT HAPPEN: NOT AT ALL
1. FEELING NERVOUS, ANXIOUS, OR ON EDGE: NOT AT ALL
GAD7 TOTAL SCORE: 1
5. BEING SO RESTLESS THAT IT IS HARD TO SIT STILL: NOT AT ALL
6. BECOMING EASILY ANNOYED OR IRRITABLE: SEVERAL DAYS
3. WORRYING TOO MUCH ABOUT DIFFERENT THINGS: NOT AT ALL
IF YOU CHECKED OFF ANY PROBLEMS ON THIS QUESTIONNAIRE, HOW DIFFICULT HAVE THESE PROBLEMS MADE IT FOR YOU TO DO YOUR WORK, TAKE CARE OF THINGS AT HOME, OR GET ALONG WITH OTHER PEOPLE: NOT DIFFICULT AT ALL
GAD7 TOTAL SCORE: 1
2. NOT BEING ABLE TO STOP OR CONTROL WORRYING: NOT AT ALL

## 2023-11-10 ASSESSMENT — PATIENT HEALTH QUESTIONNAIRE - PHQ9
SUM OF ALL RESPONSES TO PHQ QUESTIONS 1-9: 5
5. POOR APPETITE OR OVEREATING: NOT AT ALL

## 2023-11-10 NOTE — PROGRESS NOTES
Preceptor Attestation:   Patient seen, evaluated and discussed with the resident. I have verified the content of the note, which accurately reflects my assessment of the patient and the plan of care.   Supervising Physician:  Mia Tay, DO

## 2023-11-10 NOTE — PROGRESS NOTES
"  Assessment & Plan     Dysfunction of both eustachian tubes  Postviral (covid), x4 weeks, improving on its own. No vestibular symptoms. No concern for acute bacterial infection of either ear. Expect to improve gradually over the next month or two. Will use daily fluticasone nasal spray as well as antihistamine for the next month. If symptoms persist or worsen will consider ENT referral.     Return if symptoms worsen or fail to improve.    MAGDA Greenwood MD  [facility] RONAL Krishna is a 63 year old, presenting for the following health issues:  Ear Fullness (One month of clogged ears )      11/10/2023    10:13 AM   Additional Questions   Roomed by Antelmo   Accompanied by Self     HPI     Covid 1 month ago, ears plugged/clogged since then, might be affecting hearing.Things don't sound as loud. No baseline hearing impairment. Improving slowly.     Symptoms are somewhat intermittent. Right ear cleared up briefly for 2-3 days several weeks ago.     Has tried holding nose and blowing to apply pressure. This helps temporarily. Hasn't tried anything else.     Some pain in the ears, dull ache. Bilaterally.     No ringing. No balance issues. No nausea, vomiting. Vision is good. No episodes of acute worsening. Nothing exacerbates the pressure. Some earwax, no drainage. Likely ongoing postnasal drip with intermittent cough. No blood.     Able to sleep well, not bothered by the symptoms at night. Symptoms 3-4 out of 10. Maybe a 6 when it started.     Otherwise having some fatigue, other covid symptoms have resolved.     Goal for the visit is for the symptoms to improve and go away. Would like to listen to some Jazz  music.     He denies fever.         Objective    /88   Pulse 60   Resp 18   Ht 1.727 m (5' 8\")   Wt 93.8 kg (206 lb 11.2 oz)   SpO2 97%   BMI 31.43 kg/m    Body mass index is 31.43 kg/m .  Physical Exam  Constitutional:       General: He is not in acute distress.     " Appearance: Normal appearance. He is not toxic-appearing.   HENT:      Right Ear: External ear normal. No drainage. There is no impacted cerumen. No mastoid tenderness. Tympanic membrane is erythematous. Tympanic membrane is not retracted or bulging.      Left Ear: External ear normal. No drainage. A middle ear effusion is present. There is no impacted cerumen. No mastoid tenderness. Tympanic membrane is not retracted or bulging.      Ears:      Comments: No pus visualized in either ear.     Nose: Rhinorrhea present.      Comments: Erythema of right nasal mucosa. Left nasal mucosa unremarkable.      Mouth/Throat:      Comments: Large tongue almost completely obstructing view of oropharynx. No erythema, swelling, lesions of visualized mucosa.   Cardiovascular:      Heart sounds: Normal heart sounds.   Pulmonary:      Effort: Pulmonary effort is normal.      Breath sounds: Normal breath sounds.   Neurological:      Mental Status: He is alert.

## 2023-11-10 NOTE — PATIENT INSTRUCTIONS
Patient Education   Here is the plan from today's visit    1. Dysfunction of both eustachian tubes  Likely post-viral due to your covid infection. I would expect this to continue to improve over the next month or two and eventually go away. If your symptoms get worse or do not go away, please come back or call the clinic so we can refer you to an ear specialist.     In the meantime I recommend:    - Using your flonase daily  - taking an antihistamine for the next month, once a day at bedtime:   Ex: Claritin (loratidine), Xyzal (levocetirizine), Zyrtec (certizine)    Please call or return to clinic if your symptoms don't go away.    Follow up plan  No follow-ups on file.    Thank you for coming to [facility] today.  Lab Testing:  **If you had lab testing today and your results are reassuring or normal they will be mailed to you or sent through [employer] within 7 days.   **If the lab tests need quick action we will call you with the results.  **If you are having labs done on a different day, please call [phone] to schedule at [facility] or [phone] for other [facility] locations. Labs do not offer walk-in appointments.  The phone number we will call with results is # [phone] (work). If this is not the best number please call our clinic and change the number.  Medication Refills:  If you need any refills please call your pharmacy and they will contact us.   If you need to  your refill at a new pharmacy, please contact the new pharmacy directly. The new pharmacy will help you get your medications transferred faster.   Scheduling:  If you have any concerns about today's visit or wish to schedule another appointment please call our office during normal business hours [phone] (8-5:00 M-F). If you can no longer make a scheduled visit, please cancel via [employer] or call us to cancel.   If a referral was made to an [facility] specialty provider and you do not get a call  from central scheduling, please refer to directions on your visit summary or call our office during normal business hours for assistance.   If a Mammogram was ordered for you at the [facility] Center call [phone] to schedule or change your appointment.  If you had an XRay/CT/Ultrasound/MRI ordered the number is [phone] to schedule or change your radiology appointment.   [facility] has limited ultrasound appointments available on Wednesdays, if you would like your ultrasound at [facility], please call [phone] to schedule.   Medical Concerns:  If you have urgent medical concerns please call [phone] at any time of the day.    MAGDA Greenwood MD

## 2023-12-16 DIAGNOSIS — N40.1 BENIGN PROSTATIC HYPERPLASIA WITH WEAK URINARY STREAM: ICD-10-CM

## 2023-12-16 DIAGNOSIS — R39.12 BENIGN PROSTATIC HYPERPLASIA WITH WEAK URINARY STREAM: ICD-10-CM

## 2023-12-20 RX ORDER — FINASTERIDE 5 MG/1
1 TABLET, FILM COATED ORAL DAILY
Qty: 90 TABLET | Refills: 2 | Status: SHIPPED | OUTPATIENT
Start: 2023-12-20 | End: 2024-09-19

## 2023-12-27 ENCOUNTER — PATIENT OUTREACH (OUTPATIENT)
Dept: GASTROENTEROLOGY | Facility: CLINIC | Age: 63
End: 2023-12-27
Payer: COMMERCIAL

## 2024-01-13 ENCOUNTER — HEALTH MAINTENANCE LETTER (OUTPATIENT)
Age: 64
End: 2024-01-13

## 2024-01-29 ENCOUNTER — OFFICE VISIT (OUTPATIENT)
Dept: FAMILY MEDICINE | Facility: CLINIC | Age: 64
End: 2024-01-29
Payer: COMMERCIAL

## 2024-01-29 VITALS
WEIGHT: 206.5 LBS | TEMPERATURE: 98.2 F | DIASTOLIC BLOOD PRESSURE: 86 MMHG | HEIGHT: 68 IN | BODY MASS INDEX: 31.3 KG/M2 | OXYGEN SATURATION: 96 % | HEART RATE: 65 BPM | SYSTOLIC BLOOD PRESSURE: 135 MMHG

## 2024-01-29 DIAGNOSIS — E78.01 FAMILIAL HYPERCHOLESTEROLEMIA: ICD-10-CM

## 2024-01-29 DIAGNOSIS — H57.89 RED EYES: ICD-10-CM

## 2024-01-29 DIAGNOSIS — Z23 NEED FOR COVID-19 VACCINE: ICD-10-CM

## 2024-01-29 DIAGNOSIS — I10 ESSENTIAL HYPERTENSION: Primary | ICD-10-CM

## 2024-01-29 DIAGNOSIS — Z29.11 NEED FOR RSV IMMUNIZATION: ICD-10-CM

## 2024-01-29 LAB
ALBUMIN SERPL BCG-MCNC: 4.3 G/DL (ref 3.5–5.2)
ALP SERPL-CCNC: 60 U/L (ref 40–150)
ALT SERPL W P-5'-P-CCNC: 17 U/L (ref 0–70)
ANION GAP SERPL CALCULATED.3IONS-SCNC: 7 MMOL/L (ref 7–15)
AST SERPL W P-5'-P-CCNC: 25 U/L (ref 0–45)
BILIRUB SERPL-MCNC: 0.3 MG/DL
BUN SERPL-MCNC: 13.3 MG/DL (ref 8–23)
CALCIUM SERPL-MCNC: 9.6 MG/DL (ref 8.8–10.2)
CHLORIDE SERPL-SCNC: 102 MMOL/L (ref 98–107)
CHOLEST SERPL-MCNC: 176 MG/DL
CREAT SERPL-MCNC: 0.72 MG/DL (ref 0.67–1.17)
DEPRECATED HCO3 PLAS-SCNC: 30 MMOL/L (ref 22–29)
EGFRCR SERPLBLD CKD-EPI 2021: >90 ML/MIN/1.73M2
FASTING STATUS PATIENT QL REPORTED: NO
GLUCOSE SERPL-MCNC: 84 MG/DL (ref 70–99)
HDLC SERPL-MCNC: 48 MG/DL
LDLC SERPL CALC-MCNC: 97 MG/DL
NONHDLC SERPL-MCNC: 128 MG/DL
POTASSIUM SERPL-SCNC: 4.5 MMOL/L (ref 3.4–5.3)
PROT SERPL-MCNC: 6.4 G/DL (ref 6.4–8.3)
SODIUM SERPL-SCNC: 139 MMOL/L (ref 135–145)
TRIGL SERPL-MCNC: 156 MG/DL

## 2024-01-29 PROCEDURE — 99213 OFFICE O/P EST LOW 20 MIN: CPT | Mod: 25 | Performed by: FAMILY MEDICINE

## 2024-01-29 PROCEDURE — 91320 SARSCV2 VAC 30MCG TRS-SUC IM: CPT | Performed by: FAMILY MEDICINE

## 2024-01-29 PROCEDURE — 90678 RSV VACC PREF BIVALENT IM: CPT | Performed by: FAMILY MEDICINE

## 2024-01-29 PROCEDURE — 90471 IMMUNIZATION ADMIN: CPT | Performed by: FAMILY MEDICINE

## 2024-01-29 PROCEDURE — 80053 COMPREHEN METABOLIC PANEL: CPT | Performed by: FAMILY MEDICINE

## 2024-01-29 PROCEDURE — 90480 ADMN SARSCOV2 VAC 1/ONLY CMP: CPT | Performed by: FAMILY MEDICINE

## 2024-01-29 PROCEDURE — 36415 COLL VENOUS BLD VENIPUNCTURE: CPT | Performed by: FAMILY MEDICINE

## 2024-01-29 PROCEDURE — 80061 LIPID PANEL: CPT | Performed by: FAMILY MEDICINE

## 2024-01-29 RX ORDER — POLYMYXIN B SULFATE AND TRIMETHOPRIM 1; 10000 MG/ML; [USP'U]/ML
SOLUTION OPHTHALMIC
Qty: 10 ML | Refills: 0 | Status: SHIPPED | OUTPATIENT
Start: 2024-01-29 | End: 2024-02-05

## 2024-01-29 ASSESSMENT — PATIENT HEALTH QUESTIONNAIRE - PHQ9: SUM OF ALL RESPONSES TO PHQ QUESTIONS 1-9: 1

## 2024-01-29 NOTE — PROGRESS NOTES
"  Assessment & Plan     Essential hypertension  controlled  - COMPREHENSIVE METABOLIC PANEL; Future  - COMPREHENSIVE METABOLIC PANEL    Hyperlipidemia  Monitoring   - Lipid panel reflex to direct LDL Non-fasting; Future  - Lipid panel reflex to direct LDL Non-fasting    Need for RSV immunization    - RSV VACCINE (ABRYSVO)    Need for COVID-19 vaccine    - COVID-19 12+ (2023-24) (PFIZER)    Red eyes  Use antibiotics as below follow up if not improving  - polymixin b-trimethoprim (POLYTRIM) 61049-9.1 UNIT/ML-% ophthalmic solution; 1 drop in affected eye(s) every 3 hrs while awake for 5-7 days until resolved - max 6 doses per day    Review of the result(s) of each unique test - [facility]  Ordering of each unique test            Return in about 4 weeks (around 2/26/2024) for to ensure improving.    Marta Krishna is a 63 year old, presenting for the following health issues:  Eye Problem (Pt presents with bilateral conjunctivitis X5 days, pt is also c/o congestion and sore throat ) and Conjunctivitis        1/29/2024    10:32 AM   Additional Questions   Roomed by Tyrone     HPI   Irritated eyes, no significant vision change, some purulent discharge,                 Objective    /86   Pulse 65   Temp 98.2  F (36.8  C) (Oral)   Ht 1.727 m (5' 8\")   Wt 93.7 kg (206 lb 8 oz)   SpO2 96%   BMI 31.40 kg/m    Body mass index is 31.4 kg/m .  Physical Exam  Constitutional:       Appearance: He is well-developed. He is not ill-appearing or diaphoretic.   HENT:      Head: Normocephalic.      Right Ear: Tympanic membrane and external ear normal.      Left Ear: Tympanic membrane and external ear normal.      Nose: Mucosal edema, congestion and rhinorrhea present.      Right Sinus: No maxillary sinus tenderness or frontal sinus tenderness.      Left Sinus: No maxillary sinus tenderness or frontal sinus tenderness.   Eyes:      General:         Right eye: No foreign body, discharge or hordeolum.         Left eye: No foreign body, " discharge or hordeolum.      Extraocular Movements:      Right eye: Normal extraocular motion.      Conjunctiva/sclera: Conjunctivae normal.      Pupils: Pupils are equal, round, and reactive to light.   Pulmonary:      Effort: Pulmonary effort is normal. No respiratory distress.      Breath sounds: Normal breath sounds. No stridor.   Abdominal:      Palpations: Abdomen is soft.   Musculoskeletal:      Cervical back: Normal range of motion.   Neurological:      Mental Status: He is alert.                    Signed Electronically by: Alex Tapia MD

## 2024-01-29 NOTE — PATIENT INSTRUCTIONS
Thank you for choosing us for your care. I have placed an order for a prescription so that you can start treatment. View your full visit summary for details by clicking on the link below. Your pharmacist will able to address any questions you may have about the medication.     If you re not feeling better within 2-3 days, please schedule an appointment.  You can schedule an appointment right here in [facility], or call [phone]  If the visit is for the same symptoms as your eVisit, we ll refund the cost of your eVisit if seen within seven days.    Pinkeye: Care Instructions  Overview     Pinkeye is redness and swelling of the eye surface and the conjunctiva (the lining of the eyelid and the covering of the white part of the eye). Pinkeye is also called conjunctivitis. Pinkeye is often caused by infection with bacteria or a virus. Dry air, allergies, smoke, and chemicals are other common causes.  Pinkeye often gets better on its own in 7 to 10 days. Antibiotics only help if the pinkeye is caused by bacteria. Pinkeye caused by infection spreads easily. If an allergy or chemical is causing pinkeye, it will not go away unless you can avoid whatever is causing it.  Follow-up care is a key part of your treatment and safety. Be sure to make and go to all appointments, and call your doctor if you are having problems. It's also a good idea to know your test results and keep a list of the medicines you take.  How can you care for yourself at home?  Wash your hands often. Always wash them before and after you treat pinkeye or touch your eyes or face.  Use moist cotton or a clean, wet cloth to remove crust. Wipe from the inside corner of the eye to the outside. Use a clean part of the cloth for each wipe.  Put cold or warm wet cloths on your eye a few times a day if the eye hurts.  Do not wear contact lenses or eye makeup until the pinkeye is gone. Throw away any eye makeup you were using when you got pinkeye. Clean your  "contacts and storage case. If you wear disposable contacts, use a new pair when your eye has cleared and it is safe to wear contacts again.  If the doctor gave you antibiotic ointment or eyedrops, use them as directed. Use the medicine for as long as instructed, even if your eye starts looking better soon. Keep the bottle tip clean, and do not let it touch the eye area.  To put in eyedrops or ointment:  Tilt your head back, and pull your lower eyelid down with one finger.  Drop or squirt the medicine inside the lower lid.  Close your eye for 30 to 60 seconds to let the drops or ointment move around.  Do not touch the ointment or dropper tip to your eyelashes or any other surface.  Do not share towels, pillows, or washcloths while you have pinkeye.  When should you call for help?   Call your doctor now or seek immediate medical care if:    You have pain in your eye, not just irritation on the surface.     You have a change in vision or loss of vision.     You have an increase in discharge from the eye.     Your eye has not started to improve or begins to get worse within 48 hours after you start using antibiotics.     Pinkeye lasts longer than 7 days.   Watch closely for changes in your health, and be sure to contact your doctor if you have any problems.  Where can you learn more?  Go to https://www.[employer].net/patiented  Enter Y392 in the search box to learn more about \"Pinkeye: Care Instructions.\"  Current as of: June 6, 2023               Content Version: 13.8    4509-0091 [employer].   Care instructions adapted under license by your healthcare professional. If you have questions about a medical condition or this instruction, always ask your healthcare professional. [employer] disclaims any warranty or liability for your use of this information.      "

## 2024-02-14 SDOH — HEALTH STABILITY: PHYSICAL HEALTH: ON AVERAGE, HOW MANY DAYS PER WEEK DO YOU ENGAGE IN MODERATE TO STRENUOUS EXERCISE (LIKE A BRISK WALK)?: 6 DAYS

## 2024-02-14 SDOH — HEALTH STABILITY: PHYSICAL HEALTH: ON AVERAGE, HOW MANY MINUTES DO YOU ENGAGE IN EXERCISE AT THIS LEVEL?: 60 MIN

## 2024-02-14 ASSESSMENT — SOCIAL DETERMINANTS OF HEALTH (SDOH): HOW OFTEN DO YOU GET TOGETHER WITH FRIENDS OR RELATIVES?: NEVER

## 2024-02-21 ENCOUNTER — OFFICE VISIT (OUTPATIENT)
Dept: FAMILY MEDICINE | Facility: CLINIC | Age: 64
End: 2024-02-21
Payer: COMMERCIAL

## 2024-02-21 VITALS
SYSTOLIC BLOOD PRESSURE: 127 MMHG | HEART RATE: 60 BPM | BODY MASS INDEX: 31.58 KG/M2 | HEIGHT: 68 IN | DIASTOLIC BLOOD PRESSURE: 85 MMHG | OXYGEN SATURATION: 97 % | WEIGHT: 208.4 LBS

## 2024-02-21 DIAGNOSIS — G47.10 EXCESSIVE SLEEPINESS: Primary | ICD-10-CM

## 2024-02-21 LAB
ERYTHROCYTE [DISTWIDTH] IN BLOOD BY AUTOMATED COUNT: 12.2 % (ref 10–15)
HCT VFR BLD AUTO: 42 % (ref 40–53)
HGB BLD-MCNC: 13.8 G/DL (ref 13.3–17.7)
MCH RBC QN AUTO: 30.8 PG (ref 26.5–33)
MCHC RBC AUTO-ENTMCNC: 32.9 G/DL (ref 31.5–36.5)
MCV RBC AUTO: 94 FL (ref 78–100)
PLATELET # BLD AUTO: 193 10E3/UL (ref 150–450)
RBC # BLD AUTO: 4.48 10E6/UL (ref 4.4–5.9)
TSH SERPL DL<=0.005 MIU/L-ACNC: 1.35 UIU/ML (ref 0.3–4.2)
WBC # BLD AUTO: 3.7 10E3/UL (ref 4–11)

## 2024-02-21 PROCEDURE — 36415 COLL VENOUS BLD VENIPUNCTURE: CPT

## 2024-02-21 PROCEDURE — 84443 ASSAY THYROID STIM HORMONE: CPT

## 2024-02-21 PROCEDURE — 99213 OFFICE O/P EST LOW 20 MIN: CPT | Mod: GC

## 2024-02-21 PROCEDURE — 85027 COMPLETE CBC AUTOMATED: CPT

## 2024-02-21 ASSESSMENT — PATIENT HEALTH QUESTIONNAIRE - PHQ9
SUM OF ALL RESPONSES TO PHQ QUESTIONS 1-9: 8
10. IF YOU CHECKED OFF ANY PROBLEMS, HOW DIFFICULT HAVE THESE PROBLEMS MADE IT FOR YOU TO DO YOUR WORK, TAKE CARE OF THINGS AT HOME, OR GET ALONG WITH OTHER PEOPLE: SOMEWHAT DIFFICULT
SUM OF ALL RESPONSES TO PHQ QUESTIONS 1-9: 8

## 2024-02-21 NOTE — PROGRESS NOTES
Assessment & Plan     Excessive sleepiness  Likely multifactorial, though severe TIMOTHY is likely contributing. Will be worth discussing with sleep medicine provider at visit next month. Had been actively titrating settings. Tomi has a decades-long history of fatigue which has been more bothersome in the past. Has tried many things including nasal polyp surgery, various devices for TIMOTHY, has lost a good deal of weight, etc. Exam today without signs of new heart failure. Was briefly hyperthyroid several years ago and has not had follow-up since. Serial TSH and perhaps FNA was recommended at the time. Will check to see if this may be contributing currently. Will also screen for anemia.   - TSH with free T4 reflex  - CBC with platelets    Counseling  Appropriate preventive services were discussed with this patient, including applicable screening as appropriate for fall prevention, nutrition, physical activity, Tobacco-use cessation, weight loss and cognition.  Checklist reviewing preventive services available has been given to the patient.  Reviewed patient's diet, addressing concerns and/or questions.   Patient is at risk for social isolation and has been provided with information about the benefit of social connection.   The patient's PHQ-9 score is consistent with mild depression. He was provided with information regarding depression.     No follow-ups on file.    Subjective   Tomi is a 64 year old, presenting for the following health issues:  fatigue        2/21/2024    10:02 AM   Additional Questions   Roomed by Tyrone         2/21/2024    Information    services provided? No     HPI     Feeling tired for over a year.    Fall asleep a lot. Get tired when sitting for too long.     Falls asleep while reading, watching television, will start getting tired gradually and then nap less than an hour. About once per day.     Using a CPAP; always starts with it, gets around 6.5 hours, sometimes takes it  "off during the night. Been using that for 20 years.     Last saw sleep doctor in March, next visit this march (in a few weeks.) Increased the pressure at last visit in hopes of helping, didn't seem to make a difference.     When going to concerts gets tired after the first half. Feeling less focused and making most mistakes while volunteering.     No constipation, no diarrhea.   Put on some weight. Eating more crackers, outshine bars, junk food.     Dry skin is present.     No falls recently.     Decreased endurance with treadmill exercise.     Volunteers: tutoring, preparing taxes.     Eyes are still watering and a little itchy since last visit.         Objective    /85   Pulse 60   Ht 1.727 m (5' 8\")   Wt 94.5 kg (208 lb 6.4 oz)   SpO2 97%   BMI 31.69 kg/m    Body mass index is 31.69 kg/m .  Physical Exam  Constitutional:       General: He is not in acute distress.     Appearance: Normal appearance. He is not toxic-appearing.   HENT:      Mouth/Throat:      Comments: Large tongue and lips, obstructive-appearing, unable to visualize posterior structures  Cardiovascular:      Rate and Rhythm: Normal rate and regular rhythm.      Comments: No LE edema  Pulmonary:      Effort: Pulmonary effort is normal.      Breath sounds: Normal breath sounds.   Musculoskeletal:      Cervical back: Neck supple. No tenderness.   Skin:     Comments: Multiple acrochordons   Neurological:      Mental Status: He is alert.            Results for orders placed or performed in visit on 02/21/24 (from the past 24 hour(s))   CBC with platelets   Result Value Ref Range    WBC Count 3.7 (L) 4.0 - 11.0 10e3/uL    RBC Count 4.48 4.40 - 5.90 10e6/uL    Hemoglobin 13.8 13.3 - 17.7 g/dL    Hematocrit 42.0 40.0 - 53.0 %    MCV 94 78 - 100 fL    MCH 30.8 26.5 - 33.0 pg    MCHC 32.9 31.5 - 36.5 g/dL    RDW 12.2 10.0 - 15.0 %    Platelet Count 193 150 - 450 10e3/uL           Signed Electronically by: MAGDA Greenwood MD    "

## 2024-02-21 NOTE — PATIENT INSTRUCTIONS
Patient Education   Here is the plan from today's visit    1. Excessive sleepiness  Will get back to you with the results as we get them. [place] troubleshooting in detail with sleep medicine doctor.   - TSH with free T4 reflex; Future  - CBC with platelets; Future  - TSH with free T4 reflex  - CBC with platelets      Please call or return to clinic if your symptoms don't go away.    Follow up plan  Return if symptoms worsen or fail to improve.    Thank you for coming to [facility] today.  Lab Testing:  **If you had lab testing today and your results are reassuring or normal they will be mailed to you or sent through [employer] within 7 days.   **If the lab tests need quick action we will call you with the results.  **If you are having labs done on a different day, please call [phone] to schedule at [facility] or [phone] for other [facility] locations. Labs do not offer walk-in appointments.  The phone number we will call with results is # [phone] (work). If this is not the best number please call our clinic and change the number.  Medication Refills:  If you need any refills please call your pharmacy and they will contact us.   If you need to  your refill at a new pharmacy, please contact the new pharmacy directly. The new pharmacy will help you get your medications transferred faster.   Scheduling:  If you have any concerns about today's visit or wish to schedule another appointment please call our office during normal business hours [phone] (8-5:00 M-F). If you can no longer make a scheduled visit, please cancel via [employer] or call us to cancel.   If a referral was made to an [facility] specialty provider and you do not get a call from central scheduling, please refer to directions on your visit summary or call our office during normal business hours for assistance.   If a Mammogram was ordered for you at the Breast Center call [phone] to schedule or  change your appointment.  If you had an XRay/CT/Ultrasound/MRI ordered the number is [phone] to schedule or change your radiology appointment.   [facility] has limited ultrasound appointments available on Wednesdays, if you would like your ultrasound at [facility], please call [phone] to schedule.   Medical Concerns:  If you have urgent medical concerns please call [phone] at any time of the day.    MAGDA Greenwood MD

## 2024-03-05 ASSESSMENT — SLEEP AND FATIGUE QUESTIONNAIRES
HOW LIKELY ARE YOU TO NOD OFF OR FALL ASLEEP WHILE LYING DOWN TO REST IN THE AFTERNOON WHEN CIRCUMSTANCES PERMIT: HIGH CHANCE OF DOZING
HOW LIKELY ARE YOU TO NOD OFF OR FALL ASLEEP WHILE WATCHING TV: MODERATE CHANCE OF DOZING
HOW LIKELY ARE YOU TO NOD OFF OR FALL ASLEEP WHILE SITTING AND READING: MODERATE CHANCE OF DOZING
HOW LIKELY ARE YOU TO NOD OFF OR FALL ASLEEP WHILE SITTING AND TALKING TO SOMEONE: WOULD NEVER DOZE
HOW LIKELY ARE YOU TO NOD OFF OR FALL ASLEEP IN A CAR, WHILE STOPPED FOR A FEW MINUTES IN TRAFFIC: WOULD NEVER DOZE
HOW LIKELY ARE YOU TO NOD OFF OR FALL ASLEEP WHEN YOU ARE A PASSENGER IN A CAR FOR AN HOUR WITHOUT A BREAK: WOULD NEVER DOZE
HOW LIKELY ARE YOU TO NOD OFF OR FALL ASLEEP WHILE SITTING INACTIVE IN A PUBLIC PLACE: SLIGHT CHANCE OF DOZING
HOW LIKELY ARE YOU TO NOD OFF OR FALL ASLEEP WHILE SITTING QUIETLY AFTER LUNCH WITHOUT ALCOHOL: MODERATE CHANCE OF DOZING

## 2024-03-07 DIAGNOSIS — G62.9 PERIPHERAL POLYNEUROPATHY: ICD-10-CM

## 2024-03-07 RX ORDER — GABAPENTIN 300 MG/1
CAPSULE ORAL
Qty: 540 CAPSULE | Refills: 3 | Status: SHIPPED | OUTPATIENT
Start: 2024-03-07

## 2024-03-12 ENCOUNTER — OFFICE VISIT (OUTPATIENT)
Dept: SLEEP MEDICINE | Facility: CLINIC | Age: 64
End: 2024-03-12
Payer: COMMERCIAL

## 2024-03-12 VITALS
DIASTOLIC BLOOD PRESSURE: 89 MMHG | BODY MASS INDEX: 31.51 KG/M2 | WEIGHT: 207.9 LBS | OXYGEN SATURATION: 95 % | SYSTOLIC BLOOD PRESSURE: 138 MMHG | RESPIRATION RATE: 16 BRPM | HEART RATE: 67 BPM | HEIGHT: 68 IN

## 2024-03-12 DIAGNOSIS — G47.33 OSA (OBSTRUCTIVE SLEEP APNEA): Primary | ICD-10-CM

## 2024-03-12 PROCEDURE — 99214 OFFICE O/P EST MOD 30 MIN: CPT | Performed by: INTERNAL MEDICINE

## 2024-03-12 NOTE — PATIENT INSTRUCTIONS

## 2024-03-12 NOTE — PROGRESS NOTES
[facility]   Outpatient Sleep Medicine Follow-up Visit  March 12, 2024    Name: Juan Francisco Booker MRN# [id number]   Age: 64 year old YOB: 1960     Date of Consultation: March 12, 2024  Consultation is requested by: No referring provider defined for this encounter.  Primary care provider: Kalin Vega           Assessment and Plan:     Sleep Diagnoses:  Severe obstructive sleep apnea currently relatively effectively treated with some residual sleepiness and residual AHI of 10  History of parasomnia (sleep walking)    Comorbid conditions:  Hypertension  History of depression    Summary Counseling:  Return to clinic in 1 year or earlier if you have recurrent symptoms of snoring, sleep disruption or nonrestorative sleep           History of Present Illness:     Juan Francisco Booker is a 64 year old male  currently on effective CPAP 8-12 with average use 6.3 hours and AHI< 2.  He is using the CPAP device just over 6 hours intermittently taking it off and falling asleep without leaving the bed.  Occasionally gets up and comes back to the bed and forgets to put it on.  He is very effectively treated with AHI of less than 5 and good adherence with consistency and not having daytime sleepiness.  We did discuss behavioral changes to replace his mask in the middle of the pillow when he gets up as a reminder to replace it before he goes to sleep.  Since he is not having sleepiness it is likely that he is achieving sufficient sleep.      PREVIOUS SLEEP STUDIES:  Date: 2014 @ 300#  AHI: 66.5 complex predominantly obstructive without current central events on CAP            Most Recent SCALES:    EPWORTH SLEEPINESS SCALE WITHIN 1 YEAR WITHIN 10 DAYS   Sitting and reading 2 2   Watching TV 2 2   Sitting, inactive in a public place (theatre or mtg.) 1  1    As a passenger in a car 0 0   Lying down to rest in the afternoon when circumstance permit 3 3   Sitting and talking to someone 0 0   Sitting  quietly after lunch without alcohol 2 2   In a car, while stopped for a few minutes in traffic 0 0   TOTAL SCORE 10 10   Normal < 11         0--none    1--mild    2--moderate  3--severe      INSOMNIA SEVERITY INDEX WITHIN 1 YEAR   Difficulty falling asleep 0   Difficult staying asleep 1   Problems waking up to early 3   How SATISFIED/DISSATISFIED are you with your CURRENT sleep pattern? 3   How NOTICEABLE to others do you think your sleep pattern is in terms of your quality of life? 3   How WORRIED/DISTRESSED are you about your current sleep pattern? 2   To what extent do you consider your sleep problem to INTERFERE with your daily fuctioning(e.g. daytime fatigue, mood, ability to function at work/daily chores, concentration, mood,etc.) CURRENTLY? 3   INSOMNIA SEVERITY INDEX TOTAL SCORE 15    --absence of insomnia (0-7); sub-threshold insomnia (8-14); moderate insomnia (15-21); and severe insomnia (22-28)--          No data recorded    Objective:  CPAP Compliance Targets:   >70% days > 4 hours AHI < 5   30 days ending March 12, 2024  Mask type:  Full Face Mask   ResMed      Auto-PAP 12.0 - 18.0 cmH2O 30 day usage data:    93% of days with > 4 hours of use. 0/30 days with no use.   Average use 382 minutes per day.   95%ile Leak 37.35 L/min.   CPAP 95% pressure 14.1 cm.   AHI 1.88 events per hour.                   Medications:     Current Outpatient Medications   Medication Sig    citalopram (CELEXA) 10 MG tablet TAKE 1 TABLET (10 MG) BY MOUTH DAILY.    finasteride (PROSCAR) 5 MG tablet TAKE 1 TABLET BY MOUTH EVERY DAY    fluticasone (FLONASE) 50 MCG/ACT nasal spray Spray 1-2 sprays into both nostrils daily    gabapentin (NEURONTIN) 300 MG capsule TAKE 3 CAPSULES IN THE MORNING AND 3 CAPSULES IN THE EVENING.    lisinopril (ZESTRIL) 20 MG tablet TAKE 1 TABLET BY MOUTH EVERY DAY    methylcellulose (CITRUCEL) powder Take 2 g by mouth daily    Multiple Vitamin (DAILY MULTIVITAMIN PO) Take 1 tablet by mouth daily AM     "ORDER FOR DME Use your CPAP device as directed by your provider.    polyethylene glycol (MIRALAX) 17 GM/Dose powder Take 9-17 g by mouth daily (Mix in liquid)    simvastatin (ZOCOR) 40 MG tablet TAKE 1 TABLET BY MOUTH AT BEDTIME    SODIUM FLUORIDE 5000 PPM 1.1 % PSTE BRUSH TEETH WITH PEA SIZED AMOUNT AT BEDTIME AND DO NOT RINSE     No current facility-administered medications for this visit.        Allergies   Allergen Reactions    Grass             Problem List:     Patient Active Problem List   Diagnosis    Obstructive sleep apnea    Insomnia    Excessive sleepiness    Chronic nasal congestion    Lesion of ulnar nerve    Hyperlipidemia    Essential hypertension    Obesity    Benign neoplasm of colon    Hyperlipidemia    Moderate major depression (H)    Gastric polyps    Cervical pain    Prediabetes    Skin tag    Unilateral inguinal hernia with obstruction and without gangrene, recurrence not specified    History of ETOH abuse    Urinary urgency    Urinary frequency    Multiple nevi    Acrochordon    Seborrheic keratosis    Acanthosis nigricans            Past Medical History:     Does not need 02 supplement at night   Past Medical History:   Diagnosis Date    Anxiety     Benign neoplasm of colon 3/2/2015    Depression     Depressive disorder 1/15/2000    Difficult intubation     ETOH abuse 3/15/2009    in remission    Gastro-oesophageal reflux disease 1/15/2010    Hyperlipidemia 1/1/2000    Hypoxemia     Morbid obesity (H)     Nasal polyps 1/1/2015    TIMOTHY on CPAP 8/13/2012    Severe (uses 5-6 hours as able)    Other and unspecified hyperlipidemia 10/25/2012    Personal history of colonic polyps 2/207/15    Multiple \"neg for FAP\"    Pre-diabetes     Prediabetes 5/24/2017    Restless leg     Skin tag     Surgical complication 1/6/2015    difficulty inserting a tube down my throat    Unspecified essential hypertension 10/25/2012             Past Surgical History:    No h/o  upper airway surgery  Past Surgical " History:   Procedure Laterality Date    AS COLONOSCOPY THRU STOMA W BIOPSY/CAUTERY TUMOR/POLYP/LESION  2/27/15    colon polyps    COLONOSCOPY  2/27/2015    x 2    COLONOSCOPY N/A 1/6/2020    Procedure: COLONOSCOPY, WITH POLYPECTOMY AND BIOPSY;  Surgeon: Omer Salas MD;  Location:  GI    COLONOSCOPY WITH CO2 INSUFFLATION N/A 10/20/2016    Procedure: COLONOSCOPY WITH CO2 INSUFFLATION;  Surgeon: Juan Francisco Beltran MD;  Location:  OR    ENT SURGERY  1/5/2011    [facility]    ESOPHAGOSCOPY, GASTROSCOPY, DUODENOSCOPY (EGD), COMBINED N/A 10/20/2016    Procedure: COMBINED ESOPHAGOSCOPY, GASTROSCOPY, DUODENOSCOPY (EGD);  Surgeon: Juan Francisco Beltran MD;  Location:  OR    LAPAROSCOPIC HERNIORRHAPHY INGUINAL Right 3/24/2021    Procedure: HERNIORRHAPHY, INGUINAL, LAPAROSCOPIC;  Surgeon: Laith Villa MD;  Location: [facility] OR    RELEASE CARPAL TUNNEL Right 6/14/2017    Procedure: RELEASE CARPAL TUNNEL;  Right Open Carpal Tunnel Release;  Surgeon: Aracelis Lowe MD;  Location: [facility] OR    RELEASE CARPAL TUNNEL Left 12/29/2017    Procedure: RELEASE CARPAL TUNNEL;  Left Open Carpal Tunnel Release;  Surgeon: Aracelis Lowe MD;  Location:  OR              Physical Examination:   Objective:    Reported vitals:  There were no vitals taken for this visit.   GENERAL: alert and no distress  EYES: Eyes grossly normal to inspection.  No discharge or erythema, or obvious scleral/conjunctival abnormalities.  RESP: No audible wheeze, cough, or visible cyanosis.    SKIN: Visible skin clear. No significant rash, abnormal pigmentation or lesions.  NEURO: Cranial nerves grossly intact.  Mentation and speech appropriate for age.  PSYCH: Appropriate affect, tone, and pace of words        Copy to: Kalin Vega MD 3/12/2024         Total time spent reviewing medical records including previous testing and interpretation as well as direct patient contact and documentation on this date: 30  minutes

## 2024-03-12 NOTE — NURSING NOTE
1 year reminder sent via [employer]. Printed AVS.    Amelie James   Clinic Assistant  [facility]

## 2024-05-04 DIAGNOSIS — E78.5 HYPERLIPIDEMIA LDL GOAL <130: ICD-10-CM

## 2024-05-06 NOTE — TELEPHONE ENCOUNTER
"Request for medication refill:    simvastatin (ZOCOR) 40 MG tablet     Providers if patient needs an appointment and you are willing to give a one month supply please refill for one month and  send a letter/MyChart using \".SMILLIMITEDREFILL\" .smillimited and route chart to \"[facility] \" (Giving one month refill in non controlled medications is strongly recommended before denial)    If refill has been denied, meaning absolutely no refills without visit, please complete the smart phrase \".smirxrefuse\" and route it to the \"[facility] MED REFILLS\"  pool to inform the patient and the pharmacy.    Mackenzie Reddy MA      "

## 2024-05-07 NOTE — TELEPHONE ENCOUNTER
Pending Prescriptions:                       Disp   Refills    fluticasone (FLONASE) 50 MCG/ACT nasal sp*48 g   1            Sig: Spray 1-2 sprays into both nostrils daily    Last Written Prescription Date:  9/12/2023  Last Fill Quantity: 48 g,   # refills: 1  Last Office Visit with [facility], [facility] or [facility] prescribing provider: 3/12/2024  Future Office visit:   No follow up scheduled at this time.      ALESSANDRA Brewer

## 2024-05-08 RX ORDER — SIMVASTATIN 40 MG
40 TABLET ORAL AT BEDTIME
Qty: 90 TABLET | Refills: 3 | Status: SHIPPED | OUTPATIENT
Start: 2024-05-08

## 2024-05-08 RX ORDER — FLUTICASONE PROPIONATE 50 MCG
1-2 SPRAY, SUSPENSION (ML) NASAL DAILY
Qty: 48 G | Refills: 1 | Status: SHIPPED | OUTPATIENT
Start: 2024-05-08

## 2024-05-09 ENCOUNTER — TELEPHONE (OUTPATIENT)
Dept: UROLOGY | Facility: CLINIC | Age: 64
End: 2024-05-09
Payer: COMMERCIAL

## 2024-05-09 NOTE — TELEPHONE ENCOUNTER
Left Voicemail (1st Attempt) for the patient to call back and schedule the following:    Appointment type: Return   Provider: Kole Mcdonough  Return date: November 2024  Specialty phone number: [phone]  Additional appointment(s) needed: PSA lab   Additonal Notes: Yearly follow up with Kole Mcdonough with a PSA prior per message received

## 2024-05-09 NOTE — TELEPHONE ENCOUNTER
Left Voicemail (1st Attempt) for the patient to call back and schedule the following:    Appointment type: Return  Provider: Kole Mcdonough  Return date: November 2024  Specialty phone number: [phone]  Additional appointment(s) needed: PSA 1  week prior  Additonal Notes: NA

## 2024-09-17 DIAGNOSIS — N40.1 BENIGN PROSTATIC HYPERPLASIA WITH WEAK URINARY STREAM: ICD-10-CM

## 2024-09-17 DIAGNOSIS — R39.12 BENIGN PROSTATIC HYPERPLASIA WITH WEAK URINARY STREAM: ICD-10-CM

## 2024-09-19 RX ORDER — FINASTERIDE 5 MG/1
1 TABLET, FILM COATED ORAL DAILY
Qty: 90 TABLET | Refills: 0 | Status: SHIPPED | OUTPATIENT
Start: 2024-09-19

## 2024-09-19 NOTE — TELEPHONE ENCOUNTER
finasteride (PROSCAR) 5 MG tablet   90 tablet 2 12/20/2023     Last Office Visit : 11-9-2023  Future Office visit:  11-6-2024    BPH Agents

## 2024-10-04 ENCOUNTER — PATIENT OUTREACH (OUTPATIENT)
Dept: GASTROENTEROLOGY | Facility: CLINIC | Age: 64
End: 2024-10-04
Payer: COMMERCIAL

## 2024-10-04 ENCOUNTER — MYC MEDICAL ADVICE (OUTPATIENT)
Dept: GASTROENTEROLOGY | Facility: CLINIC | Age: 64
End: 2024-10-04
Payer: COMMERCIAL

## 2024-10-04 DIAGNOSIS — Z12.11 SPECIAL SCREENING FOR MALIGNANT NEOPLASMS, COLON: Primary | ICD-10-CM

## 2024-10-04 NOTE — PROGRESS NOTES
"CRC Screening Colonoscopy Referral Review    Patient meets the inclusion criteria for screening colonoscopy standing order.    Ordering/Referring Provider:  Savita Faria [facility] med director at hospitals      BMI: Estimated body mass index is 31.61 kg/m  as calculated from the following:    Height as of 3/12/24: 1.727 m (5' 8\").    Weight as of 3/12/24: 94.3 kg (207 lb 14.4 oz).     Sedation:  Does patient have any of the following conditions affecting sedation?  No medical conditions affecting sedation.    Previous Scopes:  Any previous recommendations or follow up needs based on previous scope?  Location recommendations: hospital due to severe TIMOTHY    Medical Concerns to Postpone Order:  Does patient have any of the following medical concerns that should postpone/delay colonoscopy referral?  No medical conditions affecting colonoscopy referral.    Final Referral Details:  Based on patient's medical history patient is appropriate for referral order with moderate sedation. If patient's BMI > 50 do not schedule in ASC.  "

## 2024-10-22 ENCOUNTER — LAB (OUTPATIENT)
Dept: LAB | Facility: CLINIC | Age: 64
End: 2024-10-22
Payer: COMMERCIAL

## 2024-10-22 DIAGNOSIS — R39.12 BENIGN PROSTATIC HYPERPLASIA WITH WEAK URINARY STREAM: ICD-10-CM

## 2024-10-22 DIAGNOSIS — N40.1 BENIGN PROSTATIC HYPERPLASIA WITH WEAK URINARY STREAM: ICD-10-CM

## 2024-10-22 LAB — PSA SERPL DL<=0.01 NG/ML-MCNC: 0.52 NG/ML (ref 0–4.5)

## 2024-10-22 PROCEDURE — 84153 ASSAY OF PSA TOTAL: CPT

## 2024-10-22 PROCEDURE — 36415 COLL VENOUS BLD VENIPUNCTURE: CPT

## 2024-10-23 ENCOUNTER — TELEPHONE (OUTPATIENT)
Dept: GASTROENTEROLOGY | Facility: CLINIC | Age: 64
End: 2024-10-23
Payer: COMMERCIAL

## 2024-10-23 ENCOUNTER — HOSPITAL ENCOUNTER (OUTPATIENT)
Facility: CLINIC | Age: 64
End: 2024-10-23
Attending: INTERNAL MEDICINE | Admitting: INTERNAL MEDICINE
Payer: COMMERCIAL

## 2024-10-23 NOTE — TELEPHONE ENCOUNTER
"Endoscopy Scheduling Screen    Have you had any respiratory illness or flu-like symptoms in the last 10 days?  No    What is your communication preference for Instructions and/or Bowel Prep?   MyChart    What insurance is in the chart?  Other:  [facility]    Ordering/Referring Provider: Savita Faria, DO in [facility]   (If ordering provider performs procedure, schedule with ordering provider unless otherwise instructed. )    BMI: Estimated body mass index is 31.61 kg/m  as calculated from the following:    Height as of 3/12/24: 1.727 m (5' 8\").    Weight as of 3/12/24: 94.3 kg (207 lb 14.4 oz).     Sedation Ordered  moderate sedation.   If patient BMI > 50 do not schedule in ASC.    If patient BMI > 45 do not schedule at [facility].    Are you taking methadone or Suboxone?  NO, No RN review required.    Have you been diagnosed and are being treated for severe PTSD or severe anxiety?  NO, No RN review required.    Are you taking any prescription medications for pain 3 or more times per week?   NO, No RN review required.    Do you have a history of malignant hyperthermia?  No    (Females) Are you currently pregnant?   No     Have you been diagnosed or told you have pulmonary hypertension?   No    Do you have an LVAD?  No    Have you been told you have moderate to severe sleep apnea?  Yes. Do you use a CPAP? Yes. (RN Review required for scheduling unless scheduling in Hospital.)     Have you been told you have COPD, asthma, or any other lung disease?  No    Do you have any heart conditions?  No     Have you ever had or are you waiting for an organ transplant?  No. Continue scheduling, no site restrictions.    Have you had a stroke or transient ischemic attack (TIA aka \"mini stroke\" in the last 6 months?   No    Have you been diagnosed with or been told you have cirrhosis of the liver?   No.    Are you currently on dialysis?   No    Do you need assistance transferring?   No    BMI: Estimated body mass index is 31.61 " "kg/m  as calculated from the following:    Height as of 3/12/24: 1.727 m (5' 8\").    Weight as of 3/12/24: 94.3 kg (207 lb 14.4 oz).     Is patients BMI > 40 and scheduling location [facility]?  No    Do you take an injectable or oral medication for weight loss or diabetes (excluding insulin)?  No    Do you take the medication Naltrexone?  No    Do you take blood thinners?  No       Prep   Are you currently on dialysis or do you have chronic kidney disease?  No    Do you have a diagnosis of diabetes?  No    Do you have a diagnosis of cystic fibrosis (CF)?  No    On a regular basis do you go 3 -5 days between bowel movements?  No - Pt states he takes fiber and Miralax     BMI > 40?  No    Preferred Pharmacy:    [id number] - [place], [place] - [address] [place] [address]  Phone: [phone] Fax: [phone]      Final Scheduling Details     Procedure scheduled  Colonoscopy    Surgeon:       Date of procedure:  12.13.24     Pre-OP / PAC:   No - Not required for this site.    Location  [facility] - Patient preference.    Sedation   Moderate Sedation - Per order.      Patient Reminders:   You will receive a call from a Nurse to review instructions and health history.  This assessment must be completed prior to your procedure.  Failure to complete the Nurse assessment may result in the procedure being cancelled.      On the day of your procedure, please designate an adult(s) who can drive you home stay with you for the next 24 hours. The medicines used in the exam will make you sleepy. You will not be able to drive.      You cannot take public transportation, ride share services, or non-medical taxi service without a responsible caregiver.  Medical transport services are allowed with the requirement that a responsible caregiver will receive you at your destination.  We require that drivers and caregivers are confirmed prior to your procedure.    "

## 2024-10-25 ENCOUNTER — PRE VISIT (OUTPATIENT)
Dept: UROLOGY | Facility: CLINIC | Age: 64
End: 2024-10-25
Payer: COMMERCIAL

## 2024-10-25 ENCOUNTER — TELEPHONE (OUTPATIENT)
Dept: GASTROENTEROLOGY | Facility: CLINIC | Age: 64
End: 2024-10-25
Payer: COMMERCIAL

## 2024-10-25 NOTE — TELEPHONE ENCOUNTER
Reason for visit: Follow up     Relevant information: Urinary frequency, BPH with weak urinary symptoms, LUTS    Records/imaging/labs/orders: all records available    At Rooming:  Standard rooming      Pancho Chaudhry  10/25/2024  2:44 PM

## 2024-10-25 NOTE — TELEPHONE ENCOUNTER
Caller: Juan Francisco Booker      Reason for Reschedule/Cancellation   (please be detailed, any staff messages or encounters to note?): Patient request to cancel, unable to arrange for adult supervision post procedure, patient will call to reschedule      Prior to reschedule please review:  Ordering Provider: Savita Faria DO in [facility]  Sedation Determined: MODERATE  Does patient have any ASC Exclusions, please identify?: YES, TIMOTHY      Notes on Cancelled Procedure:  Procedure: Lower Endoscopy [Colonoscopy]   Date: 12/13  Location: [facility]; [address] [place] [address], [address] Floor, [place] [address]   Surgeon:       Rescheduled: No, patient will call to reschedule       Did you cancel or rescheduled an EUS procedure? No.

## 2024-10-27 SDOH — HEALTH STABILITY: PHYSICAL HEALTH: ON AVERAGE, HOW MANY MINUTES DO YOU ENGAGE IN EXERCISE AT THIS LEVEL?: 30 MIN

## 2024-10-27 SDOH — HEALTH STABILITY: PHYSICAL HEALTH: ON AVERAGE, HOW MANY DAYS PER WEEK DO YOU ENGAGE IN MODERATE TO STRENUOUS EXERCISE (LIKE A BRISK WALK)?: 4 DAYS

## 2024-10-27 ASSESSMENT — SOCIAL DETERMINANTS OF HEALTH (SDOH): HOW OFTEN DO YOU GET TOGETHER WITH FRIENDS OR RELATIVES?: ONCE A WEEK

## 2024-10-31 ENCOUNTER — TELEPHONE (OUTPATIENT)
Dept: GASTROENTEROLOGY | Facility: CLINIC | Age: 64
End: 2024-10-31
Payer: COMMERCIAL

## 2024-10-31 NOTE — TELEPHONE ENCOUNTER
Pt called to reschedule colonoscopy. Slot offered: 01/03/2025 [facility]  Pt will call back once they have  approval.

## 2024-11-01 ENCOUNTER — OFFICE VISIT (OUTPATIENT)
Dept: FAMILY MEDICINE | Facility: CLINIC | Age: 64
End: 2024-11-01
Payer: COMMERCIAL

## 2024-11-01 VITALS
HEIGHT: 68 IN | TEMPERATURE: 97.8 F | BODY MASS INDEX: 33.89 KG/M2 | RESPIRATION RATE: 16 BRPM | OXYGEN SATURATION: 97 % | DIASTOLIC BLOOD PRESSURE: 89 MMHG | HEART RATE: 73 BPM | WEIGHT: 223.6 LBS | SYSTOLIC BLOOD PRESSURE: 137 MMHG

## 2024-11-01 DIAGNOSIS — F32.A DEPRESSION, UNSPECIFIED DEPRESSION TYPE: ICD-10-CM

## 2024-11-01 DIAGNOSIS — G47.33 OSA (OBSTRUCTIVE SLEEP APNEA): ICD-10-CM

## 2024-11-01 DIAGNOSIS — R42 DIZZINESS: ICD-10-CM

## 2024-11-01 DIAGNOSIS — I10 BENIGN ESSENTIAL HYPERTENSION: ICD-10-CM

## 2024-11-01 DIAGNOSIS — Z76.0 ENCOUNTER FOR MEDICATION REFILL: ICD-10-CM

## 2024-11-01 DIAGNOSIS — Z00.00 ROUTINE GENERAL MEDICAL EXAMINATION AT A HEALTH CARE FACILITY: Primary | ICD-10-CM

## 2024-11-01 PROCEDURE — 99396 PREV VISIT EST AGE 40-64: CPT | Mod: GC

## 2024-11-01 RX ORDER — LISINOPRIL 20 MG/1
20 TABLET ORAL DAILY
Qty: 90 TABLET | Refills: 3 | OUTPATIENT
Start: 2024-11-01

## 2024-11-01 RX ORDER — LISINOPRIL 20 MG/1
20 TABLET ORAL DAILY
Qty: 90 TABLET | Refills: 3 | Status: SHIPPED | OUTPATIENT
Start: 2024-11-01

## 2024-11-01 RX ORDER — CITALOPRAM HYDROBROMIDE 10 MG/1
10 TABLET ORAL DAILY
Qty: 90 TABLET | Refills: 3 | OUTPATIENT
Start: 2024-11-01

## 2024-11-01 RX ORDER — CITALOPRAM HYDROBROMIDE 10 MG/1
10 TABLET ORAL DAILY
Qty: 90 TABLET | Refills: 3 | Status: SHIPPED | OUTPATIENT
Start: 2024-11-01

## 2024-11-01 ASSESSMENT — PATIENT HEALTH QUESTIONNAIRE - PHQ9
10. IF YOU CHECKED OFF ANY PROBLEMS, HOW DIFFICULT HAVE THESE PROBLEMS MADE IT FOR YOU TO DO YOUR WORK, TAKE CARE OF THINGS AT HOME, OR GET ALONG WITH OTHER PEOPLE: NOT DIFFICULT AT ALL
SUM OF ALL RESPONSES TO PHQ QUESTIONS 1-9: 6
SUM OF ALL RESPONSES TO PHQ QUESTIONS 1-9: 6

## 2024-11-01 NOTE — PROGRESS NOTES
"Preventive Care Visit  [facility] RONAL Weber MD, Family Medicine  Nov 1, 2024    Assessment & Plan     Routine general medical examination at a health care facility  Overall, doing well. Concerned about slightly elevated BP (discussed below).   Reports not being able to exercise/ go on daily walks as much as last year, because of feeling tired during the day. Also reports increase in weight compared to last year. Attributes it to snacking more often and not being able to go on walks daily.   Discussed colon cancer screening - last colonoscopy in 2020 showed benign adenomatous polyps. Tomi is working on scheduling his next colonoscopy in Jan 2025 as per recommendations.   PHQ9 score was 6 today. Answered \"yes\" to feeling tired, trouble sleeping, poor appetite/ overeating, and trouble concentrating. Discussed increasing citalopram dose today, but together we decided to focus on addressing sleeping issues first (discussed below).   ASCVD score 14.4% - on Simvastatin 40mg daily. Reports adherence.   No labwork done during this visit as last labwork was about 8 months ago and was grossly WNL. Will order labwork during next office visit.     Benign essential hypertension  TIMOTHY (obstructive sleep apnea)  Reports slight increase in BP readings at home. Last year, BP 120s/70s, but this year at-home readings are 130s/80-90s. Denies headaches, dizziness, changes in vision, chest pain, difficulty breathing. Endorses decrease in physical activity due to feeling tired due to lack of proper sleep. Repots adherence to Lisinopril.   We discussed focusing on one lifestyle change at a time. Tomi would like to focus on eating less snacks. Increase in salt consumption and decrease in movement is what is most likely contributing to increase in weight and BP as compared to last year.   Also could be because Tomi sometimes takes off his CPAP machine in the middle of the night and does not remember to put it back on. " Lack of sleep quality is most likely contributing to feeling tired, which is contributing to decreased physical activity and increase in BP.   Tomi will also discuss his CPAP use with his sleep medicine provider during an upcoming appointment.   Will continue to discuss and monitor during our next visit in 3-4 months.     Encounter for medication refill  Refilled Citalopram and Lisinopril     Patient has been advised of split billing requirements and indicates understanding: Yes    Counseling  Appropriate preventive services were addressed with this patient via screening, questionnaire, or discussion as appropriate for fall prevention, nutrition, physical activity, weight loss and cognition.  Checklist reviewing preventive services available has been given to the patient.  Reviewed patient's diet, addressing concerns and/or questions.   The patient's PHQ-9 score is consistent with mild depression. He was provided with information regarding depression.     Return in 3-4 months to discuss lifestyle changes and sleep quality/ hygiene.       Subjective   Tomi is a 64 year old, presenting for the following:  Physical (High BP concern)        11/1/2024     2:50 PM   Additional Questions   Roomed by Doua   Accompanied by Self         11/1/2024     2:50 PM   Patient Reported Additional Medications   Patient reports taking the following new medications n/a         11/1/2024    Information    services provided? No      HPI    BP concerns  - higher than it used to be; a year ago it was 120s/ 70s but now 130/80-90s  - Checks BP 2-3x a week  - Same machine  - Compliant with BP med  - Maybe more salt in food? Because snacking more often lately  - Hasn't been able to exercise/ walk as much in the past, bc feeling more tired during the day  - Used to walk everyday for 1.5 hr, now walking 2/3 of that  - Feeling more tired because not sleeping well, dx with sleep apnea, sees a sleep doc, uses a cpap -  intermittently  - with CPAP able to sleep 5 hrs, w/o CPAP 8 hours of sleep  - Gets up in the middle of the night sometimes and takes off CPAP machine  - Denies headaches, changes in vision, chest pain, trouble breathing, abdominal pain, back pain, numbness, tingling, changes in weight, fevers, night sweats, changes in bowel movements.         10/27/2024   General Health   How would you rate your overall physical health? (!) FAIR   Feel stress (tense, anxious, or unable to sleep) Not at all          10/27/2024   Nutrition   Three or more servings of calcium each day? (!) I DON'T KNOW   Diet: Regular (no restrictions)   How many servings of fruit and vegetables per day? 4 or more   How many sweetened beverages each day? 0-1          10/27/2024   Exercise   Days per week of moderate/strenous exercise 4 days   Average minutes spent exercising at this level 30 min          10/27/2024   Social Factors   Frequency of gathering with friends or relatives Once a week   Worry food won't last until get money to buy more No   Food not last or not have enough money for food? No   Do you have housing? (Housing is defined as stable permanent housing and does not include staying ouside in a car, in a tent, in an abandoned building, in an overnight shelter, or couch-surfing.) No   Are you worried about losing your housing? No   Lack of transportation? No   Unable to get utilities (heat,electricity)? No   Want help with housing or utility concern? No      (!) HOUSING CONCERN PRESENT      10/27/2024   Fall Risk   Fallen 2 or more times in the past year? No    Trouble with walking or balance? No        Patient-reported          10/27/2024   Dental   Dentist two times every year? Yes          10/27/2024   TB Screening   Were you born outside of the [place]? No      Today's PHQ-9 Score:       11/1/2024     9:35 AM   PHQ-9 SCORE   PHQ-9 Total Score MyChart 6 (Mild depression)   PHQ-9 Total Score 6        Patient-reported     Answers submitted  "by the patient for this visit:  Patient Health Questionnaire (Submitted on 11/1/2024)  If you checked off any problems, how difficult have these problems made it for you to do your work, take care of things at home, or get along with other people?: Not difficult at all  PHQ9 TOTAL SCORE: 6        10/27/2024   Substance Use   Alcohol more than 3/day or more than 7/wk Not Applicable   Do you use any other substances recreationally? 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 No      Last PSA:   PSA   Date Value Ref Range Status   07/02/2021 0.51 0 - 4 ug/L Final     Comment:     Assay Method:  Chemiluminescence using Siemens Vista analyzer     PSA Tumor Marker   Date Value Ref Range Status   10/22/2024 0.52 0.00 - 4.50 ng/mL Final   06/17/2022 0.51 0.00 - 4.00 ug/L Final     ASCVD Risk   The 10-year ASCVD risk score (Abeba HAWKINS, et al., 2019) is: 14.4%    Values used to calculate the score:      Age: 64 years      Sex: Male      Is Non- : No      Diabetic: No      Tobacco smoker: No      Systolic Blood Pressure: 137 mmHg      Is BP treated: Yes      HDL Cholesterol: 48 mg/dL      Total Cholesterol: 176 mg/dL    BP Readings from Last 3 Encounters:   11/01/24 137/89   03/12/24 138/89   02/21/24 127/85    Wt Readings from Last 3 Encounters:   11/01/24 101.4 kg (223 lb 9.6 oz)   03/12/24 94.3 kg (207 lb 14.4 oz)   02/21/24 94.5 kg (208 lb 6.4 oz)         Objective    Exam  /89   Pulse 73   Temp 97.8  F (36.6  C) (Oral)   Resp 16   Ht 1.727 m (5' 8\")   Wt 101.4 kg (223 lb 9.6 oz)   SpO2 97%   BMI 34.00 kg/m       Physical Exam  GENERAL: alert and no distress  EYES: Eyes grossly normal to inspection, PERRL and conjunctivae and sclerae " normal  HENT: ear canals and TM's normal, nose and mouth without ulcers or lesions. Large tongue and lips, obstructive-appearing, unable to visualize posterior structures   NECK: no adenopathy, no asymmetry, masses, or scars  RESP: lungs clear to auscultation - no rales, rhonchi or wheezes  CV: regular rate and rhythm, normal S1 S2, no S3 or S4, no murmur, click or rub, no peripheral edema  ABDOMEN: soft, nontender, no masses and bowel sounds normal  MS: no gross musculoskeletal defects noted, no edema  SKIN: multiple acrochordons noted on examined skin  NEURO: Normal strength and tone, mentation intact and speech normal  PSYCH: mentation appears normal, affect normal/bright      Signed Electronically by: Marine Weber MD

## 2024-11-01 NOTE — PATIENT INSTRUCTIONS
Patient Education   Preventive Care Advice   This is general advice given by our system to help you stay healthy. However, your care team may have specific advice just for you. Please talk to your care team about your preventive care needs.  Nutrition  Eat 5 or more servings of fruits and vegetables each day.  Try wheat bread, brown rice and whole grain pasta (instead of white bread, rice, and pasta).  Get enough calcium and vitamin D. Check the label on foods and aim for 100% of the RDA (recommended daily allowance).  Lifestyle  Exercise at least 150 minutes each week  (30 minutes a day, 5 days a week).  Do muscle strengthening activities 2 days a week. These help control your weight and prevent disease.  No smoking.  Wear sunscreen to prevent skin cancer.  Have a dental exam and cleaning every 6 months.  Yearly exams  See your health care team every year to talk about:  Any changes in your health.  Any medicines your care team has prescribed.  Preventive care, family planning, and ways to prevent chronic diseases.  Shots (vaccines)   HPV shots (up to age 26), if you've never had them before.  Hepatitis B shots (up to age 59), if you've never had them before.  COVID-19 shot: Get this shot when it's due.  Flu shot: Get a flu shot every year.  Tetanus shot: Get a tetanus shot every 10 years.  Pneumococcal, hepatitis A, and RSV shots: Ask your care team if you need these based on your risk.  Shingles shot (for age 50 and up)  General health tests  Diabetes screening:  Starting at age 35, Get screened for diabetes at least every 3 years.  If you are younger than age 35, ask your care team if you should be screened for diabetes.  Cholesterol test: At age 39, start having a cholesterol test every 5 years, or more often if advised.  Bone density scan (DEXA): At age 50, ask your care team if you should have this scan for osteoporosis (brittle bones).  Hepatitis C: Get tested at least once in your life.  STIs (sexually  transmitted infections)  Before age 24: Ask your care team if you should be screened for STIs.  After age 24: Get screened for STIs if you're at risk. You are at risk for STIs (including HIV) if:  You are sexually active with more than one person.  You don't use condoms every time.  You or a partner was diagnosed with a sexually transmitted infection.  If you are at risk for HIV, ask about PrEP medicine to prevent HIV.  Get tested for HIV at least once in your life, whether you are at risk for HIV or not.  Cancer screening tests  Cervical cancer screening: If you have a cervix, begin getting regular cervical cancer screening tests starting at age 21.  Breast cancer scan (mammogram): If you've ever had breasts, begin having regular mammograms starting at age 40. This is a scan to check for breast cancer.  Colon cancer screening: It is important to start screening for colon cancer at age 45.  Have a colonoscopy test every 10 years (or more often if you're at risk) Or, ask your provider about stool tests like a FIT test every year or Cologuard test every 3 years.  To learn more about your testing options, visit:   .  For help making a decision, visit:   https://bit.ly/wk68583.  Prostate cancer screening test: If you have a prostate, ask your care team if a prostate cancer screening test (PSA) at age 55 is right for you.  Lung cancer screening: If you are a current or former smoker ages 50 to 80, ask your care team if ongoing lung cancer screenings are right for you.  For informational purposes only. Not to replace the advice of your health care provider. Copyright   2023 [facility]. All rights reserved. Clinically reviewed by the [facility]. [employer] [id number] - REV 01/24.  Learning About Depression Screening  What is depression screening?  Depression screening is a way to see if you have depression symptoms. It may be done by a doctor or counselor. It's often part of a routine  "checkup. That's because your mental health is just as important as your physical health.  Depression is a mental health condition that affects how you feel, think, and act. You may:  Have less energy.  Lose interest in your daily activities.  Feel sad and grouchy for a long time.  Depression is very common. It affects people of all ages.  Many things can lead to depression. Some people become depressed after they have a stroke or find out they have a major illness like cancer or heart disease. The death of a loved one or a breakup may lead to depression. It can run in families. Most experts believe that a combination of inherited genes and stressful life events can cause it.  What happens during screening?  You may be asked to fill out a form about your depression symptoms. You and the doctor will discuss your answers. The doctor may ask you more questions to learn more about how you think, act, and feel.  What happens after screening?  If you have symptoms of depression, your doctor will talk to you about your options.  Doctors usually treat depression with medicines or counseling. Often, combining the two works best. Many people don't get help because they think that they'll get over the depression on their own. But people with depression may not get better unless they get treatment.  The cause of depression is not well understood. There may be many factors involved. But if you have depression, it's not your fault.  A serious symptom of depression is thinking about death or suicide. If you or someone you care about talks about this or about feeling hopeless, get help right away.  It's important to know that depression can be treated. Medicine, counseling, and self-care may help.  Where can you learn more?  Go to https://www.[employer].net/patiented  Enter T185 in the search box to learn more about \"Learning About Depression Screening.\"  Current as of: June 24, 2023  Content Version: 14.2 2024 [employer].   Care instructions adapted under license by your healthcare professional. If you have questions about a medical condition or this instruction, always ask your healthcare professional. [employer] disclaims any warranty or liability for your use of this information.

## 2024-11-01 NOTE — TELEPHONE ENCOUNTER
"Request for medication refill:    citalopram (CELEXA) 10 MG tablet       lisinopril (ZESTRIL) 20 MG tablet       Providers if patient needs an appointment and you are willing to give a one month supply please refill for one month and  send a letter/MyChart using \".SMILLIMITEDREFILL\" .smillimited and route chart to \"P [facility] \" (Giving one month refill in non controlled medications is strongly recommended before denial)    If refill has been denied, meaning absolutely no refills without visit, please complete the smart phrase \".smirxrefuse\" and route it to the \"P SMI MED REFILLS\"  pool to inform the patient and the pharmacy.    Mackenzie Reddy MA      "

## 2024-11-05 NOTE — PROGRESS NOTES
Subjective     REASON FOR VISIT  Bothersome lower urinary tract symptoms follow-up    HISTORY OF PRESENT ILLNESS  Mr. Booker is a pleasant 64 year old male who I am speaking with today in follow-up for his bothersome feeling of incomplete bladder emptiness and postvoid dribbling.  In the past has been trialed on overactive bladder medications which led to constipation, but has also tried pelvic floor physical therapy.  Attempted to trial Myrbetriq but this was too expensive.  He has been controlled mainly with finasteride, as well as given recommendations to avoid bladder irritants, specifically for him in the form of citrus fruit and his caffeinated beverages and finally controlling his constipation.    Today:  Has not cut back much on citrus, only does caffeine in the morning   Main bothersome symptoms currently  Feeling of incomplete bladder emptiness   Nocturia due to sleep apnea   Stops drinking within 3 hours, last drink is Miralax     Objective     PHYSICAL EXAMINATION  General: Alert, oriented, no acute distress    LABS   Latest Reference Range & Units 11/09/23 14:00   Color Urine Colorless, Straw, Light Yellow, Yellow  Light Yellow   Appearance Urine Clear  Clear   Glucose Urine Negative mg/dL Negative   Bilirubin Urine Negative  Negative   Ketones Urine Negative mg/dL Negative   Specific Gravity Urine 1.003 - 1.035  1.012   pH Urine 5.0 - 7.0  7.5 (H)   Protein Albumin Urine Negative mg/dL Negative   Urobilinogen mg/dL Normal, 2.0 mg/dL Normal   Nitrite Urine Negative  Negative   Blood Urine Negative  Negative   Leukocyte Esterase Urine Negative  Negative   WBC Urine <=5 /HPF <1   RBC Urine <=2 /HPF 2   (H): Data is abnormally high    Lab Results   Component Value Date    PSA 0.52 10/22/2024    PSA 0.54 10/10/2023    PSA 0.51 06/17/2022    PSA 0.51 07/02/2021    PSA 1.25 01/21/2020    PSA 1.58 12/19/2018    PSA 1.26 11/27/2018     Lab Results   Component Value Date    A1C 5.4 07/02/2021        Assessment &  Plan    Feeling of incomplete bladder emptiness  Urinary frequency   Post-void dribbling   Sleep apnea    It was my pleasure to meet with Tomi in follow-up for his history of feeling of incomplete bladder emptiness, postvoid dribbling, and nocturia.  After reviewing his clinical history, we first reviewed his issues with nocturia and that I am much more concerned that this is either due to suboptimal treatment of his sleep apnea or another underlying sleep issue, and less likely a problem with his bladder.  With this in mind, my recommendation would be to trial some nonhabit-forming sleep aids in the form of Unisom (doxylamine succinate), Benadryl, or ZzzQuil.  In addition he may want to consider following up with the sleep medicine team if it has been a while since they have evaluated the efficacy of his mask.    In regards to his feeling of incomplete bladder emptiness, we reviewed different second and third line therapies for overactive bladder symptoms including anticholinergic medications, Myrbetriq, PTNS, bladder Botox, and InterStim.  Further of discussion of these interventions can be found below:    PTNS (posterior tibial nerve stimulation): A procedure where a small acupuncture needle was placed in your ankle, landing close to a nerve that sits just behind your tibia bone.  We attach an electrical lead to this needle and we run a current through that needle with a goal of talking to his bladder through that nerve.  This procedure would involve him coming in once a week for 12 weeks is sitting with us for about 35 minutes at a time.  If after this 12 weeks he feels there has been a significant change to his symptoms he was required to come back once a month or once every other month to once again complete the procedure.  This would be indefinite.    Bladder Botox: This is a procedure we would go into your bladder with a camera in the office or in the operating room and injected Botox into the muscle of your  "bladder with a goal of reducing the bladder stability to squeeze, and therefore reducing frequency and urgency.  If effective this would work for 6 to 9 months at a time.  There is an approximately 5% chance that this medication will work too well and put him into urinary retention to some degree for 6 to 9 months that would require him to pass the catheter multiple times a day.    Sacral neuromodulation: This is essentially a \"bladder pacemaker.\"  We will place electrical leads in your lower back close to the nerves that control your bladder.  We then attach a battery to those leads you should start feeling the device work very quickly.  If you find that this device is helpful we would then implanted the battery under your skin.  This device of work for 3 to 5 years.    After reviewing all of the above information, Tomi would prefer to consider his options which would involve either retrying certain medications or work pursuing one of the above-mentioned third line therapies.  He will reach out over [employer] if and when he makes a decision, otherwise if nothing else I will plan to see him back in 1 year.  For the moment I did renew his finasteride.    Mr. Booker expressed understanding and agreement to the above discussion and plan and all of his questions were answered to his satisfaction.     PLAN  Consideration of second and third line therapies for continued overactive bladder symptoms  Nonhabit-forming sleep aids for improvement of nocturia  Follow-up visit with me in 1 year    SIGNED:    Sagar Mcdonough PA-C    I spent a total of 40 minutes spent on the date of the encounter doing chart review, history and exam, documentation, and further activities as noted above.   "

## 2024-11-06 ENCOUNTER — OFFICE VISIT (OUTPATIENT)
Dept: UROLOGY | Facility: CLINIC | Age: 64
End: 2024-11-06
Payer: COMMERCIAL

## 2024-11-06 VITALS
DIASTOLIC BLOOD PRESSURE: 91 MMHG | WEIGHT: 220 LBS | HEART RATE: 69 BPM | SYSTOLIC BLOOD PRESSURE: 143 MMHG | OXYGEN SATURATION: 97 % | HEIGHT: 68 IN | BODY MASS INDEX: 33.34 KG/M2

## 2024-11-06 DIAGNOSIS — N40.1 BENIGN PROSTATIC HYPERPLASIA WITH WEAK URINARY STREAM: ICD-10-CM

## 2024-11-06 DIAGNOSIS — R39.12 BENIGN PROSTATIC HYPERPLASIA WITH WEAK URINARY STREAM: ICD-10-CM

## 2024-11-06 PROCEDURE — 99215 OFFICE O/P EST HI 40 MIN: CPT | Performed by: STUDENT IN AN ORGANIZED HEALTH CARE EDUCATION/TRAINING PROGRAM

## 2024-11-06 RX ORDER — FINASTERIDE 5 MG/1
1 TABLET, FILM COATED ORAL DAILY
Qty: 90 TABLET | Refills: 4 | Status: SHIPPED | OUTPATIENT
Start: 2024-11-06

## 2024-11-06 ASSESSMENT — PAIN SCALES - GENERAL: PAINLEVEL_OUTOF10: NO PAIN (0)

## 2024-11-06 NOTE — NURSING NOTE
"No chief complaint on file.    Pt states that he has frequency, will wait about 30 minutes before going again. Endorses it being daily, sometimes has urgency with more water intake, this usually happens when pt will drink double the amount of tea( usually has 4-6 cups, the urgency happens about twice a week when he has about 6-8 cups of tea.    Stream strength is strong in the morning, and starts to become slower as the day goes on, not to dribbling though. Stated that he has feelings of incomplete emptying as well.     Blood pressure (!) 143/91, pulse 69, height 1.727 m (5' 8\"), weight 99.8 kg (220 lb), SpO2 97%. Body mass index is 33.45 kg/m .    Patient Active Problem List   Diagnosis    Obstructive sleep apnea    Insomnia    Excessive sleepiness    Chronic nasal congestion    Lesion of ulnar nerve    Hyperlipidemia    Essential hypertension    Obesity    Benign neoplasm of colon    Hyperlipidemia    Moderate major depression (H)    Gastric polyps    Cervical pain    Prediabetes    Skin tag    Unilateral inguinal hernia with obstruction and without gangrene, recurrence not specified    History of ETOH abuse    Urinary urgency    Urinary frequency    Multiple nevi    Acrochordon    Seborrheic keratosis    Acanthosis nigricans       Allergies   Allergen Reactions    Grass        Current Outpatient Medications   Medication Sig Dispense Refill    citalopram (CELEXA) 10 MG tablet Take 1 tablet (10 mg) by mouth daily. 90 tablet 3    finasteride (PROSCAR) 5 MG tablet Take 1 tablet (5 mg) by mouth daily. 90 tablet 0    fluticasone (FLONASE) 50 MCG/ACT nasal spray Spray 1-2 sprays into both nostrils daily 48 g 1    gabapentin (NEURONTIN) 300 MG capsule TAKE 3 CAPSULES IN THE MORNING AND 3 CAPSULES IN THE EVENING. 540 capsule 3    lisinopril (ZESTRIL) 20 MG tablet Take 1 tablet (20 mg) by mouth daily. 90 tablet 3    methylcellulose (CITRUCEL) powder Take 2 g by mouth daily      Multiple Vitamin (DAILY MULTIVITAMIN PO) Take " 1 tablet by mouth daily AM      ORDER FOR DME Use your CPAP device as directed by your provider.      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

## 2024-11-06 NOTE — LETTER
11/6/2024       RE: Juan Francisco Booker  [address]  [place] [address]     Dear Colleague,    Thank you for referring your patient, Juan Francisco Booker, to the [facility] [place] at [facility]. Please see a copy of my visit note below.    Subjective    REASON FOR VISIT  Bothersome lower urinary tract symptoms follow-up    HISTORY OF PRESENT ILLNESS  Mr. Booker is a pleasant 64 year old male who I am speaking with today in follow-up for his bothersome feeling of incomplete bladder emptiness and postvoid dribbling.  In the past has been trialed on overactive bladder medications which led to constipation, but has also tried pelvic floor physical therapy.  Attempted to trial Myrbetriq but this was too expensive.  He has been controlled mainly with finasteride, as well as given recommendations to avoid bladder irritants, specifically for him in the form of citrus fruit and his caffeinated beverages and finally controlling his constipation.    Today:  Has not cut back much on citrus, only does caffeine in the morning   Main bothersome symptoms currently  Feeling of incomplete bladder emptiness   Nocturia due to sleep apnea   Stops drinking within 3 hours, last drink is Miralax     Objective    PHYSICAL EXAMINATION  General: Alert, oriented, no acute distress    LABS   Latest Reference Range & Units 11/09/23 14:00   Color Urine Colorless, Straw, Light Yellow, Yellow  Light Yellow   Appearance Urine Clear  Clear   Glucose Urine Negative mg/dL Negative   Bilirubin Urine Negative  Negative   Ketones Urine Negative mg/dL Negative   Specific Gravity Urine 1.003 - 1.035  1.012   pH Urine 5.0 - 7.0  7.5 (H)   Protein Albumin Urine Negative mg/dL Negative   Urobilinogen mg/dL Normal, 2.0 mg/dL Normal   Nitrite Urine Negative  Negative   Blood Urine Negative  Negative   Leukocyte Esterase Urine Negative  Negative   WBC Urine <=5 /HPF <1   RBC Urine <=2  /HPF 2   (H): Data is abnormally high    Lab Results   Component Value Date    PSA 0.52 10/22/2024    PSA 0.54 10/10/2023    PSA 0.51 06/17/2022    PSA 0.51 07/02/2021    PSA 1.25 01/21/2020    PSA 1.58 12/19/2018    PSA 1.26 11/27/2018     Lab Results   Component Value Date    A1C 5.4 07/02/2021        Assessment & Plan   Feeling of incomplete bladder emptiness  Urinary frequency   Post-void dribbling   Sleep apnea    It was my pleasure to meet with Tomi in follow-up for his history of feeling of incomplete bladder emptiness, postvoid dribbling, and nocturia.  After reviewing his clinical history, we first reviewed his issues with nocturia and that I am much more concerned that this is either due to suboptimal treatment of his sleep apnea or another underlying sleep issue, and less likely a problem with his bladder.  With this in mind, my recommendation would be to trial some nonhabit-forming sleep aids in the form of Unisom (doxylamine succinate), Benadryl, or ZzzQuil.  In addition he may want to consider following up with the sleep medicine team if it has been a while since they have evaluated the efficacy of his mask.    In regards to his feeling of incomplete bladder emptiness, we reviewed different second and third line therapies for overactive bladder symptoms including anticholinergic medications, Myrbetriq, PTNS, bladder Botox, and InterStim.  Further of discussion of these interventions can be found below:    PTNS (posterior tibial nerve stimulation): A procedure where a small acupuncture needle was placed in your ankle, landing close to a nerve that sits just behind your tibia bone.  We attach an electrical lead to this needle and we run a current through that needle with a goal of talking to his bladder through that nerve.  This procedure would involve him coming in once a week for 12 weeks is sitting with us for about 35 minutes at a time.  If after this 12 weeks he feels there has been a significant  "change to his symptoms he was required to come back once a month or once every other month to once again complete the procedure.  This would be indefinite.    Bladder Botox: This is a procedure we would go into your bladder with a camera in the office or in the operating room and injected Botox into the muscle of your bladder with a goal of reducing the bladder stability to squeeze, and therefore reducing frequency and urgency.  If effective this would work for 6 to 9 months at a time.  There is an approximately 5% chance that this medication will work too well and put him into urinary retention to some degree for 6 to 9 months that would require him to pass the catheter multiple times a day.    Sacral neuromodulation: This is essentially a \"bladder pacemaker.\"  We will place electrical leads in your lower back close to the nerves that control your bladder.  We then attach a battery to those leads you should start feeling the device work very quickly.  If you find that this device is helpful we would then implanted the battery under your skin.  This device of work for 3 to 5 years.    After reviewing all of the above information, Tomi would prefer to consider his options which would involve either retrying certain medications or work pursuing one of the above-mentioned third line therapies.  [email or web address] if and when he makes a decision, otherwise if nothing else I will plan to see him back in 1 year.  For the moment I did renew his finasteride.    Mr. Booker expressed understanding and agreement to the above discussion and plan and all of his questions were answered to his satisfaction.     PLAN  Consideration of second and third line therapies for continued overactive bladder symptoms  Nonhabit-forming sleep aids for improvement of nocturia  Follow-up visit with me in 1 year    SIGNED:    Sagar Mcdonough PA-C    I spent a total of 40 minutes spent on the date of the encounter doing chart review, " history and exam, documentation, and further activities as noted above.       Again, thank you for allowing me to participate in the care of your patient.      Sincerely,    Sagar Mcdonough PA-C

## 2024-11-11 ENCOUNTER — TELEPHONE (OUTPATIENT)
Dept: GASTROENTEROLOGY | Facility: CLINIC | Age: 64
End: 2024-11-11
Payer: COMMERCIAL

## 2024-11-11 NOTE — TELEPHONE ENCOUNTER
Caller: Patient    Reason for Reschedule/Cancellation   (please be detailed, any staff messages or encounters to note?): Patient calling back on a hold a  placed for him while he arrange a ride.      Prior to reschedule please review:  Ordering Provider: Savita Faria DO in [facility]  Sedation Determined: MODERATE  Does patient have any ASC Exclusions, please identify?: YES, TIMOTHY        Notes on Cancelled Procedure:  Procedure: Lower Endoscopy [Colonoscopy]   Date: 12/13  Location: [facility]; [address] [facility] [address], [address] [place] [address]   Surgeon:     Rescheduled: Yes,   Procedure: Lower Endoscopy [Colonoscopy]    Date: 01/03/25   Location: [facility]; [address], [address] [place] [address]    Surgeon: Moraima   Sedation Level Scheduled  Moderate ,  Reason for Sedation Level Referral   Instructions updated and sent: Johnny     Does patient need PAC or Pre -Op Rescheduled? : No       Did you cancel or rescheduled an EUS procedure? No.

## 2024-12-10 RX ORDER — FLUTICASONE PROPIONATE 50 MCG
1-2 SPRAY, SUSPENSION (ML) NASAL DAILY
Qty: 48 G | Refills: 1 | Status: SHIPPED | OUTPATIENT
Start: 2024-12-10

## 2024-12-10 NOTE — TELEPHONE ENCOUNTER
Pending Prescriptions:                       Disp   Refills    fluticasone (FLONASE) 50 MCG/ACT nasal sp*48 g   1            Sig: Spray 1-2 sprays into both nostrils daily.    ALESSANDRA Rodney

## 2024-12-10 NOTE — TELEPHONE ENCOUNTER
Pending Prescriptions:                       Disp   Refills    fluticasone (FLONASE) 50 MCG/ACT nasal sp*48 g   1            Sig: Spray 1-2 sprays into both nostrils daily.          Last Written Prescription Date:  5/8/24  Last Fill Quantity: 48g   # refills: 1  Last Office Visit: 3/12/24  Future Office visit: NA      Routing refill request to provider for review/approval because:  Drug not on the [facility] or [facility] refill protocol or controlled substance

## 2025-01-03 ENCOUNTER — HOSPITAL ENCOUNTER (OUTPATIENT)
Facility: CLINIC | Age: 65
Discharge: HOME | End: 2025-01-03
Attending: INTERNAL MEDICINE | Admitting: INTERNAL MEDICINE
Payer: COMMERCIAL

## 2025-01-03 VITALS
RESPIRATION RATE: 11 BRPM | OXYGEN SATURATION: 95 % | DIASTOLIC BLOOD PRESSURE: 87 MMHG | SYSTOLIC BLOOD PRESSURE: 133 MMHG | HEART RATE: 71 BPM

## 2025-01-03 LAB — COLONOSCOPY: NORMAL

## 2025-01-03 PROCEDURE — 99153 MOD SED SAME PHYS/QHP EA: CPT | Performed by: INTERNAL MEDICINE

## 2025-01-03 PROCEDURE — 88305 TISSUE EXAM BY PATHOLOGIST: CPT | Mod: TC | Performed by: INTERNAL MEDICINE

## 2025-01-03 PROCEDURE — 250N000011 HC RX IP 250 OP 636: Performed by: INTERNAL MEDICINE

## 2025-01-03 PROCEDURE — 88305 TISSUE EXAM BY PATHOLOGIST: CPT | Mod: 26 | Performed by: PATHOLOGY

## 2025-01-03 PROCEDURE — G0500 MOD SEDAT ENDO SERVICE >5YRS: HCPCS | Performed by: INTERNAL MEDICINE

## 2025-01-03 PROCEDURE — 45385 COLONOSCOPY W/LESION REMOVAL: CPT | Performed by: INTERNAL MEDICINE

## 2025-01-03 RX ORDER — PROCHLORPERAZINE MALEATE 5 MG/1
10 TABLET ORAL EVERY 6 HOURS PRN
Status: DISCONTINUED | OUTPATIENT
Start: 2025-01-03 | End: 2025-01-03 | Stop reason: HOSPADM

## 2025-01-03 RX ORDER — ONDANSETRON 4 MG/1
4 TABLET, ORALLY DISINTEGRATING ORAL EVERY 6 HOURS PRN
Status: DISCONTINUED | OUTPATIENT
Start: 2025-01-03 | End: 2025-01-03 | Stop reason: HOSPADM

## 2025-01-03 RX ORDER — LIDOCAINE 40 MG/G
CREAM TOPICAL
Status: DISCONTINUED | OUTPATIENT
Start: 2025-01-03 | End: 2025-01-03 | Stop reason: HOSPADM

## 2025-01-03 RX ORDER — NALOXONE HYDROCHLORIDE 0.4 MG/ML
0.2 INJECTION, SOLUTION INTRAMUSCULAR; INTRAVENOUS; SUBCUTANEOUS
Status: DISCONTINUED | OUTPATIENT
Start: 2025-01-03 | End: 2025-01-03 | Stop reason: HOSPADM

## 2025-01-03 RX ORDER — FENTANYL CITRATE 50 UG/ML
INJECTION, SOLUTION INTRAMUSCULAR; INTRAVENOUS PRN
Status: DISCONTINUED | OUTPATIENT
Start: 2025-01-03 | End: 2025-01-03 | Stop reason: HOSPADM

## 2025-01-03 RX ORDER — ONDANSETRON 2 MG/ML
4 INJECTION INTRAMUSCULAR; INTRAVENOUS EVERY 6 HOURS PRN
Status: DISCONTINUED | OUTPATIENT
Start: 2025-01-03 | End: 2025-01-03 | Stop reason: HOSPADM

## 2025-01-03 RX ORDER — NALOXONE HYDROCHLORIDE 0.4 MG/ML
0.4 INJECTION, SOLUTION INTRAMUSCULAR; INTRAVENOUS; SUBCUTANEOUS
Status: DISCONTINUED | OUTPATIENT
Start: 2025-01-03 | End: 2025-01-03 | Stop reason: HOSPADM

## 2025-01-03 RX ORDER — FLUMAZENIL 0.1 MG/ML
0.2 INJECTION, SOLUTION INTRAVENOUS
Status: DISCONTINUED | OUTPATIENT
Start: 2025-01-03 | End: 2025-01-03 | Stop reason: HOSPADM

## 2025-01-03 RX ORDER — ONDANSETRON 2 MG/ML
4 INJECTION INTRAMUSCULAR; INTRAVENOUS
Status: DISCONTINUED | OUTPATIENT
Start: 2025-01-03 | End: 2025-01-03 | Stop reason: HOSPADM

## 2025-01-03 ASSESSMENT — ACTIVITIES OF DAILY LIVING (ADL)
ADLS_ACUITY_SCORE: 41
ADLS_ACUITY_SCORE: 41

## 2025-01-03 NOTE — H&P
"ENDOSCOPY PRE-SEDATION H&P FOR OUTPATIENT PROCEDURES    Juan Francisco Booker  [id number]  1960    Procedure: colonoscopy    Pre-procedure diagnosis: hx polyps    Past medical history:   Past Medical History:   Diagnosis Date    Anxiety     Benign neoplasm of colon 3/2/2015    Depression     Depressive disorder 1/15/2000    Difficult intubation     ETOH abuse 3/15/2009    in remission    Gastro-oesophageal reflux disease 1/15/2010    Hyperlipidemia 1/1/2000    Hypoxemia     Morbid obesity (H)     Nasal polyps 1/1/2015    TIMOTHY on CPAP 8/13/2012    Severe (uses 5-6 hours as able)    Other and unspecified hyperlipidemia 10/25/2012    Personal history of colonic polyps 2/207/15    Multiple \"neg for FAP\"    Pre-diabetes     Prediabetes 5/24/2017    Restless leg     Skin tag     Surgical complication 1/6/2015    difficulty inserting a tube down my throat    Unspecified essential hypertension 10/25/2012       Past surgical history:   Past Surgical History:   Procedure Laterality Date    AS COLONOSCOPY THRU STOMA W BIOPSY/CAUTERY TUMOR/POLYP/LESION  2/27/15    colon polyps    COLONOSCOPY  2/27/2015    x 2    COLONOSCOPY N/A 1/6/2020    Procedure: COLONOSCOPY, WITH POLYPECTOMY AND BIOPSY;  Surgeon: Omer Salas MD;  Location:  GI    COLONOSCOPY WITH CO2 INSUFFLATION N/A 10/20/2016    Procedure: COLONOSCOPY WITH CO2 INSUFFLATION;  Surgeon: Juan Francisco Beltran MD;  [facility]    ENT SURGERY  1/5/2011    [facility]    ESOPHAGOSCOPY, GASTROSCOPY, DUODENOSCOPY (EGD), COMBINED N/A 10/20/2016    Procedure: COMBINED ESOPHAGOSCOPY, GASTROSCOPY, DUODENOSCOPY (EGD);  Surgeon: Juan Francisco Beltran MD;  [facility]    LAPAROSCOPIC HERNIORRHAPHY INGUINAL Right 3/24/2021    Procedure: HERNIORRHAPHY, INGUINAL, LAPAROSCOPIC;  Surgeon: Laith Villa MD;  [facility]    RELEASE CARPAL TUNNEL Right 6/14/2017    Procedure: RELEASE CARPAL TUNNEL;  Right Open Carpal Tunnel Release;  Surgeon: Aracelis Lowe MD; "  [facility]    RELEASE CARPAL TUNNEL Left 12/29/2017    Procedure: RELEASE CARPAL TUNNEL;  Left Open Carpal Tunnel Release;  Surgeon: Aracelis Lowe MD;  Location:  OR       No current facility-administered medications for this encounter.       Allergies   Allergen Reactions    Grass        History of Anesthesia/Sedation Problems: no    PHYSICAL EXAMINATION:  Constitutional: aaox3, cooperative, pleasant  Vitals reviewed: There were no vitals taken for this visit.  Wt:   Wt Readings from Last 2 Encounters:   11/06/24 99.8 kg (220 lb)   11/01/24 101.4 kg (223 lb 9.6 oz)      Eyes: Sclera anicteric/injected  Ears/nose/mouth/throat: Normal oropharynx without ulcers or exudate, mucus membranes moist, hearing intact  Neck: supple, thyroid normal size  CV: No edema  Respiratory: Unlabored breathing  Lymph: No submandibular, supraclavicular or inguinal lymphadenopathy  Abd: Nondistended, no masses, nontender  Skin: warm, perfused, no jaundice  Psych: Normal affect  MSK: normal movement on limited exam.    ASA Score: See Provation note    Assessment/Plan:     The patient is an appropriate candidate to receive sedation.    Informed consent was discussed with the patient/family, including the risks, benefits, potential complications and any alternative options associated with sedation.    Patient assessment completed just prior to sedation and while under constant observation by the provider. Condition determined to be adequate for proceeding with sedation.    The specific risks for the procedure were discussed with the patient at the time of informed consent and include but are not limited to perforation which could require surgery, missing significant neoplasm or lesion, hemorrhage and adverse sedative complication.      Milton Valera MD

## 2025-01-03 NOTE — OR NURSING
Patient had colonoscopy with polypectomy x3 Patient tolerated procedure under conscious sedation and 2liters nasal cannula

## 2025-01-06 LAB
PATH REPORT.COMMENTS IMP SPEC: NORMAL
PATH REPORT.COMMENTS IMP SPEC: NORMAL
PATH REPORT.FINAL DX SPEC: NORMAL
PATH REPORT.GROSS SPEC: NORMAL
PATH REPORT.MICROSCOPIC SPEC OTHER STN: NORMAL
PATH REPORT.RELEVANT HX SPEC: NORMAL
PHOTO IMAGE: NORMAL

## 2025-01-17 PROBLEM — D12.6 COLON ADENOMA: Status: ACTIVE | Noted: 2025-01-17

## 2025-02-03 ENCOUNTER — MYC MEDICAL ADVICE (OUTPATIENT)
Dept: UROLOGY | Facility: CLINIC | Age: 65
End: 2025-02-03
Payer: COMMERCIAL

## 2025-02-03 DIAGNOSIS — K59.09 OTHER CONSTIPATION: ICD-10-CM

## 2025-02-06 RX ORDER — POLYETHYLENE GLYCOL 3350 17 G/17G
9-17 POWDER, FOR SOLUTION ORAL DAILY
Qty: 578 G | Refills: 11 | Status: SHIPPED | OUTPATIENT
Start: 2025-02-06

## 2025-03-22 ENCOUNTER — HEALTH MAINTENANCE LETTER (OUTPATIENT)
Age: 65
End: 2025-03-22

## 2025-04-08 ENCOUNTER — MYC MEDICAL ADVICE (OUTPATIENT)
Dept: FAMILY MEDICINE | Facility: CLINIC | Age: 65
End: 2025-04-08
Payer: COMMERCIAL

## 2025-04-08 DIAGNOSIS — E78.00 PURE HYPERCHOLESTEROLEMIA: Primary | ICD-10-CM

## 2025-04-08 DIAGNOSIS — I10 ESSENTIAL HYPERTENSION: ICD-10-CM

## 2025-04-10 ENCOUNTER — LAB (OUTPATIENT)
Dept: LAB | Facility: CLINIC | Age: 65
End: 2025-04-10
Payer: COMMERCIAL

## 2025-04-10 DIAGNOSIS — I10 ESSENTIAL HYPERTENSION: ICD-10-CM

## 2025-04-10 DIAGNOSIS — E78.00 PURE HYPERCHOLESTEROLEMIA: ICD-10-CM

## 2025-04-10 LAB
ALBUMIN SERPL BCG-MCNC: 4.4 G/DL (ref 3.5–5.2)
ALP SERPL-CCNC: 67 U/L (ref 40–150)
ALT SERPL W P-5'-P-CCNC: 10 U/L (ref 0–70)
ANION GAP SERPL CALCULATED.3IONS-SCNC: 12 MMOL/L (ref 7–15)
AST SERPL W P-5'-P-CCNC: 20 U/L (ref 0–45)
BILIRUB SERPL-MCNC: 0.5 MG/DL
BUN SERPL-MCNC: 14.7 MG/DL (ref 8–23)
CALCIUM SERPL-MCNC: 9.8 MG/DL (ref 8.8–10.4)
CHLORIDE SERPL-SCNC: 103 MMOL/L (ref 98–107)
CHOLEST SERPL-MCNC: 150 MG/DL
CREAT SERPL-MCNC: 0.79 MG/DL (ref 0.67–1.17)
EGFRCR SERPLBLD CKD-EPI 2021: >90 ML/MIN/1.73M2
FASTING STATUS PATIENT QL REPORTED: NO
FASTING STATUS PATIENT QL REPORTED: NO
GLUCOSE SERPL-MCNC: 103 MG/DL (ref 70–99)
HCO3 SERPL-SCNC: 24 MMOL/L (ref 22–29)
HDLC SERPL-MCNC: 44 MG/DL
LDLC SERPL CALC-MCNC: 81 MG/DL
NONHDLC SERPL-MCNC: 106 MG/DL
POTASSIUM SERPL-SCNC: 4.4 MMOL/L (ref 3.4–5.3)
PROT SERPL-MCNC: 6.6 G/DL (ref 6.4–8.3)
SODIUM SERPL-SCNC: 139 MMOL/L (ref 135–145)
TRIGL SERPL-MCNC: 123 MG/DL

## 2025-05-03 DIAGNOSIS — E78.5 HYPERLIPIDEMIA LDL GOAL <130: ICD-10-CM

## 2025-05-05 RX ORDER — SIMVASTATIN 40 MG
40 TABLET ORAL AT BEDTIME
Qty: 90 TABLET | Refills: 3 | Status: SHIPPED | OUTPATIENT
Start: 2025-05-05

## 2025-05-05 RX ORDER — SIMVASTATIN 40 MG
40 TABLET ORAL AT BEDTIME
Qty: 90 TABLET | Refills: 3 | Status: SHIPPED | OUTPATIENT
Start: 2025-05-05 | End: 2025-05-05

## 2025-05-05 NOTE — TELEPHONE ENCOUNTER
"Request for medication refill:    Medication Name: simvastatin (ZOCOR) 40 MG tablet     Providers if patient needs an appointment and you are willing to give a one month supply please refill for one month and  send a MyChart using \".SMILLIMITEDREFILL\" .Or route chart to \"P SMI \" . To call patient and inform of limited refill and providers request to make an appointment. (Giving one month refill in non controlled medications is strongly recommended before denial)    If refill has been denied, meaning absolutely no refills without visit, please complete the smart phrase \".SMIRXREFUSE\" and route it to the \"P SMI MED REFILLS\"  pool to inform the patient and the pharmacy.    Tj Mensah MA     "

## 2025-05-16 ASSESSMENT — SLEEP AND FATIGUE QUESTIONNAIRES
HOW LIKELY ARE YOU TO NOD OFF OR FALL ASLEEP WHILE SITTING AND READING: SLIGHT CHANCE OF DOZING
HOW LIKELY ARE YOU TO NOD OFF OR FALL ASLEEP WHILE SITTING INACTIVE IN A PUBLIC PLACE: WOULD NEVER DOZE
HOW LIKELY ARE YOU TO NOD OFF OR FALL ASLEEP IN A CAR, WHILE STOPPED FOR A FEW MINUTES IN TRAFFIC: WOULD NEVER DOZE
HOW LIKELY ARE YOU TO NOD OFF OR FALL ASLEEP WHEN YOU ARE A PASSENGER IN A CAR FOR AN HOUR WITHOUT A BREAK: SLIGHT CHANCE OF DOZING
HOW LIKELY ARE YOU TO NOD OFF OR FALL ASLEEP WHILE SITTING AND TALKING TO SOMEONE: WOULD NEVER DOZE
HOW LIKELY ARE YOU TO NOD OFF OR FALL ASLEEP WHILE WATCHING TV: SLIGHT CHANCE OF DOZING
HOW LIKELY ARE YOU TO NOD OFF OR FALL ASLEEP WHILE LYING DOWN TO REST IN THE AFTERNOON WHEN CIRCUMSTANCES PERMIT: HIGH CHANCE OF DOZING
HOW LIKELY ARE YOU TO NOD OFF OR FALL ASLEEP WHILE SITTING QUIETLY AFTER LUNCH WITHOUT ALCOHOL: SLIGHT CHANCE OF DOZING

## 2025-05-21 ENCOUNTER — OFFICE VISIT (OUTPATIENT)
Dept: SLEEP MEDICINE | Facility: CLINIC | Age: 65
End: 2025-05-21
Payer: MEDICARE

## 2025-05-21 VITALS
HEART RATE: 73 BPM | DIASTOLIC BLOOD PRESSURE: 91 MMHG | BODY MASS INDEX: 35.94 KG/M2 | HEIGHT: 68 IN | SYSTOLIC BLOOD PRESSURE: 130 MMHG | OXYGEN SATURATION: 92 % | WEIGHT: 237.1 LBS

## 2025-05-21 DIAGNOSIS — G47.33 OSA (OBSTRUCTIVE SLEEP APNEA): Primary | ICD-10-CM

## 2025-05-21 PROCEDURE — 1126F AMNT PAIN NOTED NONE PRSNT: CPT | Performed by: INTERNAL MEDICINE

## 2025-05-21 PROCEDURE — 3075F SYST BP GE 130 - 139MM HG: CPT | Performed by: INTERNAL MEDICINE

## 2025-05-21 PROCEDURE — 3080F DIAST BP >= 90 MM HG: CPT | Performed by: INTERNAL MEDICINE

## 2025-05-21 PROCEDURE — 99214 OFFICE O/P EST MOD 30 MIN: CPT | Performed by: INTERNAL MEDICINE

## 2025-05-21 NOTE — PROGRESS NOTES
[facility]   Outpatient Sleep Medicine Follow-up Visit  May 21, 2025    Name: Juan Francisco Booker MRN# [id number]   Age: 65 year old YOB: 1960     Date of Consultation: May 21, 2025  Consultation is requested by: No referring provider defined for this encounter.  Primary care provider: Marine Weber           Assessment and Plan:     Sleep Diagnoses:  Currently effectively treated obstructive sleep apnea  Prior history of sleepwalking    Comorbid conditions:  Mood disturbance with depression  Hypertension    Summary Recommendations:  Patient to keep sleep diary noting mask on and mask off times to be correlated with download  Return to clinic in one year or earlier if you have recurrence of snoring, sleep disruption or non-restorative sleep  Modafinil may be considered if you continue to have daytime sleepiness             History of Present Illness:     Juan Francisco Booker is a 65 year old male with severe obstructive sleep apnea currently on auto titration CPAP 12-18 with average use 6 hours 80% of days greater than 4 hours and residual AHI of less than 5.   Despite adequate therapy and effective CPAP, patient is describing sleepiness not captured by [place] of 7. He feels unrefreshed in the morning despite 8 hours in bed and can easily nap during the day. His time in bed (11-7 or 8 hours) does not correlate with his CPAP usage of 6.5 hours (insufficient sleep) and there are 3-6 mask removals during the night. He agrees that he may be removing the mask earlier or miscalculating his sleep time or may be awake during the night.        Date: 2014    300 pounds  AHI: 67 complex predominantly obstructive sleep apnea      Bedtime 11 pm  Awakening 7 am     [facility]Med   Auto-PAP 12.0 - 18.0 cmH2O 30 day usage data:    80% of days with > 4 hours of use. 0/30 days with no use.   Average use 331 minutes per day.   95%ile Leak 45.37 L/min. -->recent new mask liner  CPAP 95% pressure 14.1 cm.   AHI  2.31 events per hour.          Most Recent SCALES:    EPWORTH SLEEPINESS SCALE WITHIN 1 YEAR WITHIN 10 DAYS   Sitting and reading (Patient-Rptd) 1 (Patient-Rptd) 1   Watching TV (Patient-Rptd) 1 (Patient-Rptd) 1   Sitting, inactive in a public place (theatre or mtg.) (Patient-Rptd) 0  (Patient-Rptd) 0    As a passenger in a car (Patient-Rptd) 1 (Patient-Rptd) 1   Lying down to rest in the afternoon when circumstance permit (Patient-Rptd) 3 (Patient-Rptd) 3   Sitting and talking to someone (Patient-Rptd) 0 (Patient-Rptd) 0   Sitting quietly after lunch without alcohol (Patient-Rptd) 1 (Patient-Rptd) 1   In a car, while stopped for a few minutes in traffic (Patient-Rptd) 0 (Patient-Rptd) 0   TOTAL SCORE (Patient-Rptd) 7 (Patient-Rptd) 7   Normal < 11         0--none    1--mild    2--moderate  3--severe      INSOMNIA SEVERITY INDEX WITHIN 1 YEAR   Difficulty falling asleep (Patient-Rptd) 0   Difficult staying asleep (Patient-Rptd) 3   Problems waking up to early (Patient-Rptd) 1   How SATISFIED/DISSATISFIED are you with your CURRENT sleep pattern? (Patient-Rptd) 4   How NOTICEABLE to others do you think your sleep pattern is in terms of your quality of life? (Patient-Rptd) 2   How WORRIED/DISTRESSED are you about your current sleep pattern? (Patient-Rptd) 3   To what extent do you consider your sleep problem to INTERFERE with your daily fuctioning(e.g. daytime fatigue, mood, ability to function at work/daily chores, concentration, mood,etc.) CURRENTLY? (Patient-Rptd) 4   INSOMNIA SEVERITY INDEX TOTAL SCORE (Patient-Rptd) 17    --absence of insomnia (0-7); sub-threshold insomnia (8-14); moderate insomnia (15-21); and severe insomnia (22-28)--             Medications:     Current Outpatient Medications   Medication Sig Dispense Refill    simvastatin (ZOCOR) 40 MG tablet Take 1 tablet (40 mg) by mouth at bedtime. 90 tablet 3    bisacodyl (DULCOLAX) 5 MG EC tablet One day prior to procedure at 3PM take two (2) tablets. If  your procedure is BEFORE 11AM, take two (2) tablets at 11PM. If your procedure is AFTER 11AM, day of procedure take (2) tablets at 6AM. 4 tablet 0    citalopram (CELEXA) 10 MG tablet Take 1 tablet (10 mg) by mouth daily. 90 tablet 3    finasteride (PROSCAR) 5 MG tablet Take 1 tablet (5 mg) by mouth daily. 90 tablet 4    fluticasone (FLONASE) 50 MCG/ACT nasal spray Spray 1-2 sprays into both nostrils daily. 48 g 1    gabapentin (NEURONTIN) 300 MG capsule TAKE 3 CAPSULES IN THE MORNING AND 3 CAPSULES IN THE EVENING. 540 capsule 3    lisinopril (ZESTRIL) 20 MG tablet Take 1 tablet (20 mg) by mouth daily. 90 tablet 3    methylcellulose (CITRUCEL) powder Take 2 g by mouth daily      Multiple Vitamin (DAILY MULTIVITAMIN PO) Take 1 tablet by mouth daily AM      ORDER FOR DME Use your CPAP device as directed by your provider.      polyethylene glycol (GOLYTELY) 236 g suspension One day prior to procedure at 3 pm fill container with water. Cover and shake until mixed well. Drink an 8 oz. glass of mixture every 10-15 minutes until the 1st half of the jug is gone. Place the remainder of the Golytely in the refrigerator. At 8 pm, drink the 2nd half of the jug of Golytely bowel prep. Drink an 8 oz. glass of Golytely every 10-15 minutes until the container of Golytely is gone. 4000 mL 0    polyethylene glycol (MIRALAX) 17 GM/Dose powder Take 9-17 g by mouth daily. (Mix in liquid) 578 g 11    polyethylene glycol (MIRALAX) 17 GM/Dose powder Take 1 capful (17g) of Miralax mixed with 8 oz of a clear liquid twice daily for 7 days prior to your scheduled procedure. 238 g 0    SODIUM FLUORIDE 5000 PPM 1.1 % PSTE BRUSH TEETH WITH PEA SIZED AMOUNT AT BEDTIME AND DO NOT RINSE       No current facility-administered medications for this visit.        Allergies   Allergen Reactions    Grass             Problem List:     Patient Active Problem List   Diagnosis    Obstructive sleep apnea    Insomnia    Excessive sleepiness    Chronic nasal  "congestion    Lesion of ulnar nerve    Hyperlipidemia    Essential hypertension    Obesity    Benign neoplasm of colon    Hyperlipidemia    Moderate major depression (H)    Gastric polyps    Cervical pain    Prediabetes    Skin tag    Unilateral inguinal hernia with obstruction and without gangrene, recurrence not specified    History of ETOH abuse    Urinary urgency    Urinary frequency    Multiple nevi    Acrochordon    Seborrheic keratosis    Acanthosis nigricans    Colon adenoma            Past Medical History:     Does not need 02 supplement at night   Past Medical History:   Diagnosis Date    Anxiety     Benign neoplasm of colon 3/2/2015    Depression     Depressive disorder 1/15/2000    Difficult intubation     ETOH abuse 3/15/2009    in remission    Gastro-oesophageal reflux disease 1/15/2010    Hyperlipidemia 1/1/2000    Hypoxemia     Morbid obesity (H)     Nasal polyps 1/1/2015    TIMOTHY on CPAP 8/13/2012    Severe (uses 5-6 hours as able)    Other and unspecified hyperlipidemia 10/25/2012    Personal history of colonic polyps 2/207/15    Multiple \"neg for FAP\"    Pre-diabetes     Prediabetes 5/24/2017    Restless leg     Skin tag     Surgical complication 1/6/2015    difficulty inserting a tube down my throat    Unspecified essential hypertension 10/25/2012             Past Surgical History:    No h/o  upper airway surgery  Past Surgical History:   Procedure Laterality Date    AS COLONOSCOPY THRU STOMA W BIOPSY/CAUTERY TUMOR/POLYP/LESION  2/27/15    colon polyps    COLONOSCOPY  2/27/2015    x 2    COLONOSCOPY N/A 1/6/2020    Procedure: COLONOSCOPY, WITH POLYPECTOMY AND BIOPSY;  Surgeon: Omer Salas MD;  [facility]    COLONOSCOPY N/A 1/3/2025    Procedure: COLONOSCOPY, FLEXIBLE, WITH LESION REMOVAL USING SNARE;  Surgeon: Milton Valera MD;  [facility]    COLONOSCOPY WITH CO2 INSUFFLATION N/A 10/20/2016    Procedure: COLONOSCOPY WITH CO2 INSUFFLATION;  Surgeon: Juan Francisco Beltran " "MD Vin;  [facility]    ENT SURGERY  1/5/2011    [facility]    ESOPHAGOSCOPY, GASTROSCOPY, DUODENOSCOPY (EGD), COMBINED N/A 10/20/2016    Procedure: COMBINED ESOPHAGOSCOPY, GASTROSCOPY, DUODENOSCOPY (EGD);  Surgeon: Juan Francisco Beltran MD;  [facility]    LAPAROSCOPIC HERNIORRHAPHY INGUINAL Right 3/24/2021    Procedure: HERNIORRHAPHY, INGUINAL, LAPAROSCOPIC;  Surgeon: Laith Villa MD;  [facility]    RELEASE CARPAL TUNNEL Right 6/14/2017    Procedure: RELEASE CARPAL TUNNEL;  Right Open Carpal Tunnel Release;  Surgeon: Aracelis Lowe MD;  [facility]    RELEASE CARPAL TUNNEL Left 12/29/2017    Procedure: RELEASE CARPAL TUNNEL;  Left Open Carpal Tunnel Release;  Surgeon: Aracelis Lowe MD;  [facility]              Physical Examination:   Objective:  BP (!) 130/91   Pulse 73   Ht 1.727 m (5' 8\")   Wt 107.5 kg (237 lb 1.6 oz)   SpO2 92%   BMI 36.05 kg/m      Mandibular profile  Reported vitals:  There were no vitals taken for this visit.   GENERAL: alert and no distress  EYES: Eyes grossly normal to inspection.  No discharge or erythema, or obvious scleral/conjunctival abnormalities.  RESP: No audible wheeze, cough, or visible cyanosis.    SKIN: Visible skin clear. No significant rash, abnormal pigmentation or lesions.  NEURO: Cranial nerves grossly intact.  Mentation and speech appropriate for age.  PSYCH: Appropriate affect, tone, and pace of words        Copy to: Marine Weber MD 5/21/2025         Total time spent reviewing medical records including previous testing and interpretation as well as direct patient contact and documentation on this date: 30 min   "

## 2025-05-21 NOTE — NURSING NOTE
"Chief Complaint   Patient presents with    CPAP Follow Up       Initial BP (!) 130/91   Pulse 73   Ht 1.727 m (5' 8\")   Wt 107.5 kg (237 lb 1.6 oz)   SpO2 92%   BMI 36.05 kg/m   Estimated body mass index is 36.05 kg/m  as calculated from the following:    Height as of this encounter: 1.727 m (5' 8\").    Weight as of this encounter: 107.5 kg (237 lb 1.6 oz).    Medication Reconciliation: complete      DME: ALESSANDRA Almonte      "

## 2025-05-21 NOTE — PROCEDURES
[facility]   Outpatient Sleep Medicine Follow-up Visit  May 21, 2025    Name: Juan Francisco Booker MRN# [id number]   Age: 65 year old YOB: 1960     Date of Consultation: May 21, 2025  Consultation is requested by: No referring provider defined for this encounter.  Primary care provider: Marine Weber           Assessment and Plan:     Sleep Diagnoses:  Currently effectively treated obstructive sleep apnea  Prior history of sleepwalking    Comorbid conditions:  Mood disturbance with depression  Hypertension    Summary Recommendations:  ***  No orders of the defined types were placed in this encounter.      Summary Counseling:  New sleep schedule recommendation: ***         History of Present Illness:     Juan Francisco Booker is a 65 year old male with severe obstructive sleep apnea currently on auto titration CPAP 12-18 with average use 6 hours 80% of days greater than 4 hours and residual AHI of less than 5       Date: 2014    300 pounds  AHI: 67 complex predominantly obstructive sleep apnea      Bedtime 11 pm  Awakening 7 am     [facility]   Auto-PAP 12.0 - 18.0 cmH2O 30 day usage data:    80% of days with > 4 hours of use. 0/30 days with no use.   Average use 331 minutes per day.   95%ile Leak 45.37 L/min.   CPAP 95% pressure 14.1 cm.   AHI 2.31 events per hour.          Most Recent SCALES:    EPWORTH SLEEPINESS SCALE WITHIN 1 YEAR WITHIN 10 DAYS   Sitting and reading (Patient-Rptd) 1 (Patient-Rptd) 1   Watching TV (Patient-Rptd) 1 (Patient-Rptd) 1   Sitting, inactive in a public place (theatre or mtg.) (Patient-Rptd) 0  (Patient-Rptd) 0    As a passenger in a car (Patient-Rptd) 1 (Patient-Rptd) 1   Lying down to rest in the afternoon when circumstance permit (Patient-Rptd) 3 (Patient-Rptd) 3   Sitting and talking to someone (Patient-Rptd) 0 (Patient-Rptd) 0   Sitting quietly after lunch without alcohol (Patient-Rptd) 1 (Patient-Rptd) 1   In a car, while stopped for a few minutes in  traffic (Patient-Rptd) 0 (Patient-Rptd) 0   TOTAL SCORE (Patient-Rptd) 7 (Patient-Rptd) 7   Normal < 11         0--none    1--mild    2--moderate  3--severe      INSOMNIA SEVERITY INDEX WITHIN 1 YEAR   Difficulty falling asleep (Patient-Rptd) 0   Difficult staying asleep (Patient-Rptd) 3   Problems waking up to early (Patient-Rptd) 1   How SATISFIED/DISSATISFIED are you with your CURRENT sleep pattern? (Patient-Rptd) 4   How NOTICEABLE to others do you think your sleep pattern is in terms of your quality of life? (Patient-Rptd) 2   How WORRIED/DISTRESSED are you about your current sleep pattern? (Patient-Rptd) 3   To what extent do you consider your sleep problem to INTERFERE with your daily fuctioning(e.g. daytime fatigue, mood, ability to function at work/daily chores, concentration, mood,etc.) CURRENTLY? (Patient-Rptd) 4   INSOMNIA SEVERITY INDEX TOTAL SCORE (Patient-Rptd) 17    --absence of insomnia (0-7); sub-threshold insomnia (8-14); moderate insomnia (15-21); and severe insomnia (22-28)--             Medications:     Current Outpatient Medications   Medication Sig Dispense Refill    simvastatin (ZOCOR) 40 MG tablet Take 1 tablet (40 mg) by mouth at bedtime. 90 tablet 3    bisacodyl (DULCOLAX) 5 MG EC tablet One day prior to procedure at 3PM take two (2) tablets. If your procedure is BEFORE 11AM, take two (2) tablets at 11PM. If your procedure is AFTER 11AM, day of procedure take (2) tablets at 6AM. 4 tablet 0    citalopram (CELEXA) 10 MG tablet Take 1 tablet (10 mg) by mouth daily. 90 tablet 3    finasteride (PROSCAR) 5 MG tablet Take 1 tablet (5 mg) by mouth daily. 90 tablet 4    fluticasone (FLONASE) 50 MCG/ACT nasal spray Spray 1-2 sprays into both nostrils daily. 48 g 1    gabapentin (NEURONTIN) 300 MG capsule TAKE 3 CAPSULES IN THE MORNING AND 3 CAPSULES IN THE EVENING. 540 capsule 3    lisinopril (ZESTRIL) 20 MG tablet Take 1 tablet (20 mg) by mouth daily. 90 tablet 3    methylcellulose (CITRUCEL)  powder Take 2 g by mouth daily      Multiple Vitamin (DAILY MULTIVITAMIN PO) Take 1 tablet by mouth daily AM      ORDER FOR DME Use your CPAP device as directed by your provider.      polyethylene glycol (GOLYTELY) 236 g suspension One day prior to procedure at 3 pm fill container with water. Cover and shake until mixed well. Drink an 8 oz. glass of mixture every 10-15 minutes until the 1st half of the jug is gone. Place the remainder of the Golytely in the refrigerator. At 8 pm, drink the 2nd half of the jug of Golytely bowel prep. Drink an 8 oz. glass of Golytely every 10-15 minutes until the container of Golytely is gone. 4000 mL 0    polyethylene glycol (MIRALAX) 17 GM/Dose powder Take 9-17 g by mouth daily. (Mix in liquid) 578 g 11    polyethylene glycol (MIRALAX) 17 GM/Dose powder Take 1 capful (17g) of Miralax mixed with 8 oz of a clear liquid twice daily for 7 days prior to your scheduled procedure. 238 g 0    SODIUM FLUORIDE 5000 PPM 1.1 % PSTE BRUSH TEETH WITH PEA SIZED AMOUNT AT BEDTIME AND DO NOT RINSE       No current facility-administered medications for this visit.        Allergies   Allergen Reactions    Grass             Problem List:     Patient Active Problem List   Diagnosis    Obstructive sleep apnea    Insomnia    Excessive sleepiness    Chronic nasal congestion    Lesion of ulnar nerve    Hyperlipidemia    Essential hypertension    Obesity    Benign neoplasm of colon    Hyperlipidemia    Moderate major depression (H)    Gastric polyps    Cervical pain    Prediabetes    Skin tag    Unilateral inguinal hernia with obstruction and without gangrene, recurrence not specified    History of ETOH abuse    Urinary urgency    Urinary frequency    Multiple nevi    Acrochordon    Seborrheic keratosis    Acanthosis nigricans    Colon adenoma            Past Medical History:     Does not need 02 supplement at night   Past Medical History:   Diagnosis Date    Anxiety     Benign neoplasm of colon 3/2/2015     "Depression     Depressive disorder 1/15/2000    Difficult intubation     ETOH abuse 3/15/2009    in remission    Gastro-oesophageal reflux disease 1/15/2010    Hyperlipidemia 1/1/2000    Hypoxemia     Morbid obesity (H)     Nasal polyps 1/1/2015    TIMOTHY on CPAP 8/13/2012    Severe (uses 5-6 hours as able)    Other and unspecified hyperlipidemia 10/25/2012    Personal history of colonic polyps 2/207/15    Multiple \"neg for FAP\"    Pre-diabetes     Prediabetes 5/24/2017    Restless leg     Skin tag     Surgical complication 1/6/2015    difficulty inserting a tube down my throat    Unspecified essential hypertension 10/25/2012             Past Surgical History:    No*** h/o  upper airway surgery  Past Surgical History:   Procedure Laterality Date    AS COLONOSCOPY THRU STOMA W BIOPSY/CAUTERY TUMOR/POLYP/LESION  2/27/15    colon polyps    COLONOSCOPY  2/27/2015    x 2    COLONOSCOPY N/A 1/6/2020    Procedure: COLONOSCOPY, WITH POLYPECTOMY AND BIOPSY;  Surgeon: Omer Salas MD;  Location: [facility]    COLONOSCOPY N/A 1/3/2025    Procedure: COLONOSCOPY, FLEXIBLE, WITH LESION REMOVAL USING SNARE;  Surgeon: Milton Valera MD;  Location: [facility]    COLONOSCOPY WITH CO2 INSUFFLATION N/A 10/20/2016    Procedure: COLONOSCOPY WITH CO2 INSUFFLATION;  Surgeon: Juan Francisco Beltran MD;  Location: [facility]    ENT SURGERY  1/5/2011    [facility]    ESOPHAGOSCOPY, GASTROSCOPY, DUODENOSCOPY (EGD), COMBINED N/A 10/20/2016    Procedure: COMBINED ESOPHAGOSCOPY, GASTROSCOPY, DUODENOSCOPY (EGD);  Surgeon: Juan Francisco Beltran MD;  Location: [facility]    LAPAROSCOPIC HERNIORRHAPHY INGUINAL Right 3/24/2021    Procedure: HERNIORRHAPHY, INGUINAL, LAPAROSCOPIC;  Surgeon: Laith Villa MD;  Location: [facility]    RELEASE CARPAL TUNNEL Right 6/14/2017    Procedure: RELEASE CARPAL TUNNEL;  Right Open Carpal Tunnel Release;  Surgeon: Aracelis Lowe MD;  Location:  OR    RELEASE CARPAL TUNNEL Left 12/29/2017    Procedure: " RELEASE CARPAL TUNNEL;  Left Open Carpal Tunnel Release;  Surgeon: Aracelis Lowe MD;  Location: [facility]              Physical Examination:   Objective:  Vitals: There were no vitals taken for this visit.  BMI= There is no height or weight on file to calculate BMI.  Neck circumference ***  [place]  ***  Mandibular profile  Reported vitals:  There were no vitals taken for this visit.   {video visit exam brief selected:[id number]}        Copy to: Marine Weber MD 5/21/2025         Total time spent reviewing medical records including previous testing and interpretation as well as direct patient contact and documentation on this date: ***

## 2025-05-27 ENCOUNTER — TELEPHONE (OUTPATIENT)
Dept: SLEEP MEDICINE | Facility: CLINIC | Age: 65
End: 2025-05-27
Payer: MEDICARE

## 2025-05-27 DIAGNOSIS — G47.33 OSA (OBSTRUCTIVE SLEEP APNEA): Primary | ICD-10-CM

## 2025-05-27 NOTE — TELEPHONE ENCOUNTER
General Call      Reason for Call:  patient called and states that DME states that patient had a sleep study that says was at 3% instead of 4%.    And due to this, patient will need to have another sleep study.    Patients cpap machine is 2 years old.    Patient only wanted new cpap supplies.    Please contact patient.  Thank you.    What are your questions or concerns:  no    Date of last appointment with provider: May    Could we send this information to you in [employer] or would you prefer to receive a phone call?:   Patient would prefer a phone call   Okay to leave a detailed message?: Yes at Cell number on file:    Telephone Information:   Mobile [phone]

## 2025-06-23 RX ORDER — FLUTICASONE PROPIONATE 50 MCG
1-2 SPRAY, SUSPENSION (ML) NASAL DAILY
Qty: 48 G | Refills: 1 | Status: SHIPPED | OUTPATIENT
Start: 2025-06-23

## 2025-08-03 ASSESSMENT — PATIENT HEALTH QUESTIONNAIRE - PHQ9
10. IF YOU CHECKED OFF ANY PROBLEMS, HOW DIFFICULT HAVE THESE PROBLEMS MADE IT FOR YOU TO DO YOUR WORK, TAKE CARE OF THINGS AT HOME, OR GET ALONG WITH OTHER PEOPLE: VERY DIFFICULT
SUM OF ALL RESPONSES TO PHQ QUESTIONS 1-9: 11
SUM OF ALL RESPONSES TO PHQ QUESTIONS 1-9: 11

## 2025-08-04 ENCOUNTER — OFFICE VISIT (OUTPATIENT)
Dept: FAMILY MEDICINE | Facility: CLINIC | Age: 65
End: 2025-08-04
Payer: MEDICARE

## 2025-08-04 VITALS
TEMPERATURE: 97.8 F | DIASTOLIC BLOOD PRESSURE: 95 MMHG | RESPIRATION RATE: 17 BRPM | WEIGHT: 243.3 LBS | BODY MASS INDEX: 36.87 KG/M2 | SYSTOLIC BLOOD PRESSURE: 153 MMHG | HEIGHT: 68 IN | HEART RATE: 75 BPM

## 2025-08-04 DIAGNOSIS — G47.33 OBSTRUCTIVE SLEEP APNEA: ICD-10-CM

## 2025-08-04 DIAGNOSIS — F33.1 MODERATE EPISODE OF RECURRENT MAJOR DEPRESSIVE DISORDER (H): ICD-10-CM

## 2025-08-04 DIAGNOSIS — I10 ESSENTIAL HYPERTENSION: ICD-10-CM

## 2025-08-04 DIAGNOSIS — R53.83 OTHER FATIGUE: Primary | ICD-10-CM

## 2025-08-04 PROCEDURE — 3077F SYST BP >= 140 MM HG: CPT

## 2025-08-04 PROCEDURE — 1125F AMNT PAIN NOTED PAIN PRSNT: CPT

## 2025-08-04 PROCEDURE — 99214 OFFICE O/P EST MOD 30 MIN: CPT | Mod: GC

## 2025-08-04 PROCEDURE — 3080F DIAST BP >= 90 MM HG: CPT

## 2025-08-04 ASSESSMENT — PAIN SCALES - GENERAL: PAINLEVEL_OUTOF10: MILD PAIN (1)

## 2025-08-10 DIAGNOSIS — I10 BENIGN ESSENTIAL HYPERTENSION: ICD-10-CM

## 2025-08-10 DIAGNOSIS — R42 DIZZINESS: ICD-10-CM

## 2025-08-10 RX ORDER — LISINOPRIL 40 MG/1
40 TABLET ORAL DAILY
Qty: 90 TABLET | Refills: 0 | Status: SHIPPED | OUTPATIENT
Start: 2025-08-10

## 2025-08-11 ENCOUNTER — PATIENT OUTREACH (OUTPATIENT)
Dept: CARE COORDINATION | Facility: CLINIC | Age: 65
End: 2025-08-11
Payer: MEDICARE

## 2025-08-12 ENCOUNTER — MYC MEDICAL ADVICE (OUTPATIENT)
Dept: FAMILY MEDICINE | Facility: CLINIC | Age: 65
End: 2025-08-12
Payer: MEDICARE

## 2025-08-13 ENCOUNTER — PATIENT OUTREACH (OUTPATIENT)
Dept: CARE COORDINATION | Facility: CLINIC | Age: 65
End: 2025-08-13
Payer: MEDICARE

## (undated) DEVICE — SOL ADH LIQUID BENZOIN SWAB 0.6ML C1544

## (undated) DEVICE — CAST PADDING 4" STERILE 9044S

## (undated) DEVICE — BNDG ELASTIC 4"X5YDS UNSTERILE 6611-40

## (undated) DEVICE — DEVICE ENDO STITCH APPLIER 10MM 173016

## (undated) DEVICE — GLOVE PROTEXIS POWDER FREE SMT 6.5  2D72PT65X

## (undated) DEVICE — ANTIFOG SOLUTION W/FOAM PAD 31142527

## (undated) DEVICE — GOWN LG DISP 9515

## (undated) DEVICE — SUCTION MANIFOLD NEPTUNE 2 SYS 4 PORT 0702-020-000

## (undated) DEVICE — ESU HOLSTER PLASTIC DISP E2400

## (undated) DEVICE — LINEN ORTHO PACK 5446

## (undated) DEVICE — PREP CHLORAPREP 26ML TINTED ORANGE  260815

## (undated) DEVICE — WIPES FOLEY CARE SURESTEP PROVON DFC100

## (undated) DEVICE — GLOVE PROTEXIS POWDER FREE SMT 7.5  2D72PT75X

## (undated) DEVICE — ENDO TROCAR FIRST ENTRY KII FIOS Z-THRD 12X100MM CTF73

## (undated) DEVICE — NDL 25GA 1.5" 305127

## (undated) DEVICE — SOL WATER IRRIG 500ML BOTTLE 2F7113

## (undated) DEVICE — GLOVE PROTEXIS BLUE W/NEU-THERA 7.0  2D73EB70

## (undated) DEVICE — ESU GROUND PAD ADULT W/CORD E7507

## (undated) DEVICE — CAST PLASTER SPLINT 4X15" 7394

## (undated) DEVICE — SYR 30ML LL W/O NDL 302832

## (undated) DEVICE — Device

## (undated) DEVICE — NDL INSUFFLATION 13GA 120MM C2201

## (undated) DEVICE — ENDO TROCAR FIRST ENTRY KII FIOS Z-THRD 05X100MM CTF03

## (undated) DEVICE — ENDO DISSECTOR BLUNT 05MM  BTD05

## (undated) DEVICE — LINEN TOWEL PACK X6 WHITE 5487

## (undated) DEVICE — SU ENDO STITCH POLYSORB 3-0 7" UND 170072

## (undated) DEVICE — ENDO TROCAR SLEEVE KII Z-THREADED 05X100MM CTS02

## (undated) DEVICE — LINEN GOWN XLG 5407

## (undated) DEVICE — PACK HAND CUSTOM ASC

## (undated) DEVICE — SU ETHILON 4-0 PC-3 18" 1864G

## (undated) DEVICE — TOURNIQUET SGL BLADDER 18"X4" RED 5921-218-135

## (undated) DEVICE — NDL SPINAL 22GA 3.5" QUINCKE 405181

## (undated) DEVICE — ADH SKIN CLOSURE PREMIERPRO EXOFIN 1.0ML 3470

## (undated) DEVICE — DRAPE STERI TOWEL LG 1010

## (undated) DEVICE — SOL NACL 0.9% IRRIG 500ML BOTTLE 2F7123

## (undated) DEVICE — SU VICRYL 0 UR-6 27" J603H

## (undated) DEVICE — SYR 10ML FINGER CONTROL W/O NDL 309695

## (undated) DEVICE — SU ENDO STITCH POLYSORB 2-0 ES-9 48"  170053

## (undated) DEVICE — CATH TRAY FOLEY SURESTEP 16FR W/URNE MTR STLK LATEX A303316A

## (undated) DEVICE — SU MONOCRYL 4-0 PS-2 27" UND Y426H

## (undated) DEVICE — JELLY LUBRICATING SURGILUBE 2OZ TUBE

## (undated) DEVICE — CAST PADDING 4" UNSTERILE 9044

## (undated) DEVICE — SOL WATER IRRIG 1000ML BOTTLE 2F7114

## (undated) DEVICE — LINEN TOWEL PACK X30 5481

## (undated) DEVICE — CATH FOLEY 20FR 5ML SILVER COAT LATEX 0165SI20

## (undated) RX ORDER — SIMETHICONE 20 MG/.3ML
EMULSION ORAL
Status: DISPENSED
Start: 2020-01-06

## (undated) RX ORDER — GLYCOPYRROLATE 0.2 MG/ML
INJECTION, SOLUTION INTRAMUSCULAR; INTRAVENOUS
Status: DISPENSED
Start: 2021-03-24

## (undated) RX ORDER — LIDOCAINE HYDROCHLORIDE 10 MG/ML
INJECTION, SOLUTION EPIDURAL; INFILTRATION; INTRACAUDAL; PERINEURAL
Status: DISPENSED
Start: 2022-03-03

## (undated) RX ORDER — LIDOCAINE HYDROCHLORIDE 20 MG/ML
JELLY TOPICAL
Status: DISPENSED
Start: 2021-03-24

## (undated) RX ORDER — CEFAZOLIN SODIUM 2 G/100ML
INJECTION, SOLUTION INTRAVENOUS
Status: DISPENSED
Start: 2021-03-24

## (undated) RX ORDER — FENTANYL CITRATE 50 UG/ML
INJECTION, SOLUTION INTRAMUSCULAR; INTRAVENOUS
Status: DISPENSED
Start: 2021-03-24

## (undated) RX ORDER — FENTANYL CITRATE 50 UG/ML
INJECTION, SOLUTION INTRAMUSCULAR; INTRAVENOUS
Status: DISPENSED
Start: 2025-01-03

## (undated) RX ORDER — OXYCODONE HYDROCHLORIDE 5 MG/1
TABLET ORAL
Status: DISPENSED
Start: 2021-03-24

## (undated) RX ORDER — FENTANYL CITRATE 50 UG/ML
INJECTION, SOLUTION INTRAMUSCULAR; INTRAVENOUS
Status: DISPENSED
Start: 2020-01-06

## (undated) RX ORDER — TRIAMCINOLONE ACETONIDE 40 MG/ML
INJECTION, SUSPENSION INTRA-ARTICULAR; INTRAMUSCULAR
Status: DISPENSED
Start: 2022-03-03

## (undated) RX ORDER — ACETAMINOPHEN 325 MG/1
TABLET ORAL
Status: DISPENSED
Start: 2021-03-24

## (undated) RX ORDER — LIDOCAINE HYDROCHLORIDE 20 MG/ML
INJECTION, SOLUTION EPIDURAL; INFILTRATION; INTRACAUDAL; PERINEURAL
Status: DISPENSED
Start: 2021-03-24

## (undated) RX ORDER — SULFAMETHOXAZOLE/TRIMETHOPRIM 800-160 MG
TABLET ORAL
Status: DISPENSED
Start: 2020-02-11

## (undated) RX ORDER — BUPIVACAINE HYDROCHLORIDE 2.5 MG/ML
INJECTION, SOLUTION EPIDURAL; INFILTRATION; INTRACAUDAL
Status: DISPENSED
Start: 2021-03-24

## (undated) RX ORDER — PROPOFOL 10 MG/ML
INJECTION, EMULSION INTRAVENOUS
Status: DISPENSED
Start: 2021-03-24

## (undated) RX ORDER — ONDANSETRON 2 MG/ML
INJECTION INTRAMUSCULAR; INTRAVENOUS
Status: DISPENSED
Start: 2021-03-24

## (undated) RX ORDER — CIPROFLOXACIN 500 MG/1
TABLET, FILM COATED ORAL
Status: DISPENSED
Start: 2021-09-15

## (undated) RX ORDER — DEXAMETHASONE SODIUM PHOSPHATE 4 MG/ML
INJECTION, SOLUTION INTRA-ARTICULAR; INTRALESIONAL; INTRAMUSCULAR; INTRAVENOUS; SOFT TISSUE
Status: DISPENSED
Start: 2021-03-24

## (undated) RX ORDER — ONDANSETRON 4 MG/1
TABLET, ORALLY DISINTEGRATING ORAL
Status: DISPENSED
Start: 2021-03-24